# Patient Record
Sex: MALE | Race: WHITE | Employment: OTHER | URBAN - METROPOLITAN AREA
[De-identification: names, ages, dates, MRNs, and addresses within clinical notes are randomized per-mention and may not be internally consistent; named-entity substitution may affect disease eponyms.]

---

## 2017-04-29 ENCOUNTER — HOSPITAL ENCOUNTER (OUTPATIENT)
Dept: VASCULAR LAB | Age: 72
Discharge: HOME | End: 2017-04-29
Payer: COMMERCIAL

## 2017-04-29 PROCEDURE — 93880 EXTRACRANIAL BILAT STUDY: CPT

## 2018-03-15 ENCOUNTER — HOSPITAL ENCOUNTER (OUTPATIENT)
Age: 73
Discharge: HOME | End: 2018-03-17
Payer: COMMERCIAL

## 2018-03-15 ENCOUNTER — HOSPITAL ENCOUNTER (OUTPATIENT)
Dept: GENERAL RADIOLOGY | Age: 73
Discharge: HOME | End: 2018-03-17
Payer: COMMERCIAL

## 2018-03-15 DIAGNOSIS — J18.9 PNEUMONIA DUE TO INFECTIOUS ORGANISM, UNSPECIFIED LATERALITY, UNSPECIFIED PART OF LUNG: ICD-10-CM

## 2018-03-15 DIAGNOSIS — R05.9 COUGH: ICD-10-CM

## 2018-03-15 PROCEDURE — 71046 X-RAY EXAM CHEST 2 VIEWS: CPT

## 2018-04-09 ENCOUNTER — HOSPITAL ENCOUNTER (OUTPATIENT)
Age: 73
Discharge: HOME | End: 2018-04-09
Payer: COMMERCIAL

## 2018-04-09 LAB — VITAMIN B-12: 719 PG/ML (ref 232–1245)

## 2018-04-09 PROCEDURE — 82607 VITAMIN B-12: CPT

## 2018-04-09 PROCEDURE — 36415 COLL VENOUS BLD VENIPUNCTURE: CPT

## 2018-06-27 ENCOUNTER — OFFICE VISIT (OUTPATIENT)
Dept: GASTROENTEROLOGY | Age: 73
End: 2018-06-27
Payer: COMMERCIAL

## 2018-06-27 VITALS
OXYGEN SATURATION: 96 % | HEART RATE: 73 BPM | BODY MASS INDEX: 25.36 KG/M2 | HEIGHT: 75 IN | WEIGHT: 204 LBS | DIASTOLIC BLOOD PRESSURE: 82 MMHG | SYSTOLIC BLOOD PRESSURE: 169 MMHG

## 2018-06-27 DIAGNOSIS — R19.4 CHANGE IN BOWEL HABITS: ICD-10-CM

## 2018-06-27 DIAGNOSIS — K59.00 CONSTIPATION, UNSPECIFIED CONSTIPATION TYPE: ICD-10-CM

## 2018-06-27 DIAGNOSIS — K62.5 RECTAL BLEEDING: ICD-10-CM

## 2018-06-27 PROCEDURE — 99204 OFFICE O/P NEW MOD 45 MIN: CPT | Performed by: INTERNAL MEDICINE

## 2018-06-27 RX ORDER — POLYETHYLENE GLYCOL 3350 17 G/17G
POWDER, FOR SOLUTION ORAL
Qty: 255 G | Refills: 0 | Status: CANCELLED | OUTPATIENT
Start: 2018-06-27 | End: 2018-07-27

## 2018-06-27 RX ORDER — ATORVASTATIN CALCIUM 40 MG/1
40 TABLET, FILM COATED ORAL DAILY
COMMUNITY
Start: 2018-02-12 | End: 2019-07-23 | Stop reason: SDUPTHER

## 2018-06-27 ASSESSMENT — ENCOUNTER SYMPTOMS
TROUBLE SWALLOWING: 0
ABDOMINAL PAIN: 0
NAUSEA: 0
FACIAL SWELLING: 0
ROS SKIN COMMENTS: MANY TATTOOS
SHORTNESS OF BREATH: 0
WHEEZING: 0
ABDOMINAL DISTENTION: 0
VOMITING: 0
DIARRHEA: 0
SORE THROAT: 0
RECTAL PAIN: 0
CONSTIPATION: 1
RHINORRHEA: 0
SINUS PAIN: 1
BACK PAIN: 1
VOICE CHANGE: 0
SINUS PRESSURE: 0
BLOOD IN STOOL: 1
ANAL BLEEDING: 0
COUGH: 1

## 2018-06-27 NOTE — TELEPHONE ENCOUNTER
Bette, please change the prep instructions to golytely, after you call the patient, please forward this to Dr Figueroa Rojas to sign the prep.   Thanks

## 2018-07-16 ENCOUNTER — TELEPHONE (OUTPATIENT)
Dept: GASTROENTEROLOGY | Age: 73
End: 2018-07-16

## 2018-08-15 ENCOUNTER — TELEPHONE (OUTPATIENT)
Dept: GASTROENTEROLOGY | Age: 73
End: 2018-08-15

## 2018-08-29 ENCOUNTER — HOSPITAL ENCOUNTER (OUTPATIENT)
Dept: MRI IMAGING | Age: 73
Discharge: HOME | End: 2018-08-31
Payer: COMMERCIAL

## 2018-08-29 DIAGNOSIS — G60.9 IDIOPATHIC PERIPHERAL NEUROPATHY: ICD-10-CM

## 2018-08-29 PROCEDURE — 72148 MRI LUMBAR SPINE W/O DYE: CPT

## 2018-08-30 ENCOUNTER — TELEPHONE (OUTPATIENT)
Dept: GASTROENTEROLOGY | Age: 73
End: 2018-08-30

## 2018-08-30 NOTE — TELEPHONE ENCOUNTER
Spoke with patient and confirmed the following:  understanding of bowel prep, arrival time of 700am and need for  to and form for tomorrow's colon at [facility].

## 2018-08-31 ENCOUNTER — HOSPITAL ENCOUNTER (OUTPATIENT)
Age: 73
Setting detail: OUTPATIENT SURGERY
Discharge: HOME | End: 2018-08-31
Attending: INTERNAL MEDICINE | Admitting: INTERNAL MEDICINE
Payer: COMMERCIAL

## 2018-08-31 VITALS
OXYGEN SATURATION: 97 % | SYSTOLIC BLOOD PRESSURE: 103 MMHG | HEART RATE: 52 BPM | RESPIRATION RATE: 14 BRPM | BODY MASS INDEX: 24.87 KG/M2 | HEIGHT: 75 IN | DIASTOLIC BLOOD PRESSURE: 50 MMHG | TEMPERATURE: 97.5 F | WEIGHT: 200 LBS

## 2018-08-31 PROCEDURE — 2580000003 HC RX 258: Performed by: INTERNAL MEDICINE

## 2018-08-31 PROCEDURE — 99153 MOD SED SAME PHYS/QHP EA: CPT | Performed by: INTERNAL MEDICINE

## 2018-08-31 PROCEDURE — 7100000010 HC PHASE II RECOVERY - FIRST 15 MIN: Performed by: INTERNAL MEDICINE

## 2018-08-31 PROCEDURE — 7100000011 HC PHASE II RECOVERY - ADDTL 15 MIN: Performed by: INTERNAL MEDICINE

## 2018-08-31 PROCEDURE — 88305 TISSUE EXAM BY PATHOLOGIST: CPT

## 2018-08-31 PROCEDURE — 3609010400 HC COLONOSCOPY POLYPECTOMY HOT BIOPSY: Performed by: INTERNAL MEDICINE

## 2018-08-31 PROCEDURE — 99152 MOD SED SAME PHYS/QHP 5/>YRS: CPT | Performed by: INTERNAL MEDICINE

## 2018-08-31 PROCEDURE — 6360000002 HC RX W HCPCS: Performed by: INTERNAL MEDICINE

## 2018-08-31 PROCEDURE — C1773 RET DEV, INSERTABLE: HCPCS | Performed by: INTERNAL MEDICINE

## 2018-08-31 PROCEDURE — 2709999900 HC NON-CHARGEABLE SUPPLY: Performed by: INTERNAL MEDICINE

## 2018-08-31 RX ORDER — MIDAZOLAM HYDROCHLORIDE 1 MG/ML
INJECTION INTRAMUSCULAR; INTRAVENOUS PRN
Status: DISCONTINUED | OUTPATIENT
Start: 2018-08-31 | End: 2018-08-31 | Stop reason: HOSPADM

## 2018-08-31 RX ORDER — FENTANYL CITRATE 50 UG/ML
INJECTION, SOLUTION INTRAMUSCULAR; INTRAVENOUS PRN
Status: DISCONTINUED | OUTPATIENT
Start: 2018-08-31 | End: 2018-08-31 | Stop reason: HOSPADM

## 2018-08-31 RX ORDER — SODIUM CHLORIDE 9 MG/ML
INJECTION, SOLUTION INTRAVENOUS CONTINUOUS
Status: DISCONTINUED | OUTPATIENT
Start: 2018-08-31 | End: 2018-08-31 | Stop reason: HOSPADM

## 2018-08-31 RX ORDER — ALBUTEROL SULFATE 90 UG/1
2 AEROSOL, METERED RESPIRATORY (INHALATION) EVERY 6 HOURS PRN
COMMUNITY
End: 2019-06-27 | Stop reason: SDUPTHER

## 2018-08-31 RX ADMIN — SODIUM CHLORIDE: 9 INJECTION, SOLUTION INTRAVENOUS at 07:40

## 2018-08-31 ASSESSMENT — PAIN SCALES - GENERAL
PAINLEVEL_OUTOF10: 0

## 2018-08-31 ASSESSMENT — PAIN - FUNCTIONAL ASSESSMENT: PAIN_FUNCTIONAL_ASSESSMENT: 0-10

## 2018-08-31 NOTE — H&P
(36.2 °C) (Oral)   Resp 18   Ht 6' 3\" (1.905 m)   Wt 200 lb (90.7 kg)   SpO2 95%   BMI 25.00 kg/m²  Body mass index is 25 kg/m². GENERAL APPEARANCE:   Katya Coronado is 68 y.o.,  male, mildly obese, nourished, conscious, alert. Does not appear to be distress or pain at this time. SKIN:  Warm, dry, no cyanosis or jaundice. HEAD:  Normocephalic, atraumatic, no swelling or tenderness. EYES:  Pupils equal, reactive to light. EARS:  No discharge, hearing loss bilaterally. NOSE:  No rhinorrhea, epistaxis or septal deformity. THROAT: Teeth absent. Not congested. No ulceration bleeding or discharge. NECK:  No stiffness, trachea central.                 CHEST:  Symmetrical and equal on expansion. HEART:  RRR S1 > S2. No audible murmurs or gallops. LUNGS:  Equal on expansion, normal breath sounds. No wheezing, rhonchi or rales on exam. Productive chronic cough. ABDOMEN:  Obese. Soft on palpation. No localized tenderness. Surgical scars to abdomen. No guarding or rigidity. No palpable organomegaly. LYMPHATICS:  No palpable cervical lymphadenopathy. LOCOMOTOR, BACK AND SPINE:  No tenderness or deformities. EXTREMITIES:  Symmetrical, trace pedal edema. Nachos sign negative. No discoloration or ulcerations. Right sided weakness s/p CVA,  strength weaker on the right. NEUROLOGIC:  The patient is conscious, alert, oriented. PROVISIONAL DIAGNOSES / SURGERY:      Change in bowel habits, rectal bleeding.    Colonoscopy    Patient Active Problem List    Diagnosis Date Noted    Constipation 06/27/2018    Change in bowel habits 06/27/2018    Rectal bleeding 06/27/2018       ASA Classification:  Class 3 - A patient with severe systemic disease that limits activity but is not incapacitating    Mallampati Airway Assessment:  Mallampati Class II - (soft palate, fauces & uvula are visible)    I have examined this patient and reviewed the history and physical, vital signs, current medications and review of systems.     Electronically signed by Indira Yates MD on 8/31/2018 at 8:00 AM        RAGHAV Dickens - CNP on 8/31/2018 at 7:44 AM

## 2018-08-31 NOTE — OP NOTE
COLONOSCOPY    DATE OF PROCEDURE: 8/31/2018    SURGEON: Indira Yates MD    ASSISTANT: None    PREOPERATIVE DIAGNOSIS: History of rectal bleeding. This procedure is performed to evaluate colonic pathology    POSTOPERATIVE DIAGNOSIS: Polyp in the apex of the rectum Removed with snare and cautery techniqueand polyp in the left colon. No radiation induced proctitis. Few diverticuli. OPERATION: Total colonoscopy And excision of the polyp with biopsy forcepsFrom left colon, snare polypectomy From apex of the rectum. ANESTHESIA: Moderate sedation      ESTIMATED BLOOD LOSS: None    COMPLICATIONS: None     SPECIMENS:  Was Obtained: Polyp Of the rectum, polyp in the left colon    HISTORY: The patient is a 68y.o. year old male with history of above preop diagnosis. I recommended colonoscopy with possible biopsy or polypectomy and I explained the risk, benefits, expected outcome, and alternatives to the procedure. Risks included but are not limited to bleeding, infection, respiratory distress, hypotension, and perforation of the colon and possibility of missing a lesion. The patient understands and is in agreement. PROCEDURE:  The patient's SPO2 remained above 90% throughout the procedure. Digital rectal exam was normal.  The colonoscope was inserted through the anus into the rectum and advanced under direct vision to the cecum without difficulty. Terminal ileum was examined for approximately 2 inches. The prep was good. Findings:  Terminal ileum: normal    Cecum/Ascending colon: normal    Transverse colon: normal    Descending/Sigmoid colon: abnormal: In the left colon at about 80 cm from the anus there is a polyp which is about 3-4 mm. This polyp is excised with biopsy forceps. Rectum/Anus: examined in normal and retroflexed positions and was abnormal: In the apex of the rectum, underneath a fold, hiding, a polyp which is about 1.5 cm, sessile.   This polyp is completely removed using snare and cautery technique. Withdrawal Time was (minutes): 15          The colon was decompressed and the scope was removed. The patient tolerated the procedure and conscious sedation without unusual events. During the procedure, the patient's blood pressure, pulse and oxygen saturation remained stable and documented. No unusual events occurred during the procedure. Patient was transferred to recovery room and will be discharged when criteria is met. Recommendations/Plan:   1. F/U Biopsies  2. F/U In Office as instructed  3. Discussed with the family  4. High fiber diet   5. Precautions to avoid constipation     Next colonoscopy: 3 years.   If Colonoscopy is less than 10 years the recommended reason is due:polyps    Electronically signed by Noa Hendrickson MD  on 8/31/2018 at 8:30 AM

## 2018-09-04 LAB — SURGICAL PATHOLOGY REPORT: NORMAL

## 2018-09-20 ENCOUNTER — OFFICE VISIT (OUTPATIENT)
Dept: GASTROENTEROLOGY | Age: 73
End: 2018-09-20
Payer: COMMERCIAL

## 2018-09-20 VITALS
BODY MASS INDEX: 24.77 KG/M2 | WEIGHT: 198.2 LBS | SYSTOLIC BLOOD PRESSURE: 142 MMHG | DIASTOLIC BLOOD PRESSURE: 84 MMHG | HEART RATE: 68 BPM

## 2018-09-20 DIAGNOSIS — D36.9 TUBULAR ADENOMA: ICD-10-CM

## 2018-09-20 DIAGNOSIS — K59.00 CONSTIPATION, UNSPECIFIED CONSTIPATION TYPE: ICD-10-CM

## 2018-09-20 DIAGNOSIS — R19.4 CHANGE IN BOWEL HABITS: ICD-10-CM

## 2018-09-20 DIAGNOSIS — K62.5 RECTAL BLEEDING: Primary | ICD-10-CM

## 2018-09-20 PROCEDURE — 99213 OFFICE O/P EST LOW 20 MIN: CPT | Performed by: INTERNAL MEDICINE

## 2018-09-20 ASSESSMENT — ENCOUNTER SYMPTOMS
ANAL BLEEDING: 0
CHOKING: 0
EYE ITCHING: 1
CHEST TIGHTNESS: 0
ABDOMINAL PAIN: 0
DIARRHEA: 0
SHORTNESS OF BREATH: 0
WHEEZING: 1
BLOOD IN STOOL: 0
NAUSEA: 0
VOMITING: 0
ALLERGIC/IMMUNOLOGIC NEGATIVE: 1
COUGH: 0
RECTAL PAIN: 0
ABDOMINAL DISTENTION: 0
CONSTIPATION: 0
BACK PAIN: 1

## 2018-09-26 ENCOUNTER — HOSPITAL ENCOUNTER (OUTPATIENT)
Dept: MRI IMAGING | Age: 73
Discharge: HOME | End: 2018-09-28
Payer: COMMERCIAL

## 2018-09-26 DIAGNOSIS — G60.9 IDIOPATHIC PERIPHERAL NEUROPATHY: ICD-10-CM

## 2018-09-26 DIAGNOSIS — M54.2 NECK PAIN: ICD-10-CM

## 2018-09-26 DIAGNOSIS — M54.50 LUMBAR PAIN: ICD-10-CM

## 2018-09-26 PROCEDURE — 70551 MRI BRAIN STEM W/O DYE: CPT

## 2018-09-26 PROCEDURE — 72141 MRI NECK SPINE W/O DYE: CPT

## 2018-10-29 ENCOUNTER — HOSPITAL ENCOUNTER (OUTPATIENT)
Dept: MRI IMAGING | Age: 73
Discharge: HOME | End: 2018-10-31
Payer: COMMERCIAL

## 2018-10-29 DIAGNOSIS — R29.898 RIGHT LEG WEAKNESS: ICD-10-CM

## 2018-10-29 DIAGNOSIS — M54.50 LUMBAR PAIN: ICD-10-CM

## 2018-10-29 DIAGNOSIS — M54.2 NECK PAIN: ICD-10-CM

## 2018-10-29 PROCEDURE — 72146 MRI CHEST SPINE W/O DYE: CPT

## 2019-01-29 ENCOUNTER — OFFICE VISIT (OUTPATIENT)
Dept: PODIATRY | Age: 74
End: 2019-01-29
Payer: COMMERCIAL

## 2019-01-29 VITALS — WEIGHT: 200 LBS | HEIGHT: 75 IN | BODY MASS INDEX: 24.87 KG/M2

## 2019-01-29 DIAGNOSIS — M79.674 PAIN OF TOES OF BOTH FEET: ICD-10-CM

## 2019-01-29 DIAGNOSIS — G60.3 IDIOPATHIC PROGRESSIVE NEUROPATHY: ICD-10-CM

## 2019-01-29 DIAGNOSIS — B35.1 ONYCHOMYCOSIS OF TOENAIL: Primary | ICD-10-CM

## 2019-01-29 DIAGNOSIS — L84 CORNS AND CALLOSITIES: ICD-10-CM

## 2019-01-29 DIAGNOSIS — M79.675 PAIN OF TOES OF BOTH FEET: ICD-10-CM

## 2019-01-29 DIAGNOSIS — M79.672 PAIN IN LEFT FOOT: ICD-10-CM

## 2019-01-29 PROCEDURE — 99203 OFFICE O/P NEW LOW 30 MIN: CPT | Performed by: PODIATRIST

## 2019-01-29 PROCEDURE — 11056 PARNG/CUTG B9 HYPRKR LES 2-4: CPT | Performed by: PODIATRIST

## 2019-01-29 PROCEDURE — 11721 DEBRIDE NAIL 6 OR MORE: CPT | Performed by: PODIATRIST

## 2019-01-29 RX ORDER — LISINOPRIL AND HYDROCHLOROTHIAZIDE 12.5; 1 MG/1; MG/1
1 TABLET ORAL DAILY
Refills: 0 | COMMUNITY
Start: 2019-01-12 | End: 2019-07-23 | Stop reason: SDUPTHER

## 2019-01-29 RX ORDER — GABAPENTIN 800 MG/1
800 TABLET ORAL 3 TIMES DAILY
Refills: 0 | Status: ON HOLD | COMMUNITY
Start: 2019-01-16 | End: 2021-11-02 | Stop reason: HOSPADM

## 2019-01-29 ASSESSMENT — ENCOUNTER SYMPTOMS
COLOR CHANGE: 0
NAUSEA: 0
BACK PAIN: 0
DIARRHEA: 0
SHORTNESS OF BREATH: 0

## 2019-04-08 ENCOUNTER — HOSPITAL ENCOUNTER (OUTPATIENT)
Age: 74
Discharge: HOME | End: 2019-04-10
Payer: COMMERCIAL

## 2019-04-08 ENCOUNTER — HOSPITAL ENCOUNTER (OUTPATIENT)
Age: 74
Discharge: HOME | End: 2019-04-08
Payer: COMMERCIAL

## 2019-04-08 ENCOUNTER — HOSPITAL ENCOUNTER (OUTPATIENT)
Dept: NON INVASIVE DIAGNOSTICS | Age: 74
Discharge: HOME | End: 2019-04-08
Payer: COMMERCIAL

## 2019-04-08 ENCOUNTER — HOSPITAL ENCOUNTER (OUTPATIENT)
Dept: GENERAL RADIOLOGY | Age: 74
Discharge: HOME | End: 2019-04-10
Payer: COMMERCIAL

## 2019-04-08 DIAGNOSIS — R06.00 DYSPNEA, UNSPECIFIED TYPE: ICD-10-CM

## 2019-04-08 LAB
ALLEN TEST: ABNORMAL
CARBOXYHEMOGLOBIN: 4.1 % (ref 0–5)
FIO2: ABNORMAL
HCO3 ARTERIAL: 25.5 MMOL/L (ref 22–26)
LV EF: 63 %
LVEF MODALITY: NORMAL
METHEMOGLOBIN: 0.3 % (ref 0–1.9)
MODE: ABNORMAL
NEGATIVE BASE EXCESS, ART: ABNORMAL MMOL/L (ref 0–2)
NOTIFICATION TIME: ABNORMAL
NOTIFICATION: ABNORMAL
O2 DEVICE/FLOW/%: ABNORMAL
O2 SAT, ARTERIAL: 90.6 % (ref 95–98)
OXYHEMOGLOBIN: ABNORMAL % (ref 95–98)
PATIENT TEMP: 37
PCO2 ARTERIAL: 39.7 MMHG (ref 35–45)
PCO2, ART, TEMP ADJ: ABNORMAL (ref 35–45)
PEEP/CPAP: ABNORMAL
PH ARTERIAL: 7.42 (ref 7.35–7.45)
PH, ART, TEMP ADJ: ABNORMAL (ref 7.35–7.45)
PO2 ARTERIAL: 67.2 MMHG (ref 80–100)
PO2, ART, TEMP ADJ: ABNORMAL MMHG (ref 80–100)
POSITIVE BASE EXCESS, ART: 0.9 MMOL/L (ref 0–2)
PSV: ABNORMAL
PT. POSITION: ABNORMAL
RESPIRATORY RATE: ABNORMAL
SAMPLE SITE: ABNORMAL
SET RATE: ABNORMAL
TEXT FOR RESPIRATORY: ABNORMAL
TOTAL HB: ABNORMAL G/DL (ref 12–16)
TOTAL RATE: ABNORMAL
VT: ABNORMAL

## 2019-04-08 PROCEDURE — 71046 X-RAY EXAM CHEST 2 VIEWS: CPT

## 2019-04-08 PROCEDURE — 36600 WITHDRAWAL OF ARTERIAL BLOOD: CPT

## 2019-04-08 PROCEDURE — 93306 TTE W/DOPPLER COMPLETE: CPT

## 2019-04-08 PROCEDURE — 82805 BLOOD GASES W/O2 SATURATION: CPT

## 2019-04-08 PROCEDURE — 93005 ELECTROCARDIOGRAM TRACING: CPT

## 2019-05-15 LAB
EKG ATRIAL RATE: 76 BPM
EKG P AXIS: 72 DEGREES
EKG P-R INTERVAL: 324 MS
EKG Q-T INTERVAL: 392 MS
EKG QRS DURATION: 84 MS
EKG QTC CALCULATION (BAZETT): 441 MS
EKG R AXIS: 78 DEGREES
EKG T AXIS: 74 DEGREES
EKG VENTRICULAR RATE: 76 BPM

## 2019-06-27 ENCOUNTER — OFFICE VISIT (OUTPATIENT)
Dept: INTERNAL MEDICINE CLINIC | Age: 74
End: 2019-06-27
Payer: COMMERCIAL

## 2019-06-27 VITALS
DIASTOLIC BLOOD PRESSURE: 102 MMHG | HEART RATE: 80 BPM | HEIGHT: 75 IN | WEIGHT: 204 LBS | OXYGEN SATURATION: 97 % | BODY MASS INDEX: 25.36 KG/M2 | SYSTOLIC BLOOD PRESSURE: 170 MMHG

## 2019-06-27 DIAGNOSIS — I48.91 ATRIAL FIBRILLATION, UNSPECIFIED TYPE (HCC): Primary | ICD-10-CM

## 2019-06-27 DIAGNOSIS — E11.8 TYPE 2 DIABETES MELLITUS WITH COMPLICATION, UNSPECIFIED WHETHER LONG TERM INSULIN USE: ICD-10-CM

## 2019-06-27 DIAGNOSIS — I63.9 CEREBROVASCULAR ACCIDENT (CVA), UNSPECIFIED MECHANISM (HCC): ICD-10-CM

## 2019-06-27 DIAGNOSIS — I50.9 CONGESTIVE HEART FAILURE, UNSPECIFIED HF CHRONICITY, UNSPECIFIED HEART FAILURE TYPE (HCC): ICD-10-CM

## 2019-06-27 PROCEDURE — 99203 OFFICE O/P NEW LOW 30 MIN: CPT | Performed by: INTERNAL MEDICINE

## 2019-06-27 RX ORDER — AMLODIPINE BESYLATE 10 MG/1
10 TABLET ORAL DAILY
Qty: 30 TABLET | Refills: 3 | Status: SHIPPED | OUTPATIENT
Start: 2019-06-27 | End: 2019-10-16 | Stop reason: SDUPTHER

## 2019-06-27 RX ORDER — ALBUTEROL SULFATE 90 UG/1
2 AEROSOL, METERED RESPIRATORY (INHALATION) EVERY 6 HOURS PRN
Qty: 3 INHALER | Refills: 6 | Status: ON HOLD | OUTPATIENT
Start: 2019-06-27 | End: 2021-08-03 | Stop reason: ALTCHOICE

## 2019-06-27 ASSESSMENT — PATIENT HEALTH QUESTIONNAIRE - PHQ9
SUM OF ALL RESPONSES TO PHQ QUESTIONS 1-9: 0
SUM OF ALL RESPONSES TO PHQ QUESTIONS 1-9: 0
SUM OF ALL RESPONSES TO PHQ9 QUESTIONS 1 & 2: 0
2. FEELING DOWN, DEPRESSED OR HOPELESS: 0
1. LITTLE INTEREST OR PLEASURE IN DOING THINGS: 0

## 2019-07-05 ENCOUNTER — HOSPITAL ENCOUNTER (OUTPATIENT)
Dept: VASCULAR LAB | Age: 74
Discharge: HOME | End: 2019-07-05
Payer: COMMERCIAL

## 2019-07-05 DIAGNOSIS — I63.9 CEREBROVASCULAR ACCIDENT (CVA), UNSPECIFIED MECHANISM (HCC): ICD-10-CM

## 2019-07-05 PROCEDURE — 93880 EXTRACRANIAL BILAT STUDY: CPT

## 2019-07-12 ENCOUNTER — TELEPHONE (OUTPATIENT)
Dept: INTERNAL MEDICINE CLINIC | Age: 74
End: 2019-07-12

## 2019-07-12 DIAGNOSIS — I63.9 CEREBROVASCULAR ACCIDENT (CVA), UNSPECIFIED MECHANISM (HCC): Primary | ICD-10-CM

## 2019-07-17 ENCOUNTER — TELEPHONE (OUTPATIENT)
Dept: INTERNAL MEDICINE CLINIC | Age: 74
End: 2019-07-17

## 2019-07-17 DIAGNOSIS — I48.91 ATRIAL FIBRILLATION, UNSPECIFIED TYPE (HCC): Primary | ICD-10-CM

## 2019-07-17 DIAGNOSIS — I50.9 CONGESTIVE HEART FAILURE, UNSPECIFIED HF CHRONICITY, UNSPECIFIED HEART FAILURE TYPE (HCC): ICD-10-CM

## 2019-07-17 DIAGNOSIS — I63.9 CEREBROVASCULAR ACCIDENT (CVA), UNSPECIFIED MECHANISM (HCC): ICD-10-CM

## 2019-07-17 NOTE — TELEPHONE ENCOUNTER
Patients daughter states per the physical therapist for her dad, he is need of a rolator walker. Could you please send an order to [facility] for this?

## 2019-07-23 NOTE — TELEPHONE ENCOUNTER
Medication: lisinopril, lipitor  Last visit: 6/27/19  Next visit: 8/1/2019  Last refill: 1/2019  Pharmacy: [facility] out of meds.

## 2019-07-24 RX ORDER — LISINOPRIL AND HYDROCHLOROTHIAZIDE 12.5; 1 MG/1; MG/1
1 TABLET ORAL DAILY
Qty: 30 TABLET | Refills: 5 | Status: ON HOLD | OUTPATIENT
Start: 2019-07-24 | End: 2021-06-07 | Stop reason: HOSPADM

## 2019-07-24 RX ORDER — ATORVASTATIN CALCIUM 40 MG/1
40 TABLET, FILM COATED ORAL DAILY
Qty: 30 TABLET | Refills: 5 | Status: SHIPPED | OUTPATIENT
Start: 2019-07-24 | End: 2021-10-22

## 2019-07-26 ENCOUNTER — TELEPHONE (OUTPATIENT)
Dept: INTERNAL MEDICINE CLINIC | Age: 74
End: 2019-07-26

## 2019-08-01 ENCOUNTER — OFFICE VISIT (OUTPATIENT)
Dept: INTERNAL MEDICINE CLINIC | Age: 74
End: 2019-08-01
Payer: COMMERCIAL

## 2019-08-01 VITALS
HEIGHT: 75 IN | BODY MASS INDEX: 25.74 KG/M2 | DIASTOLIC BLOOD PRESSURE: 82 MMHG | SYSTOLIC BLOOD PRESSURE: 154 MMHG | WEIGHT: 207 LBS

## 2019-08-01 DIAGNOSIS — I50.9 CONGESTIVE HEART FAILURE, UNSPECIFIED HF CHRONICITY, UNSPECIFIED HEART FAILURE TYPE (HCC): Primary | ICD-10-CM

## 2019-08-01 DIAGNOSIS — I63.9 CEREBROVASCULAR ACCIDENT (CVA), UNSPECIFIED MECHANISM (HCC): ICD-10-CM

## 2019-08-01 PROCEDURE — 99214 OFFICE O/P EST MOD 30 MIN: CPT | Performed by: INTERNAL MEDICINE

## 2019-08-01 RX ORDER — FUROSEMIDE 20 MG/1
20 TABLET ORAL DAILY
Qty: 60 TABLET | Refills: 3 | Status: SHIPPED | OUTPATIENT
Start: 2019-08-01 | End: 2019-09-11 | Stop reason: SDUPTHER

## 2019-08-01 RX ORDER — POTASSIUM CHLORIDE 20 MEQ/1
20 TABLET, EXTENDED RELEASE ORAL DAILY
Qty: 30 TABLET | Refills: 5 | Status: SHIPPED | OUTPATIENT
Start: 2019-08-01 | End: 2021-05-24 | Stop reason: DRUGHIGH

## 2019-08-05 ENCOUNTER — TELEPHONE (OUTPATIENT)
Dept: INTERNAL MEDICINE CLINIC | Age: 74
End: 2019-08-05

## 2019-08-14 ENCOUNTER — TELEPHONE (OUTPATIENT)
Dept: PAIN MANAGEMENT | Age: 74
End: 2019-08-14

## 2019-08-14 DIAGNOSIS — M21.969 DEFORMITY OF FOOT, UNSPECIFIED LATERALITY: ICD-10-CM

## 2019-08-14 DIAGNOSIS — E11.8 TYPE 2 DIABETES MELLITUS WITH COMPLICATION, WITHOUT LONG-TERM CURRENT USE OF INSULIN (HCC): Primary | ICD-10-CM

## 2019-08-14 NOTE — TELEPHONE ENCOUNTER
PATIENT CALLED STATING HE HAD A STROKE SO HE HASNT BEEN IN TO SEE YOU SINCE 01/2019. HE WAS RECENTLY DX WITH TYPE 2 DM ITH COMPLICATION, UNSPECIFIED WHETHER LONG-TERM INSULIN USE. PATIENT INFORMED ABOUT DM SHOES AND WOULD LIKE TO START THIS PROCESS.  HE DOES HAVE INSURANCE THAT MAKES HIM GO OUTSIDE THE OFFICE

## 2019-08-15 ENCOUNTER — HOSPITAL ENCOUNTER (OUTPATIENT)
Age: 74
Setting detail: SPECIMEN
Discharge: HOME | End: 2019-08-15
Payer: COMMERCIAL

## 2019-08-15 ENCOUNTER — TELEPHONE (OUTPATIENT)
Dept: INTERNAL MEDICINE CLINIC | Age: 74
End: 2019-08-15

## 2019-08-15 DIAGNOSIS — I50.9 CONGESTIVE HEART FAILURE, UNSPECIFIED HF CHRONICITY, UNSPECIFIED HEART FAILURE TYPE (HCC): ICD-10-CM

## 2019-08-15 LAB
ABSOLUTE EOS #: 0.32 K/UL (ref 0–0.44)
ABSOLUTE IMMATURE GRANULOCYTE: 0.04 K/UL (ref 0–0.3)
ABSOLUTE LYMPH #: 1.89 K/UL (ref 1.1–3.7)
ABSOLUTE MONO #: 0.67 K/UL (ref 0.1–1.2)
ANION GAP SERPL CALCULATED.3IONS-SCNC: 16 MMOL/L (ref 9–17)
BASOPHILS # BLD: 1 % (ref 0–2)
BASOPHILS ABSOLUTE: 0.06 K/UL (ref 0–0.2)
BUN BLDV-MCNC: 20 MG/DL (ref 8–23)
BUN/CREAT BLD: ABNORMAL (ref 9–20)
CALCIUM SERPL-MCNC: 9.7 MG/DL (ref 8.6–10.4)
CHLORIDE BLD-SCNC: 99 MMOL/L (ref 98–107)
CHOLESTEROL/HDL RATIO: 1.9
CHOLESTEROL: 132 MG/DL
CO2: 26 MMOL/L (ref 20–31)
CREAT SERPL-MCNC: 1.01 MG/DL (ref 0.7–1.2)
DIFFERENTIAL TYPE: ABNORMAL
EOSINOPHILS RELATIVE PERCENT: 4 % (ref 1–4)
GFR AFRICAN AMERICAN: >60 ML/MIN
GFR NON-AFRICAN AMERICAN: >60 ML/MIN
GFR SERPL CREATININE-BSD FRML MDRD: ABNORMAL ML/MIN/{1.73_M2}
GFR SERPL CREATININE-BSD FRML MDRD: ABNORMAL ML/MIN/{1.73_M2}
GLUCOSE BLD-MCNC: 101 MG/DL (ref 70–99)
HCT VFR BLD CALC: 42.3 % (ref 40.7–50.3)
HDLC SERPL-MCNC: 70 MG/DL
HEMOGLOBIN: 13.8 G/DL (ref 13–17)
IMMATURE GRANULOCYTES: 1 %
LDL CHOLESTEROL: 51 MG/DL (ref 0–130)
LYMPHOCYTES # BLD: 23 % (ref 24–43)
MCH RBC QN AUTO: 30.2 PG (ref 25.2–33.5)
MCHC RBC AUTO-ENTMCNC: 32.6 G/DL (ref 28.4–34.8)
MCV RBC AUTO: 92.6 FL (ref 82.6–102.9)
MONOCYTES # BLD: 8 % (ref 3–12)
NRBC AUTOMATED: 0 PER 100 WBC
PDW BLD-RTO: 13.4 % (ref 11.8–14.4)
PLATELET # BLD: 218 K/UL (ref 138–453)
PLATELET ESTIMATE: ABNORMAL
PMV BLD AUTO: 9.5 FL (ref 8.1–13.5)
POTASSIUM SERPL-SCNC: 4.4 MMOL/L (ref 3.7–5.3)
RBC # BLD: 4.57 M/UL (ref 4.21–5.77)
RBC # BLD: ABNORMAL 10*6/UL
SEG NEUTROPHILS: 63 % (ref 36–65)
SEGMENTED NEUTROPHILS ABSOLUTE COUNT: 5.28 K/UL (ref 1.5–8.1)
SODIUM BLD-SCNC: 141 MMOL/L (ref 135–144)
TRIGL SERPL-MCNC: 54 MG/DL
VLDLC SERPL CALC-MCNC: NORMAL MG/DL (ref 1–30)
WBC # BLD: 8.3 K/UL (ref 3.5–11.3)
WBC # BLD: ABNORMAL 10*3/UL

## 2019-08-16 LAB
ESTIMATED AVERAGE GLUCOSE: 128 MG/DL
HBA1C MFR BLD: 6.1 % (ref 4–6)

## 2019-08-23 ENCOUNTER — TELEPHONE (OUTPATIENT)
Dept: INTERNAL MEDICINE CLINIC | Age: 74
End: 2019-08-23

## 2019-08-23 DIAGNOSIS — R26.81 UNSTEADY GAIT: ICD-10-CM

## 2019-08-23 DIAGNOSIS — I63.9 CEREBROVASCULAR ACCIDENT (CVA), UNSPECIFIED MECHANISM (HCC): Primary | ICD-10-CM

## 2019-08-23 NOTE — TELEPHONE ENCOUNTER
[facility] contacted office in request for new order for Rolator, date needs to be on or after face to face visit

## 2019-09-11 ENCOUNTER — OFFICE VISIT (OUTPATIENT)
Dept: INTERNAL MEDICINE CLINIC | Age: 74
End: 2019-09-11
Payer: COMMERCIAL

## 2019-09-11 VITALS
SYSTOLIC BLOOD PRESSURE: 132 MMHG | WEIGHT: 203 LBS | DIASTOLIC BLOOD PRESSURE: 80 MMHG | BODY MASS INDEX: 25.24 KG/M2 | HEIGHT: 75 IN

## 2019-09-11 DIAGNOSIS — E11.42 DIABETIC POLYNEUROPATHY ASSOCIATED WITH TYPE 2 DIABETES MELLITUS (HCC): ICD-10-CM

## 2019-09-11 DIAGNOSIS — M21.371 FOOT DROP, RIGHT FOOT: ICD-10-CM

## 2019-09-11 DIAGNOSIS — I50.9 CONGESTIVE HEART FAILURE, UNSPECIFIED HF CHRONICITY, UNSPECIFIED HEART FAILURE TYPE (HCC): ICD-10-CM

## 2019-09-11 DIAGNOSIS — E11.8 TYPE 2 DIABETES MELLITUS WITH COMPLICATION, UNSPECIFIED WHETHER LONG TERM INSULIN USE: Primary | ICD-10-CM

## 2019-09-11 PROCEDURE — 99214 OFFICE O/P EST MOD 30 MIN: CPT | Performed by: INTERNAL MEDICINE

## 2019-09-11 RX ORDER — FUROSEMIDE 20 MG/1
40 TABLET ORAL DAILY
Qty: 60 TABLET | Refills: 3 | Status: SHIPPED | OUTPATIENT
Start: 2019-09-11 | End: 2021-05-24

## 2019-09-11 NOTE — PROGRESS NOTES
Subjective:      Patient ID: Panchito Faustin is a 69 y.o. male. Chronic Disease Visit Information    BP Readings from Last 3 Encounters:   19 132/80   19 (!) 154/82   19 (!) 170/102          Hemoglobin A1C (%)   Date Value   08/15/2019 6.1 (H)   2014 5.6     LDL Cholesterol (mg/dL)   Date Value   08/15/2019 51     HDL (mg/dL)   Date Value   08/15/2019 70     BUN (mg/dL)   Date Value   08/15/2019 20     CREATININE (mg/dL)   Date Value   08/15/2019 1.01     Glucose (mg/dL)   Date Value   08/15/2019 101 (H)            Have you changed or started any medications since your last visit including any over-the-counter medicines, vitamins, or herbal medicines? no   Are you having any side effects from any of your medications? -  no  Have you stopped taking any of your medications? Is so, why? -  no    Have you seen any other physician or provider since your last visit? No  Have you had any other diagnostic tests since your last visit? No  Have you been seen in the emergency room and/or had an admission to a hospital since we last saw you? No  Have you had your annual diabetic retinal (eye) exam? No  Have you had your routine dental cleaning in the past 6 months? no    Have you activated your [employer] account? If not, what are your barriers? No:      Patient Care Team:  Krystal Linares MD as PCP - General (Internal Medicine)  Krystal Linares MD as PCP - RE[facility]ABIL[facility]ATION [facility]  Mary Perkins MD as Consulting Physician (Gastroenterology)     Chief Complaint   Patient presents with    Diabetes     pt with drop foot on the right side. Needs right foot brace/boot. Would also benefit from diabetic shoes.                Medical History Review  Past Medical, Family, and Social History reviewed and does contribute to the patient presenting condition    Health Maintenance   Topic Date Due    AAA screen  1945    Hepatitis C screen  1945    Low dose CT lung screening  08/10/2001 Exam    Assessment:       Diagnosis Orders   1. Type 2 diabetes mellitus with complication, unspecified whether long term insulin use ([facility]  Diabetic Shoe   2. Foot drop, right foot  DME Order for (Specify) as OP   3.  Diabetic polyneuropathy associated with type 2 diabetes mellitus ([facility]       DM with severe neuropathy would benefit from diabetic shoes   CVA hx with right foot drop - order for brace       Plan:              [place] MD

## 2019-10-16 RX ORDER — AMLODIPINE BESYLATE 10 MG/1
TABLET ORAL
Qty: 30 TABLET | Refills: 3 | Status: ON HOLD | OUTPATIENT
Start: 2019-10-16 | End: 2020-04-06 | Stop reason: HOSPADM

## 2020-04-03 ENCOUNTER — HOSPITAL ENCOUNTER (INPATIENT)
Age: 75
LOS: 3 days | Discharge: HOME | DRG: 300 | End: 2020-04-06
Attending: INTERNAL MEDICINE | Admitting: INTERNAL MEDICINE
Payer: COMMERCIAL

## 2020-04-03 ENCOUNTER — APPOINTMENT (OUTPATIENT)
Dept: CT IMAGING | Age: 75
End: 2020-04-03
Payer: COMMERCIAL

## 2020-04-03 ENCOUNTER — HOSPITAL ENCOUNTER (EMERGENCY)
Age: 75
Discharge: ANOTHER HOSPITAL | End: 2020-04-03
Attending: EMERGENCY MEDICINE
Payer: COMMERCIAL

## 2020-04-03 VITALS
DIASTOLIC BLOOD PRESSURE: 72 MMHG | OXYGEN SATURATION: 92 % | WEIGHT: 210 LBS | HEART RATE: 66 BPM | SYSTOLIC BLOOD PRESSURE: 111 MMHG | BODY MASS INDEX: 26.25 KG/M2 | RESPIRATION RATE: 18 BRPM | TEMPERATURE: 97.7 F

## 2020-04-03 PROBLEM — I71.00 AORTIC DISSECTION (HCC): Status: ACTIVE | Noted: 2020-04-03

## 2020-04-03 LAB
ABSOLUTE EOS #: 0.2 K/UL (ref 0–0.4)
ABSOLUTE IMMATURE GRANULOCYTE: ABNORMAL K/UL (ref 0–0.3)
ABSOLUTE LYMPH #: 1.6 K/UL (ref 1–4.8)
ABSOLUTE MONO #: 0.8 K/UL (ref 0.1–1.3)
ALBUMIN SERPL-MCNC: 4.4 G/DL (ref 3.5–5.2)
ALBUMIN/GLOBULIN RATIO: ABNORMAL (ref 1–2.5)
ALP BLD-CCNC: 76 U/L (ref 40–129)
ALT SERPL-CCNC: 17 U/L (ref 5–41)
ANION GAP SERPL CALCULATED.3IONS-SCNC: 14 MMOL/L (ref 9–17)
AST SERPL-CCNC: 17 U/L
BASOPHILS # BLD: 1 % (ref 0–2)
BASOPHILS ABSOLUTE: 0.1 K/UL (ref 0–0.2)
BILIRUB SERPL-MCNC: 1.01 MG/DL (ref 0.3–1.2)
BUN BLDV-MCNC: 23 MG/DL (ref 8–23)
BUN/CREAT BLD: ABNORMAL (ref 9–20)
CALCIUM SERPL-MCNC: 10 MG/DL (ref 8.6–10.4)
CHLORIDE BLD-SCNC: 100 MMOL/L (ref 98–107)
CO2: 27 MMOL/L (ref 20–31)
CREAT SERPL-MCNC: 0.89 MG/DL (ref 0.7–1.2)
DIFFERENTIAL TYPE: ABNORMAL
EOSINOPHILS RELATIVE PERCENT: 2 % (ref 0–4)
GFR AFRICAN AMERICAN: >60 ML/MIN
GFR NON-AFRICAN AMERICAN: >60 ML/MIN
GFR SERPL CREATININE-BSD FRML MDRD: ABNORMAL ML/MIN/{1.73_M2}
GFR SERPL CREATININE-BSD FRML MDRD: ABNORMAL ML/MIN/{1.73_M2}
GLUCOSE BLD-MCNC: 101 MG/DL (ref 70–99)
HCT VFR BLD CALC: 37.4 % (ref 41–53)
HEMOGLOBIN: 12.7 G/DL (ref 13.5–17.5)
IMMATURE GRANULOCYTES: ABNORMAL %
INR BLD: 1
LACTIC ACID: 0.9 MMOL/L (ref 0.5–2.2)
LIPASE: 31 U/L (ref 13–60)
LYMPHOCYTES # BLD: 15 % (ref 24–44)
MCH RBC QN AUTO: 30.4 PG (ref 26–34)
MCHC RBC AUTO-ENTMCNC: 33.9 G/DL (ref 31–37)
MCV RBC AUTO: 89.7 FL (ref 80–100)
MONOCYTES # BLD: 7 % (ref 1–7)
NRBC AUTOMATED: ABNORMAL PER 100 WBC
PDW BLD-RTO: 13.3 % (ref 11.5–14.9)
PLATELET # BLD: 214 K/UL (ref 150–450)
PLATELET ESTIMATE: ABNORMAL
PMV BLD AUTO: 7.1 FL (ref 6–12)
POTASSIUM SERPL-SCNC: 3.9 MMOL/L (ref 3.7–5.3)
PROTHROMBIN TIME: 13.3 SEC (ref 11.8–14.6)
RBC # BLD: 4.17 M/UL (ref 4.5–5.9)
RBC # BLD: ABNORMAL 10*6/UL
SEG NEUTROPHILS: 75 % (ref 36–66)
SEGMENTED NEUTROPHILS ABSOLUTE COUNT: 7.7 K/UL (ref 1.3–9.1)
SODIUM BLD-SCNC: 141 MMOL/L (ref 135–144)
TOTAL PROTEIN: 7.6 G/DL (ref 6.4–8.3)
WBC # BLD: 10.3 K/UL (ref 3.5–11)
WBC # BLD: ABNORMAL 10*3/UL

## 2020-04-03 PROCEDURE — 96376 TX/PRO/DX INJ SAME DRUG ADON: CPT

## 2020-04-03 PROCEDURE — 85610 PROTHROMBIN TIME: CPT

## 2020-04-03 PROCEDURE — 2500000003 HC RX 250 WO HCPCS: Performed by: NURSE PRACTITIONER

## 2020-04-03 PROCEDURE — 93005 ELECTROCARDIOGRAM TRACING: CPT | Performed by: NURSE PRACTITIONER

## 2020-04-03 PROCEDURE — 74177 CT ABD & PELVIS W/CONTRAST: CPT

## 2020-04-03 PROCEDURE — 83605 ASSAY OF LACTIC ACID: CPT

## 2020-04-03 PROCEDURE — 99222 1ST HOSP IP/OBS MODERATE 55: CPT | Performed by: INTERNAL MEDICINE

## 2020-04-03 PROCEDURE — 1200000000 HC SEMI PRIVATE

## 2020-04-03 PROCEDURE — 2580000003 HC RX 258: Performed by: NURSE PRACTITIONER

## 2020-04-03 PROCEDURE — 36415 COLL VENOUS BLD VENIPUNCTURE: CPT

## 2020-04-03 PROCEDURE — 2580000003 HC RX 258: Performed by: EMERGENCY MEDICINE

## 2020-04-03 PROCEDURE — 83690 ASSAY OF LIPASE: CPT

## 2020-04-03 PROCEDURE — 96372 THER/PROPH/DIAG INJ SC/IM: CPT

## 2020-04-03 PROCEDURE — 96375 TX/PRO/DX INJ NEW DRUG ADDON: CPT

## 2020-04-03 PROCEDURE — 6360000004 HC RX CONTRAST MEDICATION: Performed by: EMERGENCY MEDICINE

## 2020-04-03 PROCEDURE — 94761 N-INVAS EAR/PLS OXIMETRY MLT: CPT

## 2020-04-03 PROCEDURE — 80053 COMPREHEN METABOLIC PANEL: CPT

## 2020-04-03 PROCEDURE — 85025 COMPLETE CBC W/AUTO DIFF WBC: CPT

## 2020-04-03 PROCEDURE — 99285 EMERGENCY DEPT VISIT HI MDM: CPT

## 2020-04-03 PROCEDURE — 6360000002 HC RX W HCPCS: Performed by: NURSE PRACTITIONER

## 2020-04-03 PROCEDURE — 6360000002 HC RX W HCPCS: Performed by: EMERGENCY MEDICINE

## 2020-04-03 RX ORDER — ATORVASTATIN CALCIUM 40 MG/1
40 TABLET, FILM COATED ORAL NIGHTLY
Status: DISCONTINUED | OUTPATIENT
Start: 2020-04-03 | End: 2020-04-06 | Stop reason: HOSPADM

## 2020-04-03 RX ORDER — ASPIRIN 81 MG/1
81 TABLET ORAL DAILY
Status: DISCONTINUED | OUTPATIENT
Start: 2020-04-03 | End: 2020-04-04

## 2020-04-03 RX ORDER — MORPHINE SULFATE 4 MG/ML
4 INJECTION, SOLUTION INTRAMUSCULAR; INTRAVENOUS EVERY 4 HOURS PRN
Status: DISCONTINUED | OUTPATIENT
Start: 2020-04-03 | End: 2020-04-06 | Stop reason: HOSPADM

## 2020-04-03 RX ORDER — FUROSEMIDE 40 MG/1
40 TABLET ORAL DAILY
Status: DISCONTINUED | OUTPATIENT
Start: 2020-04-03 | End: 2020-04-04

## 2020-04-03 RX ORDER — LISINOPRIL AND HYDROCHLOROTHIAZIDE 12.5; 1 MG/1; MG/1
1 TABLET ORAL DAILY
Status: DISCONTINUED | OUTPATIENT
Start: 2020-04-03 | End: 2020-04-04

## 2020-04-03 RX ORDER — MAGNESIUM SULFATE 1 G/100ML
1 INJECTION INTRAVENOUS PRN
Status: DISCONTINUED | OUTPATIENT
Start: 2020-04-03 | End: 2020-04-06 | Stop reason: HOSPADM

## 2020-04-03 RX ORDER — DICYCLOMINE HYDROCHLORIDE 10 MG/ML
20 INJECTION INTRAMUSCULAR ONCE
Status: COMPLETED | OUTPATIENT
Start: 2020-04-03 | End: 2020-04-03

## 2020-04-03 RX ORDER — NICOTINE 21 MG/24HR
1 PATCH, TRANSDERMAL 24 HOURS TRANSDERMAL DAILY PRN
Status: DISCONTINUED | OUTPATIENT
Start: 2020-04-03 | End: 2020-04-06 | Stop reason: HOSPADM

## 2020-04-03 RX ORDER — GABAPENTIN 800 MG/1
800 TABLET ORAL 3 TIMES DAILY
Status: DISCONTINUED | OUTPATIENT
Start: 2020-04-03 | End: 2020-04-06 | Stop reason: HOSPADM

## 2020-04-03 RX ORDER — SODIUM CHLORIDE 9 MG/ML
INJECTION, SOLUTION INTRAVENOUS CONTINUOUS
Status: DISCONTINUED | OUTPATIENT
Start: 2020-04-03 | End: 2020-04-03 | Stop reason: HOSPADM

## 2020-04-03 RX ORDER — ONDANSETRON 2 MG/ML
4 INJECTION INTRAMUSCULAR; INTRAVENOUS EVERY 6 HOURS PRN
Status: DISCONTINUED | OUTPATIENT
Start: 2020-04-03 | End: 2020-04-06 | Stop reason: HOSPADM

## 2020-04-03 RX ORDER — POTASSIUM CHLORIDE 20 MEQ/1
20 TABLET, EXTENDED RELEASE ORAL DAILY
Status: DISCONTINUED | OUTPATIENT
Start: 2020-04-03 | End: 2020-04-06 | Stop reason: HOSPADM

## 2020-04-03 RX ORDER — AMLODIPINE BESYLATE 10 MG/1
10 TABLET ORAL DAILY
Status: DISCONTINUED | OUTPATIENT
Start: 2020-04-03 | End: 2020-04-04

## 2020-04-03 RX ORDER — ACETAMINOPHEN 650 MG/1
650 SUPPOSITORY RECTAL EVERY 6 HOURS PRN
Status: DISCONTINUED | OUTPATIENT
Start: 2020-04-03 | End: 2020-04-06 | Stop reason: HOSPADM

## 2020-04-03 RX ORDER — ACETAMINOPHEN 325 MG/1
650 TABLET ORAL EVERY 6 HOURS PRN
Status: DISCONTINUED | OUTPATIENT
Start: 2020-04-03 | End: 2020-04-06 | Stop reason: HOSPADM

## 2020-04-03 RX ORDER — POLYETHYLENE GLYCOL 3350 17 G/17G
17 POWDER, FOR SOLUTION ORAL DAILY PRN
Status: DISCONTINUED | OUTPATIENT
Start: 2020-04-03 | End: 2020-04-06 | Stop reason: HOSPADM

## 2020-04-03 RX ORDER — SODIUM CHLORIDE 0.9 % (FLUSH) 0.9 %
10 SYRINGE (ML) INJECTION PRN
Status: DISCONTINUED | OUTPATIENT
Start: 2020-04-03 | End: 2020-04-06 | Stop reason: HOSPADM

## 2020-04-03 RX ORDER — DEXTROSE AND SODIUM CHLORIDE 5; .45 G/100ML; G/100ML
INJECTION, SOLUTION INTRAVENOUS CONTINUOUS
Status: DISCONTINUED | OUTPATIENT
Start: 2020-04-03 | End: 2020-04-04

## 2020-04-03 RX ORDER — POTASSIUM CHLORIDE 7.45 MG/ML
10 INJECTION INTRAVENOUS PRN
Status: DISCONTINUED | OUTPATIENT
Start: 2020-04-03 | End: 2020-04-06 | Stop reason: HOSPADM

## 2020-04-03 RX ORDER — 0.9 % SODIUM CHLORIDE 0.9 %
80 INTRAVENOUS SOLUTION INTRAVENOUS ONCE
Status: COMPLETED | OUTPATIENT
Start: 2020-04-03 | End: 2020-04-03

## 2020-04-03 RX ORDER — MORPHINE SULFATE 2 MG/ML
2 INJECTION, SOLUTION INTRAMUSCULAR; INTRAVENOUS EVERY 4 HOURS PRN
Status: DISCONTINUED | OUTPATIENT
Start: 2020-04-03 | End: 2020-04-06 | Stop reason: HOSPADM

## 2020-04-03 RX ORDER — POTASSIUM CHLORIDE 20 MEQ/1
40 TABLET, EXTENDED RELEASE ORAL PRN
Status: DISCONTINUED | OUTPATIENT
Start: 2020-04-03 | End: 2020-04-05

## 2020-04-03 RX ORDER — PROMETHAZINE HYDROCHLORIDE 25 MG/1
12.5 TABLET ORAL EVERY 6 HOURS PRN
Status: DISCONTINUED | OUTPATIENT
Start: 2020-04-03 | End: 2020-04-06 | Stop reason: HOSPADM

## 2020-04-03 RX ORDER — SODIUM CHLORIDE 0.9 % (FLUSH) 0.9 %
10 SYRINGE (ML) INJECTION EVERY 12 HOURS SCHEDULED
Status: DISCONTINUED | OUTPATIENT
Start: 2020-04-03 | End: 2020-04-06 | Stop reason: HOSPADM

## 2020-04-03 RX ORDER — SODIUM CHLORIDE 0.9 % (FLUSH) 0.9 %
10 SYRINGE (ML) INJECTION PRN
Status: DISCONTINUED | OUTPATIENT
Start: 2020-04-03 | End: 2020-04-03 | Stop reason: HOSPADM

## 2020-04-03 RX ADMIN — FAMOTIDINE 20 MG: 10 INJECTION INTRAVENOUS at 20:23

## 2020-04-03 RX ADMIN — SODIUM CHLORIDE: 9 INJECTION, SOLUTION INTRAVENOUS at 14:57

## 2020-04-03 RX ADMIN — MORPHINE SULFATE 4 MG: 4 INJECTION INTRAVENOUS at 23:41

## 2020-04-03 RX ADMIN — MORPHINE SULFATE 2 MG: 2 INJECTION, SOLUTION INTRAMUSCULAR; INTRAVENOUS at 19:24

## 2020-04-03 RX ADMIN — DEXTROSE AND SODIUM CHLORIDE: 5; 450 INJECTION, SOLUTION INTRAVENOUS at 17:00

## 2020-04-03 RX ADMIN — SODIUM CHLORIDE 80 ML: 9 INJECTION, SOLUTION INTRAVENOUS at 12:17

## 2020-04-03 RX ADMIN — IOVERSOL 75 ML: 741 INJECTION INTRA-ARTERIAL; INTRAVENOUS at 12:18

## 2020-04-03 RX ADMIN — DICYCLOMINE HYDROCHLORIDE 20 MG: 20 INJECTION, SOLUTION INTRAMUSCULAR at 11:30

## 2020-04-03 ASSESSMENT — PAIN DESCRIPTION - ORIENTATION
ORIENTATION: LEFT
ORIENTATION_2: LEFT
ORIENTATION: MID;LOWER;UPPER

## 2020-04-03 ASSESSMENT — PAIN DESCRIPTION - LOCATION
LOCATION_2: SHOULDER
LOCATION: FLANK
LOCATION: ABDOMEN

## 2020-04-03 ASSESSMENT — ENCOUNTER SYMPTOMS
BACK PAIN: 1
DIARRHEA: 1
VOMITING: 0
COUGH: 0
SHORTNESS OF BREATH: 0
ABDOMINAL PAIN: 1
RHINORRHEA: 0

## 2020-04-03 ASSESSMENT — PAIN DESCRIPTION - DURATION: DURATION_2: CONTINUOUS

## 2020-04-03 ASSESSMENT — PAIN DESCRIPTION - PAIN TYPE
TYPE: ACUTE PAIN
TYPE: ACUTE PAIN
TYPE_2: REFERRED PAIN

## 2020-04-03 ASSESSMENT — PAIN SCALES - GENERAL
PAINLEVEL_OUTOF10: 8
PAINLEVEL_OUTOF10: 7
PAINLEVEL_OUTOF10: 8
PAINLEVEL_OUTOF10: 8
PAINLEVEL_OUTOF10: 9

## 2020-04-03 ASSESSMENT — PAIN DESCRIPTION - FREQUENCY
FREQUENCY: CONTINUOUS
FREQUENCY: CONTINUOUS

## 2020-04-03 ASSESSMENT — PAIN DESCRIPTION - PROGRESSION
CLINICAL_PROGRESSION_2: NOT CHANGED
CLINICAL_PROGRESSION: NOT CHANGED

## 2020-04-03 ASSESSMENT — PAIN - FUNCTIONAL ASSESSMENT: PAIN_FUNCTIONAL_ASSESSMENT: PREVENTS OR INTERFERES SOME ACTIVE ACTIVITIES AND ADLS

## 2020-04-03 ASSESSMENT — PAIN DESCRIPTION - ONSET
ONSET: ON-GOING
ONSET_2: ON-GOING

## 2020-04-03 ASSESSMENT — PAIN DESCRIPTION - DESCRIPTORS
DESCRIPTORS_2: ACHING
DESCRIPTORS: ACHING
DESCRIPTORS: ACHING

## 2020-04-03 ASSESSMENT — PAIN DESCRIPTION - INTENSITY: RATING_2: 8

## 2020-04-03 NOTE — ED PROVIDER NOTES
[address] ED  eMERGENCY dEPARTMENT eNCOUnter      Pt Name: Deanna Tipton  MRN: [id number]  [place] 1945  Date of evaluation: 4/3/20      CHIEF COMPLAINT       Chief Complaint   Patient presents with    Abdominal Pain     left     HISTORY OF PRESENT ILLNESS   HPI 69 y.o. male comes emergency department with complaints of lower abdominal pain. The patient reports that for the last week he has felt a pressure in his stomach, over the last 2 days, his symptoms have worsened and it has now become \" painful\" he reports that it is 7 out of 10 in intensity, and constant. Associated with a decreased appetite. He denies any vomiting or diarrhea. Reports that he is not been eating. He feels generally weak and fatigued. Is also been noted that the patient's been having difficulty urinating. Patient has had slowly progressive right sided weakness and difficulty talking and swallowing over the last year. He is currently being evaluated for ALS. REVIEW OF SYSTEMS       Review of Systems   Constitutional: Positive for activity change, appetite change and fatigue. Negative for chills and fever. HENT: Negative for congestion and rhinorrhea. Eyes: Negative for visual disturbance. Respiratory: Negative for cough and shortness of breath. Cardiovascular: Negative for chest pain. Gastrointestinal: Positive for abdominal pain and diarrhea. Negative for vomiting. Genitourinary: Positive for decreased urine volume. Musculoskeletal: Positive for back pain (chronic, unchanged from baseline. ). Skin: Negative for rash. Neurological: Negative for headaches.        PAST MEDICAL HISTORY     Past Medical History:   Diagnosis Date    Arthritis     Cancer [facility])     bladder 1980s    Cerebral artery occlusion with cerebral infarction [facility])     TIA 2010    Chronic kidney disease     COPD [facility]chronic obstructive pulmonary disease) [facility]     Hypertension     Pneumonia     Psychiatric problem     depression, infusion     -------------------------  CRITICAL CARE:   CONSULTS: None  PROCEDURES: Procedures     FINAL IMPRESSION      1. Enteritis    2. Ileitis    3. Dissection of abdominal aorta [facility])          DISPOSITION/PLAN   DISPOSITION Decision To Transfer 04/03/2020 03:13:46 PM      PATIENT REFERRED TO:  No follow-up provider specified.     DISCHARGE MEDICATIONS:  Discharge Medication List as of 4/3/2020  3:13 PM            Brodie Alvarez MD  Attending Emergency Physician                      Brodie Alvarez MD  04/03/20 [id number]

## 2020-04-03 NOTE — H&P
nontender, nondistended, normal bowel sounds, no hepatomegaly or splenomegaly  Neurologic: There are no new focal motor or sensory deficits, normal muscle tone and bulk, no abnormal sensation, normal speech, cranial nerves II through XII grossly intact  Skin: No gross lesions, rashes, bruising or bleeding on exposed skin area  Extremities: peripheral pulses palpable, no pedal edema or calf pain with palpation  Psych: normal affect    Investigations:      Laboratory Testing:  Recent Results (from the past 24 hour(s))   CBC with DIFF    Collection Time: 04/03/20 11:10 AM   Result Value Ref Range    WBC 10.3 3.5 - 11.0 k/uL    RBC 4.17 (L) 4.5 - 5.9 m/uL    Hemoglobin 12.7 (L) 13.5 - 17.5 g/dL    Hematocrit 37.4 (L) 41 - 53 %    MCV 89.7 80 - 100 fL    MCH 30.4 26 - 34 pg    MCHC 33.9 31 - 37 g/dL    RDW 13.3 11.5 - 14.9 %    Platelets 363 769 - 655 k/uL    MPV 7.1 6.0 - 12.0 fL    NRBC Automated NOT REPORTED per 100 WBC    Differential Type NOT REPORTED     Seg Neutrophils 75 (H) 36 - 66 %    Lymphocytes 15 (L) 24 - 44 %    Monocytes 7 1 - 7 %    Eosinophils % 2 0 - 4 %    Basophils 1 0 - 2 %    Immature Granulocytes NOT REPORTED 0 %    Segs Absolute 7.70 1.3 - 9.1 k/uL    Absolute Lymph # 1.60 1.0 - 4.8 k/uL    Absolute Mono # 0.80 0.1 - 1.3 k/uL    Absolute Eos # 0.20 0.0 - 0.4 k/uL    Basophils Absolute 0.10 0.0 - 0.2 k/uL    Absolute Immature Granulocyte NOT REPORTED 0.00 - 0.30 k/uL    WBC Morphology NOT REPORTED     RBC Morphology NOT REPORTED     Platelet Estimate NOT REPORTED    Comprehensive Metabolic Panel    Collection Time: 04/03/20 11:10 AM   Result Value Ref Range    Glucose 101 (H) 70 - 99 mg/dL    BUN 23 8 - 23 mg/dL    CREATININE 0.89 0.70 - 1.20 mg/dL    Bun/Cre Ratio NOT REPORTED 9 - 20    Calcium 10.0 8.6 - 10.4 mg/dL    Sodium 141 135 - 144 mmol/L    Potassium 3.9 3.7 - 5.3 mmol/L    Chloride 100 98 - 107 mmol/L    CO2 27 20 - 31 mmol/L    Anion Gap 14 9 - 17 mmol/L    Alkaline Phosphatase 76 40 -

## 2020-04-03 NOTE — ED NOTES
Pt resting quietly in bed with sister at bedside. No signs of distress noted. Pt remains on cardiac monitor.       Andreas Ma RN  04/03/20 [id number]

## 2020-04-03 NOTE — CONSULTS
aneurysm but not emergently at this time. 2. Continue recs and care per primary, recommend supportive care and resolution of enteritis   3.  Patient to follow-up with Dr. Malen Schilder as an outpatient    Plan discussed and formulated with Dr. Malen Schilder     Thank you,    Electronically signed by Karin Cortez DO on 4/3/2020 at 3:59 PM

## 2020-04-03 NOTE — ED NOTES
[facility] at bedside to transfer patient to [facility], Rm #[phone].      Kalpana Nunez RN  04/03/20 [id number]

## 2020-04-04 PROBLEM — R00.1 BRADYCARDIA: Status: ACTIVE | Noted: 2020-04-04

## 2020-04-04 LAB
ALBUMIN SERPL-MCNC: 3.4 G/DL (ref 3.5–5.2)
ALBUMIN/GLOBULIN RATIO: 1 (ref 1–2.5)
ALP BLD-CCNC: 66 U/L (ref 40–129)
ALT SERPL-CCNC: 13 U/L (ref 5–41)
AMYLASE: 43 U/L (ref 28–100)
ANION GAP SERPL CALCULATED.3IONS-SCNC: 13 MMOL/L (ref 9–17)
AST SERPL-CCNC: 18 U/L
BILIRUB SERPL-MCNC: 0.69 MG/DL (ref 0.3–1.2)
BUN BLDV-MCNC: 14 MG/DL (ref 8–23)
BUN/CREAT BLD: ABNORMAL (ref 9–20)
CALCIUM SERPL-MCNC: 8.9 MG/DL (ref 8.6–10.4)
CHLORIDE BLD-SCNC: 100 MMOL/L (ref 98–107)
CO2: 23 MMOL/L (ref 20–31)
CREAT SERPL-MCNC: 0.77 MG/DL (ref 0.7–1.2)
EKG ATRIAL RATE: 241 BPM
EKG Q-T INTERVAL: 448 MS
EKG QRS DURATION: 88 MS
EKG QTC CALCULATION (BAZETT): 416 MS
EKG R AXIS: 56 DEGREES
EKG T AXIS: 41 DEGREES
EKG VENTRICULAR RATE: 52 BPM
GFR AFRICAN AMERICAN: >60 ML/MIN
GFR NON-AFRICAN AMERICAN: >60 ML/MIN
GFR SERPL CREATININE-BSD FRML MDRD: ABNORMAL ML/MIN/{1.73_M2}
GFR SERPL CREATININE-BSD FRML MDRD: ABNORMAL ML/MIN/{1.73_M2}
GLUCOSE BLD-MCNC: 104 MG/DL (ref 70–99)
HCT VFR BLD CALC: 32.1 % (ref 40.7–50.3)
HEMOGLOBIN: 11.1 G/DL (ref 13–17)
LIPASE: 24 U/L (ref 13–60)
LV EF: 63 %
LVEF MODALITY: NORMAL
MAGNESIUM: 1.8 MG/DL (ref 1.6–2.6)
MCH RBC QN AUTO: 31.4 PG (ref 25.2–33.5)
MCHC RBC AUTO-ENTMCNC: 34.6 G/DL (ref 28.4–34.8)
MCV RBC AUTO: 90.9 FL (ref 82.6–102.9)
NRBC AUTOMATED: 0 PER 100 WBC
PDW BLD-RTO: 12.8 % (ref 11.8–14.4)
PHOSPHORUS: 3.3 MG/DL (ref 2.5–4.5)
PLATELET # BLD: 177 K/UL (ref 138–453)
PMV BLD AUTO: 9.4 FL (ref 8.1–13.5)
POTASSIUM SERPL-SCNC: 3.7 MMOL/L (ref 3.7–5.3)
RBC # BLD: 3.53 M/UL (ref 4.21–5.77)
SODIUM BLD-SCNC: 136 MMOL/L (ref 135–144)
TOTAL PROTEIN: 6.7 G/DL (ref 6.4–8.3)
WBC # BLD: 8.9 K/UL (ref 3.5–11.3)

## 2020-04-04 PROCEDURE — 6370000000 HC RX 637 (ALT 250 FOR IP): Performed by: NURSE PRACTITIONER

## 2020-04-04 PROCEDURE — 6360000002 HC RX W HCPCS: Performed by: NURSE PRACTITIONER

## 2020-04-04 PROCEDURE — 2580000003 HC RX 258: Performed by: NURSE PRACTITIONER

## 2020-04-04 PROCEDURE — 96376 TX/PRO/DX INJ SAME DRUG ADON: CPT

## 2020-04-04 PROCEDURE — 6370000000 HC RX 637 (ALT 250 FOR IP): Performed by: INTERNAL MEDICINE

## 2020-04-04 PROCEDURE — 2500000003 HC RX 250 WO HCPCS: Performed by: STUDENT IN AN ORGANIZED HEALTH CARE EDUCATION/TRAINING PROGRAM

## 2020-04-04 PROCEDURE — 96365 THER/PROPH/DIAG IV INF INIT: CPT

## 2020-04-04 PROCEDURE — 84100 ASSAY OF PHOSPHORUS: CPT

## 2020-04-04 PROCEDURE — 93010 ELECTROCARDIOGRAM REPORT: CPT | Performed by: INTERNAL MEDICINE

## 2020-04-04 PROCEDURE — 36415 COLL VENOUS BLD VENIPUNCTURE: CPT

## 2020-04-04 PROCEDURE — 85027 COMPLETE CBC AUTOMATED: CPT

## 2020-04-04 PROCEDURE — 93005 ELECTROCARDIOGRAM TRACING: CPT | Performed by: INTERNAL MEDICINE

## 2020-04-04 PROCEDURE — 99233 SBSQ HOSP IP/OBS HIGH 50: CPT | Performed by: INTERNAL MEDICINE

## 2020-04-04 PROCEDURE — 80053 COMPREHEN METABOLIC PANEL: CPT

## 2020-04-04 PROCEDURE — 96366 THER/PROPH/DIAG IV INF ADDON: CPT

## 2020-04-04 PROCEDURE — 2500000003 HC RX 250 WO HCPCS: Performed by: NURSE PRACTITIONER

## 2020-04-04 PROCEDURE — 6370000000 HC RX 637 (ALT 250 FOR IP): Performed by: STUDENT IN AN ORGANIZED HEALTH CARE EDUCATION/TRAINING PROGRAM

## 2020-04-04 PROCEDURE — 6360000002 HC RX W HCPCS: Performed by: STUDENT IN AN ORGANIZED HEALTH CARE EDUCATION/TRAINING PROGRAM

## 2020-04-04 PROCEDURE — 93306 TTE W/DOPPLER COMPLETE: CPT

## 2020-04-04 PROCEDURE — 83690 ASSAY OF LIPASE: CPT

## 2020-04-04 PROCEDURE — 83735 ASSAY OF MAGNESIUM: CPT

## 2020-04-04 PROCEDURE — 2000000000 HC ICU R&B

## 2020-04-04 PROCEDURE — 82150 ASSAY OF AMYLASE: CPT

## 2020-04-04 PROCEDURE — 96372 THER/PROPH/DIAG INJ SC/IM: CPT

## 2020-04-04 PROCEDURE — 96375 TX/PRO/DX INJ NEW DRUG ADDON: CPT

## 2020-04-04 RX ORDER — HYDROCODONE BITARTRATE AND ACETAMINOPHEN 5; 325 MG/1; MG/1
1 TABLET ORAL EVERY 6 HOURS PRN
Status: DISCONTINUED | OUTPATIENT
Start: 2020-04-04 | End: 2020-04-06 | Stop reason: HOSPADM

## 2020-04-04 RX ORDER — MAGNESIUM SULFATE 1 G/100ML
1 INJECTION INTRAVENOUS
Status: COMPLETED | OUTPATIENT
Start: 2020-04-04 | End: 2020-04-04

## 2020-04-04 RX ORDER — ALBUTEROL SULFATE 2.5 MG/3ML
2.5 SOLUTION RESPIRATORY (INHALATION) EVERY 6 HOURS PRN
Status: DISCONTINUED | OUTPATIENT
Start: 2020-04-04 | End: 2020-04-06 | Stop reason: HOSPADM

## 2020-04-04 RX ORDER — POTASSIUM CHLORIDE 20 MEQ/1
40 TABLET, EXTENDED RELEASE ORAL ONCE
Status: COMPLETED | OUTPATIENT
Start: 2020-04-04 | End: 2020-04-04

## 2020-04-04 RX ORDER — DOPAMINE HYDROCHLORIDE 160 MG/100ML
2.5 INJECTION, SOLUTION INTRAVENOUS CONTINUOUS
Status: DISCONTINUED | OUTPATIENT
Start: 2020-04-04 | End: 2020-04-06

## 2020-04-04 RX ADMIN — DESMOPRESSIN ACETATE 40 MG: 0.2 TABLET ORAL at 20:00

## 2020-04-04 RX ADMIN — GABAPENTIN 800 MG: 800 TABLET ORAL at 09:05

## 2020-04-04 RX ADMIN — MAGNESIUM SULFATE HEPTAHYDRATE 1 G: 1 INJECTION, SOLUTION INTRAVENOUS at 14:06

## 2020-04-04 RX ADMIN — FAMOTIDINE 20 MG: 10 INJECTION INTRAVENOUS at 20:00

## 2020-04-04 RX ADMIN — FUROSEMIDE 40 MG: 40 TABLET ORAL at 09:05

## 2020-04-04 RX ADMIN — Medication 81 MG: at 09:05

## 2020-04-04 RX ADMIN — MORPHINE SULFATE 4 MG: 4 INJECTION INTRAVENOUS at 22:37

## 2020-04-04 RX ADMIN — MAGNESIUM SULFATE HEPTAHYDRATE 1 G: 1 INJECTION, SOLUTION INTRAVENOUS at 15:21

## 2020-04-04 RX ADMIN — GLUCAGON HYDROCHLORIDE 1 MG: KIT at 13:01

## 2020-04-04 RX ADMIN — Medication 10 ML: at 20:00

## 2020-04-04 RX ADMIN — DEXTROSE AND SODIUM CHLORIDE: 5; 450 INJECTION, SOLUTION INTRAVENOUS at 02:08

## 2020-04-04 RX ADMIN — MORPHINE SULFATE 4 MG: 4 INJECTION INTRAVENOUS at 09:10

## 2020-04-04 RX ADMIN — FAMOTIDINE 20 MG: 10 INJECTION INTRAVENOUS at 09:05

## 2020-04-04 RX ADMIN — GABAPENTIN 800 MG: 800 TABLET ORAL at 14:25

## 2020-04-04 RX ADMIN — GABAPENTIN 800 MG: 800 TABLET ORAL at 20:00

## 2020-04-04 RX ADMIN — MORPHINE SULFATE 4 MG: 4 INJECTION INTRAVENOUS at 04:27

## 2020-04-04 RX ADMIN — POTASSIUM CHLORIDE 40 MEQ: 1500 TABLET, EXTENDED RELEASE ORAL at 14:25

## 2020-04-04 RX ADMIN — ENOXAPARIN SODIUM 40 MG: 40 INJECTION SUBCUTANEOUS at 09:05

## 2020-04-04 RX ADMIN — HYDROCODONE BITARTRATE AND ACETAMINOPHEN 1 TABLET: 5; 325 TABLET ORAL at 18:28

## 2020-04-04 ASSESSMENT — PAIN DESCRIPTION - PAIN TYPE: TYPE: ACUTE PAIN

## 2020-04-04 ASSESSMENT — PAIN SCALES - GENERAL
PAINLEVEL_OUTOF10: 7
PAINLEVEL_OUTOF10: 3
PAINLEVEL_OUTOF10: 6
PAINLEVEL_OUTOF10: 7
PAINLEVEL_OUTOF10: 2
PAINLEVEL_OUTOF10: 8

## 2020-04-04 ASSESSMENT — PAIN DESCRIPTION - DESCRIPTORS: DESCRIPTORS: ACHING

## 2020-04-04 ASSESSMENT — PAIN - FUNCTIONAL ASSESSMENT: PAIN_FUNCTIONAL_ASSESSMENT: ACTIVITIES ARE NOT PREVENTED

## 2020-04-04 ASSESSMENT — PAIN DESCRIPTION - ORIENTATION: ORIENTATION: LEFT;MID;LOWER

## 2020-04-04 ASSESSMENT — PAIN DESCRIPTION - LOCATION: LOCATION: BACK;SHOULDER

## 2020-04-04 ASSESSMENT — PAIN DESCRIPTION - PROGRESSION: CLINICAL_PROGRESSION: NOT CHANGED

## 2020-04-04 ASSESSMENT — PAIN DESCRIPTION - ONSET: ONSET: ON-GOING

## 2020-04-04 ASSESSMENT — PAIN DESCRIPTION - FREQUENCY: FREQUENCY: CONTINUOUS

## 2020-04-04 ASSESSMENT — PAIN SCALES - WONG BAKER: WONGBAKER_NUMERICALRESPONSE: 2

## 2020-04-04 NOTE — CONSULTS
prior stroke. Past Medical History:   has a past medical history of Arthritis, Cancer ([facility] Cerebral artery occlusion with cerebral infarction ([facility] Chronic kidney disease, COPD (chronic obstructive pulmonary disease) ([facility] Hypertension, Pneumonia, Psychiatric problem, and Seizures ([facility] Past Surgical History:   has a past surgical history that includes back surgery; fracture surgery; hernia repair; Tonsillectomy; Carotid endarterectomy (Left, 09/20/2016); Abdomen surgery; hiatal hernia repair; Inguinal hernia repair (Bilateral); other surgical history; and Colonoscopy (N/A, 8/31/2018). Home Medications:    Prior to Admission medications    Medication Sig Start Date End Date Taking? Authorizing Provider   amLODIPine (NORVASC) 10 MG tablet take 1 tablet by mouth once daily 10/16/19   Raji Tran MD   furosemide (LASIX) 20 MG tablet Take 2 tablets by mouth daily 9/11/19   aRji Tran MD   potassium chloride (KLOR-CON M) 20 MEQ extended release tablet Take 1 tablet by mouth daily 8/1/19   Raji Tran MD   lisinopril-hydrochlorothiazide (PRINZIDE;ZESTORETIC) 10-12.5 MG per tablet Take 1 tablet by mouth daily 7/24/19   Raji Tran MD   atorvastatin (LIPITOR) 40 MG tablet Take 1 tablet by mouth daily 7/24/19   Raji Tran MD   albuterol sulfate  (90 Base) MCG/ACT inhaler Inhale 2 puffs into the lungs every 6 hours as needed for Wheezing 6/27/19   Raji Tran MD   gabapentin (NEURONTIN) 800 MG tablet Take 800 mg by mouth 3 times daily.   1/16/19   Historical Provider, MD   aspirin 81 MG EC tablet Take 1 tablet by mouth daily 9/21/16   Suzanne Orta MD      Current Facility-Administered Medications: glucagon (rDNA) injection 1 mg, 1 mg, Intravenous, Once  amLODIPine (NORVASC) tablet 10 mg, 10 mg, Oral, Daily  aspirin EC tablet 81 mg, 81 mg, Oral, Daily  atorvastatin (LIPITOR) tablet 40 mg, 40 mg, Oral, Nightly  furosemide (LASIX) tablet 40 mg, 40 mg, Oral, Daily  gabapentin (NEURONTIN) tablet 800 mg, 800 mg, Oral, TID  lisinopril-hydroCHLOROthiazide (PRINZIDE;ZESTORETIC) 10-12.5 MG per tablet 1 tablet, 1 tablet, Oral, Daily  potassium chloride (KLOR-CON M) extended release tablet 20 mEq, 20 mEq, Oral, Daily  sodium chloride flush 0.9 % injection 10 mL, 10 mL, Intravenous, 2 times per day  sodium chloride flush 0.9 % injection 10 mL, 10 mL, Intravenous, PRN  potassium chloride (KLOR-CON M) extended release tablet 40 mEq, 40 mEq, Oral, PRN **OR** potassium bicarb-citric acid (EFFER-K) effervescent tablet 40 mEq, 40 mEq, Oral, PRN **OR** potassium chloride 10 mEq/100 mL IVPB (Peripheral Line), 10 mEq, Intravenous, PRN  magnesium sulfate 1 g in dextrose 5% 100 mL IVPB, 1 g, Intravenous, PRN  acetaminophen (TYLENOL) tablet 650 mg, 650 mg, Oral, Q6H PRN **OR** acetaminophen (TYLENOL) suppository 650 mg, 650 mg, Rectal, Q6H PRN  polyethylene glycol (GLYCOLAX) packet 17 g, 17 g, Oral, Daily PRN  promethazine (PHENERGAN) tablet 12.5 mg, 12.5 mg, Oral, Q6H PRN **OR** ondansetron (ZOFRAN) injection 4 mg, 4 mg, Intravenous, Q6H PRN  famotidine (PEPCID) injection 20 mg, 20 mg, Intravenous, BID  nicotine (NICODERM CQ) 21 MG/24HR 1 patch, 1 patch, Transdermal, Daily PRN  enoxaparin (LOVENOX) injection 40 mg, 40 mg, Subcutaneous, Daily  dextrose 5 % and 0.45 % sodium chloride infusion, , Intravenous, Continuous  morphine (PF) injection 2 mg, 2 mg, Intravenous, Q4H PRN **OR** morphine injection 4 mg, 4 mg, Intravenous, Q4H PRN      Allergies:  Bactrim [sulfamethoxazole-trimethoprim] and Ciprofloxacin      Social History:   reports that he has been smoking cigarettes. He started smoking about 64 years ago. He has a 60.00 pack-year smoking history. He has never used smokeless tobacco. He reports that he does not drink alcohol or use drugs.        Family History: family history includes Arthritis in his father and mother; Atrial Fibrillation in his mother; Cancer in 1946  Gender                      Male   Birth      Age          68 year(s)  Race                              Room Number              Height:                     75 inch, 190.5 cm      Corporate ID [id number]  Weight:                     200 pounds, 90.7 kg   #      Patient Acct [de-identified]   BSA:          2.19 m^2      BMI:      25 kg/m^2   #      MR #         [id number]      Sonographer                 Dominique Le      Accession #  [id number]   Interpreting Physician      [address]      Fellow                   Referring Nurse                            Practitioner      Interpreting             Referring Physician         Etienne Jaffe     Type of Study      TTE procedure:2D Echocardiogram, M-Mode, Doppler, Color Doppler. Procedure Date  Date: 04/08/2019 Start: 09:19 AM    Study Location: [facility]  Technical Quality: Fair visualization    Indications:Dyspnea/SOB and Chest pain. History / Tech. Comments:  HTN, COPD, CKD, Cancer, Smoker    Patient Status: Outpatient    Height: 75 inches Weight: 200 pounds BSA: 2.19 m^2 BMI: 25 kg/m^2    Rhythm: Within normal limits HR: 70 bpm    CONCLUSIONS    Summary  Left ventricle is normal in size and wall thickness. Global left ventricular systolic function is normal. Estimated LV EF 60-65  %. Evidence of mild (grade I) diastolic dysfunction. Left atrium is normal in size. Right atrium is moderately dilated . Normal right ventricular size and function. Mild mitral regurgitation. Mild tricuspid regurgitation.     Signature  ----------------------------------------------------------------------------   Electronically signed by Dominique Le(Sonographer) on 04/08/2019 11:51   AM  ----------------------------------------------------------------------------    ----------------------------------------------------------------------------   Electronically signed by Tadeo Inman(Interpreting physician) on 04/08/2019   12:11 PM  ----------------------------------------------------------------------------  FINDINGS  Left Atrium  Left atrium is normal in size. Left Ventricle  Left ventricle is normal in size and wall thickness. Global left ventricular systolic function is normal.  Estimated LV EF 60-65 %. Evidence of mild (grade I) diastolic dysfunction. Right Atrium  Right atrium is moderately dilated . Right Ventricle  Normal right ventricular size and function. Mitral Valve  No obvious valvular abnormality seen. Mild mitral regurgitation. Aortic Valve  Aortic valve is sclerotic but opens well. No aortic insufficiency. Tricuspid Valve  No obvious valvular abnormality. Mild tricuspid regurgitation. No pulmonary hypertension. Estimated right ventricular systolic pressure is  66AFDY. Pulmonic Valve  Pulmonic valve was not well visualized. No evidence of pulmonic insufficiency or stenosis. Pericardial Effusion  No significant pericardial effusion is seen. Pleural Effusion  No pleural effusion seen. Miscellaneous  Normal aortic root dimension.     M-mode / 2D Measurements & Calculations:      LVIDd:4.34 cm(3.7 - 5.6 cm)      Diastolic TAKJTL:20.0 ml   LVIDs:2.62 cm(2.2 - 4.0 cm)      Systolic TPFNTA:61 ml   DMQT:5.77 cm(0.6 - 1.1 cm)       Aortic Root:2.8 cm(2.0 - 3.7 cm)   LVPWd:0.96 cm(0.6 - 1.1 cm)      LA Dimension: 3.8 cm(1.9 - 4.0 cm)   Fractional Shortenin.63 %    LA volume/Index: 42.3 ml /19m^2   Calculated LVEF (%): 77.97 %     LVOT:1.9 cm      Mitral:                                Aortic      Peak E-Wave: 0.77 m/s                  Peak Velocity: 1.01 m/s   Peak A-Wave: 0.86 m/s                  Mean Velocity: 0.70 m/s   E/A Ratio: 0.89                        Peak Gradient: 4.08 mmHg   Peak Gradient: 2.38 mmHg               Mean Gradient: 2 mmHg   Deceleration Time: 236 msec                                             Area (continuity): 2.18 cm^2                                          AV VTI: 19.6 cm dissection - 2.7 cm in right common iliac artery - no emergent intervention planned by vascular surgery  3. 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 Hypotension  5. Bradycardia with sinus pause, prolonged 1 st degree av block  6. Preserved EF  7. Dyslipidemia    Patient Active Problem List   Diagnosis    Constipation    Change in bowel habits    Rectal bleeding    Aortic dissection (HCC)         RECOMMENDATIONS:  1. Hold anticoagulation  2. Repeat ekg  3. Will obtain echo  4. Hold antihypertensives  5. Hold any BB and CCB  6. Give glucagon 1mg IV, can repeat in 30 minutes if no response. 7. Pain management  8. Will start dopamine if HR persistently <40  9. Keep bedside atropine prn  10. Attempted to reach out to family with no response. 11. Will continue to assess for needs for pacemaker. Discussed with patient and nurse. Thank you for allowing us to participate in [facility] care. Will follow with you.       Electronically signed on 04/04/20 at 11:26 AM by:    Edgardo Robertson MD   Fellow, [address]

## 2020-04-04 NOTE — CARE COORDINATION
Case Management Initial Discharge Plan  [facility],             Met with:patient to discuss discharge plans. Information verified: address, contacts, phone number, , insurance Yes  PCP: Darlene Vázquez MD  Date of last visit: 3/6/2020    Insurance Provider: [employer]    Discharge Planning    Living Arrangements:  Alone   Support Systems:  Family Members    Home has 1 stories  0 stairs to climb to get into front door, 0stairs to climb to reach second floor  Location of bedroom/bathroom in home main floor    Patient able to perform ADL's:Dependent    Current Services (outpatient & in home) home care with [facility] home care, AoA, meals on wheels  DME equipment: walker, hospital bed, electric wheel chair  DME provider:       Potential Assistance Needed:  Home Care    Patient agreeable to home care: Yes  Freedom of choice provided:  yes    Prior SNF/Rehab Placement and Facility: none  Agreeable to SNF/Rehab: No  [place] of choice provided: n/a   Evaluation: n/a    Expected Discharge date:  20  Patient expects to be discharged to:  Home  Follow Up Appointment: Best Day/ Time: Monday AM    Transportation provider: family  Transportation arrangements needed for discharge: No    Readmission Risk              Risk of Unplanned Readmission:        11             Does patient have a readmission risk score greater than 14?: No  If yes, follow-up appointment must be made within 7 days of discharge. Goals of Care: to do therapy to rossi stronger      Discharge Plan: Return home with [facility] care, referral sent. Pt has DME and sister checks on pt 3 times a day.            Electronically signed by Keisha Harrison RN on 20 at 12:29 PM EDT

## 2020-04-05 LAB
ANION GAP SERPL CALCULATED.3IONS-SCNC: 15 MMOL/L (ref 9–17)
BUN BLDV-MCNC: 15 MG/DL (ref 8–23)
BUN/CREAT BLD: ABNORMAL (ref 9–20)
CALCIUM SERPL-MCNC: 8.9 MG/DL (ref 8.6–10.4)
CHLORIDE BLD-SCNC: 98 MMOL/L (ref 98–107)
CO2: 21 MMOL/L (ref 20–31)
CREAT SERPL-MCNC: 0.78 MG/DL (ref 0.7–1.2)
GFR AFRICAN AMERICAN: >60 ML/MIN
GFR NON-AFRICAN AMERICAN: >60 ML/MIN
GFR SERPL CREATININE-BSD FRML MDRD: ABNORMAL ML/MIN/{1.73_M2}
GFR SERPL CREATININE-BSD FRML MDRD: ABNORMAL ML/MIN/{1.73_M2}
GLUCOSE BLD-MCNC: 75 MG/DL (ref 70–99)
HCT VFR BLD CALC: 35.9 % (ref 40.7–50.3)
HEMOGLOBIN: 11.8 G/DL (ref 13–17)
MAGNESIUM: 2 MG/DL (ref 1.6–2.6)
MCH RBC QN AUTO: 30.3 PG (ref 25.2–33.5)
MCHC RBC AUTO-ENTMCNC: 32.9 G/DL (ref 28.4–34.8)
MCV RBC AUTO: 92.3 FL (ref 82.6–102.9)
NRBC AUTOMATED: 0 PER 100 WBC
PDW BLD-RTO: 12.4 % (ref 11.8–14.4)
PLATELET # BLD: 163 K/UL (ref 138–453)
PMV BLD AUTO: 9.3 FL (ref 8.1–13.5)
POTASSIUM SERPL-SCNC: 3.7 MMOL/L (ref 3.7–5.3)
RBC # BLD: 3.89 M/UL (ref 4.21–5.77)
SODIUM BLD-SCNC: 134 MMOL/L (ref 135–144)
WBC # BLD: 10.2 K/UL (ref 3.5–11.3)

## 2020-04-05 PROCEDURE — 96376 TX/PRO/DX INJ SAME DRUG ADON: CPT

## 2020-04-05 PROCEDURE — 6360000002 HC RX W HCPCS: Performed by: INTERNAL MEDICINE

## 2020-04-05 PROCEDURE — 94761 N-INVAS EAR/PLS OXIMETRY MLT: CPT

## 2020-04-05 PROCEDURE — 96375 TX/PRO/DX INJ NEW DRUG ADDON: CPT

## 2020-04-05 PROCEDURE — 36415 COLL VENOUS BLD VENIPUNCTURE: CPT

## 2020-04-05 PROCEDURE — 6370000000 HC RX 637 (ALT 250 FOR IP): Performed by: INTERNAL MEDICINE

## 2020-04-05 PROCEDURE — 94640 AIRWAY INHALATION TREATMENT: CPT

## 2020-04-05 PROCEDURE — 6360000002 HC RX W HCPCS: Performed by: NURSE PRACTITIONER

## 2020-04-05 PROCEDURE — 6370000000 HC RX 637 (ALT 250 FOR IP): Performed by: NURSE PRACTITIONER

## 2020-04-05 PROCEDURE — 1200000000 HC SEMI PRIVATE

## 2020-04-05 PROCEDURE — 2500000003 HC RX 250 WO HCPCS: Performed by: NURSE PRACTITIONER

## 2020-04-05 PROCEDURE — 99232 SBSQ HOSP IP/OBS MODERATE 35: CPT | Performed by: INTERNAL MEDICINE

## 2020-04-05 PROCEDURE — 96372 THER/PROPH/DIAG INJ SC/IM: CPT

## 2020-04-05 PROCEDURE — 92610 EVALUATE SWALLOWING FUNCTION: CPT

## 2020-04-05 PROCEDURE — 2700000000 HC OXYGEN THERAPY PER DAY

## 2020-04-05 PROCEDURE — 80048 BASIC METABOLIC PNL TOTAL CA: CPT

## 2020-04-05 PROCEDURE — 83735 ASSAY OF MAGNESIUM: CPT

## 2020-04-05 PROCEDURE — 85027 COMPLETE CBC AUTOMATED: CPT

## 2020-04-05 RX ORDER — POTASSIUM CHLORIDE 7.45 MG/ML
10 INJECTION INTRAVENOUS PRN
Status: DISCONTINUED | OUTPATIENT
Start: 2020-04-05 | End: 2020-04-06 | Stop reason: HOSPADM

## 2020-04-05 RX ORDER — THEOPHYLLINE ANHYDROUS 100 MG
100 TABLET, EXTENDED RELEASE 12 HR ORAL EVERY 8 HOURS SCHEDULED
Status: DISCONTINUED | OUTPATIENT
Start: 2020-04-05 | End: 2020-04-05 | Stop reason: RX

## 2020-04-05 RX ORDER — HEPARIN SODIUM 5000 [USP'U]/ML
5000 INJECTION, SOLUTION INTRAVENOUS; SUBCUTANEOUS EVERY 8 HOURS SCHEDULED
Status: DISCONTINUED | OUTPATIENT
Start: 2020-04-05 | End: 2020-04-06 | Stop reason: HOSPADM

## 2020-04-05 RX ORDER — POTASSIUM CHLORIDE 20 MEQ/1
40 TABLET, EXTENDED RELEASE ORAL PRN
Status: DISCONTINUED | OUTPATIENT
Start: 2020-04-05 | End: 2020-04-06 | Stop reason: HOSPADM

## 2020-04-05 RX ORDER — POTASSIUM CHLORIDE 7.45 MG/ML
40 INJECTION INTRAVENOUS ONCE
Status: DISCONTINUED | OUTPATIENT
Start: 2020-04-05 | End: 2020-04-05

## 2020-04-05 RX ORDER — POTASSIUM CHLORIDE 7.45 MG/ML
10 INJECTION INTRAVENOUS
Status: COMPLETED | OUTPATIENT
Start: 2020-04-05 | End: 2020-04-05

## 2020-04-05 RX ADMIN — POTASSIUM CHLORIDE 10 MEQ: 7.46 INJECTION, SOLUTION INTRAVENOUS at 14:11

## 2020-04-05 RX ADMIN — POTASSIUM CHLORIDE 10 MEQ: 7.46 INJECTION, SOLUTION INTRAVENOUS at 11:41

## 2020-04-05 RX ADMIN — HEPARIN SODIUM 5000 UNITS: 5000 INJECTION INTRAVENOUS; SUBCUTANEOUS at 22:00

## 2020-04-05 RX ADMIN — HYDROCODONE BITARTRATE AND ACETAMINOPHEN 1 TABLET: 5; 325 TABLET ORAL at 08:43

## 2020-04-05 RX ADMIN — POTASSIUM CHLORIDE 10 MEQ: 7.46 INJECTION, SOLUTION INTRAVENOUS at 15:56

## 2020-04-05 RX ADMIN — FAMOTIDINE 20 MG: 10 INJECTION INTRAVENOUS at 20:43

## 2020-04-05 RX ADMIN — POTASSIUM CHLORIDE 10 MEQ: 7.46 INJECTION, SOLUTION INTRAVENOUS at 12:46

## 2020-04-05 RX ADMIN — GABAPENTIN 800 MG: 800 TABLET ORAL at 08:43

## 2020-04-05 RX ADMIN — HEPARIN SODIUM 5000 UNITS: 5000 INJECTION INTRAVENOUS; SUBCUTANEOUS at 15:56

## 2020-04-05 RX ADMIN — MORPHINE SULFATE 4 MG: 4 INJECTION INTRAVENOUS at 12:33

## 2020-04-05 RX ADMIN — MORPHINE SULFATE 4 MG: 4 INJECTION INTRAVENOUS at 20:39

## 2020-04-05 RX ADMIN — FAMOTIDINE 20 MG: 10 INJECTION INTRAVENOUS at 08:43

## 2020-04-05 RX ADMIN — MORPHINE SULFATE 2 MG: 2 INJECTION, SOLUTION INTRAMUSCULAR; INTRAVENOUS at 02:39

## 2020-04-05 RX ADMIN — ALBUTEROL SULFATE 2.5 MG: 2.5 SOLUTION RESPIRATORY (INHALATION) at 08:43

## 2020-04-05 ASSESSMENT — PAIN - FUNCTIONAL ASSESSMENT: PAIN_FUNCTIONAL_ASSESSMENT: 0-10

## 2020-04-05 ASSESSMENT — PAIN DESCRIPTION - ORIENTATION: ORIENTATION: LEFT

## 2020-04-05 ASSESSMENT — PAIN DESCRIPTION - PROGRESSION: CLINICAL_PROGRESSION: NOT CHANGED

## 2020-04-05 ASSESSMENT — PAIN SCALES - GENERAL
PAINLEVEL_OUTOF10: 6
PAINLEVEL_OUTOF10: 8
PAINLEVEL_OUTOF10: 4
PAINLEVEL_OUTOF10: 8
PAINLEVEL_OUTOF10: 0

## 2020-04-05 ASSESSMENT — PAIN DESCRIPTION - LOCATION: LOCATION: SHOULDER

## 2020-04-05 ASSESSMENT — PAIN DESCRIPTION - DESCRIPTORS: DESCRIPTORS: ACHING

## 2020-04-05 ASSESSMENT — PAIN DESCRIPTION - PAIN TYPE: TYPE: ACUTE PAIN

## 2020-04-05 ASSESSMENT — PAIN DESCRIPTION - FREQUENCY: FREQUENCY: CONTINUOUS

## 2020-04-05 ASSESSMENT — PAIN DESCRIPTION - ONSET: ONSET: ON-GOING

## 2020-04-06 ENCOUNTER — APPOINTMENT (OUTPATIENT)
Dept: GENERAL RADIOLOGY | Age: 75
DRG: 300 | End: 2020-04-06
Attending: INTERNAL MEDICINE
Payer: COMMERCIAL

## 2020-04-06 VITALS
TEMPERATURE: 98.3 F | RESPIRATION RATE: 21 BRPM | HEIGHT: 70 IN | HEART RATE: 57 BPM | SYSTOLIC BLOOD PRESSURE: 121 MMHG | WEIGHT: 182.3 LBS | DIASTOLIC BLOOD PRESSURE: 57 MMHG | OXYGEN SATURATION: 94 % | BODY MASS INDEX: 26.1 KG/M2

## 2020-04-06 PROBLEM — K52.9 ENTERITIS: Status: ACTIVE | Noted: 2020-04-06

## 2020-04-06 PROBLEM — R13.19 OTHER DYSPHAGIA: Status: ACTIVE | Noted: 2020-04-06

## 2020-04-06 LAB
EKG ATRIAL RATE: 54 BPM
EKG P AXIS: 76 DEGREES
EKG P-R INTERVAL: 298 MS
EKG Q-T INTERVAL: 434 MS
EKG QRS DURATION: 82 MS
EKG QTC CALCULATION (BAZETT): 411 MS
EKG R AXIS: 72 DEGREES
EKG T AXIS: 71 DEGREES
EKG VENTRICULAR RATE: 54 BPM

## 2020-04-06 PROCEDURE — 96376 TX/PRO/DX INJ SAME DRUG ADON: CPT

## 2020-04-06 PROCEDURE — 93010 ELECTROCARDIOGRAM REPORT: CPT | Performed by: INTERNAL MEDICINE

## 2020-04-06 PROCEDURE — 6360000002 HC RX W HCPCS: Performed by: INTERNAL MEDICINE

## 2020-04-06 PROCEDURE — 74230 X-RAY XM SWLNG FUNCJ C+: CPT

## 2020-04-06 PROCEDURE — 96372 THER/PROPH/DIAG INJ SC/IM: CPT

## 2020-04-06 PROCEDURE — 6360000002 HC RX W HCPCS: Performed by: NURSE PRACTITIONER

## 2020-04-06 PROCEDURE — 99239 HOSP IP/OBS DSCHRG MGMT >30: CPT | Performed by: INTERNAL MEDICINE

## 2020-04-06 PROCEDURE — 92611 MOTION FLUOROSCOPY/SWALLOW: CPT

## 2020-04-06 PROCEDURE — 2500000003 HC RX 250 WO HCPCS: Performed by: INTERNAL MEDICINE

## 2020-04-06 PROCEDURE — 2580000003 HC RX 258: Performed by: NURSE PRACTITIONER

## 2020-04-06 RX ORDER — SODIUM CHLORIDE 9 MG/ML
INJECTION, SOLUTION INTRAVENOUS CONTINUOUS
Status: DISCONTINUED | OUTPATIENT
Start: 2020-04-06 | End: 2020-04-06 | Stop reason: HOSPADM

## 2020-04-06 RX ADMIN — SODIUM CHLORIDE: 9 INJECTION, SOLUTION INTRAVENOUS at 06:55

## 2020-04-06 RX ADMIN — MORPHINE SULFATE 4 MG: 4 INJECTION INTRAVENOUS at 07:27

## 2020-04-06 RX ADMIN — MORPHINE SULFATE 4 MG: 4 INJECTION INTRAVENOUS at 01:02

## 2020-04-06 RX ADMIN — MORPHINE SULFATE 4 MG: 4 INJECTION INTRAVENOUS at 12:37

## 2020-04-06 RX ADMIN — MORPHINE SULFATE 4 MG: 4 INJECTION INTRAVENOUS at 16:02

## 2020-04-06 RX ADMIN — HEPARIN SODIUM 5000 UNITS: 5000 INJECTION INTRAVENOUS; SUBCUTANEOUS at 06:17

## 2020-04-06 RX ADMIN — FAMOTIDINE 20 MG: 10 INJECTION INTRAVENOUS at 10:57

## 2020-04-06 ASSESSMENT — PAIN DESCRIPTION - DESCRIPTORS
DESCRIPTORS: ACHING;CONSTANT;DISCOMFORT
DESCRIPTORS: ACHING;CONSTANT;DISCOMFORT

## 2020-04-06 ASSESSMENT — PAIN DESCRIPTION - PROGRESSION: CLINICAL_PROGRESSION: NOT CHANGED

## 2020-04-06 ASSESSMENT — PAIN DESCRIPTION - ONSET
ONSET: ON-GOING
ONSET: ON-GOING

## 2020-04-06 ASSESSMENT — PAIN SCALES - GENERAL
PAINLEVEL_OUTOF10: 10
PAINLEVEL_OUTOF10: 9

## 2020-04-06 ASSESSMENT — PAIN DESCRIPTION - ORIENTATION
ORIENTATION: LEFT
ORIENTATION: LEFT

## 2020-04-06 ASSESSMENT — PAIN DESCRIPTION - LOCATION
LOCATION: SHOULDER
LOCATION: SHOULDER

## 2020-04-06 ASSESSMENT — PAIN - FUNCTIONAL ASSESSMENT: PAIN_FUNCTIONAL_ASSESSMENT: PREVENTS OR INTERFERES SOME ACTIVE ACTIVITIES AND ADLS

## 2020-04-06 ASSESSMENT — PAIN DESCRIPTION - FREQUENCY
FREQUENCY: CONTINUOUS
FREQUENCY: CONTINUOUS

## 2020-04-06 ASSESSMENT — PAIN DESCRIPTION - PAIN TYPE
TYPE: ACUTE PAIN
TYPE: ACUTE PAIN

## 2020-04-06 NOTE — PROGRESS NOTES
Dr. Dolores García notified via perfect serve that patient failed swallow study and theophylline IV is on back order, per pharmacist. Crystal Lama to hold for now.
Dr. Rima Beauchamp at bedside. Discussion about code status. Patient states he does not want chest compressions or intubation. Will change to DNR-CCA. Updated on overnight events, HR not sustained below 40 bpm. Low UO.
Dr. Sarah Cruz notified of patient failed swallow study results and high risk for aspiration, Dr. Sarah Cruz stated he will call and speak with the patients sister whom he resides with and discuss need for peg tube placement, will continue to monitor patient closely, patient suctioned well with joe upon arrival back to floor from xray, SPO2 level 94% on room air
From vascular standpoint, pt ok to follow up outpatient. No acute surgical intervention at this time.      Electronically signed by Cathie Baldwin DO on 4/4/2020 at 7:38 AM
Nutrition Assessment    Type and Reason for Visit: Initial, Consult(Swallowing difficulty )    Nutrition Recommendations:  If TF desired, suggest Standard without Fiber goal 70 mL/hr to provide 2016 kcal and 93 g pro/day. Will follow/monitor plans. Nutrition Assessment: Chart reviewed. Noted pt had a bedside swallow study done today -ST recommends NPO with alternate means of nutrition. Will provide TF recommendations. Malnutrition Assessment:  · Malnutrition Status: Insufficient data  · Context: Acute illness or injury  · Findings of the 6 clinical characteristics of malnutrition (Minimum of 2 out of 6 clinical characteristics is required to make the diagnosis of moderate or severe Protein Calorie Malnutrition based on AND/ASPEN Guidelines):  1. Energy Intake-Unable to assess, Unable to assess    2. Weight Loss-10% loss or greater, (7 months )  3. Fat Loss-Unable to assess,    4. Muscle Loss-Unable to assess,    5. Fluid Accumulation-No significant fluid accumulation(per RN documentation ),    6.  Strength-Not measured    Nutrition Risk Level: High    Nutrient Needs:  · Estimated Daily Total Kcal: 2000 kcal/day  · Estimated Daily Protein (g): 90 g pro/day     Nutrition Diagnosis:   · Problem: Inadequate oral intake  · Etiology: related to (current medical condition, swallowing difficulty )     Signs and symptoms:  as evidenced by NPO status due to medical condition    Objective Information:  · Current Nutrition Therapies:  · Oral Diet Orders: NPO   · Anthropometric Measures:  · Ht: 5' 10\" (177.8 cm)   · Current Body Wt: 182 lb 5.1 oz (82.7 kg)  · Usual Body Wt: noted weight of 203 lb on 9/11/19  · % Weight Change:  10% loss in 7 months  · Ideal Body Wt: 166 lb (75.3 kg), % Ideal Body 110%  · BMI Classification: BMI 25.0 - 29.9 Overweight    Nutrition Interventions:   Start nutrition as able. If TF desired, suggest Standard without Fiber goal 70 mL/hr to provide 2016 kcal and 93 g pro/day.
Patient transferred to [facility], room 448. CHRISSIE Antonio at bedside. All belongings transferred with patient.
Speech therapy at bedside. States patient failed swallow study.
1.0       Objective:   Vitals: BP (!) 121/57   Pulse 57   Temp 98.3 °F (36.8 °C) (Oral)   Resp 21   Ht 5' 10\" (1.778 m)   Wt 182 lb 4.8 oz (82.7 kg)   SpO2 94%   BMI 26.16 kg/m²   General appearance: alert and cooperative with exam. Expressive aphasia noted  HEENT: Head: Normocephalic, no lesions, without obvious abnormality. Neck:no JVD, trachea midline, no adenopathy  Lungs: Clear to auscultation  Heart: Regular rate and rhythm, s1/s2 auscultated, no murmurs. SB with PACs  Abdomen: soft, non-tender, bowel sounds hypoactive. Extremities: no edema  Neurologic: not done    Echo 4/4/2020  Summary  Left ventricle is normal in size with normal systolic function globally. Calculated ejection fraction is 63%. Evidence of mild diastolic dysfunction. Left atrium is mildly dilated. Aortic valve is trileaflet. Aortic sclerosis without stenosis. No aortic insufficiency. Mild mitral regurgitation. Trace tricuspid regurgitation. Estimated right ventricular systolic pressure is 29 mmHg. Assessment / Acute Cardiac Problems:   1. Sinus bradycardia with first degree AVB  2. Enteritis  3. Abdominal Aortic dissection - no surgical intervention  4. Right iliac artery aneurysm  5. Hypotension  6. Preserved LVEF  7. Chronic diastolic CHF  8. ?Hx of CVA with expressive aphasia    Patient Active Problem List:     Constipation     Change in bowel habits     Rectal bleeding     Aortic dissection (HCC)     Bradycardia      Plan of Treatment:   1. HR remains jerome but stable. Continue to hold BB/CCB. Echo reviewed as above with preserved LVEF and mild DD. No signs of volume overload. Failed swallow study this AM. Continue supportive care.     Electronically signed by RAGHAV Lizama CNP on 4/6/2020 at 10:01 AM  [address]  [phone]
anterior-posterior transit in oral cavity. Pt with +labial and lingual weakness, due to probable PMH of stroke. ST to follow up to trial O/M exercise program for dysphagia. Results & recommendations reviewed with pt & reported to RN. Dysphagia Diagnosis: Severe pharyngeal stage dysphagia; Moderate to severe oral stage dysphagia  Dysphagia Outcome Severity Scale: Level 1: Severe dysphagia- NPO. Unable to tolerate any PO safely     Treatment Plan  Requires SLP Intervention: Yes  Duration/Frequency of Treatment: 3-5x per week  D/C Recommendations: Further therapy recommended at discharge. Recommended Diet and Intervention  Diet Solids Recommendation: NPO  Liquid Consistency Recommendation: NPO  Recommended Form of Meds: Via alternative means of nutrition  Recommendations: NPO;Consider alternative nutrition;Dysphagia treatment  Therapeutic Interventions: Maggie; Laryngeal exercises; Patient/Family education;Pharyngeal exercises; Tongue base strengthening;Oral motor exercises    Treatment/Goals  Short-term Goals  Goal 1: Pt will trial O/M/pharyngeal exercise program for dysphagia. General  Chart Reviewed: Yes  Behavior/Cognition: Alert; Cooperative;Pleasant mood;Confused  Temperature Spikes Noted: No  Respiratory Status: Room air  O2 Device: None (Room air)  Communication Observation: Dysarthria;Apraxia  Follows Directions: Simple  Dentition: Edentulous  Patient Positioning: Upright in bed  Baseline Vocal Quality: Weak;Hoarse  Consistencies Administered: Dysphagia Pureed (Dysphagia I)    Pain Level: 0    Vision/Hearing  Vision  Vision: Within Functional Limits  Hearing  Hearing: Within functional limits    Oral Motor Deficits  Oral/Motor  Oral Motor: Exceptions to [facility]  Labial ROM: Reduced left; Reduced right  Labial Strength: Reduced  Labial Sensation: Reduced  Labial Coordination: Reduced  Lingual ROM: Reduced left; Reduced right  Lingual Strength: Reduced  Lingual Sensation: Reduced  Lingual Coordination:
bilaterally  Heart: Regular rate and rhythm, S1, S2 normal  Abdomen: Soft, non-tender; bowel sounds normal  Extremities: No cyanosis or edema      EKG:   Results for orders placed or performed during the hospital encounter of 04/03/20   EKG 12 Lead   Result Value Ref Range    Ventricular Rate 54 BPM    Atrial Rate 54 BPM    P-R Interval 298 ms    QRS Duration 82 ms    Q-T Interval 434 ms    QTc Calculation (Bazett) 411 ms    P Axis 76 degrees    R Axis 72 degrees    T Axis 71 degrees    Narrative    Sinus bradycardia with 1st degree A-V block with Premature supraventricular complexes  Low voltage QRS  Borderline ECG  When compared with ECG of 03-APR-2020 23:18,  Sinus rhythm has replaced Atrial fibrillation       ECHO:   Results for orders placed or performed during the hospital encounter of 04/03/20   ECHO Complete 2D W Doppler W Color    Narrative    Transthoracic Echocardiography Report (TTE)     Patient Name Ira Christianson  Date of Study               04/04/2020                P      Date of      1945  Gender                      Male   Birth      Age          69 year(s)  Race                              Room Number  0176        Height:                     70 inch, 177.8 cm      Corporate ID [id number]    Weight:                     182 pounds, 82.6 kg   #      Patient Acct [de-identified]   BSA:          2.01 m^2      BMI:     26.11 kg/m^2   #      MR #         [id number]     Sonographer                 [facility]       Accession #  [id number]   Interpreting Physician      [address]      Fellow                   Referring Nurse                            Practitioner      Interpreting             Referring Physician         [facility] MD SILVIANO   Fellow     Type of Study      TTE procedure:2D Echocardiogram, M-Mode, Doppler, Color Doppler. Procedure Date  Date: 04/04/2020 Start: 12:42 PM    Study Location: [facility]    [place] / Boo Zavala.  Comments:  Hypotension, Abd Aortic
Blood Pressure Father     Stroke Father     Vision Loss Father        Vitals:  BP (!) 130/50   Pulse 82   Temp 98.1 °F (36.7 °C) (Oral)   Resp 26   Ht 5' 10\" (1.778 m)   Wt 182 lb 4.8 oz (82.7 kg)   SpO2 96%   BMI 26.16 kg/m²   Temp (24hrs), Av.4 °F (36.9 °C), Min:98.1 °F (36.7 °C), Max:99 °F (37.2 °C)    No results for input(s): POCGLU in the last 72 hours. I/O (24Hr): Intake/Output Summary (Last 24 hours) at 2020 0937  Last data filed at 2020 0610  Gross per 24 hour   Intake 521 ml   Output 600 ml   Net -79 ml       Labs:  Hematology:  Recent Labs     20  1110 20  0634   WBC 10.3 8.9   RBC 4.17* 3.53*   HGB 12.7* 11.1*   HCT 37.4* 32.1*   MCV 89.7 90.9   MCH 30.4 31.4   MCHC 33.9 34.6   RDW 13.3 12.8    177   MPV 7.1 9.4   INR 1.0  --      Chemistry:  Recent Labs     20  1110 20  0634    136   K 3.9 3.7    100   CO2 27 23   GLUCOSE 101* 104*   BUN 23 14   CREATININE 0.89 0.77   MG  --  1.8   ANIONGAP 14 13   LABGLOM >60 >60   GFRAA >60 >60   CALCIUM 10.0 8.9   PHOS  --  3.3     Recent Labs     20  1110 20  0634   PROT 7.6 6.7   LABALBU 4.4 3.4*   AST 17 18   ALT 17 13   ALKPHOS 76 66   BILITOT 1.01 0.69   AMYLASE  --  43   LIPASE 31 24     ABG:  Lab Results   Component Value Date    PHART 7.416 2019    USL6IUK 39.7 2019    PO2ART 67.2 2019    YFX2ZCW 25.5 2019    NBEA NOT REPORTED 2019    PBEA 0.9 2019    J4HQXREN 90.6 2019    FIO2 NOT REPORTED 2019     No results found for: SPECIAL  No results found for: CULTURE    Radiology:  Ct Abdomen Pelvis W Iv Contrast Additional Contrast? None    Result Date: 4/3/2020  1. Abdominal aorta is top normal in diameter of 2.7 cm in the infrarenal location. 2. Short segment right-sided aortic dissection just above the right common iliac artery of 2.7 cm length.  3. Distal common right iliac artery aneurysm of 2.4 x 2.7 cm. 4. Moderate plaque throughout

## 2020-04-06 NOTE — DISCHARGE INSTR - COC
Continuity of Care Form    Patient Name: Renata Orellana   :    MRN:  [id number]    Admit date:  4/3/2020  Discharge date: 20    Code Status Order: DNR-CCA   Advance Directives:   [facility] Documentation     Date/Time Healthcare Directive Type of Healthcare Directive Copy in [facility] [address] Agent's Name Healthcare Agent's Phone Number    20 1624  No, patient does not have an advance directive for healthcare treatment -- -- -- -- --          Admitting Physician:  Arben Nuñez MD  PCP: Mitzi Montenegro MD    Discharging Nurse: Mark Craig. Discharging Hospital Unit/Room#: 7050/1424-80  Discharging Unit Phone Number: [phone]    Emergency Contact:   Extended Emergency Contact Information  Primary Emergency Contact: Cruz Fletcher  [place] Phone: [phone]  Relation: Brother/Sister    Past Surgical History:  Past Surgical History:   Procedure Laterality Date    ABDOMEN SURGERY      BACK SURGERY      spinal surgery      CAROTID ENDARTERECTOMY Left 2016    COLONOSCOPY N/A 2018    COLONOSCOPY POLYPECTOMY COLD BIOPSY performed by Kate Orta MD at [address] [place] [address]      left face    HERNIA REPAIR      HIATAL HERNIA REPAIR      INGUINAL HERNIA REPAIR Bilateral     OTHER SURGICAL HISTORY      mesh infected in inguinal canal, had to remove. Removed testiscle at this time.  TONSILLECTOMY         Immunization History: There is no immunization history on file for this patient.     Active Problems:  Patient Active Problem List   Diagnosis Code    Constipation K59.00    Change in bowel habits R19.4    Rectal bleeding K62.5    Aortic dissection (HCC) I71.00    Bradycardia R00.1       Isolation/Infection:   Isolation          Contact        Patient Infection Status     None to display          Nurse Assessment:  Last Vital Signs: BP (!) 121/57   Pulse 57   Temp 98.3 °F (36.8 °C) (Oral)   Resp 21   Ht 5' 10\" (1.778

## 2020-04-06 NOTE — DISCHARGE SUMMARY
Agnieszka Clayton     Discharge Summary     Patient ID: Jada Lenz  :  [birth year]   MRN: [id number]     ACCOUNT:  [de-identified]   Patient's PCP: Marcela Reynoso MD  Admit Date: 4/3/2020   Discharge Date: 2020     Length of Stay: 3  Code Status:  DNR-CCA  Admitting Physician: Kandy Child MD  Discharge Physician: Kandy Child MD     Active Discharge Diagnoses:     Hospital Problem Lists:  Active Problems: Aortic dissection (HCC)    Bradycardia    Other dysphagia    Enteritis  Resolved Problems:    * No resolved hospital problems. *      Admission Condition:  poor     Discharged Condition: fair    Hospital Stay:     Hospital Course:      Mariaa Clark is a 69 y.o. Non-/non  male who presents with abdominal pain to [facility] is admitted to the hospital for the management of enteritis.  Short segment right-sided aortic dissection just above the right common iliac artery of 2.7 cm length was noted.  Patient is to be seen by vascular surgery here who are already consulted.    No surgical intervention is planned from vascular surgery. Patient developed persistent hypotension and bradycardia for which cardiology was consulted. Patient was transferred to ICU for possible initiation of dopamine drip. Patient had remained stable in ICU overnight. His heart rate did go down at night while he was sleeping to as low as 29 however when he is awake it perks back up to 60s and 70s. Patient was transferred back to medical floor by cardiology after he was started on theophylline. It was noted that patient has difficulty swallowing this bedside swallow study was done which patient did not pass. Official consult was placed for speech therapist for modified barium swallow and patient is at risk of aspiration with anything orally. This was relayed to patient and patient's sister who helps him with his activities of daily living.   It was suggested that going forward patient would probably need a PEG tube in order to avoid risks of aspiration pneumonia however at this time both patient and his sisters declining this option. They will discuss future goals of care with patient's PCP upon discharge. Significant therapeutic interventions:  Theophylline    Significant Diagnostic Studies:   Labs / Micro:  CBC:   Lab Results   Component Value Date    WBC 10.2 04/05/2020    RBC 3.89 04/05/2020    HGB 11.8 04/05/2020    HCT 35.9 04/05/2020    MCV 92.3 04/05/2020    MCH 30.3 04/05/2020    MCHC 32.9 04/05/2020    RDW 12.4 04/05/2020     04/05/2020     BMP:    Lab Results   Component Value Date    GLUCOSE 75 04/05/2020     04/05/2020    K 3.7 04/05/2020    CL 98 04/05/2020    CO2 21 04/05/2020    ANIONGAP 15 04/05/2020    BUN 15 04/05/2020    CREATININE 0.78 04/05/2020    BUNCRER NOT REPORTED 04/05/2020    CALCIUM 8.9 04/05/2020    LABGLOM >60 04/05/2020    GFRAA >60 04/05/2020    GFR      04/05/2020    GFR NOT REPORTED 04/05/2020     HFP:    Lab Results   Component Value Date    PROT 6.7 04/04/2020     CMP:    Lab Results   Component Value Date    GLUCOSE 75 04/05/2020     04/05/2020    K 3.7 04/05/2020    CL 98 04/05/2020    CO2 21 04/05/2020    BUN 15 04/05/2020    CREATININE 0.78 04/05/2020    ANIONGAP 15 04/05/2020    ALKPHOS 66 04/04/2020    ALT 13 04/04/2020    AST 18 04/04/2020    BILITOT 0.69 04/04/2020    LABALBU 3.4 04/04/2020    ALBUMIN 1.0 04/04/2020    LABGLOM >60 04/05/2020    GFRAA >60 04/05/2020    GFR      04/05/2020    GFR NOT REPORTED 04/05/2020    PROT 6.7 04/04/2020    CALCIUM 8.9 04/05/2020     PT/INR:    Lab Results   Component Value Date    PROTIME 13.3 04/03/2020    INR 1.0 04/03/2020     PTT:   Lab Results   Component Value Date    APTT 28.7 09/21/2016     FLP:    Lab Results   Component Value Date    CHOL 132 08/15/2019    TRIG 54 08/15/2019    HDL 70 08/15/2019     U/A:  No results found for: DOMINGUEZ, TURBIDITY, SPECGRAV, HGBUR, PHUR, PROTEINU, GLUCOSEU, KETUA, BILIRUBINUR, UROBILINOGEN, NITRU, LEUKOCYTESUR  TSH:  No results found for: TSH     Radiology:  Ct Abdomen Pelvis W Iv Contrast Additional Contrast? None    Result Date: 4/3/2020  1. Abdominal aorta is top normal in diameter of 2.7 cm in the infrarenal location. 2. Short segment right-sided aortic dissection just above the right common iliac artery of 2.7 cm length. 3. Distal common right iliac artery aneurysm of 2.4 x 2.7 cm. 4. Moderate plaque throughout the abdomen described above. 5. Some fluid-filled bowel loops in the mid to upper pelvis with adjacent thickening of the ileum favoring enteritis/ileitis, mild without gross obstruction. Fl Modified Barium Swallow W Video    Result Date: 4/6/2020  Penetration and aspiration with pudding and honey thick liquid consistencies. Please see separate speech pathology report for full discussion of findings and recommendations. Consultations:    Consults:     Final Specialist Recommendations/Findings:   IP CONSULT TO VASCULAR SURGERY  IP CONSULT TO CARDIOLOGY  IP CONSULT TO DIETITIAN  IP CONSULT TO HOME CARE NEEDS      The patient was seen and examined on day of discharge and this discharge summary is in conjunction with any daily progress note from day of discharge. Discharge plan:     Disposition: Home    Physician Follow Up:     Dinora Nance MD  [place] [phone]             Requiring Further Evaluation/Follow Up POST HOSPITALIZATION/Incidental Findings: PCP    Diet: N.p.o. Activity: As tolerated    Instructions to Patient: Patient was noted to have dysphagia on modified barium swallow. It was recommended that patient might require G-tube going forward however they are not open to this option at this time. Patient will be at risk of aspiration pneumonia if he continues to eat and drink orally.   This was relayed to both patient and his sister who helps him with his daily needs. They would like to discuss further with your primary care doctor. Discharge Medications:      Medication List      START taking these medications    theophylline 80 MG/15ML elixir  Take 18.8 mLs by mouth every 8 hours        CONTINUE taking these medications    albuterol sulfate  (90 Base) MCG/ACT inhaler  Inhale 2 puffs into the lungs every 6 hours as needed for Wheezing     aspirin 81 MG EC tablet  Take 1 tablet by mouth daily     atorvastatin 40 MG tablet  Commonly known as:  LIPITOR  Take 1 tablet by mouth daily     furosemide 20 MG tablet  Commonly known as:  Lasix  Take 2 tablets by mouth daily     gabapentin 800 MG tablet  Commonly known as:  NEURONTIN     lisinopril-hydroCHLOROthiazide 10-12.5 MG per tablet  Commonly known as:  PRINZIDE;ZESTORETIC  Take 1 tablet by mouth daily     potassium chloride 20 MEQ extended release tablet  Commonly known as:  KLOR-CON M  Take 1 tablet by mouth daily        STOP taking these medications    amLODIPine 10 MG tablet  Commonly known as:  NORVASC           Where to Get Your Medications      These medications were sent to [facility], [address], [address] [place] [address]    Phone:  [phone]   · theophylline 80 MG/15ML elixir         No discharge procedures on file. Time Spent on discharge is  40 mins in patient examination, evaluation, counseling as well as medication reconciliation, prescriptions for required medications, discharge plan and follow up. Electronically signed by   Anali Lancaster MD  4/6/2020  11:55 AM      Thank you Dr. Colten Gardner MD for the opportunity to be involved in this patient's care.

## 2020-04-07 ENCOUNTER — APPOINTMENT (OUTPATIENT)
Dept: CT IMAGING | Age: 75
End: 2020-04-07
Payer: COMMERCIAL

## 2020-04-07 ENCOUNTER — HOSPITAL ENCOUNTER (INPATIENT)
Age: 75
LOS: 9 days | Discharge: HOME HEALTH | DRG: 981 | End: 2020-04-17
Attending: INTERNAL MEDICINE | Admitting: HOSPITALIST
Payer: COMMERCIAL

## 2020-04-07 ENCOUNTER — APPOINTMENT (OUTPATIENT)
Dept: GENERAL RADIOLOGY | Age: 75
End: 2020-04-07
Payer: COMMERCIAL

## 2020-04-07 ENCOUNTER — APPOINTMENT (OUTPATIENT)
Dept: GENERAL RADIOLOGY | Age: 75
DRG: 981 | End: 2020-04-07
Attending: INTERNAL MEDICINE
Payer: COMMERCIAL

## 2020-04-07 ENCOUNTER — HOSPITAL ENCOUNTER (EMERGENCY)
Age: 75
Discharge: ANOTHER HOSPITAL | End: 2020-04-07
Attending: EMERGENCY MEDICINE
Payer: COMMERCIAL

## 2020-04-07 VITALS
HEART RATE: 62 BPM | HEIGHT: 70 IN | SYSTOLIC BLOOD PRESSURE: 117 MMHG | RESPIRATION RATE: 21 BRPM | WEIGHT: 183 LBS | DIASTOLIC BLOOD PRESSURE: 66 MMHG | OXYGEN SATURATION: 97 % | BODY MASS INDEX: 26.2 KG/M2 | TEMPERATURE: 97.8 F

## 2020-04-07 PROBLEM — J18.9 PNEUMONIA: Status: ACTIVE | Noted: 2020-04-07

## 2020-04-07 PROBLEM — J69.0 ASPIRATION PNEUMONIA OF BOTH LOWER LOBES (HCC): Status: ACTIVE | Noted: 2020-04-07

## 2020-04-07 PROBLEM — Z72.0 TOBACCO ABUSE: Status: ACTIVE | Noted: 2020-04-07

## 2020-04-07 LAB
ABSOLUTE EOS #: 0.11 K/UL (ref 0–0.4)
ABSOLUTE IMMATURE GRANULOCYTE: ABNORMAL K/UL (ref 0–0.3)
ABSOLUTE LYMPH #: 1.16 K/UL (ref 1–4.8)
ABSOLUTE MONO #: 0.74 K/UL (ref 0.1–1.3)
ALBUMIN SERPL-MCNC: 3.8 G/DL (ref 3.5–5.2)
ALBUMIN/GLOBULIN RATIO: ABNORMAL (ref 1–2.5)
ALP BLD-CCNC: 72 U/L (ref 40–129)
ALT SERPL-CCNC: 16 U/L (ref 5–41)
ANION GAP SERPL CALCULATED.3IONS-SCNC: 22 MMOL/L (ref 9–17)
AST SERPL-CCNC: 22 U/L
BASOPHILS # BLD: 0 % (ref 0–2)
BASOPHILS ABSOLUTE: 0 K/UL (ref 0–0.2)
BILIRUB SERPL-MCNC: 1.67 MG/DL (ref 0.3–1.2)
BNP INTERPRETATION: ABNORMAL
BUN BLDV-MCNC: 15 MG/DL (ref 8–23)
BUN/CREAT BLD: ABNORMAL (ref 9–20)
C-REACTIVE PROTEIN: 83 MG/L (ref 0–5)
CALCIUM SERPL-MCNC: 9.7 MG/DL (ref 8.6–10.4)
CHLORIDE BLD-SCNC: 97 MMOL/L (ref 98–107)
CO2: 16 MMOL/L (ref 20–31)
CREAT SERPL-MCNC: 0.81 MG/DL (ref 0.7–1.2)
DIFFERENTIAL TYPE: ABNORMAL
DIRECT EXAM: NORMAL
EKG ATRIAL RATE: 92 BPM
EKG Q-T INTERVAL: 354 MS
EKG QRS DURATION: 84 MS
EKG QTC CALCULATION (BAZETT): 442 MS
EKG R AXIS: 11 DEGREES
EKG T AXIS: 5 DEGREES
EKG VENTRICULAR RATE: 94 BPM
EOSINOPHILS RELATIVE PERCENT: 1 % (ref 0–4)
FERRITIN: 61 UG/L (ref 30–400)
GFR AFRICAN AMERICAN: >60 ML/MIN
GFR NON-AFRICAN AMERICAN: >60 ML/MIN
GFR SERPL CREATININE-BSD FRML MDRD: ABNORMAL ML/MIN/{1.73_M2}
GFR SERPL CREATININE-BSD FRML MDRD: ABNORMAL ML/MIN/{1.73_M2}
GLUCOSE BLD-MCNC: 103 MG/DL (ref 70–99)
HCT VFR BLD CALC: 36.7 % (ref 41–53)
HEMOGLOBIN: 12.6 G/DL (ref 13.5–17.5)
IMMATURE GRANULOCYTES: ABNORMAL %
INR BLD: 1.1
LACTATE DEHYDROGENASE: 243 U/L (ref 135–225)
LEGIONELLA PNEUMOPHILIA AG, URINE: NEGATIVE
LYMPHOCYTES # BLD: 11 % (ref 24–44)
Lab: NORMAL
MCH RBC QN AUTO: 30.8 PG (ref 26–34)
MCHC RBC AUTO-ENTMCNC: 34.3 G/DL (ref 31–37)
MCV RBC AUTO: 89.6 FL (ref 80–100)
MONOCYTES # BLD: 7 % (ref 1–7)
MORPHOLOGY: NORMAL
NRBC AUTOMATED: ABNORMAL PER 100 WBC
PARTIAL THROMBOPLASTIN TIME: 37.3 SEC (ref 24–36)
PDW BLD-RTO: 13.2 % (ref 11.5–14.9)
PLATELET # BLD: 211 K/UL (ref 150–450)
PLATELET ESTIMATE: ABNORMAL
PMV BLD AUTO: 7.3 FL (ref 6–12)
POTASSIUM SERPL-SCNC: 4.2 MMOL/L (ref 3.7–5.3)
PRO-BNP: 489 PG/ML
PROCALCITONIN: 0.3 NG/ML
PROTHROMBIN TIME: 14.1 SEC (ref 11.8–14.6)
RBC # BLD: 4.1 M/UL (ref 4.5–5.9)
RBC # BLD: ABNORMAL 10*6/UL
SEG NEUTROPHILS: 81 % (ref 36–66)
SEGMENTED NEUTROPHILS ABSOLUTE COUNT: 8.49 K/UL (ref 1.3–9.1)
SODIUM BLD-SCNC: 135 MMOL/L (ref 135–144)
SOURCE: NORMAL
SPECIMEN DESCRIPTION: NORMAL
STREP PNEUMONIAE ANTIGEN: NEGATIVE
TOTAL PROTEIN: 7.8 G/DL (ref 6.4–8.3)
TROPONIN INTERP: NORMAL
TROPONIN INTERP: NORMAL
TROPONIN T: NORMAL NG/ML
TROPONIN T: NORMAL NG/ML
TROPONIN, HIGH SENSITIVITY: 10 NG/L (ref 0–22)
TROPONIN, HIGH SENSITIVITY: 10 NG/L (ref 0–22)
WBC # BLD: 10.5 K/UL (ref 3.5–11)
WBC # BLD: ABNORMAL 10*3/UL

## 2020-04-07 PROCEDURE — 6360000004 HC RX CONTRAST MEDICATION: Performed by: EMERGENCY MEDICINE

## 2020-04-07 PROCEDURE — 86140 C-REACTIVE PROTEIN: CPT

## 2020-04-07 PROCEDURE — 2580000003 HC RX 258: Performed by: NURSE PRACTITIONER

## 2020-04-07 PROCEDURE — 99222 1ST HOSP IP/OBS MODERATE 55: CPT | Performed by: INTERNAL MEDICINE

## 2020-04-07 PROCEDURE — 87449 NOS EACH ORGANISM AG IA: CPT

## 2020-04-07 PROCEDURE — 6360000002 HC RX W HCPCS: Performed by: EMERGENCY MEDICINE

## 2020-04-07 PROCEDURE — 93005 ELECTROCARDIOGRAM TRACING: CPT | Performed by: EMERGENCY MEDICINE

## 2020-04-07 PROCEDURE — 6370000000 HC RX 637 (ALT 250 FOR IP): Performed by: NURSE PRACTITIONER

## 2020-04-07 PROCEDURE — 85025 COMPLETE CBC W/AUTO DIFF WBC: CPT

## 2020-04-07 PROCEDURE — 86738 MYCOPLASMA ANTIBODY: CPT

## 2020-04-07 PROCEDURE — 6360000002 HC RX W HCPCS: Performed by: INTERNAL MEDICINE

## 2020-04-07 PROCEDURE — 36415 COLL VENOUS BLD VENIPUNCTURE: CPT

## 2020-04-07 PROCEDURE — 96375 TX/PRO/DX INJ NEW DRUG ADDON: CPT

## 2020-04-07 PROCEDURE — 71045 X-RAY EXAM CHEST 1 VIEW: CPT

## 2020-04-07 PROCEDURE — 84484 ASSAY OF TROPONIN QUANT: CPT

## 2020-04-07 PROCEDURE — 96365 THER/PROPH/DIAG IV INF INIT: CPT

## 2020-04-07 PROCEDURE — 87899 AGENT NOS ASSAY W/OPTIC: CPT

## 2020-04-07 PROCEDURE — 87040 BLOOD CULTURE FOR BACTERIA: CPT

## 2020-04-07 PROCEDURE — 85730 THROMBOPLASTIN TIME PARTIAL: CPT

## 2020-04-07 PROCEDURE — 82728 ASSAY OF FERRITIN: CPT

## 2020-04-07 PROCEDURE — 71260 CT THORAX DX C+: CPT

## 2020-04-07 PROCEDURE — 87641 MR-STAPH DNA AMP PROBE: CPT

## 2020-04-07 PROCEDURE — G0378 HOSPITAL OBSERVATION PER HR: HCPCS

## 2020-04-07 PROCEDURE — 96366 THER/PROPH/DIAG IV INF ADDON: CPT

## 2020-04-07 PROCEDURE — 6360000002 HC RX W HCPCS: Performed by: NURSE PRACTITIONER

## 2020-04-07 PROCEDURE — 96367 TX/PROPH/DG ADDL SEQ IV INF: CPT

## 2020-04-07 PROCEDURE — U0002 COVID-19 LAB TEST NON-CDC: HCPCS

## 2020-04-07 PROCEDURE — 2580000003 HC RX 258: Performed by: EMERGENCY MEDICINE

## 2020-04-07 PROCEDURE — 74177 CT ABD & PELVIS W/CONTRAST: CPT

## 2020-04-07 PROCEDURE — 85610 PROTHROMBIN TIME: CPT

## 2020-04-07 PROCEDURE — 87205 SMEAR GRAM STAIN: CPT

## 2020-04-07 PROCEDURE — 93010 ELECTROCARDIOGRAM REPORT: CPT | Performed by: INTERNAL MEDICINE

## 2020-04-07 PROCEDURE — 84145 PROCALCITONIN (PCT): CPT

## 2020-04-07 PROCEDURE — 83880 ASSAY OF NATRIURETIC PEPTIDE: CPT

## 2020-04-07 PROCEDURE — 70450 CT HEAD/BRAIN W/O DYE: CPT

## 2020-04-07 PROCEDURE — 6370000000 HC RX 637 (ALT 250 FOR IP): Performed by: EMERGENCY MEDICINE

## 2020-04-07 PROCEDURE — 99285 EMERGENCY DEPT VISIT HI MDM: CPT

## 2020-04-07 PROCEDURE — 83615 LACTATE (LD) (LDH) ENZYME: CPT

## 2020-04-07 PROCEDURE — 87804 INFLUENZA ASSAY W/OPTIC: CPT

## 2020-04-07 PROCEDURE — 80053 COMPREHEN METABOLIC PANEL: CPT

## 2020-04-07 PROCEDURE — 99223 1ST HOSP IP/OBS HIGH 75: CPT | Performed by: INTERNAL MEDICINE

## 2020-04-07 PROCEDURE — 96372 THER/PROPH/DIAG INJ SC/IM: CPT

## 2020-04-07 PROCEDURE — G0379 DIRECT REFER HOSPITAL OBSERV: HCPCS

## 2020-04-07 RX ORDER — ACETAMINOPHEN 325 MG/1
650 TABLET ORAL EVERY 6 HOURS PRN
Status: DISCONTINUED | OUTPATIENT
Start: 2020-04-07 | End: 2020-04-17 | Stop reason: HOSPADM

## 2020-04-07 RX ORDER — METHYLPREDNISOLONE SODIUM SUCCINATE 125 MG/2ML
125 INJECTION, POWDER, LYOPHILIZED, FOR SOLUTION INTRAMUSCULAR; INTRAVENOUS ONCE
Status: COMPLETED | OUTPATIENT
Start: 2020-04-07 | End: 2020-04-07

## 2020-04-07 RX ORDER — ACETAMINOPHEN 650 MG/1
650 SUPPOSITORY RECTAL EVERY 6 HOURS PRN
Status: DISCONTINUED | OUTPATIENT
Start: 2020-04-07 | End: 2020-04-17 | Stop reason: HOSPADM

## 2020-04-07 RX ORDER — ONDANSETRON 2 MG/ML
4 INJECTION INTRAMUSCULAR; INTRAVENOUS EVERY 6 HOURS PRN
Status: DISCONTINUED | OUTPATIENT
Start: 2020-04-07 | End: 2020-04-17 | Stop reason: HOSPADM

## 2020-04-07 RX ORDER — SODIUM CHLORIDE 9 MG/ML
INJECTION, SOLUTION INTRAVENOUS CONTINUOUS
Status: DISCONTINUED | OUTPATIENT
Start: 2020-04-07 | End: 2020-04-14

## 2020-04-07 RX ORDER — SODIUM CHLORIDE 0.9 % (FLUSH) 0.9 %
10 SYRINGE (ML) INJECTION EVERY 12 HOURS SCHEDULED
Status: DISCONTINUED | OUTPATIENT
Start: 2020-04-07 | End: 2020-04-17 | Stop reason: HOSPADM

## 2020-04-07 RX ORDER — NICOTINE 21 MG/24HR
1 PATCH, TRANSDERMAL 24 HOURS TRANSDERMAL DAILY PRN
Status: DISCONTINUED | OUTPATIENT
Start: 2020-04-07 | End: 2020-04-17 | Stop reason: HOSPADM

## 2020-04-07 RX ORDER — SODIUM CHLORIDE 0.9 % (FLUSH) 0.9 %
10 SYRINGE (ML) INJECTION PRN
Status: DISCONTINUED | OUTPATIENT
Start: 2020-04-07 | End: 2020-04-07 | Stop reason: HOSPADM

## 2020-04-07 RX ORDER — SODIUM CHLORIDE 0.9 % (FLUSH) 0.9 %
10 SYRINGE (ML) INJECTION PRN
Status: DISCONTINUED | OUTPATIENT
Start: 2020-04-07 | End: 2020-04-17 | Stop reason: HOSPADM

## 2020-04-07 RX ORDER — PROMETHAZINE HYDROCHLORIDE 25 MG/1
12.5 TABLET ORAL EVERY 6 HOURS PRN
Status: DISCONTINUED | OUTPATIENT
Start: 2020-04-07 | End: 2020-04-17 | Stop reason: HOSPADM

## 2020-04-07 RX ORDER — 0.9 % SODIUM CHLORIDE 0.9 %
80 INTRAVENOUS SOLUTION INTRAVENOUS ONCE
Status: COMPLETED | OUTPATIENT
Start: 2020-04-07 | End: 2020-04-07

## 2020-04-07 RX ORDER — ALBUTEROL SULFATE 90 UG/1
2 AEROSOL, METERED RESPIRATORY (INHALATION) EVERY 6 HOURS PRN
Status: DISCONTINUED | OUTPATIENT
Start: 2020-04-07 | End: 2020-04-07 | Stop reason: HOSPADM

## 2020-04-07 RX ORDER — PROPOFOL 10 MG/ML
INJECTION, EMULSION INTRAVENOUS
Status: DISCONTINUED
Start: 2020-04-07 | End: 2020-04-07 | Stop reason: HOSPADM

## 2020-04-07 RX ADMIN — ENOXAPARIN SODIUM 40 MG: 40 INJECTION SUBCUTANEOUS at 15:20

## 2020-04-07 RX ADMIN — VANCOMYCIN HYDROCHLORIDE 2000 MG: 1 INJECTION, POWDER, LYOPHILIZED, FOR SOLUTION INTRAVENOUS at 07:03

## 2020-04-07 RX ADMIN — Medication 10 ML: at 05:15

## 2020-04-07 RX ADMIN — Medication 1250 MG: at 19:04

## 2020-04-07 RX ADMIN — SODIUM CHLORIDE 80 ML: 9 INJECTION, SOLUTION INTRAVENOUS at 05:14

## 2020-04-07 RX ADMIN — PIPERACILLIN AND TAZOBACTAM 3.38 G: 3; .375 INJECTION, POWDER, FOR SOLUTION INTRAVENOUS at 15:13

## 2020-04-07 RX ADMIN — PIPERACILLIN AND TAZOBACTAM 3.38 G: 3; .375 INJECTION, POWDER, FOR SOLUTION INTRAVENOUS at 22:31

## 2020-04-07 RX ADMIN — METHYLPREDNISOLONE SODIUM SUCCINATE 125 MG: 125 INJECTION, POWDER, FOR SOLUTION INTRAMUSCULAR; INTRAVENOUS at 03:39

## 2020-04-07 RX ADMIN — IOVERSOL 75 ML: 741 INJECTION INTRA-ARTERIAL; INTRAVENOUS at 05:14

## 2020-04-07 RX ADMIN — SODIUM CHLORIDE, PRESERVATIVE FREE 10 ML: 5 INJECTION INTRAVENOUS at 09:45

## 2020-04-07 RX ADMIN — SODIUM CHLORIDE: 9 INJECTION, SOLUTION INTRAVENOUS at 11:45

## 2020-04-07 RX ADMIN — PIPERACILLIN AND TAZOBACTAM 4.5 G: 4; .5 INJECTION, POWDER, LYOPHILIZED, FOR SOLUTION INTRAVENOUS; PARENTERAL at 06:34

## 2020-04-07 RX ADMIN — ACETAMINOPHEN 650 MG: 650 SUPPOSITORY RECTAL at 15:32

## 2020-04-07 RX ADMIN — ALBUTEROL SULFATE 2 PUFF: 90 AEROSOL, METERED RESPIRATORY (INHALATION) at 03:39

## 2020-04-07 ASSESSMENT — ENCOUNTER SYMPTOMS
COUGH: 1
COLOR CHANGE: 0
APNEA: 1
SHORTNESS OF BREATH: 1
EYE DISCHARGE: 0
ABDOMINAL DISTENTION: 0

## 2020-04-07 ASSESSMENT — PAIN SCALES - GENERAL
PAINLEVEL_OUTOF10: 8
PAINLEVEL_OUTOF10: 8

## 2020-04-07 ASSESSMENT — PAIN DESCRIPTION - PAIN TYPE: TYPE: ACUTE PAIN;CHRONIC PAIN

## 2020-04-07 ASSESSMENT — PAIN DESCRIPTION - ORIENTATION: ORIENTATION: LEFT

## 2020-04-07 ASSESSMENT — PAIN DESCRIPTION - LOCATION: LOCATION: BACK;HEAD;SHOULDER

## 2020-04-07 NOTE — H&P
Father        Review of Systems:     unobtainable      Physical Exam:   /86   Pulse 59   Resp 16   SpO2 97%   Temp (24hrs), Av.1 °F (36.7 °C), Min:97.8 °F (36.6 °C), Max:98.4 °F (36.9 °C)    No results for input(s): POCGLU in the last 72 hours.   No intake or output data in the 24 hours ending 20 1116    General Appearance: alert, ill appearing, and in no acute distress  Mental status: oriented to person  Head: normocephalic, atraumatic  Eye: no icterus, redness, pupils equal and reactive, extraocular eye movements intact, conjunctiva clear  Ear: normal external ear, no discharge, hearing intact  Nose: no drainage noted  Mouth: mucous membranes moist  Neck: supple, no carotid bruits, thyroid not palpable  Lungs: Bilateral rhonchi, normal effort  Cardiovascular: normal rate, regular rhythm, no murmur, gallop, rub  Abdomen: Soft, nontender, nondistended, normal bowel sounds, no hepatomegaly or splenomegaly  Neurologic:slurred speech, unintelligible; seems to be weaker on right side compared to left  Skin: No gross lesions, rashes, bruising or bleeding on exposed skin area  Extremities: peripheral pulses palpable, no pedal edema or calf pain with palpation  Psych: normal affect    Investigations:      Laboratory Testing:  Recent Results (from the past 24 hour(s))   CBC Auto Differential    Collection Time: 20  3:10 AM   Result Value Ref Range    WBC 10.5 3.5 - 11.0 k/uL    RBC 4.10 (L) 4.5 - 5.9 m/uL    Hemoglobin 12.6 (L) 13.5 - 17.5 g/dL    Hematocrit 36.7 (L) 41 - 53 %    MCV 89.6 80 - 100 fL    MCH 30.8 26 - 34 pg    MCHC 34.3 31 - 37 g/dL    RDW 13.2 11.5 - 14.9 %    Platelets 329 897 - 049 k/uL    MPV 7.3 6.0 - 12.0 fL    NRBC Automated NOT REPORTED per 100 WBC    Differential Type NOT REPORTED     Immature Granulocytes NOT REPORTED 0 %    Absolute Immature Granulocyte NOT REPORTED 0.00 - 0.30 k/uL    WBC Morphology NOT REPORTED     RBC Morphology NOT REPORTED     Platelet Estimate NOT (Minh) 442 ms    R Axis 11 degrees    T Axis 5 degrees   Rapid influenza A/B antigens    Collection Time: 04/07/20  3:50 AM   Result Value Ref Range    Specimen Description . NASOPHARYNGEAL SWAB     Special Requests NOT REPORTED     Direct Exam       NEGATIVE for Influenza A + B antigens. PCR testing to confirm this result is available upon request.  Specimen will be saved in the laboratory for 7 days. Please call [phone] if PCR testing is indicated. Troponin    Collection Time: 04/07/20  5:30 AM   Result Value Ref Range    Troponin, High Sensitivity 10 0 - 22 ng/L    Troponin T NOT REPORTED <0.03 ng/mL    Troponin Interp NOT REPORTED        Imaging/Diagnostics:  Ct Head Wo Contrast    Result Date: 4/7/2020  No acute intracranial abnormality given streak artifact from plate in the left frontotemporal skull. .     Ct Abdomen Pelvis W Iv Contrast Additional Contrast? None    Result Date: 4/7/2020  1. No acute pulmonary thromboembolic disease. 2.  Scattered bilateral basilar predominant heterogeneous pulmonary opacities are most pronounced in the left lower lobe. Findings may represent subsegmental atelectasis/scarring or a developing infectious/inflammatory process including aspiration pneumonitis. Prominent mediastinal and hilar lymph nodes may be reactive. 3.  Bilateral bronchial wall thickening suggests bronchitis. 4.  No acute abnormality in the abdomen or pelvis. 5.  Atherosclerosis. Stable right common iliac artery aneurysm and focal dissection in the distal right abdominal aorta which extends into the right common iliac artery. Ct Abdomen Pelvis W Iv Contrast Additional Contrast? None    Result Date: 4/3/2020  1. Abdominal aorta is top normal in diameter of 2.7 cm in the infrarenal location. 2. Short segment right-sided aortic dissection just above the right common iliac artery of 2.7 cm length.  3. Distal common right iliac artery aneurysm of 2.4 x 2.7 cm. 4.

## 2020-04-07 NOTE — ED PROVIDER NOTES
Psychiatric problem     depression, social anxiety    Seizures ([facility] [address]      SURGICAL HISTORY       Past Surgical History:   Procedure Laterality Date    ABDOMEN SURGERY      BACK SURGERY      spinal surgery 2005     CAROTID ENDARTERECTOMY Left 09/20/2016    COLONOSCOPY N/A 8/31/2018    COLONOSCOPY POLYPECTOMY COLD BIOPSY performed by Pradip Cheng MD at [facility]      left face    HERNIA REPAIR      HIATAL HERNIA REPAIR      INGUINAL HERNIA REPAIR Bilateral     OTHER SURGICAL HISTORY      mesh infected in inguinal canal, had to remove. Removed testiscle at this time.  TONSILLECTOMY       CURRENT MEDICATIONS       Previous Medications    ALBUTEROL SULFATE  (90 BASE) MCG/ACT INHALER    Inhale 2 puffs into the lungs every 6 hours as needed for Wheezing    ASPIRIN 81 MG EC TABLET    Take 1 tablet by mouth daily    ATORVASTATIN (LIPITOR) 40 MG TABLET    Take 1 tablet by mouth daily    FUROSEMIDE (LASIX) 20 MG TABLET    Take 2 tablets by mouth daily    GABAPENTIN (NEURONTIN) 800 MG TABLET    Take 800 mg by mouth 3 times daily. LISINOPRIL-HYDROCHLOROTHIAZIDE (PRINZIDE;ZESTORETIC) 10-12.5 MG PER TABLET    Take 1 tablet by mouth daily    POTASSIUM CHLORIDE (KLOR-CON M) 20 MEQ EXTENDED RELEASE TABLET    Take 1 tablet by mouth daily    THEOPHYLLINE 80 MG/15ML ELIXIR    Take 18.8 mLs by mouth every 8 hours     ALLERGIES     is allergic to bactrim [sulfamethoxazole-trimethoprim] and ciprofloxacin. FAMILY HISTORY     He indicated that the status of his mother is unknown. He indicated that the status of his father is unknown.      SOCIAL HISTORY       Social History     Tobacco Use    Smoking status: Current Every Day Smoker     Packs/day: 1.00     Years: 60.00     Pack years: 60.00     Types: Cigarettes     Start date: 27    Smokeless tobacco: Never Used   Substance Use Topics    Alcohol use: No    Drug use: No     PHYSICAL EXAM     INITIAL VITALS: BP (!) 151/84   Pulse 95

## 2020-04-07 NOTE — CONSULTS
No murmur. No friction rub. Pulmonary:      Effort: No respiratory distress. Breath sounds: No stridor. No wheezing. Abdominal:      General: There is no distension. Palpations: Abdomen is soft. Genitourinary:     Comments: No francis  Musculoskeletal:         General: No deformity. Skin:     General: Skin is dry. Coloration: Skin is not jaundiced. Neurological:      Mental Status: He is alert. Mental status is at baseline. Cranial Nerves: No cranial nerve deficit. Comments: Very slurred speech   Psychiatric:         Mood and Affect: Mood normal.         Thought Content: Thought content normal.           Medical Decision Making:   I have independently reviewed/ordered the following labs:    CBC with Differential:   Recent Labs     04/05/20  0920 04/07/20  0310   WBC 10.2 10.5   HGB 11.8* 12.6*   HCT 35.9* 36.7*    211   LYMPHOPCT  --  11*   MONOPCT  --  7     BMP:  Recent Labs     04/05/20  0920 04/07/20  0310   * 135   K 3.7 4.2   CL 98 97*   CO2 21 16*   BUN 15 15   CREATININE 0.78 0.81   MG 2.0  --      Hepatic Function Panel:   Recent Labs     04/07/20  0310   PROT 7.8   LABALBU 3.8   BILITOT 1.67*   ALKPHOS 72   ALT 16   AST 22     No results for input(s): RPR in the last 72 hours. No results for input(s): HIV in the last 72 hours. No results for input(s): BC in the last 72 hours. Lab Results   Component Value Date    CREATININE 0.81 04/07/2020    GLUCOSE 103 04/07/2020       Detailed results: Thank you for allowing us to participate in the care of this patient. Please call with questions. This note is created with the assistance of a speech recognition program.  While intending to generate adocument that actually reflects the content of the visit, the document can still have some errors including those of syntax and sound a like substitutions which may escape proof reading.   It such instances, actual meaningcan be extrapolated by contextual

## 2020-04-07 NOTE — CARE COORDINATION
Case Management Initial Discharge Plan  Renata Orellana,             Talked with:family member sister Gloria to discuss discharge plans. Information verified: address, contacts, phone number, , insurance Yes  PCP: Mitzi Montenegro MD  Date of last visit: 2020    Insurance Provider: [facility] [address]     Discharge Planning    Living Arrangements:  Alone   Support Systems:  Family Members, Children, Home Care Staff    Home has one story with ramp    Patient able to perform ADL's:Independent    Current Services (outpatient & in home) [facility]-current  DME equipment: walker, electric W/C  DME provider:       Potential Assistance Needed:  Home Care, Other (Comment)(Hospice)    Patient agreeable to home care: Yes-current with [facility]  Freedom of choice provided:  yes    Prior SNF/Rehab Placement and Facility: N/A  Agreeable to SNF/Rehab: No  [place] of choice provided: n/a   Evaluation: n/a    Expected Discharge date:  04/10/20  Patient expects to be discharged to:  home ? Follow Up Appointment: Best Day/ Time:      Transportation provider: ambulance  Transportation arrangements needed for discharge: Yes    Readmission Risk              Risk of Unplanned Readmission:        0             Does patient have a readmission risk score greater than 14?: No  If yes, follow-up appointment must be made within 7 days of discharge. Goals of Care: breathe easier      Discharge Plan: ? Home    Pharmacy-[facility] [address]    Talked with sister Ezra Dingist us to look into Hospice. Talked with Dr. Sallie Avina. Wanting this RN to get a hold of patient's son. Explained to her that we needed to talk with the son. 1225 called and left message with son Jacqueline Kumari  [phone] return call from [facility] with him about hospice and [address]. Will need to talk with his sisters. Doesn't think there is a Healthcare DPOA. 1330 call from son Blayne Ortiz with Hospice consult.   65 notified Dr. Cano regarding family OK with Hospice consult.       Electronically signed by Jm Manning RN on 4/7/20 at 12:36 PM EDT

## 2020-04-08 PROBLEM — J69.0 ASPIRATION PNEUMONITIS (HCC): Status: ACTIVE | Noted: 2020-04-08

## 2020-04-08 LAB
ANION GAP SERPL CALCULATED.3IONS-SCNC: 15 MMOL/L (ref 9–17)
BUN BLDV-MCNC: 14 MG/DL (ref 8–23)
BUN/CREAT BLD: ABNORMAL (ref 9–20)
C-REACTIVE PROTEIN: 178.2 MG/L (ref 0–5)
CALCIUM SERPL-MCNC: 9.3 MG/DL (ref 8.6–10.4)
CHLORIDE BLD-SCNC: 102 MMOL/L (ref 98–107)
CO2: 22 MMOL/L (ref 20–31)
CREAT SERPL-MCNC: 0.63 MG/DL (ref 0.7–1.2)
GFR AFRICAN AMERICAN: >60 ML/MIN
GFR NON-AFRICAN AMERICAN: >60 ML/MIN
GFR SERPL CREATININE-BSD FRML MDRD: ABNORMAL ML/MIN/{1.73_M2}
GFR SERPL CREATININE-BSD FRML MDRD: ABNORMAL ML/MIN/{1.73_M2}
GLUCOSE BLD-MCNC: 133 MG/DL (ref 70–99)
HCT VFR BLD CALC: 33 % (ref 40.7–50.3)
HEMOGLOBIN: 11.4 G/DL (ref 13–17)
MAGNESIUM: 2 MG/DL (ref 1.6–2.6)
MCH RBC QN AUTO: 30.2 PG (ref 25.2–33.5)
MCHC RBC AUTO-ENTMCNC: 34.5 G/DL (ref 28.4–34.8)
MCV RBC AUTO: 87.5 FL (ref 82.6–102.9)
MRSA, DNA, NASAL: NORMAL
MYCOPLASMA PNEUMONIAE IGM: 0.15
NRBC AUTOMATED: 0 PER 100 WBC
PDW BLD-RTO: 12.2 % (ref 11.8–14.4)
PLATELET # BLD: 233 K/UL (ref 138–453)
PMV BLD AUTO: 9.7 FL (ref 8.1–13.5)
POTASSIUM SERPL-SCNC: 3.5 MMOL/L (ref 3.7–5.3)
RBC # BLD: 3.77 M/UL (ref 4.21–5.77)
SARS-COV-2, NAA: NORMAL
SARS-COV-2, PCR: NOT DETECTED
SARS-COV-2: NORMAL
SODIUM BLD-SCNC: 139 MMOL/L (ref 135–144)
SOURCE: NORMAL
SPECIMEN DESCRIPTION: NORMAL
WBC # BLD: 11 K/UL (ref 3.5–11.3)

## 2020-04-08 PROCEDURE — 85027 COMPLETE CBC AUTOMATED: CPT

## 2020-04-08 PROCEDURE — 83735 ASSAY OF MAGNESIUM: CPT

## 2020-04-08 PROCEDURE — 6360000002 HC RX W HCPCS: Performed by: INTERNAL MEDICINE

## 2020-04-08 PROCEDURE — 99232 SBSQ HOSP IP/OBS MODERATE 35: CPT | Performed by: INTERNAL MEDICINE

## 2020-04-08 PROCEDURE — 80048 BASIC METABOLIC PNL TOTAL CA: CPT

## 2020-04-08 PROCEDURE — 2060000000 HC ICU INTERMEDIATE R&B

## 2020-04-08 PROCEDURE — 2580000003 HC RX 258: Performed by: NURSE PRACTITIONER

## 2020-04-08 PROCEDURE — 2580000003 HC RX 258: Performed by: INTERNAL MEDICINE

## 2020-04-08 PROCEDURE — 6360000002 HC RX W HCPCS: Performed by: NURSE PRACTITIONER

## 2020-04-08 PROCEDURE — 86140 C-REACTIVE PROTEIN: CPT

## 2020-04-08 RX ORDER — ALBUTEROL SULFATE 90 UG/1
2 AEROSOL, METERED RESPIRATORY (INHALATION) EVERY 6 HOURS PRN
Status: DISCONTINUED | OUTPATIENT
Start: 2020-04-08 | End: 2020-04-17 | Stop reason: HOSPADM

## 2020-04-08 RX ADMIN — Medication 1250 MG: at 21:31

## 2020-04-08 RX ADMIN — SODIUM CHLORIDE, PRESERVATIVE FREE 10 ML: 5 INJECTION INTRAVENOUS at 08:28

## 2020-04-08 RX ADMIN — ENOXAPARIN SODIUM 40 MG: 40 INJECTION SUBCUTANEOUS at 08:27

## 2020-04-08 RX ADMIN — PIPERACILLIN AND TAZOBACTAM 3.38 G: 3; .375 INJECTION, POWDER, FOR SOLUTION INTRAVENOUS at 08:26

## 2020-04-08 RX ADMIN — PIPERACILLIN AND TAZOBACTAM 3.38 G: 3; .375 INJECTION, POWDER, FOR SOLUTION INTRAVENOUS at 16:48

## 2020-04-08 RX ADMIN — Medication 1250 MG: at 05:27

## 2020-04-08 ASSESSMENT — ENCOUNTER SYMPTOMS
COUGH: 1
EYE DISCHARGE: 0
ABDOMINAL DISTENTION: 0
COLOR CHANGE: 0
SHORTNESS OF BREATH: 1
APNEA: 1

## 2020-04-08 ASSESSMENT — PAIN SCALES - GENERAL: PAINLEVEL_OUTOF10: 5

## 2020-04-08 ASSESSMENT — PAIN DESCRIPTION - LOCATION: LOCATION: LEG;NECK;SHOULDER

## 2020-04-08 ASSESSMENT — PAIN DESCRIPTION - PAIN TYPE: TYPE: ACUTE PAIN;CHRONIC PAIN

## 2020-04-08 ASSESSMENT — PAIN DESCRIPTION - DESCRIPTORS: DESCRIPTORS: ACHING;CRAMPING

## 2020-04-08 NOTE — PROGRESS NOTES
Physical Therapy  DATE: 2020    NAME: Bernard Tao  MRN: [id number]   : 1945    Patient not seen this date for Physical Therapy due to:  [] Blood transfusion in progress  [] Hemodialysis  []  Patient Declined  [] Spine Precautions   [] Strict Bedrest  [] Surgery/ Procedure  [] Testing      [x] Other--awaiting COVID-19 test results        [] PT being discontinued at this time. Patient independent. No further needs. [] PT being discontinued at this time as the patient has been transferred to palliative care. No further needs.     Yeni Bennett, PT

## 2020-04-08 NOTE — PLAN OF CARE
Problem: Falls - Risk of:  Goal: Will remain free from falls  Description: Will remain free from falls  Outcome: Ongoing  Goal: Absence of physical injury  Description: Absence of physical injury  Outcome: Ongoing     Problem: Pain:  Goal: Pain level will decrease  Description: Pain level will decrease  Outcome: Ongoing  Goal: Control of acute pain  Description: Control of acute pain  Outcome: Ongoing  Goal: Control of chronic pain  Description: Control of chronic pain  Outcome: Ongoing   Questions and concerns addressed with patient and family as needed.

## 2020-04-08 NOTE — PROGRESS NOTES
pulmonary disease) ([facility] [address]     Hypertension     Pneumonia     Psychiatric problem     depression, social anxiety    [facility]        Past Surgical  History:     Past Surgical History:   Procedure Laterality Date    ABDOMEN SURGERY      BACK SURGERY      spinal surgery 2005     CAROTID ENDARTERECTOMY Left 09/20/2016    COLONOSCOPY N/A 8/31/2018    COLONOSCOPY POLYPECTOMY COLD BIOPSY performed by Anaid Molina MD at [facility]      left face    HERNIA REPAIR      HIATAL HERNIA REPAIR      INGUINAL HERNIA REPAIR Bilateral     OTHER SURGICAL HISTORY      mesh infected in inguinal canal, had to remove. Removed testiscle at this time.      TONSILLECTOMY         Medications:      enoxaparin  40 mg Subcutaneous Daily    sodium chloride flush  10 mL Intravenous 2 times per day    piperacillin-tazobactam  3.375 g Intravenous Q8H    vancomycin  1,250 mg Intravenous Q12H    vancomycin (VANCOCIN) intermittent dosing (placeholder)   Other RX Placeholder       Social History:     Social History     Socioeconomic History    Marital status:      Spouse name: Not on file    Number of children: Not on file    Years of education: Not on file    Highest education level: Not on file   Occupational History    Not on file   Social Needs    Financial resource strain: Not on file    Food insecurity     Worry: Not on file     Inability: Not on file    Transportation needs     Medical: Not on file     Non-medical: Not on file   Tobacco Use    Smoking status: Current Every Day Smoker     Packs/day: 1.00     Years: 60.00     Pack years: 60.00     Types: Cigarettes     Start date: 1956    Smokeless tobacco: Never Used   Substance and Sexual Activity    Alcohol use: No    Drug use: No    Sexual activity: Yes     Partners: Female   Lifestyle    Physical activity     Days per week: Not on file     Minutes per session: Not on file    Stress: Not on file   Relationships    Social results: Thank you for allowing us to participate in the care of this patient. Please call with questions. This note is created with the assistance of a speech recognition program.  While intending to generate adocument that actually reflects the content of the visit, the document can still have some errors including those of syntax and sound a like substitutions which may escape proof reading. It such instances, actual meaningcan be extrapolated by contextual diversion.     Ankit Wolf MD  Office: [phone]  Perfect serve / office [phone]

## 2020-04-08 NOTE — PROGRESS NOTES
occlusion with cerebral infarction ([facility] [address] Chronic kidney disease, COPD (chronic obstructive pulmonary disease) ([facility] [address] Hypertension, Pneumonia, Psychiatric problem, and [facility] Social History:   reports that he has been smoking cigarettes. He started smoking about 64 years ago. He has a 60.00 pack-year smoking history. He has never used smokeless tobacco. He reports that he does not drink alcohol or use drugs. Family History:   Family History   Problem Relation Age of Onset    Arthritis Mother     Atrial Fibrillation Mother     Hearing Loss Mother     Heart Disease Mother     Stroke Mother     Vision Loss Mother     Arthritis Father     Cancer Father     Heart Disease Father     High Blood Pressure Father     Stroke Father     Vision Loss Father        Vitals:  /81   Pulse 81   Temp 97.3 °F (36.3 °C) (Oral)   Resp 21   Wt 192 lb 14.4 oz (87.5 kg)   SpO2 94%   BMI 27.68 kg/m²   Temp (24hrs), Av.8 °F (36.6 °C), Min:97.3 °F (36.3 °C), Max:98.6 °F (37 °C)    No results for input(s): POCGLU in the last 72 hours. I/O (24Hr):     Intake/Output Summary (Last 24 hours) at 2020 0959  Last data filed at 2020 0553  Gross per 24 hour   Intake 976 ml   Output 1400 ml   Net -424 ml       Labs:  Hematology:  Recent Labs     20  03120  1646 20  0632   WBC 10.5  --  11.0   RBC 4.10*  --  3.77*   HGB 12.6*  --  11.4*   HCT 36.7*  --  33.0*   MCV 89.6  --  87.5   MCH 30.8  --  30.2   MCHC 34.3  --  34.5   RDW 13.2  --  12.2     --  233   MPV 7.3  --  9.7   CRP  --  83.0*  --    INR 1.1  --   --      Chemistry:  Recent Labs     20  0310 20  0530 20  0632     --  139   K 4.2  --  3.5*   CL 97*  --  102   CO2 16*  --  22   GLUCOSE 103*  --  133*   BUN 15  --  14   CREATININE 0.81  --  0.63*   MG  --   --  2.0   ANIONGAP 22*  --  15   LABGLOM >60  --  >60   GFRAA >60  --  >60   CALCIUM 9.7  --  9.3   PROBNP 489*  --   --    TROPHS 10 10 bowel loops in the mid to upper pelvis with adjacent thickening of the ileum favoring enteritis/ileitis, mild without gross obstruction. Xr Chest Portable    Result Date: 4/8/2020  1. Lower lung volumes without acute cardiopulmonary disease. Xr Chest Portable    Result Date: 4/7/2020  No acute cardiopulmonary process. Ct Chest Pulmonary Embolism W Contrast    Result Date: 4/7/2020  1. No acute pulmonary thromboembolic disease. 2.  Scattered bilateral basilar predominant heterogeneous pulmonary opacities are most pronounced in the left lower lobe. Findings may represent subsegmental atelectasis/scarring or a developing infectious/inflammatory process including aspiration pneumonitis. Prominent mediastinal and hilar lymph nodes may be reactive. 3.  Bilateral bronchial wall thickening suggests bronchitis. 4.  No acute abnormality in the abdomen or pelvis. 5.  Atherosclerosis. Stable right common iliac artery aneurysm and focal dissection in the distal right abdominal aorta which extends into the right common iliac artery. Fl Modified Barium Swallow W Video    Result Date: 4/6/2020  Penetration and aspiration with pudding and honey thick liquid consistencies. Please see separate speech pathology report for full discussion of findings and recommendations.        Physical Examination:        General appearance:  alert, cooperative and no distress  Mental Status:  oriented to person,normal affect  Lungs:  coarse bilaterally, normal effort  Heart:  regular rate and rhythm, no murmur  Abdomen:  soft, nontender, nondistended, normal bowel sounds, no masses, hepatomegaly, splenomegaly  Extremities:  no edema, redness, tenderness in the calves  Skin:  no gross lesions, rashes, induration    Assessment:        Hospital Problems           Last Modified POA    * (Principal) Aspiration pneumonia of both lower lobes ([address] 4/7/2020 Yes    Aortic dissection ([address] 4/7/2020 Yes    Other dysphagia 4/7/2020 Yes

## 2020-04-09 LAB
ANION GAP SERPL CALCULATED.3IONS-SCNC: 12 MMOL/L (ref 9–17)
BUN BLDV-MCNC: 17 MG/DL (ref 8–23)
BUN/CREAT BLD: ABNORMAL (ref 9–20)
C-REACTIVE PROTEIN: 67.5 MG/L (ref 0–5)
CALCIUM SERPL-MCNC: 9.5 MG/DL (ref 8.6–10.4)
CHLORIDE BLD-SCNC: 103 MMOL/L (ref 98–107)
CO2: 27 MMOL/L (ref 20–31)
CREAT SERPL-MCNC: 0.79 MG/DL (ref 0.7–1.2)
CULTURE: ABNORMAL
GFR AFRICAN AMERICAN: >60 ML/MIN
GFR NON-AFRICAN AMERICAN: >60 ML/MIN
GFR SERPL CREATININE-BSD FRML MDRD: ABNORMAL ML/MIN/{1.73_M2}
GFR SERPL CREATININE-BSD FRML MDRD: ABNORMAL ML/MIN/{1.73_M2}
GLUCOSE BLD-MCNC: 96 MG/DL (ref 70–99)
Lab: ABNORMAL
POTASSIUM SERPL-SCNC: 3.6 MMOL/L (ref 3.7–5.3)
SODIUM BLD-SCNC: 142 MMOL/L (ref 135–144)
SPECIMEN DESCRIPTION: ABNORMAL
VANCOMYCIN TROUGH DATE LAST DOSE: NORMAL
VANCOMYCIN TROUGH DOSE AMOUNT: NORMAL
VANCOMYCIN TROUGH TIME LAST DOSE: NORMAL
VANCOMYCIN TROUGH: 19.7 UG/ML (ref 10–20)

## 2020-04-09 PROCEDURE — 80202 ASSAY OF VANCOMYCIN: CPT

## 2020-04-09 PROCEDURE — 6360000002 HC RX W HCPCS: Performed by: NURSE PRACTITIONER

## 2020-04-09 PROCEDURE — 1200000000 HC SEMI PRIVATE

## 2020-04-09 PROCEDURE — 6370000000 HC RX 637 (ALT 250 FOR IP): Performed by: NURSE PRACTITIONER

## 2020-04-09 PROCEDURE — 99232 SBSQ HOSP IP/OBS MODERATE 35: CPT | Performed by: INTERNAL MEDICINE

## 2020-04-09 PROCEDURE — 6360000002 HC RX W HCPCS: Performed by: INTERNAL MEDICINE

## 2020-04-09 PROCEDURE — 86140 C-REACTIVE PROTEIN: CPT

## 2020-04-09 PROCEDURE — 2580000003 HC RX 258: Performed by: NURSE PRACTITIONER

## 2020-04-09 PROCEDURE — 2580000003 HC RX 258: Performed by: INTERNAL MEDICINE

## 2020-04-09 PROCEDURE — 36415 COLL VENOUS BLD VENIPUNCTURE: CPT

## 2020-04-09 PROCEDURE — 80048 BASIC METABOLIC PNL TOTAL CA: CPT

## 2020-04-09 PROCEDURE — 94761 N-INVAS EAR/PLS OXIMETRY MLT: CPT

## 2020-04-09 RX ADMIN — SODIUM CHLORIDE, PRESERVATIVE FREE 10 ML: 5 INJECTION INTRAVENOUS at 09:35

## 2020-04-09 RX ADMIN — SODIUM CHLORIDE, PRESERVATIVE FREE 10 ML: 5 INJECTION INTRAVENOUS at 21:28

## 2020-04-09 RX ADMIN — PIPERACILLIN AND TAZOBACTAM 3.38 G: 3; .375 INJECTION, POWDER, FOR SOLUTION INTRAVENOUS at 09:28

## 2020-04-09 RX ADMIN — ACETAMINOPHEN 650 MG: 325 TABLET ORAL at 14:04

## 2020-04-09 RX ADMIN — ENOXAPARIN SODIUM 40 MG: 40 INJECTION SUBCUTANEOUS at 09:27

## 2020-04-09 RX ADMIN — Medication 1250 MG: at 11:19

## 2020-04-09 RX ADMIN — ACETAMINOPHEN 650 MG: 325 TABLET ORAL at 22:08

## 2020-04-09 RX ADMIN — PIPERACILLIN AND TAZOBACTAM 3.38 G: 3; .375 INJECTION, POWDER, FOR SOLUTION INTRAVENOUS at 01:04

## 2020-04-09 RX ADMIN — PIPERACILLIN AND TAZOBACTAM 3.38 G: 3; .375 INJECTION, POWDER, FOR SOLUTION INTRAVENOUS at 16:33

## 2020-04-09 ASSESSMENT — ENCOUNTER SYMPTOMS
EYE DISCHARGE: 0
SHORTNESS OF BREATH: 1
COUGH: 1
COLOR CHANGE: 0
ABDOMINAL DISTENTION: 0
APNEA: 1

## 2020-04-09 ASSESSMENT — PAIN DESCRIPTION - PAIN TYPE
TYPE: CHRONIC PAIN
TYPE: ACUTE PAIN

## 2020-04-09 ASSESSMENT — PAIN SCALES - GENERAL
PAINLEVEL_OUTOF10: 3
PAINLEVEL_OUTOF10: 6
PAINLEVEL_OUTOF10: 6
PAINLEVEL_OUTOF10: 4

## 2020-04-09 ASSESSMENT — PAIN DESCRIPTION - LOCATION
LOCATION: NECK
LOCATION: SHOULDER;NECK

## 2020-04-09 NOTE — PROGRESS NOTES
Called son Elliot Lyndsey and sister Abby Greene to give updates. Left VM with sister re: pt condition and that pt is being transferred to room 444-.

## 2020-04-09 NOTE — PROGRESS NOTES
[address]    Progress Note    4/9/2020    10:00 AM    Name:   Real Landry  MRN:     [id number]     Acct:      [de-identified]   Room:   2004/2004-01  IP Day:  1  Admit Date:  4/7/2020 10:01 AM    PCP:   Tone Hester MD  Code Status:  DNR-CCA    Subjective:     C/C: sob  Interval History Status: not changed. Remains about the same  He denies complaints  Tested covid negative    Brief History:     Alondra Clark is a 69 y.o.  male who presents with No chief complaint on file.   and is admitted to the hospital for the management of Aspiration pneumonia of both lower lobes ([address]    This 69 yom presents with reportedly sob and headache. Ratna Montelongo was just discharged yesterday from [facility] with enteritis and aortic aneurysm which were to be addressed as outpatient. Ratna Montelongo had failed a swallow study and peg recommended but family refused. Glynn Fofana were counseled on how he would likely aspirate. Ratna Montelongo returned to [facility] er with sob.  Patient cannot provide any history as he mumbles unintelligibly-I am told this is his baseline. Review of Systems:     unobtainable      Medications: Allergies:     Allergies   Allergen Reactions    Bactrim [Sulfamethoxazole-Trimethoprim] Hives and Swelling    Ciprofloxacin Swelling     FACE       Current Meds:   Scheduled Meds:    enoxaparin  40 mg Subcutaneous Daily    sodium chloride flush  10 mL Intravenous 2 times per day    piperacillin-tazobactam  3.375 g Intravenous Q8H    vancomycin  1,250 mg Intravenous Q12H    vancomycin (VANCOCIN) intermittent dosing (placeholder)   Other RX Placeholder     Continuous Infusions:    sodium chloride 75 mL/hr at 04/07/20 1145     PRN Meds: albuterol sulfate HFA, acetaminophen **OR** acetaminophen, magnesium hydroxide, nicotine, promethazine **OR** ondansetron, sodium chloride flush    Data:     Past Medical History:   has a past medical history of Arthritis, above. 5. Some fluid-filled bowel loops in the mid to upper pelvis with adjacent thickening of the ileum favoring enteritis/ileitis, mild without gross obstruction. Xr Chest Portable    Result Date: 4/8/2020  1. Lower lung volumes without acute cardiopulmonary disease. Xr Chest Portable    Result Date: 4/7/2020  No acute cardiopulmonary process. Ct Chest Pulmonary Embolism W Contrast    Result Date: 4/7/2020  1. No acute pulmonary thromboembolic disease. 2.  Scattered bilateral basilar predominant heterogeneous pulmonary opacities are most pronounced in the left lower lobe. Findings may represent subsegmental atelectasis/scarring or a developing infectious/inflammatory process including aspiration pneumonitis. Prominent mediastinal and hilar lymph nodes may be reactive. 3.  Bilateral bronchial wall thickening suggests bronchitis. 4.  No acute abnormality in the abdomen or pelvis. 5.  Atherosclerosis. Stable right common iliac artery aneurysm and focal dissection in the distal right abdominal aorta which extends into the right common iliac artery. Fl Modified Barium Swallow W Video    Result Date: 4/6/2020  Penetration and aspiration with pudding and honey thick liquid consistencies. Please see separate speech pathology report for full discussion of findings and recommendations.        Physical Examination:        General appearance:  alert, cooperative and no distress  Mental Status:  oriented to person, place and time and normal affect  Lungs:  coarse bilaterally, normal effort  Heart:  regular rate and rhythm, no murmur  Abdomen:  soft, nontender, nondistended, normal bowel sounds, no masses, hepatomegaly, splenomegaly  Extremities:  no edema, redness, tenderness in the calves  Skin:  no gross lesions, rashes, induration    Assessment:        Hospital Problems           Last Modified POA    * (Principal) Aspiration pneumonia of both lower lobes ([address] 4/7/2020 Yes    Aortic dissection (HCC)

## 2020-04-09 NOTE — PROGRESS NOTES
[facility] -   Infectious diseases evaluation  admission date 4/7/2020    reason for consultation:   ?? COVID    Impression :   Current:  · Abdominal aorta dissection, seems to be stable  · Dysphagia, swallow study positive, PEG  · Cough headache chest pain SOB  · Left lower lobe infiltrate, possible aspiration pneumonia  · Suspected COVID    Other:  · CVA  Discussion / summary of stay / plan of care   ·   Recommendations   · Keep vancomycin and Zosyn for now  ·  COVID neg, move out on AB and remove isolation  · Discussed with RN    Infection Control Recommendations   · [place] Precautions  · Contact Isolation   · Airborne isolation  · Droplet Isolation    Antimicrobial Stewardship Recommendations   · Simplification of therapy  · Targeted therapy  Coordination ofOutpatient Care:   · Estimated Length of IV antimicrobials:  · Patient will need Midline / picc Catheter Insertion:   · Patient will need SNF:  · Patient will need outpatient wound care:     History of Present Illness:   Initial history:  Josefina Worrell is a 69y.o.-year-old male presents with shortness of breath chest pain and headache. The chest pain is left-sided, radiating to the left shoulder and the neck, no fever chills no diarrhea. He has a mild headache, gradual onset, without any neck stiffness, no photophobia. It seems that he presented to the ER at [facility] yesterday with abdominal pain and there was a concern for a right-sided aortic dissection affecting the area above the common iliac artery, vascular surgery did not plan on any procedure. Cardiology also saw him for bradycardia and hypotension,  He was started on dopamine drip and went to ICU. There was also a concern for aspiration and there was a suggestion to place a PEG tube, but never done.   The patient was discharged from [facility] on 4/6    At this time and to rule out pulmonary emboli, CT of the chest, showed no pulmonary emboli

## 2020-04-09 NOTE — PROGRESS NOTES
Report called and given to [facility] on 4C. All questions answered. All patient belongings packed up. Messages left for family about transfer status.

## 2020-04-10 PROCEDURE — 97530 THERAPEUTIC ACTIVITIES: CPT

## 2020-04-10 PROCEDURE — 97166 OT EVAL MOD COMPLEX 45 MIN: CPT

## 2020-04-10 PROCEDURE — 99232 SBSQ HOSP IP/OBS MODERATE 35: CPT | Performed by: HOSPITALIST

## 2020-04-10 PROCEDURE — 2580000003 HC RX 258: Performed by: NURSE PRACTITIONER

## 2020-04-10 PROCEDURE — 1200000000 HC SEMI PRIVATE

## 2020-04-10 PROCEDURE — 6360000002 HC RX W HCPCS: Performed by: NURSE PRACTITIONER

## 2020-04-10 PROCEDURE — 6360000002 HC RX W HCPCS: Performed by: INTERNAL MEDICINE

## 2020-04-10 PROCEDURE — 97535 SELF CARE MNGMENT TRAINING: CPT

## 2020-04-10 PROCEDURE — 97162 PT EVAL MOD COMPLEX 30 MIN: CPT

## 2020-04-10 RX ORDER — LOPERAMIDE HYDROCHLORIDE 2 MG/1
2 CAPSULE ORAL 4 TIMES DAILY PRN
Status: DISCONTINUED | OUTPATIENT
Start: 2020-04-10 | End: 2020-04-17 | Stop reason: HOSPADM

## 2020-04-10 RX ORDER — VANCOMYCIN HYDROCHLORIDE 1 G/200ML
1000 INJECTION, SOLUTION INTRAVENOUS EVERY 12 HOURS
Status: DISCONTINUED | OUTPATIENT
Start: 2020-04-10 | End: 2020-04-11

## 2020-04-10 RX ADMIN — Medication 1250 MG: at 00:34

## 2020-04-10 RX ADMIN — SODIUM CHLORIDE, PRESERVATIVE FREE 10 ML: 5 INJECTION INTRAVENOUS at 08:16

## 2020-04-10 RX ADMIN — ENOXAPARIN SODIUM 40 MG: 40 INJECTION SUBCUTANEOUS at 08:15

## 2020-04-10 RX ADMIN — VANCOMYCIN HYDROCHLORIDE 1000 MG: 1 INJECTION, SOLUTION INTRAVENOUS at 18:24

## 2020-04-10 RX ADMIN — PIPERACILLIN AND TAZOBACTAM 3.38 G: 3; .375 INJECTION, POWDER, FOR SOLUTION INTRAVENOUS at 20:51

## 2020-04-10 RX ADMIN — PIPERACILLIN AND TAZOBACTAM 3.38 G: 3; .375 INJECTION, POWDER, FOR SOLUTION INTRAVENOUS at 02:16

## 2020-04-10 RX ADMIN — SODIUM CHLORIDE, PRESERVATIVE FREE 10 ML: 5 INJECTION INTRAVENOUS at 23:01

## 2020-04-10 RX ADMIN — PIPERACILLIN AND TAZOBACTAM 3.38 G: 3; .375 INJECTION, POWDER, FOR SOLUTION INTRAVENOUS at 11:33

## 2020-04-10 NOTE — PROGRESS NOTES
Physical Therapy    Facility/Department: [facility]  Initial Assessment    NAME: Josefina Worrell  :   MRN: [id number]    Date of Service: 4/10/2020    Discharge Recommendations:    Further therapy recommended at discharge. PT Equipment Recommendations  Other: TBD    Assessment   Body structures, Functions, Activity limitations: Decreased functional mobility ; Decreased strength;Decreased balance;Decreased endurance  Assessment: Pt grossly modA for mobility, very slow, increased effort. Pt EOB ~8min, modA to [place]. Pt would benefit from continued acute PT to address deficits. Prognosis: Fair  Decision Making: Medium Complexity  PT Education: Plan of Care;General Safety;PT Role;Functional Mobility Training  REQUIRES PT FOLLOW UP: Yes  Activity Tolerance  Activity Tolerance: Patient Tolerated treatment well;Patient limited by fatigue;Patient limited by endurance       Patient Diagnosis(es): There were no encounter diagnoses. has a past medical history of Arthritis, Cancer ([facility] [address] Cerebral artery occlusion with cerebral infarction ([facility] [address] Chronic kidney disease, COPD (chronic obstructive pulmonary disease) ([facility] [address] Hypertension, Pneumonia, Psychiatric problem, and Seizures ([facility] [address] has a past surgical history that includes back surgery; fracture surgery; hernia repair; Tonsillectomy; Carotid endarterectomy (Left, 2016); Abdomen surgery; hiatal hernia repair; Inguinal hernia repair (Bilateral); other surgical history; and Colonoscopy (N/A, 2018).     Restrictions  Restrictions/Precautions  Restrictions/Precautions: General Precautions, Fall Risk, Up as Tolerated  Required Braces or Orthoses?: No  Vision/Hearing  Vision: Impaired  Vision Exceptions: Wears glasses for reading  Hearing: Exceptions to [facility]  Hearing Exceptions: Hard of hearing/hearing concerns     Subjective  General  Chart Reviewed: Yes  Patient assessed for rehabilitation services?: Yes  Response To Previous Treatment: Not applicable  Family / Caregiver Present: No  Follows Commands: Within Functional Limits  General Comment  Comments: OT co-eval  Subjective  Subjective: RN and pt agreeable to PT. Pt alert in bed upon arrival.   Pain Assessment  Response to Pain Intervention: Patient Satisfied  Pre Treatment Pain Screening  Intervention List: Patient able to continue with treatment  Comments / Details: c/o neck pain upon EOB, chronic, number not given. otherwise only pain/ sore with stefano care. Orientation  Orientation  Overall Orientation Status: (appeared appropriate with responses, notbaly difficult to understand)  Social/Functional History  Social/Functional History  Lives With: (sister)  Type of Home: House  Home Layout: One level  Home Access: Ramped entrance  Bathroom Shower/Tub: Tub/Shower unit(can't get in it)  Bathroom Toilet: Standard  Bathroom Equipment: Grab bars around toilet  Home Equipment: Rolling walker, Lift chair(states electric w/c coming, unsure when. sleeps in lift chair)  Receives Help From: Family  ADL Assistance: Needs assistance  Homemaking Assistance: Needs assistance(sister does cooking, cleaning, laundry)  Ambulation Assistance: Independent(with RW)  Transfer Assistance: Independent  Active : No  Patient's  Info: sister, or son  Cognition        Objective             Strength Other  Other: not formally assessed, very slow, fatigues quickly. Sensation  Overall Sensation Status: WFL(Pt denies any numbness or tingling)  Bed mobility  Supine to Sit: Moderate assistance  Sit to Supine: Moderate assistance  Scooting: Moderate assistance  Transfers  Comment: pt declined d/t fatigue  Ambulation  Ambulation?: No  Stairs/Curb  Stairs?: No     Balance  Posture: Fair  Sitting - Static: Fair  Sitting - Dynamic: Poor  Comments: EOB ~ 8min  Exercises  Comments: EOB ~8min, c/o upper back/ low posterior neck pain/ tight. cues to lean into rolled pillow with author supporting.  pt notes some pain relief,

## 2020-04-10 NOTE — PROGRESS NOTES
atelectasis/scarring or a developing infectious/inflammatory process including aspiration pneumonitis. Prominent mediastinal and hilar lymph nodes may be reactive. 3.  Bilateral bronchial wall thickening suggests bronchitis. 4.  No acute abnormality in the abdomen or pelvis. 5.  Atherosclerosis. Stable right common iliac artery aneurysm and focal dissection in the distal right abdominal aorta which extends into the right common iliac artery. Fl Modified Barium Swallow W Video    Result Date: 4/6/2020  Penetration and aspiration with pudding and honey thick liquid consistencies. Please see separate speech pathology report for full discussion of findings and recommendations. Physical Examination:        General appearance:  alert, cooperative and no distress  Mental Status: Patient responds to verbal stimuli. Difficult to assess whether patient understands what is going on. Lungs:  clear to auscultation bilaterally, normal effort  Heart:  regular rate and rhythm, no murmur  Abdomen:  soft, nontender, nondistended, normal bowel sounds, no masses, hepatomegaly, splenomegaly  Extremities: Generalized weakness noted in all extremities. Skin:  no gross lesions, rashes, induration    Assessment:        Hospital Problems           Last Modified POA    * (Principal) Aspiration pneumonia of both lower lobes ([address] 4/7/2020 Yes    Aortic dissection ([address] 4/7/2020 Yes    Other dysphagia 4/7/2020 Yes    Tobacco abuse 4/7/2020 Yes    Aspiration pneumonitis ([address] 4/8/2020 Yes          Plan:        Aspiration pneumonia on bilateral lower lobes -patient continues to aspirate. Currently hospice care consult is in place. Will need to discuss with patient's family members prior to making final decision. PEG has been refused on prior admission. COVID-19 tested negative. Hypokalemia -repleted. Called patient's family member and left voice message in regards to hospice consult and PEG tube insertion.     Aryan Aranda

## 2020-04-10 NOTE — CARE COORDINATION
Spoke with Tomas Kuhnprice, patient's sister. Clarified that Dr. Roseann Lopez attempted to call earlier. Perfect served Dr. Roseann Lopez and he is going to notify the sister, Tomas Garcia and discuss a plan. PEG vs hospice?

## 2020-04-10 NOTE — PLAN OF CARE
Problem: Falls - Risk of:  Goal: Will remain free from falls  Description: Will remain free from falls  4/10/2020 1505 by Pooja Goode RN  Outcome: Ongoing  4/10/2020 0243 by Cabrera Su RN  Outcome: Ongoing  Goal: Absence of physical injury  Description: Absence of physical injury  4/10/2020 1505 by Pooja Goode RN  Outcome: Ongoing  4/10/2020 0243 by Cabrera Su RN  Outcome: Ongoing     Problem: Pain:  Goal: Pain level will decrease  Description: Pain level will decrease  4/10/2020 1505 by Pooja Goode RN  Outcome: Ongoing  4/10/2020 0243 by Cabrera Su RN  Outcome: Ongoing  Goal: Control of acute pain  Description: Control of acute pain  4/10/2020 1505 by Pooja Goode RN  Outcome: Ongoing  4/10/2020 0243 by Cabrera Su RN  Outcome: Ongoing  Goal: Control of chronic pain  Description: Control of chronic pain  4/10/2020 1505 by Pooja Goode RN  Outcome: Ongoing  4/10/2020 0243 by Cabrera Su RN  Outcome: Ongoing     Problem: Musculor/Skeletal Functional Status  Goal: Highest potential functional level  Outcome: Ongoing

## 2020-04-10 NOTE — PROGRESS NOTES
artery occlusion with cerebral infarction ([facility] [address] Chronic kidney disease, COPD (chronic obstructive pulmonary disease) ([facility] [address] Hypertension, Pneumonia, Psychiatric problem, and Seizures ([facility] [address] has a past surgical history that includes back surgery; fracture surgery; hernia repair; Tonsillectomy; Carotid endarterectomy (Left, 09/20/2016); Abdomen surgery; hiatal hernia repair; Inguinal hernia repair (Bilateral); other surgical history; and Colonoscopy (N/A, 8/31/2018).       Restrictions  Restrictions/Precautions  Restrictions/Precautions: General Precautions, Fall Risk, Up as Tolerated  Required Braces or Orthoses?: No    Subjective   General  Patient assessed for rehabilitation services?: Yes  Family / Caregiver Present: No  Patient Currently in Pain: Yes(Pt reports bottom is sore, nack pain, did not rate )  Pain Assessment  Response to Pain Intervention: Patient Satisfied  Vital Signs  Patient Currently in Pain: Yes(Pt reports bottom is sore, nack pain, did not rate )     Social/Functional History  Social/Functional History  Lives With: Family(sister )  Type of Home: House  Home Layout: One level  Home Access: Ramped entrance  Bathroom Shower/Tub: Tub/Shower unit(can't get in it)  Bathroom Toilet: Standard  Bathroom Equipment: Grab bars around toilet  Home Equipment: Rolling walker, Lift chair(states electric w/c coming, unsure when. sleeps in lift chair)  Receives Help From: Family  ADL Assistance: Needs assistance  Homemaking Assistance: Needs assistance(sister does cooking, cleaning, laundry)  Ambulation Assistance: Independent(with RW)  Transfer Assistance: Independent  Active : No  Patient's  Info: sister, or son  Occupation: Retired     Objective   Vision: Impaired  Vision Exceptions: Wears glasses for reading  Hearing: Exceptions to [facility]  Hearing Exceptions: Hard of hearing/hearing concerns    Orientation  Overall Orientation Status: Impaired  Observation/Palpation  Posture: Fair  Observation: forward flexed posture   Balance  Sitting Balance: Minimal assistance(seated EOB )  Standing Balance: Unable to assess(comment)(pt declined standing)     ADL  Feeding: Modified independent ;Setup  Grooming: Minimal assistance;Setup  UE Bathing: Moderate assistance;Setup  LE Bathing: Dependent/Total;Maximum assistance;Setup  UE Dressing: Moderate assistance;Setup  LE Dressing: Maximum assistance;Dependent/Total;Setup  Toileting: Maximum assistance;Dependent/Total(brief in place, assist for pericare and brief change )  Additional Comments: Pt supine in bed on arrival, assisted to complete bed mobility for pericare, brief and linen change. Pt reports increased fatigue. Pt assisted to complete bed mobility and sat at EOB, reports dizziness and neck pain throughout session. Pt reports increased fatigue, declined stading, returned to bed, call light in reach and RN notified on therapist exit. Tone RUE  RUE Tone: Normotonic  Tone LUE  LUE Tone: Normotonic  Coordination  Movements Are Fluid And Coordinated: No  Coordination and Movement description: Right UE;Left UE;Decreased speed        Bed mobility  Supine to Sit: Moderate assistance  Sit to Supine: Moderate assistance  Scooting: Moderate assistance  Comment: HOB raised, use of bed rails and sequencing      Transfers  Stand Step Transfers: Unable to assess  Sit to stand: Unable to assess  Stand to sit: Unable to assess        Cognition  Overall Cognitive Status: Exceptions  Arousal/Alertness: Delayed responses to stimuli  Following Commands: Follows one step commands with increased time; Follows one step commands with repetition  Safety Judgement: Decreased awareness of need for safety;Decreased awareness of need for assistance  Insights: Decreased awareness of deficits  Cognition Comment: garbled speech, per chart, baseline      Perception  Overall Perceptual Status: WFL        Sensation  Overall Sensation Status: WFL(Pt denies numbness/ tingling )        LUE AROM

## 2020-04-10 NOTE — PROGRESS NOTES
Pharmacy Vancomycin Consult     Vancomycin Day: 4  Current Dosin mg q 12 hrs    Temp max:  afebrile    Recent Labs     20  0632 20  1018   BUN 14 17       Recent Labs     20  0632 20  1018   CREATININE 0.63* 0.79       Recent Labs     20  0632   WBC 11.0         Intake/Output Summary (Last 24 hours) at 4/10/2020 0835  Last data filed at 4/10/2020 5950  Gross per 24 hour   Intake --   Output 678 ml   Net -678 ml       Culture Date      Source                       Results  20           nasal swab                 MRSA negative    Ht Readings from Last 1 Encounters:   20 5' 10\" (1.778 m)        Wt Readings from Last 1 Encounters:   20 190 lb 8 oz (86.4 kg)         Body mass index is 27.33 kg/m². Estimated Creatinine Clearance: 86 mL/min (based on SCr of 0.79 mg/dL). Trough: 19.7  (drawn last night 20 at 22:27)    Assessment/Plan: Vancomycin trough is therapeutic. Another dose of Vancomycin 1250 mg was given after trough was drawn around midnight last night. Accumulation is evident. Will decrease dose to 1000 mg q 12 hrs and omit dose at 1100 this morning and resume at 1800 later today. Will continue to monitor and follow. MRSA nasal swab is negative. Will await ID input.   Susan Leyva [facility]  4/10/2020  8:42 AM

## 2020-04-11 PROCEDURE — 2580000003 HC RX 258: Performed by: INTERNAL MEDICINE

## 2020-04-11 PROCEDURE — 99232 SBSQ HOSP IP/OBS MODERATE 35: CPT | Performed by: INTERNAL MEDICINE

## 2020-04-11 PROCEDURE — 6360000002 HC RX W HCPCS: Performed by: NURSE PRACTITIONER

## 2020-04-11 PROCEDURE — 6360000002 HC RX W HCPCS: Performed by: INTERNAL MEDICINE

## 2020-04-11 PROCEDURE — 99232 SBSQ HOSP IP/OBS MODERATE 35: CPT | Performed by: HOSPITALIST

## 2020-04-11 PROCEDURE — 6370000000 HC RX 637 (ALT 250 FOR IP): Performed by: NURSE PRACTITIONER

## 2020-04-11 PROCEDURE — 2580000003 HC RX 258: Performed by: NURSE PRACTITIONER

## 2020-04-11 PROCEDURE — 1200000000 HC SEMI PRIVATE

## 2020-04-11 PROCEDURE — 97535 SELF CARE MNGMENT TRAINING: CPT

## 2020-04-11 RX ADMIN — VANCOMYCIN HYDROCHLORIDE 1000 MG: 1 INJECTION, SOLUTION INTRAVENOUS at 09:11

## 2020-04-11 RX ADMIN — PIPERACILLIN AND TAZOBACTAM 3.38 G: 3; .375 INJECTION, POWDER, FOR SOLUTION INTRAVENOUS at 21:40

## 2020-04-11 RX ADMIN — PIPERACILLIN AND TAZOBACTAM 3.38 G: 3; .375 INJECTION, POWDER, FOR SOLUTION INTRAVENOUS at 12:51

## 2020-04-11 RX ADMIN — ENOXAPARIN SODIUM 40 MG: 40 INJECTION SUBCUTANEOUS at 09:11

## 2020-04-11 RX ADMIN — LOPERAMIDE HYDROCHLORIDE 2 MG: 2 CAPSULE ORAL at 00:15

## 2020-04-11 RX ADMIN — PIPERACILLIN AND TAZOBACTAM 3.38 G: 3; .375 INJECTION, POWDER, FOR SOLUTION INTRAVENOUS at 04:40

## 2020-04-11 ASSESSMENT — PAIN DESCRIPTION - ORIENTATION: ORIENTATION: LOWER

## 2020-04-11 ASSESSMENT — PAIN SCALES - WONG BAKER: WONGBAKER_NUMERICALRESPONSE: 4

## 2020-04-11 ASSESSMENT — ENCOUNTER SYMPTOMS
ABDOMINAL PAIN: 0
COUGH: 0
NAUSEA: 0
SHORTNESS OF BREATH: 0
DIARRHEA: 0

## 2020-04-11 ASSESSMENT — PAIN DESCRIPTION - FREQUENCY: FREQUENCY: INTERMITTENT

## 2020-04-11 ASSESSMENT — PAIN DESCRIPTION - LOCATION: LOCATION: ABDOMEN

## 2020-04-11 ASSESSMENT — PAIN DESCRIPTION - DESCRIPTORS: DESCRIPTORS: ACHING;DISCOMFORT;SORE

## 2020-04-11 ASSESSMENT — PAIN DESCRIPTION - PAIN TYPE: TYPE: ACUTE PAIN

## 2020-04-11 ASSESSMENT — PAIN SCALES - GENERAL: PAINLEVEL_OUTOF10: 0

## 2020-04-11 NOTE — PLAN OF CARE
Problem: Falls - Risk of:  Goal: Will remain free from falls  Description: Will remain free from falls  4/11/2020 1502 by Chelle Cha RN  Outcome: Ongoing  4/11/2020 0408 by Lora Lugo RN  Outcome: Ongoing  Goal: Absence of physical injury  Description: Absence of physical injury  4/11/2020 1502 by Chelle Cha RN  Outcome: Ongoing  4/11/2020 0408 by Lora Lugo RN  Outcome: Ongoing     Problem: Pain:  Goal: Pain level will decrease  Description: Pain level will decrease  4/11/2020 1502 by Chelle Cha RN  Outcome: Ongoing  4/11/2020 0408 by Lora Lugo RN  Outcome: Ongoing  Goal: Control of acute pain  Description: Control of acute pain  4/11/2020 1502 by Chelle Cha RN  Outcome: Ongoing  4/11/2020 0408 by Lora Lugo RN  Outcome: Ongoing  Goal: Control of chronic pain  Description: Control of chronic pain  4/11/2020 1502 by Chelle Cha RN  Outcome: Ongoing  4/11/2020 0408 by Lora Lugo RN  Outcome: Ongoing     Problem: Musculor/Skeletal Functional Status  Goal: Highest potential functional level  4/11/2020 1502 by Chelle Cha RN  Outcome: Ongoing  4/11/2020 0408 by Lora Lugo RN  Outcome: Ongoing

## 2020-04-11 NOTE — PLAN OF CARE
Problem: Falls - Risk of:  Goal: Will remain free from falls  Description: Will remain free from falls  4/11/2020 1938 by Tracee Jennings RN  Outcome: Ongoing     Problem: Falls - Risk of:  Goal: Absence of physical injury  Description: Absence of physical injury  4/11/2020 1938 by Tracee Jennings RN  Outcome: Ongoing     Problem: Pain:  Goal: Pain level will decrease  Description: Pain level will decrease  4/11/2020 1938 by Tracee Jennigns RN  Outcome: Ongoing     Problem: Pain:  Goal: Control of acute pain  Description: Control of acute pain  4/11/2020 1938 by Tracee Jennings RN  Outcome: Ongoing     Problem: Pain:  Goal: Control of chronic pain  Description: Control of chronic pain  4/11/2020 1938 by Tracee Jennings RN  Outcome: Ongoing     Problem: Musculor/Skeletal Functional Status  Goal: Highest potential functional level  4/11/2020 1938 by Tracee Jennings RN  Outcome: Ongoing

## 2020-04-11 NOTE — PROGRESS NOTES
[facility]    Progress Note    4/11/2020    3:34 PM    Name:   Maurisio Garrison  MRN:     [id number]     Acct:      [de-identified]   Room:   [address] [place] [address] Day:  3  Admit Date:  4/7/2020 10:01 AM    PCP:   Colten Gardner MD  Code Status:  DNR-CCA    Subjective:     C/C: Aspiration Pneumonia  Patient Active Problem List   Diagnosis    Constipation    Change in bowel habits    Rectal bleeding    Aortic dissection ([address]    Bradycardia    Other dysphagia    Enteritis    Aspiration pneumonia of both lower lobes ([address]    Tobacco abuse    Aspiration pneumonitis ([address]     Interval History Status: not changed. Patient was seen and examined at bedside. Patient is more alert today and shows clear understanding of what is going on. Patient is still unable to communicate verbally due to baseline severe slurry speech. Explained that patient needs to choose between PEG tube insertion with tube feeding and hospice care with oral feeding. Patient refuses to be discharged to hospice and states that he wants to be home. However patient also does not want to give him oral intake. Patient's family member are stating that they cannot provide him with 24-hour care advised patient to discuss with family to come up with final decision on PEG and hospice. No acute overnight event. Patient has no acute complaint except for food intake issue. No fever or chills. No shortness of breath or palpitation. No chest pain. Brief History:     Bogdan Clark is a 69 y.o.  male who presents with No chief complaint on file.   and is admitted to the hospital for the management of Aspiration pneumonia of both lower lobes ([address]      This 69 yom presents with reportedly sob and headache. Huyen Moreira was just discharged yesterday from [facility] with enteritis and aortic aneurysm which were to be addressed as outpatient. Huyen Moreira had failed a swallow study and peg represent subsegmental atelectasis/scarring or a developing infectious/inflammatory process including aspiration pneumonitis. Prominent mediastinal and hilar lymph nodes may be reactive. 3.  Bilateral bronchial wall thickening suggests bronchitis. 4.  No acute abnormality in the abdomen or pelvis. 5.  Atherosclerosis. Stable right common iliac artery aneurysm and focal dissection in the distal right abdominal aorta which extends into the right common iliac artery. Xr Chest Portable    Result Date: 4/8/2020  1. Lower lung volumes without acute cardiopulmonary disease. Xr Chest Portable    Result Date: 4/7/2020  No acute cardiopulmonary process. Ct Chest Pulmonary Embolism W Contrast    Result Date: 4/7/2020  1. No acute pulmonary thromboembolic disease. 2.  Scattered bilateral basilar predominant heterogeneous pulmonary opacities are most pronounced in the left lower lobe. Findings may represent subsegmental atelectasis/scarring or a developing infectious/inflammatory process including aspiration pneumonitis. Prominent mediastinal and hilar lymph nodes may be reactive. 3.  Bilateral bronchial wall thickening suggests bronchitis. 4.  No acute abnormality in the abdomen or pelvis. 5.  Atherosclerosis. Stable right common iliac artery aneurysm and focal dissection in the distal right abdominal aorta which extends into the right common iliac artery. Fl Modified Barium Swallow W Video    Result Date: 4/6/2020  Penetration and aspiration with pudding and honey thick liquid consistencies. Please see separate speech pathology report for full discussion of findings and recommendations. Physical Examination:        General appearance:  alert, cooperative and no distress  Mental Status:  oriented to person, place and time and normal affect.     Lungs:  clear to auscultation bilaterally, normal effort  Heart:  regular rate and rhythm, no murmur  Abdomen:  soft, nontender, nondistended, normal bowel

## 2020-04-11 NOTE — CONSULTS
swallow study done on  showed aspiration     His white count is 10.5  No lymphopenia     We are called to rule out CO VID, versus aspiration pneumonia  Looking at the imaging it is very difficult to rule out early CO VID, although the patient is a high risk for aspiration pneumonia    Current evaluation:2020    /72   Pulse 61   Temp 98.3 °F (36.8 °C) (Oral)   Resp 22   Wt 190 lb 8 oz (86.4 kg)   SpO2 95%   BMI 27.33 kg/m²     Temperature Range: Temp: 98.3 °F (36.8 °C) Temp  Av.2 °F (36.8 °C)  Min: 98.1 °F (36.7 °C)  Max: 98.3 °F (36.8 °C)  The patient is seen and evaluated at bedside and he is awake and alert and is nonverbal  He is able to moan and groan and not to answers    Review of Systems   Constitutional: Negative for chills and fever. Respiratory: Negative for cough and shortness of breath. Gastrointestinal: Negative for abdominal pain, diarrhea and nausea. Genitourinary: Negative. Musculoskeletal: Negative. Neurological: Positive for speech difficulty. Psychiatric/Behavioral: Negative. Physical Examination :     Physical Exam  Constitutional:       Appearance: He is well-developed. HENT:      Head: Normocephalic and atraumatic. Neck:      Musculoskeletal: Normal range of motion and neck supple. Cardiovascular:      Rate and Rhythm: Normal rate. Heart sounds: Normal heart sounds. Pulmonary:      Effort: Pulmonary effort is normal.      Breath sounds: Normal breath sounds. Abdominal:      General: Bowel sounds are normal.      Palpations: Abdomen is soft. Musculoskeletal: Normal range of motion. Lymphadenopathy:      Cervical: No cervical adenopathy. Skin:     General: Skin is warm and dry. Neurological:      Mental Status: He is alert and oriented to person, place, and time.          Laboratory data:   I have independently reviewed the followinglabs:  CBC with Differential: No results for input(s): WBC, HGB, HCT, PLT, SEGSPCT, BANDSPCT, for MRSA infections. Legionella antigen, urine [[id number]] Collected: 04/07/20 1220   Order Status: Completed Specimen: Urine, clean catch Updated: 04/07/20 1626    Legionella Pneumophilia Ag, Urine NEGATIVE    Comment: L. pneumophila serogroup 1 antigen not detected. A negative result does not exclude infection with Leginella pnemophila serogroup 1 nor does   it rule out other microbial-caused respiratory infections of disease caused by other   serogroups of Legionella pneumophila. Strep Pneumoniae Antigen [[id number]] Collected: 04/07/20 1220   Order Status: Completed Specimen: Urine, clean catch Updated: 04/07/20 1415    Source . CLEAN CATCH URINE    Strep pneumo Ag NEGATIVE    Comment: Strep pneumoniae antigen not detected            Medications:      vancomycin  1,000 mg Intravenous Q12H    enoxaparin  40 mg Subcutaneous Daily    sodium chloride flush  10 mL Intravenous 2 times per day    piperacillin-tazobactam  3.375 g Intravenous Q8H    vancomycin (VANCOCIN) intermittent dosing (placeholder)   Other RX Placeholder           [facility]  Kalpana Drummond MD  Perfect Serve messaging  OFFICE: [phone]      Electronically signed by Kalpana Drummond MD on 4/11/2020 at 12:27 PM  Thank you for allowing us to participate in the care of this patient. Please call with questions. This note iscreated with the assistance of a speech recognition program.  While intending to generate a document that actually reflects the content of the visit, the document can still have some errors including those of syntax andsound a like substitutions which may escape proof reading. In such instances, actual meaning can be extrapolated by contextual diversion.

## 2020-04-11 NOTE — PROGRESS NOTES
Descriptors: Aching;Discomfort; Sore  Pain Frequency: Intermittent  Non-Pharmaceutical Pain Intervention(s): Therapeutic presence;Repositioned; Rest  Vital Signs  Patient Currently in Pain: Yes(Pt intially stated no pain. Abdomen starting hurting after seated EOB x 4 minutes.)   Orientation  Orientation  Overall Orientation Status: Impaired  Orientation Level: Oriented to person  Objective    ADL  Grooming: Setup;Supervision(Swab mouth out seated in bed with HOB elevated.)  UE Bathing: Setup;Contact guard assistance(Seated EOB. Max A for back.)  UE Dressing: Minimal assistance(To manage gown.)  Additional Comments: Pt transferred to seated EOB to complete ADL care. Shortly after competing UE bathing pt c/o abdomen pain asking to lay back down. Pt educated on incorporating RUE into ADL activities with good return. Balance  Sitting Balance: Contact guard assistance(Seated EOB x5 minutes.)  Standing Balance: Contact guard assistance  Standing Balance  Time: 30 seconds x2. Activity: Static standing in [facility]. Functional Mobility  Functional Mobility Comments: PAT  Bed mobility  Supine to Sit: Minimal assistance(Trunk support.)  Sit to Supine: Minimal assistance  Scooting: Moderate assistance  Comment: HOB elevated, use bed rails. Transfers  Sit to stand: Contact guard assistance  Stand to sit: Contact guard assistance  Transfer Comments: in [facility].   Cognition  Overall Cognitive Status: Exceptions  Arousal/Alertness: Appropriate responses to stimuli  Initiation: Requires cues for some  Sequencing: Requires cues for some  Cognition Comment: garbled speech, per chart, baseline   Plan   Plan  Times per week: 3-5x/wk   Current Treatment Recommendations: Functional Mobility Training, Endurance Training, Safety Education & Training, Patient/Caregiver Education & Training, Equipment Evaluation, Education, & procurement, Self-Care / ADL    Goals  Short term goals  Time Frame for Short term goals: by discharge, pt

## 2020-04-12 PROCEDURE — 99232 SBSQ HOSP IP/OBS MODERATE 35: CPT | Performed by: HOSPITALIST

## 2020-04-12 PROCEDURE — 1200000000 HC SEMI PRIVATE

## 2020-04-12 PROCEDURE — 97530 THERAPEUTIC ACTIVITIES: CPT

## 2020-04-12 PROCEDURE — 97110 THERAPEUTIC EXERCISES: CPT

## 2020-04-12 PROCEDURE — 6360000002 HC RX W HCPCS: Performed by: NURSE PRACTITIONER

## 2020-04-12 RX ADMIN — ENOXAPARIN SODIUM 40 MG: 40 INJECTION SUBCUTANEOUS at 09:32

## 2020-04-12 NOTE — PROGRESS NOTES
pelvis. 5.  Atherosclerosis. Stable right common iliac artery aneurysm and focal dissection in the distal right abdominal aorta which extends into the right common iliac artery. Xr Chest Portable    Result Date: 4/8/2020  1. Lower lung volumes without acute cardiopulmonary disease. Xr Chest Portable    Result Date: 4/7/2020  No acute cardiopulmonary process. Ct Chest Pulmonary Embolism W Contrast    Result Date: 4/7/2020  1. No acute pulmonary thromboembolic disease. 2.  Scattered bilateral basilar predominant heterogeneous pulmonary opacities are most pronounced in the left lower lobe. Findings may represent subsegmental atelectasis/scarring or a developing infectious/inflammatory process including aspiration pneumonitis. Prominent mediastinal and hilar lymph nodes may be reactive. 3.  Bilateral bronchial wall thickening suggests bronchitis. 4.  No acute abnormality in the abdomen or pelvis. 5.  Atherosclerosis. Stable right common iliac artery aneurysm and focal dissection in the distal right abdominal aorta which extends into the right common iliac artery. Fl Modified Barium Swallow W Video    Result Date: 4/6/2020  Penetration and aspiration with pudding and honey thick liquid consistencies. Please see separate speech pathology report for full discussion of findings and recommendations.        Physical Examination:        General appearance:  alert, cooperative and no distress  Mental Status:  oriented to person, place and time and normal affect  Lungs:  clear to auscultation bilaterally, normal effort  Heart:  regular rate and rhythm, no murmur  Abdomen:  soft, nontender, nondistended, normal bowel sounds, no masses, hepatomegaly, splenomegaly  Extremities:  no edema, redness, tenderness in the calves  Skin:  no gross lesions, rashes, induration    Assessment:        Hospital Problems           Last Modified POA    * (Principal) Aspiration pneumonia of both lower lobes ([address] 4/7/2020 Yes

## 2020-04-12 NOTE — PROGRESS NOTES
step fwd/retro-unsafe to continue forward due to fall risk     Balance  Posture: Fair  Sitting - Static: Good;-  Sitting - Dynamic: Fair;+  Standing - Static: Fair(pt stood at [facility] for 2-3 minutes with Mayank; flexed posture which improved with cues)  Standing - Dynamic: Fair;-(completed standing hip flexion to facilitate gait; fatigued quickly and required standing rest break to recover)       Exercises:  Seated LE exercise program: Long Arc Quads, hip abduction/adduction, heel/toe raises, and marches. Reps: 10x each  Upper extremity exercises: Bicep curl, shoulder flexion/extension, punches, tricep curl, shoulder abduction/adduction. Reps: 10x each, decreased B shoulder ROM noted  Bridging x 5 reps, SBA    Goals  Short term goals  Time Frame for Short term goals: 10 visits  Short term goal 1: Pt will be SBA bed mobility  Short term goal 2: Pt will be SBA transfers  Short term goal 3: Pt will be Mayank amb 20' RW  Short term goal 4: Pt will be safe to attempt steps. Plan    Plan  Times per week: 5-6x/wk  Current Treatment Recommendations: Strengthening, Balance Training, Functional Mobility Training, Endurance Training, Transfer Training, Gait Training, Home Exercise Program, Safety Education & Training, Patient/Caregiver Education & Training, Equipment Evaluation, Education, & procurement  Safety Devices  Type of devices:  All fall risk precautions in place, Bed alarm in place, Call light within reach, Gait belt, Patient at risk for falls, Left in bed, Nurse notified  Restraints  Initially in place: No     Therapy Time   Individual Concurrent Group Co-treatment   Time In 1139         Time Out 1216         Minutes 37         Timed Code Treatment Minutes: 7164 [facility]

## 2020-04-13 LAB
CULTURE: NORMAL
Lab: NORMAL
SPECIMEN DESCRIPTION: NORMAL

## 2020-04-13 PROCEDURE — 94760 N-INVAS EAR/PLS OXIMETRY 1: CPT

## 2020-04-13 PROCEDURE — 2580000003 HC RX 258: Performed by: NURSE PRACTITIONER

## 2020-04-13 PROCEDURE — 97110 THERAPEUTIC EXERCISES: CPT

## 2020-04-13 PROCEDURE — 99231 SBSQ HOSP IP/OBS SF/LOW 25: CPT | Performed by: INTERNAL MEDICINE

## 2020-04-13 PROCEDURE — 97116 GAIT TRAINING THERAPY: CPT

## 2020-04-13 PROCEDURE — 1200000000 HC SEMI PRIVATE

## 2020-04-13 PROCEDURE — 99221 1ST HOSP IP/OBS SF/LOW 40: CPT | Performed by: NURSE PRACTITIONER

## 2020-04-13 PROCEDURE — 99232 SBSQ HOSP IP/OBS MODERATE 35: CPT | Performed by: HOSPITALIST

## 2020-04-13 PROCEDURE — 6360000002 HC RX W HCPCS: Performed by: NURSE PRACTITIONER

## 2020-04-13 PROCEDURE — 97535 SELF CARE MNGMENT TRAINING: CPT

## 2020-04-13 PROCEDURE — 99223 1ST HOSP IP/OBS HIGH 75: CPT | Performed by: FAMILY MEDICINE

## 2020-04-13 RX ADMIN — SODIUM CHLORIDE, PRESERVATIVE FREE 10 ML: 5 INJECTION INTRAVENOUS at 20:11

## 2020-04-13 RX ADMIN — ENOXAPARIN SODIUM 40 MG: 40 INJECTION SUBCUTANEOUS at 09:26

## 2020-04-13 RX ADMIN — SODIUM CHLORIDE, PRESERVATIVE FREE 10 ML: 5 INJECTION INTRAVENOUS at 09:26

## 2020-04-13 ASSESSMENT — ENCOUNTER SYMPTOMS
RESPIRATORY NEGATIVE: 1
ALLERGIC/IMMUNOLOGIC NEGATIVE: 1
GASTROINTESTINAL NEGATIVE: 1

## 2020-04-13 NOTE — CONSULTS
Authorizing Provider   theophylline 80 MG/15ML elixir Take 18.8 mLs by mouth every 8 hours 4/6/20   Kelton Romano MD   furosemide (LASIX) 20 MG tablet Take 2 tablets by mouth daily 9/11/19   Lucy Arias MD   potassium chloride (KLOR-CON M) 20 MEQ extended release tablet Take 1 tablet by mouth daily 8/1/19   Lucy Arias MD   lisinopril-hydrochlorothiazide (PRINZIDE;ZESTORETIC) 10-12.5 MG per tablet Take 1 tablet by mouth daily 7/24/19   Lucy Arias MD   atorvastatin (LIPITOR) 40 MG tablet Take 1 tablet by mouth daily 7/24/19   Lucy Arias MD   albuterol sulfate  (90 Base) MCG/ACT inhaler Inhale 2 puffs into the lungs every 6 hours as needed for Wheezing 6/27/19   Lucy Arias MD   gabapentin (NEURONTIN) 800 MG tablet Take 800 mg by mouth 3 times daily. 1/16/19   Historical Provider, MD   aspirin 81 MG EC tablet Take 1 tablet by mouth daily 9/21/16   Rolando Hung MD     .  CURRENT MEDICATIONS:  Scheduled Meds:   enoxaparin  40 mg Subcutaneous Daily    sodium chloride flush  10 mL Intravenous 2 times per day     . Continuous Infusions:   sodium chloride 75 mL/hr at 04/07/20 1145     . PRN Meds:loperamide, albuterol sulfate HFA, acetaminophen **OR** acetaminophen, magnesium hydroxide, nicotine, promethazine **OR** ondansetron, sodium chloride flush  .   SOCIAL HISTORY:     Social History     Socioeconomic History    Marital status:      Spouse name: Not on file    Number of children: Not on file    Years of education: Not on file    Highest education level: Not on file   Occupational History    Not on file   Social Needs    Financial resource strain: Not on file    Food insecurity     Worry: Not on file     Inability: Not on file    Transportation needs     Medical: Not on file     Non-medical: Not on file   Tobacco Use    Smoking status: Current Every Day Smoker     Packs/day: 1.00     Years: 60.00     Pack years: 60.00     Types: Cigarettes LABIRON, TIBC, FERRITIN, [id number], FOLATE, OCCULTBLD in the last 72 hours. CBC  No results for input(s): WBC, HGB, MCV, RDW, PLT in the last 72 hours. PT/INR  No results for input(s): PROTIME, INR in the last 72 hours. BMP  No results for input(s): NA, K, CL, CO2, BUN, CREATININE, GLUCOSE, CALCIUM in the last 72 hours. Invalid input(s):  MG,  PHOS;3    LIVER WORK UP  Hepatitis Functional Panel:  No results for input(s): ALKPHOS, ALT, AST, PROT, BILITOT, BILIDIR, LABALBU in the last 72 hours. AMYLASE/LIPASE/AMMONIA  No results for input(s): AMYLASE, LIPASE, AMMONIA in the last 72 hours. Acute Hepatitis Panel   No results found for: HEPBSAG, [address] [place], HEPBIGM, [address]     4/6/2020 MBSS:  Impressions: Pt presents with severe dysphagia characterized by +aspiration and +penetration with both pudding and honey thick consistencies. Pt with no immediate cough, wet cough observed ~5 minutes after exam when being transferred to bed. ST is recommending NPO at this time. Results reported to RN.        Patient Position: A-P      Consistencies Administered: Honey teaspoon;Pudding teaspoon  Dysphagia Outcome Severity Scale: Level 1: Severe dysphagia- NPO. Unable to tolerate any PO safely  Penetration-Aspiration Scale (PAS): 8 - Material Enters the airway, passes below the vocal folds, and no effort is made to eject     Recommended Diet:  Solid consistency: NPO  Liquid consistency: NPO     Medication administration: Via NG/PEG     Principal Problem:    Aspiration pneumonia of both lower lobes (HCC)  Active Problems: Aortic dissection (HCC)    Other dysphagia    Tobacco abuse    Aspiration pneumonitis (HCC)  Resolved Problems:    * No resolved hospital problems. *       Assessment:  1. Oral pharengeal dysphagia-failed MBSS 4/6/2020.  Pt agrees to PEG  -NPO-will plan for PEG Tuesday    This plan was formulated in collaboration with Dr. Anna Encarnacion    Electronically signed by:  Monty Antonio [facility], THE [place] [facility] [place]

## 2020-04-13 NOTE — CONSULTS
(KLOR-CON M) 20 MEQ extended release tablet Take 1 tablet by mouth daily 30 tablet 5    lisinopril-hydrochlorothiazide (PRINZIDE;ZESTORETIC) 10-12.5 MG per tablet Take 1 tablet by mouth daily 30 tablet 5    atorvastatin (LIPITOR) 40 MG tablet Take 1 tablet by mouth daily 30 tablet 5    albuterol sulfate  (90 Base) MCG/ACT inhaler Inhale 2 puffs into the lungs every 6 hours as needed for Wheezing 3 Inhaler 6    gabapentin (NEURONTIN) 800 MG tablet Take 800 mg by mouth 3 times daily. 0    aspirin 81 MG EC tablet Take 1 tablet by mouth daily 30 tablet 3       Data         BP (!) 153/86   Pulse 55   Temp 97.9 °F (36.6 °C)   Resp 18   Wt 190 lb 8 oz (86.4 kg)   SpO2 94%   BMI 27.33 kg/m²     Wt Readings from Last 3 Encounters:   04/09/20 190 lb 8 oz (86.4 kg)   04/07/20 183 lb (83 kg)   04/04/20 182 lb 4.8 oz (82.7 kg)      EMG on 1/31/2020    Reason for referral: Rule out ALS. Summary: The right median motor nerve revealed decreased amplitudes on proximal stimulation and prolonged F-wave latency. The right ulnar motor nerve revealed decreased conduction velocity and prolonged F-wave latency. Left ulnar motor nerve revealed diminished amplitudes on proximal stimulation decreased conduction velocity and prolonged F-wave latency. Right peroneal motor nerve revealed markedly diminished amplitudes decreased conduction velocity and absent F-wave response. Left peroneal motor nerve revealed diminished amplitudes decreased conduction velocity and absent F-wave response. Right tibial motor response was not obtained. Left tibial motor response showed markedly diminished amplitudes decreased conduction velocity and an absent F-wave response.   Right median and right ulnar sensory responses showed diminished amplitude right radial sensory response was normal.  Extensive needle EMG study was performed of the right upper and lower extremity, the right sternocleidomastoid muscle and the right side of the tongue. In the right upper and lower extremity muscles needle EMG revealed large and unstable motor unit potentials with reduced interference pattern and increased firing rate. However, spontaneous activity was not prominent. Needle EMG of the right side of the tongue and the right sternomastoid muscle was normal.  Paraspinal muscles could not be examined as patient could not lie on his side both due to weakness as well as aspiration of saliva. Conclusion: This is an abnormal study. Needle EMG revealed unstable neurogenic motor unit potentials in a widespread distribution without prominent spontaneous activity,  indicative of a generalized motor axonopathy/neuronopathy. 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 Patient also has history of peripheral neuropathy in the past and this would account for the reduced nerve conduction velocities noted in the upper extremities. Code Status: DNR-CCA     ADVANCED CARE PLANNING:  Patient has capacity for medical decisions: yes  Health Care Power of : no  Living Will: no     Personal, Social, and Family History  Marital Status:   Living situation:alone  Importance of maureen/Mormon/spiritual beliefs: [] Very [] Somewhat [x] Not  asked  Psychological Distress: mild  Does patient understand diagnosis/treatment? yes  Does caregiver understand diagnosis/treatment?  yes      Assessment        REVIEW OF SYSTEMS  Constitutional: no fever, no chills   Eyes: no eye pain or blurred vision  ENT: no hearing loss,  +difficulty swallowing   Respiratory: no wheezing, chest tightness, or shortness of breath   Cardiovascular: no chest pain or pressure, no palpitations, no diaphoresis   Gastrointestinal: no nausea, vomiting,abdominal pain, diarrhea or constipation  Genitourinary: no dysuria, frequency,hematuria , or nocturia

## 2020-04-13 NOTE — CARE COORDINATION
Spoke with family in meeting. Dr. Maia Steele present. Clarified that the patient would benefit  From PEG placement and touched base a little about Hospice care. Dr. Kelly Hammer from palliative, came into patient's room and spoke with family and patient as well. Patient agreeable to hospice. Dr. Maia Steele consulted GI for PEG placement. Writer also spoke with [facility]. They will follow patient. The plan is for patient to go home on tube feed with home care and home with [facility] hospice.

## 2020-04-13 NOTE — PROGRESS NOTES
Agnies[address] 20    Progress Note    4/13/2020    3:53 PM    Name:   Roseline Whalen  MRN:     [id number]     Acct:      [de-identified]   Room:   [address] [place] [address] Day:  5  Admit Date:  4/7/2020 10:01 AM    PCP:   Brittani Hernandez MD  Code Status:  DNR-CCA    Subjective:     C/C: Aspiration     Interval History Status: not changed. Patient was seen and examined at bedside. Had family meeting with palliative care and myself with patient's sister and son. Patient is now agreeable to PEG insertion. Plan is to discharge him with home hospice with PEG and tube feeding. Brief History:     Per record:     Avery Clark is a 69 y.o.  male who presents with No chief complaint on file.   and is admitted to the hospital for the management of Aspiration pneumonia of both lower lobes ([address]    This 69 yom presents with reportedly sob and headache. Pallavi Howell was just discharged yesterday from [facility] with enteritis and aortic aneurysm which were to be addressed as outpatient. Pallavi Howell had failed a swallow study and peg recommended but family refused. Javier Rose were counseled on how he would likely aspirate. Pallavi Howell returned to [facility] with sob.  Patient cannot provide any history as he mumbles unintelligibly-I am told this is his baseline. Review of Systems:     Constitutional:  negative for chills, fevers, sweats  Respiratory:  negative for cough, dyspnea on exertion, shortness of breath, wheezing  Cardiovascular:  negative for chest pain, chest pressure/discomfort, lower extremity edema, palpitations  Gastrointestinal:  negative for abdominal pain, constipation, diarrhea, nausea, vomiting  Neurological:  negative for dizziness, headache    Medications: Allergies:     Allergies   Allergen Reactions    Bactrim [Sulfamethoxazole-Trimethoprim] Hives and Swelling    Ciprofloxacin Swelling     FACE       Current Meds:   Scheduled Meds:    enoxaparin  40 mg Subcutaneous Daily    sodium chloride flush  10 mL Intravenous 2 times per day     Continuous Infusions:    sodium chloride 75 mL/hr at 20 1145     PRN Meds: loperamide, albuterol sulfate HFA, acetaminophen **OR** acetaminophen, magnesium hydroxide, nicotine, promethazine **OR** ondansetron, sodium chloride flush    Data:     Past Medical History:   has a past medical history of Arthritis, Cancer ([facility] Cerebral artery occlusion with cerebral infarction ([facility] Chronic kidney disease, COPD (chronic obstructive pulmonary disease) ([facility] Hypertension, Pneumonia, Psychiatric problem, and Seizures ([facility] Social History:   reports that he has been smoking cigarettes. He started smoking about 64 years ago. He has a 60.00 pack-year smoking history. He has never used smokeless tobacco. He reports that he does not drink alcohol or use drugs. Family History:   Family History   Problem Relation Age of Onset    Arthritis Mother     Atrial Fibrillation Mother    Ravi Miners Hearing Loss Mother     Heart Disease Mother     Stroke Mother     Vision Loss Mother     Arthritis Father     Cancer Father     Heart Disease Father     High Blood Pressure Father     Stroke Father     Vision Loss Father        Vitals:  BP (!) 153/86   Pulse 55   Temp 97.9 °F (36.6 °C)   Resp 18   Wt 190 lb 8 oz (86.4 kg)   SpO2 94%   BMI 27.33 kg/m²   Temp (24hrs), Av.9 °F (36.6 °C), Min:97.9 °F (36.6 °C), Max:97.9 °F (36.6 °C)    No results for input(s): POCGLU in the last 72 hours. I/O (24Hr): Intake/Output Summary (Last 24 hours) at 2020 1553  Last data filed at 2020 1827  Gross per 24 hour   Intake 680 ml   Output --   Net 680 ml       Labs:  Hematology:No results for input(s): WBC, RBC, HGB, HCT, MCV, MCH, MCHC, RDW, PLT, MPV, SEDRATE, CRP, INR, DDIMER, [id number], LABABSO in the last 72 hours.     Invalid input(s): PT  Chemistry:No results for input(s): NA, K, CL, CO2, GLUCOSE, BUN, CREATININE, MG, ANIONGAP,

## 2020-04-13 NOTE — PROGRESS NOTES
[facility]  Progress Note    Deanna Tipton  MRN: [id number]  Date: 4/13/2020    Reason for F/U :   Pneumonia    Impression :   1. Abdominal aorta dissection  2. Dysphagia, swallow study positive,   · PEG planned for tomorrow   3. Left lower lobe infiltrate, possible aspiration pneumonia  · COVID testing negative    Recommendations:   · The patient completed antimicrobial therapy with Zosyn on 4/11/2020  · Clinically he continues to do well with no signs of respiratory decline. · He is scheduled for a PEG tube to be placed by the GI service. · The patient will then be subsequently discharged home with hospice care  · Infectious disease wise is nothing further to add and I will sign off    Infection Control Recommendations:   Universal precautions    Discharge Planning:   Patient will need Midline Catheter Insertion/ PICC line Insertion: No  Patient willneed outpatient wound care: No    MedicalDecision making / Summary of Stay:   Moose Clark is a 69y.o.-year-old male presents with shortness of breath chest pain and headache. The chest pain is left-sided, radiating to the left shoulder and the neck, no fever chills no diarrhea. He has a mild headache, gradual onset, without any neck stiffness, no photophobia.     It seems that he presented to the ER at [facility] yesterday with abdominal pain and there was a concern for a right-sided aortic dissection affecting the area above the common iliac artery, vascular surgery did not plan on any procedure. Cardiology also saw him for bradycardia and hypotension,  He was started on dopamine drip and went to ICU. There was also a concern for aspiration and there was a suggestion to place a PEG tube, but never done.   The patient was discharged from [facility] on 4/6     At this time and to rule out pulmonary emboli, CT of the chest, showed no pulmonary emboli but is subsegmental atelectasis scarring or new infiltrates left lower lobe possibly aspiration pneumonitis.     A swallow study done on  showed aspiration     His white count is 10.5  No lymphopenia     We are called to rule out CO VID, versus aspiration pneumonia  Looking at the imaging it is very difficult to rule out early CO VID, although the patient is a high risk for aspiration pneumonia    Current evaluation:2020    BP (!) 153/86   Pulse 55   Temp 97.9 °F (36.6 °C)   Resp 18   Wt 190 lb 8 oz (86.4 kg)   SpO2 94%   BMI 27.33 kg/m²     Temperature Range: Temp: 97.9 °F (36.6 °C) Temp  Av.9 °F (36.6 °C)  Min: 97.9 °F (36.6 °C)  Max: 97.9 °F (36.6 °C)  The patient is seen and evaluated at bedside he is awake and alert in no acute distress. He denies any fevers or chills. No cough or shortness of breath. No abdominal pain nausea vomiting. Review of Systems   Constitutional: Negative. Respiratory: Negative. Cardiovascular: Negative. Gastrointestinal: Negative. Genitourinary: Negative. Musculoskeletal: Negative. Allergic/Immunologic: Negative. Neurological: Positive for speech difficulty. Physical Examination :     Physical Exam  Constitutional:       Appearance: He is well-developed. HENT:      Head: Normocephalic and atraumatic. Neck:      Musculoskeletal: Normal range of motion and neck supple. Cardiovascular:      Rate and Rhythm: Normal rate. Heart sounds: Normal heart sounds. No friction rub. No gallop. Pulmonary:      Effort: Pulmonary effort is normal.      Breath sounds: Normal breath sounds. No wheezing. Abdominal:      General: Bowel sounds are normal.      Palpations: Abdomen is soft. There is no mass. Tenderness: There is no abdominal tenderness. Musculoskeletal: Normal range of motion. Lymphadenopathy:      Cervical: No cervical adenopathy. Skin:     General: Skin is warm and dry. Neurological:      Mental Status: He is alert.          Laboratory data:   I have independently reviewed the followinglabs:  CBC with Differential: No results for input(s): WBC, HGB, HCT, PLT, SEGSPCT, BANDSPCT, LYMPHOPCT, MONOPCT, EOSPCT in the last 72 hours. BMP: No results for input(s): NA, K, CL, CO2, BUN, CREATININE, MG in the last 72 hours. Invalid input(s): CA  Hepatic Function Panel: No results for input(s): PROT, LABALBU, BILIDIR, IBILI, BILITOT, ALKPHOS, ALT, AST in the last 72 hours. No results for input(s): VANCOTROUGH in the last 72 hours. Lab Results   Component Value Date    CRP 67.5 (H) 04/09/2020     No results found for: SEDRATE    No results for input(s): PROCAL in the last 72 hours. Imaging Studies:   No new imaging    Cultures:     MRSA DNA Probe, Nasal [[id number]] Collected: 04/07/20 1220   Order Status: Completed Specimen: Nasal Updated: 04/08/20 6563    Specimen Description . NASAL SWAB    MRSA, DNA, Nasal NEGATIVE:  MRSA DNA not detected by nucleic acid amplification. Comment:                                                    Results should be used as an adjunct to nosocomial control efforts to identify patients   needing enhanced precautions.     The test is not intended to identify patients with staphylococcal infections.  Results   should not be used to guide or monitor treatment for MRSA infections. Legionella antigen, urine [[id number]] Collected: 04/07/20 1220   Order Status: Completed Specimen: Urine, clean catch Updated: 04/07/20 1626    Legionella Pneumophilia Ag, Urine NEGATIVE    Comment: L. pneumophila serogroup 1 antigen not detected. A negative result does not exclude infection with Leginella pnemophila serogroup 1 nor does   it rule out other microbial-caused respiratory infections of disease caused by other   serogroups of Legionella pneumophila. Strep Pneumoniae Antigen [[id number]] Collected: 04/07/20 1220   Order Status: Completed Specimen: Urine, clean catch Updated: 04/07/20 1415    Source . CLEAN CATCH URINE    Strep pneumo Ag NEGATIVE    Comment: Strep pneumoniae antigen not

## 2020-04-13 NOTE — PLAN OF CARE
Problem: Falls - Risk of:  Goal: Will remain free from falls  Description: Will remain free from falls  Outcome: Ongoing  Goal: Absence of physical injury  Description: Absence of physical injury  Outcome: Ongoing     Problem: Pain:  Goal: Pain level will decrease  Description: Pain level will decrease  Outcome: Ongoing  Goal: Control of acute pain  Description: Control of acute pain  Outcome: Ongoing  Goal: Control of chronic pain  Description: Control of chronic pain  Outcome: Ongoing     Problem: Musculor/Skeletal Functional Status  Goal: Highest potential functional level  Outcome: Ongoing

## 2020-04-13 NOTE — PLAN OF CARE
Problem: Falls - Risk of:  Goal: Will remain free from falls  Description: Will remain free from falls  4/12/2020 2017 by Kathleen Abdi RN  Outcome: Ongoing     Problem: Falls - Risk of:  Goal: Absence of physical injury  Description: Absence of physical injury  4/12/2020 2017 by Kathleen Abdi RN  Outcome: Ongoing     Problem: Pain:  Goal: Pain level will decrease  Description: Pain level will decrease  4/12/2020 2017 by Kathleen Abdi RN  Outcome: Ongoing     Problem: Pain:  Goal: Control of acute pain  Description: Control of acute pain  4/12/2020 2017 by Kathleen Abdi RN  Outcome: Ongoing     Problem: Pain:  Goal: Control of chronic pain  Description: Control of chronic pain  4/12/2020 2017 by Kathleen Abdi RN  Outcome: Ongoing     Problem: Musculor/Skeletal Functional Status  Goal: Highest potential functional level  4/12/2020 2017 by Kathleen Abdi RN  Outcome: Ongoing

## 2020-04-13 NOTE — PROGRESS NOTES
Occupational Therapy  Facility/Department: [facility]  Daily Treatment Note  NAME: Samina Black  : 9094  MRN: [id number]    Date of Service: 2020    Discharge Recommendations:  Patient would benefit from continued therapy after discharge       Assessment   Performance deficits / Impairments: Decreased functional mobility ; Decreased ADL status; Decreased endurance;Decreased safe awareness;Decreased high-level IADLs;Decreased strength  Assessment: Pt would benefit from continued acute care and post acute care OT to address moderate/ maximal deficits in ADL/ functional activities, endurance and functional transfers/ mobility following admission  Prognosis: Good  REQUIRES OT FOLLOW UP: Yes  Activity Tolerance  Activity Tolerance: Patient Tolerated treatment well  Safety Devices  Safety Devices in place: Yes  Type of devices: Call light within reach; Patient at risk for falls; Left in bed;Nurse notified         Patient Diagnosis(es): There were no encounter diagnoses. has a past medical history of Arthritis, Cancer ([facility] [address] Cerebral artery occlusion with cerebral infarction ([facility] Chronic kidney disease, COPD (chronic obstructive pulmonary disease) ([facility] [address] Hypertension, Pneumonia, Psychiatric problem, and Seizures ([facility] [address] has a past surgical history that includes back surgery; fracture surgery; hernia repair; Tonsillectomy; Carotid endarterectomy (Left, 2016); Abdomen surgery; hiatal hernia repair; Inguinal hernia repair (Bilateral); other surgical history; and Colonoscopy (N/A, 2018).     Restrictions  Restrictions/Precautions  Restrictions/Precautions: General Precautions, Fall Risk, Up as Tolerated  Required Braces or Orthoses?: No  Subjective   General  Patient assessed for rehabilitation services?: Yes  Family / Caregiver Present: No  Pain Assessment  Response to Pain Intervention: Patient Satisfied  Vital Signs  Patient Currently in Pain: Denies   Orientation     Objective

## 2020-04-14 LAB
ANION GAP SERPL CALCULATED.3IONS-SCNC: 13 MMOL/L (ref 9–17)
BUN BLDV-MCNC: 8 MG/DL (ref 8–23)
BUN/CREAT BLD: ABNORMAL (ref 9–20)
CALCIUM SERPL-MCNC: 8.8 MG/DL (ref 8.6–10.4)
CHLORIDE BLD-SCNC: 106 MMOL/L (ref 98–107)
CO2: 24 MMOL/L (ref 20–31)
CREAT SERPL-MCNC: 0.71 MG/DL (ref 0.7–1.2)
GFR AFRICAN AMERICAN: >60 ML/MIN
GFR NON-AFRICAN AMERICAN: >60 ML/MIN
GFR SERPL CREATININE-BSD FRML MDRD: ABNORMAL ML/MIN/{1.73_M2}
GFR SERPL CREATININE-BSD FRML MDRD: ABNORMAL ML/MIN/{1.73_M2}
GLUCOSE BLD-MCNC: 90 MG/DL (ref 70–99)
HCT VFR BLD CALC: 34 % (ref 40.7–50.3)
HEMOGLOBIN: 11.2 G/DL (ref 13–17)
MAGNESIUM: 1.9 MG/DL (ref 1.6–2.6)
MCH RBC QN AUTO: 30.3 PG (ref 25.2–33.5)
MCHC RBC AUTO-ENTMCNC: 32.9 G/DL (ref 28.4–34.8)
MCV RBC AUTO: 91.9 FL (ref 82.6–102.9)
NRBC AUTOMATED: 0 PER 100 WBC
PDW BLD-RTO: 12.9 % (ref 11.8–14.4)
PLATELET # BLD: 231 K/UL (ref 138–453)
PMV BLD AUTO: 8.5 FL (ref 8.1–13.5)
POTASSIUM SERPL-SCNC: 2.7 MMOL/L (ref 3.7–5.3)
POTASSIUM SERPL-SCNC: 3.5 MMOL/L (ref 3.7–5.3)
RBC # BLD: 3.7 M/UL (ref 4.21–5.77)
SODIUM BLD-SCNC: 143 MMOL/L (ref 135–144)
WBC # BLD: 9.6 K/UL (ref 3.5–11.3)

## 2020-04-14 PROCEDURE — 99232 SBSQ HOSP IP/OBS MODERATE 35: CPT | Performed by: INTERNAL MEDICINE

## 2020-04-14 PROCEDURE — 2580000003 HC RX 258: Performed by: INTERNAL MEDICINE

## 2020-04-14 PROCEDURE — 99232 SBSQ HOSP IP/OBS MODERATE 35: CPT | Performed by: FAMILY MEDICINE

## 2020-04-14 PROCEDURE — 2580000003 HC RX 258: Performed by: NURSE PRACTITIONER

## 2020-04-14 PROCEDURE — 85027 COMPLETE CBC AUTOMATED: CPT

## 2020-04-14 PROCEDURE — 84132 ASSAY OF SERUM POTASSIUM: CPT

## 2020-04-14 PROCEDURE — 83735 ASSAY OF MAGNESIUM: CPT

## 2020-04-14 PROCEDURE — 36415 COLL VENOUS BLD VENIPUNCTURE: CPT

## 2020-04-14 PROCEDURE — 6360000002 HC RX W HCPCS: Performed by: INTERNAL MEDICINE

## 2020-04-14 PROCEDURE — 1200000000 HC SEMI PRIVATE

## 2020-04-14 PROCEDURE — 97535 SELF CARE MNGMENT TRAINING: CPT

## 2020-04-14 PROCEDURE — 80048 BASIC METABOLIC PNL TOTAL CA: CPT

## 2020-04-14 RX ORDER — POTASSIUM CHLORIDE 7.45 MG/ML
10 INJECTION INTRAVENOUS PRN
Status: DISCONTINUED | OUTPATIENT
Start: 2020-04-14 | End: 2020-04-17 | Stop reason: HOSPADM

## 2020-04-14 RX ORDER — POTASSIUM CHLORIDE AND SODIUM CHLORIDE 900; 300 MG/100ML; MG/100ML
INJECTION, SOLUTION INTRAVENOUS CONTINUOUS
Status: DISCONTINUED | OUTPATIENT
Start: 2020-04-14 | End: 2020-04-14

## 2020-04-14 RX ADMIN — POTASSIUM CHLORIDE 10 MEQ: 7.46 INJECTION, SOLUTION INTRAVENOUS at 08:10

## 2020-04-14 RX ADMIN — POTASSIUM CHLORIDE 10 MEQ: 7.46 INJECTION, SOLUTION INTRAVENOUS at 11:09

## 2020-04-14 RX ADMIN — POTASSIUM CHLORIDE 10 MEQ: 7.46 INJECTION, SOLUTION INTRAVENOUS at 23:17

## 2020-04-14 RX ADMIN — POTASSIUM CHLORIDE 10 MEQ: 7.46 INJECTION, SOLUTION INTRAVENOUS at 09:02

## 2020-04-14 RX ADMIN — POTASSIUM CHLORIDE 10 MEQ: 7.46 INJECTION, SOLUTION INTRAVENOUS at 18:53

## 2020-04-14 RX ADMIN — POTASSIUM CHLORIDE: 2 INJECTION, SOLUTION, CONCENTRATE INTRAVENOUS at 13:17

## 2020-04-14 RX ADMIN — POTASSIUM CHLORIDE 10 MEQ: 7.46 INJECTION, SOLUTION INTRAVENOUS at 12:14

## 2020-04-14 RX ADMIN — POTASSIUM CHLORIDE 10 MEQ: 7.46 INJECTION, SOLUTION INTRAVENOUS at 10:07

## 2020-04-14 RX ADMIN — POTASSIUM CHLORIDE 10 MEQ: 7.46 INJECTION, SOLUTION INTRAVENOUS at 13:17

## 2020-04-14 RX ADMIN — POTASSIUM CHLORIDE 10 MEQ: 7.46 INJECTION, SOLUTION INTRAVENOUS at 17:41

## 2020-04-14 RX ADMIN — POTASSIUM CHLORIDE 10 MEQ: 7.46 INJECTION, SOLUTION INTRAVENOUS at 21:39

## 2020-04-14 NOTE — PROGRESS NOTES
Occupational Therapy  Facility/Department: [facility]  Daily Treatment Note  NAME: Josefina Worrell  :   MRN: [id number]    Date of Service: 2020    Discharge Recommendations:  Patient would benefit from continued therapy after discharge       Assessment   Performance deficits / Impairments: Decreased functional mobility ; Decreased ADL status; Decreased endurance;Decreased safe awareness;Decreased high-level IADLs;Decreased strength  Assessment: Pt would benefit from continued acute care and post acute care OT to address moderate/ maximal deficits in ADL/ functional activities, endurance and functional transfers/ mobility following admission  Prognosis: Good  REQUIRES OT FOLLOW UP: Yes  Activity Tolerance  Activity Tolerance: Patient Tolerated treatment well  Safety Devices  Safety Devices in place: Yes  Type of devices: Call light within reach; Patient at risk for falls; Left in bed;Nurse notified         Patient Diagnosis(es): There were no encounter diagnoses. has a past medical history of Arthritis, Cancer ([facility] Cerebral artery occlusion with cerebral infarction ([facility] [address] Chronic kidney disease, COPD (chronic obstructive pulmonary disease) ([facility] [address] Hypertension, Pneumonia, Psychiatric problem, and Seizures ([facility] [address] has a past surgical history that includes back surgery; fracture surgery; hernia repair; Tonsillectomy; Carotid endarterectomy (Left, 2016); Abdomen surgery; hiatal hernia repair; Inguinal hernia repair (Bilateral); other surgical history; and Colonoscopy (N/A, 2018).     Restrictions  Restrictions/Precautions  Restrictions/Precautions: General Precautions, Fall Risk, Up as Tolerated  Required Braces or Orthoses?: No  Subjective   General  Patient assessed for rehabilitation services?: Yes  Family / Caregiver Present: No  Pain Assessment  Response to Pain Intervention: Patient Satisfied  Vital Signs  Patient Currently in Pain: Denies   Objective    ADL  Feeding: Stand by

## 2020-04-14 NOTE — PROGRESS NOTES
A1A    Anti - Liver/Kidney Ab  No results found for: LIVER-KIDNEYMICROSOMALAB    JAMAL  No results found for: JAMAL    AMA  No results found for: MITOAB    ASMA  No results found for: SMOOTHMUSCAB    Ceruloplasmin  No results found for: CERULOPLSM    Celiac panel  No results found for: TISSTRNTIIGG, TTGIGA, IGA    PT/INR  No results for input(s): PROTIME, INR in the last 72 hours. Cancer Markers:  CEA:  No results for input(s): CEA in the last 72 hours. Ca 125:  No results for input(s):  in the last 72 hours. Ca 19-9:   Invalid input(s):   AFP: No results for input(s): AFP in the last 72 hours. Lactic acid:Invalid input(s): LACTIC ACID    Radiology Review:    No results found. Principal Problem:    Aspiration pneumonia of both lower lobes (HCC)  Active Problems: Aortic dissection (HCC)    Other dysphagia    Tobacco abuse    Aspiration pneumonitis (HCC)  Resolved Problems:    * No resolved hospital problems. *       GI Assessment and plan:  1. Oral pharengeal dysphagia-failed MBSS 4/6/2020. Pt agrees to PEG  -NPO-will plan for PEG Wednesday if hypokalemia is resolved    Thank you for allowing me to participate in the care of your patient. Please feel free to contact me with any questions or concerns.      Malick Qrueshi, [address]  Gastroenterology  [phone]

## 2020-04-14 NOTE — PLAN OF CARE
Problem: Falls - Risk of:  Goal: Will remain free from falls  Description: Will remain free from falls  4/14/2020 0619 by Chhaya Levin RN  Outcome: Ongoing  Note: No falls to date. Bed in lowest position with call light within reach. Floor free from obstacles and pt verbalizes understanding to call out with needs or assistance with ambulation. Falls risk score evaluated-high risk. Bed alarm activated ,falling star posted. Will continue to monitor additional needs. 4/13/2020 1708 by Joan Alan RN  Outcome: Ongoing  Goal: Absence of physical injury  Description: Absence of physical injury  4/14/2020 0619 by Chhaya Levin RN  Outcome: Ongoing  4/13/2020 1708 by Joan Alan RN  Outcome: Ongoing     Problem: Pain:  Goal: Pain level will decrease  Description: Pain level will decrease  4/14/2020 0619 by Chhaya Levin RN  Outcome: Ongoing  Note: Pain level assessment complete. Pt rated pain on 0-10 scale. Pt educated on pain scale and control interventions. PRN pain medication given per pt request. Pt verbalizes understanding to call out with new onset of pain or unrelieved pain. Will continue to monitor.     4/13/2020 1708 by Joan Alan RN  Outcome: Ongoing  Goal: Control of acute pain  Description: Control of acute pain  4/14/2020 0619 by Chhaya Levin RN  Outcome: Ongoing  4/13/2020 1708 by Joan Alan RN  Outcome: Ongoing  Goal: Control of chronic pain  Description: Control of chronic pain  4/13/2020 1708 by Joan Alan RN  Outcome: Ongoing

## 2020-04-14 NOTE — PROGRESS NOTES
x    Palliative Care Progress Note    ATTENDING ATTESTATION:    I have discussed the care of [facility], including pertinent history and exam findings, with the resident/fellow/NP. I have seen and examined the patient and the key elements of all parts of the encounter have been performed by me. I agree with the assessment, plan and orders as documented by the fellow/resident/NP, after I modified the exam findings and the plan of treatments and the final version is my approved version of the assessment. Additional Comments:   Patient was seen along with palliative care fellow Dr. Tr Sanchez and resident   Dr. Madeleine Vegas   I reviewed patient's POA paperwork for health and patient has made his son Arleen Romberg as his POA for health and his Sister Nata Sinha as the first alternate  We encouraged the patient to have the PEG tube placed   We offered comfort and emotional support to the patient    We will continue to provide comfort and support to the patient and family        Electronically signed by Damon Nam MD on 2020 at 1:19 PM      NAME:  [address]:  [id number]  AGE: 69 y.o. GENDER: male  : 1945  TODAY'S DATE:  2020    Reason for Consult:  goals of care and hospice discussion  History of Present Illness     The patient is a 69 y.o. Non-/non  male who presents with No chief complaint on file. Referred to Palliative Care by  ?x Physician   ? Nursing  ? Family Request   ? Other:       [facility]J18.9]  Aspiration pneumonitis ([facility] [address] [J69.0]. His hospital course has been associated with motor neuron disease. The patient has a complicated medical history and has been hospitalized since 2020 10:01 AM.      This is a 71-year-old male with diagnosis of motor neuron disease (did not meet criteria for ALS), who is admitted with aspiration pneumonia.   Patient initially refused to have PEG tube placed, but is now agreeable. OVERNIGHT EVENTS:  Patient had DPOA paperwork and living will completed yesterday. DPOA will be Grant and 1st alternate is Valerie. Patient with low K. K will be replaced and will have PEG tube placed. Patient continues to be tearful and emotional, but is hopeful to go home. BP (!) 154/67   Pulse 58   Temp 98 °F (36.7 °C) (Oral)   Resp 16   Wt 190 lb 8 oz (86.4 kg)   SpO2 99%   BMI 27.33 kg/m²     Assessment        REVIEW OF SYSTEMS    ?x   UNABLE TO OBTAIN: limited    Constitutional:  ?   Chills   ? Fatigue   ? Fevers   ? Malaise   ? Weight loss   ? Other:     Respiratory:   ?  Cough    ? Shortness of breath    ? Chest pain    ? Other:     Cardiovascular:   ?  Chest pain  ? Dyspnea    ? Exertional chest pressure/discomfort     ? Fatigue      ? Palpitations    ? Syncope   ? Other:     Gastrointestinal:   ?  Abdominal pain   ? Constipation    ? Diarrhea    ? X  Dysphagia   ? Reflux             ? Vomiting   ? Other:     Genitourinary:  ?  Dysuria     ? Frequency   ? Hematuria   ? Nocturia   ? Urinary incontinence   ? Other:     Musculoskeletal:   ? Back pain    ? Muscle weakness   ? Myalgias    ? Neck pain   ? Stiff joints   ? Other:     Behavioral/Psych:   ? Anxiety    ? Depression     ? Mood swings   ? Other:     PHYSICAL ASSESSMENT:     General: ?  Oriented x3      ? well appearing      ? Intubated      ? X ill appearing      ? Other:    Mental Status: ? normal mental status exam      ? drowsy      ? Confused      ?x Other: answers questions appropriately even though speech is mumbled. Cardiovascular: ?x  Regular rate/rhythm      ? Arrhythmia      ? Other:     Chest: ?x Effort normal      ? lungs clear      ? respiratory distress      ? Tachypnea      ? Other:    Abdomen: ? XSoft/non-tender      ? X Normal appearance      ? Distended      ? Ascites      ? Other:    Neurological: ?x Normal Speech      ?x Normal Sensation      ?   Deficits present: Extremity:  ? normal skin color/temp      ? clubbing/cyanosis      ? x No edema      ? Other:     Palliative Performance Scale:  ___60%  Ambulation reduced; Significant disease; Can't do hobbies/housework; intake normal or reduced; occasional assist; LOC full/confusion  ___50%  Mainly sit/lie; Extensive disease; Can't do any work; Considerable assist; intake normal or reduced; LOC full/confusion  _X__40%  Mainly in bed; Extensive disease; Mainly assist; intake normal or reduced; LOC full/confusion   ___30%  Bed Bound; Extensive disease; Total care; intake reduced; LOCfull/confusion  ___20%  Bed Bound; Extensive disease; Total care; intake minimal; Drowsy/coma  ___10%  Bed Bound; Extensive disease; Total care; Mouth care only; Drowsy/coma  ___0       Death      Plan      Palliative Interaction: This is a 73-year-old male with diagnosis of motor neuron disease, who is admitted with aspiration pneumonia. Patient initially refused to have PEG tube placed, but is now agreeable. Patient's main goal is going home and son is supportive.      - plan for PEG tube placement tomorrow after K is replaced. - will continue to follow to discuss code status/DNR CC, once PEG placed. - patient is currently DNR CCA. Hospice is already consulted with [facility] for home hospice.  - DPOA-HC and living will completed - Melecio Petty (son) will be POA and Valerie (sister) will be 1st alternate       Principle Problem/Diagnosis:  Aspiration pneumonia of both lower lobes ([address]    Additional Assessments:  Principal Problem:    Aspiration pneumonia of both lower lobes ([address]  Active Problems: Aortic dissection (HCC)    Other dysphagia    Tobacco abuse    Aspiration pneumonitis (HCC)      1- Symptom management/ pain control    No acute symptoms to be managed by palliative care at this time. We feel the patient symptoms are being controlled. his current regimen is reviewed by myself and discussed with the staff.        2- Goals of care

## 2020-04-15 ENCOUNTER — ANESTHESIA EVENT (OUTPATIENT)
Dept: ENDOSCOPY | Age: 75
DRG: 981 | End: 2020-04-15
Payer: COMMERCIAL

## 2020-04-15 ENCOUNTER — ANESTHESIA (OUTPATIENT)
Dept: ENDOSCOPY | Age: 75
DRG: 981 | End: 2020-04-15
Payer: COMMERCIAL

## 2020-04-15 VITALS — DIASTOLIC BLOOD PRESSURE: 59 MMHG | SYSTOLIC BLOOD PRESSURE: 105 MMHG | OXYGEN SATURATION: 100 %

## 2020-04-15 LAB
GLUCOSE BLD-MCNC: 95 MG/DL (ref 75–110)
POTASSIUM SERPL-SCNC: 4 MMOL/L (ref 3.7–5.3)

## 2020-04-15 PROCEDURE — 3609013000 HC EGD TRANSORAL CONTROL BLEEDING ANY METHOD: Performed by: INTERNAL MEDICINE

## 2020-04-15 PROCEDURE — 94761 N-INVAS EAR/PLS OXIMETRY MLT: CPT

## 2020-04-15 PROCEDURE — 6360000002 HC RX W HCPCS: Performed by: INTERNAL MEDICINE

## 2020-04-15 PROCEDURE — 1200000000 HC SEMI PRIVATE

## 2020-04-15 PROCEDURE — 7100000001 HC PACU RECOVERY - ADDTL 15 MIN: Performed by: INTERNAL MEDICINE

## 2020-04-15 PROCEDURE — 82947 ASSAY GLUCOSE BLOOD QUANT: CPT

## 2020-04-15 PROCEDURE — 84132 ASSAY OF SERUM POTASSIUM: CPT

## 2020-04-15 PROCEDURE — 2720000010 HC SURG SUPPLY STERILE: Performed by: INTERNAL MEDICINE

## 2020-04-15 PROCEDURE — 2580000003 HC RX 258: Performed by: NURSE ANESTHETIST, CERTIFIED REGISTERED

## 2020-04-15 PROCEDURE — 3700000001 HC ADD 15 MINUTES (ANESTHESIA): Performed by: INTERNAL MEDICINE

## 2020-04-15 PROCEDURE — 0DH63UZ INSERTION OF FEEDING DEVICE INTO STOMACH, PERCUTANEOUS APPROACH: ICD-10-PCS | Performed by: INTERNAL MEDICINE

## 2020-04-15 PROCEDURE — 99232 SBSQ HOSP IP/OBS MODERATE 35: CPT | Performed by: FAMILY MEDICINE

## 2020-04-15 PROCEDURE — 2500000003 HC RX 250 WO HCPCS: Performed by: NURSE ANESTHETIST, CERTIFIED REGISTERED

## 2020-04-15 PROCEDURE — 6360000002 HC RX W HCPCS: Performed by: ANESTHESIOLOGY

## 2020-04-15 PROCEDURE — 3700000000 HC ANESTHESIA ATTENDED CARE: Performed by: INTERNAL MEDICINE

## 2020-04-15 PROCEDURE — 43255 EGD CONTROL BLEEDING ANY: CPT | Performed by: INTERNAL MEDICINE

## 2020-04-15 PROCEDURE — 2580000003 HC RX 258: Performed by: INTERNAL MEDICINE

## 2020-04-15 PROCEDURE — 3609013300 HC EGD TUBE PLACEMENT: Performed by: INTERNAL MEDICINE

## 2020-04-15 PROCEDURE — 7100000000 HC PACU RECOVERY - FIRST 15 MIN: Performed by: INTERNAL MEDICINE

## 2020-04-15 PROCEDURE — 2709999900 HC NON-CHARGEABLE SUPPLY: Performed by: INTERNAL MEDICINE

## 2020-04-15 PROCEDURE — 6360000002 HC RX W HCPCS: Performed by: NURSE ANESTHETIST, CERTIFIED REGISTERED

## 2020-04-15 PROCEDURE — 99231 SBSQ HOSP IP/OBS SF/LOW 25: CPT | Performed by: INTERNAL MEDICINE

## 2020-04-15 PROCEDURE — 43246 EGD PLACE GASTROSTOMY TUBE: CPT | Performed by: INTERNAL MEDICINE

## 2020-04-15 PROCEDURE — 0W3P4ZZ CONTROL BLEEDING IN GASTROINTESTINAL TRACT, PERCUTANEOUS ENDOSCOPIC APPROACH: ICD-10-PCS | Performed by: INTERNAL MEDICINE

## 2020-04-15 PROCEDURE — 36415 COLL VENOUS BLD VENIPUNCTURE: CPT

## 2020-04-15 RX ORDER — GLYCOPYRROLATE 1 MG/5 ML
SYRINGE (ML) INTRAVENOUS PRN
Status: DISCONTINUED | OUTPATIENT
Start: 2020-04-15 | End: 2020-04-15 | Stop reason: SDUPTHER

## 2020-04-15 RX ORDER — FENTANYL CITRATE 50 UG/ML
25 INJECTION, SOLUTION INTRAMUSCULAR; INTRAVENOUS EVERY 5 MIN PRN
Status: DISCONTINUED | OUTPATIENT
Start: 2020-04-15 | End: 2020-04-15

## 2020-04-15 RX ORDER — DEXAMETHASONE SODIUM PHOSPHATE 10 MG/ML
INJECTION INTRAMUSCULAR; INTRAVENOUS PRN
Status: DISCONTINUED | OUTPATIENT
Start: 2020-04-15 | End: 2020-04-15 | Stop reason: SDUPTHER

## 2020-04-15 RX ORDER — 0.9 % SODIUM CHLORIDE 0.9 %
500 INTRAVENOUS SOLUTION INTRAVENOUS
Status: DISCONTINUED | OUTPATIENT
Start: 2020-04-15 | End: 2020-04-15

## 2020-04-15 RX ORDER — FENTANYL CITRATE 50 UG/ML
50 INJECTION, SOLUTION INTRAMUSCULAR; INTRAVENOUS EVERY 5 MIN PRN
Status: DISCONTINUED | OUTPATIENT
Start: 2020-04-15 | End: 2020-04-15

## 2020-04-15 RX ORDER — PROMETHAZINE HYDROCHLORIDE 25 MG/ML
6.25 INJECTION, SOLUTION INTRAMUSCULAR; INTRAVENOUS
Status: DISCONTINUED | OUTPATIENT
Start: 2020-04-15 | End: 2020-04-15

## 2020-04-15 RX ORDER — DIPHENHYDRAMINE HYDROCHLORIDE 50 MG/ML
12.5 INJECTION INTRAMUSCULAR; INTRAVENOUS
Status: DISCONTINUED | OUTPATIENT
Start: 2020-04-15 | End: 2020-04-15

## 2020-04-15 RX ORDER — SODIUM CHLORIDE, SODIUM LACTATE, POTASSIUM CHLORIDE, CALCIUM CHLORIDE 600; 310; 30; 20 MG/100ML; MG/100ML; MG/100ML; MG/100ML
INJECTION, SOLUTION INTRAVENOUS CONTINUOUS PRN
Status: DISCONTINUED | OUTPATIENT
Start: 2020-04-15 | End: 2020-04-15 | Stop reason: SDUPTHER

## 2020-04-15 RX ORDER — ONDANSETRON 2 MG/ML
4 INJECTION INTRAMUSCULAR; INTRAVENOUS
Status: DISCONTINUED | OUTPATIENT
Start: 2020-04-15 | End: 2020-04-15

## 2020-04-15 RX ORDER — OXYCODONE HYDROCHLORIDE AND ACETAMINOPHEN 5; 325 MG/1; MG/1
1 TABLET ORAL EVERY 4 HOURS PRN
Status: DISCONTINUED | OUTPATIENT
Start: 2020-04-15 | End: 2020-04-15

## 2020-04-15 RX ORDER — SUCCINYLCHOLINE/SOD CL,ISO/PF 100 MG/5ML
SYRINGE (ML) INTRAVENOUS PRN
Status: DISCONTINUED | OUTPATIENT
Start: 2020-04-15 | End: 2020-04-15 | Stop reason: SDUPTHER

## 2020-04-15 RX ORDER — PROPOFOL 10 MG/ML
INJECTION, EMULSION INTRAVENOUS PRN
Status: DISCONTINUED | OUTPATIENT
Start: 2020-04-15 | End: 2020-04-15 | Stop reason: SDUPTHER

## 2020-04-15 RX ORDER — ONDANSETRON 2 MG/ML
INJECTION INTRAMUSCULAR; INTRAVENOUS PRN
Status: DISCONTINUED | OUTPATIENT
Start: 2020-04-15 | End: 2020-04-15 | Stop reason: SDUPTHER

## 2020-04-15 RX ORDER — EPHEDRINE SULFATE/0.9% NACL/PF 50 MG/5 ML
SYRINGE (ML) INTRAVENOUS PRN
Status: DISCONTINUED | OUTPATIENT
Start: 2020-04-15 | End: 2020-04-15 | Stop reason: SDUPTHER

## 2020-04-15 RX ORDER — LIDOCAINE HYDROCHLORIDE 10 MG/ML
INJECTION, SOLUTION EPIDURAL; INFILTRATION; INTRACAUDAL; PERINEURAL PRN
Status: DISCONTINUED | OUTPATIENT
Start: 2020-04-15 | End: 2020-04-15 | Stop reason: SDUPTHER

## 2020-04-15 RX ORDER — LABETALOL 20 MG/4 ML (5 MG/ML) INTRAVENOUS SYRINGE
5 EVERY 10 MIN PRN
Status: DISCONTINUED | OUTPATIENT
Start: 2020-04-15 | End: 2020-04-15

## 2020-04-15 RX ORDER — HYDRALAZINE HYDROCHLORIDE 20 MG/ML
5 INJECTION INTRAMUSCULAR; INTRAVENOUS EVERY 10 MIN PRN
Status: DISCONTINUED | OUTPATIENT
Start: 2020-04-15 | End: 2020-04-15

## 2020-04-15 RX ADMIN — FENTANYL CITRATE 25 MCG: 50 INJECTION, SOLUTION INTRAMUSCULAR; INTRAVENOUS at 10:40

## 2020-04-15 RX ADMIN — DEXAMETHASONE SODIUM PHOSPHATE 5 MG: 10 INJECTION INTRAMUSCULAR; INTRAVENOUS at 09:48

## 2020-04-15 RX ADMIN — Medication 10 MG: at 10:06

## 2020-04-15 RX ADMIN — PROPOFOL 50 MG: 10 INJECTION, EMULSION INTRAVENOUS at 09:56

## 2020-04-15 RX ADMIN — Medication 0.2 MG: at 09:58

## 2020-04-15 RX ADMIN — SODIUM CHLORIDE, POTASSIUM CHLORIDE, SODIUM LACTATE AND CALCIUM CHLORIDE: 600; 310; 30; 20 INJECTION, SOLUTION INTRAVENOUS at 09:40

## 2020-04-15 RX ADMIN — ONDANSETRON 4 MG: 2 INJECTION, SOLUTION INTRAMUSCULAR; INTRAVENOUS at 09:50

## 2020-04-15 RX ADMIN — PROPOFOL 100 MG: 10 INJECTION, EMULSION INTRAVENOUS at 09:44

## 2020-04-15 RX ADMIN — Medication 100 MG: at 09:44

## 2020-04-15 RX ADMIN — LIDOCAINE HYDROCHLORIDE 50 MG: 10 INJECTION, SOLUTION EPIDURAL; INFILTRATION; INTRACAUDAL; PERINEURAL at 09:44

## 2020-04-15 RX ADMIN — PROPOFOL 50 MG: 10 INJECTION, EMULSION INTRAVENOUS at 10:08

## 2020-04-15 RX ADMIN — POTASSIUM CHLORIDE: 2 INJECTION, SOLUTION, CONCENTRATE INTRAVENOUS at 11:21

## 2020-04-15 RX ADMIN — POTASSIUM CHLORIDE: 2 INJECTION, SOLUTION, CONCENTRATE INTRAVENOUS at 03:13

## 2020-04-15 ASSESSMENT — PULMONARY FUNCTION TESTS
PIF_VALUE: 2
PIF_VALUE: 17
PIF_VALUE: 18
PIF_VALUE: 18
PIF_VALUE: 1
PIF_VALUE: 37
PIF_VALUE: 17
PIF_VALUE: 16
PIF_VALUE: 16
PIF_VALUE: 20
PIF_VALUE: 2
PIF_VALUE: 26
PIF_VALUE: 19
PIF_VALUE: 1
PIF_VALUE: 2
PIF_VALUE: 17
PIF_VALUE: 17
PIF_VALUE: 19
PIF_VALUE: 1
PIF_VALUE: 18
PIF_VALUE: 19
PIF_VALUE: 1
PIF_VALUE: 18
PIF_VALUE: 17
PIF_VALUE: 2
PIF_VALUE: 0
PIF_VALUE: 17
PIF_VALUE: 17
PIF_VALUE: 5
PIF_VALUE: 21
PIF_VALUE: 22
PIF_VALUE: 1
PIF_VALUE: 18
PIF_VALUE: 17
PIF_VALUE: 19

## 2020-04-15 ASSESSMENT — PAIN SCALES - GENERAL
PAINLEVEL_OUTOF10: 0
PAINLEVEL_OUTOF10: 4

## 2020-04-15 ASSESSMENT — PAIN DESCRIPTION - PAIN TYPE: TYPE: SURGICAL PAIN

## 2020-04-15 ASSESSMENT — PAIN DESCRIPTION - LOCATION: LOCATION: ABDOMEN

## 2020-04-15 ASSESSMENT — LIFESTYLE VARIABLES: SMOKING_STATUS: 1

## 2020-04-15 ASSESSMENT — PAIN - FUNCTIONAL ASSESSMENT: PAIN_FUNCTIONAL_ASSESSMENT: 0-10

## 2020-04-15 NOTE — PLAN OF CARE
Problem: Falls - Risk of:  Goal: Will remain free from falls  Description: Will remain free from falls  Outcome: Ongoing  Goal: Absence of physical injury  Description: Absence of physical injury  Outcome: Ongoing     Problem: Pain:  Description: Pain management should include both nonpharmacologic and pharmacologic interventions.   Goal: Pain level will decrease  Description: Pain level will decrease  Outcome: Ongoing  Goal: Control of acute pain  Description: Control of acute pain  Outcome: Ongoing  Goal: Control of chronic pain  Description: Control of chronic pain  Outcome: Ongoing     Problem: Musculor/Skeletal Functional Status  Goal: Highest potential functional level  Outcome: Ongoing

## 2020-04-15 NOTE — PROGRESS NOTES
Classification: BMI 25.0 - 29.9 Overweight    Nutrition Interventions:   Continue NPO, Start Tube Feeding(as able )  Continued Inpatient Monitoring, Education Not Indicated    Nutrition Evaluation:   · Evaluation: Goals set   · Goals: Start EN within 24-72 hrs     · Monitoring: Nutrition Progression, I&O, Skin Integrity, Weight, Pertinent Labs, Monitor Bowel Function      Electronically signed by Jarad Jefferson RD, LD on 4/15/20 at 10:21 AM EDT    Contact Number: [phone]

## 2020-04-15 NOTE — OP NOTE
Percutaneous Endoscopic Gastrostomy Note    Patient:   Javier Becker   :    1945   Referring/PCP: Brendon Weaver MD  Procedure:   Esophagogastroduodenoscopy with placement of a percutaneous endoscopic gastrostomy tube and control of bleeding  Date:     4/15/2020   Endoscopist:  Tito Pereira MD, [facility]    Preoperative Diagnosis:  Need for long term nutritional support    Postoperative Diagnosis:  Succseful placement of PEG tube    Anesthesia: General    EBL: Minimal    Specimen: None    Description of Procedure:  Informed consent was obtained from the patient after explanation of the procedure including indications, description of the procedure,  benefits and possible risks and complications of the procedure, and alternatives. Questions were answered. The patient's history was reviewed and a directed physical examination was performed prior to the procedure. Patient recieved prophylactic antibiotics immediately prior to the start of the procedure and was monitored throughout the procedure with pulse oximetry and periodic assessment of vital signs. Patient was sedated as noted above. With the patient in the supine position, the Olympus flexible endoscope was introduced and passed without difficulty. Visualization of the esophagus, stomach, and duodenum was performed and retroflexed view of the proximal stomach was obtained. The scope was passed to the 2nd portion of the duodenum. Esophagus: normal.  Stomach: normal  Duodenum: two 14 or 15 mm chronic appearing ulcer(s) with a visible vessel located in the duodenal bulb. Biopsies  were not obtained. The lesion was  oozing blood; the lesion was treated with  bicap cautery      No contraindication to placement of the gastrostomy tube was appreciated. The site for placement of the gastrostomy tube was identified with palpation and   transillumination of the abdomen. The abdomen, having been prepared, was draped in a sterile fashion.  Local anesthesia

## 2020-04-15 NOTE — PROGRESS NOTES
 enoxaparin  40 mg Subcutaneous Daily    sodium chloride flush  10 mL Intravenous 2 times per day     Continuous Infusions:    IV infusion builder 75 mL/hr at 04/15/20 1121     PRN Meds: potassium chloride, loperamide, albuterol sulfate HFA, acetaminophen **OR** acetaminophen, magnesium hydroxide, nicotine, promethazine **OR** ondansetron, sodium chloride flush    Data:     Past Medical History:   has a past medical history of Arthritis, Cancer ([facility] [address] [facility] Chronic kidney disease, COPD (chronic obstructive pulmonary disease) ([facility] Hypertension, Pneumonia, Psychiatric problem, and Seizures ([facility] Social History:   reports that he has been smoking cigarettes. He started smoking about 64 years ago. He has a 60.00 pack-year smoking history. He has never used smokeless tobacco. He reports that he does not drink alcohol or use drugs. Family History:   Family History   Problem Relation Age of Onset    Arthritis Mother     Atrial Fibrillation Mother    Meridee Sadaf Hearing Loss Mother     Heart Disease Mother     Stroke Mother     Vision Loss Mother     Arthritis Father     Cancer Father     Heart Disease Father     High Blood Pressure Father     Stroke Father     Vision Loss Father        Vitals:  /77   Pulse 58   Temp 97.3 °F (36.3 °C) (Oral)   Resp 18   Ht 5' 11\" (1.803 m) Comment: FROM FLOOR  Wt 189 lb (85.7 kg) Comment: FROM FLOOR  SpO2 97%   BMI 26.36 kg/m²   Temp (24hrs), Av.5 °F (36.4 °C), Min:97 °F (36.1 °C), Max:97.9 °F (36.6 °C)    No results for input(s): POCGLU in the last 72 hours. I/O (24Hr):     Intake/Output Summary (Last 24 hours) at 4/15/2020 1345  Last data filed at 4/15/2020 1017  Gross per 24 hour   Intake 3358 ml   Output 2900 ml   Net 458 ml       Labs:  Hematology:  Recent Labs     20  0600   WBC 9.6   RBC 3.70*   HGB 11.2*   HCT 34.0*   MCV 91.9   MCH 30.3   MCHC 32.9   RDW 12.9      MPV 8.5 right common iliac artery. Xr Chest Portable    Result Date: 4/8/2020  1. Lower lung volumes without acute cardiopulmonary disease. Xr Chest Portable    Result Date: 4/7/2020  No acute cardiopulmonary process. Ct Chest Pulmonary Embolism W Contrast    Result Date: 4/7/2020  1. No acute pulmonary thromboembolic disease. 2.  Scattered bilateral basilar predominant heterogeneous pulmonary opacities are most pronounced in the left lower lobe. Findings may represent subsegmental atelectasis/scarring or a developing infectious/inflammatory process including aspiration pneumonitis. Prominent mediastinal and hilar lymph nodes may be reactive. 3.  Bilateral bronchial wall thickening suggests bronchitis. 4.  No acute abnormality in the abdomen or pelvis. 5.  Atherosclerosis. Stable right common iliac artery aneurysm and focal dissection in the distal right abdominal aorta which extends into the right common iliac artery. Physical Examination:        General appearance:  alert, cooperative and no distress  Mental Status:  oriented to person, place and time and normal affect  Lungs:  clear to auscultation bilaterally, normal effort  Heart:  regular rate and rhythm, no murmur  Abdomen:  soft, nontender, nondistended, normal bowel sounds, no masses, hepatomegaly, splenomegaly  Extremities:  no edema, redness, tenderness in the calves  Skin:  no gross lesions, rashes, induration    Assessment:        Hospital Problems           Last Modified POA    * (Principal) Aspiration pneumonia of both lower lobes ([address] 4/7/2020 Yes    Aortic dissection ([address] 4/7/2020 Yes    Other dysphagia 4/7/2020 Yes    Tobacco abuse 4/7/2020 Yes    Aspiration pneumonitis ([address] 4/8/2020 Yes          Plan:        Aspiration pneumonia bilateral lower lobes -patient is now off antibiotics. Grossly asymptomatic. Status post PEG tube placement  COVID19 negative  Hypokalemia -replaced.   Disposition -after patient gets PEG tube and

## 2020-04-15 NOTE — ANESTHESIA POSTPROCEDURE EVALUATION
Department of Anesthesiology  Postprocedure Note    Patient: Ora Hodgson  MRN: [id number]  YOB: 1945  Date of evaluation: 4/15/2020  Time:  11:13 AM     Procedure Summary     Date:  04/15/20 Room / Location:  [address] [place] [address]    Anesthesia Start:  0940 Anesthesia Stop:  1026    Procedures:       EGD CONTROL HEMORRHAGE      EGD ESOPHAGOGASTRODUODENOSCOPY PEG TUBE INSERTION Diagnosis:  (DYSPHAGIA)    Surgeon:  Faye Sloan MD Responsible Provider:  Lorrie Jimenez MD    Anesthesia Type:  general ASA Status:  4          Anesthesia Type: general    Chato Phase I: Chato Score: 10    Chato Phase II:      Last vitals: Reviewed and per EMR flowsheets.        Anesthesia Post Evaluation    Patient location during evaluation: PACU  Patient participation: complete - patient participated  Level of consciousness: awake  Pain score: 1  Airway patency: patent  Nausea & Vomiting: no nausea and no vomiting  Complications: no  Cardiovascular status: blood pressure returned to baseline and hemodynamically stable  Respiratory status: acceptable  Hydration status: euvolemic

## 2020-04-15 NOTE — PROGRESS NOTES
Palliative Care Progress Note    NAME:  Veronika Khan  MEDICAL RECORD NUMBER:  [id number]  AGE: 69 y.o. GENDER: male  : 1945  TODAY'S DATE:  4/15/2020    Reason for Consult:  goals of care  History of Present Illness     The patient is a 69 y.o. Non-/non  male who presents with No chief complaint on file. Referred to Palliative Care by  ?x Physician   ? Nursing  ? Family Request   ? Other:       He was admitted to the internal medicine service for Pneumonia [J18.9]  Aspiration pneumonitis ([address] [J69.0]. His hospital course has been associated with shortness of breath. The patient has a complicated medical history and has been hospitalized since 2020 10:01 AM.    OVERNIGHT EVENTS:  No acute events overnight  Patient got PEG tube placed today  Patient tolerated the procedure well  Patient hemodynamically stable     /69   Pulse 58   Temp 97.5 °F (36.4 °C) (Temporal)   Resp 19   Ht 5' 11\" (1.803 m) Comment: FROM FLOOR  Wt 189 lb (85.7 kg) Comment: FROM FLOOR  SpO2 98%   BMI 26.36 kg/m²     Assessment        REVIEW OF SYSTEMS    ? UNABLE TO OBTAIN:     Constitutional:  ?   Chills   ?x  Fatigue   ? Fevers   ? Malaise   ? Weight loss   ?x Other: Tiredness    Respiratory:   ?  Cough    ? Shortness of breath    ? Chest pain    ? Other:     Cardiovascular:   ?  Chest pain  ? Dyspnea    ? Exertional chest pressure/discomfort     ? Fatigue      ? Palpitations    ? Syncope   ? Other:     Gastrointestinal:   ?  Abdominal pain   ? Constipation    ? Diarrhea    ? Dysphagia   ? Reflux             ? Vomiting   ? Other:     Genitourinary:  ?  Dysuria     ? Frequency   ? Hematuria   ? Nocturia   ? Urinary incontinence   ? Other:     Musculoskeletal:   ? Back pain    ? Muscle weakness   ? Myalgias    ? Neck pain   ? Stiff joints   ? Other:     Behavioral/Psych:   ? Anxiety    ? Depression     ? Mood swings   ?  Other:     PHYSICAL ASSESSMENT:     General: ?x AM    Palliative care office: [phone]

## 2020-04-15 NOTE — ANESTHESIA PRE PROCEDURE
Base) MCG/ACT inhaler 2 puff  2 puff Inhalation Q6H PRN Valaria Sessions, APRN - CNP        acetaminophen (TYLENOL) tablet 650 mg  650 mg Oral Q6H PRN Valaria Sessions, APRN - CNP   650 mg at 04/09/20 2208    Or    acetaminophen (TYLENOL) suppository 650 mg  650 mg Rectal Q6H PRN Valaria Sessions, APRN - CNP   650 mg at 04/07/20 1532    enoxaparin (LOVENOX) injection 40 mg  40 mg Subcutaneous Daily Valaria Sessions, APRN - CNP   40 mg at 04/13/20 0926    magnesium hydroxide (MILK OF MAGNESIA) 400 MG/5ML suspension 30 mL  30 mL Oral Daily PRN Valaria Sessions, APRN - CNP        nicotine (NICODERM CQ) 21 MG/24HR 1 patch  1 patch Transdermal Daily PRN Valaria Sessions, APRN - CNP        promethazine (PHENERGAN) tablet 12.5 mg  12.5 mg Oral Q6H PRN Valaria Sessions, APRN - CNP        Or    ondansetron [facility]) injection 4 mg  4 mg Intravenous Q6H PRN Valaria Sessions, APRN - CNP        sodium chloride flush 0.9 % injection 10 mL  10 mL Intravenous 2 times per day Valaria Sessions, APRN - CNP   10 mL at 04/13/20 2011    sodium chloride flush 0.9 % injection 10 mL  10 mL Intravenous PRN Valaria Sessions, APRN - CNP           Allergies:     Allergies   Allergen Reactions    Bactrim [Sulfamethoxazole-Trimethoprim] Hives and Swelling    Ciprofloxacin Swelling     FACE       Problem List:    Patient Active Problem List   Diagnosis Code    Constipation K59.00    Change in bowel habits R19.4    Rectal bleeding K62.5    Aortic dissection ([facility]) I71.00    Bradycardia R00.1    Other dysphagia R13.19    Enteritis K52.9    Aspiration pneumonia of both lower lobes ([facility]) J69.0    Tobacco abuse Z72.0    Aspiration pneumonitis ([facility]) J69.0       Past Medical History:        Diagnosis Date    Arthritis     Cancer [facility])     bladder 1980s    Cerebral artery occlusion with cerebral infarction [facility])     TIA 2010    Chronic kidney disease     COPD (chronic obstructive pulmonary disease) ([facility])     Hypertension     Pneumonia     Psychiatric problem are most pronounced in the left lower lobe.  Findings may represent   subsegmental atelectasis/scarring or a developing infectious/inflammatory   process including aspiration pneumonitis.  Prominent mediastinal and hilar   lymph nodes may be reactive.       3.  Bilateral bronchial wall thickening suggests bronchitis.       4.  No acute abnormality in the abdomen or pelvis.       5.  Atherosclerosis. [facility] right common iliac artery aneurysm and focal   dissection in the distal right abdominal aorta which extends into the right   common iliac artery.              Maksim Fields MD   4/15/2020

## 2020-04-16 PROBLEM — Z93.1 STATUS POST INSERTION OF PERCUTANEOUS ENDOSCOPIC GASTROSTOMY (PEG) TUBE (HCC): Status: ACTIVE | Noted: 2020-04-16

## 2020-04-16 LAB
ANION GAP SERPL CALCULATED.3IONS-SCNC: 11 MMOL/L (ref 9–17)
BUN BLDV-MCNC: 19 MG/DL (ref 8–23)
BUN/CREAT BLD: ABNORMAL (ref 9–20)
CALCIUM SERPL-MCNC: 8.8 MG/DL (ref 8.6–10.4)
CHLORIDE BLD-SCNC: 106 MMOL/L (ref 98–107)
CO2: 23 MMOL/L (ref 20–31)
CREAT SERPL-MCNC: 0.83 MG/DL (ref 0.7–1.2)
GFR AFRICAN AMERICAN: >60 ML/MIN
GFR NON-AFRICAN AMERICAN: >60 ML/MIN
GFR SERPL CREATININE-BSD FRML MDRD: ABNORMAL ML/MIN/{1.73_M2}
GFR SERPL CREATININE-BSD FRML MDRD: ABNORMAL ML/MIN/{1.73_M2}
GLUCOSE BLD-MCNC: 130 MG/DL (ref 75–110)
GLUCOSE BLD-MCNC: 143 MG/DL (ref 70–99)
POTASSIUM SERPL-SCNC: 4.1 MMOL/L (ref 3.7–5.3)
SODIUM BLD-SCNC: 140 MMOL/L (ref 135–144)

## 2020-04-16 PROCEDURE — 82947 ASSAY GLUCOSE BLOOD QUANT: CPT

## 2020-04-16 PROCEDURE — 2580000003 HC RX 258: Performed by: INTERNAL MEDICINE

## 2020-04-16 PROCEDURE — 99231 SBSQ HOSP IP/OBS SF/LOW 25: CPT | Performed by: INTERNAL MEDICINE

## 2020-04-16 PROCEDURE — 80048 BASIC METABOLIC PNL TOTAL CA: CPT

## 2020-04-16 PROCEDURE — 99232 SBSQ HOSP IP/OBS MODERATE 35: CPT | Performed by: NURSE PRACTITIONER

## 2020-04-16 PROCEDURE — 6360000002 HC RX W HCPCS: Performed by: INTERNAL MEDICINE

## 2020-04-16 PROCEDURE — 99232 SBSQ HOSP IP/OBS MODERATE 35: CPT | Performed by: INTERNAL MEDICINE

## 2020-04-16 PROCEDURE — 36415 COLL VENOUS BLD VENIPUNCTURE: CPT

## 2020-04-16 PROCEDURE — 6370000000 HC RX 637 (ALT 250 FOR IP): Performed by: STUDENT IN AN ORGANIZED HEALTH CARE EDUCATION/TRAINING PROGRAM

## 2020-04-16 PROCEDURE — 97530 THERAPEUTIC ACTIVITIES: CPT

## 2020-04-16 PROCEDURE — 97110 THERAPEUTIC EXERCISES: CPT

## 2020-04-16 PROCEDURE — 1200000000 HC SEMI PRIVATE

## 2020-04-16 RX ORDER — LANSOPRAZOLE
30 KIT
Status: DISCONTINUED | OUTPATIENT
Start: 2020-04-16 | End: 2020-04-17 | Stop reason: HOSPADM

## 2020-04-16 RX ADMIN — LANSOPRAZOLE 30 MG: KIT at 10:16

## 2020-04-16 RX ADMIN — SODIUM CHLORIDE, PRESERVATIVE FREE 10 ML: 5 INJECTION INTRAVENOUS at 19:53

## 2020-04-16 RX ADMIN — ENOXAPARIN SODIUM 40 MG: 40 INJECTION SUBCUTANEOUS at 07:59

## 2020-04-16 RX ADMIN — POTASSIUM CHLORIDE: 2 INJECTION, SOLUTION, CONCENTRATE INTRAVENOUS at 00:27

## 2020-04-16 RX ADMIN — LANSOPRAZOLE 30 MG: KIT at 16:08

## 2020-04-16 ASSESSMENT — PAIN SCALES - GENERAL
PAINLEVEL_OUTOF10: 0

## 2020-04-16 ASSESSMENT — PAIN SCALES - WONG BAKER: WONGBAKER_NUMERICALRESPONSE: 0

## 2020-04-16 NOTE — DISCHARGE INSTR - COC
Continuity of Care Form    Patient Name: Samina Black   :    MRN:  [id number]    Admit date:  2020  Discharge date:  2020    Code Status Order: DNR-CCA   Advance Directives:   Advance Care Flowsheet Documentation     Date/Time Healthcare Directive Type of Healthcare Directive Copy in [address] Agent's Name Healthcare Agent's Phone Number    04/15/20 9851  Yes, patient has an advance directive for healthcare treatment  --  Yes, copy in chart  --  --  --    20 1356  Yes, patient has an advance directive for healthcare treatment completed today  Durable power of  for health care;Living will  Yes, copy in chart  Adult Children  -- Uriostegui Child  [id number]          Admitting Physician:  Burgess Windy DO  PCP: Alysia López MD    Discharging Nurse: [facility] Unit/Room#: 7077/7602-90  Discharging Unit Phone Number: [phone]    Emergency Contact:   Extended Emergency Contact Information  Primary Emergency Contact: [facility] , [address] [place] [address] Phone: [phone]  Mobile Phone: [phone]  Relation: Brother/Sister  [facility] Phone: [phone]  Relation: Child    Past Surgical History:  Past Surgical History:   Procedure Laterality Date    ABDOMEN SURGERY      BACK SURGERY      spinal surgery 2005     CAROTID ENDARTERECTOMY Left 2016    COLONOSCOPY N/A 2018    COLONOSCOPY POLYPECTOMY COLD BIOPSY performed by Wing Lopez MD at [address] [place] [address]      left face    GASTROSTOMY TUBE PLACEMENT  04/15/2020    HERNIA REPAIR      HIATAL HERNIA REPAIR      INGUINAL HERNIA REPAIR Bilateral     OTHER SURGICAL HISTORY      mesh infected in inguinal canal, had to remove. Removed testiscle at this time.      TONSILLECTOMY      UPPER GASTROINTESTINAL ENDOSCOPY  04/15/2020       EGD CONTROL HEMORRHAGE    UPPER GASTROINTESTINAL ENDOSCOPY  4/15/2020    EGD CONTROL HEMORRHAGE performed 2058 ml   Output 775 ml   Net 1283 ml     I/O last 3 completed shifts: In: 2498 [I.V.:1903; NG/GT:595]  Out: 1175 [Urine:1175]    Safety Concerns: At Risk for Falls and Aspiration Risk    Impairments/Disabilities:      Speech    Nutrition Therapy:  Current Nutrition Therapy:   - Tube Feedings:  Standard with fiber Goal: 70 ml/hr    Routes of Feeding: Gastrostomy Tube  Liquids: No Liquids  Daily Fluid Restriction: no  Last Modified Barium Swallow with Video (Video Swallowing Test): not done    Treatments at the Time of Hospital Discharge:   Respiratory Treatments: albuterol q6 prn wheezing  Oxygen Therapy:  is not on home oxygen therapy. Ventilator:    - No ventilator support    Rehab Therapies: Physical Therapy, Occupational Therapy and Speech/Language Therapy  Weight Bearing Status/Restrictions: No weight bearing restirctions  Other Medical Equipment (for information only, NOT a DME order):  walker  Other Treatments: skilled nursing assessment; medication administration through PEG tube; PT/OT; hospice    Patient's personal belongings (please select all that are sent with patient):  None    RN SIGNATURE:  Electronically signed by Tete Gao RN on 4/16/20 at 11:52 AM EDT    CASE MANAGEMENT/SOCIAL WORK SECTION    Inpatient Status Date:4-8  Readmission Risk Assessment Score:  Readmission Risk              Risk of Unplanned Readmission:        14           Discharging to Facility/ Agency   · Name:   · Address:  · Phone:  · Fax:    Dialysis Facility (if applicable)   · Name:  · Address:  · Dialysis Schedule:  · Phone:  · Fax:    / signature: Electronically signed by Sapphire Reina RN on 4/16/20 at 2:26 PM EDT    PHYSICIAN SECTION    Prognosis: Fair    Condition at Discharge: Stable    Rehab Potential (if transferring to Rehab):  Fair    Recommended Labs or Other Treatments After Discharge: tube feeds    Physician Certification: I certify the above information and transfer of [place] Ashley Miguel  is necessary for the continuing treatment of the diagnosis listed and that he requires home care with PT/OT and Home nursing visit for less 30 days.      Update Admission H&P: No change in H&P    PHYSICIAN SIGNATURE:  Electronically signed by Sandeep Pettit MD on 4/16/20 at 11:47 AM EDT

## 2020-04-16 NOTE — PROGRESS NOTES
Screening  Patient Currently in Pain: Denies  Vital Signs  Patient Currently in Pain: Denies       Orientation  Orientation  Overall Orientation Status: Within Normal Limits  Cognition      Objective   Bed mobility  Rolling to Left: Moderate assistance  Supine to Sit: Moderate assistance  Sit to Supine: Moderate assistance  Scooting: Moderate assistance  Comment: HOB elevated, increased time to complete  Transfers  Sit to Stand: Maximum Assistance  Stand to sit: Maximum Assistance  Comment: Attempted x 3 trials, unable to obtain full standing position depsite MAX A. Ambulation  Ambulation?: No(unable to fully stand)     Balance  Posture: Fair  Sitting - Static: Good  Sitting - Dynamic: Fair;+  Standing - Static: Fair;-  Standing - Dynamic: Fair;-    Exercise  Seated LE exercise program: Long Arc Quads, hip abduction/adduction, heel/toe raises, and marches. Reps: 10x        Goals  Short term goals  Time Frame for Short term goals: 10 visits  Short term goal 1: Pt will be SBA bed mobility  Short term goal 2: Pt will be SBA transfers  Short term goal 3: Pt will be Mayank amb 20' RW  Short term goal 4: Pt will be safe to attempt steps. Plan    Plan  Times per week: 5-6x/wk  Current Treatment Recommendations: Strengthening, Balance Training, Functional Mobility Training, Endurance Training, Transfer Training, Gait Training, Home Exercise Program, Safety Education & Training, Patient/Caregiver Education & Training, Equipment Evaluation, Education, & procurement  Safety Devices  Type of devices:  All fall risk precautions in place, Call light within reach, Gait belt, Patient at risk for falls, Left in bed, Nurse notified, Bed alarm in place  Restraints  Initially in place: No     Therapy Time   Individual Concurrent Group Co-treatment   Time In 1439         Time Out 1506         Minutes 27         Timed Code Treatment Minutes: 213 [facility] [place]

## 2020-04-16 NOTE — PROGRESS NOTES
Agnieszka Clayton [address]    Progress Note    4/16/2020    4:54 PM    Name:   Deanna Tipton  MRN:     [id number]     Acct:      [de-identified]   Room:   [address] [place] [address] Day:  8  Admit Date:  4/7/2020 10:01 AM    PCP:   Karis Calvin MD  Code Status:  DNR-CCA    Subjective:     C/C: Aspiration     Interval History Status: not changed. Patient was seen and examined at bedside. Reports feeling better today. Brief History:     Per record:     Moose Clark is a 69 y.o.  male who presents with No chief complaint on file.   and is admitted to the hospital for the management of Aspiration pneumonia of both lower lobes ([address]    This 69 yom presents with reportedly sob and headache. [facility] was just discharged yesterday from [facility] with enteritis and aortic aneurysm which were to be addressed as outpatient. [facility] had failed a swallow study and peg recommended but family refused. Duarte Snare were counseled on how he would likely aspirate. [facility] returned to [facility] with sob.  Patient cannot provide any history as he mumbles unintelligibly-I am told this is his baseline. Review of Systems:     Constitutional:  negative for chills, fevers, sweats  Respiratory:  negative for cough, dyspnea on exertion, shortness of breath, wheezing  Cardiovascular:  negative for chest pain, chest pressure/discomfort, lower extremity edema, palpitations  Gastrointestinal:  negative for abdominal pain, constipation, diarrhea, nausea, vomiting  Neurological:  negative for dizziness, headache    Medications: Allergies:     Allergies   Allergen Reactions    Bactrim [Sulfamethoxazole-Trimethoprim] Hives and Swelling    Ciprofloxacin Swelling     FACE       Current Meds:   Scheduled Meds:    lansoprazole  30 mg Per G Tube BID AC    enoxaparin  40 mg Subcutaneous Daily    sodium chloride flush  10 mL Intravenous 2 times per day     Continuous Infusions:     PRN Meds:

## 2020-04-16 NOTE — PROGRESS NOTES
THE [facility] Progress Note    aRfael Calle is a 69 y.o. male patient. Hospitalization Day:8      Chief consult reason: PEG placement for dysphagia    Subjective: NO acute overnight issues. Pt has no complaints. Underwent successful PEG placement yesterday. Tolerating TFs at goal rate. VITALS:  BP (!) 96/53   Pulse 54   Temp 97.3 °F (36.3 °C) (Oral)   Resp 17   Ht 5' 11\" (1.803 m) Comment: FROM FLOOR  Wt 188 lb 4.8 oz (85.4 kg)   SpO2 92%   BMI 26.26 kg/m²   TEMPERATURE:  Current - Temp: 97.3 °F (36.3 °C); Max - Temp  Av.3 °F (36.3 °C)  Min: 96.8 °F (36 °C)  Max: 97.7 °F (36.5 °C)    Physical Assessment:  General appearance:  alert, cooperative and no distress  Mental Status:  oriented to person, place and time and normal affect  Lungs:  clear to auscultation bilaterally, normal effort  Heart:  regular rate and rhythm, no murmur  Abdomen:  soft, nontender, nondistended, normal bowel sounds, no masses, hepatomegaly, splenomegaly  Extremities:  no edema, redness, tenderness in the calves  Skin:  warm, dry, no gross lesions or rashes    Data Review:  LABS and IMAGING:     CBC  Recent Labs     20  0600   WBC 9.6   HGB 11.2*   MCV 91.9   RDW 12.9          Immature PLTs   No results found for: PLTFLUORE    ANEMIA STUDIES  No results for input(s): LABIRON, TIBC, IRON, FERRITIN, CATXVDKN44, FOLATE, OCCULTBLD in the last 72 hours. BMP  Recent Labs     20  0600 20  1648 04/15/20  0251     --   --    K 2.7* 3.5* 4.0     --   --    CO2 24  --   --    BUN 8  --   --    CREATININE 0.71  --   --    GLUCOSE 90  --   --    CALCIUM 8.8  --   --        LFTS  No results for input(s): ALKPHOS, ALT, AST, BILITOT, BILIDIR, LABALBU in the last 72 hours. AMYLASE/LIPASE/AMMONIA  No results for input(s): AMYLASE, LIPASE, AMMONIA in the last 72 hours.     Acute Hepatitis Panel   No results found for: HEPBSAG, HEPCAB, HEPBIGM, HEPAIGM    HCV Genotype   No results found

## 2020-04-16 NOTE — CARE COORDINATION
Transitional planning. Transportation request sent to [employer]. Stated it would be late, around 8 or 9:00pm. Danette Martinezs at [email or web address] and notified of pts discharge and she states [facility] will handle the tube feedings. [facility]. to [facility], admissions for [facility], and they will start working on pts arrival home but cannot get needed equipment tonight. (ie pump for tube feeds, etc.)She said she can have a nurse out there by 11:30 tomorrow morning. [facility] called back and notified pt can be picked up at 10:45 AM tomorrow morning. RN aware. 0 [facility] called and notified of time of discharge tomorrow. [employer] [facility] [facility], at [address] [place] [address], asked if it would be OK to send the nurse at 1:00pm due to their covid screening. Told her I would call back. Called sister Bree Mccoy and notified of pts time home tomorrow. She or pts son will be with pt 24/7. Called [employer] back and said it was OK for 1:00pm arrival tomorrow. Called Rupal with [employer]. She will call [employer] and make sure they do the tube feeds and also supply the food. 1600 Faxed AVS,MAR,H&P to Carlene Guidry at [phone]. Cinthia Swanson from [employer] calls back and [facility] will be supplying tube feeds. They are requesting 24 hours of tube feeds with pt tomorrow. Spoke with RN and charge nurse regarding this. Left message with Jhoana at [address] of the above and to pass on to tomorrow's CM if she will authorize.

## 2020-04-16 NOTE — PLAN OF CARE
Problem: Falls - Risk of:  Goal: Will remain free from falls  Description: Will remain free from falls  4/16/2020 0603 by Erwin Herbert RN  Outcome: Met This Shift  4/15/2020 1604 by Johan Farias RN  Outcome: Ongoing  Goal: Absence of physical injury  Description: Absence of physical injury  4/16/2020 0603 by Erwin Herbert RN  Outcome: Met This Shift  4/15/2020 1604 by Johan Farias RN  Outcome: Ongoing     Problem: Nutritional:  Goal: Ability to tolerate tube feedings without aspirating will improve  Description: Ability to tolerate tube feedings without aspirating will improve  Outcome: Met This Shift     Problem: Respiratory:  Goal: Ability to maintain a clear airway will improve  Description: Ability to maintain a clear airway will improve  Outcome: Met This Shift  Goal: Absence of aspiration  Description: Absence of aspiration  Outcome: Met This Shift     Problem: Pain:  Goal: Pain level will decrease  Description: Pain level will decrease  4/16/2020 0603 by Erwin Herbert RN  Outcome: Ongoing  4/15/2020 1604 by Johan Farias RN  Outcome: Ongoing  Goal: Control of acute pain  Description: Control of acute pain  4/16/2020 0603 by Erwin Herbert RN  Outcome: Ongoing  4/15/2020 1604 by Johan Farias RN  Outcome: Ongoing  Goal: Control of chronic pain  Description: Control of chronic pain  4/16/2020 0603 by Erwin Herbert RN  Outcome: Ongoing  4/15/2020 1604 by Johan Farias RN  Outcome: Ongoing     Problem: Musculor/Skeletal Functional Status  Goal: Highest potential functional level  4/15/2020 1604 by Johan Farias RN  Outcome: Ongoing     Problem: Nutrition  Goal: Optimal nutrition therapy  4/15/2020 1604 by Johan Farias RN  Outcome: Ongoing     Problem: Respiratory:  Goal: Will remain free from infection  Description: Will remain free from infection  Outcome: Ongoing     Problem: Safety:  Goal: Ability to demonstrate good, daily oral hygiene techniques will improve  Description:

## 2020-04-16 NOTE — PROGRESS NOTES
Palpitations    []  Syncope   [x] Other: no edema  Gastrointestinal:   []  Abdominal pain   []  Constipation    []  Diarrhea    []   Dysphagia   []  Reflux             []  Vomiting   [x] Other: Peg tube; Abdominal binder for comfort  Genitourinary:  []  Dysuria     []  Frequency   []  Hematuria   [] Nocturia   []  Urinary incontinence   [x] Other: Patient uses urinal   Musculoskeletal:   [] Back pain    []  Muscle weakness   []  Myalgias    []  Neck pain   []  Stiff joints   [x]  Other: generalized weakness  Behavioral/Psych:   [] Anxiety    []  Depression     []  Mood swings   [] Other:     PHYSICAL ASSESSMENT:     General: [x]  Oriented x3      [] well appearing      [] Intubated      [] ill appearing      [x] Other:  Forgetful slightly  Mental Status: [x] normal mental status exam      [x] drowsy      [] Confused      [] Other:     Cardiovascular: [x]  Regular rate/rhythm      [] Arrhythmia      [] Other:     Chest: [x] Effort normal      [x] lungs clear      [] respiratory distress      [] Tachypnea      []  Other:    Abdomen: [x] Soft/non-tender      []  Normal appearance      [] Distended      [] Ascites      [x] Other:Abdominal binder covering Peg Tube     Neurological: [] Normal Speech      [x] Normal Sensation      []  Deficits present: Patient with mumbled speech    Extremity:  [x] normal skin color/temp      [] clubbing/cyanosis      [x]  No edema      [] Other:     Palliative Performance Scale:  ___60%  Ambulation reduced; Significant disease; Can't do hobbies/housework; intake normal or reduced; occasional assist; LOC full/confusion  ___50%  Mainly sit/lie; Extensive disease; Can't do any work; Considerable assist; intake normal or reduced; LOC full/confusion  ___40%  Mainly in bed; Extensive disease; Mainly assist; intake normal or reduced; LOC full/confusion   _x__30%  Bed Bound; Extensive disease; Total care; intake reduced; LOCfull/confusion  ___20%  Bed Bound; Extensive disease;  Total care; intake

## 2020-04-17 VITALS
HEART RATE: 58 BPM | OXYGEN SATURATION: 96 % | BODY MASS INDEX: 26.46 KG/M2 | HEIGHT: 71 IN | WEIGHT: 189 LBS | TEMPERATURE: 97.6 F | SYSTOLIC BLOOD PRESSURE: 113 MMHG | RESPIRATION RATE: 16 BRPM | DIASTOLIC BLOOD PRESSURE: 64 MMHG

## 2020-04-17 PROCEDURE — 97535 SELF CARE MNGMENT TRAINING: CPT

## 2020-04-17 PROCEDURE — 6360000002 HC RX W HCPCS: Performed by: INTERNAL MEDICINE

## 2020-04-17 PROCEDURE — 6370000000 HC RX 637 (ALT 250 FOR IP): Performed by: STUDENT IN AN ORGANIZED HEALTH CARE EDUCATION/TRAINING PROGRAM

## 2020-04-17 PROCEDURE — 99239 HOSP IP/OBS DSCHRG MGMT >30: CPT | Performed by: INTERNAL MEDICINE

## 2020-04-17 RX ADMIN — ENOXAPARIN SODIUM 40 MG: 40 INJECTION SUBCUTANEOUS at 09:28

## 2020-04-17 RX ADMIN — LANSOPRAZOLE 30 MG: KIT at 09:28

## 2020-04-17 ASSESSMENT — PAIN SCALES - GENERAL
PAINLEVEL_OUTOF10: 0
PAINLEVEL_OUTOF10: 0

## 2020-04-17 NOTE — PROGRESS NOTES
w/ fiber) @ 70 mL/hr x 24 hrs ~> 2016 kcals, 93 gms protein  · Goal TF & Flush Orders Provides: Jevity 1.2 (Standard w/ fiber) @ 70 mL/hr x 24 hrs ~> 2016 kcals, 93 gms protein  · Anthropometric Measures:  · Ht: 5' 11\" (180.3 cm)(FROM FLOOR)   · Current Body Wt: 189 lb (85.7 kg)  · Admission Body Wt: 192 lb (87.1 kg)  · Weight Change: 1.6% wt loss x 1 wk   · Ideal Body Wt: 172 lb (78 kg), % Ideal Body 112% adm/ideal  · BMI Classification: BMI 25.0 - 29.9 Overweight    Nutrition Interventions:   Continue NPO, Continue current Tube Feeding  Continued Inpatient Monitoring, Education Not Indicated    Nutrition Evaluation:   · Evaluation: Goal achieved   · Goals: Start EN within 24-72 hrs    · Monitoring: Nutrition Progression, TF Intake, TF Tolerance, I&O, Weight, Pertinent Labs, Monitor Bowel Function      Electronically signed by Finn Catalan RD, LD on 4/17/20 at 11:10 AM EDT    Contact Number: [phone]

## 2020-04-17 NOTE — PROGRESS NOTES
time;Follows one step commands with repetition  Safety Judgement: Decreased awareness of need for safety;Decreased awareness of need for assistance  Insights: Decreased awareness of deficits  Initiation: Requires cues for some  Sequencing: Requires cues for some  Cognition Comment: garbled speech, per chart, baseline       Plan   Plan  Times per week: 3-4x/wk   Current Treatment Recommendations: Functional Mobility Training, Endurance Training, Safety Education & Training, Patient/Caregiver Education & Training, Equipment Evaluation, Education, & procurement, Self-Care / ADL    Goals  Short term goals  Time Frame for Short term goals: by discharge, pt will  Short term goal 1: demo CGA in UE ADL activities with set up  Short term goal 2: demo mod A for LE ADL activities with set up  Short term goal 3: demo increased activity tolerance of 20+ min with good sitting balance to assist with ADL/ functional activities   Short term goal 4: demo understanding and I use of ECWS, fall prevention tech during functional activities   Short term goal 5: demo min A for functional transfers to assist with ADL/ functional activities        Therapy Time   Individual Concurrent Group Co-treatment   Time In 0952         Time Out 1016         Minutes 24         Timed Code Treatment Minutes: 24 Minutes       Nate Garcia OTR/L

## 2020-04-18 ENCOUNTER — CARE COORDINATION (OUTPATIENT)
Dept: CASE MANAGEMENT | Age: 75
End: 2020-04-18

## 2020-05-19 ENCOUNTER — HOSPITAL ENCOUNTER (OUTPATIENT)
Age: 75
Setting detail: SPECIMEN
Discharge: HOME | End: 2020-05-19
Payer: COMMERCIAL

## 2020-05-19 LAB
-: ABNORMAL
AMORPHOUS: ABNORMAL
BACTERIA: ABNORMAL
BILIRUBIN URINE: NEGATIVE
CASTS UA: ABNORMAL /LPF
COLOR: YELLOW
COMMENT UA: ABNORMAL
CRYSTALS, UA: ABNORMAL /HPF
EPITHELIAL CELLS UA: ABNORMAL /HPF
GLUCOSE URINE: NEGATIVE
KETONES, URINE: NEGATIVE
LEUKOCYTE ESTERASE, URINE: ABNORMAL
MUCUS: ABNORMAL
NITRITE, URINE: NEGATIVE
OTHER OBSERVATIONS UA: ABNORMAL
PH UA: 8 (ref 5–8)
PROTEIN UA: ABNORMAL
RBC UA: ABNORMAL /HPF
RENAL EPITHELIAL, UA: ABNORMAL /HPF
SPECIFIC GRAVITY UA: 1.01 (ref 1–1.03)
TRICHOMONAS: ABNORMAL
TURBIDITY: ABNORMAL
URINE HGB: ABNORMAL
UROBILINOGEN, URINE: NORMAL
WBC UA: ABNORMAL /HPF
YEAST: ABNORMAL

## 2020-05-19 PROCEDURE — 81001 URINALYSIS AUTO W/SCOPE: CPT

## 2020-05-19 PROCEDURE — 87077 CULTURE AEROBIC IDENTIFY: CPT

## 2020-05-19 PROCEDURE — 87086 URINE CULTURE/COLONY COUNT: CPT

## 2020-05-19 PROCEDURE — 87186 SC STD MICRODIL/AGAR DIL: CPT

## 2020-05-20 LAB
CULTURE: ABNORMAL
Lab: ABNORMAL
SPECIMEN DESCRIPTION: ABNORMAL

## 2020-08-10 ENCOUNTER — HOSPITAL ENCOUNTER (OUTPATIENT)
Age: 75
Setting detail: SPECIMEN
Discharge: HOME | End: 2020-08-10
Payer: COMMERCIAL

## 2020-08-10 LAB
C DIFFICILE TOXINS, PCR: ABNORMAL
CAMPYLOBACTER PCR: NORMAL
E COLI ENTEROTOXIGENIC PCR: NORMAL
LACTOFERRIN, QUAL: ABNORMAL
PLESIOMONAS SHIGELLOIDES PCR: NORMAL
SALMONELLA PCR: NORMAL
SHIGATOXIN GENE PCR: NORMAL
SHIGELLA SP PCR: NORMAL
SPECIMEN DESCRIPTION: ABNORMAL
SPECIMEN DESCRIPTION: NORMAL
VIBRIO PCR: NORMAL
YERSINIA ENTEROCOLITICA PCR: NORMAL

## 2020-08-10 PROCEDURE — 87506 IADNA-DNA/RNA PROBE TQ 6-11: CPT

## 2020-08-10 PROCEDURE — 87493 C DIFF AMPLIFIED PROBE: CPT

## 2020-08-10 PROCEDURE — 83630 LACTOFERRIN FECAL (QUAL): CPT

## 2020-09-22 ENCOUNTER — HOSPITAL ENCOUNTER (OUTPATIENT)
Age: 75
Setting detail: SPECIMEN
Discharge: HOME | End: 2020-09-22
Payer: COMMERCIAL

## 2020-09-22 PROCEDURE — 87493 C DIFF AMPLIFIED PROBE: CPT

## 2020-09-23 LAB
C DIFFICILE TOXINS, PCR: ABNORMAL
SPECIMEN DESCRIPTION: ABNORMAL

## 2020-10-19 ENCOUNTER — HOSPITAL ENCOUNTER (OUTPATIENT)
Age: 75
Setting detail: SPECIMEN
Discharge: HOME | End: 2020-10-19
Payer: COMMERCIAL

## 2020-10-19 PROCEDURE — 87493 C DIFF AMPLIFIED PROBE: CPT

## 2020-10-20 LAB
C DIFFICILE TOXINS, PCR: ABNORMAL
SPECIMEN DESCRIPTION: ABNORMAL

## 2020-12-10 ENCOUNTER — HOSPITAL ENCOUNTER (OUTPATIENT)
Age: 75
Setting detail: SPECIMEN
Discharge: HOME | End: 2020-12-10
Payer: COMMERCIAL

## 2020-12-10 PROCEDURE — 87493 C DIFF AMPLIFIED PROBE: CPT

## 2020-12-11 LAB
C DIFFICILE TOXINS, PCR: ABNORMAL
SPECIMEN DESCRIPTION: ABNORMAL

## 2021-01-21 ENCOUNTER — HOSPITAL ENCOUNTER (OUTPATIENT)
Age: 76
Setting detail: SPECIMEN
Discharge: HOME | End: 2021-01-21
Payer: COMMERCIAL

## 2021-01-21 LAB
C DIFF AG + TOXIN: ABNORMAL
SPECIMEN DESCRIPTION: ABNORMAL

## 2021-01-21 PROCEDURE — 87449 NOS EACH ORGANISM AG IA: CPT

## 2021-01-21 PROCEDURE — 87493 C DIFF AMPLIFIED PROBE: CPT

## 2021-01-21 PROCEDURE — 87324 CLOSTRIDIUM AG IA: CPT

## 2021-01-22 LAB
C DIFFICILE TOXINS, PCR: ABNORMAL
SPECIMEN DESCRIPTION: ABNORMAL

## 2021-02-08 ENCOUNTER — TELEPHONE (OUTPATIENT)
Dept: GASTROENTEROLOGY | Age: 76
End: 2021-02-08

## 2021-02-08 ENCOUNTER — VIRTUAL VISIT (OUTPATIENT)
Dept: GASTROENTEROLOGY | Age: 76
End: 2021-02-08
Payer: COMMERCIAL

## 2021-02-08 DIAGNOSIS — Z43.1 ATTENTION TO G-TUBE (HCC): Primary | ICD-10-CM

## 2021-02-08 PROCEDURE — 99212 OFFICE O/P EST SF 10 MIN: CPT | Performed by: INTERNAL MEDICINE

## 2021-02-08 ASSESSMENT — ENCOUNTER SYMPTOMS
RECTAL PAIN: 0
CHOKING: 0
SORE THROAT: 0
SHORTNESS OF BREATH: 0
NAUSEA: 0
CONSTIPATION: 0
BLOOD IN STOOL: 0
ANAL BLEEDING: 0
APNEA: 0
COUGH: 0
ABDOMINAL PAIN: 1
VOMITING: 0
VOICE CHANGE: 0
BACK PAIN: 0
ABDOMINAL DISTENTION: 0
TROUBLE SWALLOWING: 1
WHEEZING: 0
DIARRHEA: 1

## 2021-02-08 NOTE — PROGRESS NOTES
2021    TELEHEALTH EVALUATION -- Audio/Visual (During IKJDE-73 public health emergency)    HPI:    Chelsey Warren (:  1945) has requested an audio/video evaluation for the following concern(s): This is a video/audio visit. Patient is bedridden. He depends on G-tube for feedings. Patient had a G-tube placed in 2020. He was tolerating feedings decently. However recently then noticed some some hematochezia around the fistula site. For this reason this video visit was arranged. I did inspect the G-tube site through the video. At present no obvious blood noted. Basing on the information given to be that the external retainer is at the brenda 4 cm on the shaft. Basing on the operative report on 4/15/2020 the bumper was placed at about 4.5 cm brenda. Basing on this information, the G-tube appears to be in place. Basing on the video appearance the shaft of the G-tube appears to be good. Also the attendant present the G-tube site with finger and there is no significant tenderness. No cellulitis noted around the G-tube. Patient has been tolerating continuous feedings. Also the attendant stated to me that the nurse practitioner going to visit this patient at home today. Review of Systems   Constitutional: Negative for appetite change, fatigue and unexpected weight change. HENT: Positive for trouble swallowing. Negative for sore throat and voice change. Eyes: Negative for visual disturbance. Respiratory: Negative for apnea, cough, choking, shortness of breath and wheezing. Cardiovascular: Negative for chest pain, palpitations and leg swelling. Gastrointestinal: Positive for abdominal pain (burning feeling) and diarrhea. Negative for abdominal distention, anal bleeding, blood in stool, constipation, nausea, rectal pain and vomiting. Genitourinary: Negative for difficulty urinating. Musculoskeletal: Positive for gait problem (bed bound). Negative for arthralgias, back pain and neck pain. Neurological: Negative for dizziness, weakness, light-headedness, numbness and headaches. Hematological: Does not bruise/bleed easily. Psychiatric/Behavioral: Positive for sleep disturbance. The patient is nervous/anxious. Prior to Visit Medications    Medication Sig Taking? Authorizing Provider   theophylline 80 MG/15ML elixir Take 18.8 mLs by mouth every 8 hours Yes Russell Fernandez MD   furosemide (LASIX) 20 MG tablet Take 2 tablets by mouth daily Yes Govind Benavides MD   potassium chloride (KLOR-CON M) 20 MEQ extended release tablet Take 1 tablet by mouth daily Yes Govind Benavides MD   lisinopril-hydrochlorothiazide (PRINZIDE;ZESTORETIC) 10-12.5 MG per tablet Take 1 tablet by mouth daily Yes Govind Benavides MD   atorvastatin (LIPITOR) 40 MG tablet Take 1 tablet by mouth daily Yes Govind Benavides MD   albuterol sulfate  (90 Base) MCG/ACT inhaler Inhale 2 puffs into the lungs every 6 hours as needed for Wheezing Yes Govind Benavides MD   gabapentin (NEURONTIN) 800 MG tablet Take 800 mg by mouth 3 times daily.   Yes Historical Provider, MD   aspirin 81 MG EC tablet Take 1 tablet by mouth daily Yes Hortencia Nettles MD       Social History     Tobacco Use    Smoking status: Current Every Day Smoker     Packs/day: 1.00     Years: 60.00     Pack years: 60.00     Types: Cigarettes     Start date: 1956    Smokeless tobacco: Never Used   Substance Use Topics    Alcohol use: No    Drug use: No        PHYSICAL EXAMINATION:  [ INSTRUCTIONS:  \"[x]\" Indicates a positive item  \"[]\" Indicates a negative item  -- DELETE ALL ITEMS NOT EXAMINED]  Vital Signs: (As obtained by patient/caregiver or practitioner observation)    Blood pressure-  Heart rate-    Respiratory rate-    Temperature-  Pulse oximetry-     Constitutional: [x] Patient appears to be overweight.    [] Abnormal- Mental status patient is not able to give satisfactory verbal responses. [x] Alert and awake  [x] Oriented to person/place/time []Able to follow commands      Eyes:  EOM    [x]  Normal  [] Abnormal-  Sclera  []  Normal  [] Abnormal -         Discharge []  None visible  [] Abnormal -    HENT:   [] Normocephalic, atraumatic. [] Abnormal   [] Mouth/Throat: Mucous membranes are moist.     External Ears [] Normal  [] Abnormal-     Neck: [] No visualized mass     Pulmonary/Chest: [] Respiratory effort normal.  [] No visualized signs of difficulty breathing or respiratory distress        [] Abnormal-      Musculoskeletal:   [] Normal gait with no signs of ataxia         [] Normal range of motion of neck        [] Abnormal-     Patient is bedridden. Neurological:        [] No Facial Asymmetry (Cranial nerve 7 motor function) (limited exam to video visit)          [] No gaze palsy        [] Abnormal-         Skin:        [] No significant exanthematous lesions or discoloration noted on facial skin         [] Abnormal-            Psychiatric:       [] Normal Affect [] No Hallucinations        [] Abnormal-     Other pertinent observable physical exam findings-     ASSESSMENT/PLAN:  Basing on the evaluation no signs of bleeding at the G-tube site. At present patient is tolerating continuous feedings    If he develops fever or any cellulitis around the G-tube site, redness around the G-tube site swelling around the G-tube site to contact me immediately or bring him to the emergency department. Also to watch whether patient develops fever abdominal distention etc. and to be in touch with medical provider. Ken Fall is a 77 y.o. male being evaluated by a Virtual Visit (video visit) encounter to address concerns as mentioned above. A caregiver was present when appropriate. Due to this being a TeleHealth encounter (During AMOLK-70 public health emergency), evaluation of the following organ systems was limited: Vitals/Constitutional/EENT/Resp/CV/GI//MS/Neuro/Skin/Heme-Lymph-Imm. Pursuant to the emergency declaration under the [address] [place] [address] and the [employer] Act, this Virtual Visit was conducted with patient's (and/or legal guardian's) consent, to reduce the patient's risk of exposure to COVID-19 and provide necessary medical care. The patient (and/or legal guardian) has also been advised to contact this office for worsening conditions or problems, and seek emergency medical treatment and/or call 911 if deemed necessary. Patient identification was verified at the start of the visit: Yes    Total time spent on this encounter: 25 minutes    Services were provided through a video synchronous discussion virtually to substitute for in-person clinic visit. Patient and provider were located at their individual homes. --Celestino Jaramillo MA on 2/8/2021 at 3:11 PM    An electronic signature was used to authenticate this note.

## 2021-02-08 NOTE — TELEPHONE ENCOUNTER
pts sister Yen Cohen stating that she has noticed blood around the outside of the peg tube x 1 month. Jose Alarcon stated it does not look infected but wanted to schedule a virtual with Dr Martin Martínez for him to take a look at the site. Jose Alarcon also stated that pts nephew cut the tube due to it leaking to make it stop. Writer spoke with Joan Lawrence, Dr Clarisa Acevedo nurse. She stated she will talk to Dr Martin Martínez and address this.

## 2021-05-24 ENCOUNTER — APPOINTMENT (OUTPATIENT)
Dept: CT IMAGING | Age: 76
DRG: 329 | End: 2021-05-24
Payer: COMMERCIAL

## 2021-05-24 ENCOUNTER — HOSPITAL ENCOUNTER (INPATIENT)
Age: 76
LOS: 14 days | Discharge: HOME HEALTH | DRG: 329 | End: 2021-06-07
Attending: EMERGENCY MEDICINE | Admitting: FAMILY MEDICINE
Payer: COMMERCIAL

## 2021-05-24 DIAGNOSIS — I50.9 CONGESTIVE HEART FAILURE, UNSPECIFIED HF CHRONICITY, UNSPECIFIED HEART FAILURE TYPE (HCC): ICD-10-CM

## 2021-05-24 DIAGNOSIS — K56.609 SMALL BOWEL OBSTRUCTION (HCC): Primary | ICD-10-CM

## 2021-05-24 PROBLEM — N30.00 ACUTE CYSTITIS WITHOUT HEMATURIA: Status: ACTIVE | Noted: 2021-05-24

## 2021-05-24 PROBLEM — R91.8 MASS OF RIGHT LUNG: Status: ACTIVE | Noted: 2021-05-24

## 2021-05-24 PROBLEM — J44.9 COPD (CHRONIC OBSTRUCTIVE PULMONARY DISEASE) (HCC): Status: ACTIVE | Noted: 2021-05-24

## 2021-05-24 LAB
-: ABNORMAL
ABSOLUTE EOS #: 0 K/UL (ref 0–0.4)
ABSOLUTE IMMATURE GRANULOCYTE: ABNORMAL K/UL (ref 0–0.3)
ABSOLUTE LYMPH #: 1 K/UL (ref 1–4.8)
ABSOLUTE MONO #: 0.5 K/UL (ref 0.1–1.3)
ALBUMIN SERPL-MCNC: 4.2 G/DL (ref 3.5–5.2)
ALBUMIN/GLOBULIN RATIO: ABNORMAL (ref 1–2.5)
ALP BLD-CCNC: 71 U/L (ref 40–129)
ALT SERPL-CCNC: 11 U/L (ref 5–41)
AMORPHOUS: ABNORMAL
ANION GAP SERPL CALCULATED.3IONS-SCNC: 14 MMOL/L (ref 9–17)
AST SERPL-CCNC: 20 U/L
BACTERIA: ABNORMAL
BASOPHILS # BLD: 0 % (ref 0–2)
BASOPHILS ABSOLUTE: 0 K/UL (ref 0–0.2)
BILIRUB SERPL-MCNC: <0.15 MG/DL (ref 0.3–1.2)
BILIRUBIN URINE: NEGATIVE
BUN BLDV-MCNC: 35 MG/DL (ref 8–23)
BUN/CREAT BLD: ABNORMAL (ref 9–20)
CALCIUM SERPL-MCNC: 9.5 MG/DL (ref 8.6–10.4)
CASTS UA: ABNORMAL /LPF
CASTS UA: ABNORMAL /LPF
CHLORIDE BLD-SCNC: 103 MMOL/L (ref 98–107)
CO2: 23 MMOL/L (ref 20–31)
COLOR: YELLOW
COMMENT UA: ABNORMAL
CREAT SERPL-MCNC: 0.82 MG/DL (ref 0.7–1.2)
CRYSTALS, UA: ABNORMAL /HPF
DIFFERENTIAL TYPE: ABNORMAL
EOSINOPHILS RELATIVE PERCENT: 0 % (ref 0–4)
EPITHELIAL CELLS UA: ABNORMAL /HPF
GFR AFRICAN AMERICAN: >60 ML/MIN
GFR NON-AFRICAN AMERICAN: >60 ML/MIN
GFR SERPL CREATININE-BSD FRML MDRD: ABNORMAL ML/MIN/{1.73_M2}
GFR SERPL CREATININE-BSD FRML MDRD: ABNORMAL ML/MIN/{1.73_M2}
GLUCOSE BLD-MCNC: 145 MG/DL (ref 75–110)
GLUCOSE BLD-MCNC: 172 MG/DL (ref 70–99)
GLUCOSE URINE: NEGATIVE
HCT VFR BLD CALC: 35.5 % (ref 41–53)
HEMOGLOBIN: 11.9 G/DL (ref 13.5–17.5)
IMMATURE GRANULOCYTES: ABNORMAL %
KETONES, URINE: NEGATIVE
LACTIC ACID: 1.8 MMOL/L (ref 0.5–2.2)
LEUKOCYTE ESTERASE, URINE: ABNORMAL
LIPASE: 33 U/L (ref 13–60)
LYMPHOCYTES # BLD: 8 % (ref 24–44)
MAGNESIUM: 2.1 MG/DL (ref 1.6–2.6)
MCH RBC QN AUTO: 29.1 PG (ref 26–34)
MCHC RBC AUTO-ENTMCNC: 33.5 G/DL (ref 31–37)
MCV RBC AUTO: 87 FL (ref 80–100)
MONOCYTES # BLD: 4 % (ref 1–7)
MUCUS: ABNORMAL
NITRITE, URINE: POSITIVE
NRBC AUTOMATED: ABNORMAL PER 100 WBC
OTHER OBSERVATIONS UA: ABNORMAL
PDW BLD-RTO: 13.7 % (ref 11.5–14.9)
PH UA: 5 (ref 5–8)
PLATELET # BLD: 327 K/UL (ref 150–450)
PLATELET ESTIMATE: ABNORMAL
PMV BLD AUTO: 6.7 FL (ref 6–12)
POTASSIUM SERPL-SCNC: 3.3 MMOL/L (ref 3.7–5.3)
PROTEIN UA: ABNORMAL
RBC # BLD: 4.08 M/UL (ref 4.5–5.9)
RBC # BLD: ABNORMAL 10*6/UL
RBC UA: ABNORMAL /HPF
RENAL EPITHELIAL, UA: ABNORMAL /HPF
SARS-COV-2, RAPID: NOT DETECTED
SEG NEUTROPHILS: 88 % (ref 36–66)
SEGMENTED NEUTROPHILS ABSOLUTE COUNT: 11.3 K/UL (ref 1.3–9.1)
SODIUM BLD-SCNC: 140 MMOL/L (ref 135–144)
SPECIFIC GRAVITY UA: 1.02 (ref 1–1.03)
SPECIMEN DESCRIPTION: NORMAL
TOTAL PROTEIN: 7.9 G/DL (ref 6.4–8.3)
TRICHOMONAS: ABNORMAL
TURBIDITY: CLEAR
URINE HGB: NEGATIVE
UROBILINOGEN, URINE: NORMAL
WBC # BLD: 12.8 K/UL (ref 3.5–11)
WBC # BLD: ABNORMAL 10*3/UL
WBC UA: ABNORMAL /HPF
YEAST: ABNORMAL

## 2021-05-24 PROCEDURE — 82947 ASSAY GLUCOSE BLOOD QUANT: CPT

## 2021-05-24 PROCEDURE — 2580000003 HC RX 258: Performed by: STUDENT IN AN ORGANIZED HEALTH CARE EDUCATION/TRAINING PROGRAM

## 2021-05-24 PROCEDURE — 83735 ASSAY OF MAGNESIUM: CPT

## 2021-05-24 PROCEDURE — 6360000002 HC RX W HCPCS: Performed by: SURGERY

## 2021-05-24 PROCEDURE — 96365 THER/PROPH/DIAG IV INF INIT: CPT

## 2021-05-24 PROCEDURE — 96375 TX/PRO/DX INJ NEW DRUG ADDON: CPT

## 2021-05-24 PROCEDURE — 85025 COMPLETE CBC W/AUTO DIFF WBC: CPT

## 2021-05-24 PROCEDURE — 2580000003 HC RX 258: Performed by: EMERGENCY MEDICINE

## 2021-05-24 PROCEDURE — 83690 ASSAY OF LIPASE: CPT

## 2021-05-24 PROCEDURE — 2580000003 HC RX 258: Performed by: SURGERY

## 2021-05-24 PROCEDURE — 87186 SC STD MICRODIL/AGAR DIL: CPT

## 2021-05-24 PROCEDURE — 87086 URINE CULTURE/COLONY COUNT: CPT

## 2021-05-24 PROCEDURE — 87077 CULTURE AEROBIC IDENTIFY: CPT

## 2021-05-24 PROCEDURE — 6360000002 HC RX W HCPCS: Performed by: STUDENT IN AN ORGANIZED HEALTH CARE EDUCATION/TRAINING PROGRAM

## 2021-05-24 PROCEDURE — 2060000000 HC ICU INTERMEDIATE R&B

## 2021-05-24 PROCEDURE — 74177 CT ABD & PELVIS W/CONTRAST: CPT

## 2021-05-24 PROCEDURE — 80053 COMPREHEN METABOLIC PANEL: CPT

## 2021-05-24 PROCEDURE — 99284 EMERGENCY DEPT VISIT MOD MDM: CPT

## 2021-05-24 PROCEDURE — 6360000004 HC RX CONTRAST MEDICATION: Performed by: EMERGENCY MEDICINE

## 2021-05-24 PROCEDURE — 87635 SARS-COV-2 COVID-19 AMP PRB: CPT

## 2021-05-24 PROCEDURE — 83605 ASSAY OF LACTIC ACID: CPT

## 2021-05-24 PROCEDURE — 87040 BLOOD CULTURE FOR BACTERIA: CPT

## 2021-05-24 PROCEDURE — 81001 URINALYSIS AUTO W/SCOPE: CPT

## 2021-05-24 PROCEDURE — 36415 COLL VENOUS BLD VENIPUNCTURE: CPT

## 2021-05-24 RX ORDER — HYDRALAZINE HYDROCHLORIDE 20 MG/ML
10 INJECTION INTRAMUSCULAR; INTRAVENOUS EVERY 6 HOURS PRN
Status: DISCONTINUED | OUTPATIENT
Start: 2021-05-24 | End: 2021-06-07 | Stop reason: HOSPADM

## 2021-05-24 RX ORDER — MAGNESIUM SULFATE 1 G/100ML
1000 INJECTION INTRAVENOUS PRN
Status: DISCONTINUED | OUTPATIENT
Start: 2021-05-24 | End: 2021-06-07 | Stop reason: HOSPADM

## 2021-05-24 RX ORDER — CILOSTAZOL 100 MG/1
100 TABLET ORAL 2 TIMES DAILY
Status: ON HOLD | COMMUNITY
End: 2021-11-02 | Stop reason: HOSPADM

## 2021-05-24 RX ORDER — THEOPHYLLINE ANHYDROUS 80 MG/15ML
100 SOLUTION ORAL EVERY 8 HOURS SCHEDULED
Status: DISCONTINUED | OUTPATIENT
Start: 2021-05-24 | End: 2021-05-29

## 2021-05-24 RX ORDER — 0.9 % SODIUM CHLORIDE 0.9 %
80 INTRAVENOUS SOLUTION INTRAVENOUS ONCE
Status: COMPLETED | OUTPATIENT
Start: 2021-05-24 | End: 2021-05-24

## 2021-05-24 RX ORDER — 0.9 % SODIUM CHLORIDE 0.9 %
1000 INTRAVENOUS SOLUTION INTRAVENOUS ONCE
Status: COMPLETED | OUTPATIENT
Start: 2021-05-24 | End: 2021-05-25

## 2021-05-24 RX ORDER — SODIUM CHLORIDE 0.9 % (FLUSH) 0.9 %
10 SYRINGE (ML) INJECTION EVERY 12 HOURS SCHEDULED
Status: DISCONTINUED | OUTPATIENT
Start: 2021-05-24 | End: 2021-06-07 | Stop reason: HOSPADM

## 2021-05-24 RX ORDER — PROMETHAZINE HYDROCHLORIDE 25 MG/1
12.5 TABLET ORAL EVERY 6 HOURS PRN
Status: DISCONTINUED | OUTPATIENT
Start: 2021-05-24 | End: 2021-06-07 | Stop reason: HOSPADM

## 2021-05-24 RX ORDER — ACETAMINOPHEN 650 MG/1
650 SUPPOSITORY RECTAL EVERY 6 HOURS PRN
Status: DISCONTINUED | OUTPATIENT
Start: 2021-05-24 | End: 2021-06-07 | Stop reason: HOSPADM

## 2021-05-24 RX ORDER — SODIUM CHLORIDE 9 MG/ML
INJECTION, SOLUTION INTRAVENOUS CONTINUOUS
Status: DISCONTINUED | OUTPATIENT
Start: 2021-05-24 | End: 2021-05-25

## 2021-05-24 RX ORDER — ONDANSETRON 2 MG/ML
4 INJECTION INTRAMUSCULAR; INTRAVENOUS EVERY 6 HOURS PRN
Status: DISCONTINUED | OUTPATIENT
Start: 2021-05-24 | End: 2021-06-07 | Stop reason: HOSPADM

## 2021-05-24 RX ORDER — ACETAMINOPHEN 325 MG/1
650 TABLET ORAL EVERY 6 HOURS PRN
Status: DISCONTINUED | OUTPATIENT
Start: 2021-05-24 | End: 2021-06-07 | Stop reason: HOSPADM

## 2021-05-24 RX ORDER — SODIUM CHLORIDE 0.9 % (FLUSH) 0.9 %
10 SYRINGE (ML) INJECTION PRN
Status: DISCONTINUED | OUTPATIENT
Start: 2021-05-24 | End: 2021-05-25 | Stop reason: SDUPTHER

## 2021-05-24 RX ORDER — ONDANSETRON 2 MG/ML
4 INJECTION INTRAMUSCULAR; INTRAVENOUS ONCE
Status: COMPLETED | OUTPATIENT
Start: 2021-05-24 | End: 2021-05-24

## 2021-05-24 RX ORDER — ONDANSETRON 2 MG/ML
4 INJECTION INTRAMUSCULAR; INTRAVENOUS EVERY 6 HOURS PRN
Status: DISCONTINUED | OUTPATIENT
Start: 2021-05-24 | End: 2021-05-24 | Stop reason: SDUPTHER

## 2021-05-24 RX ORDER — ALBUTEROL SULFATE 2.5 MG/3ML
2.5 SOLUTION RESPIRATORY (INHALATION) EVERY 4 HOURS PRN
Status: DISCONTINUED | OUTPATIENT
Start: 2021-05-24 | End: 2021-05-25

## 2021-05-24 RX ORDER — ASPIRIN 325 MG
325 TABLET ORAL DAILY
Status: ON HOLD | COMMUNITY
End: 2021-06-07 | Stop reason: HOSPADM

## 2021-05-24 RX ORDER — SODIUM CHLORIDE 0.9 % (FLUSH) 0.9 %
10 SYRINGE (ML) INJECTION PRN
Status: DISCONTINUED | OUTPATIENT
Start: 2021-05-24 | End: 2021-05-31

## 2021-05-24 RX ORDER — POTASSIUM CHLORIDE 7.45 MG/ML
10 INJECTION INTRAVENOUS PRN
Status: DISCONTINUED | OUTPATIENT
Start: 2021-05-24 | End: 2021-06-07 | Stop reason: HOSPADM

## 2021-05-24 RX ORDER — FENTANYL CITRATE 50 UG/ML
50 INJECTION, SOLUTION INTRAMUSCULAR; INTRAVENOUS
Status: DISCONTINUED | OUTPATIENT
Start: 2021-05-24 | End: 2021-06-06

## 2021-05-24 RX ORDER — POTASSIUM CHLORIDE 750 MG/1
20 CAPSULE, EXTENDED RELEASE ORAL DAILY
COMMUNITY
End: 2021-10-22

## 2021-05-24 RX ORDER — FAMOTIDINE 20 MG/1
20 TABLET, FILM COATED ORAL 2 TIMES DAILY
COMMUNITY

## 2021-05-24 RX ORDER — FENTANYL CITRATE 50 UG/ML
50 INJECTION, SOLUTION INTRAMUSCULAR; INTRAVENOUS ONCE
Status: COMPLETED | OUTPATIENT
Start: 2021-05-24 | End: 2021-05-24

## 2021-05-24 RX ORDER — SODIUM CHLORIDE 9 MG/ML
25 INJECTION, SOLUTION INTRAVENOUS PRN
Status: DISCONTINUED | OUTPATIENT
Start: 2021-05-24 | End: 2021-05-31

## 2021-05-24 RX ORDER — SODIUM CHLORIDE 9 MG/ML
INJECTION, SOLUTION INTRAVENOUS CONTINUOUS
Status: DISCONTINUED | OUTPATIENT
Start: 2021-05-24 | End: 2021-05-27

## 2021-05-24 RX ORDER — POTASSIUM CHLORIDE 20 MEQ/1
40 TABLET, EXTENDED RELEASE ORAL PRN
Status: DISCONTINUED | OUTPATIENT
Start: 2021-05-24 | End: 2021-06-07 | Stop reason: HOSPADM

## 2021-05-24 RX ORDER — FENTANYL CITRATE 50 UG/ML
25 INJECTION, SOLUTION INTRAMUSCULAR; INTRAVENOUS
Status: DISCONTINUED | OUTPATIENT
Start: 2021-05-24 | End: 2021-06-06

## 2021-05-24 RX ORDER — 0.9 % SODIUM CHLORIDE 0.9 %
1000 INTRAVENOUS SOLUTION INTRAVENOUS ONCE
Status: COMPLETED | OUTPATIENT
Start: 2021-05-24 | End: 2021-05-24

## 2021-05-24 RX ADMIN — SODIUM CHLORIDE 1000 ML: 9 INJECTION, SOLUTION INTRAVENOUS at 17:01

## 2021-05-24 RX ADMIN — SODIUM CHLORIDE 1000 ML: 9 INJECTION, SOLUTION INTRAVENOUS at 23:12

## 2021-05-24 RX ADMIN — IOPAMIDOL 75 ML: 755 INJECTION, SOLUTION INTRAVENOUS at 18:02

## 2021-05-24 RX ADMIN — SODIUM CHLORIDE: 9 INJECTION, SOLUTION INTRAVENOUS at 20:43

## 2021-05-24 RX ADMIN — ONDANSETRON 4 MG: 2 INJECTION INTRAMUSCULAR; INTRAVENOUS at 17:02

## 2021-05-24 RX ADMIN — SODIUM CHLORIDE 80 ML: 9 INJECTION, SOLUTION INTRAVENOUS at 18:03

## 2021-05-24 RX ADMIN — FENTANYL CITRATE 50 MCG: 50 INJECTION, SOLUTION INTRAMUSCULAR; INTRAVENOUS at 17:01

## 2021-05-24 RX ADMIN — FENTANYL CITRATE 50 MCG: 50 INJECTION INTRAMUSCULAR; INTRAVENOUS at 23:12

## 2021-05-24 RX ADMIN — CEFEPIME 2000 MG: 2 INJECTION, POWDER, FOR SOLUTION INTRAVENOUS at 18:55

## 2021-05-24 RX ADMIN — SODIUM CHLORIDE, PRESERVATIVE FREE 10 ML: 5 INJECTION INTRAVENOUS at 18:03

## 2021-05-24 RX ADMIN — FENTANYL CITRATE 50 MCG: 50 INJECTION INTRAMUSCULAR; INTRAVENOUS at 20:44

## 2021-05-24 ASSESSMENT — PAIN SCALES - GENERAL
PAINLEVEL_OUTOF10: 9
PAINLEVEL_OUTOF10: 9
PAINLEVEL_OUTOF10: 7
PAINLEVEL_OUTOF10: 9

## 2021-05-24 NOTE — ED NOTES
RN was instructed to insert NG tube to gravity per resident [place]. RN attempted to insert 16F ng tube multiple times per each nostril. Patient unable to swallow and tube just coils in back of throat. RN attempted to insert 14F NG tube and was unsuccessful still. RN notified Dr. Valorie Sousa and was instructed to hook up g-tube to gravity.       Delphine Peña RN  05/24/21 [id number]

## 2021-05-24 NOTE — ED NOTES
Patient's SPO2 monitor repositioned and patient's SPO2 increased to 94%. Patient placed on 2L NC via mouth per family request. Dr. Syeda Agarwal notified.       Ailyn Cunningham RN  05/24/21 1375

## 2021-05-24 NOTE — ED PROVIDER NOTES
[address] ED  eMERGENCY dEPARTMENT eNCOUnter   Attending Attestation     Pt Name: Anusha Castaneda  MRN: [id number]  [place] 1945  Date of evaluation: 5/24/21       Anusha Castaneda is a 77 y.o. male who presents with Nausea and Emesis      History:   Patient started having diffuse abdominal pain right worse than left with nausea and vomiting today. Patient does have a G-tube where he gets all of his tube feeds. Patient is having no respiratory issues no fevers. Exam: Vitals:   Vitals:    05/24/21 1609 05/24/21 1610 05/24/21 1611 05/24/21 1612   BP:       Pulse:       Temp:       TempSrc:       SpO2: 94% 94% 96% 96%     Abdomen is soft nondistended but tender to palpation diffusely but right greater than left. Lungs are clear to auscultation bilaterally. Heart regular rate rhythm no murmurs. I performed a history and physical examination of the patient and discussed management with the resident. I reviewed the residents note and agree with the documented findings and plan of care. Any areas of disagreement are noted on the chart. I was personally present for the key portions of any procedures. I have documented in the chart those procedures where I was not present during the key portions. I have personally reviewed all images and agree with the resident's interpretation. I have reviewed the emergency nurses triage note. I agree with the chief complaint, past medical history, past surgical history, allergies, medications, social and family history as documented unless otherwise noted below. Documentation of the HPI, Physical Exam and Medical Decision Making performed by medical students or scribes is based on my personal performance of the HPI, PE and MDM. I personally evaluated and examined the patient in conjunction with the APC and agree with the assessment, treatment plan, and disposition of the patient as recorded by the APC. Additional findings are as noted.     Ashley Reeves MD  Attending

## 2021-05-24 NOTE — ED NOTES
Mode of arrival (squad #, walk in, police, etc) : [place]        Chief complaint(s): nausea and vomiting      Arrival Note (brief scenario, treatment PTA, etc). : Patient arrived to ED this afternoon with c/o nausea and vomiting. EMS squad states they were called out to home by patient's sister. Sister reports patient has been nauseous and throwing up all day. Sister also reports patient has been complaining of abdominal pain. 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 Patient has gtube placed last year and receives tube feed twice a day. Upon arrival to ED patient was drive heaving and c/o of abdominal pain. Patient respirations were even non labored but RN placed patient on 2l NC due to spo2 being in 80s.             Shelby Kunz RN  05/24/21 4510

## 2021-05-24 NOTE — ED NOTES
Report given Bret Cordoba RN from [facility], RN   Report method in person   The following was reviewed with receiving RN:   Current vital signs:  BP (!) 129/98   Pulse (!) 37   Temp 97.6 °F (36.4 °C) (Axillary)   Resp 19   SpO2 98%                      Any medication or safety alerts were reviewed. Any pending diagnostics and notifications were also reviewed, as well as any safety concerns or issues, abnormal labs, abnormal imaging, and abnormal assessment findings. Questions were answered.           Kev Cotter RN  05/24/21 6529

## 2021-05-24 NOTE — ED PROVIDER NOTES
[facility]  Emergency Department Encounter  EmergencyMedicine Resident     Pt Name:Juni Meyers  MRN: [id number]  Birthdate 1945  Date of evaluation: 5/24/21  PCP:  Mabel Moe MD    [address] [place] [address]       Chief Complaint   Patient presents with    Nausea    Emesis       HISTORY OF PRESENT ILLNESS  (Location/Symptom, Timing/Onset, Context/Setting, Quality, Duration, Modifying Factors, Severity.)      Stef Grace is a 77 y.o. male who presents with abdominal pain, nausea, vomiting. Patient was noted by family to have abdominal pain, nausea, vomiting, called EMS, transferred to the emergency department. Patient is at baseline, mumbling incoherently, unable to obtain HPI or ROS from patient, but sister is present and is able to provide information. Patient was diagnosed with a neuromuscular disorder, unknown etiology, minimally verbal at baseline, but interactive. Patient sister reports that he started having generalized abdominal pain this morning, some episodes of emesis. Sister denies any fevers, cough, respiratory distress, chest pain, headaches, change in bowel or bladder habits, lower extremities pain or swelling. Patient is able to urinate on his own at baseline, diarrhea at baseline, has PEG tube in place and is fed with tube feeds. PAST MEDICAL / SURGICAL / SOCIAL / FAMILY HISTORY      has a past medical history of Arthritis, Cancer ([address] Cerebral artery occlusion with cerebral infarction ([address] Chronic kidney disease, COPD (chronic obstructive pulmonary disease) ([address] Hypertension, Pneumonia, Psychiatric problem, and Seizures ([address] has a past surgical history that includes back surgery; fracture surgery; hernia repair; Tonsillectomy; Carotid endarterectomy (Left, 09/20/2016); Abdomen surgery; hiatal hernia repair; Inguinal hernia repair (Bilateral); other surgical history; Colonoscopy (N/A, 8/31/2018); Upper gastrointestinal endoscopy (04/15/2020);  Gastrostomy tube placement (04/15/2020); Upper gastrointestinal endoscopy (4/15/2020); and Upper gastrointestinal endoscopy (4/15/2020). Social History     Socioeconomic History    Marital status:      Spouse name: Not on file    Number of children: Not on file    Years of education: Not on file    Highest education level: Not on file   Occupational History    Not on file   Tobacco Use    Smoking status: Former Smoker     Packs/day: 1.00     Years: 60.00     Pack years: 60.00     Types: Cigarettes     Start date:      Quit date: 2020     Years since quittin.1    Smokeless tobacco: Never Used   Vaping Use    Vaping Use: Never used   Substance and Sexual Activity    Alcohol use: No    Drug use: No    Sexual activity: Yes     Partners: Female   Other Topics Concern    Not on file   Social History Narrative    Not on file     Social Determinants of Health     Financial Resource Strain:     Difficulty of Paying Living Expenses:    Food Insecurity:     Worried About [address] in the Last Year:     [address] in the Last Year:    Transportation Needs:     Lack of Transportation (Medical):      Lack of Transportation (Non-Medical):    Physical Activity:     Days of Exercise per Week:     Minutes of Exercise per Session:    Stress:     Feeling of Stress :    Social Connections:     Frequency of Communication with Friends and Family:     Frequency of Social Gatherings with Friends and Family:     Attends Jain Services:     Active Member of Clubs or Organizations:     Attends Club or Organization Meetings:     Marital Status:    Intimate Partner Violence:     Fear of Current or Ex-Partner:     Emotionally Abused:     Physically Abused:     Sexually Abused:        Family History   Problem Relation Age of Onset    Arthritis Mother     Atrial Fibrillation Mother     Hearing Loss Mother     Heart Disease Mother     Stroke Mother     Vision Loss Mother     Arthritis Father     Cancer Father     Heart Disease Father     High Blood Pressure Father     Stroke Father     Vision Loss Father        Allergies:  Bactrim [sulfamethoxazole-trimethoprim] and Ciprofloxacin    Home Medications:  Prior to Admission medications    Medication Sig Start Date End Date Taking? Authorizing Provider   cilostazol (PLETAL) 100 MG tablet Take 100 mg by mouth 2 times daily   Yes Historical Provider, MD   famotidine (PEPCID) 20 MG tablet Take 20 mg by mouth 2 times daily   Yes Historical Provider, MD   aspirin 325 MG tablet Take 325 mg by mouth daily   Yes Historical Provider, MD   potassium chloride (MICRO-K) 10 MEQ extended release capsule Take 20 mEq by mouth daily   Yes Historical Provider, MD   theophylline 80 MG/15ML elixir Take 18.8 mLs by mouth every 8 hours 4/6/20  Yes Angelito Lara MD   lisinopril-hydrochlorothiazide (PRINZIDE;ZESTORETIC) 10-12.5 MG per tablet Take 1 tablet by mouth daily 7/24/19  Yes Harsh Ramirez MD   atorvastatin (LIPITOR) 40 MG tablet Take 1 tablet by mouth daily 7/24/19  Yes Harsh Ramirez MD   albuterol sulfate  (90 Base) MCG/ACT inhaler Inhale 2 puffs into the lungs every 6 hours as needed for Wheezing 6/27/19  Yes Harsh Ramirez MD   gabapentin (NEURONTIN) 800 MG tablet Take 800 mg by mouth 3 times daily. 1/16/19  Yes Historical Provider, MD       REVIEW OF SYSTEMS    (2-9 systems for level 4, 10 or more for level 5)      Review of Systems   Constitutional: Negative for chills, diaphoresis, fatigue and fever. HENT: Negative for congestion, rhinorrhea and sore throat. Eyes: Negative for visual disturbance. Respiratory: Negative for cough and shortness of breath. Cardiovascular: Negative for chest pain, palpitations and leg swelling. Gastrointestinal: Positive for abdominal pain, diarrhea, nausea and vomiting. Negative for blood in stool and constipation. Genitourinary: Negative for dysuria and hematuria.    Musculoskeletal: Negative for neck pain and neck stiffness. Skin: Negative for pallor and rash. Neurological: Negative for dizziness, syncope, weakness, light-headedness and headaches. Psychiatric/Behavioral: Negative for confusion. PHYSICAL EXAM   (up to 7 for level 4, 8 or more for level 5)      INITIAL VITALS:   /65   Pulse 89   Temp 97.5 °F (36.4 °C) (Axillary)   Resp 16   Ht 6' 4\" (1.93 m)   Wt 184 lb 4.8 oz (83.6 kg)   SpO2 98%   BMI 22.43 kg/m²     Physical Exam  Constitutional:       General: He is not in acute distress. Appearance: He is well-developed. He is ill-appearing. He is not toxic-appearing or diaphoretic. Comments: Patient is alert, nonverbal, sister is able to understand patient for the most part, patient appears in no acute distress, no respiratory distress   HENT:      Head: Normocephalic and atraumatic. Right Ear: External ear normal.      Left Ear: External ear normal.      Nose: Nose normal. No congestion or rhinorrhea. Mouth/Throat:      Mouth: Mucous membranes are moist.      Pharynx: Oropharynx is clear. No oropharyngeal exudate or posterior oropharyngeal erythema. Eyes:      General:         Right eye: No discharge. Left eye: No discharge. Extraocular Movements: Extraocular movements intact. Pupils: Pupils are equal, round, and reactive to light. Neck:      Vascular: No JVD. Trachea: No tracheal deviation. Cardiovascular:      Rate and Rhythm: Normal rate and regular rhythm. Pulses: Normal pulses. Heart sounds: Normal heart sounds. No murmur heard. No friction rub. No gallop. Pulmonary:      Effort: Pulmonary effort is normal. No respiratory distress. Breath sounds: Normal breath sounds. No stridor. No wheezing, rhonchi or rales. Chest:      Chest wall: No tenderness. Abdominal:      General: There is no distension. Palpations: Abdomen is soft. There is no mass. Tenderness:  There is abdominal guidelines    Pad/offload medical devices    Maintain HOB at the lowest elevation consistent with medical plan of care    Use lift equipment for lifting patient    Maintain heels off of bed at all times    Full Code    Inpatient consult to General Surgery    Inpatient consult to Primary Care Provider    Inpatient consult to Pulmonology    OT eval and treat    PT evaluation and treat    Initiate Oxygen Therapy Protocol    Pulse Oximetry Spot Check    POC Glucose Fingerstick    PATIENT STATUS (FROM ED OR OR/PROCEDURAL) Inpatient       MEDICATIONS ORDERED:  Orders Placed This Encounter   Medications    0.9 % sodium chloride bolus    ondansetron (ZOFRAN) injection 4 mg    fentaNYL (SUBLIMAZE) injection 50 mcg    iopamidol (ISOVUE-370) 76 % injection 75 mL    sodium chloride flush 0.9 % injection 10 mL    0.9 % sodium chloride bolus    cefepime (MAXIPIME) 2000 mg IVPB minibag     Order Specific Question:   Antimicrobial Indications     Answer:   Pneumonia (CAP)    0.9 % sodium chloride infusion    famotidine (PEPCID) injection 20 mg    enoxaparin (LOVENOX) injection 40 mg    DISCONTD: ondansetron (ZOFRAN) injection 4 mg    DISCONTD: piperacillin-tazobactam (ZOSYN) 3,375 mg in dextrose 5 % 50 mL IVPB (mini-bag)     Order Specific Question:   Antimicrobial Indications     Answer:   Intra-Abdominal Infection    fentaNYL (SUBLIMAZE) injection 25 mcg    fentaNYL (SUBLIMAZE) injection 50 mcg    0.9 % sodium chloride bolus    albuterol (PROVENTIL) nebulizer solution 2.5 mg    theophylline 80 MG/15ML oral solution 100 mg    sodium chloride flush 0.9 % injection 10 mL    sodium chloride flush 0.9 % injection 10 mL    0.9 % sodium chloride infusion    OR Linked Order Group     potassium chloride (KLOR-CON M) extended release tablet 40 mEq     potassium bicarb-citric acid (EFFER-K) effervescent tablet 40 mEq     potassium chloride 10 mEq/100 mL IVPB (Peripheral Line)    magnesium sulfate 1000 mg in dextrose 5% 100 mL IVPB    DISCONTD: enoxaparin (LOVENOX) injection 40 mg    OR Linked Order Group     promethazine (PHENERGAN) tablet 12.5 mg     ondansetron (ZOFRAN) injection 4 mg    magnesium hydroxide (MILK OF MAGNESIA) 400 MG/5ML suspension 30 mL    OR Linked Order Group     acetaminophen (TYLENOL) tablet 650 mg     acetaminophen (TYLENOL) suppository 650 mg    cefepime (MAXIPIME) 2000 mg IVPB minibag     Order Specific Question:   Antimicrobial Indications     Answer:   Intra-Abdominal Infection    metronidazole (FLAGYL) 500 mg in NaCl 100 mL IVPB premix     Order Specific Question:   Antimicrobial Indications     Answer:   Intra-Abdominal Infection    0.9 % sodium chloride infusion    hydrALAZINE (APRESOLINE) injection 10 mg    DISCONTD: piperacillin-tazobactam (ZOSYN) 3,375 mg in dextrose 5 % 50 mL IVPB (mini-bag)     Order Specific Question:   Antimicrobial Indications     Answer:   Intra-Abdominal Infection    potassium chloride 10 mEq/100 mL IVPB (Peripheral Line)       DDX:     DIAGNOSTIC RESULTS / EMERGENCY DEPARTMENT COURSE / MDM   LAB RESULTS:  Results for orders placed or performed during the hospital encounter of 05/24/21   COVID-19, Rapid    Specimen: Nasopharyngeal Swab   Result Value Ref Range    Specimen Description . NASOPHARYNGEAL SWAB     SARS-CoV-2, Rapid Not Detected Not Detected   CBC Auto Differential   Result Value Ref Range    WBC 12.8 (H) 3.5 - 11.0 k/uL    RBC 4.08 (L) 4.5 - 5.9 m/uL    Hemoglobin 11.9 (L) 13.5 - 17.5 g/dL    Hematocrit 35.5 (L) 41 - 53 %    MCV 87.0 80 - 100 fL    MCH 29.1 26 - 34 pg    MCHC 33.5 31 - 37 g/dL    RDW 13.7 11.5 - 14.9 %    Platelets 767 857 - 725 k/uL    MPV 6.7 6.0 - 12.0 fL    NRBC Automated NOT REPORTED per 100 WBC    Differential Type NOT REPORTED     Seg Neutrophils 88 (H) 36 - 66 %    Lymphocytes 8 (L) 24 - 44 %    Monocytes 4 1 - 7 %    Eosinophils % 0 0 - 4 %    Basophils 0 0 - 2 %    Immature Granulocytes NOT REPORTED 0 % Segs Absolute 11.30 (H) 1.3 - 9.1 k/uL    Absolute Lymph # 1.00 1.0 - 4.8 k/uL    Absolute Mono # 0.50 0.1 - 1.3 k/uL    Absolute Eos # 0.00 0.0 - 0.4 k/uL    Basophils Absolute 0.00 0.0 - 0.2 k/uL    Absolute Immature Granulocyte NOT REPORTED 0.00 - 0.30 k/uL    WBC Morphology NOT REPORTED     RBC Morphology NOT REPORTED     Platelet Estimate NOT REPORTED    Comprehensive Metabolic Panel w/ Reflex to MG   Result Value Ref Range    Glucose 172 (H) 70 - 99 mg/dL    BUN 35 (H) 8 - 23 mg/dL    CREATININE 0.82 0.70 - 1.20 mg/dL    Bun/Cre Ratio NOT REPORTED 9 - 20    Calcium 9.5 8.6 - 10.4 mg/dL    Sodium 140 135 - 144 mmol/L    Potassium 3.3 (L) 3.7 - 5.3 mmol/L    Chloride 103 98 - 107 mmol/L    CO2 23 20 - 31 mmol/L    Anion Gap 14 9 - 17 mmol/L    Alkaline Phosphatase 71 40 - 129 U/L    ALT 11 5 - 41 U/L    AST 20 <40 U/L    Total Bilirubin <0.15 (L) 0.3 - 1.2 mg/dL    Total Protein 7.9 6.4 - 8.3 g/dL    Albumin 4.2 3.5 - 5.2 g/dL    Albumin/Globulin Ratio NOT REPORTED 1.0 - 2.5    GFR Non-African American >60 >60 mL/min    GFR African American >60 >60 mL/min    GFR Comment          GFR Staging NOT REPORTED    Lipase   Result Value Ref Range    Lipase 33 13 - 60 U/L   Urinalysis Reflex to Culture    Specimen: Urine, straight catheter   Result Value Ref Range    Color, UA YELLOW YELLOW    Turbidity UA CLEAR CLEAR    Glucose, Ur NEGATIVE NEGATIVE    Bilirubin Urine NEGATIVE NEGATIVE    Ketones, Urine NEGATIVE NEGATIVE    Specific Gravity, UA 1.024 1.000 - 1.030    Urine Hgb NEGATIVE NEGATIVE    pH, UA 5.0 5.0 - 8.0    Protein, UA TRACE (A) NEGATIVE    Urobilinogen, Urine Normal Normal    Nitrite, Urine POSITIVE (A) NEGATIVE    Leukocyte Esterase, Urine SMALL (A) NEGATIVE    Urinalysis Comments NOT REPORTED    Lactic Acid   Result Value Ref Range    Lactic Acid 1.8 0.5 - 2.2 mmol/L   Magnesium   Result Value Ref Range    Magnesium 2.1 1.6 - 2.6 mg/dL   Microscopic Urinalysis   Result Value Ref Range    - WBC, UA 10 TO 20 /HPF    RBC, UA 0 TO 2 /HPF    Casts UA HYALINE /LPF    Casts UA 5 TO 10 /LPF    Crystals, UA NOT REPORTED None /HPF    Epithelial Cells UA 0 TO 2 /HPF    Renal Epithelial, UA NOT REPORTED 0 /HPF    Bacteria, UA FEW (A) None    Mucus, UA 2+ (A) None    Trichomonas, UA NOT REPORTED None    Amorphous, UA 1+ (A) None    Other Observations UA NOT REPORTED NOT REQ. Yeast, UA NOT REPORTED None   POC Glucose Fingerstick   Result Value Ref Range    POC Glucose 145 (H) 75 - 110 mg/dL       IMPRESSION: 52-year-old male who is minimally verbal at baseline, presenting with family member who reports abdominal pain, nausea, vomiting, PEG tube in place, concern for small bowel obstruction versus diverticulitis versus perforated viscus, will obtain CT abdomen pelvis for further evaluation. Will obtain abdominal labs to evaluate for electrolyte abnormality, anemia, gallbladder/liver etiology, pancreatitis, will obtain urinalysis to evaluate for UTI. RADIOLOGY:  CT ABDOMEN PELVIS W IV CONTRAST Additional Contrast? None    Result Date: 5/24/2021  EXAMINATION: CT OF THE ABDOMEN AND PELVIS WITH CONTRAST 5/24/2021 6:01 pm TECHNIQUE: CT of the abdomen and pelvis was performed with the administration of intravenous contrast. Multiplanar reformatted images are provided for review. Dose modulation, iterative reconstruction, and/or weight based adjustment of the mA/kV was utilized to reduce the radiation dose to as low as reasonably achievable. COMPARISON: 04/07/2020 HISTORY: ORDERING SYSTEM PROVIDED HISTORY: Abdominal pain TECHNOLOGIST PROVIDED HISTORY: Abdominal pain Decision Support Exception - unselect if not a suspected or confirmed emergency medical condition->Emergency Medical Condition (MA) Reason for Exam: abd pain, pt poor historian, unable to raise arms above head.  FINDINGS: Lower Chest: There is either necrotic infrahilar lymph node or infrahilar lower lobe mass 3.2 cm in size representing a drastic change from prior. Malignancy is of concern. There may be some satellite nodules. There is accompanying partially imaged patchy consolidation with interlobular septal thickening and trace right pleural effusion noted in the right lower lobe and adjacent middle lobe. Similar septal thickening noted left lower lobe posteriorly. Dedicated CT chest is recommended. Malignancy is of concern. Organs: Unchanged 1 cm cyst in the posterior segment 2 left lobe of liver. Other tiny subcentimeter hypodensities anteriorly in segment 4 a unchanged but more difficult to characterize due to small size. The spleen, pancreas, adrenal glands show no significant abnormalities. Right kidney is atrophic. Gallbladder unremarkable. Quintin Hoyles GI/Bowel: There is limited evaluation due to absence of oral contrast. Sliding hiatal hernia. Stomach grossly normal.  In the right mid to upper abdomen there is a group of swirling mildly dilated fluid-filled small bowel loops which end inferiorly in a tightly collapsed swirling small bowel loop with surrounding fat. Several other borderline dilated fluid-filled small bowel loops are noted along with collapsed terminal ileum. Findings are most compatible with closed-loop small-bowel obstruction possibly from internal hernia. Pelvis: Urinary bladder grossly normal.  Mild prostatomegaly. Small amount of pelvic free fluid. Peritoneum/Retroperitoneum: Ectatic right common iliac artery with large amount of mural thrombus measuring 2.3 cm diameter. 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 Findings stable. Bones/Soft Tissues: Diffuse degenerative changes. Grade 1 L4-5 anterolisthesis unchanged. No acute abnormality of the bones. The superficial soft tissues show no significant abnormalities. 1. Findings consistent with close loop small bowel obstruction with transition zone in the right abdomen.   Right upper abdomen Swirling loops of mildly dilated small bowel and in tightly collapsed small bowel loop with a funneling appearance. Surgical consultation advised. 2. Views of the lung bases show partially imaged necrotic appearing 3.2 cm right hilar or infrahilar lymph nodes/parenchymal mass with surrounding patchy consolidation and septal thickening in the right lower and middle lobes. Findings worrisome for malignancy. Dedicated CT chest recommended. 3. 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 Vascular consultation advised. EKG      All EKG's are interpreted by the Emergency Department Physician who either signs or Co-signs this chart in the absence of a cardiologist.    EMERGENCY DEPARTMENT COURSE:  Patient came to emergency department, HPI and physical exam were conducted. All nursing notes were reviewed. CT abdomen pelvis was consistent with small bowel obstruction. Consulted with general surgery who recommended NG tube to gravity, 1 more liter of normal saline, normal saline at 125 cc/h. Patient has slight leukocytosis, normal lactate. Will admit patient to primary care provider for further medical management. PROCEDURES:      CONSULTS:  IP CONSULT TO GENERAL SURGERY  IP CONSULT TO PRIMARY CARE PROVIDER  IP CONSULT TO PULMONOLOGY    CRITICAL CARE:  Please see attending note    FINAL IMPRESSION      1. Small bowel obstruction ([address]          DISPOSITION / PLAN     DISPOSITION Admitted 05/24/2021 07:25:15 PM      PATIENT REFERRED TO:  No follow-up provider specified.     DISCHARGE MEDICATIONS:  Current Discharge Medication List          Adriana Nunez MD  Emergency Medicine Resident    (Please note that portions of thisnote were completed with a voice recognition program.  Efforts were made to edit the dictations but occasionally words are mis-transcribed.)        Adriana Nunez MD  Resident  05/25/21 [id number]

## 2021-05-25 ENCOUNTER — APPOINTMENT (OUTPATIENT)
Dept: GENERAL RADIOLOGY | Age: 76
DRG: 329 | End: 2021-05-25
Payer: COMMERCIAL

## 2021-05-25 LAB
-: ABNORMAL
ABSOLUTE EOS #: 0 K/UL (ref 0–0.4)
ABSOLUTE IMMATURE GRANULOCYTE: ABNORMAL K/UL (ref 0–0.3)
ABSOLUTE LYMPH #: 1.13 K/UL (ref 1–4.8)
ABSOLUTE MONO #: 0.76 K/UL (ref 0.1–1.3)
ALBUMIN SERPL-MCNC: 3.2 G/DL (ref 3.5–5.2)
ALBUMIN/GLOBULIN RATIO: ABNORMAL (ref 1–2.5)
ALP BLD-CCNC: 59 U/L (ref 40–129)
ALT SERPL-CCNC: 11 U/L (ref 5–41)
AMORPHOUS: ABNORMAL
ANION GAP SERPL CALCULATED.3IONS-SCNC: 15 MMOL/L (ref 9–17)
AST SERPL-CCNC: 18 U/L
BACTERIA: ABNORMAL
BASOPHILS # BLD: 1 % (ref 0–2)
BASOPHILS ABSOLUTE: 0.19 K/UL (ref 0–0.2)
BILIRUB SERPL-MCNC: 0.19 MG/DL (ref 0.3–1.2)
BILIRUBIN URINE: NEGATIVE
BUN BLDV-MCNC: 41 MG/DL (ref 8–23)
BUN/CREAT BLD: ABNORMAL (ref 9–20)
CALCIUM SERPL-MCNC: 8.5 MG/DL (ref 8.6–10.4)
CASTS UA: ABNORMAL /LPF
CASTS UA: ABNORMAL /LPF
CHLORIDE BLD-SCNC: 109 MMOL/L (ref 98–107)
CO2: 18 MMOL/L (ref 20–31)
COLOR: YELLOW
COMMENT UA: ABNORMAL
CREAT SERPL-MCNC: 0.9 MG/DL (ref 0.7–1.2)
CRYSTALS, UA: ABNORMAL /HPF
DIFFERENTIAL TYPE: ABNORMAL
EOSINOPHILS RELATIVE PERCENT: 0 % (ref 0–4)
EPITHELIAL CELLS UA: ABNORMAL /HPF
GFR AFRICAN AMERICAN: >60 ML/MIN
GFR NON-AFRICAN AMERICAN: >60 ML/MIN
GFR SERPL CREATININE-BSD FRML MDRD: ABNORMAL ML/MIN/{1.73_M2}
GFR SERPL CREATININE-BSD FRML MDRD: ABNORMAL ML/MIN/{1.73_M2}
GLUCOSE BLD-MCNC: 115 MG/DL (ref 75–110)
GLUCOSE BLD-MCNC: 116 MG/DL (ref 75–110)
GLUCOSE BLD-MCNC: 124 MG/DL (ref 75–110)
GLUCOSE BLD-MCNC: 146 MG/DL (ref 70–99)
GLUCOSE URINE: NEGATIVE
HCT VFR BLD CALC: 35.3 % (ref 41–53)
HEMOGLOBIN: 11.7 G/DL (ref 13.5–17.5)
IMMATURE GRANULOCYTES: ABNORMAL %
KETONES, URINE: NEGATIVE
LACTIC ACID: 2.7 MMOL/L (ref 0.5–2.2)
LEUKOCYTE ESTERASE, URINE: ABNORMAL
LYMPHOCYTES # BLD: 6 % (ref 24–44)
MCH RBC QN AUTO: 29.5 PG (ref 26–34)
MCHC RBC AUTO-ENTMCNC: 33.2 G/DL (ref 31–37)
MCV RBC AUTO: 88.9 FL (ref 80–100)
MONOCYTES # BLD: 4 % (ref 1–7)
MORPHOLOGY: ABNORMAL
MORPHOLOGY: ABNORMAL
MUCUS: ABNORMAL
NITRITE, URINE: NEGATIVE
NRBC AUTOMATED: ABNORMAL PER 100 WBC
OTHER OBSERVATIONS UA: ABNORMAL
PDW BLD-RTO: 13.7 % (ref 11.5–14.9)
PH UA: 5 (ref 5–8)
PLATELET # BLD: 314 K/UL (ref 150–450)
PLATELET ESTIMATE: ABNORMAL
PMV BLD AUTO: 6.7 FL (ref 6–12)
POTASSIUM SERPL-SCNC: 4.4 MMOL/L (ref 3.7–5.3)
PROTEIN UA: ABNORMAL
RBC # BLD: 3.97 M/UL (ref 4.5–5.9)
RBC # BLD: ABNORMAL 10*6/UL
RBC UA: ABNORMAL /HPF
RENAL EPITHELIAL, UA: ABNORMAL /HPF
SEG NEUTROPHILS: 89 % (ref 36–66)
SEGMENTED NEUTROPHILS ABSOLUTE COUNT: 16.82 K/UL (ref 1.3–9.1)
SODIUM BLD-SCNC: 142 MMOL/L (ref 135–144)
SPECIFIC GRAVITY UA: 1.06 (ref 1–1.03)
TOTAL PROTEIN: 6.7 G/DL (ref 6.4–8.3)
TRICHOMONAS: ABNORMAL
TURBIDITY: CLEAR
URINE HGB: NEGATIVE
UROBILINOGEN, URINE: NORMAL
WBC # BLD: 18.9 K/UL (ref 3.5–11)
WBC # BLD: ABNORMAL 10*3/UL
WBC UA: ABNORMAL /HPF
YEAST: ABNORMAL

## 2021-05-25 PROCEDURE — 36415 COLL VENOUS BLD VENIPUNCTURE: CPT

## 2021-05-25 PROCEDURE — 81001 URINALYSIS AUTO W/SCOPE: CPT

## 2021-05-25 PROCEDURE — 94760 N-INVAS EAR/PLS OXIMETRY 1: CPT

## 2021-05-25 PROCEDURE — 2580000003 HC RX 258: Performed by: FAMILY MEDICINE

## 2021-05-25 PROCEDURE — 94640 AIRWAY INHALATION TREATMENT: CPT

## 2021-05-25 PROCEDURE — 87186 SC STD MICRODIL/AGAR DIL: CPT

## 2021-05-25 PROCEDURE — 87077 CULTURE AEROBIC IDENTIFY: CPT

## 2021-05-25 PROCEDURE — 74018 RADEX ABDOMEN 1 VIEW: CPT

## 2021-05-25 PROCEDURE — 2580000003 HC RX 258: Performed by: SURGERY

## 2021-05-25 PROCEDURE — 83605 ASSAY OF LACTIC ACID: CPT

## 2021-05-25 PROCEDURE — 51702 INSERT TEMP BLADDER CATH: CPT

## 2021-05-25 PROCEDURE — 87086 URINE CULTURE/COLONY COUNT: CPT

## 2021-05-25 PROCEDURE — 85025 COMPLETE CBC W/AUTO DIFF WBC: CPT

## 2021-05-25 PROCEDURE — 80053 COMPREHEN METABOLIC PANEL: CPT

## 2021-05-25 PROCEDURE — 2500000003 HC RX 250 WO HCPCS: Performed by: FAMILY MEDICINE

## 2021-05-25 PROCEDURE — 6360000002 HC RX W HCPCS: Performed by: FAMILY MEDICINE

## 2021-05-25 PROCEDURE — 6360000002 HC RX W HCPCS: Performed by: SURGERY

## 2021-05-25 PROCEDURE — 82947 ASSAY GLUCOSE BLOOD QUANT: CPT

## 2021-05-25 PROCEDURE — 51798 US URINE CAPACITY MEASURE: CPT

## 2021-05-25 PROCEDURE — 94664 DEMO&/EVAL PT USE INHALER: CPT

## 2021-05-25 PROCEDURE — 2500000003 HC RX 250 WO HCPCS: Performed by: SURGERY

## 2021-05-25 PROCEDURE — 1200000000 HC SEMI PRIVATE

## 2021-05-25 PROCEDURE — 6370000000 HC RX 637 (ALT 250 FOR IP): Performed by: FAMILY MEDICINE

## 2021-05-25 RX ORDER — POTASSIUM CHLORIDE 7.45 MG/ML
10 INJECTION INTRAVENOUS PRN
Status: DISCONTINUED | OUTPATIENT
Start: 2021-05-25 | End: 2021-05-31

## 2021-05-25 RX ORDER — 0.9 % SODIUM CHLORIDE 0.9 %
1000 INTRAVENOUS SOLUTION INTRAVENOUS ONCE
Status: COMPLETED | OUTPATIENT
Start: 2021-05-25 | End: 2021-05-25

## 2021-05-25 RX ORDER — IPRATROPIUM BROMIDE AND ALBUTEROL SULFATE 2.5; .5 MG/3ML; MG/3ML
1 SOLUTION RESPIRATORY (INHALATION)
Status: DISCONTINUED | OUTPATIENT
Start: 2021-05-25 | End: 2021-05-29

## 2021-05-25 RX ADMIN — METRONIDAZOLE 500 MG: 500 INJECTION, SOLUTION INTRAVENOUS at 09:18

## 2021-05-25 RX ADMIN — SODIUM CHLORIDE 1000 ML: 9 INJECTION, SOLUTION INTRAVENOUS at 22:30

## 2021-05-25 RX ADMIN — FENTANYL CITRATE 50 MCG: 50 INJECTION INTRAMUSCULAR; INTRAVENOUS at 02:16

## 2021-05-25 RX ADMIN — IPRATROPIUM BROMIDE AND ALBUTEROL SULFATE 1 AMPULE: .5; 3 SOLUTION RESPIRATORY (INHALATION) at 14:57

## 2021-05-25 RX ADMIN — SODIUM CHLORIDE: 9 INJECTION, SOLUTION INTRAVENOUS at 21:16

## 2021-05-25 RX ADMIN — FENTANYL CITRATE 50 MCG: 50 INJECTION INTRAMUSCULAR; INTRAVENOUS at 09:10

## 2021-05-25 RX ADMIN — POTASSIUM CHLORIDE 10 MEQ: 7.46 INJECTION, SOLUTION INTRAVENOUS at 02:01

## 2021-05-25 RX ADMIN — FENTANYL CITRATE 25 MCG: 50 INJECTION INTRAMUSCULAR; INTRAVENOUS at 22:33

## 2021-05-25 RX ADMIN — FENTANYL CITRATE 50 MCG: 50 INJECTION INTRAMUSCULAR; INTRAVENOUS at 12:16

## 2021-05-25 RX ADMIN — SODIUM CHLORIDE: 9 INJECTION, SOLUTION INTRAVENOUS at 00:27

## 2021-05-25 RX ADMIN — METRONIDAZOLE 500 MG: 500 INJECTION, SOLUTION INTRAVENOUS at 15:41

## 2021-05-25 RX ADMIN — IPRATROPIUM BROMIDE AND ALBUTEROL SULFATE 1 AMPULE: .5; 3 SOLUTION RESPIRATORY (INHALATION) at 18:51

## 2021-05-25 RX ADMIN — CEFEPIME 2000 MG: 2 INJECTION, POWDER, FOR SOLUTION INTRAVENOUS at 07:47

## 2021-05-25 RX ADMIN — POTASSIUM CHLORIDE 10 MEQ: 7.46 INJECTION, SOLUTION INTRAVENOUS at 05:06

## 2021-05-25 RX ADMIN — FAMOTIDINE 20 MG: 10 INJECTION, SOLUTION INTRAVENOUS at 00:27

## 2021-05-25 RX ADMIN — CEFEPIME 2000 MG: 2 INJECTION, POWDER, FOR SOLUTION INTRAVENOUS at 18:20

## 2021-05-25 RX ADMIN — FENTANYL CITRATE 50 MCG: 50 INJECTION INTRAMUSCULAR; INTRAVENOUS at 05:05

## 2021-05-25 RX ADMIN — ENOXAPARIN SODIUM 40 MG: 40 INJECTION SUBCUTANEOUS at 00:32

## 2021-05-25 RX ADMIN — METRONIDAZOLE 500 MG: 500 INJECTION, SOLUTION INTRAVENOUS at 00:28

## 2021-05-25 RX ADMIN — FAMOTIDINE 20 MG: 10 INJECTION, SOLUTION INTRAVENOUS at 09:10

## 2021-05-25 RX ADMIN — FAMOTIDINE 20 MG: 10 INJECTION, SOLUTION INTRAVENOUS at 21:16

## 2021-05-25 RX ADMIN — POTASSIUM CHLORIDE 10 MEQ: 7.46 INJECTION, SOLUTION INTRAVENOUS at 03:45

## 2021-05-25 RX ADMIN — SODIUM CHLORIDE: 9 INJECTION, SOLUTION INTRAVENOUS at 09:19

## 2021-05-25 RX ADMIN — FENTANYL CITRATE 50 MCG: 50 INJECTION INTRAMUSCULAR; INTRAVENOUS at 15:49

## 2021-05-25 ASSESSMENT — PAIN DESCRIPTION - PAIN TYPE
TYPE: ACUTE PAIN

## 2021-05-25 ASSESSMENT — PAIN SCALES - GENERAL
PAINLEVEL_OUTOF10: 0
PAINLEVEL_OUTOF10: 2
PAINLEVEL_OUTOF10: 0
PAINLEVEL_OUTOF10: 8
PAINLEVEL_OUTOF10: 2
PAINLEVEL_OUTOF10: 8
PAINLEVEL_OUTOF10: 9
PAINLEVEL_OUTOF10: 2
PAINLEVEL_OUTOF10: 7
PAINLEVEL_OUTOF10: 4
PAINLEVEL_OUTOF10: 8

## 2021-05-25 ASSESSMENT — PAIN DESCRIPTION - LOCATION
LOCATION: ARM;SHOULDER
LOCATION: BACK
LOCATION: ABDOMEN
LOCATION: ARM;SHOULDER
LOCATION: ABDOMEN

## 2021-05-25 ASSESSMENT — ENCOUNTER SYMPTOMS
NAUSEA: 1
DIARRHEA: 1
CONSTIPATION: 0
COUGH: 0
TROUBLE SWALLOWING: 1
ABDOMINAL PAIN: 1
SHORTNESS OF BREATH: 0
RHINORRHEA: 0
SORE THROAT: 0
BLOOD IN STOOL: 0
VOMITING: 1

## 2021-05-25 ASSESSMENT — PAIN SCALES - WONG BAKER: WONGBAKER_NUMERICALRESPONSE: 4

## 2021-05-25 ASSESSMENT — PAIN DESCRIPTION - ORIENTATION: ORIENTATION: RIGHT

## 2021-05-25 NOTE — PLAN OF CARE
Problem: Falls - Risk of:  Goal: Will remain free from falls  Description: Will remain free from falls  5/25/2021 0458 by James Mayo RN  Outcome: Met This Shift  5/25/2021 0457 by James Mayo RN  Outcome: Ongoing  Goal: Absence of physical injury  Description: Absence of physical injury  5/25/2021 0458 by James Mayo RN  Outcome: Met This Shift  5/25/2021 0457 by James Mayo RN  Outcome: Ongoing     Problem: SAFETY  Goal: Free from accidental physical injury  Outcome: Met This Shift  Goal: Free from intentional harm  Outcome: Met This Shift

## 2021-05-25 NOTE — PROGRESS NOTES
Comprehensive Nutrition Assessment    Type and Reason for Visit:  Initial, Positive Nutrition Screen (H/O Tube feed, wound, N/V)    Nutrition Recommendations/Plan: Recommend Vital AF (semielemental) with goal of 70 ml per hour to provide 2016 kcal, 126 g protein. Nutrition Assessment:  Pt admitted from ECF due to SBO. G tube is currently to gravity. N/V 1 day prior to admit noted. Pt does take some po at [facility]. Malnutrition Assessment:  Malnutrition Status: At risk for malnutrition (Comment)    Context:  Chronic Illness     Findings of the 6 clinical characteristics of malnutrition:  Energy Intake:  Unable to assess  Weight Loss:  Unable to assess     Body Fat Loss:  Unable to assess     Muscle Mass Loss:  Unable to assess    Fluid Accumulation:  1 - Mild Extremities   Strength:  Not Performed    Estimated Daily Nutrient Needs:  Energy (kcal):   [place] x 1.2= 2000 kcal; Weight Used for Energy Requirements:  Admission     Protein (g):  1.5g/kg= 135 g protein; Weight Used for Protein Requirements:  Ideal          Nutrition Related Findings:  trace edema BLE, Labs (5/25) Glu 146, POC Glu 116-124, Meds: Reviewed, PMH: CVA, CKD, COPD, Lung Mass      Wounds:   (possible wound at sacrum)       Current Nutrition Therapies:    Diet NPO Effective Now    Anthropometric Measures:  · Height: 6' 4\" (193 cm)  · Current Body Weight: 184 lb (83.5 kg)   · Admission Body Weight: 184 lb (83.5 kg)    · Ideal Body Weight: 202 lbs;BMI: 22.4  · BMI Categories: Normal Weight (BMI 22.0 to 24.9) age over 72       Nutrition Diagnosis:   · Inadequate oral intake related to swallowing difficulty as evidenced by NPO or clear liquid status due to medical condition, nutrition support - enteral nutrition    Nutrition Interventions:   Food and/or Nutrient Delivery:  Start Tube Feeding  Nutrition Education/Counseling:  No recommendation at this time   Coordination of Nutrition Care:  Continue to monitor while inpatient    Goals:  provide more than 75% of nutrition needs       Nutrition Monitoring and Evaluation:   Food/Nutrient Intake Outcomes:  Enteral Nutrition Intake/Tolerance  Physical Signs/Symptoms Outcomes:  Biochemical Data, GI Status, Skin, Weight, Fluid Status or Edema     Discharge Planning:    Enteral Nutrition     Electronically signed by Yolanda Burt RD, HEVER on 5/25/21 at 3:01 PM EDT    Contact: [phone]

## 2021-05-25 NOTE — CARE COORDINATION
CASE MANAGEMENT NOTE:    Admission Date:  5/24/2021 Kecia Sawyer is a 77 y.o.  male    Admitted for : Small bowel obstruction ([facility] [address] [K56.199]    Met with:  Patient    PCP:  Dr. Roslynn Prader:  [employer]      Is patient alert and oriented at time of discussion:  Spoke with family     Current Residence/ Living Arrangements:  independently at home             Current Services PTA:  Yes    Does patient go to outpatient dialysis: No  If yes, location and chair time:     Is patient agreeable to VNS: Yes    Freedom of choice provided:  yes    List of [address] provided: NA    VNS chosen:  Yes    DME:  scooter and other hospital bed    Home Oxygen: No    Nebulizer: No    CPAP/BIPAP: No    Supplier: N/A    Potential Assistance Needed: No    SNF needed: No    Freedom of choice and list provided: No    Pharmacy:  [facility] on [address]       Does Patient want to use MEDS to BEDS? No    Is patient currently receiving oral anticoagulation therapy? No    Is the Patient an [facility] with Readmission Risk Score greater than 14%? Yes  If yes, pt needs a follow up appointment made within 7 days. Family Members/Caregivers that pt would like involved in their care:    Yes    If yes, list name here:  Melissa Vásquez    Transportation Provider:  Family             Discharge Plan:  5/25/21 Westonkusumnoreen Smith Pt is from home in a one story home alone his family stays with his 24/7 he has a motorized wheelchair and a hospital bed pt has a [facility] JAN is started and needs signed . //tv    Electronically signed by:  Pierre Andrew RN on 5/25/2021 at 1:49 PM

## 2021-05-25 NOTE — FLOWSHEET NOTE
05/25/21 1550   Encounter Summary   Services provided to: Patient   Referral/Consult From: Rounding   Complexity of Encounter Low   Length of Encounter 15 minutes   Spiritual/Restoration   Type Spiritual support   Assessment Sleeping   Intervention Prayer   Outcome Did not respond

## 2021-05-25 NOTE — PROGRESS NOTES
Indwelling Issa catheter was placed at this time observing sterile technique for retention. Sample will be sent for UA culture and sensitivity.

## 2021-05-25 NOTE — CONSULTS
[address] [facility]                 [facility], [address]                                  CONSULTATION    PATIENT NAME: Jarett Hudson                       :        1945  MED REC NO:   [id number]                              ROOM:       2105  ACCOUNT NO:   [de-identified]                           ADMIT DATE: 2021  PROVIDER:     Izabella Fournier    CONSULT DATE:  2021    REASON FOR CONSULTATION:  Lung mass, COPD. HISTORY OF PRESENT ILLNESS:  The patient is a 71-year-old male. He is  not able to give the history because he reportedly has a history of  expressive aphasia. The patient presented to the hospital because of  nausea and emesis. Reportedly, he had some abdominal pain with episodes  of nonbloody emesis. The patient was admitted. CT scan of the abdomen  and pelvis revealed findings consistent with a closed loop small bowel  obstruction with transitions only in the right abdomen. It also showed  a 3.2 cm right infrahilar lymph node, mass with surrounding patchy  consolidation. We have been asked to help and assist the patient's  evaluation. PAST MEDICAL HISTORY:  Notable for a diagnosis of history of arthritis,  history of bladder cancer ______, history of CVA, history of chronic  kidney disease, history of COPD, hypertension, pneumonia, history of  seizures. SOCIAL HISTORY:  Reportedly, he has a former smoking history, quitting  18 months ago. He has a 60-pack history. CURRENT MEDICATIONS:  The patient's home medications does include  Trital, Pepcid, aspirin, potassium chloride, also has theophylline,  _____, albuterol HFA. REVIEW OF SYSTEMS:  Not obtainable at this time. PHYSICAL EXAMINATION:  GENERAL:  The patient is a stable-appearing 71-year-old male resting  quietly. VITAL SIGNS:  Blood pressure is 129/60, pulse 79, respirations 20,  temperature 98.1, O2 saturations 96% on 2L. His BMI is 23.5.   HEENT:  No supraclavicular lymph node enlargement. LUNGS:  Clear posteriorly. No crackles, rales, or wheezes. CARDIOVASCULAR:  Regular rhythm. ABDOMEN:  Soft. EXTREMITIES:  No edema. LABORATORY RESULTS:  BUN and creatinine 41/0.9. Albumin 3.2, white  count 18.9, hemoglobin 11.7, platelet count 775. IMPRESSION:  1. The patient with evidence of small bowel obstruction. 2.  COPD by history. 3.  3.2 cm infrahilar right lesion. PLAN:  1. Check CT scan of the chest with IV contrast tomorrow. We did hold  off on CT scan with contrast yesterday. 2.  Continue with COPD medications. 3.  Continue with Lovenox. 4.  Further recommendations pending. The patient has home medication  called Pletal and it has been held. If the bronchoscopy is required,  the patient's Pletal may need to be held for 5 to 7 days prior to  potential bronchoscopy. We will follow the patient in-house and thank  you Dr. Maggy Landry for the consult.         Megan Jean Baptiste    D: 05/25/2021 15:30:05       T: 05/25/2021 19:35:42     [email or web address]  Job#: [id number]     Doc#: [id number]    CC:

## 2021-05-25 NOTE — PROGRESS NOTES
RN spoke with Dr. Timur Houser and updated her on patient's decreased urine output. Bladder scan showed 586 mL retention. Dr. Timur Houser stated to place a catheter francis at this time. Orders entered at this time.

## 2021-05-25 NOTE — H&P
History and Physical      Name: Luana Euceda  MRN: [id number]     [place]: [de-identified]  Room: [facility]-    Admit Date: 5/24/2021  PCP: Dago Zhang MD      Chief Complaint:     Chief Complaint   Patient presents with    Nausea    Emesis       History Obtained From:     patient, family member - son, electronic medical record  Patient is mumbling incoherently unable to obtain HPI or review of systems per patient    History of Present Illness:      Luana Euceda is a  77 y.o.  male cerebral CVA, G-tube in place presents with Nausea and Emesis  Per son patient started having abdominal pain with episodes of nonbloody emesis since yesterday. Abdominal pain was generalized. There have been no reports of hematemesis. Per son there have been no reports of cough shortness of breath, fever. This morning patient was noted to have difficulty urinating  bladder scan showed 586 mL of urine retention    Past Medical History:     Past Medical History:   Diagnosis Date    Arthritis     Cancer [facility])     bladder 1980s    Cerebral artery occlusion with cerebral infarction [facility])     TIA 2010    Chronic kidney disease     COPD (chronic obstructive pulmonary disease) ([address]     Hypertension     Pneumonia     Psychiatric problem     depression, social anxiety    Seizures ([address]         Past Surgical History:     Past Surgical History:   Procedure Laterality Date    ABDOMEN SURGERY      BACK SURGERY      spinal surgery 2005     CAROTID ENDARTERECTOMY Left 09/20/2016    COLONOSCOPY N/A 8/31/2018    COLONOSCOPY POLYPECTOMY COLD BIOPSY performed by Kelly Hernandez MD at [address]      left face    GASTROSTOMY TUBE PLACEMENT  04/15/2020    HERNIA REPAIR      HIATAL HERNIA REPAIR      INGUINAL HERNIA REPAIR Bilateral     OTHER SURGICAL HISTORY      mesh infected in inguinal canal, had to remove. Removed testiscle at this time.      TONSILLECTOMY      UPPER GASTROINTESTINAL ENDOSCOPY 04/15/2020       EGD CONTROL HEMORRHAGE    UPPER GASTROINTESTINAL ENDOSCOPY  4/15/2020    EGD CONTROL HEMORRHAGE performed by Katherene Primrose, MD at [address] [place] [address] ENDOSCOPY  4/15/2020    EGD ESOPHAGOGASTRODUODENOSCOPY PEG TUBE INSERTION performed by Katherene Primrose, MD at [facility]        Medications Prior to Admission:       Prior to Admission medications    Medication Sig Start Date End Date Taking? Authorizing Provider   cilostazol (PLETAL) 100 MG tablet Take 100 mg by mouth 2 times daily   Yes Historical Provider, MD   famotidine (PEPCID) 20 MG tablet Take 20 mg by mouth 2 times daily   Yes Historical Provider, MD   aspirin 325 MG tablet Take 325 mg by mouth daily   Yes Historical Provider, MD   potassium chloride (MICRO-K) 10 MEQ extended release capsule Take 20 mEq by mouth daily   Yes Historical Provider, MD   theophylline 80 MG/15ML elixir Take 18.8 mLs by mouth every 8 hours 4/6/20  Yes Prudence Urrutia MD   lisinopril-hydrochlorothiazide (PRINZIDE;ZESTORETIC) 10-12.5 MG per tablet Take 1 tablet by mouth daily 7/24/19  Yes Radha Copeland MD   atorvastatin (LIPITOR) 40 MG tablet Take 1 tablet by mouth daily 7/24/19  Yes Radha Copeland MD   albuterol sulfate  (90 Base) MCG/ACT inhaler Inhale 2 puffs into the lungs every 6 hours as needed for Wheezing 6/27/19  Yes Radha Copeland MD   gabapentin (NEURONTIN) 800 MG tablet Take 800 mg by mouth 3 times daily. 1/16/19  Yes Historical Provider, MD        Allergies:       Bactrim [sulfamethoxazole-trimethoprim] and Ciprofloxacin    Social History:     Tobacco:    reports that he quit smoking about 13 months ago. His smoking use included cigarettes. He started smoking about 65 years ago. He has a 60.00 pack-year smoking history. He has never used smokeless tobacco.  Alcohol:      reports no history of alcohol use. Drug Use:  reports no history of drug use.     Family History:     Family History   Problem Relation Age of Onset    Arthritis Mother     Atrial Fibrillation Mother     Hearing Loss Mother     Heart Disease Mother     Stroke Mother     Vision Loss Mother     Arthritis Father     Cancer Father     Heart Disease Father     High Blood Pressure Father     Stroke Father     Vision Loss Father        Review of Systems:     Positive and Negative as described in HPI   all 10 systems are reviewed and negative except as Noted      Review of Systems   Unable to perform ROS: Patient nonverbal   Constitutional: Negative for fever. HENT: Positive for trouble swallowing. Negative for tinnitus. Respiratory: Negative for shortness of breath. Gastrointestinal: Positive for abdominal pain, nausea and vomiting. Genitourinary: Positive for difficulty urinating. Code Status:  Full Code    Physical Exam:     Vitals:  /62   Pulse 79   Temp 97.9 °F (36.6 °C) (Oral)   Resp 20   Ht 6' 4\" (1.93 m)   Wt 184 lb 4.8 oz (83.6 kg)   SpO2 90%   BMI 22.43 kg/m²   Temp (24hrs), Av.7 °F (36.5 °C), Min:97.5 °F (36.4 °C), Max:98.1 °F (36.7 °C)        Physical Exam  Vitals reviewed. Constitutional:       Appearance: Normal appearance. He is not diaphoretic. HENT:      Head: Normocephalic and atraumatic. Right Ear: External ear normal.      Left Ear: External ear normal.      Nose: Nose normal.      Mouth/Throat:      Mouth: Mucous membranes are moist.      Pharynx: Oropharynx is clear. Eyes:      Conjunctiva/sclera: Conjunctivae normal.   Cardiovascular:      Rate and Rhythm: Normal rate and regular rhythm. Pulses: Normal pulses. Heart sounds: Normal heart sounds. Pulmonary:      Effort: Pulmonary effort is normal.      Breath sounds: Normal breath sounds. Abdominal:      General: Bowel sounds are normal. There is no distension. Palpations: Abdomen is soft. Tenderness: There is abdominal tenderness (mid Abdomen). There is no right CVA tenderness or left CVA tenderness. Comments: G-tube in placed to gravity   Musculoskeletal:         General: No tenderness or deformity. Normal range of motion. Cervical back: Normal range of motion and neck supple. No rigidity. Skin:     General: Skin is warm and dry. Capillary Refill: Capillary refill takes less than 2 seconds. Coloration: Skin is not jaundiced. Psychiatric:         Behavior: Behavior normal.               Data:     Recent Results (from the past 24 hour(s))   COVID-19, Rapid    Collection Time: 05/24/21  4:29 PM    Specimen: Nasopharyngeal Swab   Result Value Ref Range    Specimen Description . NASOPHARYNGEAL SWAB     SARS-CoV-2, Rapid Not Detected Not Detected   CBC Auto Differential    Collection Time: 05/24/21  4:30 PM   Result Value Ref Range    WBC 12.8 (H) 3.5 - 11.0 k/uL    RBC 4.08 (L) 4.5 - 5.9 m/uL    Hemoglobin 11.9 (L) 13.5 - 17.5 g/dL    Hematocrit 35.5 (L) 41 - 53 %    MCV 87.0 80 - 100 fL    MCH 29.1 26 - 34 pg    MCHC 33.5 31 - 37 g/dL    RDW 13.7 11.5 - 14.9 %    Platelets 321 346 - 949 k/uL    MPV 6.7 6.0 - 12.0 fL    NRBC Automated NOT REPORTED per 100 WBC    Differential Type NOT REPORTED     Seg Neutrophils 88 (H) 36 - 66 %    Lymphocytes 8 (L) 24 - 44 %    Monocytes 4 1 - 7 %    Eosinophils % 0 0 - 4 %    Basophils 0 0 - 2 %    Immature Granulocytes NOT REPORTED 0 %    Segs Absolute 11.30 (H) 1.3 - 9.1 k/uL    Absolute Lymph # 1.00 1.0 - 4.8 k/uL    Absolute Mono # 0.50 0.1 - 1.3 k/uL    Absolute Eos # 0.00 0.0 - 0.4 k/uL    Basophils Absolute 0.00 0.0 - 0.2 k/uL    Absolute Immature Granulocyte NOT REPORTED 0.00 - 0.30 k/uL    WBC Morphology NOT REPORTED     RBC Morphology NOT REPORTED     Platelet Estimate NOT REPORTED    Comprehensive Metabolic Panel w/ Reflex to MG    Collection Time: 05/24/21  4:30 PM   Result Value Ref Range    Glucose 172 (H) 70 - 99 mg/dL    BUN 35 (H) 8 - 23 mg/dL    CREATININE 0.82 0.70 - 1.20 mg/dL    Bun/Cre Ratio NOT REPORTED 9 - 20    Calcium 9.5 8.6 - 10.4 None   POC Glucose Fingerstick    Collection Time: 05/24/21 10:46 PM   Result Value Ref Range    POC Glucose 145 (H) 75 - 110 mg/dL   Comprehensive Metabolic Panel w/ Reflex to MG    Collection Time: 05/25/21  5:35 AM   Result Value Ref Range    Glucose 146 (H) 70 - 99 mg/dL    BUN 41 (H) 8 - 23 mg/dL    CREATININE 0.90 0.70 - 1.20 mg/dL    Bun/Cre Ratio NOT REPORTED 9 - 20    Calcium 8.5 (L) 8.6 - 10.4 mg/dL    Sodium 142 135 - 144 mmol/L    Potassium 4.4 3.7 - 5.3 mmol/L    Chloride 109 (H) 98 - 107 mmol/L    CO2 18 (L) 20 - 31 mmol/L    Anion Gap 15 9 - 17 mmol/L    Alkaline Phosphatase 59 40 - 129 U/L    ALT 11 5 - 41 U/L    AST 18 <40 U/L    Total Bilirubin 0.19 (L) 0.3 - 1.2 mg/dL    Total Protein 6.7 6.4 - 8.3 g/dL    Albumin 3.2 (L) 3.5 - 5.2 g/dL    Albumin/Globulin Ratio NOT REPORTED 1.0 - 2.5    GFR Non-African American >60 >60 mL/min    GFR African American >60 >60 mL/min    GFR Comment          GFR Staging NOT REPORTED    CBC with DIFF    Collection Time: 05/25/21  5:35 AM   Result Value Ref Range    WBC 18.9 (H) 3.5 - 11.0 k/uL    RBC 3.97 (L) 4.5 - 5.9 m/uL    Hemoglobin 11.7 (L) 13.5 - 17.5 g/dL    Hematocrit 35.3 (L) 41 - 53 %    MCV 88.9 80 - 100 fL    MCH 29.5 26 - 34 pg    MCHC 33.2 31 - 37 g/dL    RDW 13.7 11.5 - 14.9 %    Platelets 974 050 - 734 k/uL    MPV 6.7 6.0 - 12.0 fL    NRBC Automated NOT REPORTED per 100 WBC    Differential Type NOT REPORTED     Immature Granulocytes NOT REPORTED 0 %    Absolute Immature Granulocyte NOT REPORTED 0.00 - 0.30 k/uL    WBC Morphology NOT REPORTED     RBC Morphology NOT REPORTED     Platelet Estimate NOT REPORTED     Seg Neutrophils 89 (H) 36 - 66 %    Lymphocytes 6 (L) 24 - 44 %    Monocytes 4 1 - 7 %    Eosinophils % 0 0 - 4 %    Basophils 1 0 - 2 %    Segs Absolute 16.82 (H) 1.3 - 9.1 k/uL    Absolute Lymph # 1.13 1.0 - 4.8 k/uL    Absolute Mono # 0.76 0.1 - 1.3 k/uL    Absolute Eos # 0.00 0.0 - 0.4 k/uL    Basophils Absolute 0.19 0.0 - 0.2 k/uL Morphology ANISOCYTOSIS PRESENT     Morphology FEW TEARDROPS    POC Glucose Fingerstick    Collection Time: 05/25/21  6:53 AM   Result Value Ref Range    POC Glucose 116 (H) 75 - 110 mg/dL   Urinalysis Reflex to Culture    Collection Time: 05/25/21  8:10 AM    Specimen: Urine, indwelling catheter   Result Value Ref Range    Color, UA YELLOW YELLOW    Turbidity UA CLEAR CLEAR    Glucose, Ur NEGATIVE NEGATIVE    Bilirubin Urine NEGATIVE NEGATIVE    Ketones, Urine NEGATIVE NEGATIVE    Specific Gravity, UA 1.060 (H) 1.000 - 1.030    Urine Hgb NEGATIVE NEGATIVE    pH, UA 5.0 5.0 - 8.0    Protein, UA TRACE (A) NEGATIVE    Urobilinogen, Urine Normal Normal    Nitrite, Urine NEGATIVE NEGATIVE    Leukocyte Esterase, Urine SMALL (A) NEGATIVE    Urinalysis Comments NOT REPORTED    Microscopic Urinalysis    Collection Time: 05/25/21  8:10 AM   Result Value Ref Range    -          WBC, UA 10 TO 20 /HPF    RBC, UA 0 TO 2 /HPF    Casts UA HYALINE /LPF    Casts UA 0 TO 2 /LPF    Crystals, UA NOT REPORTED None /HPF    Epithelial Cells UA 0 TO 2 /HPF    Renal Epithelial, UA NOT REPORTED 0 /HPF    Bacteria, UA FEW (A) None    Mucus, UA NOT REPORTED None    Trichomonas, UA NOT REPORTED None    Amorphous, UA NOT REPORTED None    Other Observations UA NOT REPORTED NOT REQ. Yeast, UA NOT REPORTED None   POC Glucose Fingerstick    Collection Time: 05/25/21 11:10 AM   Result Value Ref Range    POC Glucose 124 (H) 75 - 110 mg/dL       Assesment:     Primary Problem  Small bowel obstruction (HCC)    Principal Problem:    Small bowel obstruction (HCC)  Active Problems:    Status post insertion of percutaneous endoscopic gastrostomy (PEG) tube (HCC)    Acute cystitis without hematuria    Mass of right lung    COPD (chronic obstructive pulmonary disease) ([facility])  Resolved Problems:    * No resolved hospital problems. *      Plan:     1. IV normal saline at 125 mL/h  2. IV cefepime  3. IV Flagyl  4. G-tube to gravity  5.  Consult general surgery  6. CT abdominal pelvis report reviewed  7. Consult pulmonology for right lung mass  8. Issa catheter  9. Nebulizer treatment every 4 hours as needed for shortness of breath  10. CBC, BMP  11. Discussed with patient and son who was present in room during the exam  12. Urine culture  13. Blood culture x2  14. DVT prophylaxis Lovenox 40 mg subcu daily  15.   EPCs  16.  check and replace electrolytes per sliding scale  17.  restart home medications        Electronically signed by Kacey Sr MD     Copy sent to Dr. Surya Patterson MD

## 2021-05-25 NOTE — PLAN OF CARE
Nutrition Problem #1: Inadequate oral intake  Intervention: Food and/or Nutrient Delivery: Start Tube Feeding  Nutritional Goals: provide more than 75% of nutrition needs

## 2021-05-25 NOTE — PROGRESS NOTES
Patient's sister Burkett Bourbon called for an update, at this time all questions and concerns were addressed. This writer explained that if there are any changes in condition or plan of care the sister will be updated.

## 2021-05-25 NOTE — PROGRESS NOTES
Patient began to vomit and was unable to clear the vomit from his mouth and required suctioning. RN also noted that patient did have a small amount of bloody sputum present after suctioning. RN examined patient's mouth which did not show any evidence of further bleeding, cuts or scrapes. Patient is complaining that mouth and throat are dry. Frequent swabbing performed by this RN and SEGUN Savage.

## 2021-05-25 NOTE — PROGRESS NOTES
Medication History completed:    New medications: famotidine, cilostazol    Medications discontinued: furosemide    Changes to dosing:   Potassium chloride changed to ER capsules taking 20 mEq (two capsules) daily  Aspirin changed to 325 mg daily    Stated allergies: As listed    Other pertinent information: Medications confirmed with [facility].      Thank you,  Lowell Francisco, PharmD, BCPS  [phone]

## 2021-05-25 NOTE — ED NOTES
Report given to Blayne Mccurdy RN from U. Report method by phone   The following was reviewed with receiving RN:   Current vital signs:  BP (!) 129/98   Pulse (!) 37   Temp 97.6 °F (36.4 °C) (Axillary)   Resp 19   SpO2 98%                      Any medication or safety alerts were reviewed. Any pending diagnostics and notifications were also reviewed, as well as any safety concerns or issues, abnormal labs, abnormal imaging, and abnormal assessment findings. Questions were answered.             Tatiana Love RN  05/24/21 [id number]

## 2021-05-25 NOTE — PROGRESS NOTES
The patient's sister, [facility], is at bedside and requested an update. At this time this writer attended the bedside and updated Laura on the plan of care so far. It was noticed at this time that the patient's sister had ice chips in a cup with a spoon on the patient's bed. It was reiterated to the patient and the sister that the patient is absolutely not allowed to have any ice chips at this time. The patient is unable to swallow d/t previous CVA and the patient has failed multiple swallow studies. The patient's sister at this time stated \"we do it all the time at home and you can just look away. \" This writer then explained that it was possible for the patient to aspirate and develop pneumonia should he continue to try to have ice chips or fluids and that we would have to place a telesitter in the room to make sure that the patient did not have these types of substances while inpatient. The patient's sister stated understanding at this time, however will need re-education, as well as a watchful eye to make sure that no one is giving the patient anything by mouth at this time due to aspiration risk. NPO sign displayed at door, as well as on white board. Will continue to monitor closely.

## 2021-05-25 NOTE — PROGRESS NOTES
This writer agrees with all charted information by SEGUN Gonzales (this is my cosign on her CTA work).  Electronically signed by Simon Alvarado RN on 5/25/2021 at 7:09 PM

## 2021-05-25 NOTE — PROGRESS NOTES
[facility]   OCCUPATIONAL THERAPY MISSED TREATMENT NOTE   INPATIENT   Date: 21  Patient Name: Leandro Paniagua       Room: 6735/2830-15  MRN: [id number]   Account #: [de-identified]    : 1945  (72 y.o.)  Gender: male                 REASON FOR MISSED TREATMENT:  Pt refused in PM due to pain.  Pt shakes his head \"no\" x 2 in regard to therapy evaluation.   -    Refusal by Patient      Tasha Oscar OT

## 2021-05-25 NOTE — PROGRESS NOTES
Physical Therapy    DATE: 2021    NAME: Bobby Carnes  MRN: [id number]   : 1945      Patient not seen this date for Physical Therapy due to:    2021 at 835- Pt out of room for a KUB per nurse Ana Lizarraga. Will check back later today if time permits.        Electronically signed by Carrington Alonso PT on 2021 at 10:40 AM

## 2021-05-26 ENCOUNTER — APPOINTMENT (OUTPATIENT)
Dept: CT IMAGING | Age: 76
DRG: 329 | End: 2021-05-26
Payer: COMMERCIAL

## 2021-05-26 ENCOUNTER — APPOINTMENT (OUTPATIENT)
Dept: GENERAL RADIOLOGY | Age: 76
DRG: 329 | End: 2021-05-26
Payer: COMMERCIAL

## 2021-05-26 LAB
ABSOLUTE BANDS #: 0.63 K/UL (ref 0–1)
ABSOLUTE EOS #: 0 K/UL (ref 0–0.4)
ABSOLUTE IMMATURE GRANULOCYTE: ABNORMAL K/UL (ref 0–0.3)
ABSOLUTE LYMPH #: 0.63 K/UL (ref 1–4.8)
ABSOLUTE MONO #: 0.94 K/UL (ref 0.1–1.3)
ALBUMIN SERPL-MCNC: 3 G/DL (ref 3.5–5.2)
ALBUMIN/GLOBULIN RATIO: ABNORMAL (ref 1–2.5)
ALP BLD-CCNC: 48 U/L (ref 40–129)
ALT SERPL-CCNC: 8 U/L (ref 5–41)
ANION GAP SERPL CALCULATED.3IONS-SCNC: 12 MMOL/L (ref 9–17)
AST SERPL-CCNC: 14 U/L
BANDS: 4 % (ref 0–10)
BASOPHILS # BLD: 0 % (ref 0–2)
BASOPHILS ABSOLUTE: 0 K/UL (ref 0–0.2)
BILIRUB SERPL-MCNC: 0.23 MG/DL (ref 0.3–1.2)
BUN BLDV-MCNC: 49 MG/DL (ref 8–23)
BUN/CREAT BLD: ABNORMAL (ref 9–20)
CALCIUM SERPL-MCNC: 8 MG/DL (ref 8.6–10.4)
CHLORIDE BLD-SCNC: 114 MMOL/L (ref 98–107)
CO2: 18 MMOL/L (ref 20–31)
CREAT SERPL-MCNC: 1.05 MG/DL (ref 0.7–1.2)
CULTURE: ABNORMAL
CULTURE: ABNORMAL
DIFFERENTIAL TYPE: ABNORMAL
EOSINOPHILS RELATIVE PERCENT: 0 % (ref 0–4)
GFR AFRICAN AMERICAN: >60 ML/MIN
GFR NON-AFRICAN AMERICAN: >60 ML/MIN
GFR SERPL CREATININE-BSD FRML MDRD: ABNORMAL ML/MIN/{1.73_M2}
GFR SERPL CREATININE-BSD FRML MDRD: ABNORMAL ML/MIN/{1.73_M2}
GLUCOSE BLD-MCNC: 105 MG/DL (ref 75–110)
GLUCOSE BLD-MCNC: 110 MG/DL (ref 70–99)
GLUCOSE BLD-MCNC: 85 MG/DL (ref 75–110)
GLUCOSE BLD-MCNC: 96 MG/DL (ref 75–110)
HCT VFR BLD CALC: 26.7 % (ref 41–53)
HCT VFR BLD CALC: 28.4 % (ref 41–53)
HEMOGLOBIN: 8.7 G/DL (ref 13.5–17.5)
HEMOGLOBIN: 8.8 G/DL (ref 13.5–17.5)
IMMATURE GRANULOCYTES: ABNORMAL %
LACTIC ACID: 1.5 MMOL/L (ref 0.5–2.2)
LYMPHOCYTES # BLD: 4 % (ref 24–44)
Lab: ABNORMAL
Lab: ABNORMAL
MCH RBC QN AUTO: 29.1 PG (ref 26–34)
MCHC RBC AUTO-ENTMCNC: 32.5 G/DL (ref 31–37)
MCV RBC AUTO: 89.4 FL (ref 80–100)
MONOCYTES # BLD: 6 % (ref 1–7)
MORPHOLOGY: ABNORMAL
NRBC AUTOMATED: ABNORMAL PER 100 WBC
PDW BLD-RTO: 14 % (ref 11.5–14.9)
PLATELET # BLD: 260 K/UL (ref 150–450)
PLATELET ESTIMATE: ABNORMAL
PMV BLD AUTO: 6.9 FL (ref 6–12)
POTASSIUM SERPL-SCNC: 3.8 MMOL/L (ref 3.7–5.3)
RBC # BLD: 2.99 M/UL (ref 4.5–5.9)
RBC # BLD: ABNORMAL 10*6/UL
SEG NEUTROPHILS: 86 % (ref 36–66)
SEGMENTED NEUTROPHILS ABSOLUTE COUNT: 13.5 K/UL (ref 1.3–9.1)
SODIUM BLD-SCNC: 144 MMOL/L (ref 135–144)
SPECIMEN DESCRIPTION: ABNORMAL
SPECIMEN DESCRIPTION: ABNORMAL
TOTAL PROTEIN: 5.9 G/DL (ref 6.4–8.3)
WBC # BLD: 15.7 K/UL (ref 3.5–11)
WBC # BLD: ABNORMAL 10*3/UL

## 2021-05-26 PROCEDURE — 6370000000 HC RX 637 (ALT 250 FOR IP): Performed by: FAMILY MEDICINE

## 2021-05-26 PROCEDURE — 97162 PT EVAL MOD COMPLEX 30 MIN: CPT

## 2021-05-26 PROCEDURE — 1200000000 HC SEMI PRIVATE

## 2021-05-26 PROCEDURE — C9113 INJ PANTOPRAZOLE SODIUM, VIA: HCPCS | Performed by: SURGERY

## 2021-05-26 PROCEDURE — 36415 COLL VENOUS BLD VENIPUNCTURE: CPT

## 2021-05-26 PROCEDURE — 2500000003 HC RX 250 WO HCPCS: Performed by: FAMILY MEDICINE

## 2021-05-26 PROCEDURE — 80053 COMPREHEN METABOLIC PANEL: CPT

## 2021-05-26 PROCEDURE — 85025 COMPLETE CBC W/AUTO DIFF WBC: CPT

## 2021-05-26 PROCEDURE — 2580000003 HC RX 258: Performed by: FAMILY MEDICINE

## 2021-05-26 PROCEDURE — 6360000002 HC RX W HCPCS: Performed by: SURGERY

## 2021-05-26 PROCEDURE — 83605 ASSAY OF LACTIC ACID: CPT

## 2021-05-26 PROCEDURE — 97166 OT EVAL MOD COMPLEX 45 MIN: CPT

## 2021-05-26 PROCEDURE — 82947 ASSAY GLUCOSE BLOOD QUANT: CPT

## 2021-05-26 PROCEDURE — 2580000003 HC RX 258: Performed by: INTERNAL MEDICINE

## 2021-05-26 PROCEDURE — 71260 CT THORAX DX C+: CPT

## 2021-05-26 PROCEDURE — 85018 HEMOGLOBIN: CPT

## 2021-05-26 PROCEDURE — 2580000003 HC RX 258: Performed by: SURGERY

## 2021-05-26 PROCEDURE — 94761 N-INVAS EAR/PLS OXIMETRY MLT: CPT

## 2021-05-26 PROCEDURE — 74018 RADEX ABDOMEN 1 VIEW: CPT

## 2021-05-26 PROCEDURE — 6360000002 HC RX W HCPCS: Performed by: FAMILY MEDICINE

## 2021-05-26 PROCEDURE — 94640 AIRWAY INHALATION TREATMENT: CPT

## 2021-05-26 PROCEDURE — 85014 HEMATOCRIT: CPT

## 2021-05-26 RX ORDER — SODIUM CHLORIDE 9 MG/ML
10 INJECTION INTRAVENOUS 2 TIMES DAILY
Status: DISCONTINUED | OUTPATIENT
Start: 2021-05-26 | End: 2021-06-07 | Stop reason: HOSPADM

## 2021-05-26 RX ORDER — 0.9 % SODIUM CHLORIDE 0.9 %
1000 INTRAVENOUS SOLUTION INTRAVENOUS ONCE
Status: COMPLETED | OUTPATIENT
Start: 2021-05-26 | End: 2021-05-26

## 2021-05-26 RX ORDER — SODIUM CHLORIDE 0.9 % (FLUSH) 0.9 %
10 SYRINGE (ML) INJECTION PRN
Status: DISCONTINUED | OUTPATIENT
Start: 2021-05-26 | End: 2021-05-31

## 2021-05-26 RX ORDER — 0.9 % SODIUM CHLORIDE 0.9 %
80 INTRAVENOUS SOLUTION INTRAVENOUS ONCE
Status: COMPLETED | OUTPATIENT
Start: 2021-05-26 | End: 2021-05-26

## 2021-05-26 RX ORDER — PANTOPRAZOLE SODIUM 40 MG/10ML
40 INJECTION, POWDER, LYOPHILIZED, FOR SOLUTION INTRAVENOUS 2 TIMES DAILY
Status: DISCONTINUED | OUTPATIENT
Start: 2021-05-26 | End: 2021-06-07 | Stop reason: HOSPADM

## 2021-05-26 RX ADMIN — FENTANYL CITRATE 50 MCG: 50 INJECTION INTRAMUSCULAR; INTRAVENOUS at 13:46

## 2021-05-26 RX ADMIN — THEOPHYLLINE 100 MG: 80 SOLUTION ORAL at 23:11

## 2021-05-26 RX ADMIN — SODIUM CHLORIDE: 9 INJECTION, SOLUTION INTRAVENOUS at 20:07

## 2021-05-26 RX ADMIN — IPRATROPIUM BROMIDE AND ALBUTEROL SULFATE 1 AMPULE: .5; 3 SOLUTION RESPIRATORY (INHALATION) at 14:41

## 2021-05-26 RX ADMIN — IPRATROPIUM BROMIDE AND ALBUTEROL SULFATE 1 AMPULE: .5; 3 SOLUTION RESPIRATORY (INHALATION) at 10:51

## 2021-05-26 RX ADMIN — PANTOPRAZOLE SODIUM 40 MG: 40 INJECTION, POWDER, FOR SOLUTION INTRAVENOUS at 09:57

## 2021-05-26 RX ADMIN — FENTANYL CITRATE 25 MCG: 50 INJECTION INTRAMUSCULAR; INTRAVENOUS at 02:44

## 2021-05-26 RX ADMIN — CEFEPIME 2000 MG: 2 INJECTION, POWDER, FOR SOLUTION INTRAVENOUS at 06:57

## 2021-05-26 RX ADMIN — IPRATROPIUM BROMIDE AND ALBUTEROL SULFATE 1 AMPULE: .5; 3 SOLUTION RESPIRATORY (INHALATION) at 06:58

## 2021-05-26 RX ADMIN — IPRATROPIUM BROMIDE AND ALBUTEROL SULFATE 1 AMPULE: .5; 3 SOLUTION RESPIRATORY (INHALATION) at 18:53

## 2021-05-26 RX ADMIN — FENTANYL CITRATE 50 MCG: 50 INJECTION INTRAMUSCULAR; INTRAVENOUS at 17:58

## 2021-05-26 RX ADMIN — METRONIDAZOLE 500 MG: 500 INJECTION, SOLUTION INTRAVENOUS at 09:57

## 2021-05-26 RX ADMIN — CEFEPIME 2000 MG: 2 INJECTION, POWDER, FOR SOLUTION INTRAVENOUS at 17:58

## 2021-05-26 RX ADMIN — Medication 10 ML: at 20:07

## 2021-05-26 RX ADMIN — METRONIDAZOLE 500 MG: 500 INJECTION, SOLUTION INTRAVENOUS at 16:16

## 2021-05-26 RX ADMIN — Medication 10 ML: at 09:58

## 2021-05-26 RX ADMIN — SODIUM CHLORIDE, PRESERVATIVE FREE 10 ML: 5 INJECTION INTRAVENOUS at 09:12

## 2021-05-26 RX ADMIN — SODIUM CHLORIDE 1000 ML: 9 INJECTION, SOLUTION INTRAVENOUS at 03:58

## 2021-05-26 RX ADMIN — PANTOPRAZOLE SODIUM 40 MG: 40 INJECTION, POWDER, FOR SOLUTION INTRAVENOUS at 20:07

## 2021-05-26 RX ADMIN — METRONIDAZOLE 500 MG: 500 INJECTION, SOLUTION INTRAVENOUS at 00:20

## 2021-05-26 RX ADMIN — SODIUM CHLORIDE 80 ML: 9 INJECTION, SOLUTION INTRAVENOUS at 09:13

## 2021-05-26 ASSESSMENT — ENCOUNTER SYMPTOMS
ABDOMINAL PAIN: 0
TROUBLE SWALLOWING: 1
VOMITING: 0
NAUSEA: 0

## 2021-05-26 ASSESSMENT — PAIN SCALES - GENERAL
PAINLEVEL_OUTOF10: 0
PAINLEVEL_OUTOF10: 0
PAINLEVEL_OUTOF10: 2
PAINLEVEL_OUTOF10: 0
PAINLEVEL_OUTOF10: 5

## 2021-05-26 ASSESSMENT — PAIN DESCRIPTION - PROGRESSION: CLINICAL_PROGRESSION: NOT CHANGED

## 2021-05-26 ASSESSMENT — PAIN DESCRIPTION - LOCATION
LOCATION: SHOULDER;RIB CAGE
LOCATION: ABDOMEN
LOCATION: SHOULDER;RIB CAGE

## 2021-05-26 ASSESSMENT — PAIN SCALES - WONG BAKER
WONGBAKER_NUMERICALRESPONSE: 2
WONGBAKER_NUMERICALRESPONSE: 2
WONGBAKER_NUMERICALRESPONSE: 0
WONGBAKER_NUMERICALRESPONSE: 6

## 2021-05-26 ASSESSMENT — PAIN DESCRIPTION - ORIENTATION
ORIENTATION: RIGHT

## 2021-05-26 ASSESSMENT — PAIN DESCRIPTION - PAIN TYPE
TYPE: ACUTE PAIN

## 2021-05-26 ASSESSMENT — PAIN - FUNCTIONAL ASSESSMENT: PAIN_FUNCTIONAL_ASSESSMENT: PREVENTS OR INTERFERES SOME ACTIVE ACTIVITIES AND ADLS

## 2021-05-26 ASSESSMENT — PAIN DESCRIPTION - ONSET: ONSET: ON-GOING

## 2021-05-26 ASSESSMENT — PAIN DESCRIPTION - DESCRIPTORS: DESCRIPTORS: DISCOMFORT

## 2021-05-26 ASSESSMENT — PAIN DESCRIPTION - FREQUENCY: FREQUENCY: CONTINUOUS

## 2021-05-26 NOTE — CARE COORDINATION
ONGOING DISCHARGE PLAN:    Patient is alert and oriented x4. Spoke with patient regarding discharge plan and patient confirms that plan is still to discharge to home with [facility] [address] [facility] [address]  Patient having a enema done today   Recheck hbg at 1200 today      Will continue to follow for additional discharge needs.     Electronically signed by Phillip Liang RN on 5/26/2021 at 1:07 PM

## 2021-05-26 NOTE — CONSULTS
General Surgery Consult      Pt Name: Bobby Carnes  MRN: [id number]  [place]: 1945  Date of evaluation: 5/25/2021  Primary Care Physician: Ángel Fabian MD   Patient evaluated at the request of  Dr. Em Lizarraga  Reason for evaluation: nausea vomiting    SUBJECTIVE:   History of Chief Complaint:    Bobby Carnes is a 77 y.o. male who presents with nausea vomiting. patient is not verbally communicative. Patient with history of PEG tube. Family was given patient's eyes chips at home. Patient was also on tube feeds. Patient started vomiting. Patient was having diarrhea according to the family at home. He was brought to the emergency room because of nausea and vomiting. No distention no fever chills. patient with history of arthritis bladder cancer history of CVA emergency room workup reviewed. Patient has had 3 or 4 loose bowel movements today. NG output 150 mL. Urine output is 700 mL all day today. No distention. No nausea vomiting today. Symptom onset has been acute for a time period of few day(s). Severity is described as moderate. Course of his symptoms over time is acute. Past Medical History   has a past medical history of Arthritis, Cancer ([address] Cerebral artery occlusion with cerebral infarction ([address] Chronic kidney disease, COPD (chronic obstructive pulmonary disease) ([address] Hypertension, Pneumonia, Psychiatric problem, and Seizures ([address] Past Surgical History   has a past surgical history that includes back surgery; fracture surgery; hernia repair; Tonsillectomy; Carotid endarterectomy (Left, 09/20/2016); Abdomen surgery; hiatal hernia repair; Inguinal hernia repair (Bilateral); other surgical history; Colonoscopy (N/A, 8/31/2018); Upper gastrointestinal endoscopy (04/15/2020); Gastrostomy tube placement (04/15/2020); Upper gastrointestinal endoscopy (4/15/2020); and Upper gastrointestinal endoscopy (4/15/2020).     Medications  Prior to Admission medications    Medication Sig Start Date End Date Taking? Authorizing Provider   cilostazol (PLETAL) 100 MG tablet Take 100 mg by mouth 2 times daily   Yes Historical Provider, MD   famotidine (PEPCID) 20 MG tablet Take 20 mg by mouth 2 times daily   Yes Historical Provider, MD   aspirin 325 MG tablet Take 325 mg by mouth daily   Yes Historical Provider, MD   potassium chloride (MICRO-K) 10 MEQ extended release capsule Take 20 mEq by mouth daily   Yes Historical Provider, MD   theophylline 80 MG/15ML elixir Take 18.8 mLs by mouth every 8 hours 4/6/20  Yes Rjaendra Ross MD   lisinopril-hydrochlorothiazide (PRINZIDE;ZESTORETIC) 10-12.5 MG per tablet Take 1 tablet by mouth daily 7/24/19  Yes Joaquin Faith MD   atorvastatin (LIPITOR) 40 MG tablet Take 1 tablet by mouth daily 7/24/19  Yes Joaquin Faith MD   albuterol sulfate  (90 Base) MCG/ACT inhaler Inhale 2 puffs into the lungs every 6 hours as needed for Wheezing 6/27/19  Yes Joaquin Faith MD   gabapentin (NEURONTIN) 800 MG tablet Take 800 mg by mouth 3 times daily. 1/16/19  Yes Historical Provider, MD     Allergies  is allergic to bactrim [sulfamethoxazole-trimethoprim] and ciprofloxacin. Family History  family history includes Arthritis in his father and mother; Atrial Fibrillation in his mother; Cancer in his father; Hearing Loss in his mother; Heart Disease in his father and mother; High Blood Pressure in his father; Stroke in his father and mother; Vision Loss in his father and mother. Social History   reports that he quit smoking about 13 months ago. His smoking use included cigarettes. He started smoking about 65 years ago. He has a 60.00 pack-year smoking history. He has never used smokeless tobacco. He reports that he does not drink alcohol and does not use drugs. Review of Systems:  All 10 systems review was conducted. Please refer to chart. OBJECTIVE:   VITALS:  height is 6' 4\" (1.93 m) and weight is 184 lb 4.8 oz (83.6 kg).  His axillary temperature is 99.7 °F (37.6 Calcium:    Lab Results   Component Value Date    CALCIUM 8.5 05/25/2021     Magnesium:    Lab Results   Component Value Date    MG 2.1 05/24/2021     Phosphorus:    Lab Results   Component Value Date    PHOS 3.3 04/04/2020     PT/INR:    Lab Results   Component Value Date    PROTIME 14.1 04/07/2020    INR 1.1 04/07/2020     ABG:    Lab Results   Component Value Date    PHART 7.416 04/08/2019    GHV1ZII 39.7 04/08/2019    PO2ART 67.2 04/08/2019    URW2CRH 25.5 04/08/2019    G6BGXUUQ 90.6 04/08/2019     Urine Culture:  No components found for: CURINE  Blood Culture:  No components found for: CBLOOD, CFUNGUSBL  Stool Culture:  No components found for: CSTOOL    RADIOLOGY:   I have personally reviewed the following films:  XR ABDOMEN (KUB) (SINGLE AP VIEW)    Result Date: 5/25/2021  EXAMINATION: ONE SUPINE XRAY VIEW(S) OF THE ABDOMEN 5/25/2021 8:28 am COMPARISON: 05/24/2021 HISTORY: ORDERING SYSTEM PROVIDED HISTORY: SBO TECHNOLOGIST PROVIDED HISTORY: SBO Reason for Exam: F/u SBO Acuity: Acute Type of Exam: Subsequent/Follow-up Additional signs and symptoms: F/u SBO FINDINGS: Air-filled loops of large and small bowel are similar to yesterday's CT. No air-fluid levels are demonstrated. There is no evidence of free air. No acute osseous abnormality is demonstrated. The urinary bladder is decompressed by a Issa catheter. Stable bowel gas pattern, with air present throughout the large and small bowel. No significant small bowel distension. The closed loop small bowel obstruction in the right abdomen is not evident radiographically. CT ABDOMEN PELVIS W IV CONTRAST Additional Contrast? None    Result Date: 5/24/2021  EXAMINATION: CT OF THE ABDOMEN AND PELVIS WITH CONTRAST 5/24/2021 6:01 pm TECHNIQUE: CT of the abdomen and pelvis was performed with the administration of intravenous contrast. Multiplanar reformatted images are provided for review.  Dose modulation, iterative reconstruction, and/or weight based adjustment of the mA/kV was utilized to reduce the radiation dose to as low as reasonably achievable. COMPARISON: 04/07/2020 HISTORY: ORDERING SYSTEM PROVIDED HISTORY: Abdominal pain TECHNOLOGIST PROVIDED HISTORY: Abdominal pain Decision Support Exception - unselect if not a suspected or confirmed emergency medical condition->Emergency Medical Condition (MA) Reason for Exam: abd pain, pt poor historian, unable to raise arms above head. FINDINGS: Lower Chest: There is either necrotic infrahilar lymph node or infrahilar lower lobe mass 3.2 cm in size representing a drastic change from prior. Malignancy is of concern. There may be some satellite nodules. There is accompanying partially imaged patchy consolidation with interlobular septal thickening and trace right pleural effusion noted in the right lower lobe and adjacent middle lobe. Similar septal thickening noted left lower lobe posteriorly. Dedicated CT chest is recommended. Malignancy is of concern. Organs: Unchanged 1 cm cyst in the posterior segment 2 left lobe of liver. Other tiny subcentimeter hypodensities anteriorly in segment 4 a unchanged but more difficult to characterize due to small size. The spleen, pancreas, adrenal glands show no significant abnormalities. Right kidney is atrophic. Gallbladder unremarkable. Willetta Alyson GI/Bowel: There is limited evaluation due to absence of oral contrast. Sliding hiatal hernia. Stomach grossly normal.  In the right mid to upper abdomen there is a group of swirling mildly dilated fluid-filled small bowel loops which end inferiorly in a tightly collapsed swirling small bowel loop with surrounding fat. Several other borderline dilated fluid-filled small bowel loops are noted along with collapsed terminal ileum. Findings are most compatible with closed-loop small-bowel obstruction possibly from internal hernia. Pelvis: Urinary bladder grossly normal.  Mild prostatomegaly. Small amount of pelvic free fluid. Peritoneum/Retroperitoneum: Ectatic right common iliac artery with large amount of mural thrombus measuring 2.3 cm diameter. 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 Findings stable. Bones/Soft Tissues: Diffuse degenerative changes. Grade 1 L4-5 anterolisthesis unchanged. No acute abnormality of the bones. The superficial soft tissues show no significant abnormalities. 1. Findings consistent with close loop small bowel obstruction with transition zone in the right abdomen. Right upper abdomen Swirling loops of mildly dilated small bowel and in tightly collapsed small bowel loop with a funneling appearance. Surgical consultation advised. 2. Views of the lung bases show partially imaged necrotic appearing 3.2 cm right hilar or infrahilar lymph nodes/parenchymal mass with surrounding patchy consolidation and septal thickening in the right lower and middle lobes. Findings worrisome for malignancy. Dedicated CT chest recommended. 3. 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 Vascular consultation advised. IMPRESSION:   1. Abdominal pain. 2. CT scan suggestive of bowel obstruction but clinically patient does not have bowel obstruction. He's had 3 or 4 bowel movements already today. G-tube output is 150 ML. Abdominal x-ray showing air within the small and large bowel. 3. CT also showing necrotic-appearing 3.2 cm right hilar infrahilar lymph node/mass with surrounding consolidation. Findings concerning for lung cancer. 4. 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 5. Significant leukocytosis today. Antibiotics were changed. 6. Patient was given ice chips and liquids by the family according to the nurse. Possible aspiration pneumonia. does not have any pertinent problems on file. PLAN:   1.  Admission to the hospital. Lactic acid is elevated. Fluid bolus. Recheck lactic acid in the morning. IV hydration. Continue IV antibiotics. Recheck KUB and blood work in the morning. GI prophylaxis. G-tube to gravity. Strict I's and O's. CT of the chest for pulmonary medicine. If WBC count remains elevated I may repeat his CT scan of his abdomen and pelvis tomorrow. Reevaluate in the morning. Discussed with nursing staff. depending on his clinical and radiology progress patient may need surgical intervention. Thank you for this interesting consult and for allowing us to participate in the care of this patient. If you have any questions please don't hesitate to call.           Electronically signed by Froylan Monahan MD  on 5/25/2021 at 9:10 PM

## 2021-05-26 NOTE — PROGRESS NOTES
Patient was seen and examined. Awake alert. Afebrile. Vital signs are stable. Urine output is excellent over 700 mL. G-tube 100 mL. Patient had soapsuds enema with return of one solid stool and some liquid stool. Abdomen is soft. No peritoneal signs. PEG site is clean. Extremity positive edema. Blood work was reviewed. Hemoglobin had dropped likely because of dilution. Repeat hemoglobin is stable. WBC count is improved to 15.7. Potassium is 3.8. Creatinine is normal.  Abdominal x-ray shows no signs of obstruction. Evidence of constipation for which he received soapsuds enema. G-tube can be used for medications. Plan for medications otherwise G-tube to gravity. Recheck blood work in the morning. Small bowel follow-through tomorrow with Gastrografin via G-tube. Local skin care on the sacrococcygeal region. Continue IV antibiotics.

## 2021-05-26 NOTE — PROGRESS NOTES
Physical Therapy    Facility/Department: [address] [place] [address] CARE  Initial Assessment    NAME: Puja Cruz  :   MRN: [id number]    Date of Service: 2021    Discharge Recommendations:  Patient would benefit from continued therapy after discharge   PT Equipment Recommendations  Equipment Needed: No    Assessment   Body structures, Functions, Activity limitations: Decreased functional mobility ; Decreased ADL status; Decreased ROM; Decreased strength;Decreased endurance;Decreased sensation; Increased pain  Assessment: Pt requiring 2 assist for bed mobility and repositioning. Pt agreeable to bedside eval. Pt would benefit from continued PT to prepare for junior transfers at home in addition to progress to EOB with core activity. Treatment Diagnosis: Impaired functional mobility 2* SBO  Specific instructions for Next Treatment: ROM, stretching, bed mobility, repositioning  Prognosis: Guarded  Decision Making: Medium Complexity  History: 77 y.o. male who presents with abdominal pain, nausea, vomiting. Patient was noted by family to have abdominal pain, nausea, vomiting, called EMS, transferred to the emergency department. Patient is at baseline, mumbling incoherently, unable to obtain HPI or ROS from patient, but sister is present and is able to provide information. Patient was diagnosed with a neuromuscular disorder, unknown etiology, minimally verbal at baseline, but interactive. Patient sister reports that he started having generalized abdominal pain this morning, some episodes of emesis. Exam: ROM, MMT, bed mobility, positioning  Clinical Presentation: Pt agreeable with encouragement. Expressive aphasia. Barriers to Learning: expressive aphasia, pain  REQUIRES PT FOLLOW UP: Yes  Activity Tolerance  Activity Tolerance: Patient limited by pain; Patient limited by endurance       Patient Diagnosis(es): The encounter diagnosis was Small bowel obstruction ([facility]      has a past medical history of Arthritis, Cancer Intervention(s): Ambulation/Increased Activity; Distraction;Repositioned;Rest;Elevation  Response to Pain Intervention: None  Vital Signs  Patient Currently in Pain: Yes  Oxygen Therapy  O2 Device: None (Room air)  Patient Observation  Observations: R sided weakness, expressive aphasia       Orientation  Orientation  Overall Orientation Status: Within Functional Limits (expressive aphasia (confirms with yes/no))  Social/Functional History  Social/Functional History  Lives With: Alone  Type of Home: House  Home Layout: One level  Home Access: Ramped entrance  Bathroom Shower/Tub:  (performs bathing in bed)  Bathroom Accessibility: Accessible  Home Equipment:  (junior lift)  Receives Help From: Family  ADL Assistance: Needs assistance (ADLs in bed with family assist)  Homemaking Assistance: Needs assistance (sister and daughter in law do cleaning/laundry)  Homemaking Responsibilities: No  Ambulation Assistance:  (NON AMBULATORY)  Transfer Assistance:  (recently got junior lift)  Active : No  Patient's  Info: transport  Occupation: Retired  IADL Comments: hospital bed  Additional Comments: Recently received junior lift to use. PT OT home therapy 2x/week. Sister, daughter in law, son involved in care. 24/7 supervision at home.    Cognition        Objective          PROM RLE (degrees)  RLE PROM: WFL  RLE General PROM: unable to obtain neutral ankle - requested PRAFO boots  PROM LLE (degrees)  LLE PROM: WFL  LLE General PROM: unable to obtain neutral ankle - requested PRAFO boots  AROM RUE (degrees)  RUE General AROM: See OT  AROM LUE (degrees)  LUE General AROM: See OT  Strength RLE  Strength RLE: Exception  Comment: supine  R Hip Flexion: 0/5  R Knee Flexion: 2-/5  R Knee Extension: 0/5  R Ankle Dorsiflexion: 0/5  R Ankle Plantar flexion: 0/5  Strength LLE  Strength LLE: Exception  Comment: supine  L Hip Flexion: 1+/5  L Knee Flexion: 2-/5  L Knee Extension: 1+/5  L Ankle Dorsiflexion: 0/5  L Ankle Plantar Flexion: 0/5  Strength RUE  Comment: See OT  Strength LUE  Comment: See OT  Tone RLE  RLE Tone: Clonus  Tone Description: with passive DF  Tone LLE  LLE Tone: Clonus  Tone Description: with passive DF  Sensation  Overall Sensation Status: Impaired (R hand/foot numbness)  Bed mobility  Rolling to Left: 2 Person assistance;Dependent/Total  Rolling to Right: 2 Person assistance;Maximum assistance  Supine to Sit: Unable to assess  Sit to Supine: Unable to assess  Scooting: Dependent/Total  Comment: Pt encouraged to roll to R with use of bed rail and L UE. Pt repositioned in bed with pillows to elevated B LE and B UE. Transfers  Sit to Stand: Unable to assess  Stand to sit: Unable to assess  Comment: Pt does not transfer - will be progressing to junior transfers at home. Ambulation  Ambulation?: No  Stairs/Curb  Stairs?: No     Balance  Comments: PAT - pt remains in bed, agreeable to rolling only. Pt repositioned by PT OT. Plan   Plan  Times per week: 3-4x/week  Specific instructions for Next Treatment: ROM, stretching, bed mobility, repositioning  Current Treatment Recommendations: ROM, Strengthening, Functional Mobility Training, Endurance Training, Positioning, Equipment Evaluation, Education, & procurement, Patient/Caregiver Education & Training, Safety Education & Training, Home Exercise Program, Pain Management  Safety Devices  Type of devices:  All fall risk precautions in place, Call light within reach, Bed alarm in place, Gait belt, Patient at risk for falls, Left in bed, Nurse notified (CHRISSIE Germain)    G-Code       OutComes Score                                                  AM-PAC Score     AM-PAC Inpatient Mobility without Stair Climbing Raw Score : 5 (05/26/21 1249)  AM-PAC Inpatient without Stair Climbing T-Scale Score : 23.59 (05/26/21 1249)  Mobility Inpatient CMS 0-100% Score: 100 (05/26/21 1249)  Mobility Inpatient without Stair CMS G-Code Modifier : CN (05/26/21 1249)       Goals  Short term goals  Time Frame for Short term goals: 4-5 days  Short term goal 1: Pt to demo mod x2 rolling each direction in bed to prepare for sling placement at home for junior transfers. Short term goal 2: Pt to tolerate 30-45 minute PT session. Short term goal 3: Pt to tolerate stretching of B LE with up to 30 second holds, 3x each. Short term goal 4: Pt to tolerate 10-15 reps of BLE strengthening exercises progressing from [facility] to [facility]. Short term goal 5: Pt to complete core strengthening exercises with good technique.   Patient Goals   Patient goals : Pt did not state       Therapy Time   Individual Concurrent Group Co-treatment   Time In 1249         Time Out 1311         Minutes [address]78, [address]

## 2021-05-26 NOTE — PLAN OF CARE
Problem: Falls - Risk of:  Goal: Will remain free from falls  Description: Will remain free from falls  Outcome: Ongoing  Note: Pt remains free from falls this shift. Bed is locked, in lowest position, and call light within reach. Bed alarm is on. .      Problem: Skin Integrity:  Goal: Will show no infection signs and symptoms  Description: Will show no infection signs and symptoms  Outcome: Ongoing  Note: Pt has redness on coccyx and heels. Pt feet elevated off bed and pt is repositioned every 2 hours. Waffle mattress in place. Problem: PAIN  Goal: Patient's pain/discomfort is manageable  Outcome: Ongoing  Note: Pt given fentanyl as needed. Pt grimaces when moving or in pain. Problem: PAIN  Goal: Patient's pain/discomfort is manageable  Outcome: Ongoing  Note: Pt given fentanyl as needed. Pt grimaces when moving or in pain.

## 2021-05-26 NOTE — PLAN OF CARE
Problem: Falls - Risk of:  Goal: Will remain free from falls  Description: Will remain free from falls  5/26/2021 0306 by Willem Saba RN  Outcome: Ongoing  Note: Pt assessed as a fall risk score 55 this shift. Remains free from falls and accidental injury at this time. Fall precautions in place, including falling star sign and fall risk band on pt. Floor free from obstacles, and bed is locked and in lowest position. Adequate lighting provided. Pt encouraged to call before getting OOB for any need. Bed alarm activated. Will continue to monitor needs during hourly rounding, and reinforce education on use of call light. Problem: Skin Integrity:  Goal: Will show no infection signs and symptoms  Description: Will show no infection signs and symptoms  5/26/2021 0306 by Willem Saba RN  Outcome: Ongoing  Note: Skin assessment complete. Waffle mattress in place. Coccyx reddened. Mepilex in place and Sensicare applied PRN. Turned and repositioned every two hours. Area kept free from moisture. Proper nourishment and fluids encouraged, as appropriate. Will continue to monitor for additional needs and changes in skin breakdown. Problem: DAILY CARE  Goal: Daily care needs are met  5/26/2021 0306 by Willem Saba RN  Outcome: Ongoing  Note: Staff currently meeting all patient's daily care needs. Patient encouraged to participate and complete all ADLs per ability. Will continue to monitor for opportunities for patient to meet all ADLs per self. Problem: PAIN  Goal: Patient's pain/discomfort is manageable  Outcome: Ongoing  Note: Pt medicated with pain medication prn. Assessed all pain characteristics including level, type, location, frequency, and onset. Non-pharmacologic interventions offered to pt as well. Pt states pain is tolerable at this time. Will continue to monitor.

## 2021-05-26 NOTE — PROGRESS NOTES
[address] [facility]    Occupational Therapy Evaluation  Date: 21  Patient Name: Anusha Castaneda       Room: 9525/6675-32  MRN: [id number]  Account: [de-identified]   : 1945  (72 y.o.) Gender: male     Discharge Recommendations:  Further Occupational Therapy is recommended upon facility discharge. Referring Practitioner: Stanislaw Garcia MD  Diagnosis: Small bowel obstruction       Treatment Diagnosis: Impaired self-care status. Past Medical History:  has a past medical history of Arthritis, Cancer ([facility] [address] Cerebral artery occlusion with cerebral infarction ([facility] [address] Chronic kidney disease, COPD (chronic obstructive pulmonary disease) ([facility] [address] Hypertension, Pneumonia, Psychiatric problem, and Seizures ([facility] [address] Past Surgical History:   has a past surgical history that includes back surgery; fracture surgery; hernia repair; Tonsillectomy; Carotid endarterectomy (Left, 2016); Abdomen surgery; hiatal hernia repair; Inguinal hernia repair (Bilateral); other surgical history; Colonoscopy (N/A, 2018); Upper gastrointestinal endoscopy (04/15/2020); Gastrostomy tube placement (04/15/2020); Upper gastrointestinal endoscopy (4/15/2020); and Upper gastrointestinal endoscopy (4/15/2020). Restrictions  Restrictions/Precautions: General Precautions, Fall Risk  Implants present? : Metal implants (plate in head)  Required Braces or Orthoses?: No     Vitals  Temp: 98.5 °F (36.9 °C)  Pulse: 87  Resp: 16  BP: (!) 125/48  Height: 6' 4\" (193 cm)  Weight: 188 lb (85.3 kg)  BMI (Calculated): 22.9  Oxygen Therapy  SpO2: 98 %  Pulse Oximeter Device Mode: Intermittent  Pulse Oximeter Device Location: Finger  O2 Device: None (Room air)  O2 Flow Rate (L/min): 3 L/min  Blood Gas  Performed?: No  Level of Consciousness: Alert (0)    Subjective  Subjective: \"That's bull\" Pt is primarily non-verbal, however attempts to communicate with nods/shaking his head as well as verbalizing as able.  Pt's sister present and is able to understand patient at times. Overall Orientation Status:  (unable to fully assess due to significant expressive aphasia)  Vision  Vision: Impaired  Vision Exceptions: Wears glasses for reading  Hearing  Hearing: Within functional limits  Social/Functional History  Lives With: Alone  Type of Home: House  Home Layout: One level  Home Access: Ramped entrance  Bathroom Shower/Tub:  (performs bathing in bed)  Bathroom Accessibility: Accessible  Receives Help From: Family  ADL Assistance: Needs assistance (ADLs in bed with family assist)  Homemaking Assistance: Needs assistance (sister and daughter in law do cleaning/laundry)  Homemaking Responsibilities: No  Ambulation Assistance:  (NON AMBULATORY)  Transfer Assistance:  (recently got junior lift)  Active : No  Patient's  Info: transport  Occupation: Retired  IADL Comments: hospital bed  Additional Comments: PT OT home therapy 2x/week. Sister, daughter in law, son involved in care. 24/7 supervision at home. Pain Assessment  Pain Assessment: Faces  Hinson-Baker Pain Rating: Hurts a little bit  Pain Type: Acute pain  Pain Location: Shoulder, Rib cage  Pain Orientation: Right    Objective  Vision - Basic Assessment  Prior Vision: Wears glasses only for reading   Cognition  Overall Cognitive Status: Impaired (unable to assess fully due to aphasia)  Arousal/Alertness: Delayed responses to stimuli  Attention Span: Attends with cues to redirect  Memory: Unable to assess  Following Commands:  Follows one step commands with repetition  Safety Judgement: Decreased awareness of need for safety  Awareness of Errors: Decreased awareness of errors  Insights: Decreased awareness of deficits  Sequencing and Organization: Assistance required to implement solutions, Assistance required to generate solutions, Assistance required to identify errors made   Perception  Overall Perceptual Status: Impaired  Initiation: Cues to initiate tasks  Motor Planning: Cues to use objects appropriately  Sensation  Overall Sensation Status: Impaired (R hand/foot numbness)   ADL  Feeding: NPO  Grooming: Maximum assistance  UE Bathing: Dependent/Total  LE Bathing: Dependent/Total  UE Dressing: Dependent/Total  LE Dressing: Dependent/Total  Toileting: Dependent/Total  Additional Comments: ADL scores based on skilled observations and clinical reasoning as well as family report. UE Function  Hand Dominance  Hand Dominance: Right        LUE Strength  Gross LUE Strength: Exceptions to [facility]  L Hand General: 3+/5  LUE Strength Comment: Grossly 3+/5     LUE Tone: Normotonic  LUE PROM (degrees)  LUE PROM: Exceptions  LUE General PROM: Hard end feel noted at ~60% PROM of shoulder, elbow WFL  LUE AROM (degrees)  LUE AROM : Exceptions  LUE General AROM: AROM ~60% at shoulder, elbow WFL  Left Hand PROM (degrees)  Left Hand PROM: WFL  Left Hand AROM (degrees)  Left Hand AROM: WFL  RUE Strength  Gross RUE Strength: Exceptions to [facility]  R Hand General: 2-/5  RUE Strength Comment: Shoulder not assessed      RUE Tone: Normotonic  RUE PROM (degrees)  RUE PROM: Exceptions  RUE General PROM: Shoulder not assessed due to patient's reports of pain. Elbow WFL  RUE AROM (degrees)  RUE AROM : Exceptions  RUE General AROM: Shoulder not assessed due to patient's reports of pain.  Elbow WFL  Right Hand PROM (degrees)  Right Hand PROM: WFL  Right Hand AROM (degrees)  Right Hand AROM: Exceptions  Right Hand General AROM: AROM ~10% composite fist    Fine Motor Skills  Coordination  Movements Are Fluid And Coordinated: No  Coordination and Movement description: Left UE, Right UE, Fine motor impairments, Gross motor impairments (RUE worse than LUE)                           Mobility  Balance  Sitting Balance: Unable to assess(comment) (pt refusal)  Standing Balance: Unable to assess(comment) (pt refusal)        Bed mobility  Rolling to Left: 2 Person assistance;Dependent/Total  Rolling to Right: 2 Person assistance;Maximum assistance        Functional Activity Tolerance  Functional Activity Tolerance: Tolerates 10 - 20 min exercise with multiple rests   Assessment  Performance deficits / Impairments: Decreased functional mobility , Decreased ADL status, Decreased ROM, Decreased strength, Decreased safe awareness, Decreased cognition, Decreased endurance, Decreased sensation, Decreased balance, Decreased high-level IADLs, Decreased fine motor control, Decreased coordination, Decreased posture  Treatment Diagnosis: Impaired self-care status. Prognosis: Fair  Decision Making: Medium Complexity  REQUIRES OT FOLLOW UP: Yes  Discharge Recommendations: Patient would benefit from continued therapy after discharge  Activity Tolerance: Patient limited by pain, Patient Tolerated treatment well         Functional Outcome Measures  AM-PAC Daily Activity Inpatient   How much help for putting on and taking off regular lower body clothing?: Total  How much help for Bathing?: Total  How much help for Toileting?: Total  How much help for putting on and taking off regular upper body clothing?: Total  How much help for taking care of personal grooming?: A Lot  How much help for eating meals?: Total  AM-PAC Inpatient Daily Activity Raw Score: 7  AM-PAC Inpatient ADL T-Scale Score : 20.13  ADL Inpatient CMS 0-100% Score: 92.44  ADL Inpatient CMS G-Code Modifier : CM       Goals  Patient Goals   Patient goals : To return home  Short term goals  Time Frame for Short term goals: By discharge  Short term goal 1: Pt will perform rolling while supine with Maximum assist for increased participation in self-care and decreased caregiver burden. Short term goal 2: Pt will actively participate in 15+ minutes of self-care to the best of patient's ability. Short term goal 3: Pt will actively participate in 15+ minutes of therapeutic exercise/functional activities to the best of patient's ability.   Short term goal 4: Pt and Pt's caregivers will verbalize/demonstrate Good understanding of assistive equipment/durable medical equipment/modified techniques for increased independence with self-care and mobility.     Plan  Plan  Times per week: 2-3  Times per day: Daily  Current Treatment Recommendations: Self-Care / ADL, Strengthening, Balance Training, ROM, Endurance Training, Positioning, Safety Education & Training, Patient/Caregiver Education & Training, Equipment Evaluation, Education, & procurement          OT Individual Minutes  Time In: 4074  Time Out: 0189  Minutes: 27    Electronically signed by Sunil Moreno OT on 5/26/21 at 3:22 PM EDT

## 2021-05-26 NOTE — PROGRESS NOTES
Progress Note    5/26/2021   1:19 PM    Name:  Bobby Carnes  MRN:    [id number]     Acct:     [de-identified]   Room:  2105/2105-[facility] Day: 2     Admit Date: 5/24/2021  3:38 PM  PCP: Ángel Fabian MD    Subjective:     C/C:   Chief Complaint   Patient presents with   [employer] Nausea    Emesis       Interval History: Status: not changed. Patient denies abdominal pain there have been no reports of vomiting. Patient had soapsuds enema today had a small formed stool. Vital signs stable. Recent labs reviewed hemoglobin dropped to 8.7. urine cultures positive. KUB report reviewed . ROS:   all 10 systems reviewed and are negative except as noted    Review of Systems   Unable to perform ROS: Acuity of condition (Patient mumbles)   HENT: Positive for trouble swallowing. Gastrointestinal: Negative for abdominal pain, nausea and vomiting. Medications: Allergies:    Allergies   Allergen Reactions    Bactrim [Sulfamethoxazole-Trimethoprim] Hives and Swelling    Ciprofloxacin Swelling     FACE       Current Meds: pantoprazole (PROTONIX) injection 40 mg, BID   And  sodium chloride (PF) 0.9 % injection 10 mL, BID  sodium chloride flush 0.9 % injection 10 mL, PRN  iopamidol (ISOVUE-370) 76 % injection 75 mL, ONCE PRN  potassium chloride 10 mEq/100 mL IVPB (Peripheral Line), PRN  ipratropium-albuterol (DUONEB) nebulizer solution 1 ampule, Q4H WA  0.9 % sodium chloride infusion, Continuous  enoxaparin (LOVENOX) injection 40 mg, Daily  fentaNYL (SUBLIMAZE) injection 25 mcg, Q2H PRN  fentaNYL (SUBLIMAZE) injection 50 mcg, Q2H PRN  theophylline 80 MG/15ML oral solution 100 mg, 3 times per day  sodium chloride flush 0.9 % injection 10 mL, 2 times per day  sodium chloride flush 0.9 % injection 10 mL, PRN  0.9 % sodium chloride infusion, PRN  potassium chloride (KLOR-CON M) extended release tablet 40 mEq, PRN   Or  potassium bicarb-citric acid (EFFER-K) effervescent tablet 40 mEq, PRN   Or  potassium chloride 10 mEq/100 mL funneling appearance. Surgical consultation advised. 2. Views of the lung bases show partially imaged necrotic appearing 3.2 cm right hilar or infrahilar lymph nodes/parenchymal mass with surrounding patchy consolidation and septal thickening in the right lower and middle lobes. Findings worrisome for malignancy. Dedicated CT chest recommended. 3. 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 Vascular consultation advised.        Labs:  Recent Results (from the past 24 hour(s))   POC Glucose Fingerstick    Collection Time: 05/25/21  4:04 PM   Result Value Ref Range    POC Glucose 115 (H) 75 - 110 mg/dL   Lactic Acid    Collection Time: 05/25/21  5:20 PM   Result Value Ref Range    Lactic Acid 2.7 (H) 0.5 - 2.2 mmol/L   Lactic Acid    Collection Time: 05/26/21  5:17 AM   Result Value Ref Range    Lactic Acid 1.5 0.5 - 2.2 mmol/L   Comprehensive Metabolic Panel w/ Reflex to MG    Collection Time: 05/26/21  5:17 AM   Result Value Ref Range    Glucose 110 (H) 70 - 99 mg/dL    BUN 49 (H) 8 - 23 mg/dL    CREATININE 1.05 0.70 - 1.20 mg/dL    Bun/Cre Ratio NOT REPORTED 9 - 20    Calcium 8.0 (L) 8.6 - 10.4 mg/dL    Sodium 144 135 - 144 mmol/L    Potassium 3.8 3.7 - 5.3 mmol/L    Chloride 114 (H) 98 - 107 mmol/L    CO2 18 (L) 20 - 31 mmol/L    Anion Gap 12 9 - 17 mmol/L    Alkaline Phosphatase 48 40 - 129 U/L    ALT 8 5 - 41 U/L    AST 14 <40 U/L    Total Bilirubin 0.23 (L) 0.3 - 1.2 mg/dL    Total Protein 5.9 (L) 6.4 - 8.3 g/dL    Albumin 3.0 (L) 3.5 - 5.2 g/dL    Albumin/Globulin Ratio NOT REPORTED 1.0 - 2.5    GFR Non-African American >60 >60 mL/min    GFR African American >60 >60 mL/min    GFR Comment          GFR Staging NOT REPORTED    CBC with DIFF    Collection Time: 05/26/21  5:17 AM   Result Value Ref Range    WBC 15.7 (H) 3.5 - 11.0 k/uL    RBC 2.99 (L) 4.5 - 5.9 m/uL    Hemoglobin 8.7 (L) 13.5 - 17.5 g/dL Hematocrit 26.7 (L) 41 - 53 %    MCV 89.4 80 - 100 fL    MCH 29.1 26 - 34 pg    MCHC 32.5 31 - 37 g/dL    RDW 14.0 11.5 - 14.9 %    Platelets 438 331 - 403 k/uL    MPV 6.9 6.0 - 12.0 fL    NRBC Automated NOT REPORTED per 100 WBC    Differential Type NOT REPORTED     Immature Granulocytes NOT REPORTED 0 %    Absolute Immature Granulocyte NOT REPORTED 0.00 - 0.30 k/uL    WBC Morphology NOT REPORTED     RBC Morphology NOT REPORTED     Platelet Estimate NOT REPORTED     Seg Neutrophils 86 (H) 36 - 66 %    Lymphocytes 4 (L) 24 - 44 %    Monocytes 6 1 - 7 %    Eosinophils % 0 0 - 4 %    Basophils 0 0 - 2 %    Bands 4 0 - 10 %    Segs Absolute 13.50 (H) 1.3 - 9.1 k/uL    Absolute Lymph # 0.63 (L) 1.0 - 4.8 k/uL    Absolute Mono # 0.94 0.1 - 1.3 k/uL    Absolute Eos # 0.00 0.0 - 0.4 k/uL    Basophils Absolute 0.00 0.0 - 0.2 k/uL    Absolute Bands # 0.63 0.0 - 1.0 k/uL    Morphology FEW TEARDROPS    POC Glucose Fingerstick    Collection Time: 21  8:23 AM   Result Value Ref Range    POC Glucose 105 75 - 110 mg/dL   Hgb/Hct    Collection Time: 21 12:04 PM   Result Value Ref Range    Hemoglobin 8.8 (L) 13.5 - 17.5 g/dL    Hematocrit 28.4 (L) 41 - 53 %   POC Glucose Fingerstick    Collection Time: 21 12:53 PM   Result Value Ref Range    POC Glucose 96 75 - 110 mg/dL       Physical Examination:        Vitals:  BP (!) 125/48   Pulse 87   Temp 98.5 °F (36.9 °C) (Oral)   Resp 16   Ht 6' 4\" (1.93 m)   Wt 188 lb (85.3 kg)   SpO2 93%   BMI 22.88 kg/m²   Temp (24hrs), Av.7 °F (37.1 °C), Min:97.5 °F (36.4 °C), Max:100.2 °F (37.9 °C)    Recent Labs     21  1110 21  1604 21  0823 21  1253   POCGLU 124* 115* 105 96         Physical Exam  Vitals reviewed. Constitutional:       Appearance: Normal appearance. He is not diaphoretic. HENT:      Head: Normocephalic and atraumatic.       Right Ear: External ear normal.      Left Ear: External ear normal.      Nose: Nose normal. mL/h  7. Discussed with son who was present in room during exam  1. PPI  2. DVT Prophylaxis Lovenox 40 mg subcu daily  3. EPCs  4. PT/OT to evaluate and treat  5. Pain control  6. Replace electrolytes as per sliding scale  7. Home medications reviewed and appropriate medications continued  8.  Reviewed labs and imaging studies from last 24 hours and results explained to patient      Electronically signed by Pedro Morales MD

## 2021-05-26 NOTE — PROGRESS NOTES
Pulmonary Progress Note  [facility]      Patient - Soniya Machado,  Age - 77 y.o.    - 1945      Room Number - 2105/2105-01   N -  [id number]   Acct # - [de-identified]  Date of Admission -  2021  3:38 PM    Edie Cash MD  Primary Care Physician - Radha Copeland MD     SUBJECTIVE   Patient resting quietly. No acute distress. OBJECTIVE   VITALS    height is 6' 4\" (1.93 m) and weight is 188 lb (85.3 kg). His oral temperature is 98.5 °F (36.9 °C). His blood pressure is 125/48 (abnormal) and his pulse is 87. His respiration is 16 and oxygen saturation is 98%. Body mass index is 22.88 kg/m². Temperature Range: Temp: 98.5 °F (36.9 °C) Temp  Av.7 °F (37.1 °C)  Min: 97.5 °F (36.4 °C)  Max: 100.2 °F (37.9 °C)  BP Range:  Systolic (29GON), GVK:257 , Min:111 , TFY:292     Diastolic (86OHV), AUX:47, Min:48, Max:72    Pulse Range: Pulse  Av.4  Min: 81  Max: 103  Respiration Range: Resp  Av  Min: 16  Max: 16  Current Pulse Ox[de-identified]  SpO2: 98 %  24HR Pulse Ox Range:  SpO2  Av.4 %  Min: 93 %  Max: 98 %  Oxygen Amount and Delivery: O2 Flow Rate (L/min): 3 L/min    Wt Readings from Last 3 Encounters:   21 188 lb (85.3 kg)   20 189 lb (85.7 kg)   20 183 lb (83 kg)       I/O (24 Hours)    Intake/Output Summary (Last 24 hours) at 2021 1658  Last data filed at 2021 1357  Gross per 24 hour   Intake 3079 ml   Output 1300 ml   Net 1779 ml       EXAM     General Appearance  Awake, alert, oriented, in no acute distress  HEENT - normocephalic, atraumatic. Neck - Supple,  trachea midline   Lungs -coarse breath sounds no crackles rales or wheeze  Heart Exam:PMI normal. No lifts, heaves, or thrills. RRR. No murmurs, clicks, gallops, or rubs  Abdomen Exam: Abdomen soft, non-tender.  BS normal. No masses,   Extremity Exam: No signs of cyanosis    MEDS      pantoprazole  40 mg Intravenous BID    And    sodium chloride (PF)  10 mL Intravenous BID    ipratropium-albuterol  1 ampule Inhalation Q4H WA    enoxaparin  40 mg Subcutaneous Daily    theophylline  100 mg Oral 3 times per day    sodium chloride flush  10 mL Intravenous 2 times per day    cefepime  2,000 mg Intravenous Q12H    metroNIDAZOLE  500 mg Intravenous Q8H      sodium chloride Stopped (05/26/21 0657)    sodium chloride       sodium chloride flush, iopamidol, potassium chloride, fentanNYL, fentanNYL, sodium chloride flush, sodium chloride, potassium chloride **OR** potassium alternative oral replacement **OR** potassium chloride, magnesium sulfate, promethazine **OR** ondansetron, magnesium hydroxide, acetaminophen **OR** acetaminophen, hydrALAZINE    LABS   CBC   Recent Labs     05/26/21  0517 05/26/21  1204   WBC 15.7*  --    HGB 8.7* 8.8*   HCT 26.7* 28.4*   MCV 89.4  --      --      BMP:   Lab Results   Component Value Date     05/26/2021    K 3.8 05/26/2021     05/26/2021    CO2 18 05/26/2021    BUN 49 05/26/2021    LABALBU 3.0 05/26/2021    CREATININE 1.05 05/26/2021    CALCIUM 8.0 05/26/2021    GFRAA >60 05/26/2021    LABGLOM >60 05/26/2021     ABGs:  Lab Results   Component Value Date    PHART 7.416 04/08/2019    PO2ART 67.2 04/08/2019    KUI4BRL 39.7 04/08/2019      Lab Results   Component Value Date    MODE NOT REPORTED 04/08/2019     Ionized Calcium:  No results found for: IONCA  Magnesium:    Lab Results   Component Value Date    MG 2.1 05/24/2021     Phosphorus:    Lab Results   Component Value Date    PHOS 3.3 04/04/2020        LIVER PROFILE   Recent Labs     05/24/21  1630 05/26/21 0517   AST 20 14   ALT 11 8   LIPASE 33  --    BILITOT <0.15* 0.23*   ALKPHOS 71 48     INR No results for input(s): INR in the last 72 hours.   PTT   Lab Results   Component Value Date    APTT 37.3 (H) 04/07/2020         RADIOLOGY     (See actual reports for details)    ASSESSMENT/PLAN     Patient Active Problem List   Diagnosis    Constipation    Change in bowel habits    Rectal bleeding    Aortic dissection (HCC)    Bradycardia    Other dysphagia    Enteritis    Aspiration pneumonia of both lower lobes (HCC)    Tobacco abuse    Aspiration pneumonitis (HCC)    Status post insertion of percutaneous endoscopic gastrostomy (PEG) tube (HCC)    Small bowel obstruction (HCC)    Acute cystitis without hematuria    Mass of right lung    COPD (chronic obstructive pulmonary disease) ([facility] [address]     The CT scan of the chest was reviewed. There is a 4.6 x 3.7 right infrahilar mass. There are other opacities in the right upper lobe appreciated. I reviewed the scans with the patient and patient's sister Ms. Evelyne Malagon. I explained to the patient and sister that we have like a high clinical suspicion for cancer. It is hard for the patient to express himself given his CVA. I received the impression that they wish to get through this more acute issue regarding the patient's abdomen prior to considering addressing the lung mass.       Electronically signed by Brent Hudson MD on 5/26/2021 at 4:58 PM

## 2021-05-26 NOTE — DISCHARGE INSTR - COC
Continuity of Care Form    Patient Name: Sean Rosa   :    MRN:  [id number]    Admit date:  2021  Discharge date:  2021    Code Status Order: Full Code   Advance Directives:   [facility] Documentation       Date/Time Healthcare Directive Type of Healthcare Directive Copy in [address] [facility] [address] Agent's Name Healthcare Agent's Phone Number    [phone]  Yes, patient has an advance directive for healthcare treatment  Durable power of  for health care  No, copy requested from family  Healthcare power of   Michelle Godoy [address] Physician:  Susan Barnes MD  PCP: Bakari Daniel MD    Discharging Nurse: [facility] Unit/Room#: 2105/2105-01  Discharging Unit Phone Number: [phone]    Emergency Contact:   Extended Emergency Contact Information  Primary Emergency Contact: [address] , [address] [place] [address] Phone: [phone]  Mobile Phone: [phone]  Relation: Brother/Sister  Secondary Emergency Contact: OrniceptonoYesware  Home Phone: [phone]  Relation: Child    Past Surgical History:  Past Surgical History:   Procedure Laterality Date    ABDOMEN SURGERY      BACK SURGERY      spinal surgery 2005     CAROTID ENDARTERECTOMY Left 2016    COLONOSCOPY N/A 2018    COLONOSCOPY POLYPECTOMY COLD BIOPSY performed by Key Arreaga MD at [address] [place] [address]      left face    GASTROSTOMY TUBE PLACEMENT  04/15/2020    HERNIA REPAIR      HIATAL HERNIA REPAIR      INGUINAL HERNIA REPAIR Bilateral     OTHER SURGICAL HISTORY      mesh infected in inguinal canal, had to remove. Removed testiscle at this time.      TONSILLECTOMY      UPPER GASTROINTESTINAL ENDOSCOPY  04/15/2020       EGD CONTROL HEMORRHAGE    UPPER GASTROINTESTINAL ENDOSCOPY  4/15/2020    EGD CONTROL HEMORRHAGE performed by Vashti Ivan MD at [address] [place] [address] ENDOSCOPY  4/15/2020    EGD ESOPHAGOGASTRODUODENOSCOPY PEG TUBE INSERTION performed by Jeni Rendon MD at [facility] Endoscopy       Immunization History: There is no immunization history on file for this patient.     Active Problems:  Patient Active Problem List   Diagnosis Code    Constipation K59.00    Change in bowel habits R19.4    Rectal bleeding K62.5    Aortic dissection ([facility]) I71.00    Bradycardia R00.1    Other dysphagia R13.19    Enteritis K52.9    Aspiration pneumonia of both lower lobes ([facility]) J69.0    Tobacco abuse Z72.0    Aspiration pneumonitis ([address] J69.0    Status post insertion of percutaneous endoscopic gastrostomy (PEG) tube ([facility] Z93.1    Small bowel obstruction ([facility] K56.609    Acute cystitis without hematuria N30.00    Mass of right lung R91.8    COPD (chronic obstructive pulmonary disease) ([facility]) J44.9       Isolation/Infection:   Isolation            No Isolation          Patient Infection Status       Infection Onset Added Last Indicated Last Indicated By Review Planned Expiration Resolved Resolved By    None active    Resolved    COVID-19 Rule Out 05/24/21 05/24/21 05/24/21 COVID-19, Rapid (Ordered)   05/24/21 Rule-Out Test Resulted    C-diff Rule Out 01/21/21 01/21/21 01/21/21 C. difficile toxin Molecular (Ordered)   01/21/21 Rule-Out Test Resulted    C-diff Rule Out 12/10/20 12/10/20 12/10/20 C. difficile toxin Molecular (Ordered)   12/11/20 Rule-Out Test Resulted    C-diff Rule Out 10/19/20 10/19/20 10/19/20 C. difficile toxin Molecular (Ordered)   10/20/20 Rule-Out Test Resulted    C-diff Rule Out 09/22/20 09/22/20 09/22/20 C. difficile toxin Molecular (Ordered)   09/23/20 Rule-Out Test Resulted    C-diff Rule Out 08/10/20 08/10/20 08/10/20 Gastrointestinal Panel, Molecular (Ordered)   08/10/20 Rule-Out Test Resulted    COVID-19 Rule Out 04/07/20 04/07/20 04/07/20 COVID-19 (Ordered)   04/08/20 Rule-Out Test Resulted            Nurse Assessment:  Last Vital Signs: BP (!) 119/59   Pulse 98   Temp 98.2 °F (36.8 °C) (Axillary)   Resp 16   Ht 6' 4\" (1.93 m)   Wt 188 lb (85.3 kg)   SpO2 93%   BMI 22.88 kg/m²     Last documented pain score (0-10 scale): Pain Level: 2  Last Weight:   Wt Readings from Last 1 Encounters:   05/26/21 188 lb (85.3 kg)     Mental Status:  oriented, alert and coherent    IV Access:  - None    Nursing Mobility/ADLs:  Walking   Dependent  Transfer  Dependent  Bathing  Dependent  Dressing  Dependent  Toileting  Dependent  Feeding  Dependent  Med Admin  Dependent  Med Delivery   peg tube    Wound Care Documentation and Therapy:           Buttocks/sacrum: Cleanse with soap and water, pat dry. Apply thin layer of zinc cream twice daily and as needed when incontinent     Left inner thigh: Apply skin barrier wipe to reddened area, cover with foam dressing. Change every 3 days and as needed if loose or soiled     Elimination:  Continence:   · Bowel: No  · Bladder: Francis  Urinary Catheter: Francis removed today, if patient does not void by 1700, francis will be placed again and discharged home with. Colostomy/Ileostomy/Ileal Conduit: No       Date of Last BM: 6/6/24    Intake/Output Summary (Last 24 hours) at 5/26/2021 0821  Last data filed at 5/26/2021 0800  Gross per 24 hour   Intake 4252 ml   Output 1150 ml   Net 3102 ml     I/O last 3 completed shifts: In: 4714 [I.V.:4152; IV Piggyback:100]  Out: 960 [Urine:750; Emesis/NG output:210]    Safety Concerns:     History of Falls (last 30 days) and At Risk for Falls    Impairments/Disabilities:      Speech    Nutrition Therapy:  Current Nutrition Therapy:   - Tube Feedings:  as per home tube feed prior to discharge    Routes of Feeding: Gastrostomy Tube  Liquids: No Liquids  Daily Fluid Restriction: no  Last Modified Barium Swallow with Video (Video Swallowing Test): not done    Treatments at the Time of Hospital Discharge:   Respiratory Treatments:   Oxygen Therapy:  is not on home oxygen therapy.   Ventilator:    - No ventilator support    Rehab Therapies: Physical Therapy and Occupational Therapy  Weight Bearing Status/Restrictions:  Bedbound  Other Medical Equipment (for information only, NOT a DME order):  Hospital bed, lift, motor wheelchair  Other Treatments: skilled nursing assessment; medication education and monitor; home health aid    Home health care agency's  to evaluate patient two weeks prior to discharge from home health to determine post-discharge services. Patient's personal belongings (please select all that are sent with patient):  None    RN SIGNATURE:  Electronically signed by Salvador Kumar RN on 6/7/21 at 2:23 PM EDT    CASE MANAGEMENT/SOCIAL WORK SECTION    Inpatient Status Date: 5/24/21    Readmission Risk Assessment Score:  Readmission Risk              Risk of Unplanned Readmission:  13           Discharging to Facility/ [facility]  [address] [address]  Phone [phone]  Fax  [address]  [phone]        / signature: Electronically signed by Domingo Dietrich RN on 5/26/21 at 8:22 AM EDT    PHYSICIAN SECTION    Prognosis: Good    Condition at Discharge: Stable    Rehab Potential (if transferring to Rehab): Good    Recommended Labs or Other Treatments After Discharge: Continue tube feeds at goal as tolerated. Wash the incision with Hibiclens or soap and water twice daily. Drain site care. Prescriptions called into [facility] in [place]. Physician Certification: I certify the above information and transfer of Christopher Lacey  is necessary for the continuing treatment of the diagnosis listed and that he requires [facility] for less 30 days.      Update Admission H&P: No change in H&P    PHYSICIAN SIGNATURE:  Electronically signed by Brandt Cummings MD on 6/5/21 at 9:45 AM EDT

## 2021-05-27 ENCOUNTER — APPOINTMENT (OUTPATIENT)
Dept: GENERAL RADIOLOGY | Age: 76
DRG: 329 | End: 2021-05-27
Payer: COMMERCIAL

## 2021-05-27 LAB
ABSOLUTE EOS #: 0 K/UL (ref 0–0.4)
ABSOLUTE IMMATURE GRANULOCYTE: ABNORMAL K/UL (ref 0–0.3)
ABSOLUTE LYMPH #: 0.91 K/UL (ref 1–4.8)
ABSOLUTE MONO #: 0.6 K/UL (ref 0.1–1.3)
ALBUMIN SERPL-MCNC: 3.4 G/DL (ref 3.5–5.2)
ALBUMIN/GLOBULIN RATIO: ABNORMAL (ref 1–2.5)
ALP BLD-CCNC: 55 U/L (ref 40–129)
ALT SERPL-CCNC: 12 U/L (ref 5–41)
ANION GAP SERPL CALCULATED.3IONS-SCNC: 16 MMOL/L (ref 9–17)
AST SERPL-CCNC: 24 U/L
BASOPHILS # BLD: 1 % (ref 0–2)
BASOPHILS ABSOLUTE: 0.15 K/UL (ref 0–0.2)
BILIRUB SERPL-MCNC: 0.3 MG/DL (ref 0.3–1.2)
BUN BLDV-MCNC: 40 MG/DL (ref 8–23)
BUN/CREAT BLD: ABNORMAL (ref 9–20)
CALCIUM SERPL-MCNC: 8.6 MG/DL (ref 8.6–10.4)
CHLORIDE BLD-SCNC: 114 MMOL/L (ref 98–107)
CO2: 15 MMOL/L (ref 20–31)
CREAT SERPL-MCNC: 0.88 MG/DL (ref 0.7–1.2)
DIFFERENTIAL TYPE: ABNORMAL
EOSINOPHILS RELATIVE PERCENT: 0 % (ref 0–4)
GFR AFRICAN AMERICAN: >60 ML/MIN
GFR NON-AFRICAN AMERICAN: >60 ML/MIN
GFR SERPL CREATININE-BSD FRML MDRD: ABNORMAL ML/MIN/{1.73_M2}
GFR SERPL CREATININE-BSD FRML MDRD: ABNORMAL ML/MIN/{1.73_M2}
GLUCOSE BLD-MCNC: 82 MG/DL (ref 75–110)
GLUCOSE BLD-MCNC: 84 MG/DL (ref 75–110)
GLUCOSE BLD-MCNC: 91 MG/DL (ref 70–99)
HCT VFR BLD CALC: 26.5 % (ref 41–53)
HEMOGLOBIN: 9 G/DL (ref 13.5–17.5)
IMMATURE GRANULOCYTES: ABNORMAL %
LACTIC ACID: 0.7 MMOL/L (ref 0.5–2.2)
LYMPHOCYTES # BLD: 6 % (ref 24–44)
MAGNESIUM: 2.2 MG/DL (ref 1.6–2.6)
MCH RBC QN AUTO: 29.7 PG (ref 26–34)
MCHC RBC AUTO-ENTMCNC: 33.9 G/DL (ref 31–37)
MCV RBC AUTO: 87.7 FL (ref 80–100)
MONOCYTES # BLD: 4 % (ref 1–7)
MORPHOLOGY: ABNORMAL
NRBC AUTOMATED: ABNORMAL PER 100 WBC
PDW BLD-RTO: 14.2 % (ref 11.5–14.9)
PLATELET # BLD: 268 K/UL (ref 150–450)
PLATELET ESTIMATE: ABNORMAL
PMV BLD AUTO: 6.7 FL (ref 6–12)
POTASSIUM SERPL-SCNC: 3.4 MMOL/L (ref 3.7–5.3)
RBC # BLD: 3.02 M/UL (ref 4.5–5.9)
RBC # BLD: ABNORMAL 10*6/UL
SEG NEUTROPHILS: 89 % (ref 36–66)
SEGMENTED NEUTROPHILS ABSOLUTE COUNT: 13.44 K/UL (ref 1.3–9.1)
SODIUM BLD-SCNC: 145 MMOL/L (ref 135–144)
TOTAL PROTEIN: 6.5 G/DL (ref 6.4–8.3)
WBC # BLD: 15.1 K/UL (ref 3.5–11)
WBC # BLD: ABNORMAL 10*3/UL

## 2021-05-27 PROCEDURE — 83735 ASSAY OF MAGNESIUM: CPT

## 2021-05-27 PROCEDURE — 36415 COLL VENOUS BLD VENIPUNCTURE: CPT

## 2021-05-27 PROCEDURE — 2580000003 HC RX 258: Performed by: FAMILY MEDICINE

## 2021-05-27 PROCEDURE — 6360000002 HC RX W HCPCS: Performed by: SURGERY

## 2021-05-27 PROCEDURE — 83605 ASSAY OF LACTIC ACID: CPT

## 2021-05-27 PROCEDURE — 74250 X-RAY XM SM INT 1CNTRST STD: CPT

## 2021-05-27 PROCEDURE — 6360000004 HC RX CONTRAST MEDICATION: Performed by: SURGERY

## 2021-05-27 PROCEDURE — 94640 AIRWAY INHALATION TREATMENT: CPT

## 2021-05-27 PROCEDURE — C9113 INJ PANTOPRAZOLE SODIUM, VIA: HCPCS | Performed by: SURGERY

## 2021-05-27 PROCEDURE — 2580000003 HC RX 258: Performed by: INTERNAL MEDICINE

## 2021-05-27 PROCEDURE — 1200000000 HC SEMI PRIVATE

## 2021-05-27 PROCEDURE — 6370000000 HC RX 637 (ALT 250 FOR IP): Performed by: FAMILY MEDICINE

## 2021-05-27 PROCEDURE — 2580000003 HC RX 258: Performed by: SURGERY

## 2021-05-27 PROCEDURE — 2500000003 HC RX 250 WO HCPCS: Performed by: FAMILY MEDICINE

## 2021-05-27 PROCEDURE — 82947 ASSAY GLUCOSE BLOOD QUANT: CPT

## 2021-05-27 PROCEDURE — 85025 COMPLETE CBC W/AUTO DIFF WBC: CPT

## 2021-05-27 PROCEDURE — 99213 OFFICE O/P EST LOW 20 MIN: CPT

## 2021-05-27 PROCEDURE — 80053 COMPREHEN METABOLIC PANEL: CPT

## 2021-05-27 PROCEDURE — 94761 N-INVAS EAR/PLS OXIMETRY MLT: CPT

## 2021-05-27 PROCEDURE — 6360000002 HC RX W HCPCS: Performed by: FAMILY MEDICINE

## 2021-05-27 PROCEDURE — 97110 THERAPEUTIC EXERCISES: CPT

## 2021-05-27 RX ORDER — CILOSTAZOL 100 MG/1
100 TABLET ORAL 2 TIMES DAILY
Status: DISCONTINUED | OUTPATIENT
Start: 2021-05-27 | End: 2021-06-07 | Stop reason: HOSPADM

## 2021-05-27 RX ORDER — POTASSIUM CHLORIDE 750 MG/1
20 CAPSULE, EXTENDED RELEASE ORAL DAILY
Status: DISCONTINUED | OUTPATIENT
Start: 2021-05-27 | End: 2021-06-07 | Stop reason: HOSPADM

## 2021-05-27 RX ORDER — DEXTROSE AND SODIUM CHLORIDE 5; .9 G/100ML; G/100ML
INJECTION, SOLUTION INTRAVENOUS CONTINUOUS
Status: DISCONTINUED | OUTPATIENT
Start: 2021-05-27 | End: 2021-05-28

## 2021-05-27 RX ORDER — ASPIRIN 325 MG
325 TABLET ORAL DAILY
Status: DISCONTINUED | OUTPATIENT
Start: 2021-05-27 | End: 2021-06-03

## 2021-05-27 RX ADMIN — PANTOPRAZOLE SODIUM 40 MG: 40 INJECTION, POWDER, FOR SOLUTION INTRAVENOUS at 21:09

## 2021-05-27 RX ADMIN — IPRATROPIUM BROMIDE AND ALBUTEROL SULFATE 1 AMPULE: .5; 3 SOLUTION RESPIRATORY (INHALATION) at 19:23

## 2021-05-27 RX ADMIN — SODIUM CHLORIDE, PRESERVATIVE FREE 10 ML: 5 INJECTION INTRAVENOUS at 11:52

## 2021-05-27 RX ADMIN — FENTANYL CITRATE 50 MCG: 50 INJECTION INTRAMUSCULAR; INTRAVENOUS at 09:31

## 2021-05-27 RX ADMIN — CILOSTAZOL 100 MG: 100 TABLET ORAL at 14:07

## 2021-05-27 RX ADMIN — SODIUM CHLORIDE: 9 INJECTION, SOLUTION INTRAVENOUS at 05:55

## 2021-05-27 RX ADMIN — SODIUM CHLORIDE, PRESERVATIVE FREE 10 ML: 5 INJECTION INTRAVENOUS at 09:31

## 2021-05-27 RX ADMIN — FENTANYL CITRATE 50 MCG: 50 INJECTION INTRAMUSCULAR; INTRAVENOUS at 11:51

## 2021-05-27 RX ADMIN — Medication 10 ML: at 08:41

## 2021-05-27 RX ADMIN — THEOPHYLLINE 100 MG: 80 SOLUTION ORAL at 14:08

## 2021-05-27 RX ADMIN — ONDANSETRON 4 MG: 2 INJECTION INTRAMUSCULAR; INTRAVENOUS at 09:31

## 2021-05-27 RX ADMIN — IPRATROPIUM BROMIDE AND ALBUTEROL SULFATE 1 AMPULE: .5; 3 SOLUTION RESPIRATORY (INHALATION) at 15:16

## 2021-05-27 RX ADMIN — FENTANYL CITRATE 50 MCG: 50 INJECTION INTRAMUSCULAR; INTRAVENOUS at 13:56

## 2021-05-27 RX ADMIN — ENOXAPARIN SODIUM 40 MG: 40 INJECTION SUBCUTANEOUS at 08:41

## 2021-05-27 RX ADMIN — Medication 10 ML: at 21:09

## 2021-05-27 RX ADMIN — POTASSIUM BICARBONATE 40 MEQ: 782 TABLET, EFFERVESCENT ORAL at 14:06

## 2021-05-27 RX ADMIN — FENTANYL CITRATE 50 MCG: 50 INJECTION INTRAMUSCULAR; INTRAVENOUS at 03:00

## 2021-05-27 RX ADMIN — FENTANYL CITRATE 50 MCG: 50 INJECTION INTRAMUSCULAR; INTRAVENOUS at 21:09

## 2021-05-27 RX ADMIN — CEFEPIME 2000 MG: 2 INJECTION, POWDER, FOR SOLUTION INTRAVENOUS at 06:37

## 2021-05-27 RX ADMIN — METRONIDAZOLE 500 MG: 500 INJECTION, SOLUTION INTRAVENOUS at 16:02

## 2021-05-27 RX ADMIN — THEOPHYLLINE 100 MG: 80 SOLUTION ORAL at 05:55

## 2021-05-27 RX ADMIN — FENTANYL CITRATE 50 MCG: 50 INJECTION INTRAMUSCULAR; INTRAVENOUS at 17:11

## 2021-05-27 RX ADMIN — ONDANSETRON 4 MG: 2 INJECTION INTRAMUSCULAR; INTRAVENOUS at 17:14

## 2021-05-27 RX ADMIN — METRONIDAZOLE 500 MG: 500 INJECTION, SOLUTION INTRAVENOUS at 01:38

## 2021-05-27 RX ADMIN — PANTOPRAZOLE SODIUM 40 MG: 40 INJECTION, POWDER, FOR SOLUTION INTRAVENOUS at 08:41

## 2021-05-27 RX ADMIN — CEFEPIME 2000 MG: 2 INJECTION, POWDER, FOR SOLUTION INTRAVENOUS at 19:20

## 2021-05-27 RX ADMIN — DEXTROSE AND SODIUM CHLORIDE: 5; 900 INJECTION, SOLUTION INTRAVENOUS at 13:56

## 2021-05-27 RX ADMIN — DIATRIZOATE MEGLUMINE AND DIATRIZOATE SODIUM 450 ML: 600; 100 SOLUTION ORAL; RECTAL at 12:14

## 2021-05-27 RX ADMIN — ASPIRIN 325 MG ORAL TABLET 325 MG: 325 PILL ORAL at 13:56

## 2021-05-27 RX ADMIN — IPRATROPIUM BROMIDE AND ALBUTEROL SULFATE 1 AMPULE: .5; 3 SOLUTION RESPIRATORY (INHALATION) at 07:41

## 2021-05-27 RX ADMIN — METRONIDAZOLE 500 MG: 500 INJECTION, SOLUTION INTRAVENOUS at 08:41

## 2021-05-27 RX ADMIN — FENTANYL CITRATE 50 MCG: 50 INJECTION INTRAMUSCULAR; INTRAVENOUS at 06:41

## 2021-05-27 ASSESSMENT — PAIN DESCRIPTION - LOCATION
LOCATION: ABDOMEN
LOCATION: ABDOMEN;ARM
LOCATION: ABDOMEN
LOCATION: ABDOMEN

## 2021-05-27 ASSESSMENT — PAIN SCALES - GENERAL
PAINLEVEL_OUTOF10: 10
PAINLEVEL_OUTOF10: 5
PAINLEVEL_OUTOF10: 10
PAINLEVEL_OUTOF10: 10
PAINLEVEL_OUTOF10: 5
PAINLEVEL_OUTOF10: 9
PAINLEVEL_OUTOF10: 10
PAINLEVEL_OUTOF10: 10
PAINLEVEL_OUTOF10: 9
PAINLEVEL_OUTOF10: 10

## 2021-05-27 ASSESSMENT — ENCOUNTER SYMPTOMS
BLOOD IN STOOL: 0
CHOKING: 0
ANAL BLEEDING: 0
SHORTNESS OF BREATH: 0
BACK PAIN: 0
VOMITING: 1
TROUBLE SWALLOWING: 1
ABDOMINAL DISTENTION: 0
COUGH: 1
VOICE CHANGE: 0

## 2021-05-27 ASSESSMENT — PAIN DESCRIPTION - PAIN TYPE
TYPE: ACUTE PAIN

## 2021-05-27 ASSESSMENT — PAIN DESCRIPTION - DESCRIPTORS
DESCRIPTORS: PATIENT UNABLE TO DESCRIBE
DESCRIPTORS: PATIENT UNABLE TO DESCRIBE

## 2021-05-27 ASSESSMENT — PAIN DESCRIPTION - FREQUENCY
FREQUENCY: INTERMITTENT
FREQUENCY: INTERMITTENT

## 2021-05-27 ASSESSMENT — PAIN DESCRIPTION - ONSET
ONSET: ON-GOING
ONSET: ON-GOING

## 2021-05-27 ASSESSMENT — PAIN - FUNCTIONAL ASSESSMENT
PAIN_FUNCTIONAL_ASSESSMENT: PREVENTS OR INTERFERES SOME ACTIVE ACTIVITIES AND ADLS
PAIN_FUNCTIONAL_ASSESSMENT: PREVENTS OR INTERFERES SOME ACTIVE ACTIVITIES AND ADLS

## 2021-05-27 ASSESSMENT — PAIN DESCRIPTION - PROGRESSION
CLINICAL_PROGRESSION: NOT CHANGED
CLINICAL_PROGRESSION: NOT CHANGED

## 2021-05-27 ASSESSMENT — PAIN DESCRIPTION - ORIENTATION: ORIENTATION: RIGHT

## 2021-05-27 NOTE — CONSULTS
[facility] Contin[facility] Nurse  Consult Note       NAME:  Anusha Castaneda  MEDICAL RECORD NUMBER:  [id number]  AGE: 77 y.o. GENDER: male  : 1945  TODAY'S DATE:  2021    Subjective:     Reason for Wound Sean Due Care and Assessment: pressure ulcer- sacrum      Anusha Castaneda is a 77 y.o. male referred by:   [x] Physician  [] Nursing  [] Other:       Wound Identification:  Wound Type: incontinence associated dermatitis  Contributing Factors: chronic pressure, decreased mobility, shear force, anticoagulation therapy, malnutrition and incontinence of stool    Wound History: pt nonverbal, staff reports area is red and blanchable, foam applied to protect   Current Wound Care Treatment:  foam    Patient Goal of Care:  [x] Wound Healing  [] Odor Control  [] Palliative Care  [] Pain Control   [] Other:         PAST MEDICAL HISTORY        Diagnosis Date    Arthritis     Cancer [facility])     bladder 1980s    Cerebral artery occlusion with cerebral infarction ([facility]     TIA     Chronic kidney disease     COPD (chronic obstructive pulmonary disease) ([facility]     Hypertension     Pneumonia     Psychiatric problem     depression, social anxiety    Seizures ([facility]        PAST SURGICAL HISTORY    Past Surgical History:   Procedure Laterality Date    ABDOMEN SURGERY      BACK SURGERY      spinal surgery 2005     CAROTID ENDARTERECTOMY Left 2016    COLONOSCOPY N/A 2018    COLONOSCOPY POLYPECTOMY COLD BIOPSY performed by Davin Pressley MD at [facility]      left face    GASTROSTOMY TUBE PLACEMENT  04/15/2020    HERNIA REPAIR      HIATAL HERNIA REPAIR      INGUINAL HERNIA REPAIR Bilateral     OTHER SURGICAL HISTORY      mesh infected in inguinal canal, had to remove. Removed testiscle at this time.      TONSILLECTOMY      UPPER GASTROINTESTINAL ENDOSCOPY  04/15/2020       EGD CONTROL HEMORRHAGE    UPPER GASTROINTESTINAL ENDOSCOPY  4/15/2020    EGD CONTROL HEMORRHAGE performed by Adela Andersen MD at [facility] Endoscopy    UPPER GASTROINTESTINAL ENDOSCOPY  4/15/2020    EGD ESOPHAGOGASTRODUODENOSCOPY PEG TUBE INSERTION performed by Adela Andersen MD at [facility]       FAMILY HISTORY    Family History   Problem Relation Age of Onset    Arthritis Mother     Atrial Fibrillation Mother     Hearing Loss Mother     Heart Disease Mother     Stroke Mother     Vision Loss Mother     Arthritis Father     Cancer Father     Heart Disease Father     High Blood Pressure Father     Stroke Father     Vision Loss Father        SOCIAL HISTORY    Social History     Tobacco Use    Smoking status: Former Smoker     Packs/day: 1.00     Years: 60.00     Pack years: 60.00     Types: Cigarettes     Start date:      Quit date: 2020     Years since quittin.1    Smokeless tobacco: Never Used   Vaping Use    Vaping Use: Never used   Substance Use Topics    Alcohol use: No    Drug use: No       ALLERGIES    Allergies   Allergen Reactions    Bactrim [Sulfamethoxazole-Trimethoprim] Hives and Swelling    Ciprofloxacin Swelling     FACE           Objective:      BP (!) 161/102   Pulse 72   Temp 99 °F (37.2 °C) (Oral)   Resp 24   Ht 6' 4\" (1.93 m)   Wt 188 lb (85.3 kg)   SpO2 95%   BMI 22.88 kg/m²       LABS    CBC:   Lab Results   Component Value Date    WBC 15.1 2021    RBC 3.02 2021    HGB 9.0 2021     CMP:  Albumin:    Lab Results   Component Value Date    LABALBU 3.4 2021     PT/INR:    Lab Results   Component Value Date    PROTIME 14.1 2020    INR 1.1 2020     HgBA1c:    Lab Results   Component Value Date    LABA1C 6.1 08/15/2019     PTT: No components found for: LABPTT    Review of Systems    Assessment:     Physical Exam      Leeroy Risk Score: Leeroy Scale Score: 9    Patient Active Problem List   Diagnosis Code    Constipation K59.00    Change in bowel habits R19.4    Rectal bleeding K62.5    Aortic dissection (HCC) I71.00    Bradycardia R00.1    Other dysphagia R13.19    Enteritis K52.9    Aspiration pneumonia of both lower lobes (HCC) J69.0    Tobacco abuse Z72.0    Aspiration pneumonitis (HCC) J69.0    Status post insertion of percutaneous endoscopic gastrostomy (PEG) tube (HCC) Z93.1    Small bowel obstruction ([facility] [address] K56.609    Acute cystitis without hematuria N30.00    Mass of right lung R91.8    COPD (chronic obstructive pulmonary disease) (HCC) J44.9       Patient Active Problem List   Diagnosis    Constipation    Change in bowel habits    Rectal bleeding    Aortic dissection (HCC)    Bradycardia    Other dysphagia    Enteritis    Aspiration pneumonia of both lower lobes (HCC)    Tobacco abuse    Aspiration pneumonitis (HCC)    Status post insertion of percutaneous endoscopic gastrostomy (PEG) tube (HCC)    Small bowel obstruction (HCC)    Acute cystitis without hematuria    Mass of right lung    COPD (chronic obstructive pulmonary disease) (HCC)     Media Information       Sacral area red and blanchable with evidence of moisture associated skin damage. Mildly denuded areas to gluteal fold. Per nursing student assigned to pt today, pt has had several loose bowel movements this morning/afternoon. Issa catheter in place. Recommend zinc cream to buttocks twice daily and as needed when incontinent. Waffle mattress on bed. Pt also has blanchable area to left inner thigh, which appears to be from brief. Recommend protecting with foam dressing- change every 3 days and as needed if loose or soiled. Bilateral heels red and blanchable. Heels were elevated on pillow. Added heel medix boots for further offloading and protection. Response to treatment:  Well tolerated by patient. Plan:     Plan of Care:   Buttocks/sacrum: Cleanse with soap and water, pat dry.  Apply thin layer of zinc cream twice daily and as needed when incontinent    Left inner thigh: Apply skin barrier wipe to reddened area, cover with foam dressing. Change every 3 days and as needed if loose or soiled    -Turn every 2 hours    -Float heels off of bed with pillows under calves    -Routine incontinence care with foaming cleanser and zinc oxide cream. Apply zinc oxide cream BID and prn incontinence. Use moisture wicking under pads vs cloth backed briefs    -Static air overlay. Check inflation each shift by sliding hand under the air overlay. Feel for the patient's heaviest/ most dependant body part. Ideally 1/2 to 1\" of air will be between your hand and the patient's body. Add air prn.     Specialty Bed Required : Yes   [] Low Air Loss   [x] Pressure Redistribution  [] Fluid Immersion  [] Bariatric  [] Total Pressure Relief  [] Other:     Current Diet: Diet NPO Effective Now  Dietician consult:  Yes    Discharge Plan:  Placement for patient upon discharge: home with support   Patient appropriate for Outpatient [address]: N/A    Patient/Caregiver Teaching:  Level of patientunderstanding able to:     [] Indicates understanding       [] Needs reinforcement  [] Unsuccessful      [] Verbal Understanding  [] Demonstrated understanding       [x] No evidence of learning  [] Refused teaching         [] N/A       Electronically signed by Eulogio De La Fuente RN on  5/27/2021 at 3:24 PM

## 2021-05-27 NOTE — PROGRESS NOTES
Physician Progress Note      Anderson Nance  CSN #:                  [id number]  :                       1945  ADMIT DATE:       2021 3:38 PM  [address] [place] DATE:  RESPONDING  PROVIDER #:        Kevin Crisostomo MD          QUERY TEXT:    Pt admitted with SBO. Pt noted to have acute cystitis with SIRS plus elevated   lactic acid and documentation of possible aspiration pneumonia by surgery   consultant in  consult note. If possible, please document in the progress   notes and discharge summary if you are evaluating and /or treating any of the   following: The medical record reflects the following:  Risk Factors: CVA with multiple failed swallow studies in past requiring PEG;    nursing progress note indicates patient family witnessed providing ice   chips to patient by RN with response by patient's sister: Nubia Cano do it all the   time at home and you can just look away. \"  Patient with current likely lung   malignancy.   Urinary retention requiring rfancis catheter placement;  current   acute cystitis  Clinical Indicators:  UCx:  >100,000 Pseudomonas;   in evening on    with T 37.9, lactic acid 1.8 on  but up to 2.7 on ;  WBC 12.8 on   , up to 18.9 on  with continued leukocytosis despite antibiotics;     CT chest:  right infrahilar mass which may narrow bronchials;  Pleural   based opacity in the right upper lobe posteriorly and nodular opacity  in the right upper lobe laterally;   general surgery consult note:    Pleural based opacity in the right upper lobe posteriorly and nodular opacity   in the right upper lobe laterally  Treatment: general surgery and pulmonology consults, total 4L in IVF boluses   - and IVFs @ 125ml/hr, IV Antibiotics Maxipime and Flagyl;  Zosyn x 1  Options provided:  -- Sepsis due to acute cystitis developing on admission  -- Sepsis due to acute cystitis and aspiration pneumonia developing on   admission  -- Acute cystitis resulting in sepsis which was not present on admission  -- Acute cystitis and aspiration pneumonia resulting in sepsis which was not   present on admission  -- Acute cystitis without sepsis  -- Acute cystitis and aspiration pneumonia without sepsis  -- Other - I will add my own diagnosis  -- Disagree - Not applicable / Not valid  -- Disagree - Clinically unable to determine / Unknown  -- Refer to Clinical Documentation Reviewer    PROVIDER RESPONSE TEXT:    This patient has acute cystitis resulting in sepsis which was not present on   admission.     Query created by: Melissa Albright on 5/27/2021 11:23 AM      Electronically signed by:  Kevin Crisostomo MD 5/27/2021 11:36 AM

## 2021-05-27 NOTE — PROGRESS NOTES
Progress Note    5/27/2021   12:03 PM    Name:  Bobo Lezama  MRN:    [id number]     Acct:     [de-identified]   Room:  2105/2105-01  IP Day: 3     Admit Date: 5/24/2021  3:38 PM  PCP: Elaine Jauregui MD    Subjective:     C/C:   Chief Complaint   Patient presents with   [facility] Nausea    Emesis       Interval History: Status: not changed. Patient is seen and examined in radiology suite. Patient will be having a small bowel follow-through per surgery today. 2 episodes of nonbloody vomiting this morning. Vital signs reviewed. blood culture no growth for 2 days. ROS:   all 10 systems reviewed and are negative except as noted    Review of Systems   Unable to perform ROS: Other (Patient mumbles)   HENT: Positive for trouble swallowing. Negative for drooling and voice change. Respiratory: Positive for cough. Negative for choking and shortness of breath. Gastrointestinal: Positive for vomiting (x2). Negative for abdominal distention, anal bleeding and blood in stool. Musculoskeletal: Negative for back pain. Skin: Negative for rash. Medications: Allergies:    Allergies   Allergen Reactions    Bactrim [Sulfamethoxazole-Trimethoprim] Hives and Swelling    Ciprofloxacin Swelling     FACE       Current Meds: aspirin tablet 325 mg, Daily  cilostazol (PLETAL) tablet 100 mg, BID  potassium chloride (MICRO-K) extended release capsule 20 mEq, Daily  dextrose 5 % and 0.9 % sodium chloride infusion, Continuous  pantoprazole (PROTONIX) injection 40 mg, BID   And  sodium chloride (PF) 0.9 % injection 10 mL, BID  sodium chloride flush 0.9 % injection 10 mL, PRN  iopamidol (ISOVUE-370) 76 % injection 75 mL, ONCE PRN  potassium chloride 10 mEq/100 mL IVPB (Peripheral Line), PRN  ipratropium-albuterol (DUONEB) nebulizer solution 1 ampule, Q4H WA  enoxaparin (LOVENOX) injection 40 mg, Daily  fentaNYL (SUBLIMAZE) injection 25 mcg, Q2H PRN  fentaNYL (SUBLIMAZE) injection 50 mcg, Q2H PRN  theophylline 80 MG/15ML oral solution 100 mg, 3 times per day  sodium chloride flush 0.9 % injection 10 mL, 2 times per day  sodium chloride flush 0.9 % injection 10 mL, PRN  0.9 % sodium chloride infusion, PRN  potassium chloride (KLOR-CON M) extended release tablet 40 mEq, PRN   Or  potassium bicarb-citric acid (EFFER-K) effervescent tablet 40 mEq, PRN   Or  potassium chloride 10 mEq/100 mL IVPB (Peripheral Line), PRN  magnesium sulfate 1000 mg in dextrose 5% 100 mL IVPB, PRN  promethazine (PHENERGAN) tablet 12.5 mg, Q6H PRN   Or  ondansetron (ZOFRAN) injection 4 mg, Q6H PRN  magnesium hydroxide (MILK OF MAGNESIA) 400 MG/5ML suspension 30 mL, Daily PRN  acetaminophen (TYLENOL) tablet 650 mg, Q6H PRN   Or  acetaminophen (TYLENOL) suppository 650 mg, Q6H PRN  cefepime (MAXIPIME) 2000 mg IVPB minibag, Q12H  metronidazole (FLAGYL) 500 mg in NaCl 100 mL IVPB premix, Q8H  hydrALAZINE (APRESOLINE) injection 10 mg, Q6H PRN        Data:     Code Status:  Full Code    Family History   Problem Relation Age of Onset    Arthritis Mother     Atrial Fibrillation Mother     Hearing Loss Mother     Heart Disease Mother     Stroke Mother     Vision Loss Mother     Arthritis Father     Cancer Father     Heart Disease Father     High Blood Pressure Father     Stroke Father     Vision Loss Father        Social History     Socioeconomic History    Marital status:      Spouse name: Not on file    Number of children: Not on file    Years of education: Not on file    Highest education level: Not on file   Occupational History    Not on file   Tobacco Use    Smoking status: Former Smoker     Packs/day: 1.00     Years: 60.00     Pack years: 60.00     Types: Cigarettes     Start date:      Quit date: 2020     Years since quittin.1    Smokeless tobacco: Never Used   Vaping Use    Vaping Use: Never used   Substance and Sexual Activity    Alcohol use: No    Drug use: No    Sexual activity: Yes     Partners: Female   Other Topics Concern    Not on file   Social History Narrative    Not on file     Social Determinants of Health     Financial Resource Strain:     Difficulty of Paying Living Expenses:    Food Insecurity:     Worried About Running Out of Food in the Last Year:     [address] in the Last Year:    Transportation Needs:     Lack of Transportation (Medical):  Lack of Transportation (Non-Medical):    Physical Activity:     Days of Exercise per Week:     Minutes of Exercise per Session:    Stress:     Feeling of Stress :    Social Connections:     Frequency of Communication with Friends and Family:     Frequency of Social Gatherings with Friends and Family:     Attends [employer] Services:     Active Member of Clubs or Organizations:     Attends Club or Organization Meetings:     Marital Status:    Intimate Partner Violence:     Fear of Current or Ex-Partner:     Emotionally Abused:     Physically Abused:     Sexually Abused:        I/O (24Hr): Intake/Output Summary (Last 24 hours) at 5/27/2021 1203  Last data filed at 5/27/2021 0841  Gross per 24 hour   Intake 2771.52 ml   Output 1555 ml   Net 1216.52 ml     Radiology:  XR ABDOMEN (KUB) (SINGLE AP VIEW)    Result Date: 5/26/2021  1. Interval decrease in bowel gas. Nonobstructive bowel gas pattern. 2.  Moderate amount stool burden in the rectum and proximal colon. XR ABDOMEN (KUB) (SINGLE AP VIEW)    Result Date: 5/25/2021  Stable bowel gas pattern, with air present throughout the large and small bowel. No significant small bowel distension. The closed loop small bowel obstruction in the right abdomen is not evident radiographically. CT CHEST W CONTRAST    Result Date: 5/26/2021  Right infrahilar mass that may involve the right inferior pulmonary vein. This also may narrow bronchials traversing this region. Tissue sampling is recommended.  Pleural based opacity in the right upper lobe posteriorly and nodular opacity in the right upper lobe laterally. PET-CT may be helpful in further characterization. CT ABDOMEN PELVIS W IV CONTRAST Additional Contrast? None    Result Date: 5/24/2021  1. Findings consistent with close loop small bowel obstruction with transition zone in the right abdomen. Right upper abdomen Swirling loops of mildly dilated small bowel and in tightly collapsed small bowel loop with a funneling appearance. Surgical consultation advised. 2. Views of the lung bases show partially imaged necrotic appearing 3.2 cm right hilar or infrahilar lymph nodes/parenchymal mass with surrounding patchy consolidation and septal thickening in the right lower and middle lobes. Findings worrisome for malignancy. Dedicated CT chest recommended. 3. 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 Vascular consultation advised.        Labs:  Recent Results (from the past 24 hour(s))   Hgb/Hct    Collection Time: 05/26/21 12:04 PM   Result Value Ref Range    Hemoglobin 8.8 (L) 13.5 - 17.5 g/dL    Hematocrit 28.4 (L) 41 - 53 %   POC Glucose Fingerstick    Collection Time: 05/26/21 12:53 PM   Result Value Ref Range    POC Glucose 96 75 - 110 mg/dL   POC Glucose Fingerstick    Collection Time: 05/26/21  4:44 PM   Result Value Ref Range    POC Glucose 85 75 - 110 mg/dL   POC Glucose Fingerstick    Collection Time: 05/26/21  7:51 PM   Result Value Ref Range    POC Glucose 84 75 - 110 mg/dL   Comprehensive Metabolic Panel w/ Reflex to MG    Collection Time: 05/27/21  4:55 AM   Result Value Ref Range    Glucose 91 70 - 99 mg/dL    BUN 40 (H) 8 - 23 mg/dL    CREATININE 0.88 0.70 - 1.20 mg/dL    Bun/Cre Ratio NOT REPORTED 9 - 20    Calcium 8.6 8.6 - 10.4 mg/dL    Sodium 145 (H) 135 - 144 mmol/L    Potassium 3.4 (L) 3.7 - 5.3 mmol/L    Chloride 114 (H) 98 - 107 mmol/L    CO2 15 (L) 20 - 31 mmol/L    Anion Gap 16 9 - 17 mmol/L    Alkaline Phosphatase 55 40 - 129 U/L    ALT 12 5 - 41 U/L    AST 24 <40 U/L    Total Bilirubin 0.30 0.3 - 1.2 mg/dL    Total Protein 6.5 6.4 - 8.3 g/dL    Albumin 3.4 (L) 3.5 - 5.2 g/dL    Albumin/Globulin Ratio NOT REPORTED 1.0 - 2.5    GFR Non-African American >60 >60 mL/min    GFR African American >60 >60 mL/min    GFR Comment          GFR Staging NOT REPORTED    CBC with DIFF    Collection Time: 05/27/21  4:55 AM   Result Value Ref Range    WBC 15.1 (H) 3.5 - 11.0 k/uL    RBC 3.02 (L) 4.5 - 5.9 m/uL    Hemoglobin 9.0 (L) 13.5 - 17.5 g/dL    Hematocrit 26.5 (L) 41 - 53 %    MCV 87.7 80 - 100 fL    MCH 29.7 26 - 34 pg    MCHC 33.9 31 - 37 g/dL    RDW 14.2 11.5 - 14.9 %    Platelets 835 304 - 152 k/uL    MPV 6.7 6.0 - 12.0 fL    NRBC Automated NOT REPORTED per 100 WBC    Differential Type NOT REPORTED     Immature Granulocytes NOT REPORTED 0 %    Absolute Immature Granulocyte NOT REPORTED 0.00 - 0.30 k/uL    WBC Morphology NOT REPORTED     RBC Morphology NOT REPORTED     Platelet Estimate NOT REPORTED     Seg Neutrophils 89 (H) 36 - 66 %    Lymphocytes 6 (L) 24 - 44 %    Monocytes 4 1 - 7 %    Eosinophils % 0 0 - 4 %    Basophils 1 0 - 2 %    Segs Absolute 13.44 (H) 1.3 - 9.1 k/uL    Absolute Lymph # 0.91 (L) 1.0 - 4.8 k/uL    Absolute Mono # 0.60 0.1 - 1.3 k/uL    Absolute Eos # 0.00 0.0 - 0.4 k/uL    Basophils Absolute 0.15 0.0 - 0.2 k/uL    Morphology ANISOCYTOSIS PRESENT     Morphology FEW ELLIPTOCYTES     Morphology FEW ECHINOCYTES    Lactic Acid    Collection Time: 05/27/21  4:55 AM   Result Value Ref Range    Lactic Acid 0.7 0.5 - 2.2 mmol/L   Magnesium    Collection Time: 05/27/21  4:55 AM   Result Value Ref Range    Magnesium 2.2 1.6 - 2.6 mg/dL   POC Glucose Fingerstick    Collection Time: 05/27/21  7:09 AM   Result Value Ref Range    POC Glucose 82 75 - 110 mg/dL       Physical Examination:        Vitals:  BP (!) 147/91   Pulse 109   Temp 98.8 °F (37.1 °C) (Axillary)   Resp 20   Ht 6' 4\" (1.93 m)   Wt 188 lb (85.3 kg)   SpO2 92%   BMI 22.88 kg/m²   Temp (24hrs), Av.3 °F (36.8 °C), Min:97.5 °F (36.4 °C), Max:98.8 °F (37.1 °C)    Recent Labs     21  1253 21  1644 21  0709   POCGLU 96 85 84 82         Physical Exam  Vitals reviewed. Constitutional:       Appearance: Normal appearance. He is not diaphoretic. HENT:      Head: Normocephalic and atraumatic. Right Ear: External ear normal.      Left Ear: External ear normal.      Nose: Nose normal.      Mouth/Throat:      Mouth: Mucous membranes are moist.      Pharynx: Oropharynx is clear. Eyes:      Conjunctiva/sclera: Conjunctivae normal.   Cardiovascular:      Rate and Rhythm: Normal rate and regular rhythm. Pulses: Normal pulses. Heart sounds: Normal heart sounds. Pulmonary:      Effort: Pulmonary effort is normal.      Breath sounds: Normal breath sounds. Abdominal:      General: Bowel sounds are normal. There is no distension. Palpations: Abdomen is soft. Tenderness: There is no abdominal tenderness. There is no right CVA tenderness or left CVA tenderness. Comments: G-tube in place   Musculoskeletal:         General: No tenderness or deformity. Normal range of motion. Cervical back: Normal range of motion and neck supple. No rigidity. Skin:     General: Skin is warm and dry. Capillary Refill: Capillary refill takes less than 2 seconds. Coloration: Skin is not jaundiced. Neurological:      Mental Status: Mental status is at baseline.    Psychiatric:         Mood and Affect: Mood normal.         Behavior: Behavior normal.         Assessment:        Primary Problem  Small bowel obstruction (HCC)     Principal Problem:    Small bowel obstruction (HCC)  Active Problems:    Status post insertion of percutaneous endoscopic gastrostomy (PEG) tube (HCC)    Acute cystitis without hematuria    Mass of right lung    COPD (chronic obstructive pulmonary disease) (HCC)  Resolved Problems:    * No resolved hospital problems. *      Past Medical History:   Diagnosis Date    Arthritis     Cancer [facility])     bladder 1980s    Cerebral artery occlusion with cerebral infarction [facility])     TIA 2010    Chronic kidney disease     COPD (chronic obstructive [facility] disease) ([facility]     Hypertension     Pneumonia     Psychiatric problem     depression, social anxiety    Seizures ([facility]         Plan:        1. IV cefepime  2. Urine cultures positive for Pseudomonas  3. IV Flagyl  4. D5 normal saline at 125 mL/h  5. CBC, CMP  6. Small bowel follow-through today per general surgery  1. PPI protonics 40 mg IV  2. DVT Prophylaxis Lovenox  3. EPCs  4. PT/OT to evaluate and treat  5. Pain control  6. Replace electrolytes as per sliding scale  7. Home medications reviewed and appropriate medications continued  8.  Reviewed labs and imaging studies from last 24 hours and results explained to patient      Electronically signed by Nico Otero MD

## 2021-05-27 NOTE — PLAN OF CARE
Problem: Falls - Risk of:  Goal: Will remain free from falls  Description: Will remain free from falls  5/27/2021 0332 by Almas Jaramillo RN  Outcome: Ongoing  Note: Pt assessed as a fall risk this shift. Remains free from falls and accidental injury at this time. Fall precautions in place, including falling star sign and fall risk band on pt. Floor free from obstacles, and bed is locked and in lowest position. Adequate lighting provided. Pt encouraged to call before getting OOB for any need. Bed alarm activated. Needs monitored during hourly rounding. Problem: Skin Integrity:  Goal: Absence of new skin breakdown  Description: Absence of new skin breakdown  Outcome: Ongoing  Note: Skin assessment complete. Waffle mattress in place. Pt turned and repositioned every two hours with assistance from staff. Area kept free from moisture. Proper nourishment and fluids encouraged, as appropriate. Skin remains clean, dry, and intact. Problem: PAIN  Goal: Patient's pain/discomfort is manageable  5/27/2021 0332 by Almas Jaramillo RN  Outcome: Ongoing  Note: Pt stated in the beginning of shift that he didn't want to take anymore pain meds. Around 3am patient changed his mind and was willing to take pain medication. Pt medicated with pain medication prn. Assessed all pain characteristics including level, type, location, frequency, and onset. Non-pharmacologic interventions offered to pt as well.

## 2021-05-27 NOTE — PROGRESS NOTES
Pulmonary Progress Note  [facility]      Patient - Nitish Gray,  Age - 77 y.o.    - 1945      Room Number - 2105/2105-01   N -  [id number]   [facility]  Date of Admission -  2021  3:38 PM    Tona De MD  Primary Care Physician - Anthony Perez MD     SUBJECTIVE   Patient looks about the same. No respiratory difficulty at this time. General surgery continues to follow the patient's abdomen      OBJECTIVE   VITALS    height is 6' 4\" (1.93 m) and weight is 188 lb (85.3 kg). His axillary temperature is 98.6 °F (37 °C). His blood pressure is 149/82 (abnormal) and his pulse is 99. His respiration is 20 and oxygen saturation is 95%. Body mass index is 22.88 kg/m². Temperature Range: Temp: 98.6 °F (37 °C) Temp  Av.4 °F (36.9 °C)  Min: 97.5 °F (36.4 °C)  Max: 99 °F (37.2 °C)  BP Range:  Systolic (08FMY), AVQ:732 , Min:131 , QBA:373     Diastolic (49QUU), WPA:362, Min:82, Max:140    Pulse Range: Pulse  Av.1  Min: 72  Max: 109  Respiration Range: Resp  Av.3  Min: 16  Max: 24  Current Pulse Ox[de-identified]  SpO2: 95 %  24HR Pulse Ox Range:  SpO2  Av.2 %  Min: 92 %  Max: 97 %  Oxygen Amount and Delivery: O2 Flow Rate (L/min): 0 L/min    Wt Readings from Last 3 Encounters:   21 188 lb (85.3 kg)   20 189 lb (85.7 kg)   20 183 lb (83 kg)       I/O (24 Hours)    Intake/Output Summary (Last 24 hours) at 2021 0329  Last data filed at 2021 1625  Gross per 24 hour   Intake 1478.52 ml   Output 3005 ml   Net -1526.48 ml       EXAM     General Appearance  Awake, alert, oriented, in no acute distress  HEENT - normocephalic, atraumatic. Neck - Supple,  trachea midline   Lungs -coarse breath sounds no crackles rales or wheezes anteriorly    Heart Exam:PMI normal. No lifts, heaves, or thrills. RRR. No murmurs,  Extremity Exam: No signs of edema.     MEDS      aspirin  325 mg Oral Daily    cilostazol  100 mg Oral BID    potassium chloride  20 mEq Oral Daily    pantoprazole  40 mg Intravenous BID    And    sodium chloride (PF)  10 mL Intravenous BID    ipratropium-albuterol  1 ampule Inhalation Q4H WA    enoxaparin  40 mg Subcutaneous Daily    theophylline  100 mg Oral 3 times per day    sodium chloride flush  10 mL Intravenous 2 times per day    cefepime  2,000 mg Intravenous Q12H    metroNIDAZOLE  500 mg Intravenous Q8H      dextrose 5 % and 0.9 % NaCl 125 mL/hr at 05/27/21 1356    sodium chloride       diatrizoate meglumine-sodium, sodium chloride flush, iopamidol, potassium chloride, fentanNYL, fentanNYL, sodium chloride flush, sodium chloride, potassium chloride **OR** potassium alternative oral replacement **OR** potassium chloride, magnesium sulfate, promethazine **OR** ondansetron, magnesium hydroxide, acetaminophen **OR** acetaminophen, hydrALAZINE    LABS   CBC   Recent Labs     05/27/21  0455   WBC 15.1*   HGB 9.0*   HCT 26.5*   MCV 87.7        BMP:   Lab Results   Component Value Date     05/27/2021    K 3.4 05/27/2021     05/27/2021    CO2 15 05/27/2021    BUN 40 05/27/2021    LABALBU 3.4 05/27/2021    CREATININE 0.88 05/27/2021    CALCIUM 8.6 05/27/2021    GFRAA >60 05/27/2021    LABGLOM >60 05/27/2021     ABGs:  Lab Results   Component Value Date    PHART 7.416 04/08/2019    PO2ART 67.2 04/08/2019    VIH4WUJ 39.7 04/08/2019      Lab Results   Component Value Date    MODE NOT REPORTED 04/08/2019     Ionized Calcium:  No results found for: IONCA  Magnesium:    Lab Results   Component Value Date    MG 2.2 05/27/2021     Phosphorus:    Lab Results   Component Value Date    PHOS 3.3 04/04/2020        LIVER PROFILE   Recent Labs     05/27/21  0455   AST 24   ALT 12   BILITOT 0.30   ALKPHOS 55     INR No results for input(s): INR in the last 72 hours.   PTT   Lab Results   Component Value Date    APTT 37.3 (H) 04/07/2020         RADIOLOGY     (See actual reports for details)    ASSESSMENT/PLAN     Patient Active Problem List   Diagnosis    Constipation    Change in bowel habits    Rectal bleeding    Aortic dissection (HCC)    Bradycardia    Other dysphagia    Enteritis    Aspiration pneumonia of both lower lobes (HCC)    Tobacco abuse    Aspiration pneumonitis ([address]    Status post insertion of percutaneous endoscopic gastrostomy (PEG) tube (HCC)    Small bowel obstruction (HCC)    Acute cystitis without hematuria    Mass of right lung    COPD (chronic obstructive pulmonary disease) ([address]     IMPRESSION:  1. The patient with evidence of small bowel obstruction  Versus ileus. 2.  COPD by history. 3.    4.6 x 3.7 cm infrahilar right lesion. Concern for satellite lesions. 4.  History of CVA with expressive aphasia  5. History of bladder cancer in the past  6. History of hypertension  7. History of chronic kidney disease    Of note, patient was placed back on Pletal    On DuoNeb treatments. On Lovenox for DVT prophylaxis    I did show the CT scan images to patient's son, Eboni Gusman. He voiced understanding that this could be advanced lung cancer.   Certainly it would be wise to hold off addressing this issue until the acute process that brought him to the hospital is stable  He was appreciative of the information      Electronically signed by Gordo Raines MD on 5/27/2021 at 6:59 PM

## 2021-05-27 NOTE — CARE COORDINATION
ONGOING DISCHARGE PLAN:    I spoke with son at nurse . He states that someone is with the patient 24/7  I inquired about VNS/aide services and he states what they currently have is not enough. I called [facility] to inquire about increasing servies/aide services and they informed me that they do not offer aide services, however they would be able to increase nurse visits to 2x/week instead of 1x/week. [facility] states that family needs to consider SNF - I informed them that family is declining SNF at this time. Will continue to follow for additional discharge needs.     Electronically signed by Gutierrez Magallon RN on 5/27/2021 at 3:55 PM

## 2021-05-27 NOTE — PLAN OF CARE
Problem: Falls - Risk of:  Goal: Will remain free from falls  Outcome: Ongoing     Problem: Skin Integrity:  Goal: Will show no infection signs and symptoms  Outcome: Ongoing     Problem: SAFETY  Goal: Free from accidental physical injury  Outcome: Ongoing     Problem: DAILY CARE  Goal: Daily care needs are met  Outcome: Ongoing     Problem: PAIN  Goal: Patient's pain/discomfort is manageable  Outcome: Ongoing     Problem: SKIN INTEGRITY  Goal: Skin integrity is maintained or improved  Outcome: Ongoing     Problem: KNOWLEDGE DEFICIT  Goal: Patient/S.O. demonstrates understanding of disease process, treatment plan, medications, and discharge instructions.   Outcome: Ongoing     Problem: DISCHARGE BARRIERS  Goal: Patient's continuum of care needs are met  Outcome: Ongoing

## 2021-05-27 NOTE — PROGRESS NOTES
Patient was seen and examined. Had some abdominal pain. Afebrile bowel sounds are stable. Urine output is excellent. MAXIMUM TEMPERATURE was 99. G-tube was clamped earlier for small bowel follow-through. Abdomen is soft nonspecific tenderness. Patient is Nonverbal and its very hard to get information from him. Extremity mild edema. Blood work was reviewed. WBC count has improved a little too 15.1 from 15.7. It's better than 18.9 couple of days ago. Hemoglobin is stable. Potassium was 3.4. Creatinine is normal.Lactic acid today is normal at 0.7. Small bowel follow-through reviewed with the radiologist. I spoke with the nurse and the charge nurse this afternoon after I reviewed the small bowel follow-through. On the x-rays contrast is moving very slow especially towards the distal small bowel. It has not reached the colon almost after 6 hours. I was told by the nurse today that  She got in the report that patient had 2 large bowel movements and a smear this morning. The 2 large bowel movements are not documented in the computer. I've asked the charge nurse to reach out to the right nurse and confirm that. On the small bowel follow-through patient does have stool and air in the colon. At this point my plan is to repeat KUB in the morning. Repeat blood work in the morning. G-tube to gravity. Monitor strict I's and O's. Depending on my evaluation tomorrow and the small bowel follow-through results with a follow-up KUB in the morning I will make further recommendations in terms of possible surgical intervention. This was again discussed with the nurse taking care of the patient as well as charge nurse.

## 2021-05-27 NOTE — PROGRESS NOTES
2)  Scooting: Dependent/Total;2 Person assistance  Bed mobility  Scooting: Dependent/Total;2 Person assistance    Transfers:                                                                                            Other exercises?: Yes  Other exercises 1: PROM BLE and BUE ex x  10reps, 2sets. Other exercises 2: gastroc gentle stretching x 5 for 15sec hold. Activity Tolerance: Patient limited by pain; Patient limited by endurance  PT Equipment Recommendations  Equipment Needed: No       Assessment  Activity Tolerance: Patient limited by pain; Patient limited by endurance   Body structures, Functions, Activity limitations: Decreased functional mobility ; Decreased ADL status; Decreased ROM; Decreased strength;Decreased endurance;Decreased sensation; Increased pain  Assessment: Pt requiring 2 assist for bed mobility and repositioning. Pt agreeable to bedside eval. Pt would benefit from continued PT to prepare for junior transfers at home in addition to progress to EOB with core activity. Prognosis: Guarded  Discharge Recommendations: Patient would benefit from continued therapy after discharge     Type of devices: All fall risk precautions in place; Bed alarm in place;Call light within reach; Left in bed;Nurse notified (student nurse present in the room. )     Plan  Times per week: 3-4x/week  Current Treatment Recommendations: ROM, Strengthening, Functional Mobility Training, Endurance Training, Positioning, Equipment Evaluation, Education, & procurement, Patient/Caregiver Education & Training, Safety Education & Training, Home Exercise Program, Pain Management    Patient Education  New Education Provided:    Learner:patient  Method: explanation       Outcome: acknowledged understanding of none. Goals  Short term goals  Time Frame for Short term goals: 4-5 days  Short term goal 1: Pt to demo mod x2 rolling each direction in bed to prepare for sling placement at home for junior transfers.   Short term goal 2: Pt to tolerate 30-45 minute PT session. Short term goal 3: Pt to tolerate stretching of B LE with up to 30 second holds, 3x each. Short term goal 4: Pt to tolerate 10-15 reps of BLE strengthening exercises progressing from [facility] to [facility]. Short term goal 5: Pt to complete core strengthening exercises with good technique.     PT Individual Minutes  Time In: (P) 0459  Time Out: (P) 0921  Minutes: (P) 25    Electronically signed by Socorro Carrera PTA on 5/27/21 at 9:41 AM EDT

## 2021-05-28 ENCOUNTER — APPOINTMENT (OUTPATIENT)
Dept: GENERAL RADIOLOGY | Age: 76
DRG: 329 | End: 2021-05-28
Payer: COMMERCIAL

## 2021-05-28 ENCOUNTER — ANESTHESIA (OUTPATIENT)
Dept: OPERATING ROOM | Age: 76
DRG: 329 | End: 2021-05-28
Payer: COMMERCIAL

## 2021-05-28 ENCOUNTER — APPOINTMENT (OUTPATIENT)
Dept: INTERVENTIONAL RADIOLOGY/VASCULAR | Age: 76
DRG: 329 | End: 2021-05-28
Payer: COMMERCIAL

## 2021-05-28 ENCOUNTER — ANESTHESIA EVENT (OUTPATIENT)
Dept: OPERATING ROOM | Age: 76
DRG: 329 | End: 2021-05-28
Payer: COMMERCIAL

## 2021-05-28 VITALS — TEMPERATURE: 34.2 F | OXYGEN SATURATION: 94 % | DIASTOLIC BLOOD PRESSURE: 67 MMHG | SYSTOLIC BLOOD PRESSURE: 139 MMHG

## 2021-05-28 LAB
ABSOLUTE EOS #: 0 K/UL (ref 0–0.4)
ABSOLUTE IMMATURE GRANULOCYTE: ABNORMAL K/UL (ref 0–0.3)
ABSOLUTE LYMPH #: 1.68 K/UL (ref 1–4.8)
ABSOLUTE MONO #: 0.61 K/UL (ref 0.1–1.3)
ALBUMIN SERPL-MCNC: 3.6 G/DL (ref 3.5–5.2)
ALBUMIN/GLOBULIN RATIO: ABNORMAL (ref 1–2.5)
ALLEN TEST: ABNORMAL
ALP BLD-CCNC: 52 U/L (ref 40–129)
ALT SERPL-CCNC: 13 U/L (ref 5–41)
ANION GAP SERPL CALCULATED.3IONS-SCNC: 13 MMOL/L (ref 9–17)
ANION GAP SERPL CALCULATED.3IONS-SCNC: 15 MMOL/L (ref 9–17)
AST SERPL-CCNC: 28 U/L
BASOPHILS # BLD: 0 % (ref 0–2)
BASOPHILS ABSOLUTE: 0 K/UL (ref 0–0.2)
BILIRUB SERPL-MCNC: 0.32 MG/DL (ref 0.3–1.2)
BNP INTERPRETATION: ABNORMAL
BUN BLDV-MCNC: 30 MG/DL (ref 8–23)
BUN BLDV-MCNC: 35 MG/DL (ref 8–23)
BUN/CREAT BLD: ABNORMAL (ref 9–20)
BUN/CREAT BLD: ABNORMAL (ref 9–20)
CALCIUM SERPL-MCNC: 8.3 MG/DL (ref 8.6–10.4)
CALCIUM SERPL-MCNC: 8.9 MG/DL (ref 8.6–10.4)
CARBOXYHEMOGLOBIN: 1.6 % (ref 0–5)
CHLORIDE BLD-SCNC: 119 MMOL/L (ref 98–107)
CHLORIDE BLD-SCNC: 119 MMOL/L (ref 98–107)
CO2: 18 MMOL/L (ref 20–31)
CO2: 20 MMOL/L (ref 20–31)
CREAT SERPL-MCNC: 0.99 MG/DL (ref 0.7–1.2)
CREAT SERPL-MCNC: 1.05 MG/DL (ref 0.7–1.2)
DIFFERENTIAL TYPE: ABNORMAL
EOSINOPHILS RELATIVE PERCENT: 0 % (ref 0–4)
FIO2: 60
GFR AFRICAN AMERICAN: >60 ML/MIN
GFR AFRICAN AMERICAN: >60 ML/MIN
GFR NON-AFRICAN AMERICAN: >60 ML/MIN
GFR NON-AFRICAN AMERICAN: >60 ML/MIN
GFR SERPL CREATININE-BSD FRML MDRD: ABNORMAL ML/MIN/{1.73_M2}
GLUCOSE BLD-MCNC: 114 MG/DL (ref 75–110)
GLUCOSE BLD-MCNC: 118 MG/DL (ref 75–110)
GLUCOSE BLD-MCNC: 138 MG/DL (ref 70–99)
GLUCOSE BLD-MCNC: 150 MG/DL (ref 70–99)
HCO3 ARTERIAL: 17.8 MMOL/L (ref 22–26)
HCT VFR BLD CALC: 25.8 % (ref 41–53)
HCT VFR BLD CALC: 28.1 % (ref 41–53)
HCT VFR BLD CALC: 33.7 % (ref 41–53)
HEMOGLOBIN: 10.7 G/DL (ref 13.5–17.5)
HEMOGLOBIN: 8.3 G/DL (ref 13.5–17.5)
HEMOGLOBIN: 9.3 G/DL (ref 13.5–17.5)
IMMATURE GRANULOCYTES: ABNORMAL %
LYMPHOCYTES # BLD: 11 % (ref 24–44)
MAGNESIUM: 2.3 MG/DL (ref 1.6–2.6)
MCH RBC QN AUTO: 29.1 PG (ref 26–34)
MCHC RBC AUTO-ENTMCNC: 32.3 G/DL (ref 31–37)
MCV RBC AUTO: 90.2 FL (ref 80–100)
METHEMOGLOBIN: 0.4 % (ref 0–1.9)
MODE: ABNORMAL
MONOCYTES # BLD: 4 % (ref 1–7)
MORPHOLOGY: ABNORMAL
MORPHOLOGY: ABNORMAL
NEGATIVE BASE EXCESS, ART: 8.3 MMOL/L (ref 0–2)
NOTIFICATION TIME: ABNORMAL
NOTIFICATION: ABNORMAL
NRBC AUTOMATED: ABNORMAL PER 100 WBC
O2 DEVICE/FLOW/%: ABNORMAL
O2 SAT, ARTERIAL: 98.3 % (ref 95–98)
OXYHEMOGLOBIN: ABNORMAL % (ref 95–98)
PATIENT TEMP: 37
PCO2 ARTERIAL: 34.7 MMHG (ref 35–45)
PCO2, ART, TEMP ADJ: ABNORMAL (ref 35–45)
PDW BLD-RTO: 14.6 % (ref 11.5–14.9)
PEEP/CPAP: 5
PH ARTERIAL: 7.32 (ref 7.35–7.45)
PH, ART, TEMP ADJ: ABNORMAL (ref 7.35–7.45)
PLATELET # BLD: 311 K/UL (ref 150–450)
PLATELET ESTIMATE: ABNORMAL
PMV BLD AUTO: 6.8 FL (ref 6–12)
PO2 ARTERIAL: 259 MMHG (ref 80–100)
PO2, ART, TEMP ADJ: ABNORMAL MMHG (ref 80–100)
POSITIVE BASE EXCESS, ART: ABNORMAL MMOL/L (ref 0–2)
POTASSIUM SERPL-SCNC: 3.2 MMOL/L (ref 3.7–5.3)
POTASSIUM SERPL-SCNC: 3.5 MMOL/L (ref 3.7–5.3)
PRO-BNP: ABNORMAL PG/ML
PSV: ABNORMAL
PT. POSITION: ABNORMAL
RBC # BLD: 2.86 M/UL (ref 4.5–5.9)
RBC # BLD: ABNORMAL 10*6/UL
RESPIRATORY RATE: 16
SAMPLE SITE: ABNORMAL
SEG NEUTROPHILS: 85 % (ref 36–66)
SEGMENTED NEUTROPHILS ABSOLUTE COUNT: 13.01 K/UL (ref 1.3–9.1)
SET RATE: 16
SODIUM BLD-SCNC: 152 MMOL/L (ref 135–144)
SODIUM BLD-SCNC: 152 MMOL/L (ref 135–144)
TEXT FOR RESPIRATORY: ABNORMAL
TOTAL HB: ABNORMAL G/DL (ref 12–16)
TOTAL PROTEIN: 6.7 G/DL (ref 6.4–8.3)
TOTAL RATE: 16
VT: 620
WBC # BLD: 15.3 K/UL (ref 3.5–11)
WBC # BLD: ABNORMAL 10*3/UL

## 2021-05-28 PROCEDURE — 80053 COMPREHEN METABOLIC PANEL: CPT

## 2021-05-28 PROCEDURE — 2580000003 HC RX 258: Performed by: ANESTHESIOLOGY

## 2021-05-28 PROCEDURE — 84134 ASSAY OF PREALBUMIN: CPT

## 2021-05-28 PROCEDURE — 94761 N-INVAS EAR/PLS OXIMETRY MLT: CPT

## 2021-05-28 PROCEDURE — 2709999900 IR FLUORO GUIDED CVA DEVICE PLMT/REPLACE/REMOVAL

## 2021-05-28 PROCEDURE — 85025 COMPLETE CBC W/AUTO DIFF WBC: CPT

## 2021-05-28 PROCEDURE — 36415 COLL VENOUS BLD VENIPUNCTURE: CPT

## 2021-05-28 PROCEDURE — 3700000001 HC ADD 15 MINUTES (ANESTHESIA): Performed by: SURGERY

## 2021-05-28 PROCEDURE — C9113 INJ PANTOPRAZOLE SODIUM, VIA: HCPCS | Performed by: SURGERY

## 2021-05-28 PROCEDURE — 85018 HEMOGLOBIN: CPT

## 2021-05-28 PROCEDURE — 2709999900 HC NON-CHARGEABLE SUPPLY: Performed by: SURGERY

## 2021-05-28 PROCEDURE — 80048 BASIC METABOLIC PNL TOTAL CA: CPT

## 2021-05-28 PROCEDURE — 0DB80ZZ EXCISION OF SMALL INTESTINE, OPEN APPROACH: ICD-10-PCS | Performed by: SURGERY

## 2021-05-28 PROCEDURE — 82805 BLOOD GASES W/O2 SATURATION: CPT

## 2021-05-28 PROCEDURE — 6360000002 HC RX W HCPCS: Performed by: NURSE ANESTHETIST, CERTIFIED REGISTERED

## 2021-05-28 PROCEDURE — 2580000003 HC RX 258: Performed by: NURSE ANESTHETIST, CERTIFIED REGISTERED

## 2021-05-28 PROCEDURE — 83880 ASSAY OF NATRIURETIC PEPTIDE: CPT

## 2021-05-28 PROCEDURE — 86850 RBC ANTIBODY SCREEN: CPT

## 2021-05-28 PROCEDURE — 85014 HEMATOCRIT: CPT

## 2021-05-28 PROCEDURE — 94640 AIRWAY INHALATION TREATMENT: CPT

## 2021-05-28 PROCEDURE — 2500000003 HC RX 250 WO HCPCS: Performed by: FAMILY MEDICINE

## 2021-05-28 PROCEDURE — 0DN80ZZ RELEASE SMALL INTESTINE, OPEN APPROACH: ICD-10-PCS | Performed by: SURGERY

## 2021-05-28 PROCEDURE — 83735 ASSAY OF MAGNESIUM: CPT

## 2021-05-28 PROCEDURE — 6370000000 HC RX 637 (ALT 250 FOR IP): Performed by: FAMILY MEDICINE

## 2021-05-28 PROCEDURE — 94002 VENT MGMT INPAT INIT DAY: CPT

## 2021-05-28 PROCEDURE — 86920 COMPATIBILITY TEST SPIN: CPT

## 2021-05-28 PROCEDURE — C1765 ADHESION BARRIER: HCPCS | Performed by: SURGERY

## 2021-05-28 PROCEDURE — 36573 INSJ PICC RS&I 5 YR+: CPT | Performed by: RADIOLOGY

## 2021-05-28 PROCEDURE — 02HV33Z INSERTION OF INFUSION DEVICE INTO SUPERIOR VENA CAVA, PERCUTANEOUS APPROACH: ICD-10-PCS | Performed by: RADIOLOGY

## 2021-05-28 PROCEDURE — 0DUP07Z SUPPLEMENT RECTUM WITH AUTOLOGOUS TISSUE SUBSTITUTE, OPEN APPROACH: ICD-10-PCS | Performed by: SURGERY

## 2021-05-28 PROCEDURE — 2500000003 HC RX 250 WO HCPCS: Performed by: SURGERY

## 2021-05-28 PROCEDURE — 2580000003 HC RX 258: Performed by: SURGERY

## 2021-05-28 PROCEDURE — 3600000003 HC SURGERY LEVEL 3 BASE: Performed by: SURGERY

## 2021-05-28 PROCEDURE — 0DQV0ZZ REPAIR MESENTERY, OPEN APPROACH: ICD-10-PCS | Performed by: SURGERY

## 2021-05-28 PROCEDURE — 3700000000 HC ANESTHESIA ATTENDED CARE: Performed by: SURGERY

## 2021-05-28 PROCEDURE — 2500000003 HC RX 250 WO HCPCS: Performed by: NURSE ANESTHETIST, CERTIFIED REGISTERED

## 2021-05-28 PROCEDURE — 2700000000 HC OXYGEN THERAPY PER DAY

## 2021-05-28 PROCEDURE — 36430 TRANSFUSION BLD/BLD COMPNT: CPT

## 2021-05-28 PROCEDURE — 6360000002 HC RX W HCPCS: Performed by: SURGERY

## 2021-05-28 PROCEDURE — 74018 RADEX ABDOMEN 1 VIEW: CPT

## 2021-05-28 PROCEDURE — 36600 WITHDRAWAL OF ARTERIAL BLOOD: CPT

## 2021-05-28 PROCEDURE — 7100000000 HC PACU RECOVERY - FIRST 15 MIN: Performed by: SURGERY

## 2021-05-28 PROCEDURE — 3600000013 HC SURGERY LEVEL 3 ADDTL 15MIN: Performed by: SURGERY

## 2021-05-28 PROCEDURE — 6360000002 HC RX W HCPCS: Performed by: ANESTHESIOLOGY

## 2021-05-28 PROCEDURE — 71045 X-RAY EXAM CHEST 1 VIEW: CPT

## 2021-05-28 PROCEDURE — 6370000000 HC RX 637 (ALT 250 FOR IP): Performed by: SURGERY

## 2021-05-28 PROCEDURE — 88307 TISSUE EXAM BY PATHOLOGIST: CPT

## 2021-05-28 PROCEDURE — 82947 ASSAY GLUCOSE BLOOD QUANT: CPT

## 2021-05-28 PROCEDURE — 5A1955Z RESPIRATORY VENTILATION, GREATER THAN 96 CONSECUTIVE HOURS: ICD-10-PCS | Performed by: INTERNAL MEDICINE

## 2021-05-28 PROCEDURE — 2000000000 HC ICU R&B

## 2021-05-28 PROCEDURE — 86901 BLOOD TYPING SEROLOGIC RH(D): CPT

## 2021-05-28 PROCEDURE — 86900 BLOOD TYPING SEROLOGIC ABO: CPT

## 2021-05-28 PROCEDURE — 6360000002 HC RX W HCPCS: Performed by: FAMILY MEDICINE

## 2021-05-28 PROCEDURE — 2580000003 HC RX 258: Performed by: FAMILY MEDICINE

## 2021-05-28 PROCEDURE — P9016 RBC LEUKOCYTES REDUCED: HCPCS

## 2021-05-28 PROCEDURE — 7100000001 HC PACU RECOVERY - ADDTL 15 MIN: Performed by: SURGERY

## 2021-05-28 DEVICE — BARRIER, ABSORBABLE, ADHESION
Type: IMPLANTABLE DEVICE | Site: ABDOMEN | Status: FUNCTIONAL
Brand: SEPRAFILM®

## 2021-05-28 RX ORDER — SODIUM CHLORIDE 9 MG/ML
INJECTION, SOLUTION INTRAVENOUS PRN
Status: DISCONTINUED | OUTPATIENT
Start: 2021-05-28 | End: 2021-06-07 | Stop reason: HOSPADM

## 2021-05-28 RX ORDER — LIDOCAINE HYDROCHLORIDE 10 MG/ML
INJECTION, SOLUTION EPIDURAL; INFILTRATION; INTRACAUDAL; PERINEURAL PRN
Status: DISCONTINUED | OUTPATIENT
Start: 2021-05-28 | End: 2021-05-28 | Stop reason: SDUPTHER

## 2021-05-28 RX ORDER — DIPHENHYDRAMINE HYDROCHLORIDE 50 MG/ML
12.5 INJECTION INTRAMUSCULAR; INTRAVENOUS
Status: DISCONTINUED | OUTPATIENT
Start: 2021-05-28 | End: 2021-05-28 | Stop reason: HOSPADM

## 2021-05-28 RX ORDER — SODIUM CHLORIDE 450 MG/100ML
INJECTION, SOLUTION INTRAVENOUS CONTINUOUS
Status: DISCONTINUED | OUTPATIENT
Start: 2021-05-28 | End: 2021-05-29

## 2021-05-28 RX ORDER — LABETALOL HYDROCHLORIDE 5 MG/ML
5 INJECTION, SOLUTION INTRAVENOUS EVERY 10 MIN PRN
Status: DISCONTINUED | OUTPATIENT
Start: 2021-05-28 | End: 2021-05-28 | Stop reason: HOSPADM

## 2021-05-28 RX ORDER — ONDANSETRON 2 MG/ML
4 INJECTION INTRAMUSCULAR; INTRAVENOUS EVERY 6 HOURS PRN
Status: DISCONTINUED | OUTPATIENT
Start: 2021-05-28 | End: 2021-06-07 | Stop reason: HOSPADM

## 2021-05-28 RX ORDER — FENTANYL CITRATE 50 UG/ML
INJECTION, SOLUTION INTRAMUSCULAR; INTRAVENOUS PRN
Status: DISCONTINUED | OUTPATIENT
Start: 2021-05-28 | End: 2021-05-28 | Stop reason: SDUPTHER

## 2021-05-28 RX ORDER — PROPOFOL 10 MG/ML
5-50 INJECTION, EMULSION INTRAVENOUS
Status: DISCONTINUED | OUTPATIENT
Start: 2021-05-28 | End: 2021-06-07 | Stop reason: HOSPADM

## 2021-05-28 RX ORDER — SODIUM CHLORIDE, SODIUM LACTATE, POTASSIUM CHLORIDE, CALCIUM CHLORIDE 600; 310; 30; 20 MG/100ML; MG/100ML; MG/100ML; MG/100ML
INJECTION, SOLUTION INTRAVENOUS CONTINUOUS PRN
Status: DISCONTINUED | OUTPATIENT
Start: 2021-05-28 | End: 2021-05-28 | Stop reason: SDUPTHER

## 2021-05-28 RX ORDER — SODIUM CHLORIDE 0.9 % (FLUSH) 0.9 %
10 SYRINGE (ML) INJECTION PRN
Status: DISCONTINUED | OUTPATIENT
Start: 2021-05-28 | End: 2021-06-07 | Stop reason: HOSPADM

## 2021-05-28 RX ORDER — HYDROCODONE BITARTRATE AND ACETAMINOPHEN 5; 325 MG/1; MG/1
2 TABLET ORAL PRN
Status: DISCONTINUED | OUTPATIENT
Start: 2021-05-28 | End: 2021-05-28 | Stop reason: HOSPADM

## 2021-05-28 RX ORDER — FENTANYL CITRATE 50 UG/ML
25 INJECTION, SOLUTION INTRAMUSCULAR; INTRAVENOUS EVERY 5 MIN PRN
Status: DISCONTINUED | OUTPATIENT
Start: 2021-05-28 | End: 2021-05-28 | Stop reason: HOSPADM

## 2021-05-28 RX ORDER — MEPERIDINE HYDROCHLORIDE 25 MG/ML
12.5 INJECTION INTRAMUSCULAR; INTRAVENOUS; SUBCUTANEOUS EVERY 5 MIN PRN
Status: DISCONTINUED | OUTPATIENT
Start: 2021-05-28 | End: 2021-05-28 | Stop reason: HOSPADM

## 2021-05-28 RX ORDER — MAGNESIUM SULFATE 1 G/100ML
1000 INJECTION INTRAVENOUS PRN
Status: DISCONTINUED | OUTPATIENT
Start: 2021-05-28 | End: 2021-05-31

## 2021-05-28 RX ORDER — SUCCINYLCHOLINE/SOD CL,ISO/PF 200MG/10ML
SYRINGE (ML) INTRAVENOUS PRN
Status: DISCONTINUED | OUTPATIENT
Start: 2021-05-28 | End: 2021-05-28 | Stop reason: SDUPTHER

## 2021-05-28 RX ORDER — ONDANSETRON 2 MG/ML
4 INJECTION INTRAMUSCULAR; INTRAVENOUS
Status: DISCONTINUED | OUTPATIENT
Start: 2021-05-28 | End: 2021-05-28 | Stop reason: HOSPADM

## 2021-05-28 RX ORDER — MIDAZOLAM HYDROCHLORIDE 1 MG/ML
INJECTION INTRAMUSCULAR; INTRAVENOUS PRN
Status: DISCONTINUED | OUTPATIENT
Start: 2021-05-28 | End: 2021-05-28 | Stop reason: SDUPTHER

## 2021-05-28 RX ORDER — SODIUM CHLORIDE 9 MG/ML
25 INJECTION, SOLUTION INTRAVENOUS PRN
Status: DISCONTINUED | OUTPATIENT
Start: 2021-05-28 | End: 2021-05-31

## 2021-05-28 RX ORDER — SODIUM CHLORIDE 0.9 % (FLUSH) 0.9 %
10 SYRINGE (ML) INJECTION EVERY 12 HOURS SCHEDULED
Status: DISCONTINUED | OUTPATIENT
Start: 2021-05-28 | End: 2021-06-07 | Stop reason: HOSPADM

## 2021-05-28 RX ORDER — FENTANYL CITRATE 50 UG/ML
50 INJECTION, SOLUTION INTRAMUSCULAR; INTRAVENOUS EVERY 5 MIN PRN
Status: DISCONTINUED | OUTPATIENT
Start: 2021-05-28 | End: 2021-05-28 | Stop reason: HOSPADM

## 2021-05-28 RX ORDER — ROCURONIUM BROMIDE 10 MG/ML
INJECTION, SOLUTION INTRAVENOUS PRN
Status: DISCONTINUED | OUTPATIENT
Start: 2021-05-28 | End: 2021-05-28 | Stop reason: SDUPTHER

## 2021-05-28 RX ORDER — SODIUM CHLORIDE, SODIUM LACTATE, POTASSIUM CHLORIDE, CALCIUM CHLORIDE 600; 310; 30; 20 MG/100ML; MG/100ML; MG/100ML; MG/100ML
INJECTION, SOLUTION INTRAVENOUS CONTINUOUS
Status: DISCONTINUED | OUTPATIENT
Start: 2021-05-28 | End: 2021-05-28

## 2021-05-28 RX ORDER — POTASSIUM CHLORIDE 7.45 MG/ML
10 INJECTION INTRAVENOUS PRN
Status: DISCONTINUED | OUTPATIENT
Start: 2021-05-28 | End: 2021-06-07 | Stop reason: HOSPADM

## 2021-05-28 RX ORDER — DEXTROSE MONOHYDRATE 50 MG/ML
100 INJECTION, SOLUTION INTRAVENOUS PRN
Status: DISCONTINUED | OUTPATIENT
Start: 2021-05-28 | End: 2021-06-07 | Stop reason: HOSPADM

## 2021-05-28 RX ORDER — MORPHINE SULFATE 2 MG/ML
2 INJECTION, SOLUTION INTRAMUSCULAR; INTRAVENOUS EVERY 5 MIN PRN
Status: DISCONTINUED | OUTPATIENT
Start: 2021-05-28 | End: 2021-05-28 | Stop reason: HOSPADM

## 2021-05-28 RX ORDER — PROPOFOL 10 MG/ML
INJECTION, EMULSION INTRAVENOUS PRN
Status: DISCONTINUED | OUTPATIENT
Start: 2021-05-28 | End: 2021-05-28 | Stop reason: SDUPTHER

## 2021-05-28 RX ORDER — NICOTINE POLACRILEX 4 MG
15 LOZENGE BUCCAL PRN
Status: DISCONTINUED | OUTPATIENT
Start: 2021-05-28 | End: 2021-06-07 | Stop reason: HOSPADM

## 2021-05-28 RX ORDER — ONDANSETRON 2 MG/ML
4 INJECTION INTRAMUSCULAR; INTRAVENOUS EVERY 6 HOURS PRN
Status: DISCONTINUED | OUTPATIENT
Start: 2021-05-28 | End: 2021-06-02 | Stop reason: SDUPTHER

## 2021-05-28 RX ORDER — METOPROLOL TARTRATE 5 MG/5ML
5 INJECTION INTRAVENOUS EVERY 6 HOURS
Status: DISCONTINUED | OUTPATIENT
Start: 2021-05-28 | End: 2021-05-29

## 2021-05-28 RX ORDER — DEXTROSE MONOHYDRATE 25 G/50ML
12.5 INJECTION, SOLUTION INTRAVENOUS PRN
Status: DISCONTINUED | OUTPATIENT
Start: 2021-05-28 | End: 2021-06-07 | Stop reason: HOSPADM

## 2021-05-28 RX ORDER — HYDRALAZINE HYDROCHLORIDE 20 MG/ML
5 INJECTION INTRAMUSCULAR; INTRAVENOUS EVERY 10 MIN PRN
Status: DISCONTINUED | OUTPATIENT
Start: 2021-05-28 | End: 2021-05-28 | Stop reason: HOSPADM

## 2021-05-28 RX ORDER — METOCLOPRAMIDE HYDROCHLORIDE 5 MG/ML
10 INJECTION INTRAMUSCULAR; INTRAVENOUS
Status: DISCONTINUED | OUTPATIENT
Start: 2021-05-28 | End: 2021-05-28 | Stop reason: HOSPADM

## 2021-05-28 RX ORDER — HYDROCODONE BITARTRATE AND ACETAMINOPHEN 5; 325 MG/1; MG/1
1 TABLET ORAL PRN
Status: DISCONTINUED | OUTPATIENT
Start: 2021-05-28 | End: 2021-05-28 | Stop reason: HOSPADM

## 2021-05-28 RX ADMIN — IPRATROPIUM BROMIDE AND ALBUTEROL SULFATE 1 AMPULE: .5; 3 SOLUTION RESPIRATORY (INHALATION) at 11:54

## 2021-05-28 RX ADMIN — SODIUM CHLORIDE, POTASSIUM CHLORIDE, SODIUM LACTATE AND CALCIUM CHLORIDE: 600; 310; 30; 20 INJECTION, SOLUTION INTRAVENOUS at 15:27

## 2021-05-28 RX ADMIN — Medication 10 ML: at 08:03

## 2021-05-28 RX ADMIN — METRONIDAZOLE 500 MG: 500 INJECTION, SOLUTION INTRAVENOUS at 08:03

## 2021-05-28 RX ADMIN — MIDAZOLAM 1 MG: 1 INJECTION INTRAMUSCULAR; INTRAVENOUS at 14:57

## 2021-05-28 RX ADMIN — IPRATROPIUM BROMIDE AND ALBUTEROL SULFATE 1 AMPULE: .5; 3 SOLUTION RESPIRATORY (INHALATION) at 20:05

## 2021-05-28 RX ADMIN — FENTANYL CITRATE 50 MCG: 50 INJECTION INTRAMUSCULAR; INTRAVENOUS at 02:52

## 2021-05-28 RX ADMIN — MIDAZOLAM 1 MG: 1 INJECTION INTRAMUSCULAR; INTRAVENOUS at 15:16

## 2021-05-28 RX ADMIN — FENTANYL CITRATE 50 MCG: 50 INJECTION INTRAMUSCULAR; INTRAVENOUS at 21:59

## 2021-05-28 RX ADMIN — FENTANYL CITRATE 50 MCG: 50 INJECTION INTRAMUSCULAR; INTRAVENOUS at 08:03

## 2021-05-28 RX ADMIN — FENTANYL CITRATE 50 MCG: 50 INJECTION, SOLUTION INTRAMUSCULAR; INTRAVENOUS at 16:19

## 2021-05-28 RX ADMIN — POTASSIUM CHLORIDE 10 MEQ: 7.46 INJECTION, SOLUTION INTRAVENOUS at 22:36

## 2021-05-28 RX ADMIN — LIDOCAINE HYDROCHLORIDE 50 MG: 10 INJECTION, SOLUTION EPIDURAL; INFILTRATION; INTRACAUDAL; PERINEURAL at 14:57

## 2021-05-28 RX ADMIN — SODIUM CHLORIDE, PRESERVATIVE FREE 10 ML: 5 INJECTION INTRAVENOUS at 20:35

## 2021-05-28 RX ADMIN — ROCURONIUM BROMIDE 10 MG: 10 INJECTION, SOLUTION INTRAVENOUS at 17:04

## 2021-05-28 RX ADMIN — CEFEPIME 2000 MG: 2 INJECTION, POWDER, FOR SOLUTION INTRAVENOUS at 06:13

## 2021-05-28 RX ADMIN — ROCURONIUM BROMIDE 20 MG: 10 INJECTION, SOLUTION INTRAVENOUS at 16:35

## 2021-05-28 RX ADMIN — PROPOFOL 200 MG: 10 INJECTION, EMULSION INTRAVENOUS at 14:57

## 2021-05-28 RX ADMIN — PHENYLEPHRINE HYDROCHLORIDE 100 MCG: 10 INJECTION INTRAVENOUS at 16:57

## 2021-05-28 RX ADMIN — Medication 140 MG: at 14:57

## 2021-05-28 RX ADMIN — DEXTROSE AND SODIUM CHLORIDE: 5; 900 INJECTION, SOLUTION INTRAVENOUS at 00:41

## 2021-05-28 RX ADMIN — IPRATROPIUM BROMIDE AND ALBUTEROL SULFATE 1 AMPULE: .5; 3 SOLUTION RESPIRATORY (INHALATION) at 07:38

## 2021-05-28 RX ADMIN — FENTANYL CITRATE 50 MCG: 50 INJECTION, SOLUTION INTRAMUSCULAR; INTRAVENOUS at 14:57

## 2021-05-28 RX ADMIN — FENTANYL CITRATE 50 MCG: 50 INJECTION, SOLUTION INTRAMUSCULAR; INTRAVENOUS at 15:31

## 2021-05-28 RX ADMIN — ROCURONIUM BROMIDE 10 MG: 10 INJECTION, SOLUTION INTRAVENOUS at 16:17

## 2021-05-28 RX ADMIN — FENTANYL CITRATE 25 MCG: 50 INJECTION, SOLUTION INTRAMUSCULAR; INTRAVENOUS at 17:11

## 2021-05-28 RX ADMIN — PANTOPRAZOLE SODIUM 40 MG: 40 INJECTION, POWDER, FOR SOLUTION INTRAVENOUS at 08:03

## 2021-05-28 RX ADMIN — METRONIDAZOLE 500 MG: 500 INJECTION, SOLUTION INTRAVENOUS at 00:44

## 2021-05-28 RX ADMIN — ROCURONIUM BROMIDE 20 MG: 10 INJECTION, SOLUTION INTRAVENOUS at 15:49

## 2021-05-28 RX ADMIN — PANTOPRAZOLE SODIUM 40 MG: 40 INJECTION, POWDER, FOR SOLUTION INTRAVENOUS at 22:04

## 2021-05-28 RX ADMIN — ASCORBIC ACID, VITAMIN A PALMITATE, CHOLECALCIFEROL, THIAMINE HYDROCHLORIDE, RIBOFLAVIN-5 PHOSPHATE SODIUM, PYRIDOXINE HYDROCHLORIDE, NIACINAMIDE, DEXPANTHENOL, ALPHA-TOCOPHEROL ACETATE, VITAMIN K1, FOLIC ACID, BIOTIN, CYANOCOBALAMIN: 200; 3300; 200; 6; 3.6; 6; 40; 15; 10; 150; 600; 60; 5 INJECTION, SOLUTION INTRAVENOUS at 22:21

## 2021-05-28 RX ADMIN — PHENYLEPHRINE HYDROCHLORIDE 100 MCG: 10 INJECTION INTRAVENOUS at 16:06

## 2021-05-28 RX ADMIN — SODIUM CHLORIDE, POTASSIUM CHLORIDE, SODIUM LACTATE AND CALCIUM CHLORIDE: 600; 310; 30; 20 INJECTION, SOLUTION INTRAVENOUS at 14:41

## 2021-05-28 RX ADMIN — PHENYLEPHRINE HYDROCHLORIDE 100 MCG: 10 INJECTION INTRAVENOUS at 16:15

## 2021-05-28 RX ADMIN — FENTANYL CITRATE 50 MCG: 50 INJECTION, SOLUTION INTRAMUSCULAR; INTRAVENOUS at 18:08

## 2021-05-28 RX ADMIN — Medication 10 ML: at 22:04

## 2021-05-28 RX ADMIN — SODIUM CHLORIDE: 4.5 INJECTION, SOLUTION INTRAVENOUS at 06:34

## 2021-05-28 RX ADMIN — ROCURONIUM BROMIDE 50 MG: 10 INJECTION, SOLUTION INTRAVENOUS at 15:11

## 2021-05-28 RX ADMIN — SODIUM CHLORIDE: 4.5 INJECTION, SOLUTION INTRAVENOUS at 20:31

## 2021-05-28 RX ADMIN — FENTANYL CITRATE 25 MCG: 50 INJECTION, SOLUTION INTRAMUSCULAR; INTRAVENOUS at 17:04

## 2021-05-28 RX ADMIN — PROPOFOL 9.96 MCG/KG/MIN: 10 INJECTION, EMULSION INTRAVENOUS at 18:39

## 2021-05-28 ASSESSMENT — PULMONARY FUNCTION TESTS
PIF_VALUE: 15
PIF_VALUE: 16
PIF_VALUE: 15
PIF_VALUE: 24
PIF_VALUE: 16
PIF_VALUE: 21
PIF_VALUE: 15
PIF_VALUE: 15
PIF_VALUE: 16
PIF_VALUE: 22
PIF_VALUE: 20
PIF_VALUE: 15
PIF_VALUE: 15
PIF_VALUE: 16
PIF_VALUE: 15
PIF_VALUE: 16
PIF_VALUE: 17
PIF_VALUE: 21
PIF_VALUE: 21
PIF_VALUE: 15
PIF_VALUE: 16
PIF_VALUE: 15
PIF_VALUE: 22
PIF_VALUE: 15
PIF_VALUE: 4
PIF_VALUE: 16
PIF_VALUE: 15
PIF_VALUE: 16
PIF_VALUE: 15
PIF_VALUE: 17
PIF_VALUE: 16
PIF_VALUE: 17
PIF_VALUE: 15
PIF_VALUE: 16
PIF_VALUE: 15
PIF_VALUE: 2
PIF_VALUE: 21
PIF_VALUE: 17
PIF_VALUE: 18
PIF_VALUE: 16
PIF_VALUE: 15
PIF_VALUE: 17
PIF_VALUE: 16
PIF_VALUE: 17
PIF_VALUE: 15
PIF_VALUE: 18
PIF_VALUE: 16
PIF_VALUE: 16
PIF_VALUE: 15
PIF_VALUE: 17
PIF_VALUE: 15
PIF_VALUE: 17
PIF_VALUE: 15
PIF_VALUE: 16
PIF_VALUE: 16
PIF_VALUE: 21
PIF_VALUE: 15
PIF_VALUE: 15
PIF_VALUE: 16
PIF_VALUE: 15
PIF_VALUE: 16
PIF_VALUE: 15
PIF_VALUE: 16
PIF_VALUE: 15
PIF_VALUE: 16
PIF_VALUE: 20
PIF_VALUE: 16
PIF_VALUE: 15
PIF_VALUE: 16

## 2021-05-28 ASSESSMENT — ENCOUNTER SYMPTOMS: STRIDOR: 0

## 2021-05-28 ASSESSMENT — PAIN - FUNCTIONAL ASSESSMENT: PAIN_FUNCTIONAL_ASSESSMENT: FACES

## 2021-05-28 ASSESSMENT — PAIN SCALES - GENERAL
PAINLEVEL_OUTOF10: 10
PAINLEVEL_OUTOF10: 10
PAINLEVEL_OUTOF10: 6
PAINLEVEL_OUTOF10: 8

## 2021-05-28 ASSESSMENT — COPD QUESTIONNAIRES: CAT_SEVERITY: NO INTERVAL CHANGE

## 2021-05-28 ASSESSMENT — PAIN SCALES - WONG BAKER: WONGBAKER_NUMERICALRESPONSE: 0

## 2021-05-28 NOTE — PROGRESS NOTES
Patient was seen and examined. Son/POA at the bedside. Afebrile vital signs are stable. Urine output is adequate. G-tube had significant output. Abdomen is soft and mildly tender. No significant distention. Extremity positive edema. Blood work was reviewed. Sodium is elevated. Potassium is 3.5. Creatinine is normal.  WBC count is stable. Hemoglobin is 8.3. Platelet count is adequate. Small bowel follow-through reviewed with the radiologist including this morning's KUB. Findings consistent with small bowel obstruction. Patient scheduled for exploratory laparotomy. Discussed with patient's son/POA. He understands and wants us to proceed.

## 2021-05-28 NOTE — PLAN OF CARE
Problem: SKIN INTEGRITY  Goal: Skin integrity is maintained or improved  5/28/2021 1853 by Valerie Arzate RCP  Outcome: Ongoing     Problem: OXYGENATION/RESPIRATORY FUNCTION  Goal: Patient will maintain patent airway  Outcome: Ongoing     Problem: OXYGENATION/RESPIRATORY FUNCTION  Goal: Patient will achieve/maintain normal respiratory rate/effort  Description: Respiratory rate and effort will be within normal limits for the patient  Outcome: Ongoing     Problem: MECHANICAL VENTILATION  Goal: Patient will maintain patent airway  Outcome: Ongoing  Goal: Oral health is maintained or improved  Outcome: Ongoing  Goal: ET tube will be managed safely  Outcome: Ongoing  Goal: Ability to express needs and understand communication  Outcome: Ongoing  Goal: Mobility/activity is maintained at optimum level for patient  Outcome: Ongoing

## 2021-05-28 NOTE — PROGRESS NOTES
Physical Therapy    DATE: 2021    NAME: Leandro Paniagua  MRN: [id number]   : 1945      Patient not seen this date for Physical Therapy due to: 1315 p.m Pt unavailable, having procedure per nursing staff.           Electronically signed by Mukesh Haynes PTA on 2021 at 4:47 PM

## 2021-05-28 NOTE — PROGRESS NOTES
Progress Note    5/28/2021   12:48 PM    Name:  Stefanie Olmedo  MRN:    [id number]     Acct:     [de-identified]   Room:  2105/2105-01   Day: 4     Admit Date: 5/24/2021  3:38 PM  PCP: Tania Brantley MD    Subjective:     C/C:   Chief Complaint   Patient presents with   Gatica Nausea    Emesis       Interval History: Status: not changed. There have been no reports of vomiting patient had a bowel movement today. Vital signs reviewed and stable. Labs reviewed sodium 152. WBC 15.3 hemoglobin 8.3, calcium 5.3 small bowel follow-through report shows small bowel obstruction. ROS:   all 10 systems reviewed and are negative except as noted    Review of Systems   Unable to perform ROS: Patient nonverbal (Patient mumbles due to previous history of CVA)       Medications: Allergies:    Allergies   Allergen Reactions    Bactrim [Sulfamethoxazole-Trimethoprim] Hives and Swelling    Ciprofloxacin Swelling     FACE       Current Meds: 0.45 % sodium chloride infusion, Continuous  metoprolol (LOPRESSOR) injection 5 mg, Q6H  aspirin tablet 325 mg, Daily  cilostazol (PLETAL) tablet 100 mg, BID  potassium chloride (MICRO-K) extended release capsule 20 mEq, Daily  diatrizoate meglumine-sodium (GASTROGRAFIN) 66-10 % solution 450 mL, ONCE PRN  pantoprazole (PROTONIX) injection 40 mg, BID   And  sodium chloride (PF) 0.9 % injection 10 mL, BID  sodium chloride flush 0.9 % injection 10 mL, PRN  iopamidol (ISOVUE-370) 76 % injection 75 mL, ONCE PRN  potassium chloride 10 mEq/100 mL IVPB (Peripheral Line), PRN  ipratropium-albuterol (DUONEB) nebulizer solution 1 ampule, Q4H WA  enoxaparin (LOVENOX) injection 40 mg, Daily  fentaNYL (SUBLIMAZE) injection 25 mcg, Q2H PRN  fentaNYL (SUBLIMAZE) injection 50 mcg, Q2H PRN  theophylline 80 MG/15ML oral solution 100 mg, 3 times per day  sodium chloride flush 0.9 % injection 10 mL, 2 times per day  sodium chloride flush 0.9 % injection 10 mL, PRN  0.9 % sodium chloride infusion, PRN  potassium chloride (KLOR-CON M) extended release tablet 40 mEq, PRN   Or  potassium bicarb-citric acid (EFFER-K) effervescent tablet 40 mEq, PRN   Or  potassium chloride 10 mEq/100 mL IVPB (Peripheral Line), PRN  magnesium sulfate 1000 mg in dextrose 5% 100 mL IVPB, PRN  promethazine (PHENERGAN) tablet 12.5 mg, Q6H PRN   Or  ondansetron (ZOFRAN) injection 4 mg, Q6H PRN  magnesium hydroxide (MILK OF MAGNESIA) 400 MG/5ML suspension 30 mL, Daily PRN  acetaminophen (TYLENOL) tablet 650 mg, Q6H PRN   Or  acetaminophen (TYLENOL) suppository 650 mg, Q6H PRN  cefepime (MAXIPIME) 2000 mg IVPB minibag, Q12H  metronidazole (FLAGYL) 500 mg in NaCl 100 mL IVPB premix, Q8H  hydrALAZINE (APRESOLINE) injection 10 mg, Q6H PRN        Data:     Code Status:  Full Code    Family History   Problem Relation Age of Onset    Arthritis Mother     Atrial Fibrillation Mother     Hearing Loss Mother     Heart Disease Mother     Stroke Mother     Vision Loss Mother     Arthritis Father     Cancer Father     Heart Disease Father     High Blood Pressure Father     Stroke Father     Vision Loss Father        Social History     Socioeconomic History    Marital status:      Spouse name: Not on file    Number of children: Not on file    Years of education: Not on file    Highest education level: Not on file   Occupational History    Not on file   Tobacco Use    Smoking status: Former Smoker     Packs/day: 1.00     Years: 60.00     Pack years: 60.00     Types: Cigarettes     Start date:      Quit date: 2020     Years since quittin.1    Smokeless tobacco: Never Used   Vaping Use    Vaping Use: Never used   Substance and Sexual Activity    Alcohol use: No    Drug use: No    Sexual activity: Yes     Partners: Female   Other Topics Concern    Not on file   Social History Narrative    Not on file     Social Determinants of Health     Financial Resource Strain:     Difficulty of Paying Living Expenses:    Food Insecurity:  Worried About [facility] in the Last Year:    [address] in the Last Year:    Transportation Needs:     Lack of Transportation (Medical):  Lack of Transportation (Non-Medical):    Physical Activity:     Days of Exercise per Week:     Minutes of Exercise per Session:    Stress:     Feeling of Stress :    Social Connections:     Frequency of Communication with Friends and Family:     Frequency of Social Gatherings with Friends and Family:     Attends Yazidi Services:     Active Member of Clubs or Organizations:     Attends Club or Organization Meetings:     Marital Status:    Intimate Partner Violence:     Fear of Current or Ex-Partner:     Emotionally Abused:     Physically Abused:     Sexually Abused:        I/O (24Hr): Intake/Output Summary (Last 24 hours) at 5/28/2021 1248  Last data filed at 5/28/2021 1221  Gross per 24 hour   Intake 1800 ml   Output 4550 ml   Net -2750 ml     Radiology:  XR ABDOMEN (KUB) (SINGLE AP VIEW)    Result Date: 5/28/2021  Persistently dilated small bowel loops similar to small bowel series examination. XR ABDOMEN (KUB) (SINGLE AP VIEW)    Result Date: 5/26/2021  1. Interval decrease in bowel gas. Nonobstructive bowel gas pattern. 2.  Moderate amount stool burden in the rectum and proximal colon. XR ABDOMEN (KUB) (SINGLE AP VIEW)    Result Date: 5/25/2021  Stable bowel gas pattern, with air present throughout the large and small bowel. No significant small bowel distension. The closed loop small bowel obstruction in the right abdomen is not evident radiographically. CT CHEST W CONTRAST    Result Date: 5/26/2021  Right infrahilar mass that may involve the right inferior pulmonary vein. This also may narrow bronchials traversing this region. Tissue sampling is recommended. Pleural based opacity in the right upper lobe posteriorly and nodular opacity in the right upper lobe laterally.   PET-CT may be helpful in further Protein 6.7 6.4 - 8.3 g/dL    Albumin 3.6 3.5 - 5.2 g/dL    Albumin/Globulin Ratio NOT REPORTED 1.0 - 2.5    GFR Non-African American >60 >60 mL/min    GFR African American >60 >60 mL/min    GFR Comment          GFR Staging NOT REPORTED    CBC with DIFF    Collection Time: 21  5:01 AM   Result Value Ref Range    WBC 15.3 (H) 3.5 - 11.0 k/uL    RBC 2.86 (L) 4.5 - 5.9 m/uL    Hemoglobin 8.3 (L) 13.5 - 17.5 g/dL    Hematocrit 25.8 (L) 41 - 53 %    MCV 90.2 80 - 100 fL    MCH 29.1 26 - 34 pg    MCHC 32.3 31 - 37 g/dL    RDW 14.6 11.5 - 14.9 %    Platelets 643 285 - 716 k/uL    MPV 6.8 6.0 - 12.0 fL    NRBC Automated NOT REPORTED per 100 WBC    Differential Type NOT REPORTED     Immature Granulocytes NOT REPORTED 0 %    Absolute Immature Granulocyte NOT REPORTED 0.00 - 0.30 k/uL    WBC Morphology NOT REPORTED     RBC Morphology NOT REPORTED     Platelet Estimate NOT REPORTED     Seg Neutrophils 85 (H) 36 - 66 %    Lymphocytes 11 (L) 24 - 44 %    Monocytes 4 1 - 7 %    Eosinophils % 0 0 - 4 %    Basophils 0 0 - 2 %    Segs Absolute 13.01 (H) 1.3 - 9.1 k/uL    Absolute Lymph # 1.68 1.0 - 4.8 k/uL    Absolute Mono # 0.61 0.1 - 1.3 k/uL    Absolute Eos # 0.00 0.0 - 0.4 k/uL    Basophils Absolute 0.00 0.0 - 0.2 k/uL    Morphology 1+ ECHINOCYTES     Morphology FEW ELLIPTOCYTES    Magnesium    Collection Time: 21  5:01 AM   Result Value Ref Range    Magnesium 2.3 1.6 - 2.6 mg/dL   POC Glucose Fingerstick    Collection Time: 21  6:57 AM   Result Value Ref Range    POC Glucose 118 (H) 75 - 110 mg/dL       Physical Examination:        Vitals:  BP (!) 117/93   Pulse 90   Temp 98.8 °F (37.1 °C) (Axillary)   Resp 18   Ht 6' 4\" (1.93 m)   Wt 188 lb (85.3 kg)   SpO2 97%   BMI 22.88 kg/m²   Temp (24hrs), Av.6 °F (37 °C), Min:98.4 °F (36.9 °C), Max:99 °F (37.2 °C)    Recent Labs     21  1644 21  1951 21  0709 21  0657   POCGLU 85 84 82 118*         Physical Exam  Vitals reviewed. Constitutional:       Appearance: Normal appearance. He is not diaphoretic. HENT:      Head: Normocephalic and atraumatic. Right Ear: External ear normal.      Left Ear: External ear normal.      Nose: Nose normal.      Mouth/Throat:      Pharynx: Oropharynx is clear. Cardiovascular:      Rate and Rhythm: Normal rate and regular rhythm. Pulses: Normal pulses. Heart sounds: Normal heart sounds. Pulmonary:      Effort: Pulmonary effort is normal.      Breath sounds: Normal breath sounds. Abdominal:      General: Bowel sounds are normal. There is no distension. Palpations: Abdomen is soft. Musculoskeletal:         General: No tenderness or deformity. Normal range of motion. Cervical back: Normal range of motion and neck supple. No rigidity. Right lower leg: Edema (1+) present. Left lower leg: Edema (1+) present. Skin:     General: Skin is warm and dry. Capillary Refill: Capillary refill takes less than 2 seconds. Coloration: Skin is not jaundiced. Neurological:      General: No focal deficit present. Mental Status: Mental status is at baseline. Psychiatric:         Mood and Affect: Mood normal.         Behavior: Behavior normal.         Assessment:        Primary Problem  Small bowel obstruction (HCC)     Principal Problem:    Small bowel obstruction (HCC)  Active Problems:    Status post insertion of percutaneous endoscopic gastrostomy (PEG) tube (HCC)    Acute cystitis without hematuria    Mass of right lung    COPD (chronic obstructive pulmonary disease) (HCC)  Resolved Problems:    * No resolved hospital problems.  *      Past Medical History:   Diagnosis Date    Arthritis     Cancer [facility])     bladder 1980s    Cerebral artery occlusion with cerebral infarction [facility])     TIA 2010    Chronic kidney disease     COPD (chronic obstructive pulmonary disease) ([facility] [address]     Hypertension     Pneumonia     Psychiatric problem     depression, social

## 2021-05-28 NOTE — CONSULTS
Comprehensive Nutrition Assessment    Type and Reason for Visit:  Consult (TPN ordering and management)    Nutrition Recommendations/Plan: TPN to start at 41.7 ml with 100 ml lipids. Additives: K acetate- 15 mEq, K Phos- 15 mmol, add MVI & trace elements. Nutrition Assessment:  Yesterday pt had SB follow throught with gastrograffin via G-tube showing high grade ileus or partial/intermittent SB obstruction. Surgery ordered KUB this morning showing persistent dialated SB loops with decision made to take pt to surgery for exploratory lap. PCP had PICC line placed in anticipation of possible start of TPN. Writer asked Dr. Reji Shine if would like to start TPN tonight, she was ok ordering under her name. Sodium level was up to 152 with IV started of .45% NaCl at 125 ml/hr. Day 1 TPN will start at 41.6 ml with 100 ml lipids. Malnutrition Assessment:  Malnutrition Status: At risk for malnutrition (Comment)    Context:  Chronic Illness     Findings of the 6 clinical characteristics of malnutrition:  Energy Intake:  Unable to assess  Weight Loss:  Unable to assess     Body Fat Loss:  Unable to assess     Muscle Mass Loss:  Unable to assess    Fluid Accumulation:  1 - Mild Extremities   Strength:  Not Performed    Estimated Daily Nutrient Needs:  Energy (kcal): [place] x 1.2= 2000 kcal; Weight Used for Energy Requirements:  Admission     Protein (g):  1.5g/kg= 135 g protein; Weight Used for Protein Requirements:  Ideal          Nutrition Related Findings:  Mild edema BLE's. Hx CVA with multiple failed swallow studies requiring PEG tube.       Wounds:   (possible wound at sacrum)       Current Nutrition Therapies:    Diet NPO Effective Now  PN-Adult 2-in-1 Central Line (Standard)  Current Parenteral Nutrition Orders:  · Type and Formula: 2-in-1 Custom   · Lipids: 100ml  · Duration: Continuous  · Rate/Volume: 41.6 ml/ 1000 ml  · Current PN Order Provides: 1080 kcal, 50 gm protein    Anthropometric Measures:  · Height: 6' 4\" (193 cm)  · Current Body Weight: 188 lb (85.3 kg)   · Admission Body Weight: 184 lb (83.5 kg)     · Ideal Body Weight: 202 lbs; % Ideal Body Weight     · BMI: 22.9  · BMI Categories: Normal Weight (BMI 22.0 to 24.9) age over 72       Nutrition Diagnosis:   · Inadequate oral intake related to altered GI function as evidenced by NPO or clear liquid status due to medical condition, nutrition support - parenteral nutrition      Nutrition Interventions:   Food and/or Nutrient Delivery:  Continue NPO, Start Parenteral Nutrititon  Nutrition Education/Counseling:  No recommendation at this time   Coordination of Nutrition Care:  Continue to monitor while inpatient    Goals:  provide more than 75% of nutrition needs       Nutrition Monitoring and Evaluation:   Behavioral-Environmental Outcomes:  None Identified   Food/Nutrient Intake Outcomes:  Parenteral Nutrition Intake/Tolerance  Physical Signs/Symptoms Outcomes:  Biochemical Data, GI Status, Fluid Status or Edema, Nutrition Focused Physical Findings, Skin, Weight     Discharge Planning: Too soon to determine     Some areas of assessment may be incomplete due to COVID-19 precautions. Crow Kay R.D. LAVEL.   Clinical Dietitian  Office: [phone]

## 2021-05-28 NOTE — ANESTHESIA PRE PROCEDURE
Department of Anesthesiology  Preprocedure Note       Name:  Anayeli Mc   Age:  77 y.o.  :  1945                                          MRN:  [id number]         Date:  2021      Surgeon: Ksenia Sosa):  Nathaly Hughes MD    Procedure: Procedure(s):  LAPAROTOMY EXPLORATORY    Medications prior to admission:   Prior to Admission medications    Medication Sig Start Date End Date Taking? Authorizing Provider   cilostazol (PLETAL) 100 MG tablet Take 100 mg by mouth 2 times daily   Yes Historical Provider, MD   famotidine (PEPCID) 20 MG tablet Take 20 mg by mouth 2 times daily   Yes Historical Provider, MD   aspirin 325 MG tablet Take 325 mg by mouth daily   Yes Historical Provider, MD   potassium chloride (MICRO-K) 10 MEQ extended release capsule Take 20 mEq by mouth daily   Yes Historical Provider, MD   theophylline 80 MG/15ML elixir Take 18.8 mLs by mouth every 8 hours 20  Yes Lexi Abarca MD   lisinopril-hydrochlorothiazide (PRINZIDE;ZESTORETIC) 10-12.5 MG per tablet Take 1 tablet by mouth daily 19  Yes Mirela Barrow MD   atorvastatin (LIPITOR) 40 MG tablet Take 1 tablet by mouth daily 19  Yes Mirela Barrow MD   albuterol sulfate  (90 Base) MCG/ACT inhaler Inhale 2 puffs into the lungs every 6 hours as needed for Wheezing 19  Yes Mirela Barrow MD   gabapentin (NEURONTIN) 800 MG tablet Take 800 mg by mouth 3 times daily.   19  Yes Historical Provider, MD       Current medications:    Current Facility-Administered Medications   Medication Dose Route Frequency Provider Last Rate Last Admin    0.45 % sodium chloride infusion   Intravenous Continuous Nathaly Hughes  mL/hr at 21 0634 New Bag at 21 0634    aspirin tablet 325 mg  325 mg Oral Daily Birgit Jimenez MD   325 mg at 21 1356    cilostazol (PLETAL) tablet 100 mg  100 mg Oral BID Birgit Jimenez MD   100 mg at 21 1407    potassium chloride (MICRO-K) extended release capsule 20 mEq  20 mEq Oral Daily Susy Gandhi MD        diatrizoate meglumine-sodium (GASTROGRAFIN) 66-10 % solution 450 mL  450 mL Oral ONCE PRN Shan Velez MD   450 mL at 05/27/21 1214    pantoprazole (PROTONIX) injection 40 mg  40 mg Intravenous BID Shan Velez MD   40 mg at 05/28/21 4972    And    sodium chloride (PF) 0.9 % injection 10 mL  10 mL Intravenous BID Shan Velez MD   10 mL at 05/28/21 0803    sodium chloride flush 0.9 % injection 10 mL  10 mL Intravenous PRN Glenn Bacon MD   10 mL at 05/27/21 1152    iopamidol (ISOVUE-370) 76 % injection 75 mL  75 mL Intravenous ONCE PRN Glenn Bacon MD        potassium chloride 10 mEq/100 mL IVPB (Peripheral Line)  10 mEq Intravenous PRN Susy Gandhi  mL/hr at 05/25/21 0506 10 mEq at 05/25/21 0506    ipratropium-albuterol (DUONEB) nebulizer solution 1 ampule  1 ampule Inhalation Q4H WA Susy Gandhi MD   1 ampule at 05/28/21 0738    enoxaparin (LOVENOX) injection 40 mg  40 mg Subcutaneous Daily Shan Velez MD   40 mg at 05/27/21 0841    fentaNYL (SUBLIMAZE) injection 25 mcg  25 mcg Intravenous Q2H PRN Shan Velez MD   25 mcg at 05/26/21 0244    fentaNYL (SUBLIMAZE) injection 50 mcg  50 mcg Intravenous Q2H PRN Shan Velez MD   50 mcg at 05/28/21 0803    theophylline 80 MG/15ML oral solution 100 mg  100 mg Oral 3 times per day Susy Gandhi MD   100 mg at 05/27/21 1408    sodium chloride flush 0.9 % injection 10 mL  10 mL Intravenous 2 times per day Susy Gandhi MD   10 mL at 05/27/21 0931    sodium chloride flush 0.9 % injection 10 mL  10 mL Intravenous PRN Susy Gandhi MD        0.9 % sodium chloride infusion  25 mL Intravenous PRN Susy Gandhi MD        potassium chloride (KLOR-CON M) extended release tablet 40 mEq  40 mEq Oral PRN Susy Gandhi MD        Or    potassium bicarb-citric acid (EFFER-K) effervescent tablet 40 mEq  40 mEq Oral PRN Susy Gandhi MD   40 mEq at 05/27/21 1406    Or    potassium chloride 10 mEq/100 mL IVPB (Peripheral Line)  10 mEq Intravenous PRN Beltran Neri MD        magnesium sulfate 1000 mg in dextrose 5% 100 mL IVPB  1,000 mg Intravenous PRN Beltran Neri MD        promethazine (PHENERGAN) tablet 12.5 mg  12.5 mg Oral Q6H PRN Beltran Neri MD        Or    [facility] injection 4 mg  4 mg Intravenous Q6H PRN Beltran Neri MD   4 mg at 05/27/21 1714    magnesium hydroxide (MILK OF MAGNESIA) 400 MG/5ML suspension 30 mL  30 mL Oral Daily PRN Beltran Neri MD        acetaminophen (TYLENOL) tablet 650 mg  650 mg Oral Q6H PRN Beltran Neri MD        Or    acetaminophen (TYLENOL) suppository 650 mg  650 mg Rectal Q6H PRN Beltran Neri MD        cefepime (MAXIPIME) 2000 mg IVPB minibag  2,000 mg Intravenous Q12H Beltran Neri MD   Stopped at 05/28/21 0643    metronidazole (FLAGYL) 500 mg in NaCl 100 mL IVPB premix  500 mg Intravenous Q8H Beltran Neri  mL/hr at 05/28/21 0803 500 mg at 05/28/21 0803    hydrALAZINE (APRESOLINE) injection 10 mg  10 mg Intravenous Q6H PRN Beltran Neri MD           Allergies:     Allergies   Allergen Reactions    Bactrim [Sulfamethoxazole-Trimethoprim] Hives and Swelling    Ciprofloxacin Swelling     FACE       Problem List:    Patient Active Problem List   Diagnosis Code    Constipation K59.00    Change in bowel habits R19.4    Rectal bleeding K62.5    Aortic dissection ([facility]) I71.00    Bradycardia R00.1    Other dysphagia R13.19    Enteritis K52.9    Aspiration pneumonia of both lower lobes ([facility]) J69.0    Tobacco abuse Z72.0    Aspiration pneumonitis ([facility]) J69.0    Status post insertion of percutaneous endoscopic gastrostomy (PEG) tube ([facility]) Z93.1    Small bowel obstruction ([facility] K56.609    Acute cystitis without hematuria N30.00    Mass of right lung R91.8    COPD (chronic obstructive pulmonary disease) ([facility]) J44.9       Past Medical History: Diagnosis Date    Arthritis     Cancer [facility])     bladder 1980s    Cerebral artery occlusion with cerebral infarction [facility])     TIA 2010    Chronic kidney disease     COPD [facility] obstructive [facility] disease) [facility]     Hypertension     Pneumonia     Psychiatric problem     depression, social anxiety    Seizures ([facility]        Past Surgical History:        Procedure Laterality Date    ABDOMEN SURGERY      BACK SURGERY      spinal surgery 2005     CAROTID ENDARTERECTOMY Left 2016    COLONOSCOPY N/A 2018    COLONOSCOPY POLYPECTOMY COLD BIOPSY performed by Kelly Hernandez MD at [address] [facility]      left face    GASTROSTOMY TUBE PLACEMENT  04/15/2020    HERNIA REPAIR      HIATAL HERNIA REPAIR      INGUINAL HERNIA REPAIR Bilateral     OTHER SURGICAL HISTORY      mesh infected in inguinal canal, had to remove. Removed testiscle at this time.  TONSILLECTOMY      UPPER GASTROINTESTINAL ENDOSCOPY  04/15/2020       EGD CONTROL HEMORRHAGE    UPPER GASTROINTESTINAL ENDOSCOPY  4/15/2020    EGD CONTROL HEMORRHAGE performed by Jamar Damon MD at [address] [place] [address] ENDOSCOPY  4/15/2020    EGD ESOPHAGOGASTRODUODENOSCOPY PEG TUBE INSERTION performed by Jamar Damon MD at [facility]       Social History:    Social History     Tobacco Use    Smoking status: Former Smoker     Packs/day: 1.00     Years: 60.00     Pack years: 60.00     Types: Cigarettes     Start date:      Quit date: 2020     Years since quittin.1    Smokeless tobacco: Never Used   Substance Use Topics    Alcohol use:  No                                Counseling given: Not Answered      Vital Signs (Current):   Vitals:    21 0030 21 0200 21 0741 21 0745   BP: (!) 146/68 128/72  (!) 117/93   Pulse: 112 87  90   Resp:  18   Temp: 98.4 °F (36.9 °C) 98.6 °F (37 °C)  98.8 °F (37.1 °C)   TempSrc: Axillary Axillary  Axillary   SpO2: 96% 94% 92% 96%   Weight:       Height:                                                  BP Readings from Last 3 Encounters:   05/28/21 (!) 117/93   04/17/20 113/64   04/15/20 (!) 105/59       NPO Status:                                                                                 BMI:   Wt Readings from Last 3 Encounters:   05/26/21 188 lb (85.3 kg)   04/17/20 189 lb (85.7 kg)   04/07/20 183 lb (83 kg)     Body mass index is 22.88 kg/m².     CBC:   Lab Results   Component Value Date    WBC 15.3 05/28/2021    RBC 2.86 05/28/2021    HGB 8.3 05/28/2021    HCT 25.8 05/28/2021    MCV 90.2 05/28/2021    RDW 14.6 05/28/2021     05/28/2021       CMP:   Lab Results   Component Value Date     05/28/2021    K 3.5 05/28/2021     05/28/2021    CO2 18 05/28/2021    BUN 35 05/28/2021    CREATININE 0.99 05/28/2021    GFRAA >60 05/28/2021    LABGLOM >60 05/28/2021    GLUCOSE 138 05/28/2021    PROT 6.7 05/28/2021    CALCIUM 8.9 05/28/2021    BILITOT 0.32 05/28/2021    ALKPHOS 52 05/28/2021    AST 28 05/28/2021    ALT 13 05/28/2021       POC Tests:   Recent Labs     05/28/21  0657   POCGLU 118*       Coags:   Lab Results   Component Value Date    PROTIME 14.1 04/07/2020    INR 1.1 04/07/2020    APTT 37.3 04/07/2020       HCG (If Applicable): No results found for: PREGTESTUR, PREGSERUM, HCG, HCGQUANT     ABGs:   Lab Results   Component Value Date    PHART 7.416 04/08/2019    PO2ART 67.2 04/08/2019    EAY3XKZ 39.7 04/08/2019    OPH5KMN 25.5 04/08/2019    R8HVLBXV 90.6 04/08/2019        Type & Screen (If Applicable):  No results found for: LABABO, LABRH    Drug/Infectious Status (If Applicable):  No results found for: HIV, HEPCAB    COVID-19 Screening (If Applicable):   Lab Results   Component Value Date    COVID19 Not Detected 05/24/2021    COVID19 Not Detected 04/07/2020           Anesthesia Evaluation  Patient summary reviewed and Nursing notes reviewed  Airway: Mallampati: II  TM distance: >3 FB   Neck ROM: limited Mouth opening: > = 3 FB Dental:    (+) edentulous      Pulmonary:   (+) pneumonia:  COPD: no interval change,  decreased breath sounds,      (-) rhonchi, wheezes, rales and stridor                          ROS comment:  lung CA   Hx of aspiration pneumonia   Cardiovascular:    (+) hypertension: no interval change,     (-) murmur, weak pulses,  friction rub, systolic click, carotid bruit,  JVD and peripheral edema    ECG reviewed  Rhythm: regular  Rate: normal  Echocardiogram reviewed               ROS comment: Echo: Left ventricle is normal in size with normal systolic function globally. Calculated ejection fraction is 63%. Evidence of mild diastolic dysfunction     Neuro/Psych:   (+) CVA: residual symptoms, psychiatric history:depression/anxiety              ROS comment: Hx of CVA with expressive aphasia GI/Hepatic/Renal:            ROS comment: Small bowel obstruction. Endo/Other:    (+) malignancy/cancer. ROS comment: Hx of bladder CA Abdominal:           Vascular:                                      Anesthesia Plan      general     ASA 3     (Possible Post op Ventilator/ ICU discussed with patient's son who agreed to proceed)  Induction: rapid sequence and intravenous. MIPS: Postoperative opioids intended and Prophylactic antiemetics administered. Anesthetic plan and risks discussed with patient and child/children. Plan discussed with CRNA.                   Ruthy Matthew MD   5/28/2021

## 2021-05-28 NOTE — PROGRESS NOTES
RN spoke with Dr. Neida Hollingsworth regarding pt output over night. Orders received. Radiology called at this time for stat KUB.

## 2021-05-28 NOTE — OP NOTE
Operative Note      Patient: Mirna Moon  YOB: 1945  MRN: [id number]    Date of Procedure: 5/28/2021                Preoperative diagnosis: Small bowel obstruction. Postoperative diagnosis: Adhesive small bowel obstruction. Hemoperitoneum. Etiology of hemoperitoneum unclear. Procedure: Exploratory Laparotomy. Release of adhesive band with release of small bowel obstruction. Small bowel resection with primary anastomosis. Surgeon: Dr. Freddy Wolfe    Asst.: None    Anesthesia: General    Preparation: Chloraprep    EBL: Less than 50 mL    Specimen: Small bowel. Procedure: 27-year-old gentleman with  history of laparoscopic hiatal hernia repair history of G-tube presented with abdominal pain. He was diagnosed with small bowel obstruction that did not resolve with conservative management. Small bowel follow-through and this morning's KUB showed persistent dilatation of the small bowel. Patient was scheduled for exploratory laparotomy for small bowel obstruction. Procedure risk complications risk-benefit alternatives recovery restrictions were discussed with the POA. Informed consent was obtained. Preoperative antibiotics were given. Patient was taken to the operating room. General anesthesia was given. Orogastric tube was placed. Abdomen was prepped and draped in usual sterile fashion. Timeout was done. Midline laparotomy was performed. Abdominal cavity was entered. Upon entering the abdominal cavity patient was found to have evidence of hemoperitoneum. There was a fair amount of blood noted in the abdominal cavity. Upon further exploration patient did have adhesive small bowel obstruction in this adhesive band was released. Small bowel obstruction release. Portion of small bowel appeared hemorrhagic in the region of the small bowel obstruction. Mesentery was somewhat congested. Exact etiology of hemoperitoneum at this time is unclear.   I inspected liver and the spleen and grossly they appeared/felt unremarkable. I also spoke with the radiologist from the operating room and the CT scan did not show any evidence of trauma to the liver or the spleen. All the solid organs were intact. At this point abdominal cavity was copiously irrigated into the returning fluid was clear. Small bowel was inspected. There was one portion of the small bowel that appeared fairly thick edematous and hemorrhagic. And I decided to proceed with small bowel resection with primary anastomosis. Small bowel on either side in the healthier portion was transected using a DEBRA 55 blue stapler. Mesentery control was obtained using the LigaSure. Small bowel was removed and sent to pathology. At this point a side-to-side functional end-to-end anastomosis was accomplished between the 2 ends of the small bowel using DEBRA 55 blue stapler followed by TA 60 blue stapler. Satisfactory widely patent tension-free viable anastomosis was accomplished. Crotch stitch was placed. Seromuscular sutures were placed around the anastomosis using 4-0 PDS suture. Mesentery defect was approximated using 4-0 PDS suture. Bowel was returned into the abdominal cavity. There was large amount of stool noted throughout the colon. Small bowel was inspected from ligament of Treitz all the way to the level of ileocecal valve and otherwise the bowel was unremarkable and viable. Orogastric tube was confirmed in the stomach. Proximal small bowel was decompressed and large amount of orogastric output noted. At this point all the small bowel is decompressed. Small bowel was returned into the abdominal cavity. Again the abdominal cavity was copiously irrigated until the returning fluid was clear. Fer-Carson drains were left in the left in the right paracolic gutter as well as in the pelvis. Those were brought out and secured. Satisfactory drain placement was confirmed. Hemostasis was confirmed.   Sponge needle instrument count was found to be correct. Seprafilm was left below and above the omentum. After confirming sponge needle instrument count and hemostasis fascia was approximated using looped PDS sutures x2. Wound was irrigated. Subcutaneous drain was left in place was brought out and secured. Skin was approximated using staples. All the drains were secured. Clean dressing was applied. Urine output remained clear. Patient tolerated procedure very well and was transferred to the recovery room in a stable condition.    -  Recommendations: Admission to the ICU for further care. Findings were discussed with Dr. Prema Grande. Patient will remain intubated on the ventilator. Tonight. I will check hemoglobin and BMP in the recovery room. Patient will get 1 unit of blood transfusion in the recovery room. Will monitor hemoglobin every 6 hours. Continue aggressive supportive care. Family updated.

## 2021-05-28 NOTE — PLAN OF CARE
Problem: Falls - Risk of:  Goal: Will remain free from falls  Description: Will remain free from falls  5/28/2021 0346 by Patricia Bishop RN  Outcome: Ongoing  Note: Pt free of falls.  Call light and bedside table within reach, bed in lowest position and call light within reach     Problem: Skin Integrity:  Goal: Absence of new skin breakdown  Description: Absence of new skin breakdown  5/28/2021 0346 by Patricia Bishop RN  Outcome: Ongoing  Note: Pt absent of any new skin breakdown     Problem: SAFETY  Goal: Free from intentional harm  5/28/2021 0346 by Patricia Bishop RN  Outcome: Ongoing  Note: Pt free from any intentional harm

## 2021-05-28 NOTE — ANESTHESIA POSTPROCEDURE EVALUATION
POST- ANESTHESIA EVALUATION       Pt Name: Christi Ross  MRN: [id number]  YOB: 1945  Date of evaluation: 5/28/2021  Time:  6:14 PM      BP (!) 172/83   Pulse 86   Temp 98 °F (36.7 °C) (Infrared)   Resp 18   Ht 6' 4\" (1.93 m)   Wt 188 lb (85.3 kg)   SpO2 100%   BMI 22.88 kg/m²      Consciousness Level  Sedated, Intubated on vent  Cardiopulmonary Status  Stable  Pain Adequately Treated YES  Nausea / Vomiting  NO  Adequate Hydration  YES  Anesthesia Related Complications NONE      Electronically signed by Irwin Frankel, MD on 5/28/2021 at [address] PM       Department of Anesthesiology  Postprocedure Note    Patient: Christi Ross  MRN: [id number]  YOB: 1945  Date of evaluation: 5/28/2021  Time:  6:14 PM     Procedure Summary     Date: 05/28/21 Room / Location: [address] [place] [address] [facility]  / [facility]: [facility]    Anesthesia Start: 2933 Anesthesia Stop: 1732    Procedure: Exploratory Laparotomy. Release of adhesive band with release of small bowel obstruction. Small bowel resection with primary anastomosis (N/A Abdomen) Diagnosis: (SMALL BOWEL OBSTRUCTION)    Surgeons: Micaela Madden MD Responsible Provider: Irwin Frankel, MD    Anesthesia Type: general ASA Status: 3          Anesthesia Type: general    Chato Phase I: Chato Score: 4    Chato Phase II:      Last vitals: Reviewed and per EMR flowsheets.    Patient sedated, intubated on the vent    Anesthesia Post Evaluation

## 2021-05-28 NOTE — CARE COORDINATION
ONGOING DISCHARGE PLAN:    Patient is alert     I spoke with son Rach Ghotra in regards to discharge plans and informed him of [facility] not being able to do [facility] services and that a referral was sent to [facility] to assist with [facility] services. Will remain current with [facility] for skilled services. Patient is going to exp lap today. [place] [facility] - 13%    Will continue to follow for additional discharge needs.     Electronically signed by Jessee Lemon RN on 5/28/2021 at 12:34 PM

## 2021-05-28 NOTE — PROGRESS NOTES
Physician Progress Note      PATIENTNikhil Tristan  [facility]:                  [id number]  :                       1945  ADMIT DATE:       2021 3:38 PM  [address] [place] DATE:  RESPONDING  PROVIDER #:        Chico Flynn MD          QUERY TEXT:    Pt admitted with SBO. Pt. noted to have history of CVA and neuromuscular   disorder of unknown etiology rendering patient bedbound with severely limited   mobility. If possible, please document in the progress notes and/or discharge   summary if you are treating and/or evaluating any of the following: The medical record reflects the following:  Risk Factors: history of CVA and neuromuscular disorder of unknown etiology   (per  ED provider note)  Clinical Indicators: Per nursing screening and flowsheet notes:  patient with   limited movement of extremities x 4, contractures, bedbound, and dependent on   others for all ADLs  Treatment: ADLs performed by nursing and ancillary staff:  Nutrition, mobility   (turn every 2 hours), elimination, hygiene;  PT/OT    Functional quadriplegia is not true paresis. It is the inability to move due   to another condition (dementia, severe contractures, MS, Parkinson's,   [place]'s, ALS, advanced arthritis). .  The patient is immobile due to a   severe physical disability, not a spinal cord injury or trauma. The patient   loses the ability to feed, bath and toilet themselves. Options provided:  -- Functional quadriplegia  -- Severe debility/impaired mobility  -- Other - I will add my own diagnosis  -- Disagree - Not applicable / Not valid  -- Disagree - Clinically unable to determine / Unknown  -- Refer to Clinical Documentation Reviewer    PROVIDER RESPONSE TEXT:    This patient has severe debility/ impaired mobility.     Query created by: Adin Rowland on 2021 8:52 AM      Electronically signed by:  Chico Flynn MD 2021 11:37 AM

## 2021-05-28 NOTE — BRIEF OP NOTE
Brief Postoperative Note    Val Aldridge  YOB: 1945  [id number]    Pre-operative Diagnosis: SBO. Lung cancer. In need of longterm IV treatments    Post-operative Diagnosis: Same    Procedure: PICC    Medications Given: none    Anesthesia: Local    Surgeons/Assistants: Lan Lennon MD    Estimated Blood Loss: minimal    Complications: none    Specimens: were not obtained    Findings: 5 F dual lumen 40 cm PICC inserted rt brachial vein with tip in upper RA. PICC is ready for use at this time.       Electronically signed by Lan Lennon MD on 5/28/2021 at 8:41 AM

## 2021-05-28 NOTE — PROGRESS NOTES
Pulmonary Progress Note  [facility]      Patient - Sage Barfield,  Age - 77 y.o.    - 1945      Room Number - 2105/2105-01   N -  [id number]   [facility]  Date of Admission -  2021  3:38 PM        Faye Piedra MD  Primary Care Physician - Josie Ramos MD     SUBJECTIVE   Plans for exploratory lap noted    OBJECTIVE   VITALS    height is 6' 4\" (1.93 m) and weight is 188 lb (85.3 kg). His axillary temperature is 98.8 °F (37.1 °C). His blood pressure is 117/93 (abnormal) and his pulse is 90. His respiration is 18 and oxygen saturation is 97%. Body mass index is 22.88 kg/m². Temperature Range: Temp: 98.8 °F (37.1 °C) Temp  Av.6 °F (37 °C)  Min: 98.4 °F (36.9 °C)  Max: 99 °F (37.2 °C)  BP Range:  Systolic (57JLE), QVD:823 , Min:117 , QFR:011     Diastolic (81AQE), RRM:26, Min:60, Max:102    Pulse Range: Pulse  Av.9  Min: 72  Max: 112  Respiration Range: Resp  Av.3  Min: 18  Max: 24  Current Pulse Ox[de-identified]  SpO2: 97 %  24HR Pulse Ox Range:  SpO2  Av %  Min: 92 %  Max: 97 %  Oxygen Amount and Delivery: O2 Flow Rate (L/min): 0 L/min    Wt Readings from Last 3 Encounters:   21 188 lb (85.3 kg)   20 189 lb (85.7 kg)   20 183 lb (83 kg)       I/O (24 Hours)    Intake/Output Summary (Last 24 hours) at 2021 1310  Last data filed at 2021 1221  Gross per 24 hour   Intake 1800 ml   Output 3350 ml   Net -1550 ml       EXAM     General Appearance  Awake, alert, oriented, in no acute distress  HEENT - normocephalic, atraumatic.  []  Mallampati  [] Crowded airway   [x] Macroglossia  []  Retrognathia  [] Micrognathia  []  Normal tongue size []  Normal Bite  [] Cherie sign positive    Neck - Supple,  trachea midline   Lungs -coarse upper airway noise  Cardiovascular - Heart sounds are normal.  Regular rate and rhythm   Abdomen - Soft, nontender, nondistended, no masses or organomegaly  Neurologic - There are no focal motor or sensory deficits  Skin - No bruising or bleeding  Extremities - No clubbing, cyanosis, edema    MEDS      metoprolol  5 mg Intravenous Q6H    fat emulsion  100 mL Intravenous Daily    insulin lispro  0-6 Units Subcutaneous Q6H    aspirin  325 mg Oral Daily    cilostazol  100 mg Oral BID    potassium chloride  20 mEq Oral Daily    pantoprazole  40 mg Intravenous BID    And    sodium chloride (PF)  10 mL Intravenous BID    ipratropium-albuterol  1 ampule Inhalation Q4H WA    enoxaparin  40 mg Subcutaneous Daily    theophylline  100 mg Oral 3 times per day    sodium chloride flush  10 mL Intravenous 2 times per day    cefepime  2,000 mg Intravenous Q12H    metroNIDAZOLE  500 mg Intravenous Q8H      sodium chloride 125 mL/hr at 05/28/21 0634    PN-Adult 2-in-1 Central Line (Standard)      dextrose      sodium chloride       glucose, dextrose, glucagon (rDNA), dextrose, diatrizoate meglumine-sodium, sodium chloride flush, iopamidol, potassium chloride, fentanNYL, fentanNYL, sodium chloride flush, sodium chloride, potassium chloride **OR** potassium alternative oral replacement **OR** potassium chloride, magnesium sulfate, promethazine **OR** ondansetron, magnesium hydroxide, acetaminophen **OR** acetaminophen, hydrALAZINE    LABS   CBC   Recent Labs     05/28/21  0501   WBC 15.3*   HGB 8.3*   HCT 25.8*   MCV 90.2        BMP:   Lab Results   Component Value Date     05/28/2021    K 3.5 05/28/2021     05/28/2021    CO2 18 05/28/2021    BUN 35 05/28/2021    LABALBU 3.6 05/28/2021    CREATININE 0.99 05/28/2021    CALCIUM 8.9 05/28/2021    GFRAA >60 05/28/2021    LABGLOM >60 05/28/2021     ABGs:  Lab Results   Component Value Date    PHART 7.416 04/08/2019    PO2ART 67.2 04/08/2019    TVI3TPF 39.7 04/08/2019      Lab Results   Component Value Date    MODE NOT REPORTED 04/08/2019     Ionized Calcium:  No results found for: IONCA  Magnesium:    Lab Results   Component Value Date    MG 2.3 05/28/2021     Phosphorus:    Lab Results   Component Value Date    PHOS 3.3 04/04/2020        LIVER PROFILE   Recent Labs     05/28/21  0501   AST 28   ALT 13   BILITOT 0.32   ALKPHOS 52     INR No results for input(s): INR in the last 72 hours. PTT   Lab Results   Component Value Date    APTT 37.3 (H) 04/07/2020         RADIOLOGY     (See actual reports for details)    ASSESSMENT/PLAN   Principal Problem:    Small bowel obstruction ([address]  Active Problems:    Status post insertion of percutaneous endoscopic gastrostomy (PEG) tube ([address]    Acute cystitis without hematuria    Mass of right lung    COPD (chronic obstructive pulmonary disease) ([address]  Resolved Problems:    * No resolved hospital problems.  *    High risk for pulmonary complications postoperatively due to poor cough reflex and COPD  I suspect he has a component of CYNDI given his upper airway anatomy  We will check follow-up x-ray tomorrow  Continue respiratory treatments  ICU bed postoperatively  Discussed with patient's son, he is aware of the risks  Electronically signed by Severa Grippe, MD on 5/28/2021 at 1:10 PM

## 2021-05-28 NOTE — PROGRESS NOTES
[facility]: KARL LEMON   OCCUPATIONAL THERAPY MISSED TREATMENT NOTE   INPATIENT   Date: 21  Patient Name: Malena Alexander       Room: [address] [facility] [address]  MRN: [id number]   Account #: [de-identified]    : 1945  (72 y.o.)  Gender: male   Referring Practitioner: Shaji Carcamo MD  Diagnosis: Small bowel obstruction             REASON FOR MISSED TREATMENT:  Patient at testing and/or off the floor   -   Surgery          Froylan Bosworth, OTA

## 2021-05-29 ENCOUNTER — APPOINTMENT (OUTPATIENT)
Dept: ULTRASOUND IMAGING | Age: 76
DRG: 329 | End: 2021-05-29
Payer: COMMERCIAL

## 2021-05-29 ENCOUNTER — APPOINTMENT (OUTPATIENT)
Dept: GENERAL RADIOLOGY | Age: 76
DRG: 329 | End: 2021-05-29
Payer: COMMERCIAL

## 2021-05-29 LAB
ABSOLUTE EOS #: 0 K/UL (ref 0–0.4)
ABSOLUTE IMMATURE GRANULOCYTE: ABNORMAL K/UL (ref 0–0.3)
ABSOLUTE LYMPH #: 1 K/UL (ref 1–4.8)
ABSOLUTE MONO #: 0.8 K/UL (ref 0.1–1.3)
ALBUMIN SERPL-MCNC: 3.1 G/DL (ref 3.5–5.2)
ALBUMIN/GLOBULIN RATIO: ABNORMAL (ref 1–2.5)
ALLEN TEST: ABNORMAL
ALP BLD-CCNC: 45 U/L (ref 40–129)
ALT SERPL-CCNC: 20 U/L (ref 5–41)
ANION GAP SERPL CALCULATED.3IONS-SCNC: 11 MMOL/L (ref 9–17)
AST SERPL-CCNC: 56 U/L
BASOPHILS # BLD: 0 % (ref 0–2)
BASOPHILS ABSOLUTE: 0 K/UL (ref 0–0.2)
BILIRUB SERPL-MCNC: 0.34 MG/DL (ref 0.3–1.2)
BUN BLDV-MCNC: 30 MG/DL (ref 8–23)
BUN/CREAT BLD: ABNORMAL (ref 9–20)
CALCIUM SERPL-MCNC: 8.3 MG/DL (ref 8.6–10.4)
CARBOXYHEMOGLOBIN: 1.4 % (ref 0–5)
CHLORIDE BLD-SCNC: 119 MMOL/L (ref 98–107)
CHLORIDE, UR: 46 MMOL/L
CO2: 22 MMOL/L (ref 20–31)
CREAT SERPL-MCNC: 1.07 MG/DL (ref 0.7–1.2)
CREATININE URINE: 94.6 MG/DL (ref 39–259)
DIFFERENTIAL TYPE: ABNORMAL
EKG ATRIAL RATE: 100 BPM
EKG P AXIS: 69 DEGREES
EKG P-R INTERVAL: 222 MS
EKG Q-T INTERVAL: 390 MS
EKG QRS DURATION: 84 MS
EKG QTC CALCULATION (BAZETT): 503 MS
EKG R AXIS: 55 DEGREES
EKG T AXIS: 48 DEGREES
EKG VENTRICULAR RATE: 100 BPM
EOSINOPHILS RELATIVE PERCENT: 0 % (ref 0–4)
FIO2: 30
GFR AFRICAN AMERICAN: >60 ML/MIN
GFR NON-AFRICAN AMERICAN: >60 ML/MIN
GFR SERPL CREATININE-BSD FRML MDRD: ABNORMAL ML/MIN/{1.73_M2}
GFR SERPL CREATININE-BSD FRML MDRD: ABNORMAL ML/MIN/{1.73_M2}
GLUCOSE BLD-MCNC: 106 MG/DL (ref 75–110)
GLUCOSE BLD-MCNC: 126 MG/DL (ref 75–110)
GLUCOSE BLD-MCNC: 140 MG/DL (ref 75–110)
GLUCOSE BLD-MCNC: 168 MG/DL (ref 75–110)
GLUCOSE BLD-MCNC: 177 MG/DL (ref 75–110)
GLUCOSE BLD-MCNC: 191 MG/DL (ref 75–110)
GLUCOSE BLD-MCNC: 198 MG/DL (ref 70–99)
HCO3 ARTERIAL: 19.5 MMOL/L (ref 22–26)
HCT VFR BLD CALC: 27.1 % (ref 41–53)
HCT VFR BLD CALC: 30.1 % (ref 41–53)
HCT VFR BLD CALC: 31.2 % (ref 41–53)
HEMOGLOBIN: 10 G/DL (ref 13.5–17.5)
HEMOGLOBIN: 10.4 G/DL (ref 13.5–17.5)
HEMOGLOBIN: 8.8 G/DL (ref 13.5–17.5)
IMMATURE GRANULOCYTES: ABNORMAL %
LACTIC ACID: 2.4 MMOL/L (ref 0.5–2.2)
LYMPHOCYTES # BLD: 7 % (ref 24–44)
MAGNESIUM: 2 MG/DL (ref 1.6–2.6)
MCH RBC QN AUTO: 29.2 PG (ref 26–34)
MCHC RBC AUTO-ENTMCNC: 33.4 G/DL (ref 31–37)
MCV RBC AUTO: 87.6 FL (ref 80–100)
METHEMOGLOBIN: 0.9 % (ref 0–1.9)
MODE: ABNORMAL
MONOCYTES # BLD: 6 % (ref 1–7)
NEGATIVE BASE EXCESS, ART: 5 MMOL/L (ref 0–2)
NOTIFICATION TIME: ABNORMAL
NOTIFICATION: ABNORMAL
NRBC AUTOMATED: ABNORMAL PER 100 WBC
O2 DEVICE/FLOW/%: ABNORMAL
O2 SAT, ARTERIAL: 96.7 % (ref 95–98)
OXYHEMOGLOBIN: ABNORMAL % (ref 95–98)
PATIENT TEMP: 37
PCO2 ARTERIAL: 30.4 MMHG (ref 35–45)
PCO2, ART, TEMP ADJ: ABNORMAL (ref 35–45)
PDW BLD-RTO: 14.6 % (ref 11.5–14.9)
PEEP/CPAP: 7
PH ARTERIAL: 7.42 (ref 7.35–7.45)
PH, ART, TEMP ADJ: ABNORMAL (ref 7.35–7.45)
PHOSPHORUS: 1.4 MG/DL (ref 2.5–4.5)
PHOSPHORUS: 2 MG/DL (ref 2.5–4.5)
PLATELET # BLD: 329 K/UL (ref 150–450)
PLATELET ESTIMATE: ABNORMAL
PMV BLD AUTO: 7 FL (ref 6–12)
PO2 ARTERIAL: 109 MMHG (ref 80–100)
PO2, ART, TEMP ADJ: ABNORMAL MMHG (ref 80–100)
POSITIVE BASE EXCESS, ART: ABNORMAL MMOL/L (ref 0–2)
POTASSIUM SERPL-SCNC: 3.2 MMOL/L (ref 3.7–5.3)
POTASSIUM SERPL-SCNC: 4 MMOL/L (ref 3.7–5.3)
PREALBUMIN: 18 MG/DL (ref 20–40)
PREALBUMIN: 19.4 MG/DL (ref 20–40)
PSV: ABNORMAL
PT. POSITION: ABNORMAL
RBC # BLD: 3.56 M/UL (ref 4.5–5.9)
RBC # BLD: ABNORMAL 10*6/UL
RESPIRATORY RATE: 18
SAMPLE SITE: ABNORMAL
SEG NEUTROPHILS: 87 % (ref 36–66)
SEGMENTED NEUTROPHILS ABSOLUTE COUNT: 11.7 K/UL (ref 1.3–9.1)
SET RATE: 18
SODIUM BLD-SCNC: 152 MMOL/L (ref 135–144)
SODIUM,UR: <20 MMOL/L
TEXT FOR RESPIRATORY: ABNORMAL
TOTAL HB: ABNORMAL G/DL (ref 12–16)
TOTAL PROTEIN, URINE: 125 MG/DL
TOTAL PROTEIN: 5.7 G/DL (ref 6.4–8.3)
TOTAL RATE: 25
TRIGL SERPL-MCNC: 114 MG/DL
URIC ACID: 7.5 MG/DL (ref 3.4–7)
URINE TOTAL PROTEIN CREATININE RATIO: 1.32 (ref 0–0.2)
VT: 500
WBC # BLD: 13.6 K/UL (ref 3.5–11)
WBC # BLD: ABNORMAL 10*3/UL

## 2021-05-29 PROCEDURE — 51702 INSERT TEMP BLADDER CATH: CPT

## 2021-05-29 PROCEDURE — 2580000003 HC RX 258: Performed by: FAMILY MEDICINE

## 2021-05-29 PROCEDURE — 82805 BLOOD GASES W/O2 SATURATION: CPT

## 2021-05-29 PROCEDURE — 82570 ASSAY OF URINE CREATININE: CPT

## 2021-05-29 PROCEDURE — 2500000003 HC RX 250 WO HCPCS: Performed by: INTERNAL MEDICINE

## 2021-05-29 PROCEDURE — 82947 ASSAY GLUCOSE BLOOD QUANT: CPT

## 2021-05-29 PROCEDURE — 85014 HEMATOCRIT: CPT

## 2021-05-29 PROCEDURE — 2000000000 HC ICU R&B

## 2021-05-29 PROCEDURE — 85018 HEMOGLOBIN: CPT

## 2021-05-29 PROCEDURE — 94003 VENT MGMT INPAT SUBQ DAY: CPT

## 2021-05-29 PROCEDURE — 94761 N-INVAS EAR/PLS OXIMETRY MLT: CPT

## 2021-05-29 PROCEDURE — 80053 COMPREHEN METABOLIC PANEL: CPT

## 2021-05-29 PROCEDURE — 2500000003 HC RX 250 WO HCPCS: Performed by: SURGERY

## 2021-05-29 PROCEDURE — 36415 COLL VENOUS BLD VENIPUNCTURE: CPT

## 2021-05-29 PROCEDURE — 6360000002 HC RX W HCPCS: Performed by: SURGERY

## 2021-05-29 PROCEDURE — 84134 ASSAY OF PREALBUMIN: CPT

## 2021-05-29 PROCEDURE — 2580000003 HC RX 258: Performed by: SURGERY

## 2021-05-29 PROCEDURE — 82436 ASSAY OF URINE CHLORIDE: CPT

## 2021-05-29 PROCEDURE — 84300 ASSAY OF URINE SODIUM: CPT

## 2021-05-29 PROCEDURE — 2580000003 HC RX 258: Performed by: INTERNAL MEDICINE

## 2021-05-29 PROCEDURE — 84132 ASSAY OF SERUM POTASSIUM: CPT

## 2021-05-29 PROCEDURE — 6360000002 HC RX W HCPCS: Performed by: INTERNAL MEDICINE

## 2021-05-29 PROCEDURE — 94640 AIRWAY INHALATION TREATMENT: CPT

## 2021-05-29 PROCEDURE — 6370000000 HC RX 637 (ALT 250 FOR IP): Performed by: SURGERY

## 2021-05-29 PROCEDURE — 84156 ASSAY OF PROTEIN URINE: CPT

## 2021-05-29 PROCEDURE — 84100 ASSAY OF PHOSPHORUS: CPT

## 2021-05-29 PROCEDURE — 83735 ASSAY OF MAGNESIUM: CPT

## 2021-05-29 PROCEDURE — 2700000000 HC OXYGEN THERAPY PER DAY

## 2021-05-29 PROCEDURE — 84550 ASSAY OF BLOOD/URIC ACID: CPT

## 2021-05-29 PROCEDURE — 71045 X-RAY EXAM CHEST 1 VIEW: CPT

## 2021-05-29 PROCEDURE — 83605 ASSAY OF LACTIC ACID: CPT

## 2021-05-29 PROCEDURE — 6370000000 HC RX 637 (ALT 250 FOR IP): Performed by: INTERNAL MEDICINE

## 2021-05-29 PROCEDURE — C9113 INJ PANTOPRAZOLE SODIUM, VIA: HCPCS | Performed by: SURGERY

## 2021-05-29 PROCEDURE — 84478 ASSAY OF TRIGLYCERIDES: CPT

## 2021-05-29 PROCEDURE — 36600 WITHDRAWAL OF ARTERIAL BLOOD: CPT

## 2021-05-29 PROCEDURE — 85025 COMPLETE CBC W/AUTO DIFF WBC: CPT

## 2021-05-29 RX ORDER — 0.9 % SODIUM CHLORIDE 0.9 %
500 INTRAVENOUS SOLUTION INTRAVENOUS ONCE
Status: COMPLETED | OUTPATIENT
Start: 2021-05-29 | End: 2021-05-29

## 2021-05-29 RX ORDER — METOPROLOL TARTRATE 5 MG/5ML
5 INJECTION INTRAVENOUS EVERY 6 HOURS PRN
Status: DISCONTINUED | OUTPATIENT
Start: 2021-05-29 | End: 2021-06-07 | Stop reason: HOSPADM

## 2021-05-29 RX ORDER — SODIUM CHLORIDE, SODIUM LACTATE, POTASSIUM CHLORIDE, AND CALCIUM CHLORIDE .6; .31; .03; .02 G/100ML; G/100ML; G/100ML; G/100ML
1000 INJECTION, SOLUTION INTRAVENOUS ONCE
Status: COMPLETED | OUTPATIENT
Start: 2021-05-29 | End: 2021-05-29

## 2021-05-29 RX ORDER — NOREPINEPHRINE BIT/0.9 % NACL 16MG/250ML
2-100 INFUSION BOTTLE (ML) INTRAVENOUS CONTINUOUS
Status: DISCONTINUED | OUTPATIENT
Start: 2021-05-29 | End: 2021-06-07 | Stop reason: HOSPADM

## 2021-05-29 RX ORDER — IPRATROPIUM BROMIDE AND ALBUTEROL SULFATE 2.5; .5 MG/3ML; MG/3ML
1 SOLUTION RESPIRATORY (INHALATION) EVERY 4 HOURS
Status: DISCONTINUED | OUTPATIENT
Start: 2021-05-29 | End: 2021-05-30

## 2021-05-29 RX ORDER — SODIUM CHLORIDE, SODIUM LACTATE, POTASSIUM CHLORIDE, AND CALCIUM CHLORIDE .6; .31; .03; .02 G/100ML; G/100ML; G/100ML; G/100ML
500 INJECTION, SOLUTION INTRAVENOUS ONCE
Status: COMPLETED | OUTPATIENT
Start: 2021-05-30 | End: 2021-05-30

## 2021-05-29 RX ADMIN — IPRATROPIUM BROMIDE AND ALBUTEROL SULFATE 1 AMPULE: .5; 3 SOLUTION RESPIRATORY (INHALATION) at 19:18

## 2021-05-29 RX ADMIN — FENTANYL CITRATE 25 MCG: 50 INJECTION INTRAMUSCULAR; INTRAVENOUS at 00:00

## 2021-05-29 RX ADMIN — INSULIN LISPRO 1 UNITS: 100 INJECTION, SOLUTION INTRAVENOUS; SUBCUTANEOUS at 13:35

## 2021-05-29 RX ADMIN — METRONIDAZOLE 500 MG: 500 INJECTION, SOLUTION INTRAVENOUS at 10:09

## 2021-05-29 RX ADMIN — PROPOFOL 15 MCG/KG/MIN: 10 INJECTION, EMULSION INTRAVENOUS at 19:10

## 2021-05-29 RX ADMIN — PANTOPRAZOLE SODIUM 40 MG: 40 INJECTION, POWDER, FOR SOLUTION INTRAVENOUS at 07:17

## 2021-05-29 RX ADMIN — INSULIN LISPRO 1 UNITS: 100 INJECTION, SOLUTION INTRAVENOUS; SUBCUTANEOUS at 18:25

## 2021-05-29 RX ADMIN — SODIUM CHLORIDE, PRESERVATIVE FREE 10 ML: 5 INJECTION INTRAVENOUS at 07:18

## 2021-05-29 RX ADMIN — POTASSIUM CHLORIDE 10 MEQ: 7.46 INJECTION, SOLUTION INTRAVENOUS at 08:27

## 2021-05-29 RX ADMIN — METRONIDAZOLE 500 MG: 500 INJECTION, SOLUTION INTRAVENOUS at 23:40

## 2021-05-29 RX ADMIN — SODIUM CHLORIDE, POTASSIUM CHLORIDE, SODIUM LACTATE AND CALCIUM CHLORIDE 1000 ML: 600; 310; 30; 20 INJECTION, SOLUTION INTRAVENOUS at 16:01

## 2021-05-29 RX ADMIN — Medication 10 ML: at 07:17

## 2021-05-29 RX ADMIN — POTASSIUM ACETATE: 3.93 INJECTION, SOLUTION, CONCENTRATE INTRAVENOUS at 17:37

## 2021-05-29 RX ADMIN — POTASSIUM CHLORIDE 10 MEQ: 7.46 INJECTION, SOLUTION INTRAVENOUS at 01:56

## 2021-05-29 RX ADMIN — POTASSIUM CHLORIDE 10 MEQ: 7.46 INJECTION, SOLUTION INTRAVENOUS at 00:56

## 2021-05-29 RX ADMIN — POTASSIUM CHLORIDE 10 MEQ: 7.46 INJECTION, SOLUTION INTRAVENOUS at 09:28

## 2021-05-29 RX ADMIN — METRONIDAZOLE 500 MG: 500 INJECTION, SOLUTION INTRAVENOUS at 00:32

## 2021-05-29 RX ADMIN — METRONIDAZOLE 500 MG: 500 INJECTION, SOLUTION INTRAVENOUS at 17:35

## 2021-05-29 RX ADMIN — Medication 2 MCG/MIN: at 21:15

## 2021-05-29 RX ADMIN — I.V. FAT EMULSION 100 ML: 20 EMULSION INTRAVENOUS at 17:36

## 2021-05-29 RX ADMIN — PROPOFOL 10 MG/HR: 10 INJECTION, EMULSION INTRAVENOUS at 10:11

## 2021-05-29 RX ADMIN — FENTANYL CITRATE 25 MCG: 50 INJECTION INTRAMUSCULAR; INTRAVENOUS at 16:00

## 2021-05-29 RX ADMIN — IPRATROPIUM BROMIDE AND ALBUTEROL SULFATE 1 AMPULE: .5; 3 SOLUTION RESPIRATORY (INHALATION) at 10:47

## 2021-05-29 RX ADMIN — POTASSIUM CHLORIDE 10 MEQ: 7.46 INJECTION, SOLUTION INTRAVENOUS at 07:16

## 2021-05-29 RX ADMIN — Medication 10 ML: at 21:50

## 2021-05-29 RX ADMIN — POTASSIUM CHLORIDE: 2 INJECTION, SOLUTION, CONCENTRATE INTRAVENOUS at 14:14

## 2021-05-29 RX ADMIN — POTASSIUM PHOSPHATE, MONOBASIC AND POTASSIUM PHOSPHATE, DIBASIC 20 MMOL: 224; 236 INJECTION, SOLUTION, CONCENTRATE INTRAVENOUS at 14:15

## 2021-05-29 RX ADMIN — IPRATROPIUM BROMIDE AND ALBUTEROL SULFATE 1 AMPULE: .5; 3 SOLUTION RESPIRATORY (INHALATION) at 23:27

## 2021-05-29 RX ADMIN — SODIUM CHLORIDE 500 ML: 9 INJECTION, SOLUTION INTRAVENOUS at 13:35

## 2021-05-29 RX ADMIN — SODIUM CHLORIDE, PRESERVATIVE FREE 10 ML: 5 INJECTION INTRAVENOUS at 21:57

## 2021-05-29 RX ADMIN — INSULIN LISPRO 1 UNITS: 100 INJECTION, SOLUTION INTRAVENOUS; SUBCUTANEOUS at 06:25

## 2021-05-29 RX ADMIN — POTASSIUM CHLORIDE 10 MEQ: 7.46 INJECTION, SOLUTION INTRAVENOUS at 00:00

## 2021-05-29 RX ADMIN — PROPOFOL 25 MCG/KG/MIN: 10 INJECTION, EMULSION INTRAVENOUS at 03:42

## 2021-05-29 RX ADMIN — PANTOPRAZOLE SODIUM 40 MG: 40 INJECTION, POWDER, FOR SOLUTION INTRAVENOUS at 21:49

## 2021-05-29 RX ADMIN — SODIUM CHLORIDE 500 ML: 9 INJECTION, SOLUTION INTRAVENOUS at 18:20

## 2021-05-29 RX ADMIN — POTASSIUM CHLORIDE 10 MEQ: 7.46 INJECTION, SOLUTION INTRAVENOUS at 10:32

## 2021-05-29 RX ADMIN — SODIUM CHLORIDE, POTASSIUM CHLORIDE, SODIUM LACTATE AND CALCIUM CHLORIDE 500 ML: 600; 310; 30; 20 INJECTION, SOLUTION INTRAVENOUS at 23:39

## 2021-05-29 RX ADMIN — IPRATROPIUM BROMIDE AND ALBUTEROL SULFATE 1 AMPULE: .5; 3 SOLUTION RESPIRATORY (INHALATION) at 06:45

## 2021-05-29 RX ADMIN — IPRATROPIUM BROMIDE AND ALBUTEROL SULFATE 1 AMPULE: .5; 3 SOLUTION RESPIRATORY (INHALATION) at 15:16

## 2021-05-29 RX ADMIN — CEFEPIME 2000 MG: 2 INJECTION, POWDER, FOR SOLUTION INTRAVENOUS at 18:50

## 2021-05-29 RX ADMIN — CEFEPIME 2000 MG: 2 INJECTION, POWDER, FOR SOLUTION INTRAVENOUS at 07:16

## 2021-05-29 RX ADMIN — INSULIN LISPRO 1 UNITS: 100 INJECTION, SOLUTION INTRAVENOUS; SUBCUTANEOUS at 00:28

## 2021-05-29 ASSESSMENT — PULMONARY FUNCTION TESTS
PIF_VALUE: 14
PIF_VALUE: 18
PIF_VALUE: 13
PIF_VALUE: 28
PIF_VALUE: 18
PIF_VALUE: 13
PIF_VALUE: 16
PIF_VALUE: 18
PIF_VALUE: 14
PIF_VALUE: 12
PIF_VALUE: 16
PIF_VALUE: 31
PIF_VALUE: 17
PIF_VALUE: 21
PIF_VALUE: 20
PIF_VALUE: 15
PIF_VALUE: 18
PIF_VALUE: 24
PIF_VALUE: 14
PIF_VALUE: 19
PIF_VALUE: 26
PIF_VALUE: 18
PIF_VALUE: 22
PIF_VALUE: 20
PIF_VALUE: 22
PIF_VALUE: 15
PIF_VALUE: 18
PIF_VALUE: 30
PIF_VALUE: 18
PIF_VALUE: 19
PIF_VALUE: 12
PIF_VALUE: 25
PIF_VALUE: 18
PIF_VALUE: 13
PIF_VALUE: 15
PIF_VALUE: 21
PIF_VALUE: 26
PIF_VALUE: 29
PIF_VALUE: 17
PIF_VALUE: 15
PIF_VALUE: 15
PIF_VALUE: 9
PIF_VALUE: 15
PIF_VALUE: 12
PIF_VALUE: 14
PIF_VALUE: 13
PIF_VALUE: 13
PIF_VALUE: 22
PIF_VALUE: 11
PIF_VALUE: 37
PIF_VALUE: 13
PIF_VALUE: 19
PIF_VALUE: 14
PIF_VALUE: 19
PIF_VALUE: 12
PIF_VALUE: 18
PIF_VALUE: 24
PIF_VALUE: 14
PIF_VALUE: 28
PIF_VALUE: 11
PIF_VALUE: 28
PIF_VALUE: 21
PIF_VALUE: 17
PIF_VALUE: 22
PIF_VALUE: 18
PIF_VALUE: 9
PIF_VALUE: 12
PIF_VALUE: 16
PIF_VALUE: 12
PIF_VALUE: 12
PIF_VALUE: 15
PIF_VALUE: 25
PIF_VALUE: 17
PIF_VALUE: 23
PIF_VALUE: 12
PIF_VALUE: 16
PIF_VALUE: 30
PIF_VALUE: 14
PIF_VALUE: 19
PIF_VALUE: 18
PIF_VALUE: 19
PIF_VALUE: 17
PIF_VALUE: 18
PIF_VALUE: 20
PIF_VALUE: 25
PIF_VALUE: 14
PIF_VALUE: 39
PIF_VALUE: 17
PIF_VALUE: 17
PIF_VALUE: 23
PIF_VALUE: 17
PIF_VALUE: 16
PIF_VALUE: 17
PIF_VALUE: 31
PIF_VALUE: 17

## 2021-05-29 ASSESSMENT — PAIN SCALES - WONG BAKER

## 2021-05-29 ASSESSMENT — PAIN SCALES - GENERAL
PAINLEVEL_OUTOF10: 0
PAINLEVEL_OUTOF10: 0
PAINLEVEL_OUTOF10: 2
PAINLEVEL_OUTOF10: 4
PAINLEVEL_OUTOF10: 4

## 2021-05-29 NOTE — PROGRESS NOTES
Dr. Sia Vasquez notified of urine output 235, 10 ml since 4pm and recent labs. Order given for STAT renal US and bladder scan. Would like call back with results.      Bladder scan completed 135 ml morning nurse updated

## 2021-05-29 NOTE — PROGRESS NOTES
Dr. Aurelio Putnam notified of [facility] radiology's recommendations to retract ETT 3.5cm. Order received to retract ETT 2cm. RT made aware of this. Patient currently on CPAP PS, tolerating well.

## 2021-05-29 NOTE — CONSULTS
Department of Internal Medicine  Nephrology Edith Day MD   Consult Note    Reason for consultation: Management of oliguric acute kidney injury. Consulting physician: Beltran Neri MD    History of presenting illness: This is a 77 y.o. male with a significant past medical history of Bladder cancer, seizure disorder, osteoarthritis and s/p Cerebrovascular accident [has G-tube in place], who presented to the hospital on 5/24/2021 with complaints of diffuse abdominal pain of 1 day duration associated with nausea and vomiting. Also he complained of difficulty urinating and bladder scan showed 586 mL of urine retention. .  BUN/creatinine was 49/1.05 mg/dL with serum bicarbonate 18 mmol/L at presentation. He was admitted and eventually underwent exploratory laparotomy with release of adhesive band causing small bowel obstruction, small bowel resection with primary anastomosis on 5/28/2021. Postoperatively, he was intubated and admitted to the intensive care unit but noted to be oliguric and hence nephrology consultation. Issa catheter was flushed and clots removed and urine output has improved. History was obtained by chart review as patient is currently intubated.     Bactrim [sulfamethoxazole-trimethoprim] and Ciprofloxacin    Past Medical History:   Diagnosis Date    Arthritis     Cancer [facility])     bladder 1980s    Cerebral artery occlusion with cerebral infarction [facility])     TIA 2010    Chronic kidney disease     COPD [facility]ronic obstructive [facility]) [facility]     Hypertension     Pneumonia     Psychiatric problem     depression, social anxiety    Seizures ([facility])      Scheduled Meds:   metoprolol  5 mg Intravenous Q6H    fat emulsion  100 mL Intravenous Daily    insulin lispro  0-6 Units Subcutaneous Q6H    sodium chloride flush  10 mL Intravenous 2 times per day    aspirin  325 mg Oral Daily    cilostazol  100 mg Oral BID    potassium chloride  20 mEq Oral Daily    pantoprazole 40 mg Intravenous BID    And    sodium chloride (PF)  10 mL Intravenous BID    ipratropium-albuterol  1 ampule Inhalation Q4H WA    theophylline  100 mg Oral 3 times per day    sodium chloride flush  10 mL Intravenous 2 times per day    cefepime  2,000 mg Intravenous Q12H    metroNIDAZOLE  500 mg Intravenous Q8H     Continuous Infusions:   sodium chloride Stopped (05/28/21 1444)    PN-Adult 2-in-1 Central Line (Standard) 41.6 mL/hr at 05/28/21 2221    dextrose      sodium chloride      propofol 10 mcg/kg/min (05/29/21 1257)    sodium chloride      sodium chloride 75 mL/hr at 05/28/21 2226    sodium chloride       PRN Meds:.glucose, dextrose, glucagon (rDNA), dextrose, sodium chloride, HYDROmorphone **OR** HYDROmorphone, sodium chloride flush, sodium chloride, potassium chloride, magnesium sulfate, ondansetron, ondansetron, diatrizoate meglumine-sodium, sodium chloride flush, iopamidol, potassium chloride, fentanNYL, fentanNYL, sodium chloride flush, sodium chloride, potassium chloride **OR** potassium alternative oral replacement **OR** potassium chloride, magnesium sulfate, promethazine **OR** ondansetron, magnesium hydroxide, acetaminophen **OR** acetaminophen, hydrALAZINE    Family History   Problem Relation Age of Onset    Arthritis Mother     Atrial Fibrillation Mother     Hearing Loss Mother     Heart Disease Mother     Stroke Mother     Vision Loss Mother     Arthritis Father     Cancer Father     Heart Disease Father     High Blood Pressure Father     Stroke Father     Vision Loss Father        Social History     Socioeconomic History    Marital status:      Spouse name: None    Number of children: None    Years of education: None    Highest education level: None   Occupational History    None   Tobacco Use    Smoking status: Former Smoker     Packs/day: 1.00     Years: 60.00     Pack years: 60.00     Types: Cigarettes     Start date: 1956     Quit date: 4/1/2020 Years since quittin.1    Smokeless tobacco: Never Used   Vaping Use    Vaping Use: Never used   Substance and Sexual Activity    Alcohol use: No    Drug use: No    Sexual activity: Yes     Partners: Female   Other Topics Concern    None   Social History Narrative    None     Social Determinants of Health     Financial Resource Strain:     Difficulty of Paying Living Expenses:    Food Insecurity:     Worried About Running Out of Food in the Last Year:     Ran Out of Food in the Last Year:    Transportation Needs:     Lack of Transportation (Medical):  Lack of Transportation (Non-Medical):    Physical Activity:     Days of Exercise per Week:     Minutes of Exercise per Session:    Stress:     Feeling of Stress :    Social Connections:     Frequency of Communication with Friends and Family:     Frequency of Social Gatherings with Friends and Family:     Attends Scientologist Services:     Active Member of Clubs or Organizations:     Attends Club or Organization Meetings:     Marital Status:    Intimate Partner Violence:     Fear of Current or Ex-Partner:     Emotionally Abused:     Physically Abused:     Sexually Abused:      Review of systems: Unobtainable due to intubation and sedation.     Physical Exam:    VITALS:  BP 92/71   Pulse 104   Temp 99.2 °F (37.3 °C) (Oral)   Resp 28   Ht 6' 4\" (1.93 m)   Wt 192 lb (87.1 kg)   SpO2 97%   BMI 23.37 kg/m²   24HR INTAKE/OUTPUT:    Intake/Output Summary (Last 24 hours) at 2021 1304  Last data filed at 2021 0734  Gross per 24 hour   Intake 4033.91 ml   Output 3145 ml   Net 888.91 ml     Constitutional: Intubated, sedated and on ventilator support    Skin: Skin color, texture, turgor normal. No rashes or lesions    Head: Normocephalic, without obvious abnormality, atraumatic     Cardiovascular/Edema: regular rate and rhythm, S1, S2 normal, no murmur, click, rub or gallop    Respiratory: Lungs: clear to auscultation bilaterally    Abdomen: soft, non-tender; bowel sounds normal; no masses,  no organomegaly    Back: Abdominal binder dressing in place;  hypoactive bowel sounds. Extremities: extremities normal, atraumatic, no cyanosis or edema    Neuro:  Grossly normal      CBC:   Recent Labs     05/27/21  0455 05/28/21  0501 05/28/21 2131 05/29/21  0441 05/29/21  1143   WBC 15.1* 15.3*  --  13.6*  --    HGB 9.0* 8.3* 10.7* 10.4* 10.0*    311  --  329  --      BMP:    Recent Labs     05/28/21  0501 05/28/21 2131 05/29/21  0441   * 152* 152*   K 3.5* 3.2* 3.2*   * 119* 119*   CO2 18* 20 22   BUN 35* 30* 30*   CREATININE 0.99 1.05 1.07   GLUCOSE 138* 150* 198*       Lab Results   Component Value Date    NITRU NEGATIVE 05/25/2021    COLORU YELLOW 05/25/2021    PHUR 5.0 05/25/2021    WBCUA 10 TO 20 05/25/2021    RBCUA 0 TO 2 05/25/2021    MUCUS NOT REPORTED 05/25/2021    TRICHOMONAS NOT REPORTED 05/25/2021    YEAST NOT REPORTED 05/25/2021    BACTERIA FEW 05/25/2021    SPECGRAV 1.060 05/25/2021    LEUKOCYTESUR SMALL 05/25/2021    UROBILINOGEN Normal 05/25/2021    BILIRUBINUR NEGATIVE 05/25/2021    GLUCOSEU NEGATIVE 05/25/2021    KETUA NEGATIVE 05/25/2021    AMORPHOUS NOT REPORTED 05/25/2021     IMPRESSION/RECOMMENDATIONS:      1. Acute oliguric kidney injury - secondary to urinary retention. Patient is now nonoliguric with clearing of Issa catheter obstruction. Plan: Continue IV fluid 0.45 normal saline at 100 mL/h. Avoid nephrotoxic agents. Keep mean arterial pressure greater than 65 mmHg. Strict input and output documentation  Basic metabolic profile daily. 2.  Hypernatremia - estimated free water deficit 4 L. Continue hypotonic hydration. 3.  Hypokalemia - secondary to poor intake. We will add potassium chloride to IV fluid. 3.  Hypophosphatemia - IV potassium phosphate 20 mmol over 6 hours. Prognosis is guarded. Thank you very much for the courtesy of this consultation.       Derek CALDERON Harika Rowland MD FACP  Attending Nephrologist  5/29/2021 12:57 PM

## 2021-05-29 NOTE — PROGRESS NOTES
Issa catheter irrigated at this time. Moderate size clots noted. Issa irrigated with 60ml of sterile water. 150ml of urine immediately in bag. Urometer placed for closer monitoring of I&O.

## 2021-05-29 NOTE — PLAN OF CARE
Nutrition Problem #1: Inadequate oral intake  Intervention: Food and/or Nutrient Delivery: Modify Parenteral Nutrition  Nutritional Goals: provide more than 75% of nutrition needs

## 2021-05-29 NOTE — PROGRESS NOTES
Dr. Clayton Argueta updated on New Consult, current lab values and TPN/ lipid infusion. Per Dr. Marta Carrasquillo orders given to start TPN hold lipids at this time. Continue IV fluids  0.45% NS but keep total fluids around 150ml/h, BMP for morning, ok to replace potassium per sliding scale.  Will see patient in the AM.

## 2021-05-29 NOTE — PROGRESS NOTES
Dr. Stephanie Araya notified of continued borderline blood pressures. Order received to start Levophed if MAP <65.

## 2021-05-29 NOTE — PROGRESS NOTES
Comprehensive Nutrition Assessment    Type and Reason for Visit:  Reassess    Nutrition Recommendations/Plan: Continue TPN at 41.6ml/hr with 100ml of lipids and the following additives: K acetate 35mEq, K phos 15mMol, remove MVI and Trace elements. Nutrition Assessment:  TPN to continue at 41.6ml with 100ml of lipids. Pt was given KCl boluse totalling 40mEq and will have a 20mMol Kphos bolus. Malnutrition Assessment:  Malnutrition Status: At risk for malnutrition (Comment)    Context:  Chronic Illness     Findings of the 6 clinical characteristics of malnutrition:  Energy Intake:  Unable to assess  Weight Loss:  Unable to assess     Body Fat Loss:  Unable to assess     Muscle Mass Loss:  Unable to assess    Fluid Accumulation:  1 - Mild Extremities   Strength:  Not Performed    Estimated Daily Nutrient Needs:  Energy (kcal): [place] x 1.2= 2000 kcal; Weight Used for Energy Requirements:  Admission     Protein (g):  1.5g/kg= 135 g protein; Weight Used for Protein Requirements:  Ideal            Nutrition Related Findings:  Trace edema. Labs and meds reviewed.       Wounds:   (possible wound at sacrum)       Current Nutrition Therapies:    Current Parenteral Nutrition Orders:  · Type and Formula: 2-in-1 Custom   · Lipids: 100ml  · Duration: Continuous  · Rate/Volume: 41.6 ml/ 1000 ml  · Current PN Order Provides: 1080 kcal, 50 gm protein  · Goal PN Orders Provides:        Anthropometric Measures:  · Height: 6' 4\" (193 cm)  · Current Body Weight: 188 lb (85.3 kg)   · Admission Body Weight: 184 lb (83.5 kg)    · Ideal Body Weight: 202 lbs;   · BMI: 22.9  ·   · BMI Categories: Normal Weight (BMI 22.0 to 24.9) age over 72       Nutrition Diagnosis:   · Inadequate oral intake related to altered GI function as evidenced by NPO or clear liquid status due to medical condition, nutrition support - parenteral nutrition      Nutrition Interventions:   Food and/or Nutrient Delivery:  Modify Parenteral Nutrition  Nutrition Education/Counseling:  No recommendation at this time   Coordination of Nutrition Care:  Continue to monitor while inpatient    Goals:  provide more than 75% of nutrition needs       Nutrition Monitoring and Evaluation:   Behavioral-Environmental Outcomes:  None Identified   Food/Nutrient Intake Outcomes:  Parenteral Nutrition Intake/Tolerance  Physical Signs/Symptoms Outcomes:  Biochemical Data, GI Status, Fluid Status or Edema, Nutrition Focused Physical Findings, Skin, Weight     Discharge Planning: Too soon to determine     Bridgett Sanchez RD, LD  [phone]    Some areas of assessment may be incomplete due to standard COVID-19 Precautions.

## 2021-05-29 NOTE — PROGRESS NOTES
Dr. Aurelio Putnam notified of hypotension and tachypnea. Asynchrony noted on vent as well as diaphragmatic breathing. Order received for 1000ml bolus of LR.  Attempt to give patient 25mcg of Fentanyl

## 2021-05-29 NOTE — PLAN OF CARE
Problem: OXYGENATION/RESPIRATORY FUNCTION  Goal: Patient will maintain patent airway  5/29/2021 1923 by Heaven Shields RCP  Outcome: Ongoing  5/29/2021 1610 by Comfort Mcguire RN  Outcome: Ongoing  Note: Patient remains orally intubated at this time   5/29/2021 0528 by Saji Arroyo RN  Outcome: Ongoing     Problem: OXYGENATION/RESPIRATORY FUNCTION  Goal: Patient will achieve/maintain normal respiratory rate/effort  Description: Respiratory rate and effort will be within normal limits for the patient  5/29/2021 1923 by Heaven Shields RCP  Outcome: Ongoing  5/29/2021 1610 by Comfort Mcguire RN  Outcome: Ongoing  5/29/2021 0528 by Saji Arroyo RN  Outcome: Ongoing     Problem: MECHANICAL VENTILATION  Goal: Patient will maintain patent airway  5/29/2021 1923 by Heaven Shields RCP  Outcome: Ongoing  5/29/2021 1610 by Comfort Mcguire RN  Outcome: Ongoing  Note: Patient remains orally intubated at this time   5/29/2021 0528 by Saji Arroyo RN  Outcome: Ongoing     Problem: MECHANICAL VENTILATION  Goal: Patient will maintain patent airway  5/29/2021 1923 by Heaven Shields RCP  Outcome: Ongoing  5/29/2021 1610 by Comfort Mcguire RN  Outcome: Ongoing  5/29/2021 0528 by Saji Arroyo RN  Outcome: Ongoing     Problem: MECHANICAL VENTILATION  Goal: ET tube will be managed safely  5/29/2021 1923 by Heaven Shields RCP  Outcome: Ongoing  5/29/2021 1610 by Comfort Mcguire RN  Outcome: Ongoing  5/29/2021 0528 by Saji Arroyo RN  Outcome: Ongoing

## 2021-05-29 NOTE — PROGRESS NOTES
Spoke with Dr. Debbie Barnett regarding UA. Hold off on obtaining UA at this time, will evaluate tomorrow.

## 2021-05-29 NOTE — PROGRESS NOTES
ICU Progress Note (Vent)   [facility]    Patient - Evi Wong,  Age - 77 y.o.    - 1945      Room Number -    N -  [id number]   [facility]  Date of Admission -  2021  3:38 PM    Events of Past 24 Hours   Underwent surgery yesterday, this a.m. failed weaning trial due to tachypnea    Vitals    height is 6' 4\" (1.93 m) and weight is 192 lb (87.1 kg). His oral temperature is 99.5 °F (37.5 °C). His blood pressure is 88/57 (abnormal) and his pulse is 98. His respiration is 22 and oxygen saturation is 100%. Temperature Range: Temp: 99.5 °F (37.5 °C) Temp  Av.3 °F (37.4 °C)  Min: 99 °F (37.2 °C)  Max: 99.5 °F (37.5 °C)  BP Range:  Systolic (42WQL), DCC:017 , Min:69 , VOH:216     Diastolic (26RPI), MCE:37, Min:34, Max:107    Pulse Range: Pulse  Av  Min: 87  Max: 113  Respiration Range: Resp  Av.5  Min: 12  Max: 31  Current Pulse Ox[de-identified]  SpO2: 100 %  24HR Pulse Ox Range:  SpO2  Av.5 %  Min: 78 %  Max: 100 %  Oxygen Amount and Delivery: O2 Flow Rate (L/min): 0 L/min      Wt Readings from Last 3 Encounters:   21 192 lb (87.1 kg)   20 189 lb (85.7 kg)   20 183 lb (83 kg)     I/O       Intake/Output Summary (Last 24 hours) at 2021 1840  Last data filed at 2021 1754  Gross per 24 hour   Intake 5786.62 ml   Output 1595 ml   Net 4191.62 ml     I/O last 3 completed shifts:   In: 4033.9 [I.V.:3229.2; Blood:377.5; IV Piggyback:100]  Out: 3256 [Urine:525; Emesis/NG output:475; Drains:795; Blood:1000]     DRAIN/TUBE OUTPUT:     Invasive Lines   ETT Day -   1  Lines -PICC line    ICP PRESSURE RANGE:  No data recorded  CVP PRESSURE RANGE:  No data recorded  Mechanical Ventilation Data   SETTINGS (Comprehensive)  Vent Information  $Ventilation: $Subsequent Day  Skin Assessment: Clean, dry, & intact  Equipment ID: [id number]  Vent Type: Servo i  Vent Mode: PRVC  Vt Ordered: 500 mL  Rate Set: 18 bmp  Pressure Support: 7 cmH20  FiO2 : 30 %  SpO2: 100 %  SpO2/FiO2 ratio: 333.33  Sensitivity: 5  PEEP/CPAP: 7  I Time/ I Time %: 1.11 s  Humidification Source: HME  Additional Respiratory  Assessments  Pulse: 98  Resp: 22  SpO2: 100 %  End Tidal CO2: 27  Position: Semi-Santiago's  Humidification Source: HME  Oral Care: Mouth suctioned, Mouth swabbed       ABGs:   Lab Results   Component Value Date    PHART 7.416 05/29/2021    PO2ART 109.0 05/29/2021    UQG3HCK 30.4 05/29/2021       Lab Results   Component Value Date    MODE PRVC 05/29/2021         Medications   IV   IV infusion builder 100 mL/hr at 05/29/21 1414    PN-Adult 2-in-1 Central Line (Standard) 41.6 mL/hr at 05/29/21 1737    dextrose      sodium chloride      propofol 15 mcg/kg/min (05/29/21 1546)    sodium chloride      sodium chloride        sodium chloride  500 mL Intravenous Once    fat emulsion  100 mL Intravenous Daily    insulin lispro  0-6 Units Subcutaneous Q6H    sodium chloride flush  10 mL Intravenous 2 times per day    aspirin  325 mg Oral Daily    cilostazol  100 mg Oral BID    potassium chloride  20 mEq Oral Daily    pantoprazole  40 mg Intravenous BID    And    sodium chloride (PF)  10 mL Intravenous BID    ipratropium-albuterol  1 ampule Inhalation Q4H WA    theophylline  100 mg Oral 3 times per day    sodium chloride flush  10 mL Intravenous 2 times per day    cefepime  2,000 mg Intravenous Q12H    metroNIDAZOLE  500 mg Intravenous Q8H       Diet/Nutrition   Diet NPO Effective Now Exceptions are: Ice Chips, Sips of Water with Meds, Popsicles  PN-Adult 2-in-1 Central Line (Standard)    Exam   VITALS    height is 6' 4\" (1.93 m) and weight is 192 lb (87.1 kg). His oral temperature is 99.5 °F (37.5 °C). His blood pressure is 88/57 (abnormal) and his pulse is 98. His respiration is 22 and oxygen saturation is 100%.    Ventilator Settings (Basic)  Vent Mode: PRVC Rate Set: 18 bmp/Vt Ordered: 500 mL/ /FiO2 : 30 %    Constitutional - Sedated  General Appearance  well developed, well nourished  HEENT - Life support devices in place (ET, OG),normocephalic, atraumatic. PERRLA  Lungs - Chest expands equally, slightly diminished   Cardiovascular - Heart sounds are normal.  normal rate and rhythm regular, no murmur, gallop or rub. Abdomen - soft, nontender, nondistended, no masses or organomegaly  Neurologic - CN II-XII are grossly intact. There are no focal motor deficits  Skin - no bruising or bleeding  Extremities - no cyanosis, clubbing or edema    Lab Results   CBC     Lab Results   Component Value Date    WBC 13.6 05/29/2021    RBC 3.56 05/29/2021    HGB 10.0 05/29/2021    HCT 30.1 05/29/2021     05/29/2021    MCV 87.6 05/29/2021    MCH 29.2 05/29/2021    MCHC 33.4 05/29/2021    RDW 14.6 05/29/2021    LYMPHOPCT 7 05/29/2021    MONOPCT 6 05/29/2021    BASOPCT 0 05/29/2021    MONOSABS 0.80 05/29/2021    LYMPHSABS 1.00 05/29/2021    EOSABS 0.00 05/29/2021    BASOSABS 0.00 05/29/2021    DIFFTYPE NOT REPORTED 05/29/2021       BMP   Lab Results   Component Value Date     05/29/2021    K 4.0 05/29/2021     05/29/2021    CO2 22 05/29/2021    BUN 30 05/29/2021    CREATININE 1.07 05/29/2021    GLUCOSE 198 05/29/2021    CALCIUM 8.3 05/29/2021       LFTS  Lab Results   Component Value Date    ALKPHOS 45 05/29/2021    ALT 20 05/29/2021    AST 56 05/29/2021    PROT 5.7 05/29/2021    BILITOT 0.34 05/29/2021    LABALBU 3.1 05/29/2021       INR  No results for input(s): PROTIME, INR in the last 72 hours. APTT  No results for input(s): APTT in the last 72 hours. Lactic Acid  Lab Results   Component Value Date    LACTA 0.7 05/27/2021    LACTA 1.5 05/26/2021    LACTA 2.7 05/25/2021        BNP   No results for input(s): BNP in the last 72 hours.      Cultures       Radiology     Plain Films               SYSTEM ASSESSMENT    Status post exploratory lap for small bowel obstruction with hemoperitoneum and resection of small bowel with primary anastomosis and lysis of adhesions  History of COPD  Poor cough reflex  Probable CYNDI  Hypernatremia    Neuro   Keep sedated and comfortable, sedation vacations daily    Respiratory   Wean oxygen as tolerated. Keep O2 sat > 88%  Retry weaning him tomorrow, not surprising he failed given his multiple medical problems and overall ill health  Endotracheal tube retracted 2 cm  Sputum culture sent  DuoNeb aerosols adjusted to every 4 hours  No need for theophylline  Hemodynamics   Blood pressure initially on low side but now improving after fluid boluses  May need norepinephrine  Check lactic acid level  Gastrointestinal/Nutrition   TPN to be started  GI prophylaxis    Renal   Continue IV fluids, appears to be dehydrated  Patient has had worsening hyponatremia over the last 24 hours  Infectious Disease   Remains on broad-spectrum antibiotics, cefepime and Flagyl  Awaiting cultures    Hematology/Oncology   Monitor CBC  No chemical ET prophylaxis due to hemoperitoneum e  Endocrine   Placed on high dose sliding scale insulin, watch blood sugars as TPN has been started    Social/Spiritual/DNR/Disposition/Other   Family was aware how he may need ventilator for several days.       Critical Care Time   35 min    Electronically signed by Du Mccann MD on 5/29/2021 at 6:40 PM

## 2021-05-29 NOTE — PROGRESS NOTES
Patient was seen and examined. Failed weaning trial.  Afebrile vital signs are stable. Urine output is adequate. G-tube is bilious. Abdomen is benign. Incision is clean dry intact. CHELSEA drains are all serosanguineous. Extremity nontender. Blood work was reviewed. Potassium was replaced. Creatinine is normal.  WBC count is improved. Hemoglobin is stable. Surgically stable. Weaning trials per pulmonary. Patient is on TPN. Patient on cefepime and Flagyl. Continue supportive care. Discussed with nurse taking care of the patient.

## 2021-05-29 NOTE — PROGRESS NOTES
Patient placed back on PRVC due to tachypnea. RR in the upper 40s, diaphragmatic breathing noted. Diprivan restarted at previous rate of 20mcg/kg/min for rate control.

## 2021-05-29 NOTE — PLAN OF CARE
Problem: Falls - Risk of:  Goal: Will remain free from falls  Outcome: Ongoing  Goal: Absence of physical injury  Outcome: Ongoing     Problem: Skin Integrity:  Goal: Will show no infection signs and symptoms  Outcome: Ongoing  Goal: Absence of new skin breakdown  Outcome: Ongoing     Problem: SAFETY  Goal: Free from accidental physical injury  Outcome: Ongoing  Goal: Free from intentional harm  Outcome: Ongoing     Problem: DAILY CARE  Goal: Daily care needs are met  Outcome: Ongoing     Problem: PAIN  Goal: Patient's pain/discomfort is manageable  Outcome: Ongoing     Problem: SKIN INTEGRITY  Goal: Skin integrity is maintained or improved  5/29/2021 0528 by Trisha Morales RN  Outcome: Ongoing  5/28/2021 1853 by Mari Babcock RCP  Outcome: Ongoing     Problem: KNOWLEDGE DEFICIT  Goal: Patient/S.O. demonstrates understanding of disease process, treatment plan, medications, and discharge instructions.   Outcome: Ongoing     Problem: DISCHARGE BARRIERS  Goal: Patient's continuum of care needs are met  Outcome: Ongoing     Problem: Pain:  Goal: Pain level will decrease  Outcome: Ongoing  Goal: Control of acute pain  Outcome: Ongoing  Goal: Control of chronic pain  Outcome: Ongoing     Problem: Nutrition  Goal: Optimal nutrition therapy  Outcome: Ongoing     Problem: Musculor/Skeletal Functional Status  Goal: Highest potential functional level  Outcome: Ongoing  Goal: Absence of falls  Outcome: Ongoing     Problem: Musculor/Skeletal Functional Status  Goal: Highest potential functional level  Outcome: Ongoing  Goal: Absence of falls  Outcome: Ongoing     Problem: Non-Violent Restraints  Goal: Removal from restraints as soon as assessed to be safe  Outcome: Ongoing  Goal: No harm/injury to patient while restraints in use  Outcome: Ongoing  Goal: Patient's dignity will be maintained  Outcome: Ongoing     Problem: OXYGENATION/RESPIRATORY FUNCTION  Goal: Patient will maintain patent airway  5/29/2021 0528 by Logan Sparrow

## 2021-05-29 NOTE — PLAN OF CARE
Problem: OXYGENATION/RESPIRATORY FUNCTION  Goal: Patient will maintain patent airway  5/28/2021 2012 by Bebeto Swift RCP  Outcome: Ongoing  5/28/2021 1853 by Fabiano Yuan RCP  Outcome: Ongoing     Problem: OXYGENATION/RESPIRATORY FUNCTION  Goal: Patient will achieve/maintain normal respiratory rate/effort  Description: Respiratory rate and effort will be within normal limits for the patient  5/28/2021 2012 by Bebeto Swift RCP  Outcome: Ongoing  5/28/2021 1853 by Fabiano uYan RCP  Outcome: Ongoing     Problem: MECHANICAL VENTILATION  Goal: Patient will maintain patent airway  5/28/2021 2012 by Bebeto Swift RCP  Outcome: Ongoing  5/28/2021 1853 by Fabiano Yuan RCP  Outcome: Ongoing     Problem: MECHANICAL VENTILATION  Goal: Oral health is maintained or improved  5/28/2021 2012 by Bebeto Swift RCP  Outcome: Ongoing  5/28/2021 1853 by Fabiano Yuan RCP  Outcome: Ongoing     Problem: MECHANICAL VENTILATION  Goal: ET tube will be managed safely  5/28/2021 2012 by Bebeto Swift RCP  Outcome: Ongoing  5/28/2021 1853 by Fabiano Yuan RCP  Outcome: Ongoing

## 2021-05-29 NOTE — ANESTHESIA POSTPROCEDURE EVALUATION
Department of Anesthesiology  Postprocedure Note    Patient: Pankaj Villagomez  MRN: [id number]  YOB: 1945  Date of evaluation: 5/29/2021  Time:  3:26 PM     Procedure Summary     Date: 05/28/21 Room / Location: [address]  / [address]    Anesthesia Start: 7579 Anesthesia Stop: 1732    Procedure: Exploratory Laparotomy. Release of adhesive band with release of small bowel obstruction. Small bowel resection with primary anastomosis (N/A Abdomen) Diagnosis: (SMALL BOWEL OBSTRUCTION)    Surgeons: Sue Merlos MD Responsible Provider: Lou Hancock MD    Anesthesia Type: general ASA Status: 3          Anesthesia Type: general    Chato Phase I: Chato Score: 5    Chato Phase II:      Last vitals: Reviewed and per EMR flowsheets. Anesthesia Post Evaluation    Comments: POD #1. Patient still intubated and vented in ICU. No anesthesia related issues reported by ICU or surgical staff.

## 2021-05-29 NOTE — PROGRESS NOTES
Progress Note    5/29/2021   5:01 PM    Name:  Sage Barfield  MRN:    [id number]     Acct:     [de-identified]   Room:  2002/2002-01  IP Day: 5     Admit Date: 5/24/2021  3:38 PM  PCP: Josie Ramos MD    Subjective:     C/C:   Chief Complaint   Patient presents with   Jenness Reek Nausea    Emesis       Interval History: Status: not changed. Patient is stop day 1 small bowel resection due to small bowel obstruction. Sedated and intubated. Vital signs reviewed relatively low blood pressure readings noted. Heart rates in 90s. Recent labs reviewed sodium 152, mean 1.07, hemoglobin 10    ROS:   all 10 systems reviewed and are negative except as noted    Review of Systems   Unable to perform ROS: Intubated       Medications: Allergies:    Allergies   Allergen Reactions    Bactrim [Sulfamethoxazole-Trimethoprim] Hives and Swelling    Ciprofloxacin Swelling     FACE       Current Meds: potassium chloride 20 mEq in sodium chloride 0.45 % 1,000 mL infusion, Continuous  potassium phosphate 20 mmol in dextrose 5 % 250 mL IVPB, Once  PN-Adult 2-in-1 Central Line (Standard), Continuous TPN  lactated ringers bolus, Once  0.9 % sodium chloride bolus, Once  metoprolol (LOPRESSOR) injection 5 mg, Q6H PRN  PN-Adult 2-in-1 Central Line (Standard), Continuous TPN  fat emulsion 20 % infusion 100 mL, Daily  insulin lispro (HUMALOG) injection vial 0-6 Units, Q6H  glucose (GLUTOSE) 40 % oral gel 15 g, PRN  dextrose 50 % IV solution, PRN  glucagon (rDNA) injection 1 mg, PRN  dextrose 5 % solution, PRN  0.9 % sodium chloride infusion, PRN  propofol injection, Titrated  HYDROmorphone (DILAUDID) injection 0.25 mg, Q3H PRN   Or  HYDROmorphone (DILAUDID) injection 0.5 mg, Q3H PRN  sodium chloride flush 0.9 % injection 10 mL, 2 times per day  sodium chloride flush 0.9 % injection 10 mL, PRN  0.9 % sodium chloride infusion, PRN  potassium chloride 10 mEq/100 mL IVPB (Peripheral Line), PRN  magnesium sulfate 1000 mg in dextrose 5% 100 mL IVPB, PRN  ondansetron (ZOFRAN) injection 4 mg, Q6H PRN  ondansetron (ZOFRAN) injection 4 mg, Q6H PRN  aspirin tablet 325 mg, Daily  cilostazol (PLETAL) tablet 100 mg, BID  potassium chloride (MICRO-K) extended release capsule 20 mEq, Daily  diatrizoate meglumine-sodium (GASTROGRAFIN) 66-10 % solution 450 mL, ONCE PRN  pantoprazole (PROTONIX) injection 40 mg, BID   And  sodium chloride (PF) 0.9 % injection 10 mL, BID  sodium chloride flush 0.9 % injection 10 mL, PRN  iopamidol (ISOVUE-370) 76 % injection 75 mL, ONCE PRN  potassium chloride 10 mEq/100 mL IVPB (Peripheral Line), PRN  ipratropium-albuterol (DUONEB) nebulizer solution 1 ampule, Q4H WA  fentaNYL (SUBLIMAZE) injection 25 mcg, Q2H PRN  fentaNYL (SUBLIMAZE) injection 50 mcg, Q2H PRN  theophylline 80 MG/15ML oral solution 100 mg, 3 times per day  sodium chloride flush 0.9 % injection 10 mL, 2 times per day  sodium chloride flush 0.9 % injection 10 mL, PRN  0.9 % sodium chloride infusion, PRN  potassium chloride (KLOR-CON M) extended release tablet 40 mEq, PRN   Or  potassium bicarb-citric acid (EFFER-K) effervescent tablet 40 mEq, PRN   Or  potassium chloride 10 mEq/100 mL IVPB (Peripheral Line), PRN  magnesium sulfate 1000 mg in dextrose 5% 100 mL IVPB, PRN  promethazine (PHENERGAN) tablet 12.5 mg, Q6H PRN   Or  ondansetron (ZOFRAN) injection 4 mg, Q6H PRN  magnesium hydroxide (MILK OF MAGNESIA) 400 MG/5ML suspension 30 mL, Daily PRN  acetaminophen (TYLENOL) tablet 650 mg, Q6H PRN   Or  acetaminophen (TYLENOL) suppository 650 mg, Q6H PRN  cefepime (MAXIPIME) 2000 mg IVPB minibag, Q12H  metronidazole (FLAGYL) 500 mg in NaCl 100 mL IVPB premix, Q8H  hydrALAZINE (APRESOLINE) injection 10 mg, Q6H PRN        Data:     Code Status:  Full Code    Family History   Problem Relation Age of Onset    Arthritis Mother     Atrial Fibrillation Mother     Hearing Loss Mother     Heart Disease Mother     Stroke Mother     Vision Loss Mother     Arthritis Father     Cancer Father     Heart Disease Father     High Blood Pressure Father     Stroke Father     Vision Loss Father        Social History     Socioeconomic History    Marital status:      Spouse name: Not on file    Number of children: Not on file    Years of education: Not on file    Highest education level: Not on file   Occupational History    Not on file   Tobacco Use    Smoking status: Former Smoker     Packs/day: 1.00     Years: 60.00     Pack years: 60.00     Types: Cigarettes     Start date:      Quit date: 2020     Years since quittin.1    Smokeless tobacco: Never Used   Vaping Use    Vaping Use: Never used   Substance and Sexual Activity    Alcohol use: No    Drug use: No    Sexual activity: Yes     Partners: Female   Other Topics Concern    Not on file   Social History Narrative    Not on file     Social Determinants of Health     Financial Resource Strain:     Difficulty of Paying Living Expenses:    Food Insecurity:     Worried About Running Out of Food in the Last Year:     [address] in the Last Year:    Transportation Needs:     Lack of Transportation (Medical):  Lack of Transportation (Non-Medical):    Physical Activity:     Days of Exercise per Week:     Minutes of Exercise per Session:    Stress:     Feeling of Stress :    Social Connections:     Frequency of Communication with Friends and Family:     Frequency of Social Gatherings with Friends and Family:     Attends [employer] Services:     Active Member of Clubs or Organizations:     Attends Club or Organization Meetings:     Marital Status:    Intimate Partner Violence:     Fear of Current or Ex-Partner:     Emotionally Abused:     Physically Abused:     Sexually Abused:        I/O (24Hr):     Intake/Output Summary (Last 24 hours) at 2021 1701  Last data filed at 2021 1600  Gross per 24 hour   Intake 3033.91 ml   Output 2915 ml   Net 118.91 ml     Radiology:  XR ABDOMEN (KUB) (SINGLE sided PICC insertion with good tip position in the central circulation. PICC is ready for use at this time. XR CHEST PORTABLE    Result Date: 5/29/2021  Trace right effusion and mild atelectasis. Tubes and lines as above. However, endotracheal tube terminates at the louis oriented toward the right mainstem bronchus. RECOMMENDATION: Advise retraction of endotracheal tube by 3-4 cm. The findings were sent to the Radiology Results [address] at 8:11 am on 5/29/2021to be communicated to a licensed caregiver. XR CHEST PORTABLE    Result Date: 5/28/2021  Endotracheal tube with tip 3.6 cm from the louis. Enteric tube with tip in the proximal body versus fundus of the stomach and side-port likely in the distal esophagus. Suggest advancement by 5-10 cm. Right-sided PICC line with tip in the distal SVC. No pneumothoraces. Mild bibasilar likely atelectasis. FL SMALL BOWEL FOLLOW THROUGH ONLY    Result Date: 5/27/2021  Findings suggestive of high-grade ileus or partial/intermittent small bowel obstruction. Findings were discussed with Dr. Jamie Collins at 4:30 p.m. on 5/27/2021.        Labs:  Recent Results (from the past 24 hour(s))   Arterial Blood Gases    Collection Time: 05/28/21  5:34 PM   Result Value Ref Range    pH, Arterial 7.319 (L) 7.350 - 7.450    pCO2, Arterial 34.7 (L) 35.0 - 45.0 mmHg    pO2, Arterial 259.0 (H) 80.0 - 100.0 mmHg    HCO3, Arterial 17.8 (L) 22.0 - 26.0 mmol/L    Positive Base Excess, Art NOT REPORTED 0.0 - 2.0 mmol/L    Negative Base Excess, Art 8.3 (H) 0.0 - 2.0 mmol/L    O2 Sat, Arterial 98.3 (H) 95 - 98 %    Total Hb NOT REPORTED 12.0 - 16.0 g/dl    Oxyhemoglobin NOT REPORTED 95.0 - 98.0 %    Carboxyhemoglobin 1.6 0 - 5 %    Methemoglobin 0.4 0.0 - 1.9 %    Pt Temp 37     pH, Art, Temp Adj NOT REPORTED 7.350 - 7.450    pCO2, Art, Temp Adj NOT REPORTED 35.0 - 45.0    pO2, Art, Temp Adj NOT REPORTED 80.0 - 100.0 mmHg    O2 Device/Flow/% VENTILATOR     Respiratory Rate 16 Andrew Test PASS     Sample Site Left Radial Artery     Pt.  Position SEMI-FOWLERS     Mode PRVC     Set Rate 16     Total Rate 16          FIO2 60     Peep/Cpap 5     PSV NOT REPORTED     Text for Respiratory NOT REPORTED     NOTIFICATION NOT REPORTED     NOTIFICATION TIME NOT REPORTED    Prealbumin    Collection Time: 05/28/21  5:57 PM   Result Value Ref Range    Prealbumin 19.4 (L) 20 - 40 mg/dL   HEMOGLOBIN AND HEMATOCRIT, BLOOD    Collection Time: 05/28/21  5:57 PM   Result Value Ref Range    Hemoglobin 9.3 (L) 13.5 - 17.5 g/dL    Hematocrit 28.1 (L) 41 - 53 %   Brain Natriuretic Peptide    Collection Time: 05/28/21  5:57 PM   Result Value Ref Range    Pro-BNP 14,372 (H) <300 pg/mL    BNP Interpretation Pro-BNP Reference Range:    POC Glucose Fingerstick    Collection Time: 05/28/21  6:50 PM   Result Value Ref Range    POC Glucose 114 (H) 75 - 110 mg/dL   HEMOGLOBIN AND HEMATOCRIT, BLOOD    Collection Time: 05/28/21  9:31 PM   Result Value Ref Range    Hemoglobin 10.7 (L) 13.5 - 17.5 g/dL    Hematocrit 33.7 (L) 41 - 53 %   Basic Metabolic Panel    Collection Time: 05/28/21  9:31 PM   Result Value Ref Range    Glucose 150 (H) 70 - 99 mg/dL    BUN 30 (H) 8 - 23 mg/dL    CREATININE 1.05 0.70 - 1.20 mg/dL    Bun/Cre Ratio NOT REPORTED 9 - 20    Calcium 8.3 (L) 8.6 - 10.4 mg/dL    Sodium 152 (H) 135 - 144 mmol/L    Potassium 3.2 (L) 3.7 - 5.3 mmol/L    Chloride 119 (H) 98 - 107 mmol/L    CO2 20 20 - 31 mmol/L    Anion Gap 13 9 - 17 mmol/L    GFR Non-African American >60 >60 mL/min    GFR African American >60 >60 mL/min    GFR Comment          GFR Staging NOT REPORTED    POC Glucose Fingerstick    Collection Time: 05/29/21 12:25 AM   Result Value Ref Range    POC Glucose 140 (H) 75 - 110 mg/dL   BLOOD GAS, ARTERIAL    Collection Time: 05/29/21  3:30 AM   Result Value Ref Range    pH, Arterial 7.416 7.350 - 7.450    pCO2, Arterial 30.4 (L) 35.0 - 45.0 mmHg    pO2, Arterial 109.0 (H) 80.0 - 100.0 mmHg    HCO3, Arterial 19.5 (L) 22.0 - 26.0 mmol/L    Positive Base Excess, Art NOT REPORTED 0.0 - 2.0 mmol/L    Negative Base Excess, Art 5.0 (H) 0.0 - 2.0 mmol/L    O2 Sat, Arterial 96.7 95 - 98 %    Total Hb NOT REPORTED 12.0 - 16.0 g/dl    Oxyhemoglobin NOT REPORTED 95.0 - 98.0 %    Carboxyhemoglobin 1.4 0 - 5 %    Methemoglobin 0.9 0.0 - 1.9 %    Pt Temp 37.0     pH, Art, Temp Adj NOT REPORTED 7.350 - 7.450    pCO2, Art, Temp Adj NOT REPORTED 35.0 - 45.0    pO2, Art, Temp Adj NOT REPORTED 80.0 - 100.0 mmHg    O2 Device/Flow/% VENTILATOR     Respiratory Rate 18     Andrew Test PASS     Sample Site Right Radial Artery     Pt.  Position SEMI-FOWLERS     Mode PRVC     Set Rate 18     Total Rate 25          FIO2 30     Peep/Cpap 7     PSV NOT REPORTED     Text for Respiratory RESULTS TO RN     NOTIFICATION NOT REPORTED     NOTIFICATION TIME NOT REPORTED    CBC with DIFF    Collection Time: 05/29/21  4:41 AM   Result Value Ref Range    WBC 13.6 (H) 3.5 - 11.0 k/uL    RBC 3.56 (L) 4.5 - 5.9 m/uL    Hemoglobin 10.4 (L) 13.5 - 17.5 g/dL    Hematocrit 31.2 (L) 41 - 53 %    MCV 87.6 80 - 100 fL    MCH 29.2 26 - 34 pg    MCHC 33.4 31 - 37 g/dL    RDW 14.6 11.5 - 14.9 %    Platelets 334 130 - 959 k/uL    MPV 7.0 6.0 - 12.0 fL    NRBC Automated NOT REPORTED per 100 WBC    Differential Type NOT REPORTED     Seg Neutrophils 87 (H) 36 - 66 %    Lymphocytes 7 (L) 24 - 44 %    Monocytes 6 1 - 7 %    Eosinophils % 0 0 - 4 %    Basophils 0 0 - 2 %    Immature Granulocytes NOT REPORTED 0 %    Segs Absolute 11.70 (H) 1.3 - 9.1 k/uL    Absolute Lymph # 1.00 1.0 - 4.8 k/uL    Absolute Mono # 0.80 0.1 - 1.3 k/uL    Absolute Eos # 0.00 0.0 - 0.4 k/uL    Basophils Absolute 0.00 0.0 - 0.2 k/uL    Absolute Immature Granulocyte NOT REPORTED 0.00 - 0.30 k/uL    WBC Morphology NOT REPORTED     RBC Morphology NOT REPORTED     Platelet Estimate NOT REPORTED    Comprehensive Metabolic Panel    Collection Time: 05/29/21  4:41 AM   Result Value Ref Range Glucose 198 (H) 70 - 99 mg/dL    BUN 30 (H) 8 - 23 mg/dL    CREATININE 1.07 0.70 - 1.20 mg/dL    Bun/Cre Ratio NOT REPORTED 9 - 20    Calcium 8.3 (L) 8.6 - 10.4 mg/dL    Sodium 152 (H) 135 - 144 mmol/L    Potassium 3.2 (L) 3.7 - 5.3 mmol/L    Chloride 119 (H) 98 - 107 mmol/L    CO2 22 20 - 31 mmol/L    Anion Gap 11 9 - 17 mmol/L    Alkaline Phosphatase 45 40 - 129 U/L    ALT 20 5 - 41 U/L    AST 56 (H) <40 U/L    Total Bilirubin 0.34 0.3 - 1.2 mg/dL    Total Protein 5.7 (L) 6.4 - 8.3 g/dL    Albumin 3.1 (L) 3.5 - 5.2 g/dL    Albumin/Globulin Ratio NOT REPORTED 1.0 - 2.5    GFR Non-African American >60 >60 mL/min    GFR African American >60 >60 mL/min    GFR Comment          GFR Staging NOT REPORTED    Magnesium    Collection Time: 05/29/21  4:41 AM   Result Value Ref Range    Magnesium 2.0 1.6 - 2.6 mg/dL   Phosphorus    Collection Time: 05/29/21  4:41 AM   Result Value Ref Range    Phosphorus 1.4 (L) 2.5 - 4.5 mg/dL   Triglycerides    Collection Time: 05/29/21  4:41 AM   Result Value Ref Range    Triglycerides 114 <150 mg/dL   Prealbumin    Collection Time: 05/29/21  4:41 AM   Result Value Ref Range    Prealbumin 18.0 (L) 20 - 40 mg/dL   EKG 12 Lead    Collection Time: 05/29/21  4:46 AM   Result Value Ref Range    Ventricular Rate 100 BPM    Atrial Rate 100 BPM    P-R Interval 222 ms    QRS Duration 84 ms    Q-T Interval 390 ms    QTc Calculation (Bazett) 503 ms    P Axis 69 degrees    R Axis 55 degrees    T Axis 48 degrees   HEMOGLOBIN AND HEMATOCRIT, BLOOD    Collection Time: 05/29/21 11:43 AM   Result Value Ref Range    Hemoglobin 10.0 (L) 13.5 - 17.5 g/dL    Hematocrit 30.1 (L) 41 - 53 %   Potassium    Collection Time: 05/29/21 11:43 AM   Result Value Ref Range    Potassium 4.0 3.7 - 5.3 mmol/L   Uric Acid    Collection Time: 05/29/21 11:43 AM   Result Value Ref Range    Uric Acid 7.5 (H) 3.4 - 7.0 mg/dL   POC Glucose Fingerstick    Collection Time: 05/29/21 12:02 PM   Result Value Ref Range    POC Glucose 191 (H) 75 - 110 mg/dL   CHLORIDE, URINE, RANDOM    Collection Time: 21  1:45 PM   Result Value Ref Range    Chloride, Ur 46 mmol/L   SODIUM, URINE, RANDOM    Collection Time: 21  1:45 PM   Result Value Ref Range    Sodium,Ur <20 mmol/L   Protein / Creatinine Ratio, Urine    Collection Time: 21  1:45 PM   Result Value Ref Range    Total Protein, Urine 125 mg/dL    Creatinine, Ur 94.6 39.0 - 259.0 mg/dL    Urine Total Protein Creatinine Ratio 1.32 (H) 0.00 - 0.20       Physical Examination:        Vitals:  BP 96/62   Pulse 99   Temp 99.5 °F (37.5 °C) (Oral)   Resp 26   Ht 6' 4\" (1.93 m)   Wt 192 lb (87.1 kg)   SpO2 100%   BMI 23.37 kg/m²   Temp (24hrs), Av.4 °F (34.7 °C), Min:34.2 °F (1.2 °C), Max:99.5 °F (37.5 °C)    Recent Labs     21  0657 21  1850 21  0025 21  1202   POCGLU 118* 114* 140* 191*         Physical Exam  Vitals reviewed. Constitutional:       Appearance: Normal appearance. He is not diaphoretic. HENT:      Head: Normocephalic and atraumatic. Right Ear: External ear normal.      Left Ear: External ear normal.      Nose: Nose normal.      Mouth/Throat:      Pharynx: Oropharynx is clear. Cardiovascular:      Rate and Rhythm: Normal rate and regular rhythm. Pulses: Normal pulses. Heart sounds: Normal heart sounds. Pulmonary:      Breath sounds: Normal breath sounds. Comments: Sedated and intubated  Abdominal:      General: Bowel sounds are normal. There is no distension. Palpations: Abdomen is soft. Comments: Postop day 1 small bowel resection   Musculoskeletal:      Cervical back: Normal range of motion and neck supple. No rigidity. Skin:     General: Skin is warm and dry. Capillary Refill: Capillary refill takes less than 2 seconds. Coloration: Skin is not jaundiced. Neurological:      General: No focal deficit present.          Assessment:        Primary Problem  Small bowel obstruction ([address] Principal Problem:    Small bowel obstruction (HCC)  Active Problems:    Status post insertion of percutaneous endoscopic gastrostomy (PEG) tube (HCC)    Acute cystitis without hematuria    Mass of right lung    COPD (chronic obstructive pulmonary disease) (HCC)  Resolved Problems:    * No resolved hospital problems. *      Past Medical History:   Diagnosis Date    Arthritis     Cancer [facility])     bladder 1980s    Cerebral artery occlusion with cerebral infarction [facility])     TIA 2010    Chronic kidney disease     COPD [facility] [address]     Hypertension     Pneumonia     Psychiatric problem     depression, social anxiety    Seizures ([facility] [address]         Plan:        1. 500 mL normal saline bolus X2  2. Patient is currently receiving 1 L of LR bolus  3. IV normal saline at 100 mL/h  4. TPN at 41 mL/h  5. IV Flagyl  6. IV cefepime  1. PPI protonics 40 mg IV daily  2. Guarded prognosis  3. Nephrology input noted  4. Monitor CBC CMP  5. IV Lopressor 5 mg every 6 hours as needed for systolic blood pressure more than 160 heart rate more than 115  6. disCussed with floor nurse  7. EPCs  8. PT/OT to evaluate and treat  9. Pain control  10. Replace electrolytes as per sliding scale  11. Home medications reviewed and appropriate medications continued  12.  Reviewed labs and imaging studies from last 24 hours and results explained to patient      Electronically signed by Reather Meckel, MD

## 2021-05-30 ENCOUNTER — APPOINTMENT (OUTPATIENT)
Dept: GENERAL RADIOLOGY | Age: 76
DRG: 329 | End: 2021-05-30
Payer: COMMERCIAL

## 2021-05-30 LAB
ABSOLUTE EOS #: 0.16 K/UL (ref 0–0.4)
ABSOLUTE IMMATURE GRANULOCYTE: ABNORMAL K/UL (ref 0–0.3)
ABSOLUTE LYMPH #: 0.47 K/UL (ref 1–4.8)
ABSOLUTE MONO #: 1.24 K/UL (ref 0.1–1.3)
ALLEN TEST: ABNORMAL
ANION GAP SERPL CALCULATED.3IONS-SCNC: 9 MMOL/L (ref 9–17)
BASOPHILS # BLD: 0 % (ref 0–2)
BASOPHILS ABSOLUTE: 0 K/UL (ref 0–0.2)
BUN BLDV-MCNC: 28 MG/DL (ref 8–23)
BUN/CREAT BLD: ABNORMAL (ref 9–20)
CALCIUM SERPL-MCNC: 7.7 MG/DL (ref 8.6–10.4)
CARBOXYHEMOGLOBIN: 1.2 % (ref 0–5)
CHLORIDE BLD-SCNC: 118 MMOL/L (ref 98–107)
CO2: 19 MMOL/L (ref 20–31)
CREAT SERPL-MCNC: 0.85 MG/DL (ref 0.7–1.2)
DIFFERENTIAL TYPE: ABNORMAL
EOSINOPHILS RELATIVE PERCENT: 1 % (ref 0–4)
FIO2: 30
GFR AFRICAN AMERICAN: >60 ML/MIN
GFR NON-AFRICAN AMERICAN: >60 ML/MIN
GFR SERPL CREATININE-BSD FRML MDRD: ABNORMAL ML/MIN/{1.73_M2}
GFR SERPL CREATININE-BSD FRML MDRD: ABNORMAL ML/MIN/{1.73_M2}
GLUCOSE BLD-MCNC: 126 MG/DL (ref 75–110)
GLUCOSE BLD-MCNC: 136 MG/DL (ref 75–110)
GLUCOSE BLD-MCNC: 163 MG/DL (ref 75–110)
GLUCOSE BLD-MCNC: 199 MG/DL (ref 70–99)
HCO3 ARTERIAL: 18.9 MMOL/L (ref 22–26)
HCT VFR BLD CALC: 26.3 % (ref 41–53)
HCT VFR BLD CALC: 27.2 % (ref 41–53)
HCT VFR BLD CALC: 27.3 % (ref 41–53)
HCT VFR BLD CALC: 27.7 % (ref 41–53)
HCT VFR BLD CALC: 31.1 % (ref 41–53)
HEMOGLOBIN: 8.6 G/DL (ref 13.5–17.5)
HEMOGLOBIN: 8.7 G/DL (ref 13.5–17.5)
HEMOGLOBIN: 8.7 G/DL (ref 13.5–17.5)
HEMOGLOBIN: 9.3 G/DL (ref 13.5–17.5)
HEMOGLOBIN: 9.5 G/DL (ref 13.5–17.5)
IMMATURE GRANULOCYTES: ABNORMAL %
LACTIC ACID: 1.6 MMOL/L (ref 0.5–2.2)
LYMPHOCYTES # BLD: 3 % (ref 24–44)
MAGNESIUM: 1.8 MG/DL (ref 1.6–2.6)
MCH RBC QN AUTO: 29.7 PG (ref 26–34)
MCHC RBC AUTO-ENTMCNC: 34 G/DL (ref 31–37)
MCV RBC AUTO: 87.2 FL (ref 80–100)
METHEMOGLOBIN: 0.9 % (ref 0–1.9)
MODE: ABNORMAL
MONOCYTES # BLD: 8 % (ref 1–7)
MORPHOLOGY: ABNORMAL
NEGATIVE BASE EXCESS, ART: 5.1 MMOL/L (ref 0–2)
NOTIFICATION TIME: ABNORMAL
NOTIFICATION: ABNORMAL
NRBC AUTOMATED: ABNORMAL PER 100 WBC
O2 DEVICE/FLOW/%: ABNORMAL
O2 SAT, ARTERIAL: 97.2 % (ref 95–98)
OXYHEMOGLOBIN: ABNORMAL % (ref 95–98)
PATIENT TEMP: 37
PCO2 ARTERIAL: 26.9 MMHG (ref 35–45)
PCO2, ART, TEMP ADJ: ABNORMAL (ref 35–45)
PDW BLD-RTO: 14.9 % (ref 11.5–14.9)
PEEP/CPAP: 7
PH ARTERIAL: 7.45 (ref 7.35–7.45)
PH, ART, TEMP ADJ: ABNORMAL (ref 7.35–7.45)
PHOSPHORUS: 1.7 MG/DL (ref 2.5–4.5)
PHOSPHORUS: 2.5 MG/DL (ref 2.5–4.5)
PLATELET # BLD: 286 K/UL (ref 150–450)
PLATELET ESTIMATE: ABNORMAL
PMV BLD AUTO: 7.2 FL (ref 6–12)
PO2 ARTERIAL: 110 MMHG (ref 80–100)
PO2, ART, TEMP ADJ: ABNORMAL MMHG (ref 80–100)
POSITIVE BASE EXCESS, ART: ABNORMAL MMOL/L (ref 0–2)
POTASSIUM SERPL-SCNC: 3.9 MMOL/L (ref 3.7–5.3)
PSV: ABNORMAL
PT. POSITION: ABNORMAL
RBC # BLD: 3.13 M/UL (ref 4.5–5.9)
RBC # BLD: ABNORMAL 10*6/UL
RESPIRATORY RATE: 18
SAMPLE SITE: ABNORMAL
SEG NEUTROPHILS: 88 % (ref 36–66)
SEGMENTED NEUTROPHILS ABSOLUTE COUNT: 13.63 K/UL (ref 1.3–9.1)
SET RATE: 18
SODIUM BLD-SCNC: 146 MMOL/L (ref 135–144)
TEXT FOR RESPIRATORY: ABNORMAL
TOTAL HB: ABNORMAL G/DL (ref 12–16)
TOTAL RATE: 25
VT: 500
WBC # BLD: 15.5 K/UL (ref 3.5–11)
WBC # BLD: ABNORMAL 10*3/UL

## 2021-05-30 PROCEDURE — 71045 X-RAY EXAM CHEST 1 VIEW: CPT

## 2021-05-30 PROCEDURE — C9113 INJ PANTOPRAZOLE SODIUM, VIA: HCPCS | Performed by: SURGERY

## 2021-05-30 PROCEDURE — 36415 COLL VENOUS BLD VENIPUNCTURE: CPT

## 2021-05-30 PROCEDURE — 36600 WITHDRAWAL OF ARTERIAL BLOOD: CPT

## 2021-05-30 PROCEDURE — 6370000000 HC RX 637 (ALT 250 FOR IP): Performed by: INTERNAL MEDICINE

## 2021-05-30 PROCEDURE — 2500000003 HC RX 250 WO HCPCS: Performed by: INTERNAL MEDICINE

## 2021-05-30 PROCEDURE — 85014 HEMATOCRIT: CPT

## 2021-05-30 PROCEDURE — 2500000003 HC RX 250 WO HCPCS: Performed by: SURGERY

## 2021-05-30 PROCEDURE — 85018 HEMOGLOBIN: CPT

## 2021-05-30 PROCEDURE — 2000000000 HC ICU R&B

## 2021-05-30 PROCEDURE — 83605 ASSAY OF LACTIC ACID: CPT

## 2021-05-30 PROCEDURE — 6360000002 HC RX W HCPCS: Performed by: INTERNAL MEDICINE

## 2021-05-30 PROCEDURE — 82947 ASSAY GLUCOSE BLOOD QUANT: CPT

## 2021-05-30 PROCEDURE — 94003 VENT MGMT INPAT SUBQ DAY: CPT

## 2021-05-30 PROCEDURE — 6360000002 HC RX W HCPCS: Performed by: SURGERY

## 2021-05-30 PROCEDURE — 2700000000 HC OXYGEN THERAPY PER DAY

## 2021-05-30 PROCEDURE — 80048 BASIC METABOLIC PNL TOTAL CA: CPT

## 2021-05-30 PROCEDURE — 83735 ASSAY OF MAGNESIUM: CPT

## 2021-05-30 PROCEDURE — 82805 BLOOD GASES W/O2 SATURATION: CPT

## 2021-05-30 PROCEDURE — 94761 N-INVAS EAR/PLS OXIMETRY MLT: CPT

## 2021-05-30 PROCEDURE — 51702 INSERT TEMP BLADDER CATH: CPT

## 2021-05-30 PROCEDURE — 2580000003 HC RX 258: Performed by: SURGERY

## 2021-05-30 PROCEDURE — 84100 ASSAY OF PHOSPHORUS: CPT

## 2021-05-30 PROCEDURE — 94640 AIRWAY INHALATION TREATMENT: CPT

## 2021-05-30 PROCEDURE — 2580000003 HC RX 258: Performed by: INTERNAL MEDICINE

## 2021-05-30 PROCEDURE — 85025 COMPLETE CBC W/AUTO DIFF WBC: CPT

## 2021-05-30 RX ORDER — LEVALBUTEROL 1.25 MG/.5ML
1.25 SOLUTION, CONCENTRATE RESPIRATORY (INHALATION) EVERY 6 HOURS
Status: DISCONTINUED | OUTPATIENT
Start: 2021-05-30 | End: 2021-06-07 | Stop reason: HOSPADM

## 2021-05-30 RX ORDER — SODIUM CHLORIDE FOR INHALATION 0.9 %
3 VIAL, NEBULIZER (ML) INHALATION EVERY 8 HOURS PRN
Status: DISCONTINUED | OUTPATIENT
Start: 2021-05-30 | End: 2021-06-07 | Stop reason: HOSPADM

## 2021-05-30 RX ORDER — DIGOXIN 0.25 MG/ML
125 INJECTION INTRAMUSCULAR; INTRAVENOUS ONCE
Status: COMPLETED | OUTPATIENT
Start: 2021-05-30 | End: 2021-05-30

## 2021-05-30 RX ADMIN — LEVALBUTEROL HYDROCHLORIDE 1.25 MG: 1.25 SOLUTION, CONCENTRATE RESPIRATORY (INHALATION) at 18:58

## 2021-05-30 RX ADMIN — FENTANYL CITRATE 25 MCG: 50 INJECTION INTRAMUSCULAR; INTRAVENOUS at 13:31

## 2021-05-30 RX ADMIN — PROPOFOL 15 MCG/KG/MIN: 10 INJECTION, EMULSION INTRAVENOUS at 04:31

## 2021-05-30 RX ADMIN — SODIUM CHLORIDE, PRESERVATIVE FREE 10 ML: 5 INJECTION INTRAVENOUS at 19:35

## 2021-05-30 RX ADMIN — CALCIUM GLUCONATE: 98 INJECTION, SOLUTION INTRAVENOUS at 17:47

## 2021-05-30 RX ADMIN — IPRATROPIUM BROMIDE 0.5 MG: 0.5 SOLUTION RESPIRATORY (INHALATION) at 18:58

## 2021-05-30 RX ADMIN — IPRATROPIUM BROMIDE AND ALBUTEROL SULFATE 1 AMPULE: .5; 3 SOLUTION RESPIRATORY (INHALATION) at 06:59

## 2021-05-30 RX ADMIN — Medication 10 ML: at 07:26

## 2021-05-30 RX ADMIN — SODIUM BICARBONATE: 84 INJECTION, SOLUTION INTRAVENOUS at 13:31

## 2021-05-30 RX ADMIN — PANTOPRAZOLE SODIUM 40 MG: 40 INJECTION, POWDER, FOR SOLUTION INTRAVENOUS at 19:33

## 2021-05-30 RX ADMIN — CEFEPIME 2000 MG: 2 INJECTION, POWDER, FOR SOLUTION INTRAVENOUS at 19:34

## 2021-05-30 RX ADMIN — PROPOFOL 20 MCG/KG/MIN: 10 INJECTION, EMULSION INTRAVENOUS at 20:26

## 2021-05-30 RX ADMIN — CEFEPIME 2000 MG: 2 INJECTION, POWDER, FOR SOLUTION INTRAVENOUS at 06:16

## 2021-05-30 RX ADMIN — SODIUM PHOSPHATE, MONOBASIC, MONOHYDRATE AND SODIUM PHOSPHATE, DIBASIC, ANHYDROUS 14.79 MMOL: 276; 142 INJECTION, SOLUTION INTRAVENOUS at 11:04

## 2021-05-30 RX ADMIN — Medication 10 ML: at 19:33

## 2021-05-30 RX ADMIN — IPRATROPIUM BROMIDE AND ALBUTEROL SULFATE 1 AMPULE: .5; 3 SOLUTION RESPIRATORY (INHALATION) at 03:25

## 2021-05-30 RX ADMIN — METRONIDAZOLE 500 MG: 500 INJECTION, SOLUTION INTRAVENOUS at 16:47

## 2021-05-30 RX ADMIN — INSULIN LISPRO 3 UNITS: 100 INJECTION, SOLUTION INTRAVENOUS; SUBCUTANEOUS at 06:17

## 2021-05-30 RX ADMIN — METRONIDAZOLE 500 MG: 500 INJECTION, SOLUTION INTRAVENOUS at 08:55

## 2021-05-30 RX ADMIN — IPRATROPIUM BROMIDE AND ALBUTEROL SULFATE 1 AMPULE: .5; 3 SOLUTION RESPIRATORY (INHALATION) at 11:13

## 2021-05-30 RX ADMIN — POTASSIUM CHLORIDE: 2 INJECTION, SOLUTION, CONCENTRATE INTRAVENOUS at 04:32

## 2021-05-30 RX ADMIN — I.V. FAT EMULSION 100 ML: 20 EMULSION INTRAVENOUS at 17:47

## 2021-05-30 RX ADMIN — DIGOXIN 125 MCG: 0.25 INJECTION INTRAMUSCULAR; INTRAVENOUS at 04:17

## 2021-05-30 RX ADMIN — PANTOPRAZOLE SODIUM 40 MG: 40 INJECTION, POWDER, FOR SOLUTION INTRAVENOUS at 07:26

## 2021-05-30 ASSESSMENT — PULMONARY FUNCTION TESTS
PIF_VALUE: 19
PIF_VALUE: 15
PIF_VALUE: 13
PIF_VALUE: 14
PIF_VALUE: 15
PIF_VALUE: 13
PIF_VALUE: 16
PIF_VALUE: 20
PIF_VALUE: 17
PIF_VALUE: 19
PIF_VALUE: 13
PIF_VALUE: 19
PIF_VALUE: 17
PIF_VALUE: 16
PIF_VALUE: 20
PIF_VALUE: 54
PIF_VALUE: 17
PIF_VALUE: 14
PIF_VALUE: 15
PIF_VALUE: 12
PIF_VALUE: 13
PIF_VALUE: 14
PIF_VALUE: 15
PIF_VALUE: 15
PIF_VALUE: 21
PIF_VALUE: 20
PIF_VALUE: 17
PIF_VALUE: 14
PIF_VALUE: 15
PIF_VALUE: 14
PIF_VALUE: 21
PIF_VALUE: 13
PIF_VALUE: 18
PIF_VALUE: 22
PIF_VALUE: 18
PIF_VALUE: 19
PIF_VALUE: 16
PIF_VALUE: 14
PIF_VALUE: 16
PIF_VALUE: 19
PIF_VALUE: 13
PIF_VALUE: 18
PIF_VALUE: 16
PIF_VALUE: 14
PIF_VALUE: 12
PIF_VALUE: 15
PIF_VALUE: 44
PIF_VALUE: 17
PIF_VALUE: 14
PIF_VALUE: 17
PIF_VALUE: 14
PIF_VALUE: 20
PIF_VALUE: 21
PIF_VALUE: 19
PIF_VALUE: 18
PIF_VALUE: 11
PIF_VALUE: 14
PIF_VALUE: 18
PIF_VALUE: 15
PIF_VALUE: 13
PIF_VALUE: 18
PIF_VALUE: 13
PIF_VALUE: 15
PIF_VALUE: 20
PIF_VALUE: 11
PIF_VALUE: 15
PIF_VALUE: 15
PIF_VALUE: 28
PIF_VALUE: 15
PIF_VALUE: 13
PIF_VALUE: 11
PIF_VALUE: 18
PIF_VALUE: 22
PIF_VALUE: 26
PIF_VALUE: 14
PIF_VALUE: 14
PIF_VALUE: 17
PIF_VALUE: 16
PIF_VALUE: 21
PIF_VALUE: 11
PIF_VALUE: 18
PIF_VALUE: 12
PIF_VALUE: 14
PIF_VALUE: 20
PIF_VALUE: 19
PIF_VALUE: 12
PIF_VALUE: 19
PIF_VALUE: 12
PIF_VALUE: 12
PIF_VALUE: 14
PIF_VALUE: 20
PIF_VALUE: 16

## 2021-05-30 ASSESSMENT — PAIN SCALES - GENERAL
PAINLEVEL_OUTOF10: 0
PAINLEVEL_OUTOF10: 0
PAINLEVEL_OUTOF10: 2
PAINLEVEL_OUTOF10: 0
PAINLEVEL_OUTOF10: 0
PAINLEVEL_OUTOF10: 2
PAINLEVEL_OUTOF10: 0

## 2021-05-30 NOTE — FLOWSHEET NOTE
05/30/21 0225   Provider Notification   Reason for Communication Evaluate   Provider Name Dr. Brian Haynes. Provider Notification Physician   Method of Communication Secure Message   Response Waiting for response   Dr. Brian Haynes notified of patients heart rate increasing to 140's-150's at times. Updated that patient has been in afib but controlled between 's throughout shift. Updated that patient is on levophed. Orders received to give digoxin 0.125.

## 2021-05-30 NOTE — PLAN OF CARE
treatment plan, medications, and discharge instructions. 5/30/2021 0323 by Parish Irizarry RN  Outcome: Ongoing     Problem: DISCHARGE BARRIERS  Goal: Patient's continuum of care needs are met  5/30/2021 0323 by Parish Irizarry RN  Outcome: Ongoing     Problem: Pain:  Goal: Pain level will decrease  Description: Pain level will decrease  5/30/2021 0323 by Parish Irizarry RN  Outcome: Ongoing  Goal: Control of acute pain  Description: Control of acute pain  5/30/2021 0323 by Parish Irizarry RN  Outcome: Ongoing  Note: Pain assessed using CPOT pain assessment scale. Goal: Control of chronic pain  Description: Control of chronic pain  5/30/2021 0323 by Parish Irizarry RN  Outcome: Ongoing     Problem: Nutrition  Goal: Optimal nutrition therapy  5/30/2021 0323 by Parish Irizarry RN  Outcome: Ongoing  Note: Patient is NPO. Patient has TPN running. Problem: Musculor/Skeletal Functional Status  Goal: Highest potential functional level  5/30/2021 0323 by aPrish Irizarry RN  Outcome: Ongoing  Goal: Absence of falls  5/30/2021 0323 by Parish Irizarry RN  Outcome: Ongoing     Problem: Musculor/Skeletal Functional Status  Goal: Highest potential functional level  5/30/2021 0323 by Parish Irizarry RN  Outcome: Ongoing  Goal: Absence of falls  5/30/2021 0323 by Parish Irizarry RN  Outcome: Ongoing  Problem: Non-Violent Restraints  Goal: Removal from restraints as soon as assessed to be safe  5/30/2021 0323 by Parish Irizarry RN  Outcome: Ongoing  Note: Attempts to pull at tubes when released. ROM assessed every 2 hours and visual safety checks completed hourly. Restraints maintained to preserve the patient's airway. Goal: No harm/injury to patient while restraints in use  5/30/2021 0323 by Parish Irizarry RN  Outcome: Ongoing  Note: Skin integrity assessed and ROM performed every two hours. No signs of injury noted.    Goal: Patient's dignity will be maintained  5/30/2021 0323 by Parish Irizarry RN  Outcome: Ongoing     Problem: OXYGENATION/RESPIRATORY FUNCTION  Goal: Patient will maintain patent airway  5/30/2021 0323 by Jared Madison RN  Outcome: Ongoing  Note: Patient remains orally intubated and sedated on vent. Suctioned as needed. Airway remains patent. Goal: Patient will achieve/maintain normal respiratory rate/effort  Description: Respiratory rate and effort will be within normal limits for the patient  5/30/2021 0323 by Jared Madison RN  Outcome: Ongoing  Note: RR remains within normal limits on vent. Problem: MECHANICAL VENTILATION  Goal: Patient will maintain patent airway  5/30/2021 0323 by Jared Madison RN  Outcome: Ongoing  Note: Patient remains orally intubated and sedated on vent. Suctioned as needed. Airway remains patent.    Goal: Patient will maintain patent airway  5/30/2021 0323 by Jared Madison RN  Outcome: Ongoing  Goal: Oral health is maintained or improved  5/30/2021 0323 by Jared Madison RN  Outcome: Ongoing  Goal: ET tube will be managed safely  5/30/2021 0323 by Jared Madison RN  Outcome: Ongoing  Goal: Ability to express needs and understand communication  5/30/2021 0323 by Jared Madison RN  Outcome: Ongoing  Goal: Mobility/activity is maintained at optimum level for patient  5/30/2021 0323 by Jared Madison RN  Outcome: Ongoing

## 2021-05-30 NOTE — PROGRESS NOTES
Dr. Oscar Martínez at bedside, discuss current Levophed rate as well as urine output and labs. Order received for sodium phosphate replacement protocol.

## 2021-05-30 NOTE — PROGRESS NOTES
Patient was seen and examined. Remains intubated on the ventilator. Low-dose Levophed. Blood pressure drop with sedation. Discussed with nurse. Gastric tube output bilious. Urine output is adequate. Abdomen is soft. Incision is clean dry intact. CHELSEA drains are all serosanguineous. Extremity nontender. Mild edema. Blood work was reviewed. Potassium is 3.9. Creatinine is normal.  WBC count is 15.5. Hemoglobin is 9.3. Continue supportive care. Patient on TPN. No acute general surgical issues at this time.

## 2021-05-30 NOTE — PLAN OF CARE
Problem: Falls - Risk of:  Goal: Will remain free from falls  Description: Will remain free from falls  5/30/2021 1555 by Comfort Mcguire RN  Outcome: Ongoing  Note: Patient remains free from falls this shift, appropriate safety measures in place      Problem: Skin Integrity:  Goal: Will show no infection signs and symptoms  Description: Will show no infection signs and symptoms  5/30/2021 1555 by Comfort Mcguire RN  Outcome: Ongoing  Note: No new skin breakdown noted this shift, patient turned every 2 hours      Problem: Non-Violent Restraints  Goal: Removal from restraints as soon as assessed to be safe  5/30/2021 1555 by Comfort Mcguire RN  Outcome: Ongoing  Note: Patient attempts to pull at lines and tubes when unrestrained, restraints continue      Problem: Nutrition  Goal: Optimal nutrition therapy  5/30/2021 1555 by Comfort Mcguire RN  Outcome: Ongoing  Note: TPN infusing

## 2021-05-30 NOTE — FLOWSHEET NOTE
05/29/21 5059   Provider Notification   Reason for Communication Evaluate;New orders   Provider Name Dr. Kristopher Reese. Provider Notification Physician   Method of Communication Secure Message   Response Waiting for response   Dr. Kristopher Reese notified of patient's lactic acid of 2.4.  Orders received to give a 500 mL bolus of LR and re-check in AM.

## 2021-05-30 NOTE — PLAN OF CARE
BRONCHOSPASM/BRONCHOCONSTRICTION   [x]  IMPROVE AERATION/BREATH SOUNDS  [x]   ADMINISTER BRONCHODILATOR THERAPY AS APPROPRIATE  [x]   ASSESS BREATH SOUNDS  []   IMPLEMENT AEROSOL/MDI PROTOCOL  [x]   PATIENT EDUCATION AS NEEDED    PROVIDE ADEQUATE OXYGENATION WITH ACCEPTABLE SP02/ABG'S  [x]  IDENTIFY APPROPRIATE OXYGEN THERAPY  [x]   MONITOR SP02/ABG'S AS NEEDED   [x]   PATIENT EDUCATION AS NEEDED      MECHANICAL VENTILATION     [x]  PROVIDE OPTIMAL VENTILATION  [x]   ASSESS FOR EXTUBATION READINESS  [x]   ASSESS FOR WEANING READINESS  [x]  EXTUBATE AS TOLERATED  [x]  IMPLEMENT ADULT MECHANICAL VENTILATION PROTOCOL  [x]  MAINTAIN ADEQUATE OXYGENATION  [x]  PERFORM SPONTANEOUS WEANING TRIAL AS TOLERATED

## 2021-05-30 NOTE — PROGRESS NOTES
Department of Internal Medicine  Nephrology Alice Byrd MD  Progress Note    Reason for consultation: Management of oliguric acute kidney injury. Consulting physician: Edel Self MD    Interval history: Patient was seen and examined today in the intensive care unit where he remains intubated and on ventilator support. He is hypotensive and on Levophed drip at 4 mcg/min. He is nonoliguric. History of presenting illness: This is a 77 y.o. male with a significant past medical history of Bladder cancer, seizure disorder, osteoarthritis and s/p Cerebrovascular accident [has G-tube in place], who presented to the hospital on 5/24/2021 with complaints of diffuse abdominal pain of 1 day duration associated with nausea and vomiting. Also he complained of difficulty urinating and bladder scan showed 586 mL of urine retention. .  BUN/creatinine was 49/1.05 mg/dL with serum bicarbonate 18 mmol/L at presentation. He was admitted and eventually underwent exploratory laparotomy with release of adhesive band causing small bowel obstruction, small bowel resection with primary anastomosis on 5/28/2021. Postoperatively, he was intubated and admitted to the intensive care unit but noted to be oliguric and hence nephrology consultation. Issa catheter was flushed and clots removed and urine output has improved. History was obtained by chart review as patient is currently intubated.     Scheduled Meds:   ipratropium-albuterol  1 ampule Inhalation Q4H    insulin lispro  0-18 Units Subcutaneous Q6H    fat emulsion  100 mL Intravenous Daily    sodium chloride flush  10 mL Intravenous 2 times per day    aspirin  325 mg Oral Daily    cilostazol  100 mg Oral BID    potassium chloride  20 mEq Oral Daily    pantoprazole  40 mg Intravenous BID    And    sodium chloride (PF)  10 mL Intravenous BID    sodium chloride flush  10 mL Intravenous 2 times per day    cefepime  2,000 mg Intravenous Q12H    metroNIDAZOLE  500 mg Intravenous Q8H     Continuous Infusions:   IV infusion builder 100 mL/hr at 05/30/21 0432    PN-Adult 2-in-1 Central Line (Standard) 41.6 mL/hr at 05/29/21 1737    norepinephrine 4 mcg/min (05/30/21 0936)    dextrose      sodium chloride      propofol 20 mcg/kg/min (05/30/21 0635)    sodium chloride      sodium chloride       PRN Meds:.sodium phosphate IVPB **OR** sodium phosphate IVPB, metoprolol, glucose, dextrose, glucagon (rDNA), dextrose, sodium chloride, HYDROmorphone **OR** HYDROmorphone, sodium chloride flush, sodium chloride, potassium chloride, magnesium sulfate, ondansetron, ondansetron, diatrizoate meglumine-sodium, sodium chloride flush, iopamidol, potassium chloride, fentanNYL, fentanNYL, sodium chloride flush, sodium chloride, potassium chloride **OR** potassium alternative oral replacement **OR** potassium chloride, magnesium sulfate, promethazine **OR** ondansetron, magnesium hydroxide, acetaminophen **OR** acetaminophen, hydrALAZINE    Physical Exam:    VITALS:  /72   Pulse 96   Temp 99.5 °F (37.5 °C) (Axillary)   Resp 18   Ht 6' 4\" (1.93 m)   Wt 203 lb 11.3 oz (92.4 kg)   SpO2 100%   BMI 24.80 kg/m²   24HR INTAKE/OUTPUT:      Intake/Output Summary (Last 24 hours) at 5/30/2021 1120  Last data filed at 5/30/2021 0800  Gross per 24 hour   Intake 5620.57 ml   Output 1890 ml   Net 3730.57 ml     Constitutional: Intubated, sedated and on ventilator support    Skin: Skin color, texture, turgor normal. No rashes or lesions    Head: Normocephalic, without obvious abnormality, atraumatic     Cardiovascular/Edema: regular rate and rhythm, S1, S2 normal, no murmur, click, rub or gallop    Respiratory: Lungs: clear to auscultation bilaterally    Abdomen: soft, non-tender; bowel sounds normal; no masses,  no organomegaly    Back: Abdominal binder dressing in place;  hypoactive bowel sounds.     Extremities: extremities normal, atraumatic, no cyanosis or edema    Neuro:

## 2021-05-30 NOTE — PROGRESS NOTES
ICU Progress Note ([facility]ent)   [facility]    Patient - Malena Alexander,  Age - 77 y.o.    - 1945      Room Number -    MRN -  [id number]   Acct # - [de-identified]  Date of Admission -  2021  3:38 PM    Events of Past 24 Hours   Remains sedated on propofol, also on norepinephrine ; No sedation vacation done yet. An episode of SVT overnight, responded to digoxin    Vitals    height is 6' 4\" (1.93 m) and weight is 203 lb 11.3 oz (92.4 kg). His axillary temperature is 99.5 °F (37.5 °C). His blood pressure is 121/68 and his pulse is 100. His respiration is 22 and oxygen saturation is 100%. Temperature Range: Temp: 99.5 °F (37.5 °C) Temp  Av.2 °F (37.3 °C)  Min: 98 °F (36.7 °C)  Max: 99.5 °F (37.5 °C)  BP Range:  Systolic (07KVR), VLN:09 , Min:71 , EBX:133     Diastolic (71XJE), ZGM:89, Min:38, Max:95    Pulse Range: Pulse  Av.3  Min: 77  Max: 132  Respiration Range: Resp  Av.8  Min: 16  Max: 33  Current Pulse Ox[de-identified]  SpO2: 100 %  24HR Pulse Ox Range:  SpO2  Av.5 %  Min: 86 %  Max: 100 %  Oxygen Amount and Delivery: O2 Flow Rate (L/min): 0 L/min      Wt Readings from Last 3 Encounters:   21 203 lb 11.3 oz (92.4 kg)   20 189 lb (85.7 kg)   20 183 lb (83 kg)     I/O       Intake/Output Summary (Last 24 hours) at 2021 1117  Last data filed at 2021 0800  Gross per 24 hour   Intake 5620.57 ml   Output 1890 ml   Net 3730.57 ml     I/O last 3 completed shifts:   In: 5620.6 [I.V.:4481.2; IV Piggyback:150]  Out: 0 [Urine:890; Emesis/NG output:150; Drains:340]     DRAIN/TUBE OUTPUT:     Invasive Lines   ETT Day -   2  Lines -PICC line in place    ICP PRESSURE RANGE:  No data recorded  CVP PRESSURE RANGE:  No data recorded  Mechanical Ventilation Data   SETTINGS (Comprehensive)  Vent Information  $Ventilation: $Subsequent Day  Skin Assessment: Clean, dry, & intact  Equipment ID: [id number]  Equipment Changed: HME  Vent Type: Servo i  Vent Mode: PRVC  Vt Ordered: 500 mL  Rate Set: 18 bmp  Pressure Support: 7 cmH20  FiO2 : 30 %  SpO2: 100 %  SpO2/FiO2 ratio: 333.33  Sensitivity: 5  PEEP/CPAP: 7  I Time/ I Time %: 1.11 s  Humidification Source: HME  Additional Respiratory  Assessments  Pulse: 100  Resp: 22  SpO2: 100 %  End Tidal CO2: 27  Position: Semi-Santiago's  Humidification Source: HME  Oral Care: Mouth swabbed, Mouth moisturizer, Mouth suctioned, Suction toothette       ABGs:   Lab Results   Component Value Date    PHART 7.454 05/30/2021    PO2ART 110.0 05/30/2021    YAW0VYI 26.9 05/30/2021       Lab Results   Component Value Date    MODE PRVC 05/30/2021         Medications   IV   IV infusion builder 100 mL/hr at 05/30/21 6735    PN-Adult 2-in-1 Central Line (Standard) 41.6 mL/hr at 05/29/21 1737    norepinephrine 4 mcg/min (05/30/21 0936)    dextrose      sodium chloride      propofol 20 mcg/kg/min (05/30/21 0607)    sodium chloride      sodium chloride        ipratropium-albuterol  1 ampule Inhalation Q4H    insulin lispro  0-18 Units Subcutaneous Q6H    fat emulsion  100 mL Intravenous Daily    sodium chloride flush  10 mL Intravenous 2 times per day    aspirin  325 mg Oral Daily    cilostazol  100 mg Oral BID    potassium chloride  20 mEq Oral Daily    pantoprazole  40 mg Intravenous BID    And    sodium chloride (PF)  10 mL Intravenous BID    sodium chloride flush  10 mL Intravenous 2 times per day    cefepime  2,000 mg Intravenous Q12H    metroNIDAZOLE  500 mg Intravenous Q8H       Diet/Nutrition   Diet NPO Effective Now Exceptions are: Ice Chips, Sips of Water with Meds, Popsicles  PN-Adult 2-in-1 Central Line (Standard)    Exam   VITALS    height is 6' 4\" (1.93 m) and weight is 203 lb 11.3 oz (92.4 kg). His axillary temperature is 99.5 °F (37.5 °C). His blood pressure is 121/68 and his pulse is 100. His respiration is 22 and oxygen saturation is 100%.    Ventilator Settings (Basic)  Vent Mode: PRVC Rate Set: 18 bmp/Vt Ordered: 500 mL/ /FiO2 : 30 %    Constitutional - Sedated  General Appearance  well developed, well nourished  HEENT - Life support devices in place (ET, OG),normocephalic, atraumatic. PERRLA  Lungs - Chest expands equally, overall fairly clear with some minimal basilar crackles  Cardiovascular - Heart sounds are normal.  normal rate and rhythm regular, no murmur, gallop or rub. Abdomen - soft, nontender, ndistended, no masses or organomegaly  Neurologic - CN II-XII are grossly intact. There are no focal motor deficits  Skin - no bruising or bleeding  Extremities - no cyanosis, clubbing or edema    Lab Results   CBC     Lab Results   Component Value Date    WBC 15.5 05/30/2021    RBC 3.13 05/30/2021    HGB 8.7 05/30/2021    HCT 27.2 05/30/2021     05/30/2021    MCV 87.2 05/30/2021    MCH 29.7 05/30/2021    MCHC 34.0 05/30/2021    RDW 14.9 05/30/2021    LYMPHOPCT 3 05/30/2021    MONOPCT 8 05/30/2021    BASOPCT 0 05/30/2021    MONOSABS 1.24 05/30/2021    LYMPHSABS 0.47 05/30/2021    EOSABS 0.16 05/30/2021    BASOSABS 0.00 05/30/2021    DIFFTYPE NOT REPORTED 05/30/2021       BMP   Lab Results   Component Value Date     05/30/2021    K 3.9 05/30/2021     05/30/2021    CO2 19 05/30/2021    BUN 28 05/30/2021    CREATININE 0.85 05/30/2021    GLUCOSE 199 05/30/2021    CALCIUM 7.7 05/30/2021       LFTS  Lab Results   Component Value Date    ALKPHOS 45 05/29/2021    ALT 20 05/29/2021    AST 56 05/29/2021    PROT 5.7 05/29/2021    BILITOT 0.34 05/29/2021    LABALBU 3.1 05/29/2021       INR  No results for input(s): PROTIME, INR in the last 72 hours. APTT  No results for input(s): APTT in the last 72 hours. Lactic Acid  Lab Results   Component Value Date    LACTA 1.6 05/30/2021    LACTA 2.4 05/29/2021    LACTA 0.7 05/27/2021        BNP   No results for input(s): BNP in the last 72 hours.      Cultures       Radiology     Plain Films             SYSTEM ASSESSMENT    Status post exploratory lap for small bowel obstruction with hemoperitoneum and resection of small bowel with primary anastomosis and lysis of adhesions  History of COPD  Poor cough reflex  Probable CYNDI  Hypernatremia    Neuro   Will do daily sedation vacations but otherwise keep comfortable since we are not weaning    Respiratory   Wean oxygen as tolerated.  Keep O2 sat > 88%  Remains hemodynamically unstable for no weaning today  We will switch to Xopenex aerosols due to SVT/tachycardia  Theophylline stopped yesterday     Hemodynamics   Unstable, continue norepinephrine  Lactic acid level trending down again    Gastrointestinal/Nutrition   Currently on TPN, would increase phosphorus   GI prophylaxis    Renal   Phosphate being replaced  Sodium bicarb drip started    Infectious Disease   Remains on cefepime and Flagyl    Hematology/Oncology   Creased frequency of H&H's to every 8 hours    Endocrine   I increase insulin over TPN  Social/Spiritual/DNR/Disposition/Other     Updated patient's sister in detail    Critical Care Time   35 min    Electronically signed by Sari Dandy, MD on 5/30/2021 at 11:17 AM

## 2021-05-30 NOTE — PROGRESS NOTES
Progress Note    5/30/2021   1:46 PM    Name:  Stef Grace  MRN:    [id number]     Acct:     [de-identified]   Room:  2002/2002-01  IP Day: 6     Admit Date: 5/24/2021  3:38 PM  PCP: Mabel Moe MD    Subjective:     C/C:   Chief Complaint   Patient presents with   Liz Jose E Nausea    Emesis       Interval History: Status: not changed. Postop day 2 small bowel resection patient is sedated and intubated. Patient is on Levophed 4 mics. Had one episode of nonsustained V. tach overnight which was resolved with 1 dose of digoxin. Vital signs reviewed. Currently patient's map is 80. Recent labs reviewed sodium 146, phosphorus 1.7, WBC count 15.5 hemoglobin 8.7.    ROS:   all 10 systems reviewed and are negative except as noted    Review of Systems   Unable to perform ROS: Intubated       Medications: Allergies:    Allergies   Allergen Reactions    Bactrim [Sulfamethoxazole-Trimethoprim] Hives and Swelling    Ciprofloxacin Swelling     FACE       Current Meds: sodium phosphate 14.79 mmol in dextrose 5 % 250 mL IVPB, PRN   Or  sodium phosphate 29.58 mmol in dextrose 5 % 250 mL IVPB, PRN  sodium bicarbonate 50 mEq in dextrose 5 % 1,000 mL infusion, Continuous  levalbuterol (XOPENEX) nebulizer solution 1.25 mg, Q6H  sodium chloride nebulizer 0.9 % solution 3 mL, Q8H PRN  ipratropium (ATROVENT) 0.02 % nebulizer solution 0.5 mg, Q6H  PN-Adult 2-in-1 Central Line (Standard), Continuous TPN  PN-Adult 2-in-1 Central Line (Standard), Continuous TPN  metoprolol (LOPRESSOR) injection 5 mg, Q6H PRN  insulin lispro (HUMALOG) injection vial 0-18 Units, Q6H  norepinephrine (LEVOPHED) 16 mg in sodium chloride 0.9 % 250 mL infusion, Continuous  fat emulsion 20 % infusion 100 mL, Daily  glucose (GLUTOSE) 40 % oral gel 15 g, PRN  dextrose 50 % IV solution, PRN  glucagon (rDNA) injection 1 mg, PRN  dextrose 5 % solution, PRN  0.9 % sodium chloride infusion, PRN  propofol injection, Titrated  HYDROmorphone (DILAUDID) injection 0.25 mg, Q3H PRN   Or  HYDROmorphone (DILAUDID) injection 0.5 mg, Q3H PRN  sodium chloride flush 0.9 % injection 10 mL, 2 times per day  sodium chloride flush 0.9 % injection 10 mL, PRN  0.9 % sodium chloride infusion, PRN  potassium chloride 10 mEq/100 mL IVPB (Peripheral Line), PRN  magnesium sulfate 1000 mg in dextrose 5% 100 mL IVPB, PRN  ondansetron (ZOFRAN) injection 4 mg, Q6H PRN  ondansetron (ZOFRAN) injection 4 mg, Q6H PRN  aspirin tablet 325 mg, Daily  cilostazol (PLETAL) tablet 100 mg, BID  potassium chloride (MICRO-K) extended release capsule 20 mEq, Daily  diatrizoate meglumine-sodium (GASTROGRAFIN) 66-10 % solution 450 mL, ONCE PRN  pantoprazole (PROTONIX) injection 40 mg, BID   And  sodium chloride (PF) 0.9 % injection 10 mL, BID  sodium chloride flush 0.9 % injection 10 mL, PRN  iopamidol (ISOVUE-370) 76 % injection 75 mL, ONCE PRN  potassium chloride 10 mEq/100 mL IVPB (Peripheral Line), PRN  fentaNYL (SUBLIMAZE) injection 25 mcg, Q2H PRN  fentaNYL (SUBLIMAZE) injection 50 mcg, Q2H PRN  sodium chloride flush 0.9 % injection 10 mL, 2 times per day  sodium chloride flush 0.9 % injection 10 mL, PRN  0.9 % sodium chloride infusion, PRN  potassium chloride (KLOR-CON M) extended release tablet 40 mEq, PRN   Or  potassium bicarb-citric acid (EFFER-K) effervescent tablet 40 mEq, PRN   Or  potassium chloride 10 mEq/100 mL IVPB (Peripheral Line), PRN  magnesium sulfate 1000 mg in dextrose 5% 100 mL IVPB, PRN  promethazine (PHENERGAN) tablet 12.5 mg, Q6H PRN   Or  ondansetron (ZOFRAN) injection 4 mg, Q6H PRN  magnesium hydroxide (MILK OF MAGNESIA) 400 MG/5ML suspension 30 mL, Daily PRN  acetaminophen (TYLENOL) tablet 650 mg, Q6H PRN   Or  acetaminophen (TYLENOL) suppository 650 mg, Q6H PRN  cefepime (MAXIPIME) 2000 mg IVPB minibag, Q12H  metronidazole (FLAGYL) 500 mg in NaCl 100 mL IVPB premix, Q8H  hydrALAZINE (APRESOLINE) injection 10 mg, Q6H PRN        Data:     Code Status:  Full Code    Family History   Problem Relation Age of Onset    Arthritis Mother     Atrial Fibrillation Mother     Hearing Loss Mother     Heart Disease Mother     Stroke Mother     Vision Loss Mother     Arthritis Father     Cancer Father     Heart Disease Father     High Blood Pressure Father     Stroke Father     Vision Loss Father        Social History     Socioeconomic History    Marital status:      Spouse name: Not on file    Number of children: Not on file    Years of education: Not on file    Highest education level: Not on file   Occupational History    Not on file   Tobacco Use    Smoking status: Former Smoker     Packs/day: 1.00     Years: 60.00     Pack years: 60.00     Types: Cigarettes     Start date:      Quit date: 2020     Years since quittin.1    Smokeless tobacco: Never Used   Vaping Use    Vaping Use: Never used   Substance and Sexual Activity    Alcohol use: No    Drug use: No    Sexual activity: Yes     Partners: Female   Other Topics Concern    Not on file   Social History Narrative    Not on file     Social Determinants of Health     Financial Resource Strain:     Difficulty of Paying Living Expenses:    Food Insecurity:     Worried About [address] in the Last Year:     [address] [place] [employer] in the Last Year:    Transportation Needs:     Lack of Transportation (Medical):      Lack of Transportation (Non-Medical):    Physical Activity:     Days of Exercise per Week:     Minutes of Exercise per Session:    Stress:     Feeling of Stress :    Social Connections:     Frequency of Communication with Friends and Family:     Frequency of Social Gatherings with Friends and Family:     Attends Confucianism Services:     Active Member of Clubs or Organizations:     Attends Club or Organization Meetings:     Marital Status:    Intimate Partner Violence:     Fear of Current or Ex-Partner:     Emotionally Abused:     Physically Abused:     Sexually Abused:        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 Vascular consultation advised. IR FLUORO GUIDED CVA DEVICE PLMT/REPLACE/REMOVAL    Result Date: 5/28/2021  Ultrasound and fluoroscopy guided right sided PICC insertion with good tip position in the central circulation. PICC is ready for use at this time. XR CHEST PORTABLE    Result Date: 5/30/2021  Endotracheal tube in satisfactory position Bilateral airspace disease right greater than left could represent edema or infection     XR CHEST PORTABLE    Result Date: 5/29/2021  Trace right effusion and mild atelectasis. Tubes and lines as above. However, endotracheal tube terminates at the louis oriented toward the right mainstem bronchus. RECOMMENDATION: Advise retraction of endotracheal tube by 3-4 cm. The findings were sent to the Radiology Results [address] at 8:11 am on 5/29/2021to be communicated to a licensed caregiver. XR CHEST PORTABLE    Result Date: 5/28/2021  Endotracheal tube with tip 3.6 cm from the louis. Enteric tube with tip in the proximal body versus fundus of the stomach and side-port likely in the distal esophagus. Suggest advancement by 5-10 cm. Right-sided PICC line with tip in the distal SVC. No pneumothoraces. Mild bibasilar likely atelectasis. FL SMALL BOWEL FOLLOW THROUGH ONLY    Result Date: 5/27/2021  Findings suggestive of high-grade ileus or partial/intermittent small bowel obstruction. Findings were discussed with Dr. Margarita Traore at 4:30 p.m. on 5/27/2021.        Labs:  Recent Results (from the past 24 hour(s))   POC Glucose Fingerstick    Collection Time: 05/29/21  6:24 PM   Result Value Ref Range    POC Glucose 168 (H) 75 - 110 mg/dL   PHOSPHORUS    Collection Time: 05/29/21  7:26 PM   Result Value Ref Range    Phosphorus 2.0 (L) 2.5 - 4.5 mg/dL   Hemoglobin and Hematocrit, Blood    Collection Time: 05/29/21  7:26 PM   Result Value Ref Range    Hemoglobin 8.8 (L) 13.5 - 17.5 g/dL Hematocrit 27.1 (L) 41 - 53 %   Lactic Acid    Collection Time: 05/29/21  7:26 PM   Result Value Ref Range    Lactic Acid 2.4 (H) 0.5 - 2.2 mmol/L   POC Glucose Fingerstick    Collection Time: 05/29/21  9:46 PM   Result Value Ref Range    POC Glucose 126 (H) 75 - 110 mg/dL   POC Glucose Fingerstick    Collection Time: 05/29/21 11:44 PM   Result Value Ref Range    POC Glucose 106 75 - 110 mg/dL   Hemoglobin and Hematocrit, Blood    Collection Time: 05/30/21  1:50 AM   Result Value Ref Range    Hemoglobin 9.5 (L) 13.5 - 17.5 g/dL    Hematocrit 31.1 (L) 41 - 53 %   BLOOD GAS, ARTERIAL    Collection Time: 05/30/21  4:20 AM   Result Value Ref Range    pH, Arterial 7.454 (H) 7.350 - 7.450    pCO2, Arterial 26.9 (LL) 35.0 - 45.0 mmHg    pO2, Arterial 110.0 (H) 80.0 - 100.0 mmHg    HCO3, Arterial 18.9 (L) 22.0 - 26.0 mmol/L    Positive Base Excess, Art NOT REPORTED 0.0 - 2.0 mmol/L    Negative Base Excess, Art 5.1 (H) 0.0 - 2.0 mmol/L    O2 Sat, Arterial 97.2 95 - 98 %    Total Hb NOT REPORTED 12.0 - 16.0 g/dl    Oxyhemoglobin NOT REPORTED 95.0 - 98.0 %    Carboxyhemoglobin 1.2 0 - 5 %    Methemoglobin 0.9 0.0 - 1.9 %    Pt Temp 37.0     pH, Art, Temp Adj NOT REPORTED 7.350 - 7.450    pCO2, Art, Temp Adj NOT REPORTED 35.0 - 45.0    pO2, Art, Temp Adj NOT REPORTED 80.0 - 100.0 mmHg    O2 Device/Flow/% VENTILATOR     Respiratory Rate 18     Andrew Test PASS     Sample Site Right Radial Artery     Pt.  Position SEMI-FOWLERS     Mode PRVC     Set Rate 18     Total Rate 25          FIO2 30     Peep/Cpap 7     PSV NOT REPORTED     Text for Respiratory RESULTS TO RN     NOTIFICATION NOT REPORTED     NOTIFICATION TIME NOT REPORTED    Basic Metabolic Panel w/ Reflex to MG    Collection Time: 05/30/21  4:46 AM   Result Value Ref Range    Glucose 199 (H) 70 - 99 mg/dL    BUN 28 (H) 8 - 23 mg/dL    CREATININE 0.85 0.70 - 1.20 mg/dL    Bun/Cre Ratio NOT REPORTED 9 - 20    Calcium 7.7 (L) 8.6 - 10.4 mg/dL    Sodium 146 (H) 135 - 144 mmol/L    Potassium 3.9 3.7 - 5.3 mmol/L    Chloride 118 (H) 98 - 107 mmol/L    CO2 19 (L) 20 - 31 mmol/L    Anion Gap 9 9 - 17 mmol/L    GFR Non-African American >60 >60 mL/min    GFR African American >60 >60 mL/min    GFR Comment          GFR Staging NOT REPORTED    CBC with DIFF    Collection Time: 05/30/21  4:46 AM   Result Value Ref Range    WBC 15.5 (H) 3.5 - 11.0 k/uL    RBC 3.13 (L) 4.5 - 5.9 m/uL    Hemoglobin 9.3 (L) 13.5 - 17.5 g/dL    Hematocrit 27.3 (L) 41 - 53 %    MCV 87.2 80 - 100 fL    MCH 29.7 26 - 34 pg    MCHC 34.0 31 - 37 g/dL    RDW 14.9 11.5 - 14.9 %    Platelets 350 884 - 874 k/uL    MPV 7.2 6.0 - 12.0 fL    NRBC Automated NOT REPORTED per 100 WBC    Differential Type NOT REPORTED     Immature Granulocytes NOT REPORTED 0 %    Absolute Immature Granulocyte NOT REPORTED 0.00 - 0.30 k/uL    WBC Morphology NOT REPORTED     RBC Morphology NOT REPORTED     Platelet Estimate NOT REPORTED     Seg Neutrophils 88 (H) 36 - 66 %    Lymphocytes 3 (L) 24 - 44 %    Monocytes 8 (H) 1 - 7 %    Eosinophils % 1 0 - 4 %    Basophils 0 0 - 2 %    Segs Absolute 13.63 (H) 1.3 - 9.1 k/uL    Absolute Lymph # 0.47 (L) 1.0 - 4.8 k/uL    Absolute Mono # 1.24 0.1 - 1.3 k/uL    Absolute Eos # 0.16 0.0 - 0.4 k/uL    Basophils Absolute 0.00 0.0 - 0.2 k/uL    Morphology ANISOCYTOSIS PRESENT     Morphology 1+ ELLIPTOCYTES     Morphology 1+ TEARDROPS     Morphology 1+ ECHINOCYTES    Magnesium    Collection Time: 05/30/21  4:46 AM   Result Value Ref Range    Magnesium 1.8 1.6 - 2.6 mg/dL   Phosphorus    Collection Time: 05/30/21  4:46 AM   Result Value Ref Range    Phosphorus 1.7 (L) 2.5 - 4.5 mg/dL   Lactic Acid    Collection Time: 05/30/21  4:46 AM   Result Value Ref Range    Lactic Acid 1.6 0.5 - 2.2 mmol/L   POC Glucose Fingerstick    Collection Time: 05/30/21  6:12 AM   Result Value Ref Range    POC Glucose 163 (H) 75 - 110 mg/dL   HEMOGLOBIN AND HEMATOCRIT, BLOOD    Collection Time: 05/30/21 11:05 AM   Result Value Ref Range    Hemoglobin 8.7 (L) 13.5 - 17.5 g/dL    Hematocrit 27.2 (L) 41 - 53 %       Physical Examination:        Vitals:  /78   Pulse 117   Temp 99.5 °F (37.5 °C) (Axillary)   Resp 30   Ht 6' 4\" (1.93 m)   Wt 203 lb 11.3 oz (92.4 kg)   SpO2 98%   BMI 24.80 kg/m²   Temp (24hrs), Av.2 °F (37.3 °C), Min:98 °F (36.7 °C), Max:99.5 °F (37.5 °C)    Recent Labs     21  1824 21  2146 21  2344 21  0612   POCGLU 168* 126* 106 163*         Physical Exam  Vitals reviewed. Constitutional:       Appearance: He is not diaphoretic. HENT:      Head: Normocephalic and atraumatic. Right Ear: External ear normal.      Left Ear: External ear normal.      Nose: Nose normal.      Mouth/Throat:      Pharynx: Oropharynx is clear. Eyes:      Conjunctiva/sclera: Conjunctivae normal.   Cardiovascular:      Rate and Rhythm: Normal rate and regular rhythm. Pulses: Normal pulses. Heart sounds: Normal heart sounds. Pulmonary:      Breath sounds: Normal breath sounds. Comments: Sedated and intubated  Abdominal:      General: Bowel sounds are decreased. There is no distension. Palpations: Abdomen is soft. Comments: Postop day 2 small bowel resection   Musculoskeletal:         General: No deformity. Skin:     General: Skin is warm and dry. Capillary Refill: Capillary refill takes less than 2 seconds. Coloration: Skin is not jaundiced. Assessment:        Primary Problem  Small bowel obstruction (HCC)     Principal Problem:    Small bowel obstruction (HCC)  Active Problems:    Status post insertion of percutaneous endoscopic gastrostomy (PEG) tube (HCC)    Acute cystitis without hematuria    Mass of right lung    COPD (chronic obstructive pulmonary disease) (HCC)  Resolved Problems:    * No resolved hospital problems.  *      Past Medical History:   Diagnosis Date    Arthritis     Cancer [facility])     bladder 1980s    Cerebral artery occlusion with cerebral

## 2021-05-30 NOTE — FLOWSHEET NOTE
05/30/21 5142   Provider Notification   Reason for Communication Evaluate   Provider Name Dr. Molly Hyde. Provider Notification Physician   Method of Communication Secure Message   Response Waiting for response   Dr. Molly Hyde notified of morning ABG's. MD also updated on patient's low phosphorus level. Awaiting response.

## 2021-05-30 NOTE — CARE COORDINATION
ONGOING DISCHARGE PLAN:    Patient is on Vent    Remains on vent post-op day #2 exp lap with release of bowel obstruction and bowel resection.     TPN. Plan per family was for this patient to return to home and have services resumed with [facility] and add HHA services through [facility]. We will continue to follow and see how patient does post-op. Orange header - 25%    Will continue to follow for additional discharge needs.     Electronically signed by Ashly Rose RN on 5/30/2021 at 1:33 PM

## 2021-05-31 ENCOUNTER — APPOINTMENT (OUTPATIENT)
Dept: GENERAL RADIOLOGY | Age: 76
DRG: 329 | End: 2021-05-31
Payer: COMMERCIAL

## 2021-05-31 LAB
ABSOLUTE EOS #: 0.31 K/UL (ref 0–0.4)
ABSOLUTE IMMATURE GRANULOCYTE: ABNORMAL K/UL (ref 0–0.3)
ABSOLUTE LYMPH #: 2.78 K/UL (ref 1–4.8)
ABSOLUTE MONO #: 1.08 K/UL (ref 0.1–1.3)
ALLEN TEST: ABNORMAL
ANION GAP SERPL CALCULATED.3IONS-SCNC: 7 MMOL/L (ref 9–17)
BASOPHILS # BLD: 0 % (ref 0–2)
BASOPHILS ABSOLUTE: 0 K/UL (ref 0–0.2)
BUN BLDV-MCNC: 24 MG/DL (ref 8–23)
BUN/CREAT BLD: ABNORMAL (ref 9–20)
CALCIUM SERPL-MCNC: 7.3 MG/DL (ref 8.6–10.4)
CARBOXYHEMOGLOBIN: 1 % (ref 0–5)
CHLORIDE BLD-SCNC: 110 MMOL/L (ref 98–107)
CO2: 21 MMOL/L (ref 20–31)
CREAT SERPL-MCNC: 0.81 MG/DL (ref 0.7–1.2)
CULTURE: NORMAL
CULTURE: NORMAL
DIFFERENTIAL TYPE: ABNORMAL
EKG ATRIAL RATE: 57 BPM
EKG Q-T INTERVAL: 384 MS
EKG QRS DURATION: 78 MS
EKG QTC CALCULATION (BAZETT): 485 MS
EKG R AXIS: 72 DEGREES
EKG T AXIS: 161 DEGREES
EKG VENTRICULAR RATE: 96 BPM
EOSINOPHILS RELATIVE PERCENT: 2 % (ref 0–4)
FIO2: 30
GFR AFRICAN AMERICAN: >60 ML/MIN
GFR NON-AFRICAN AMERICAN: >60 ML/MIN
GFR SERPL CREATININE-BSD FRML MDRD: ABNORMAL ML/MIN/{1.73_M2}
GFR SERPL CREATININE-BSD FRML MDRD: ABNORMAL ML/MIN/{1.73_M2}
GLUCOSE BLD-MCNC: 141 MG/DL (ref 75–110)
GLUCOSE BLD-MCNC: 162 MG/DL (ref 75–110)
GLUCOSE BLD-MCNC: 167 MG/DL (ref 70–99)
GLUCOSE BLD-MCNC: 95 MG/DL (ref 75–110)
GLUCOSE BLD-MCNC: 99 MG/DL (ref 75–110)
HCO3 ARTERIAL: 20.6 MMOL/L (ref 22–26)
HCT VFR BLD CALC: 25.6 % (ref 41–53)
HCT VFR BLD CALC: 27.5 % (ref 41–53)
HEMOGLOBIN: 8.3 G/DL (ref 13.5–17.5)
HEMOGLOBIN: 8.9 G/DL (ref 13.5–17.5)
IMMATURE GRANULOCYTES: ABNORMAL %
LYMPHOCYTES # BLD: 18 % (ref 24–44)
Lab: NORMAL
Lab: NORMAL
MAGNESIUM: 1.5 MG/DL (ref 1.6–2.6)
MAGNESIUM: 2.1 MG/DL (ref 1.6–2.6)
MCH RBC QN AUTO: 28.8 PG (ref 26–34)
MCHC RBC AUTO-ENTMCNC: 32.5 G/DL (ref 31–37)
MCV RBC AUTO: 88.5 FL (ref 80–100)
METHEMOGLOBIN: 1.1 % (ref 0–1.9)
MODE: ABNORMAL
MONOCYTES # BLD: 7 % (ref 1–7)
MORPHOLOGY: ABNORMAL
NEGATIVE BASE EXCESS, ART: 3.3 MMOL/L (ref 0–2)
NOTIFICATION TIME: ABNORMAL
NOTIFICATION: ABNORMAL
NRBC AUTOMATED: ABNORMAL PER 100 WBC
NUCLEATED RED BLOOD CELLS: 1 PER 100 WBC
O2 DEVICE/FLOW/%: ABNORMAL
O2 SAT, ARTERIAL: 97.2 % (ref 95–98)
OXYHEMOGLOBIN: ABNORMAL % (ref 95–98)
PATIENT TEMP: 37
PCO2 ARTERIAL: 29.2 MMHG (ref 35–45)
PCO2, ART, TEMP ADJ: ABNORMAL (ref 35–45)
PDW BLD-RTO: 15.2 % (ref 11.5–14.9)
PEEP/CPAP: 7
PH ARTERIAL: 7.46 (ref 7.35–7.45)
PH, ART, TEMP ADJ: ABNORMAL (ref 7.35–7.45)
PHOSPHORUS: 1.9 MG/DL (ref 2.5–4.5)
PHOSPHORUS: 2.4 MG/DL (ref 2.5–4.5)
PLATELET # BLD: 215 K/UL (ref 150–450)
PLATELET ESTIMATE: ABNORMAL
PMV BLD AUTO: 7.7 FL (ref 6–12)
PO2 ARTERIAL: 111 MMHG (ref 80–100)
PO2, ART, TEMP ADJ: ABNORMAL MMHG (ref 80–100)
POSITIVE BASE EXCESS, ART: ABNORMAL MMOL/L (ref 0–2)
POTASSIUM SERPL-SCNC: 3.5 MMOL/L (ref 3.7–5.3)
POTASSIUM SERPL-SCNC: 3.7 MMOL/L (ref 3.7–5.3)
PSV: ABNORMAL
PT. POSITION: ABNORMAL
RBC # BLD: 2.9 M/UL (ref 4.5–5.9)
RBC # BLD: ABNORMAL 10*6/UL
RESPIRATORY RATE: 18
SAMPLE SITE: ABNORMAL
SEG NEUTROPHILS: 73 % (ref 36–66)
SEGMENTED NEUTROPHILS ABSOLUTE COUNT: 11.28 K/UL (ref 1.3–9.1)
SET RATE: 18
SODIUM BLD-SCNC: 138 MMOL/L (ref 135–144)
SPECIMEN DESCRIPTION: NORMAL
SPECIMEN DESCRIPTION: NORMAL
TEXT FOR RESPIRATORY: ABNORMAL
TOTAL HB: ABNORMAL G/DL (ref 12–16)
TOTAL RATE: 25
VT: 500
WBC # BLD: 15.4 K/UL (ref 3.5–11)
WBC # BLD: ABNORMAL 10*3/UL

## 2021-05-31 PROCEDURE — 94003 VENT MGMT INPAT SUBQ DAY: CPT

## 2021-05-31 PROCEDURE — 2580000003 HC RX 258: Performed by: SURGERY

## 2021-05-31 PROCEDURE — 36415 COLL VENOUS BLD VENIPUNCTURE: CPT

## 2021-05-31 PROCEDURE — 51702 INSERT TEMP BLADDER CATH: CPT

## 2021-05-31 PROCEDURE — 94761 N-INVAS EAR/PLS OXIMETRY MLT: CPT

## 2021-05-31 PROCEDURE — 94640 AIRWAY INHALATION TREATMENT: CPT

## 2021-05-31 PROCEDURE — 84132 ASSAY OF SERUM POTASSIUM: CPT

## 2021-05-31 PROCEDURE — C9113 INJ PANTOPRAZOLE SODIUM, VIA: HCPCS | Performed by: SURGERY

## 2021-05-31 PROCEDURE — 82805 BLOOD GASES W/O2 SATURATION: CPT

## 2021-05-31 PROCEDURE — 6370000000 HC RX 637 (ALT 250 FOR IP): Performed by: INTERNAL MEDICINE

## 2021-05-31 PROCEDURE — 2500000003 HC RX 250 WO HCPCS: Performed by: SURGERY

## 2021-05-31 PROCEDURE — 36600 WITHDRAWAL OF ARTERIAL BLOOD: CPT

## 2021-05-31 PROCEDURE — 2580000003 HC RX 258: Performed by: INTERNAL MEDICINE

## 2021-05-31 PROCEDURE — 6360000002 HC RX W HCPCS: Performed by: FAMILY MEDICINE

## 2021-05-31 PROCEDURE — 80048 BASIC METABOLIC PNL TOTAL CA: CPT

## 2021-05-31 PROCEDURE — 2500000003 HC RX 250 WO HCPCS: Performed by: INTERNAL MEDICINE

## 2021-05-31 PROCEDURE — 85025 COMPLETE CBC W/AUTO DIFF WBC: CPT

## 2021-05-31 PROCEDURE — 84100 ASSAY OF PHOSPHORUS: CPT

## 2021-05-31 PROCEDURE — 6360000002 HC RX W HCPCS: Performed by: SURGERY

## 2021-05-31 PROCEDURE — P9047 ALBUMIN (HUMAN), 25%, 50ML: HCPCS | Performed by: INTERNAL MEDICINE

## 2021-05-31 PROCEDURE — 85018 HEMOGLOBIN: CPT

## 2021-05-31 PROCEDURE — 6360000002 HC RX W HCPCS: Performed by: INTERNAL MEDICINE

## 2021-05-31 PROCEDURE — 2000000000 HC ICU R&B

## 2021-05-31 PROCEDURE — 71045 X-RAY EXAM CHEST 1 VIEW: CPT

## 2021-05-31 PROCEDURE — 83735 ASSAY OF MAGNESIUM: CPT

## 2021-05-31 PROCEDURE — 82947 ASSAY GLUCOSE BLOOD QUANT: CPT

## 2021-05-31 PROCEDURE — 85014 HEMATOCRIT: CPT

## 2021-05-31 PROCEDURE — 2700000000 HC OXYGEN THERAPY PER DAY

## 2021-05-31 RX ORDER — ALBUMIN (HUMAN) 12.5 G/50ML
25 SOLUTION INTRAVENOUS ONCE
Status: COMPLETED | OUTPATIENT
Start: 2021-05-31 | End: 2021-05-31

## 2021-05-31 RX ORDER — FUROSEMIDE 10 MG/ML
20 INJECTION INTRAMUSCULAR; INTRAVENOUS ONCE
Status: COMPLETED | OUTPATIENT
Start: 2021-05-31 | End: 2021-05-31

## 2021-05-31 RX ORDER — POTASSIUM CHLORIDE 7.45 MG/ML
10 INJECTION INTRAVENOUS
Status: COMPLETED | OUTPATIENT
Start: 2021-05-31 | End: 2021-06-01

## 2021-05-31 RX ORDER — MAGNESIUM SULFATE 1 G/100ML
1000 INJECTION INTRAVENOUS PRN
Status: DISCONTINUED | OUTPATIENT
Start: 2021-05-31 | End: 2021-05-31

## 2021-05-31 RX ADMIN — CALCIUM GLUCONATE: 98 INJECTION, SOLUTION INTRAVENOUS at 17:59

## 2021-05-31 RX ADMIN — POTASSIUM CHLORIDE 10 MEQ: 7.46 INJECTION, SOLUTION INTRAVENOUS at 05:48

## 2021-05-31 RX ADMIN — MAGNESIUM SULFATE HEPTAHYDRATE 1000 MG: 1 INJECTION, SOLUTION INTRAVENOUS at 14:50

## 2021-05-31 RX ADMIN — ALBUMIN (HUMAN) 25 G: 0.25 INJECTION, SOLUTION INTRAVENOUS at 16:29

## 2021-05-31 RX ADMIN — PROPOFOL 30 MCG/KG/MIN: 10 INJECTION, EMULSION INTRAVENOUS at 11:38

## 2021-05-31 RX ADMIN — INSULIN LISPRO 3 UNITS: 100 INJECTION, SOLUTION INTRAVENOUS; SUBCUTANEOUS at 05:47

## 2021-05-31 RX ADMIN — POTASSIUM CHLORIDE 10 MEQ: 7.46 INJECTION, SOLUTION INTRAVENOUS at 08:07

## 2021-05-31 RX ADMIN — INSULIN LISPRO 3 UNITS: 100 INJECTION, SOLUTION INTRAVENOUS; SUBCUTANEOUS at 00:15

## 2021-05-31 RX ADMIN — LEVALBUTEROL HYDROCHLORIDE 1.25 MG: 1.25 SOLUTION, CONCENTRATE RESPIRATORY (INHALATION) at 07:04

## 2021-05-31 RX ADMIN — LEVALBUTEROL HYDROCHLORIDE 1.25 MG: 1.25 SOLUTION, CONCENTRATE RESPIRATORY (INHALATION) at 02:58

## 2021-05-31 RX ADMIN — CEFEPIME 2000 MG: 2 INJECTION, POWDER, FOR SOLUTION INTRAVENOUS at 18:56

## 2021-05-31 RX ADMIN — SODIUM PHOSPHATE, MONOBASIC, MONOHYDRATE AND SODIUM PHOSPHATE, DIBASIC, ANHYDROUS 14.79 MMOL: 276; 142 INJECTION, SOLUTION INTRAVENOUS at 08:07

## 2021-05-31 RX ADMIN — PANTOPRAZOLE SODIUM 40 MG: 40 INJECTION, POWDER, FOR SOLUTION INTRAVENOUS at 20:48

## 2021-05-31 RX ADMIN — Medication 10 ML: at 20:48

## 2021-05-31 RX ADMIN — CEFEPIME 2000 MG: 2 INJECTION, POWDER, FOR SOLUTION INTRAVENOUS at 08:10

## 2021-05-31 RX ADMIN — METRONIDAZOLE 500 MG: 500 INJECTION, SOLUTION INTRAVENOUS at 00:59

## 2021-05-31 RX ADMIN — METRONIDAZOLE 500 MG: 500 INJECTION, SOLUTION INTRAVENOUS at 17:24

## 2021-05-31 RX ADMIN — LEVALBUTEROL HYDROCHLORIDE 1.25 MG: 1.25 SOLUTION, CONCENTRATE RESPIRATORY (INHALATION) at 15:36

## 2021-05-31 RX ADMIN — POTASSIUM CHLORIDE 10 MEQ: 7.46 INJECTION, SOLUTION INTRAVENOUS at 09:51

## 2021-05-31 RX ADMIN — MAGNESIUM SULFATE HEPTAHYDRATE 1000 MG: 1 INJECTION, SOLUTION INTRAVENOUS at 13:32

## 2021-05-31 RX ADMIN — PANTOPRAZOLE SODIUM 40 MG: 40 INJECTION, POWDER, FOR SOLUTION INTRAVENOUS at 08:12

## 2021-05-31 RX ADMIN — Medication 4 MCG/MIN: at 00:57

## 2021-05-31 RX ADMIN — SODIUM BICARBONATE: 84 INJECTION, SOLUTION INTRAVENOUS at 02:40

## 2021-05-31 RX ADMIN — PROPOFOL 30 MCG/KG/MIN: 10 INJECTION, EMULSION INTRAVENOUS at 05:08

## 2021-05-31 RX ADMIN — LEVALBUTEROL HYDROCHLORIDE 1.25 MG: 1.25 SOLUTION, CONCENTRATE RESPIRATORY (INHALATION) at 18:51

## 2021-05-31 RX ADMIN — POTASSIUM CHLORIDE 10 MEQ: 7.46 INJECTION, SOLUTION INTRAVENOUS at 21:53

## 2021-05-31 RX ADMIN — IPRATROPIUM BROMIDE 0.5 MG: 0.5 SOLUTION RESPIRATORY (INHALATION) at 02:58

## 2021-05-31 RX ADMIN — IPRATROPIUM BROMIDE 0.5 MG: 0.5 SOLUTION RESPIRATORY (INHALATION) at 07:04

## 2021-05-31 RX ADMIN — METRONIDAZOLE 500 MG: 500 INJECTION, SOLUTION INTRAVENOUS at 09:50

## 2021-05-31 RX ADMIN — IPRATROPIUM BROMIDE 0.5 MG: 0.5 SOLUTION RESPIRATORY (INHALATION) at 15:36

## 2021-05-31 RX ADMIN — PROPOFOL 20 MCG/KG/MIN: 10 INJECTION, EMULSION INTRAVENOUS at 21:53

## 2021-05-31 RX ADMIN — Medication 10 ML: at 08:13

## 2021-05-31 RX ADMIN — PROPOFOL 20 MCG/KG/MIN: 10 INJECTION, EMULSION INTRAVENOUS at 17:59

## 2021-05-31 RX ADMIN — POTASSIUM CHLORIDE 10 MEQ: 7.46 INJECTION, SOLUTION INTRAVENOUS at 20:47

## 2021-05-31 RX ADMIN — IPRATROPIUM BROMIDE 0.5 MG: 0.5 SOLUTION RESPIRATORY (INHALATION) at 18:51

## 2021-05-31 RX ADMIN — POTASSIUM CHLORIDE 10 MEQ: 7.46 INJECTION, SOLUTION INTRAVENOUS at 11:38

## 2021-05-31 RX ADMIN — FUROSEMIDE 20 MG: 10 INJECTION, SOLUTION INTRAMUSCULAR; INTRAVENOUS at 17:58

## 2021-05-31 RX ADMIN — PROPOFOL 20 MCG/KG/MIN: 10 INJECTION, EMULSION INTRAVENOUS at 00:54

## 2021-05-31 RX ADMIN — POTASSIUM CHLORIDE 10 MEQ: 7.46 INJECTION, SOLUTION INTRAVENOUS at 23:18

## 2021-05-31 ASSESSMENT — PULMONARY FUNCTION TESTS
PIF_VALUE: 18
PIF_VALUE: 26
PIF_VALUE: 12
PIF_VALUE: 22
PIF_VALUE: 21
PIF_VALUE: 18
PIF_VALUE: 13
PIF_VALUE: 12
PIF_VALUE: 14
PIF_VALUE: 20
PIF_VALUE: 21
PIF_VALUE: 13
PIF_VALUE: 12
PIF_VALUE: 16
PIF_VALUE: 12
PIF_VALUE: 19
PIF_VALUE: 12
PIF_VALUE: 12
PIF_VALUE: 13
PIF_VALUE: 18
PIF_VALUE: 12
PIF_VALUE: 14
PIF_VALUE: 13
PIF_VALUE: 28
PIF_VALUE: 12
PIF_VALUE: 18
PIF_VALUE: 14
PIF_VALUE: 21
PIF_VALUE: 15
PIF_VALUE: 15
PIF_VALUE: 12
PIF_VALUE: 15
PIF_VALUE: 12
PIF_VALUE: 18
PIF_VALUE: 10
PIF_VALUE: 15
PIF_VALUE: 17
PIF_VALUE: 25
PIF_VALUE: 12
PIF_VALUE: 15
PIF_VALUE: 14
PIF_VALUE: 15
PIF_VALUE: 20
PIF_VALUE: 21
PIF_VALUE: 12
PIF_VALUE: 13
PIF_VALUE: 16
PIF_VALUE: 18
PIF_VALUE: 21
PIF_VALUE: 14
PIF_VALUE: 12
PIF_VALUE: 21
PIF_VALUE: 19
PIF_VALUE: 16
PIF_VALUE: 16
PIF_VALUE: 12
PIF_VALUE: 15
PIF_VALUE: 12
PIF_VALUE: 21
PIF_VALUE: 13
PIF_VALUE: 25
PIF_VALUE: 20
PIF_VALUE: 15
PIF_VALUE: 13
PIF_VALUE: 14
PIF_VALUE: 14
PIF_VALUE: 19
PIF_VALUE: 13
PIF_VALUE: 19
PIF_VALUE: 12
PIF_VALUE: 19
PIF_VALUE: 20
PIF_VALUE: 15
PIF_VALUE: 20
PIF_VALUE: 14
PIF_VALUE: 12
PIF_VALUE: 15
PIF_VALUE: 13
PIF_VALUE: 15
PIF_VALUE: 18
PIF_VALUE: 17
PIF_VALUE: 18

## 2021-05-31 ASSESSMENT — PAIN SCALES - GENERAL
PAINLEVEL_OUTOF10: 0

## 2021-05-31 NOTE — PROGRESS NOTES
Comprehensive Nutrition Assessment    Type and Reason for Visit:  Reassess    Nutrition Recommendations/Plan: Following changes made in additives; Na acetate- 25 mEq, K acetate- 45 to 75 mEq, K Phos- 25 to 50 mmol, Ca+- 5 to 10 mEq, Mg- 4 to 12 mEq, add MVI & trace elements. Nutrition Assessment:  TPN increased to 80 ml/hr with lipids discontinued since on Propofol at 15.2 ml providin kcal. Surgery notes may start trickle tube feedings tomorrow, pt already has G-tube in place. Malnutrition Assessment:  Malnutrition Status: At risk for malnutrition (Comment)    Context:  Chronic Illness     Findings of the 6 clinical characteristics of malnutrition:  Energy Intake:  Unable to assess  Weight Loss:  Unable to assess     Body Fat Loss:  Unable to assess     Muscle Mass Loss:  Unable to assess    Fluid Accumulation:  1 - Mild Extremities   Strength:  Not Performed    Estimated Daily Nutrient Needs:  Energy (kcal): [place] x 1.2= 2000 kcal; Weight Used for Energy Requirements:  Admission     Protein (g):  1.5g/kg= 135 g protein; Weight Used for Protein Requirements:  Ideal          Nutrition Related Findings:  Trace edema. Labs and meds reviewed. Wounds:   (possible wound at sacrum)       Current Nutrition Therapies:    Diet NPO Effective Now Exceptions are: Ice Chips, Sips of Water with Meds, Popsicles  PN-Adult 2-in-1 Central Line (Standard)  PN-Adult 2-in-1 \[facility] [place] [facility]\"" Financial (Standard)  Current Parenteral Nutrition Orders:  · Type and Formula: 2-in-1 Custom   · Lipids: None  · Duration: Continuous  · Rate/Volume: 80 ml/ 1920 ml  · Current PN Order Provides: 1690 kcal, 96 gm protein (without Propofol calories).  With Propofol 2091 kcal    Anthropometric Measures:  · Height: 6' 4\" (193 cm)  · Current Body Weight: 214 lb (97.1 kg) (weight discrepancy noted)   · Admission Body Weight: 184 lb (83.5 kg)    · Ideal Body Weight: 202 lbs; % Ideal Body Weight     · BMI: 26.1  · BMI Categories: Normal Weight

## 2021-05-31 NOTE — PLAN OF CARE
Problem: OXYGENATION/RESPIRATORY FUNCTION  Goal: Patient will maintain patent airway  5/31/2021 1703 by [employer] RCP  Outcome: Ongoing     Problem: OXYGENATION/RESPIRATORY FUNCTION  Goal: Patient will achieve/maintain normal respiratory rate/effort  Description: Respiratory rate and effort will be within normal limits for the patient  5/31/2021 1703 by [employer] RCP  Outcome: Ongoing     Problem: MECHANICAL VENTILATION  Goal: Patient will maintain patent airway  5/31/2021 1703 by [employer] RCP  Outcome: Ongoing     Problem: MECHANICAL VENTILATION  Goal: Patient will maintain patent airway  5/31/2021 1703 by [employer] RCP  Outcome: Ongoing     Problem: MECHANICAL VENTILATION  Goal: Oral health is maintained or improved  5/31/2021 1703 by [employer] RCP  Outcome: Ongoing     Problem: MECHANICAL VENTILATION  Goal: ET tube will be managed safely  5/31/2021 1703 by [employer] RCP  Outcome: Ongoing     Problem: MECHANICAL VENTILATION  Goal: Ability to express needs and understand communication  5/31/2021 1703 by [employer] RCP  Outcome: Ongoing     Problem: MECHANICAL VENTILATION  Goal: Mobility/activity is maintained at optimum level for patient  5/31/2021 1703 by [employer] RCP  Outcome: Ongoing   BRONCHOSPASM/BRONCHOCONSTRICTION     [x]         IMPROVE AERATION/BREATH SOUNDS  [x]   ADMINISTER BRONCHODILATOR THERAPY AS APPROPRIATE  [x]   ASSESS BREATH SOUNDS  [x]   IMPLEMENT AEROSOL/MDI PROTOCOL  [x]   PATIENT EDUCATION AS NEEDED   MOBILIZE SECRETIONS    [x]   ASSESS BREATH SOUNDS  [x]   ASSESS SPUTUM PRODUCTION  [x]   COUGH AND DEEP BREATHING  [x]  IMPLEMENT SECRETION MANAGEMENT PROTOCOL  [x]   PATIENT EDUCATION AS NEEDED

## 2021-05-31 NOTE — CONSULTS
[address]  [phone]               Cardiology Consult           Date of Admission:  5/24/2021  Date of Consultation:  5/31/2021      PCP:  Enma Nance MD      Chief Complaint: Paroxysmal atrial fib with RVR. History of Present Illness:  Pankaj Villagomez is a 77 y.o. male who presents with abdominal distention. DX  Atrial for with RVR  Small bowel obstruction  Exploratory lap with lysis of adhesions. Hypernatremic  Postop hypotension on low-dose Levophed.  h/o atrial fibrillation  Normal LV function with EF 63% per echo 04/2020  Bladder CA  h/o CVA  COPD    Had small bowel obstruction with surgery: Exploratory lap with adhesion lysis. Postop mild hypotension. On low-dose Levophed currently  A. fib with RVR last evening  Received single dose of digoxin  Ventricular rate is currently controlled. Not clear that patient has had A. fib in the past.    Currently, sedated with propofol  Intubated on vent. Low-dose levo for mild hypo-BP  A. fib with controlled VR 90-1 10 at rest.    EKG shows atrial fib with low voltage and poor R wave progression. No acute ST-T changes. Echo 04/2020  Normal LV function EF 63%. Mild LA enlargement  No primary valve disease  . PMH:   has a past medical history of Arthritis, Cancer ([address] Cerebral artery occlusion with cerebral infarction ([address] Chronic kidney disease, COPD (chronic obstructive pulmonary disease) ([address] Hypertension, Pneumonia, Psychiatric problem, and Seizures ([address] PSH:   has a past surgical history that includes back surgery; fracture surgery; hernia repair; Tonsillectomy; Carotid endarterectomy (Left, 09/20/2016); Abdomen surgery; hiatal hernia repair; Inguinal hernia repair (Bilateral); other surgical history; Colonoscopy (N/A, 8/31/2018); Upper gastrointestinal endoscopy (04/15/2020); Gastrostomy tube placement (04/15/2020);  Upper gastrointestinal endoscopy (4/15/2020); Upper gastrointestinal endoscopy (4/15/2020); and colectomy (N/A, 5/28/2021). Allergies: Allergies   Allergen Reactions    Bactrim [Sulfamethoxazole-Trimethoprim] Hives and Swelling    Ciprofloxacin Swelling     FACE        Home Meds:    Prior to Admission medications    Medication Sig Start Date End Date Taking? Authorizing Provider   cilostazol (PLETAL) 100 MG tablet Take 100 mg by mouth 2 times daily   Yes Historical Provider, MD   famotidine (PEPCID) 20 MG tablet Take 20 mg by mouth 2 times daily   Yes Historical Provider, MD   aspirin 325 MG tablet Take 325 mg by mouth daily   Yes Historical Provider, MD   potassium chloride (MICRO-K) 10 MEQ extended release capsule Take 20 mEq by mouth daily   Yes Historical Provider, MD   theophylline 80 MG/15ML elixir Take 18.8 mLs by mouth every 8 hours 4/6/20  Yes Sean Max MD   lisinopril-hydrochlorothiazide (PRINZIDE;ZESTORETIC) 10-12.5 MG per tablet Take 1 tablet by mouth daily 7/24/19  Yes Mabel Moe MD   atorvastatin (LIPITOR) 40 MG tablet Take 1 tablet by mouth daily 7/24/19  Yes Mabel Moe MD   albuterol sulfate  (90 Base) MCG/ACT inhaler Inhale 2 puffs into the lungs every 6 hours as needed for Wheezing 6/27/19  Yes Mabel Moe MD   gabapentin (NEURONTIN) 800 MG tablet Take 800 mg by mouth 3 times daily.   1/16/19  Yes Historical Provider, MD        [facility] Meds:    Current Facility-Administered Medications   Medication Dose Route Frequency Provider Last Rate Last Admin    sodium phosphate 14.79 mmol in dextrose 5 % 250 mL IVPB  0.16 mmol/kg Intravenous PRN Lore Rose MD 62.5 mL/hr at 05/31/21 0807 14.79 mmol at 05/31/21 4540    Or    sodium phosphate 29.58 mmol in dextrose 5 % 250 mL IVPB  0.32 mmol/kg Intravenous PRN Lore Rose MD        sodium bicarbonate 50 mEq in dextrose 5 % 1,000 mL infusion   Intravenous Continuous Lore Rose  mL/hr at 05/31/21 0240 New Bag at 05/31/21 0240    levalbuterol (XOPENEX) nebulizer solution 1.25 mg  1.25 mg Nebulization Q6H Jeffrey Stuart MD   1.25 mg at 05/31/21 0704    sodium chloride nebulizer 0.9 % solution 3 mL  3 mL Nebulization Q8H PRN Jeffrey Stuart MD        ipratropium (ATROVENT) 0.02 % nebulizer solution 0.5 mg  0.5 mg Nebulization Q6H Jeffrey Stuart MD   0.5 mg at 05/31/21 9499    PN-Adult 2-in-1 Central Line (Standard)   Intravenous Continuous TPN Sid Ring MD 70 mL/hr at 05/30/21 1747 New Bag at 05/30/21 1747    metoprolol (LOPRESSOR) injection 5 mg  5 mg Intravenous Q6H PRN Judy Gee MD        insulin lispro (HUMALOG) injection vial 0-18 Units  0-18 Units Subcutaneous Q6H Jeffrey Stuart MD   3 Units at 05/31/21 0547    norepinephrine (LEVOPHED) 16 mg in sodium chloride 0.9 % 250 mL infusion  2-100 mcg/min Intravenous Continuous Jeffrey Stuart MD 0.9 mL/hr at 05/31/21 1044 1 mcg/min at 05/31/21 1044    fat emulsion 20 % infusion 100 mL  100 mL Intravenous Daily Sid Ring MD   Stopped at 05/31/21 0547    glucose (GLUTOSE) 40 % oral gel 15 g  15 g Oral PRN Sid Ring MD        dextrose 50 % IV solution  12.5 g Intravenous PRN Sid Ring MD        glucagon (rDNA) injection 1 mg  1 mg Intramuscular PRN Sid Ring MD        dextrose 5 % solution  100 mL/hr Intravenous PRN Sid Ring MD        0.9 % sodium chloride infusion   Intravenous PRN Sid Ring MD        propofol injection  5-50 mcg/kg/min Intravenous Titrated Sid Ring MD 15.4 mL/hr at 05/31/21 0844 30 mcg/kg/min at 05/31/21 0844    HYDROmorphone (DILAUDID) injection 0.25 mg  0.25 mg Intravenous Q3H PRN Sid Ring MD        Or    HYDROmorphone (DILAUDID) injection 0.5 mg  0.5 mg Intravenous Q3H PRN iSd Ring MD        sodium chloride flush 0.9 % injection 10 mL  10 mL Intravenous 2 times per day Sid Ring MD   10 mL at 05/30/21 1935    sodium chloride flush 0.9 % injection 10 mL  10 mL Intravenous PRN Markie Garcia MD        0.9 % sodium chloride infusion  25 mL Intravenous PRN Markie Garcia MD        potassium chloride 10 mEq/100 mL IVPB (Peripheral Line)  10 mEq Intravenous PRN Markie Garcia  mL/hr at 05/31/21 0951 10 mEq at 05/31/21 0951    magnesium sulfate 1000 mg in dextrose 5% 100 mL IVPB  1,000 mg Intravenous PRN Markie Garcia MD        ondansetron [facility]) injection 4 mg  4 mg Intravenous Q6H PRN Markie Garcia MD        ondansetron [facility]) injection 4 mg  4 mg Intravenous Q6H PRN Markie Garcia MD        aspirin tablet 325 mg  325 mg Oral Daily Markie Garcia MD   325 mg at 05/27/21 1356    cilostazol (PLETAL) tablet 100 mg  100 mg Oral BID Markie Garcia MD   100 mg at 05/27/21 1407    potassium chloride (MICRO-K) extended release capsule 20 mEq  20 mEq Oral Daily Markie Garcia MD        diatrizoate meglumine-sodium (GASTROGRAFIN) 66-10 % solution 450 mL  450 mL Oral ONCE PRN Markie Garcia MD   450 mL at 05/27/21 1214    pantoprazole (PROTONIX) injection 40 mg  40 mg Intravenous BID Markie Garcia MD   40 mg at 05/31/21 5183    And    sodium chloride (PF) 0.9 % injection 10 mL  10 mL Intravenous BID Markie Garcia MD   10 mL at 05/31/21 0813    sodium chloride flush 0.9 % injection 10 mL  10 mL Intravenous PRN Markie Garcia MD   10 mL at 05/27/21 1152    iopamidol (ISOVUE-370) 76 % injection 75 mL  75 mL Intravenous ONCE PRN Markie Garcia MD        potassium chloride 10 mEq/100 mL IVPB (Peripheral Line)  10 mEq Intravenous PRN Markie Garcia  mL/hr at 05/31/21 0548 10 mEq at 05/31/21 0548    fentaNYL (SUBLIMAZE) injection 25 mcg  25 mcg Intravenous Q2H PRN Markie Garcia MD   25 mcg at 05/30/21 1331    fentaNYL (SUBLIMAZE) injection 50 mcg  50 mcg Intravenous Q2H PRN Markie Garcia MD   50 mcg at 05/28/21 1573    sodium chloride flush 0.9 % injection 10 mL  10 mL Intravenous 2 times per day Markie Garcia MD   10 mL at 05/30/21 1935    sodium chloride flush 0.9 % injection 10 mL  10 mL Intravenous PRN Allan Owens MD        0.9 % sodium chloride infusion  25 mL Intravenous PRN Allan Owens MD        potassium chloride (KLOR-CON M) extended release tablet 40 mEq  40 mEq Oral PRN Allan Owens MD        Or    potassium bicarb-citric acid (EFFER-K) effervescent tablet 40 mEq  40 mEq Oral PRN Allan Owens MD   40 mEq at 05/27/21 1406    Or    potassium chloride 10 mEq/100 mL IVPB (Peripheral Line)  10 mEq Intravenous PRN Allan Owens MD        magnesium sulfate 1000 mg in dextrose 5% 100 mL IVPB  1,000 mg Intravenous PRN Allan Owens MD        promethazine (PHENERGAN) tablet 12.5 mg  12.5 mg Oral Q6H PRN Allan Owens MD        Or    ondansetron [facility]) injection 4 mg  4 mg Intravenous Q6H PRN Allan Owens MD   4 mg at 05/27/21 1714    magnesium hydroxide (MILK OF MAGNESIA) 400 MG/5ML suspension 30 mL  30 mL Oral Daily PRN Allan Owens MD        acetaminophen (TYLENOL) tablet 650 mg  650 mg Oral Q6H PRKILLIAN Owens MD        Or    acetaminophen (TYLENOL) suppository 650 mg  650 mg Rectal Q6H PRKILLIAN Owens MD        cefepime (MAXIPIME) 2000 mg IVPB minibag  2,000 mg Intravenous Q12H Allan Owens MD   Stopped at 05/31/21 0840    metronidazole (FLAGYL) 500 mg in NaCl 100 mL IVPB premix  500 mg Intravenous Nicole Patrick MD   Stopped at 05/31/21 1050    hydrALAZINE (APRESOLINE) injection 10 mg  10 mg Intravenous Q6H PRKILLIAN Owens MD           Social History:       TOBACCO:   reports that he quit smoking about 13 months ago. His smoking use included cigarettes. He started smoking about 65 years ago. He has a 60.00 pack-year smoking history. He has never used smokeless tobacco.  ETOH:   reports no history of alcohol use. DRUGS:  reports no history of drug use.   OCCUPATION:          Family Histroy:         Problem Relation Age of Onset    Arthritis Mother  Atrial Fibrillation Mother     Hearing Loss Mother     Heart Disease Mother     Stroke Mother     Vision Loss Mother     Arthritis Father     Cancer Father     Heart Disease Father     High Blood Pressure Father     Stroke Father     Vision Loss Father            Review of Systems:   · Constitutional: there has been no unanticipated weight loss. There's been no change in energy level, sleep pattern, or activity level. · Eyes: No visual changes or diplopia. No scleral icterus. · ENT: No Headaches, hearing loss or vertigo. No mouth sores or sore throat. · Cardiovascular: No chest pain, dyspnea on exertion, palpitations or loss of consciousness. No cough, hemoptysis, pleuritic pain, or phlebitis. · Respiratory: No cough or wheezing, no sputum production. No hematemesis. · Gastrointestinal: No abdominal pain, appetite loss, blood in stools. No change in bowel or bladder habits. · Genitourinary: No dysuria, trouble voiding, or hematuria. · Musculoskeletal:  No gait disturbance, weakness or joint complaints. · Integumentary: No rash or pruritis. · Neurological: No headache, diplopia, change in muscle strength, numbness or tingling. No change in gait, balance, coordination, mood, affect, memory, mentation, behavior. · Psychiatric: No anxiety, or depression. · Endocrine: No temperature intolerance. No excessive thirst, fluid intake, or urination. No tremor. · Hematologic/Lymphatic: No abnormal bruising or bleeding, blood clots or swollen lymph nodes. · Allergic/Immunologic: No nasal congestion or hives. Physical Exam    Vital Signs: /77   Pulse 94   Temp 98 °F (36.7 °C) (Axillary)   Resp 25   Ht 6' 4\" (1.93 m)   Wt 214 lb 8.1 oz (97.3 kg)   SpO2 100%   BMI 26.11 kg/m²  O2 Flow Rate (L/min): 0 L/min     Admission Weight: 184 lb 4.8 oz (83.6 kg)     General appearance: Sedated, orotracheal tube, NG tube. On vent.     Head: Normocephalic, without obvious abnormality, atraumatic    Eyes: Conjunctivae/corneas clear. Neck: Trachea midline. No JVD noted. No carotid bruits. Lungs: clear to auscultation bilaterally. Mild decreased breath sounds at bases. Heart: Atrial fib with a regular rate and rhythm, S1, S2 normal, no murmur, click, rub or gallop    Abdomen: Soft. No abdominal bruits    Extremities: extremities normal, atraumatic, no cyanosis or edema    Skin: Skin color, texture, turgor normal. No rashes or lesions. Generalized pallor. Neurologic: Not evaluated           Labs:      CBC:   Recent Labs     05/29/21 0441 05/30/21 0446 05/30/21  1736 05/30/21  2316 05/31/21  0354   WBC 13.6* 15.5*  --   --  15.4*   HGB 10.4* 9.3* 8.7* 8.6* 8.3*   HCT 31.2* 27.3* 27.7* 26.3* 25.6*   MCV 87.6 87.2  --   --  88.5    286  --   --  215     BMP:   Recent Labs     05/29/21  0441 05/29/21  0441 05/29/21  1143 05/30/21 0446 05/30/21  1935 05/31/21  0354   *  --   --  146*  --  138   K 3.2*  --  4.0 3.9  --  3.5*   *  --   --  118*  --  110*   CO2 22  --   --  19*  --  21   PHOS 1.4*   < >  --  1.7* 2.5 1.9*   BUN 30*  --   --  28*  --  24*   CREATININE 1.07  --   --  0.85  --  0.81    < > = values in this interval not displayed. PT/INR: No results for input(s): PROTIME, INR in the last 72 hours. APTT: No results for input(s): APTT in the last 72 hours. MAG:   Recent Labs     05/29/21  0441 05/30/21 0446 05/31/21  0354   MG 2.0 1.8 1.5*     D Dimer: No results for input(s): DDIMER in the last 72 hours. Troponin T No results for input(s): TROPHS in the last 72 hours. ProBNP Invalid input(s): PRO-BNP    XR ABDOMEN (KUB) (SINGLE AP VIEW)    Result Date: 5/28/2021  Persistently dilated small bowel loops similar to small bowel series examination. XR ABDOMEN (KUB) (SINGLE AP VIEW)    Result Date: 5/26/2021  1. Interval decrease in bowel gas. Nonobstructive bowel gas pattern. 2.  Moderate amount stool burden in the rectum and proximal colon.      XR ABDOMEN (KUB) (SINGLE AP VIEW)    Result Date: 5/25/2021  Stable bowel gas pattern, with air present throughout the large and small bowel. No significant small bowel distension. The closed loop small bowel obstruction in the right abdomen is not evident radiographically. CT CHEST W CONTRAST    Result Date: 5/26/2021  Right infrahilar mass that may involve the right inferior pulmonary vein. This also may narrow bronchials traversing this region. Tissue sampling is recommended. Pleural based opacity in the right upper lobe posteriorly and nodular opacity in the right upper lobe laterally. PET-CT may be helpful in further characterization. CT ABDOMEN PELVIS W IV CONTRAST Additional Contrast? None    Result Date: 5/24/2021  1. Findings consistent with close loop small bowel obstruction with transition zone in the right abdomen. Right upper abdomen Swirling loops of mildly dilated small bowel and in tightly collapsed small bowel loop with a funneling appearance. Surgical consultation advised. 2. Views of the lung bases show partially imaged necrotic appearing 3.2 cm right hilar or infrahilar lymph nodes/parenchymal mass with surrounding patchy consolidation and septal thickening in the right lower and middle lobes. Findings worrisome for malignancy. Dedicated CT chest recommended. 3. 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 Vascular consultation advised. IR FLUORO GUIDED CVA DEVICE PLMT/REPLACE/REMOVAL    Result Date: 5/28/2021  Ultrasound and fluoroscopy guided right sided PICC insertion with good tip position in the central circulation. PICC is ready for use at this time.      XR CHEST PORTABLE    Result Date: 5/31/2021  Endotracheal tube in satisfactory position Bilateral pulmonary opacities right greater than left likely representing pulmonary edema with bilateral pleural effusions XR CHEST PORTABLE    Result Date: 5/30/2021  Endotracheal tube in satisfactory position Bilateral airspace disease right greater than left could represent edema or infection     XR CHEST PORTABLE    Result Date: 5/29/2021  Trace right effusion and mild atelectasis. Tubes and lines as above. However, endotracheal tube terminates at the louis oriented toward the right mainstem bronchus. RECOMMENDATION: Advise retraction of endotracheal tube by 3-4 cm. The findings were sent to the Radiology Results [address] at 8:11 am on 5/29/2021to be communicated to a licensed caregiver. XR CHEST PORTABLE    Result Date: 5/28/2021  Endotracheal tube with tip 3.6 cm from the louis. Enteric tube with tip in the proximal body versus fundus of the stomach and side-port likely in the distal esophagus. Suggest advancement by 5-10 cm. Right-sided PICC line with tip in the distal SVC. No pneumothoraces. Mild bibasilar likely atelectasis. FL SMALL BOWEL FOLLOW THROUGH ONLY    Result Date: 5/27/2021  Findings suggestive of high-grade ileus or partial/intermittent small bowel obstruction. Findings were discussed with Dr. Jamie Collins at 4:30 p.m. on 5/27/2021. Diagnosis:  Principal Problem:    Small bowel obstruction (HCC)  Active Problems:    Status post insertion of percutaneous endoscopic gastrostomy (PEG) tube (HCC)    Acute cystitis without hematuria    Mass of right lung    COPD (chronic obstructive pulmonary disease) (HCC)  Resolved Problems:    * No resolved hospital problems. *          Plan:    DX  Atrial fib with RVR  Small bowel obstruction  Exploratory lap with lysis of adhesions. Hypernatremic  Postop hypotension on low-dose Levophed.  h/o atrial fibrillation  Normal LV function with EF 63% per echo 04/2020  Bladder CA  h/o CVA  COPD    Ventricular rate with A. fib currently controlled. Holding anticoagulation until cleared by surgical service.   Normal renal function  Hypernatremia resolved. Beta-blockers as needed for A. fib/RVR. We will hold digoxin for now. Recent echo noted. Vasopressors/Levophed as needed for pressure support. Low-dose currently.         Electronically signed by Flor Abraham MD on 5/31/2021 at 11:38 AM

## 2021-05-31 NOTE — PROGRESS NOTES
Patient was seen and examined. Remains intubated on the ventilator. Afebrile vital signs are stable. Has had liquid bowel movements multiple during the night. G-tube NG tube bilious. Abdomen is soft. Incision is clean dry intact. CHELSEA drains are all serous serosanguineous. Extremity mild edema. Blood work was reviewed. Potassium is 3.5. Creatinine is normal.  Potassium is being replaced. WBC count is 15.4. Hemoglobin is 8.3. Patient is on TPN. Change H&H to every 12. Potentially start tube feeds low-dose tomorrow. Discussed with nursing staff. Overall stable and doing well.

## 2021-05-31 NOTE — PROGRESS NOTES
Progress Note    5/31/2021   2:11 PM    Name:  Carlos Bolaños  MRN:    [id number]     Acct:     [de-identified]   Room:  2002/2002-01  IP Day: 7     Admit Date: 5/24/2021  3:38 PM  PCP: oRn Barrios MD    Subjective:     C/C:   Chief Complaint   Patient presents with   Dolly Nakai Nausea    Emesis       Interval History: Status: not changed. Patient is intubated and sedated. Patient had a brief episode of A. fib with RVR last night. Currently heart rate is sinus rhythm and stable. Patient is on 1 mery of Levophed for vasopressor support. Recent labs reviewed sodium within normal limit 138, 3.5 magnesium 1.5, phosphorus 1.9. Hemoglobin 8.3    ROS:   all 10 systems reviewed and are negative except as noted    Review of Systems   Unable to perform ROS: Intubated       Medications: Allergies:    Allergies   Allergen Reactions    Bactrim [Sulfamethoxazole-Trimethoprim] Hives and Swelling    Ciprofloxacin Swelling     FACE       Current Meds: PN-Adult 2-in-1 Central Line (Standard), Continuous TPN  magnesium sulfate 1000 mg in dextrose 5% 100 mL IVPB, PRN  sodium phosphate 14.79 mmol in dextrose 5 % 250 mL IVPB, PRN   Or  sodium phosphate 29.58 mmol in dextrose 5 % 250 mL IVPB, PRN  sodium bicarbonate 50 mEq in dextrose 5 % 1,000 mL infusion, Continuous  levalbuterol (XOPENEX) nebulizer solution 1.25 mg, Q6H  sodium chloride nebulizer 0.9 % solution 3 mL, Q8H PRN  ipratropium (ATROVENT) 0.02 % nebulizer solution 0.5 mg, Q6H  PN-Adult 2-in-1 Central Line (Standard), Continuous TPN  metoprolol (LOPRESSOR) injection 5 mg, Q6H PRN  insulin lispro (HUMALOG) injection vial 0-18 Units, Q6H  norepinephrine (LEVOPHED) 16 mg in sodium chloride 0.9 % 250 mL infusion, Continuous  fat emulsion 20 % infusion 100 mL, Daily  glucose (GLUTOSE) 40 % oral gel 15 g, PRN  dextrose 50 % IV solution, PRN  glucagon (rDNA) injection 1 mg, PRN  dextrose 5 % solution, PRN  0.9 % sodium chloride infusion, PRN  propofol injection, Titrated  HYDROmorphone (DILAUDID) injection 0.25 mg, Q3H PRN   Or  HYDROmorphone (DILAUDID) injection 0.5 mg, Q3H PRN  sodium chloride flush 0.9 % injection 10 mL, 2 times per day  sodium chloride flush 0.9 % injection 10 mL, PRN  0.9 % sodium chloride infusion, PRN  potassium chloride 10 mEq/100 mL IVPB (Peripheral Line), PRN  magnesium sulfate 1000 mg in dextrose 5% 100 mL IVPB, PRN  ondansetron (ZOFRAN) injection 4 mg, Q6H PRN  ondansetron (ZOFRAN) injection 4 mg, Q6H PRN  aspirin tablet 325 mg, Daily  cilostazol (PLETAL) tablet 100 mg, BID  potassium chloride (MICRO-K) extended release capsule 20 mEq, Daily  diatrizoate meglumine-sodium (GASTROGRAFIN) 66-10 % solution 450 mL, ONCE PRN  pantoprazole (PROTONIX) injection 40 mg, BID   And  sodium chloride (PF) 0.9 % injection 10 mL, BID  sodium chloride flush 0.9 % injection 10 mL, PRN  iopamidol (ISOVUE-370) 76 % injection 75 mL, ONCE PRN  potassium chloride 10 mEq/100 mL IVPB (Peripheral Line), PRN  fentaNYL (SUBLIMAZE) injection 25 mcg, Q2H PRN  fentaNYL (SUBLIMAZE) injection 50 mcg, Q2H PRN  sodium chloride flush 0.9 % injection 10 mL, 2 times per day  sodium chloride flush 0.9 % injection 10 mL, PRN  0.9 % sodium chloride infusion, PRN  potassium chloride (KLOR-CON M) extended release tablet 40 mEq, PRN   Or  potassium bicarb-citric acid (EFFER-K) effervescent tablet 40 mEq, PRN   Or  potassium chloride 10 mEq/100 mL IVPB (Peripheral Line), PRN  magnesium sulfate 1000 mg in dextrose 5% 100 mL IVPB, PRN  promethazine (PHENERGAN) tablet 12.5 mg, Q6H PRN   Or  ondansetron (ZOFRAN) injection 4 mg, Q6H PRN  magnesium hydroxide (MILK OF MAGNESIA) 400 MG/5ML suspension 30 mL, Daily PRN  acetaminophen (TYLENOL) tablet 650 mg, Q6H PRN   Or  acetaminophen (TYLENOL) suppository 650 mg, Q6H PRN  cefepime (MAXIPIME) 2000 mg IVPB minibag, Q12H  metronidazole (FLAGYL) 500 mg in NaCl 100 mL IVPB premix, Q8H  hydrALAZINE (APRESOLINE) injection 10 mg, Q6H PRN        Data: Code Status:  Full Code    Family History   Problem Relation Age of Onset    Arthritis Mother     Atrial Fibrillation Mother     Hearing Loss Mother     Heart Disease Mother     Stroke Mother     Vision Loss Mother     Arthritis Father     Cancer Father     Heart Disease Father     High Blood Pressure Father     Stroke Father     Vision Loss Father        Social History     Socioeconomic History    Marital status:      Spouse name: Not on file    Number of children: Not on file    Years of education: Not on file    Highest education level: Not on file   Occupational History    Not on file   Tobacco Use    Smoking status: Former Smoker     Packs/day: 1.00     Years: 60.00     Pack years: 60.00     Types: Cigarettes     Start date:      Quit date: 2020     Years since quittin.1    Smokeless tobacco: Never Used   Vaping Use    Vaping Use: Never used   Substance and Sexual Activity    Alcohol use: No    Drug use: No    Sexual activity: Yes     Partners: Female   Other Topics Concern    Not on file   Social History Narrative    Not on file     Social Determinants of Health     Financial Resource Strain:     Difficulty of Paying Living Expenses:    Food Insecurity:     Worried About [address] in the Last Year:     [address] in the Last Year:    Transportation Needs:     Lack of Transportation (Medical):      Lack of Transportation (Non-Medical):    Physical Activity:     Days of Exercise per Week:     Minutes of Exercise per Session:    Stress:     Feeling of Stress :    Social Connections:     Frequency of Communication with Friends and Family:     Frequency of Social Gatherings with Friends and Family:     Attends Baptism Services:     Active Member of Clubs or Organizations:     Attends Club or Organization Meetings:     Marital Status:    Intimate Partner Violence:     Fear of Current or Ex-Partner:     Emotionally Abused:     Physically Abused:     Sexually Abused:        I/O (24Hr): Intake/Output Summary (Last 24 hours) at 5/31/2021 1411  Last data filed at 5/31/2021 0400  Gross per 24 hour   Intake 2244.19 ml   Output 1715 ml   Net 529.19 ml     Radiology:  XR ABDOMEN (KUB) (SINGLE AP VIEW)    Result Date: 5/28/2021  Persistently dilated small bowel loops similar to small bowel series examination. XR ABDOMEN (KUB) (SINGLE AP VIEW)    Result Date: 5/26/2021  1. Interval decrease in bowel gas. Nonobstructive bowel gas pattern. 2.  Moderate amount stool burden in the rectum and proximal colon. XR ABDOMEN (KUB) (SINGLE AP VIEW)    Result Date: 5/25/2021  Stable bowel gas pattern, with air present throughout the large and small bowel. No significant small bowel distension. The closed loop small bowel obstruction in the right abdomen is not evident radiographically. CT CHEST W CONTRAST    Result Date: 5/26/2021  Right infrahilar mass that may involve the right inferior pulmonary vein. This also may narrow bronchials traversing this region. Tissue sampling is recommended. Pleural based opacity in the right upper lobe posteriorly and nodular opacity in the right upper lobe laterally. PET-CT may be helpful in further characterization. CT ABDOMEN PELVIS W IV CONTRAST Additional Contrast? None    Result Date: 5/24/2021  1. Findings consistent with close loop small bowel obstruction with transition zone in the right abdomen. Right upper abdomen Swirling loops of mildly dilated small bowel and in tightly collapsed small bowel loop with a funneling appearance. Surgical consultation advised. 2. Views of the lung bases show partially imaged necrotic appearing 3.2 cm right hilar or infrahilar lymph nodes/parenchymal mass with surrounding patchy consolidation and septal thickening in the right lower and middle lobes. Findings worrisome for malignancy. Dedicated CT chest recommended.  3. 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 Vascular consultation advised. IR FLUORO GUIDED CVA DEVICE PLMT/REPLACE/REMOVAL    Result Date: 5/28/2021  Ultrasound and fluoroscopy guided right sided PICC insertion with good tip position in the central circulation. PICC is ready for use at this time. XR CHEST PORTABLE    Result Date: 5/31/2021  Endotracheal tube in satisfactory position Bilateral pulmonary opacities right greater than left likely representing pulmonary edema with bilateral pleural effusions     XR CHEST PORTABLE    Result Date: 5/30/2021  Endotracheal tube in satisfactory position Bilateral airspace disease right greater than left could represent edema or infection     XR CHEST PORTABLE    Result Date: 5/29/2021  Trace right effusion and mild atelectasis. Tubes and lines as above. However, endotracheal tube terminates at the louis oriented toward the right mainstem bronchus. RECOMMENDATION: Advise retraction of endotracheal tube by 3-4 cm. The findings were sent to the Radiology Results [address] at 8:11 am on 5/29/2021to be communicated to a licensed caregiver. XR CHEST PORTABLE    Result Date: 5/28/2021  Endotracheal tube with tip 3.6 cm from the louis. Enteric tube with tip in the proximal body versus fundus of the stomach and side-port likely in the distal esophagus. Suggest advancement by 5-10 cm. Right-sided PICC line with tip in the distal SVC. No pneumothoraces. Mild bibasilar likely atelectasis. FL SMALL BOWEL FOLLOW THROUGH ONLY    Result Date: 5/27/2021  Findings suggestive of high-grade ileus or partial/intermittent small bowel obstruction. Findings were discussed with Dr. Jen Valadez at 4:30 p.m. on 5/27/2021.        Labs:  Recent Results (from the past 24 hour(s))   POC Glucose Fingerstick    Collection Time: 05/30/21  5:03 PM   Result Value Ref Range    POC Glucose 136 (H) 75 - 110 mg/dL   HEMOGLOBIN AND HEMATOCRIT, BLOOD    Collection Time: 05/30/21  5:36 PM   Result Value Ref Range    Hemoglobin 8.7 (L) 13.5 - 17.5 g/dL    Hematocrit 27.7 (L) 41 - 53 %   POC Glucose Fingerstick    Collection Time: 05/30/21  7:26 PM   Result Value Ref Range    POC Glucose 126 (H) 75 - 110 mg/dL   PHOSPHORUS    Collection Time: 05/30/21  7:35 PM   Result Value Ref Range    Phosphorus 2.5 2.5 - 4.5 mg/dL   HEMOGLOBIN AND HEMATOCRIT, BLOOD    Collection Time: 05/30/21 11:16 PM   Result Value Ref Range    Hemoglobin 8.6 (L) 13.5 - 17.5 g/dL    Hematocrit 26.3 (L) 41 - 53 %   POC Glucose Fingerstick    Collection Time: 05/31/21 12:14 AM   Result Value Ref Range    POC Glucose 162 (H) 75 - 110 mg/dL   Basic Metabolic Panel w/ Reflex to MG    Collection Time: 05/31/21  3:54 AM   Result Value Ref Range    Glucose 167 (H) 70 - 99 mg/dL    BUN 24 (H) 8 - 23 mg/dL    CREATININE 0.81 0.70 - 1.20 mg/dL    Bun/Cre Ratio NOT REPORTED 9 - 20    Calcium 7.3 (L) 8.6 - 10.4 mg/dL    Sodium 138 135 - 144 mmol/L    Potassium 3.5 (L) 3.7 - 5.3 mmol/L    Chloride 110 (H) 98 - 107 mmol/L    CO2 21 20 - 31 mmol/L    Anion Gap 7 (L) 9 - 17 mmol/L    GFR Non-African American >60 >60 mL/min    GFR African American >60 >60 mL/min    GFR Comment          GFR Staging NOT REPORTED    CBC with DIFF    Collection Time: 05/31/21  3:54 AM   Result Value Ref Range    WBC 15.4 (H) 3.5 - 11.0 k/uL    RBC 2.90 (L) 4.5 - 5.9 m/uL    Hemoglobin 8.3 (L) 13.5 - 17.5 g/dL    Hematocrit 25.6 (L) 41 - 53 %    MCV 88.5 80 - 100 fL    MCH 28.8 26 - 34 pg    MCHC 32.5 31 - 37 g/dL    RDW 15.2 (H) 11.5 - 14.9 %    Platelets 852 871 - 925 k/uL    MPV 7.7 6.0 - 12.0 fL    NRBC Automated NOT REPORTED per 100 WBC    Differential Type NOT REPORTED     Immature Granulocytes NOT REPORTED 0 %    Absolute Immature Granulocyte NOT REPORTED 0.00 - 0.30 k/uL    WBC Morphology NOT REPORTED     RBC Morphology NOT REPORTED     Platelet Estimate NOT REPORTED     Seg Neutrophils 73 (H) 36 - 66 %    Lymphocytes 18 (L) 24 - 44 %    Monocytes 7 1 - 7 %    Eosinophils % 2 0 - 4 %    Basophils 0 0 - 2 %    nRBC 1 (H) 0 per 100 WBC    Segs Absolute 11.28 (H) 1.3 - 9.1 k/uL    Absolute Lymph # 2.78 1.0 - 4.8 k/uL    Absolute Mono # 1.08 0.1 - 1.3 k/uL    Absolute Eos # 0.31 0.0 - 0.4 k/uL    Basophils Absolute 0.00 0.0 - 0.2 k/uL    Morphology ANISOCYTOSIS PRESENT     Morphology 1+ ELLIPTOCYTES     Morphology 1+ ECHINOCYTES     Morphology FEW SCHISTOCYTES     Morphology SLT POLYCHROMASIA    Magnesium    Collection Time: 05/31/21  3:54 AM   Result Value Ref Range    Magnesium 1.5 (L) 1.6 - 2.6 mg/dL   Phosphorus    Collection Time: 05/31/21  3:54 AM   Result Value Ref Range    Phosphorus 1.9 (L) 2.5 - 4.5 mg/dL   BLOOD GAS, ARTERIAL    Collection Time: 05/31/21  4:31 AM   Result Value Ref Range    pH, Arterial 7.457 (H) 7.350 - 7.450    pCO2, Arterial 29.2 (L) 35.0 - 45.0 mmHg    pO2, Arterial 111.0 (H) 80.0 - 100.0 mmHg    HCO3, Arterial 20.6 (L) 22.0 - 26.0 mmol/L    Positive Base Excess, Art NOT REPORTED 0.0 - 2.0 mmol/L    Negative Base Excess, Art 3.3 (H) 0.0 - 2.0 mmol/L    O2 Sat, Arterial 97.2 95 - 98 %    Total Hb NOT REPORTED 12.0 - 16.0 g/dl    Oxyhemoglobin NOT REPORTED 95.0 - 98.0 %    Carboxyhemoglobin 1.0 0 - 5 %    Methemoglobin 1.1 0.0 - 1.9 %    Pt Temp 37     pH, Art, Temp Adj NOT REPORTED 7.350 - 7.450    pCO2, Art, Temp Adj NOT REPORTED 35.0 - 45.0    pO2, Art, Temp Adj NOT REPORTED 80.0 - 100.0 mmHg    O2 Device/Flow/% VENTILATOR     Respiratory Rate 18     Andrew Test PASS     Sample Site Left Radial Artery     Pt.  Position SEMI-FOWLERS     Mode PRVC     Set Rate 18     Total Rate 25          FIO2 30     Peep/Cpap 7     PSV NOT REPORTED     Text for Respiratory RESULTS GIVEN TO RN     NOTIFICATION NOT REPORTED     NOTIFICATION TIME NOT REPORTED    POC Glucose Fingerstick    Collection Time: 05/31/21  7:26 AM   Result Value Ref Range    POC Glucose 141 (H) 75 - 110 mg/dL   EKG 12 Lead    Collection Time: 21  9:23 AM   Result Value Ref Range    Ventricular Rate 96 BPM    Atrial Rate 57 BPM    QRS Duration 78 ms    Q-T Interval 384 ms    QTc Calculation (Bazett) 485 ms    R Axis 72 degrees    T Axis 161 degrees   POC Glucose Fingerstick    Collection Time: 21 11:39 AM   Result Value Ref Range    POC Glucose 99 75 - 110 mg/dL       Physical Examination:        Vitals:  BP 95/64   Pulse 91   Temp 98.5 °F (36.9 °C) (Axillary)   Resp 25   Ht 6' 4\" (1.93 m)   Wt 214 lb 8.1 oz (97.3 kg)   SpO2 100%   BMI 26.11 kg/m²   Temp (24hrs), Av.5 °F (36.9 °C), Min:98 °F (36.7 °C), Max:99.1 °F (37.3 °C)    Recent Labs     21  1926 21  0014 21  0726 21  1139   POCGLU 126* 162* 141* 99         Physical Exam  Vitals reviewed. Constitutional:       Appearance: He is not diaphoretic. Comments: Sedated intubated   HENT:      Head: Normocephalic and atraumatic. Right Ear: External ear normal.      Left Ear: External ear normal.      Mouth/Throat:      Mouth: Mucous membranes are moist.   Cardiovascular:      Rate and Rhythm: Normal rate and regular rhythm. Pulses: Normal pulses. Heart sounds: Normal heart sounds. Pulmonary:      Breath sounds: Normal breath sounds. Comments: On ventilator  Abdominal:      General: Bowel sounds are normal. There is no distension. Palpations: Abdomen is soft. Comments: S/p small bowel resection . G-tube with bilious aspirate   Musculoskeletal:         General: No deformity. Cervical back: Normal range of motion and neck supple. No rigidity. Skin:     General: Skin is warm and dry. Capillary Refill: Capillary refill takes less than 2 seconds. Coloration: Skin is not jaundiced.    Neurological:      Comments: Sedated and intubated         Assessment:        Primary Problem  Small bowel obstruction (HCC)     Principal Problem:    Small bowel obstruction (HCC)  Active Problems:    Status post insertion of percutaneous endoscopic gastrostomy (PEG) tube (HCC)    Acute cystitis without hematuria    Mass of right lung    COPD (chronic obstructive pulmonary disease) ([facility])  Resolved Problems:    * No resolved hospital problems. *      Past Medical History:   Diagnosis Date    Arthritis     Cancer [facility])     bladder 1980s    Cerebral artery occlusion with cerebral infarction [facility])     TIA 2010    Chronic kidney disease     COPD [facility]chronic obstructive pulmonary disease) ([facility] [address]     Hypertension     Pneumonia     Psychiatric problem     depression, social anxiety    Seizures ([facility]         Plan:        1. IV cefepime  2. IV Flagyl  3. DC IV fluids  4. Keep potassium above 4magnesium above 2  5. Keep MAP above 65  6. TPN at goal  1. PPI pronics 40 mg IV  2. Guarded prognosis  3. CBC, CMP  4. DVT Prophylaxis no pharmacological DVT prophylaxis due to low hemoglobin  5. EPCs  6. PT/OT to evaluate and treat  7. Pain control  8. Replace electrolytes magnesium potassium and phosphorus as per sliding scale  9. Home medications reviewed and appropriate medications continued  10.  Reviewed labs and imaging studies from last 24 hours and results explained to patient      Electronically signed by Scot Vines MD

## 2021-05-31 NOTE — PLAN OF CARE
Problem: Falls - Risk of:  Goal: Will remain free from falls  Description: Will remain free from falls  5/31/2021 0321 by Perri Marx RN  Outcome: Ongoing  Note: Bed remains in lowest position and side rails up x2. Patient remains free of falls at this time. RN will continue to monitor. Goal: Absence of physical injury  Description: Absence of physical injury  5/31/2021 0321 by Perri Marx RN  Outcome: Ongoing  Note: Fall assessment performed and appropriate measures implemented. Room freed from clutter. Bed in lowest position with wheels locked. ID band in place. Problem: Skin Integrity:  Goal: Will show no infection signs and symptoms  Description: Will show no infection signs and symptoms  5/31/2021 0321 by Perri Marx RN  Outcome: Ongoing  Note: Patient afebrile. No signs of infection noted. Goal: Absence of new skin breakdown  Description: Absence of new skin breakdown  5/31/2021 0321 by Perri Marx RN  Outcome: Ongoing  Note: Patient turned and repositioned every 2 hours and as needed for comfort. Skin remains dry and intact. No new skin breakdown noted. Cream placed on bottom due to redness. Problem: SAFETY  Goal: Free from accidental physical injury  5/31/2021 0321 by Perri Marx RN  Outcome: Ongoing  Note: Fall assessment performed and appropriate measures implemented. Room freed from clutter. Goal: Free from intentional harm  5/31/2021 0321 by Perri Marx RN  Outcome: Ongoing     Problem: DAILY CARE  Goal: Daily care needs are met  5/31/2021 0321 by Perri Marx RN  Outcome: Ongoing     Problem: PAIN  Goal: Patient's pain/discomfort is manageable  5/31/2021 0321 by Preri Marx RN  Outcome: Ongoing     Problem: SKIN INTEGRITY  Goal: Skin integrity is maintained or improved  5/31/2021 0321 by Perri Marx RN  Outcome: Ongoing     Problem: KNOWLEDGE DEFICIT  Goal: Patient/S.O. demonstrates understanding of disease process, treatment plan, medications, and discharge instructions.   5/31/2021 0321 by Shadia Martin RN  Outcome: Ongoing     Problem: DISCHARGE BARRIERS  Goal: Patient's continuum of care needs are met  5/31/2021 0321 by Shadia Martin RN  Outcome: Ongoing  Problem: Pain:  Goal: Pain level will decrease  Description: Pain level will decrease  5/31/2021 0321 by Shadia Martin RN  Outcome: Ongoing  Goal: Control of acute pain  Description: Control of acute pain  5/31/2021 0321 by Shadia Martin RN  Outcome: Ongoing  Note: Pain assessed at regular intervals using CPOT. No signs of pain assessed. Goal: Control of chronic pain  Description: Control of chronic pain  5/31/2021 0321 by Shadia Martin RN  Outcome: Ongoing     Problem: Nutrition  Goal: Optimal nutrition therapy  5/31/2021 0321 by Shadia Martin RN  Outcome: Ongoing      Problem: Musculor/Skeletal Functional Status  Goal: Highest potential functional level  5/31/2021 0321 by Shadia Martin RN  Outcome: Ongoing  Note: Patient orally intubated and sedated. Not following any commands. Goal: Absence of falls  5/31/2021 0321 by Shadia Martin RN  Outcome: Ongoing     Problem: Musculor/Skeletal Functional Status  Goal: Highest potential functional level  5/31/2021 0321 by Shadia Martin RN  Outcome: Ongoing  Goal: Absence of falls  5/31/2021 0321 by Shadia Martin RN  Outcome: Ongoing     Problem: Non-Violent Restraints  Goal: Removal from restraints as soon as assessed to be safe  5/31/2021 0321 by Shadia Martin RN  Outcome: Ongoing  Note: Attempts to pull at tubes when released. ROM assessed every 2 hours and visual safety checks completed hourly. Restraints maintained to preserve the patient's airway. Goal: No harm/injury to patient while restraints in use  5/31/2021 0321 by Shadia Martin RN  Outcome: Ongoing  Note: Skin integrity assessed and ROM performed every two hours. No signs of injury noted.    Goal: Patient's dignity will be maintained  5/31/2021 0321 by Shadia Martin RN  Outcome: Ongoing     Problem: OXYGENATION/RESPIRATORY FUNCTION  Goal: Patient will maintain patent airway  5/31/2021 0321 by Parish Irizarry RN  Outcome: Ongoing  Note: Patient remains orally intubated and sedated on vent. Oral care and suctioning performed every two hours and as needed. Airway remains patent. Goal: Patient will achieve/maintain normal respiratory rate/effort  Description: Respiratory rate and effort will be within normal limits for the patient  5/31/2021 0321 by Parish Irizarry RN  Outcome: Ongoing  Note: Patient breathing over vent. Sedation increased as needed to help keep patient comfortable. Problem: MECHANICAL VENTILATION  Goal: Patient will maintain patent airway  5/31/2021 0321 by Parish Irizarry RN  Outcome: Ongoing  Note: Patient remains orally intubated and sedated on vent. Oral care and suctioning performed every two hours and as needed. Airway remains patent.    Goal: Patient will maintain patent airway  5/31/2021 0321 by Parish Irizarry RN  Outcome: Ongoing  Goal: Oral health is maintained or improved  5/31/2021 0321 by Parish Irizarry RN  Outcome: Ongoing  Goal: ET tube will be managed safely  5/31/2021 0321 by Parish Irizarry RN  Outcome: Ongoing  Goal: Ability to express needs and understand communication  5/31/2021 0321 by Parish Irizarry RN  Outcome: Ongoing  Goal: Mobility/activity is maintained at optimum level for patient  5/31/2021 0321 by Parish Irizarry RN  Outcome: Ongoing

## 2021-05-31 NOTE — PROGRESS NOTES
Department of Internal Medicine  Nephrology [facility], MD  Progress Note    Reason for consultation: Management of oliguric acute kidney injury. Consulting physician: Sherryle Lea, MD    Interval history:   Patient was seen and examined today in the intensive care unit where he remains intubated and on ventilator support. He is hypotensive and on Levophed drip at 1 mcg/min. He is nonoliguric. Patient is in +ve fluid balance, + edema  Finger tips/Digits are showing cyanosis. Scr WNL  SNa improved to 138    History of presenting illness: This is a 77 y.o. male with a significant past medical history of Bladder cancer, seizure disorder, osteoarthritis and s/p Cerebrovascular accident [has G-tube in place], who presented to the hospital on 5/24/2021 with complaints of diffuse abdominal pain of 1 day duration associated with nausea and vomiting. Also he complained of difficulty urinating and bladder scan showed 586 mL of urine retention. .  BUN/creatinine was 49/1.05 mg/dL with serum bicarbonate 18 mmol/L at presentation. He was admitted and eventually underwent exploratory laparotomy with release of adhesive band causing small bowel obstruction, small bowel resection with primary anastomosis on 5/28/2021. Postoperatively, he was intubated and admitted to the intensive care unit but noted to be oliguric and hence nephrology consultation. Issa catheter was flushed and clots removed and urine output has improved. History was obtained by chart review as patient is currently intubated.     Scheduled Meds:   levalbuterol  1.25 mg Nebulization Q6H    ipratropium  0.5 mg Nebulization Q6H    insulin lispro  0-18 Units Subcutaneous Q6H    fat emulsion  100 mL Intravenous Daily    sodium chloride flush  10 mL Intravenous 2 times per day    aspirin  325 mg Oral Daily    cilostazol  100 mg Oral BID    potassium chloride  20 mEq Oral Daily    pantoprazole  40 mg Intravenous BID    And    sodium

## 2021-06-01 ENCOUNTER — APPOINTMENT (OUTPATIENT)
Dept: INTERVENTIONAL RADIOLOGY/VASCULAR | Age: 76
DRG: 329 | End: 2021-06-01
Payer: COMMERCIAL

## 2021-06-01 ENCOUNTER — APPOINTMENT (OUTPATIENT)
Dept: GENERAL RADIOLOGY | Age: 76
DRG: 329 | End: 2021-06-01
Payer: COMMERCIAL

## 2021-06-01 PROBLEM — I48.0 PAF (PAROXYSMAL ATRIAL FIBRILLATION) (HCC): Status: ACTIVE | Noted: 2021-06-01

## 2021-06-01 LAB
ABSOLUTE EOS #: 0.55 K/UL (ref 0–0.4)
ABSOLUTE IMMATURE GRANULOCYTE: ABNORMAL K/UL (ref 0–0.3)
ABSOLUTE LYMPH #: 1.1 K/UL (ref 1–4.8)
ABSOLUTE MONO #: 0.83 K/UL (ref 0.1–1.3)
ALLEN TEST: ABNORMAL
ANION GAP SERPL CALCULATED.3IONS-SCNC: 11 MMOL/L (ref 9–17)
BASOPHILS # BLD: 0 % (ref 0–2)
BASOPHILS ABSOLUTE: 0 K/UL (ref 0–0.2)
BUN BLDV-MCNC: 24 MG/DL (ref 8–23)
BUN/CREAT BLD: ABNORMAL (ref 9–20)
CALCIUM SERPL-MCNC: 7.9 MG/DL (ref 8.6–10.4)
CARBOXYHEMOGLOBIN: 1.2 % (ref 0–5)
CHLORIDE BLD-SCNC: 108 MMOL/L (ref 98–107)
CO2: 22 MMOL/L (ref 20–31)
CREAT SERPL-MCNC: 0.74 MG/DL (ref 0.7–1.2)
DIFFERENTIAL TYPE: ABNORMAL
EOSINOPHILS RELATIVE PERCENT: 4 % (ref 0–4)
FIO2: 30
GFR AFRICAN AMERICAN: >60 ML/MIN
GFR NON-AFRICAN AMERICAN: >60 ML/MIN
GFR SERPL CREATININE-BSD FRML MDRD: ABNORMAL ML/MIN/{1.73_M2}
GFR SERPL CREATININE-BSD FRML MDRD: ABNORMAL ML/MIN/{1.73_M2}
GLUCOSE BLD-MCNC: 106 MG/DL (ref 75–110)
GLUCOSE BLD-MCNC: 120 MG/DL (ref 75–110)
GLUCOSE BLD-MCNC: 129 MG/DL (ref 75–110)
GLUCOSE BLD-MCNC: 130 MG/DL (ref 75–110)
GLUCOSE BLD-MCNC: 133 MG/DL (ref 70–99)
GLUCOSE BLD-MCNC: 137 MG/DL (ref 75–110)
GLUCOSE BLD-MCNC: 141 MG/DL (ref 75–110)
HCO3 ARTERIAL: 23.4 MMOL/L (ref 22–26)
HCT VFR BLD CALC: 26.7 % (ref 41–53)
HCT VFR BLD CALC: 26.8 % (ref 41–53)
HCT VFR BLD CALC: 27.5 % (ref 41–53)
HEMOGLOBIN: 8.7 G/DL (ref 13.5–17.5)
HEMOGLOBIN: 8.8 G/DL (ref 13.5–17.5)
HEMOGLOBIN: 8.9 G/DL (ref 13.5–17.5)
IMMATURE GRANULOCYTES: ABNORMAL %
LYMPHOCYTES # BLD: 8 % (ref 24–44)
MAGNESIUM: 2 MG/DL (ref 1.6–2.6)
MCH RBC QN AUTO: 29.3 PG (ref 26–34)
MCHC RBC AUTO-ENTMCNC: 33 G/DL (ref 31–37)
MCV RBC AUTO: 89 FL (ref 80–100)
METAMYELOCYTES ABSOLUTE COUNT: 0.14 K/UL
METAMYELOCYTES: 1 %
METHEMOGLOBIN: 1 % (ref 0–1.9)
MODE: ABNORMAL
MONOCYTES # BLD: 6 % (ref 1–7)
MORPHOLOGY: ABNORMAL
NEGATIVE BASE EXCESS, ART: 0.1 MMOL/L (ref 0–2)
NOTIFICATION TIME: ABNORMAL
NOTIFICATION: ABNORMAL
NRBC AUTOMATED: ABNORMAL PER 100 WBC
NUCLEATED RED BLOOD CELLS: 1 PER 100 WBC
O2 DEVICE/FLOW/%: ABNORMAL
O2 SAT, ARTERIAL: 97 % (ref 95–98)
OXYHEMOGLOBIN: ABNORMAL % (ref 95–98)
PATIENT TEMP: 37
PCO2 ARTERIAL: 31 MMHG (ref 35–45)
PCO2, ART, TEMP ADJ: ABNORMAL (ref 35–45)
PDW BLD-RTO: 15.4 % (ref 11.5–14.9)
PEEP/CPAP: 7
PH ARTERIAL: 7.49 (ref 7.35–7.45)
PH, ART, TEMP ADJ: ABNORMAL (ref 7.35–7.45)
PHOSPHORUS: 2.5 MG/DL (ref 2.5–4.5)
PLATELET # BLD: 239 K/UL (ref 150–450)
PLATELET ESTIMATE: ABNORMAL
PMV BLD AUTO: 8.3 FL (ref 6–12)
PO2 ARTERIAL: 108 MMHG (ref 80–100)
PO2, ART, TEMP ADJ: ABNORMAL MMHG (ref 80–100)
POSITIVE BASE EXCESS, ART: ABNORMAL MMOL/L (ref 0–2)
POTASSIUM SERPL-SCNC: 4.2 MMOL/L (ref 3.7–5.3)
PSV: ABNORMAL
PT. POSITION: ABNORMAL
RBC # BLD: 3.02 M/UL (ref 4.5–5.9)
RBC # BLD: ABNORMAL 10*6/UL
RESPIRATORY RATE: 18
SAMPLE SITE: ABNORMAL
SEG NEUTROPHILS: 81 % (ref 36–66)
SEGMENTED NEUTROPHILS ABSOLUTE COUNT: 11.14 K/UL (ref 1.3–9.1)
SET RATE: 18
SODIUM BLD-SCNC: 141 MMOL/L (ref 135–144)
TEXT FOR RESPIRATORY: ABNORMAL
TOTAL HB: ABNORMAL G/DL (ref 12–16)
TOTAL RATE: 26
VT: 500
WBC # BLD: 13.8 K/UL (ref 3.5–11)
WBC # BLD: ABNORMAL 10*3/UL

## 2021-06-01 PROCEDURE — 51702 INSERT TEMP BLADDER CATH: CPT

## 2021-06-01 PROCEDURE — 2500000003 HC RX 250 WO HCPCS: Performed by: SURGERY

## 2021-06-01 PROCEDURE — 2000000000 HC ICU R&B

## 2021-06-01 PROCEDURE — 02HV33Z INSERTION OF INFUSION DEVICE INTO SUPERIOR VENA CAVA, PERCUTANEOUS APPROACH: ICD-10-PCS | Performed by: RADIOLOGY

## 2021-06-01 PROCEDURE — 80048 BASIC METABOLIC PNL TOTAL CA: CPT

## 2021-06-01 PROCEDURE — 82805 BLOOD GASES W/O2 SATURATION: CPT

## 2021-06-01 PROCEDURE — 85014 HEMATOCRIT: CPT

## 2021-06-01 PROCEDURE — 02PYX3Z REMOVAL OF INFUSION DEVICE FROM GREAT VESSEL, EXTERNAL APPROACH: ICD-10-PCS | Performed by: RADIOLOGY

## 2021-06-01 PROCEDURE — 84100 ASSAY OF PHOSPHORUS: CPT

## 2021-06-01 PROCEDURE — 82947 ASSAY GLUCOSE BLOOD QUANT: CPT

## 2021-06-01 PROCEDURE — 6360000002 HC RX W HCPCS: Performed by: SURGERY

## 2021-06-01 PROCEDURE — 36600 WITHDRAWAL OF ARTERIAL BLOOD: CPT

## 2021-06-01 PROCEDURE — 71045 X-RAY EXAM CHEST 1 VIEW: CPT

## 2021-06-01 PROCEDURE — 2580000003 HC RX 258: Performed by: SURGERY

## 2021-06-01 PROCEDURE — 36415 COLL VENOUS BLD VENIPUNCTURE: CPT

## 2021-06-01 PROCEDURE — 6360000002 HC RX W HCPCS: Performed by: INTERNAL MEDICINE

## 2021-06-01 PROCEDURE — 85025 COMPLETE CBC W/AUTO DIFF WBC: CPT

## 2021-06-01 PROCEDURE — 6360000002 HC RX W HCPCS: Performed by: FAMILY MEDICINE

## 2021-06-01 PROCEDURE — 83735 ASSAY OF MAGNESIUM: CPT

## 2021-06-01 PROCEDURE — 36584 COMPL RPLCMT PICC RS&I: CPT | Performed by: RADIOLOGY

## 2021-06-01 PROCEDURE — 94640 AIRWAY INHALATION TREATMENT: CPT

## 2021-06-01 PROCEDURE — C9113 INJ PANTOPRAZOLE SODIUM, VIA: HCPCS | Performed by: SURGERY

## 2021-06-01 PROCEDURE — 85018 HEMOGLOBIN: CPT

## 2021-06-01 PROCEDURE — 2700000000 HC OXYGEN THERAPY PER DAY

## 2021-06-01 PROCEDURE — 97110 THERAPEUTIC EXERCISES: CPT

## 2021-06-01 PROCEDURE — 94003 VENT MGMT INPAT SUBQ DAY: CPT

## 2021-06-01 PROCEDURE — 94761 N-INVAS EAR/PLS OXIMETRY MLT: CPT

## 2021-06-01 PROCEDURE — 2709999900 IR FLUORO GUIDED CVA DEVICE PLMT/REPLACE/REMOVAL

## 2021-06-01 RX ADMIN — IPRATROPIUM BROMIDE 0.5 MG: 0.5 SOLUTION RESPIRATORY (INHALATION) at 02:53

## 2021-06-01 RX ADMIN — IPRATROPIUM BROMIDE 0.5 MG: 0.5 SOLUTION RESPIRATORY (INHALATION) at 06:56

## 2021-06-01 RX ADMIN — SODIUM CHLORIDE, PRESERVATIVE FREE 10 ML: 5 INJECTION INTRAVENOUS at 09:00

## 2021-06-01 RX ADMIN — PROPOFOL 20 MCG/KG/MIN: 10 INJECTION, EMULSION INTRAVENOUS at 22:45

## 2021-06-01 RX ADMIN — CALCIUM GLUCONATE: 98 INJECTION, SOLUTION INTRAVENOUS at 19:00

## 2021-06-01 RX ADMIN — IPRATROPIUM BROMIDE 0.5 MG: 0.5 SOLUTION RESPIRATORY (INHALATION) at 13:05

## 2021-06-01 RX ADMIN — LEVALBUTEROL HYDROCHLORIDE 1.25 MG: 1.25 SOLUTION, CONCENTRATE RESPIRATORY (INHALATION) at 02:53

## 2021-06-01 RX ADMIN — PROPOFOL 20 MCG/KG/MIN: 10 INJECTION, EMULSION INTRAVENOUS at 14:30

## 2021-06-01 RX ADMIN — POTASSIUM CHLORIDE 10 MEQ: 7.46 INJECTION, SOLUTION INTRAVENOUS at 00:56

## 2021-06-01 RX ADMIN — METRONIDAZOLE 500 MG: 500 INJECTION, SOLUTION INTRAVENOUS at 17:29

## 2021-06-01 RX ADMIN — LEVALBUTEROL HYDROCHLORIDE 1.25 MG: 1.25 SOLUTION, CONCENTRATE RESPIRATORY (INHALATION) at 13:05

## 2021-06-01 RX ADMIN — FENTANYL CITRATE 25 MCG: 50 INJECTION INTRAMUSCULAR; INTRAVENOUS at 13:32

## 2021-06-01 RX ADMIN — SODIUM CHLORIDE, PRESERVATIVE FREE 10 ML: 5 INJECTION INTRAVENOUS at 19:54

## 2021-06-01 RX ADMIN — METRONIDAZOLE 500 MG: 500 INJECTION, SOLUTION INTRAVENOUS at 00:57

## 2021-06-01 RX ADMIN — PANTOPRAZOLE SODIUM 40 MG: 40 INJECTION, POWDER, FOR SOLUTION INTRAVENOUS at 12:19

## 2021-06-01 RX ADMIN — Medication 10 ML: at 12:19

## 2021-06-01 RX ADMIN — LEVALBUTEROL HYDROCHLORIDE 1.25 MG: 1.25 SOLUTION, CONCENTRATE RESPIRATORY (INHALATION) at 06:57

## 2021-06-01 RX ADMIN — METRONIDAZOLE 500 MG: 500 INJECTION, SOLUTION INTRAVENOUS at 12:18

## 2021-06-01 RX ADMIN — SODIUM CHLORIDE, PRESERVATIVE FREE 10 ML: 5 INJECTION INTRAVENOUS at 19:55

## 2021-06-01 RX ADMIN — IPRATROPIUM BROMIDE 0.5 MG: 0.5 SOLUTION RESPIRATORY (INHALATION) at 19:07

## 2021-06-01 RX ADMIN — LEVALBUTEROL HYDROCHLORIDE 1.25 MG: 1.25 SOLUTION, CONCENTRATE RESPIRATORY (INHALATION) at 19:07

## 2021-06-01 RX ADMIN — PANTOPRAZOLE SODIUM 40 MG: 40 INJECTION, POWDER, FOR SOLUTION INTRAVENOUS at 19:54

## 2021-06-01 RX ADMIN — CEFEPIME 2000 MG: 2 INJECTION, POWDER, FOR SOLUTION INTRAVENOUS at 06:37

## 2021-06-01 RX ADMIN — CEFEPIME 2000 MG: 2 INJECTION, POWDER, FOR SOLUTION INTRAVENOUS at 19:33

## 2021-06-01 RX ADMIN — Medication 10 ML: at 19:54

## 2021-06-01 ASSESSMENT — PULMONARY FUNCTION TESTS
PIF_VALUE: 21
PIF_VALUE: 20
PIF_VALUE: 33
PIF_VALUE: 32
PIF_VALUE: 31
PIF_VALUE: 25
PIF_VALUE: 42
PIF_VALUE: 28
PIF_VALUE: 20
PIF_VALUE: 31
PIF_VALUE: 38
PIF_VALUE: 17
PIF_VALUE: 22
PIF_VALUE: 23
PIF_VALUE: 19
PIF_VALUE: 23
PIF_VALUE: 35
PIF_VALUE: 34
PIF_VALUE: 24
PIF_VALUE: 27
PIF_VALUE: 28
PIF_VALUE: 19
PIF_VALUE: 24
PIF_VALUE: 48
PIF_VALUE: 17
PIF_VALUE: 22
PIF_VALUE: 23
PIF_VALUE: 33
PIF_VALUE: 27
PIF_VALUE: 34
PIF_VALUE: 18
PIF_VALUE: 24
PIF_VALUE: 19
PIF_VALUE: 14
PIF_VALUE: 22
PIF_VALUE: 19
PIF_VALUE: 22
PIF_VALUE: 22
PIF_VALUE: 27
PIF_VALUE: 18
PIF_VALUE: 23
PIF_VALUE: 20
PIF_VALUE: 26
PIF_VALUE: 20
PIF_VALUE: 36
PIF_VALUE: 16
PIF_VALUE: 27
PIF_VALUE: 18
PIF_VALUE: 16
PIF_VALUE: 32
PIF_VALUE: 19
PIF_VALUE: 48
PIF_VALUE: 42
PIF_VALUE: 22
PIF_VALUE: 20
PIF_VALUE: 20
PIF_VALUE: 16
PIF_VALUE: 23
PIF_VALUE: 17
PIF_VALUE: 21
PIF_VALUE: 33
PIF_VALUE: 20
PIF_VALUE: 22
PIF_VALUE: 24
PIF_VALUE: 18
PIF_VALUE: 40
PIF_VALUE: 18
PIF_VALUE: 21
PIF_VALUE: 31
PIF_VALUE: 38
PIF_VALUE: 30

## 2021-06-01 ASSESSMENT — PAIN SCALES - GENERAL: PAINLEVEL_OUTOF10: 0

## 2021-06-01 NOTE — PROGRESS NOTES
Physical Therapy  Facility/Department: [facility] ICU  Daily Treatment Note  NAME: Dyllan Lott  :   MRN: [id number]    Date of Service: 2021    Discharge Recommendations:  Patient would benefit from continued therapy after discharge   PT Equipment Recommendations  Equipment Needed: No    Assessment   Body structures, Functions, Activity limitations: Decreased functional mobility ; Decreased ADL status; Decreased ROM; Decreased strength;Decreased endurance;Decreased sensation; Increased pain  Assessment: PROM completed this date to prevent risk of skin breakdown and contractures. Pt currently sedated and on vent. Pt is post op abdominal procedure. No updated goals due to pt's PLOF at baseline. Treatment Diagnosis: Impaired functional mobility 2* SBO  Specific instructions for Next Treatment: ROM, stretching, bed mobility, repositioning  Prognosis: Guarded  Decision Making: Medium Complexity  History: 77 y.o. male who presents with abdominal pain, nausea, vomiting. Patient was noted by family to have abdominal pain, nausea, vomiting, called EMS, transferred to the emergency department. Patient is at baseline, mumbling incoherently, unable to obtain HPI or ROS from patient, but sister is present and is able to provide information. Patient was diagnosed with a neuromuscular disorder, unknown etiology, minimally verbal at baseline, but interactive. Patient sister reports that he started having generalized abdominal pain this morning, some episodes of emesis. Exam: ROM, positioning  Clinical Presentation: Pt sedated on vent  Barriers to Learning: sedation  REQUIRES PT FOLLOW UP: Yes  Activity Tolerance  Activity Tolerance: Treatment limited secondary to medical complications (free text) (sedated on vent)     Patient Diagnosis(es): The encounter diagnosis was Small bowel obstruction ([facility] [address]      has a past medical history of Arthritis, Cancer ([facility] [address] Cerebral artery occlusion with cerebral infarction [facility]), Chronic kidney disease, COPD (chronic obstructive pulmonary disease) ([facility] [address] Hypertension, Pneumonia, Psychiatric problem, and Seizures ([facility] [address] has a past surgical history that includes back surgery; fracture surgery; hernia repair; Tonsillectomy; Carotid endarterectomy (Left, 09/20/2016); Abdomen surgery; hiatal hernia repair; Inguinal hernia repair (Bilateral); other surgical history; Colonoscopy (N/A, 8/31/2018); Upper gastrointestinal endoscopy (04/15/2020); Gastrostomy tube placement (04/15/2020); Upper gastrointestinal endoscopy (4/15/2020); Upper gastrointestinal endoscopy (4/15/2020); and colectomy (N/A, 5/28/2021). Restrictions  Restrictions/Precautions  Restrictions/Precautions: General Precautions, Fall Risk  Required Braces or Orthoses?: Yes (B PRAFOS in bed)  Implants present? : Metal implants (plate in head)  Required Braces or Orthoses  Other: Abdominal Binder  Right Lower Extremity Brace:  (PRAFO)  Left Lower Extremity Brace:  (PRAFO)  Position Activity Restriction  Other position/activity restrictions: PT OT eval and treat  Subjective   General  Chart Reviewed: Yes  Additional Pertinent Hx: CA, CVA, COPD, expressive aphasia  Family / Caregiver Present: Yes (son briefly came during session)  Referring Practitioner: Sherryle Lea, MD  Subjective  Subjective: CHRISSIE Gilbert PROM. Pt is sedated on vent. Recent ex lap on 5/28/21. General Comment  Comments: B UE restraints this date - secured at start/end of session. Pain Screening  Patient Currently in Pain:  (PAT)  Vital Signs  Patient Currently in Pain:  (PAT)  Oxygen Therapy  SpO2: 100 %  O2 Device: Ventilator  Patient Observation  Observations: mild L UE edema, B feet cold to touch/shiny in appearance       Orientation  Orientation  Overall Orientation Status:  (PAT)  Cognition      Objective   Bed mobility  Comment: No formal bed mobility. Pt positioned with pillows for skin protection.   Transfers  Sit to Stand: Unable to assess  Stand to sit: Unable to assess  Comment: Pt does not transfer - will be progressing to junior transfers at home. Ambulation  Ambulation?: No  Stairs/Curb  Stairs?: No     Balance  Comments: PAT - pt remains in bed  Other exercises  Other exercises?: Yes  Other exercises 1: PROM BLE and BUE ex x  10 reps  Other exercises 2: bilateral gastroc gentle stretching x 3x 30sec hold.    Other exercises 3: B finger extension stretch to prevent contractures - 3x 30 second holds  Other exercises 4: Skin inspection to check for integrity of skin as well as pillow positioning  Other exercises 5: B PRAFOs donned'doffed   PROM RLE (degrees)  RLE PROM: WFL  RLE General PROM: unable to obtain neutral ankle - requested PRAFO boots  PROM LLE (degrees)  LLE PROM: WFL  LLE General PROM: unable to obtain neutral ankle - requested PRAFO boots  AROM RUE (degrees)  RUE General AROM: See OT  AROM LUE (degrees)  LUE General AROM: See OT  Strength RLE  Strength RLE: Exception  Comment: supine  R Hip Flexion: 0/5  R Knee Flexion: 2-/5  R Knee Extension: 0/5  R Ankle Dorsiflexion: 0/5  R Ankle Plantar flexion: 0/5  Strength LLE  Strength LLE: Exception  Comment: supine  L Hip Flexion: 1+/5  L Knee Flexion: 2-/5  L Knee Extension: 1+/5  L Ankle Dorsiflexion: 0/5  L Ankle Plantar Flexion: 0/5  Strength RUE  Comment: See OT  Strength LUE  Comment: See OT                 G-Code     OutComes Score                                                     AM-PAC Score     AM-PAC Inpatient Mobility without Stair Climbing Raw Score : 5 (05/26/21 1249)  AM-PAC Inpatient without Stair Climbing T-Scale Score : 23.59 (05/26/21 1249)  Mobility Inpatient CMS 0-100% Score: 100 (05/26/21 1249)  Mobility Inpatient without Stair CMS G-Code Modifier : CN (05/26/21 1249)       Goals  Short term goals  Time Frame for Short term goals: 4-5 days - no change to goals post op  Short term goal 1: Pt to demo mod x2 rolling each direction in bed to prepare for sling placement at home for [employer] transfers. Short term goal 2: Pt to tolerate 30-45 minute PT session. Short term goal 3: Pt to tolerate stretching of B LE with up to 30 second holds, 3x each. Short term goal 4: Pt to tolerate 10-15 reps of BLE strengthening exercises progressing from [facility] to [facility]. Short term goal 5: Pt to complete core strengthening exercises with good technique. 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

## 2021-06-01 NOTE — PROGRESS NOTES
recorded  CVP PRESSURE RANGE:  No data recorded  Mechanical Ventilation Data   SETTINGS (Comprehensive)  Vent Information  $Ventilation: $Subsequent Day  Skin Assessment: Clean, dry, & intact  Equipment ID: [id number]  Equipment Changed: HME  Vent Type: Servo i  Vent Mode: PRVC  Vt Ordered: 500 mL  Rate Set: 18 bmp  Pressure Support: 7 cmH20  FiO2 : 30 %  SpO2: 100 %  SpO2/FiO2 ratio: 326.67  Sensitivity: 5  PEEP/CPAP: 7  I Time/ I Time %: 1.11 s  Humidification Source: HME  Additional Respiratory  Assessments  Pulse: 85  Resp: 23  SpO2: 100 %  End Tidal CO2: 24  Position: Semi-Santiago's  Humidification Source: HME  Oral Care Completed?: Yes  Oral Care: Mouth suctioned  Cuff Pressure (cm H2O): 24 cm H2O       ABGs:   Lab Results   Component Value Date    PHART 7.485 06/01/2021    PO2ART 108.0 06/01/2021    MWD1VOV 31.0 06/01/2021       Lab Results   Component Value Date    MODE PRVC 06/01/2021         Medications   IV   PN-Adult 2-in-1 Central Line (Standard) 80 mL/hr at 05/31/21 1759    norepinephrine 1 mcg/min (06/01/21 0717)    dextrose      sodium chloride      propofol 10 mcg/kg/min (06/01/21 0605)      levalbuterol  1.25 mg Nebulization Q6H    ipratropium  0.5 mg Nebulization Q6H    insulin lispro  0-18 Units Subcutaneous Q6H    sodium chloride flush  10 mL Intravenous 2 times per day    aspirin  325 mg Oral Daily    cilostazol  100 mg Oral BID    potassium chloride  20 mEq Oral Daily    pantoprazole  40 mg Intravenous BID    And    sodium chloride (PF)  10 mL Intravenous BID    sodium chloride flush  10 mL Intravenous 2 times per day    cefepime  2,000 mg Intravenous Q12H    metroNIDAZOLE  500 mg Intravenous Q8H       Diet/Nutrition   Diet NPO Effective Now Exceptions are: Ice Chips, Sips of Water with Meds, Popsicles  PN-Adult 2-in-1 Central Line (Standard)    Exam   VITALS    height is 6' 4\" (1.93 m) and weight is 206 lb 2.1 oz (93.5 kg). His axillary temperature is 99.3 °F (37.4 °C).  His blood pressure is 119/84 and his pulse is 85. His respiration is 23 and oxygen saturation is 100%. Ventilator Settings (Basic)  Vent Mode: PRVC Rate Set: 18 bmp/Vt Ordered: 500 mL/ /FiO2 : 30 %    Constitutional - Sedated on vent  General Appearance  well developed, well nourished  HEENT - Life support devices in place (ET, NG),normocephalic, atraumatic. PERRLA  Lungs - Chest expands equally, no wheezes, + coarse rhonchi. Cardiovascular - Heart sounds are normal.  normal rate and rhythm irregular, no murmur, gallop or rub. Abdomen - soft, no guarding, nondistended,   Neurologic -open eyes at times, moves all extremities, does not follow commands  Skin - no bruising or bleeding  Extremities - no cyanosis, clubbing, mild edema    Lab Results   CBC     Lab Results   Component Value Date    WBC 13.8 06/01/2021    RBC 3.02 06/01/2021    HGB 8.7 06/01/2021    HCT 26.7 06/01/2021     06/01/2021    MCV 89.0 06/01/2021    MCH 29.3 06/01/2021    MCHC 33.0 06/01/2021    RDW 15.4 06/01/2021    NRBC 1 06/01/2021    METASPCT 1 06/01/2021    LYMPHOPCT 8 06/01/2021    MONOPCT 6 06/01/2021    BASOPCT 0 06/01/2021    MONOSABS 0.83 06/01/2021    LYMPHSABS 1.10 06/01/2021    EOSABS 0.55 06/01/2021    BASOSABS 0.00 06/01/2021    DIFFTYPE NOT REPORTED 06/01/2021       BMP   Lab Results   Component Value Date     06/01/2021    K 4.2 06/01/2021     06/01/2021    CO2 22 06/01/2021    BUN 24 06/01/2021    CREATININE 0.74 06/01/2021    GLUCOSE 133 06/01/2021    CALCIUM 7.9 06/01/2021       LFTS  Lab Results   Component Value Date    ALKPHOS 45 05/29/2021    ALT 20 05/29/2021    AST 56 05/29/2021    PROT 5.7 05/29/2021    BILITOT 0.34 05/29/2021    LABALBU 3.1 05/29/2021       INR  No results for input(s): PROTIME, INR in the last 72 hours. APTT  No results for input(s): APTT in the last 72 hours.     Lactic Acid  Lab Results   Component Value Date    LACTA 1.6 05/30/2021    LACTA 2.4 05/29/2021    LACTA 0.7 05/27/2021 BNP   No results for input(s): BNP in the last 72 hours. Cultures       Radiology     Plain Films ET tube acceptable position, mild congestion and small pleural effusions         CT Scans    See actual reports for details    SYSTEM ASSESSMENT      Neuro       Respiratory   Wean oxygen as tolerated.  Keep O2 sat > 88%       Hemodynamics       Gastrointestinal/Nutrition       Renal       Infectious Disease       Hematology/Oncology       Endocrine       Social/Spiritual/DNR/Disposition/Other     Requiring ventilatory support post ex lap 5/28  Respiratory alkalosis  Hypervolemia, negative fluid balance   Bilateral pleural effusions  History of COPD/possible CYNDI  Paroxysmal A. fib, preserved LV function,   history of CVA seizure  SBO/ Ex lap with SB resection and primary anastomosis, 5/28  Acute kidney injury as per nephrology     Plan:  Daily sedation vacation and weaning trial  Decrease respiratory rate to 12  Off Lasix, fluid balance was negative by 3723 mL  Bronchodilators  Anticoagulation as noted by cardiology awaiting surgery clearance  Discussed with staff, noted patient's baseline is nonverbal      Critical Care Time   30 min    Electronically signed by Em Albright MD on 6/1/2021 at 11:23 AM

## 2021-06-01 NOTE — PLAN OF CARE
Problem: SKIN INTEGRITY  Goal: Skin integrity is maintained or improved  6/1/2021 1645 by Ken Cope RCP  Outcome: Ongoing     Problem: OXYGENATION/RESPIRATORY FUNCTION  Goal: Patient will achieve/maintain normal respiratory rate/effort  Description: Respiratory rate and effort will be within normal limits for the patient  6/1/2021 1645 by Ken Cope RCP  Outcome: Ongoing     Problem: MECHANICAL VENTILATION  Goal: Patient will maintain patent airway  6/1/2021 1645 by Ken Cope RCP  Outcome: Ongoing     Problem: MECHANICAL VENTILATION  Goal: ET tube will be managed safely  6/1/2021 1645 by Ken Cope RCP  Outcome: Ongoing     Problem: MECHANICAL VENTILATION  Goal: Ability to express needs and understand communication  6/1/2021 1645 by Ken Cope RCP  Outcome: Ongoing

## 2021-06-01 NOTE — PROGRESS NOTES
Weight: 184 lb (83.5 kg)     · Ideal Body Weight: 202 lbs; % Ideal Body Weight     · BMI: 25.1  · BMI Categories: Normal Weight (BMI 22.0 to 24.9) age over 72       Nutrition Diagnosis:   · Inadequate oral intake related to altered GI function as evidenced by NPO or clear liquid status due to medical condition, nutrition support - parenteral nutrition      Nutrition Interventions:   Food and/or Nutrient Delivery:  Start Tube Feeding, Modify Parenteral Nutrition  Nutrition Education/Counseling:  No recommendation at this time   Coordination of Nutrition Care:  Continue to monitor while inpatient    Goals:  provide more than 75% of nutrition needs       Nutrition Monitoring and Evaluation:   Behavioral-Environmental Outcomes:  None Identified   Food/Nutrient Intake Outcomes:  Enteral Nutrition Intake/Tolerance, Parenteral Nutrition Intake/Tolerance  Physical Signs/Symptoms Outcomes:  Biochemical Data, GI Status, Fluid Status or Edema, Nutrition Focused Physical Findings, Skin, Weight     Discharge Planning: Too soon to determine     Some areas of assessment may be incomplete due to COVID-19 precautions. Paola Varghese R.D., L.D.   Clinical Dietitian  Office: [phone]

## 2021-06-01 NOTE — PROGRESS NOTES
Progress Note    6/1/2021   1:24 PM    Name:  Stefanie Olmedo  MRN:    [id number]     Acct:     [de-identified]   Room:  2002/2002-01  IP Day: 8     Admit Date: 5/24/2021  3:38 PM  PCP: Tania Brantley MD    Subjective:     C/C:   Chief Complaint   Patient presents with   Heather Manual Nausea    Emesis       Interval History: Status: not changed. Patient had a new PICC line placed today due to the PICC tip displacement. today patient is intubated currently being weaned off of propofol open eyes on verbal command. Patient is in a negative fluid balance. On Levophed for vasopressor support. Blood pressure and heart rate stable with a MAP of 100. Patient has paroxysmal atrial fibrillation but currently in sinus rhythm. recent labs reviewed blood sugar readings stable. Hemoglobin 8.7    ROS:   all 10 systems reviewed and are negative except as noted    Review of Systems   Unable to perform ROS: Intubated       Medications: Allergies:    Allergies   Allergen Reactions    Bactrim [Sulfamethoxazole-Trimethoprim] Hives and Swelling    Ciprofloxacin Swelling     FACE       Current Meds: PN-Adult 2-in-1 Central Line (Standard), Continuous TPN  sodium phosphate 14.79 mmol in dextrose 5 % 250 mL IVPB, PRN   Or  sodium phosphate 29.58 mmol in dextrose 5 % 250 mL IVPB, PRN  levalbuterol (XOPENEX) nebulizer solution 1.25 mg, Q6H  sodium chloride nebulizer 0.9 % solution 3 mL, Q8H PRN  ipratropium (ATROVENT) 0.02 % nebulizer solution 0.5 mg, Q6H  metoprolol (LOPRESSOR) injection 5 mg, Q6H PRN  insulin lispro (HUMALOG) injection vial 0-18 Units, Q6H  norepinephrine (LEVOPHED) 16 mg in sodium chloride 0.9 % 250 mL infusion, Continuous  glucose (GLUTOSE) 40 % oral gel 15 g, PRN  dextrose 50 % IV solution, PRN  glucagon (rDNA) injection 1 mg, PRN  dextrose 5 % solution, PRN  0.9 % sodium chloride infusion, PRN  propofol injection, Titrated  HYDROmorphone (DILAUDID) injection 0.25 mg, Q3H PRN   Or  HYDROmorphone (DILAUDID) injection 0.5 mg, Q3H PRN  sodium chloride flush 0.9 % injection 10 mL, 2 times per day  sodium chloride flush 0.9 % injection 10 mL, PRN  potassium chloride 10 mEq/100 mL IVPB (Peripheral Line), PRN  ondansetron (ZOFRAN) injection 4 mg, Q6H PRN  ondansetron (ZOFRAN) injection 4 mg, Q6H PRN  aspirin tablet 325 mg, Daily  cilostazol (PLETAL) tablet 100 mg, BID  potassium chloride (MICRO-K) extended release capsule 20 mEq, Daily  diatrizoate meglumine-sodium (GASTROGRAFIN) 66-10 % solution 450 mL, ONCE PRN  pantoprazole (PROTONIX) injection 40 mg, BID   And  sodium chloride (PF) 0.9 % injection 10 mL, BID  iopamidol (ISOVUE-370) 76 % injection 75 mL, ONCE PRN  fentaNYL (SUBLIMAZE) injection 25 mcg, Q2H PRN  fentaNYL (SUBLIMAZE) injection 50 mcg, Q2H PRN  sodium chloride flush 0.9 % injection 10 mL, 2 times per day  potassium chloride (KLOR-CON M) extended release tablet 40 mEq, PRN   Or  potassium bicarb-citric acid (EFFER-K) effervescent tablet 40 mEq, PRN   Or  potassium chloride 10 mEq/100 mL IVPB (Peripheral Line), PRN  magnesium sulfate 1000 mg in dextrose 5% 100 mL IVPB, PRN  promethazine (PHENERGAN) tablet 12.5 mg, Q6H PRN   Or  ondansetron (ZOFRAN) injection 4 mg, Q6H PRN  magnesium hydroxide (MILK OF MAGNESIA) 400 MG/5ML suspension 30 mL, Daily PRN  acetaminophen (TYLENOL) tablet 650 mg, Q6H PRN   Or  acetaminophen (TYLENOL) suppository 650 mg, Q6H PRN  cefepime (MAXIPIME) 2000 mg IVPB minibag, Q12H  metronidazole (FLAGYL) 500 mg in NaCl 100 mL IVPB premix, Q8H  hydrALAZINE (APRESOLINE) injection 10 mg, Q6H PRN        Data:     Code Status:  Full Code    Family History   Problem Relation Age of Onset    Arthritis Mother     Atrial Fibrillation Mother     Hearing Loss Mother     Heart Disease Mother     Stroke Mother     Vision Loss Mother     Arthritis Father     Cancer Father     Heart Disease Father     High Blood Pressure Father     Stroke Father     Vision Loss Father        Social History     Socioeconomic History    Marital status:      Spouse name: Not on file    Number of children: Not on file    Years of education: Not on file    Highest education level: Not on file   Occupational History    Not on file   Tobacco Use    Smoking status: Former Smoker     Packs/day: 1.00     Years: 60.00     Pack years: 60.00     Types: Cigarettes     Start date:      Quit date: 2020     Years since quittin.1    Smokeless tobacco: Never Used   Vaping Use    Vaping Use: Never used   Substance and Sexual Activity    Alcohol use: No    Drug use: No    Sexual activity: Yes     Partners: Female   Other Topics Concern    Not on file   Social History Narrative    Not on file     Social Determinants of Health     Financial Resource Strain:     Difficulty of Paying Living Expenses:    Food Insecurity:     Worried About [address] [employer] in the Last Year:     [employer] in the Last Year:    Transportation Needs:     Lack of Transportation (Medical):  Lack of Transportation (Non-Medical):    Physical Activity:     Days of Exercise per Week:     Minutes of Exercise per Session:    Stress:     Feeling of Stress :    Social Connections:     Frequency of Communication with Friends and Family:     Frequency of Social Gatherings with Friends and Family:     Attends Oriental orthodox Services:     Active Member of Clubs or Organizations:     Attends Club or Organization Meetings:     Marital Status:    Intimate Partner Violence:     Fear of Current or Ex-Partner:     Emotionally Abused:     Physically Abused:     Sexually Abused:        I/O (24Hr): Intake/Output Summary (Last 24 hours) at 2021 1324  Last data filed at 2021 0420  Gross per 24 hour   Intake 3791.83 ml   Output 7515 ml   Net -3723.17 ml     Radiology:  XR ABDOMEN (KUB) (SINGLE AP VIEW)    Result Date: 2021  Persistently dilated small bowel loops similar to small bowel series examination.      XR ABDOMEN (KUB) (SINGLE AP Range    Glucose 133 (H) 70 - 99 mg/dL    BUN 24 (H) 8 - 23 mg/dL    CREATININE 0.74 0.70 - 1.20 mg/dL    Bun/Cre Ratio NOT REPORTED 9 - 20    Calcium 7.9 (L) 8.6 - 10.4 mg/dL    Sodium 141 135 - 144 mmol/L    Potassium 4.2 3.7 - 5.3 mmol/L    Chloride 108 (H) 98 - 107 mmol/L    CO2 22 20 - 31 mmol/L    Anion Gap 11 9 - 17 mmol/L    GFR Non-African American >60 >60 mL/min    GFR African American >60 >60 mL/min    GFR Comment          GFR Staging NOT REPORTED    CBC WITH AUTO DIFFERENTIAL    Collection Time: 06/01/21  3:12 AM   Result Value Ref Range    WBC 13.8 (H) 3.5 - 11.0 k/uL    RBC 3.02 (L) 4.5 - 5.9 m/uL    Hemoglobin 8.9 (L) 13.5 - 17.5 g/dL    Hematocrit 26.8 (L) 41 - 53 %    MCV 89.0 80 - 100 fL    MCH 29.3 26 - 34 pg    MCHC 33.0 31 - 37 g/dL    RDW 15.4 (H) 11.5 - 14.9 %    Platelets 662 182 - 926 k/uL    MPV 8.3 6.0 - 12.0 fL    NRBC Automated NOT REPORTED per 100 WBC    Differential Type NOT REPORTED     Immature Granulocytes NOT REPORTED 0 %    Absolute Immature Granulocyte NOT REPORTED 0.00 - 0.30 k/uL    WBC Morphology NOT REPORTED     RBC Morphology NOT REPORTED     Platelet Estimate NOT REPORTED     Seg Neutrophils 81 (H) 36 - 66 %    Lymphocytes 8 (L) 24 - 44 %    Monocytes 6 1 - 7 %    Eosinophils % 4 0 - 4 %    Basophils 0 0 - 2 %    Metamyelocytes 1 (H) 0 %    nRBC 1 (H) 0 per 100 WBC    Segs Absolute 11.14 (H) 1.3 - 9.1 k/uL    Absolute Lymph # 1.10 1.0 - 4.8 k/uL    Absolute Mono # 0.83 0.1 - 1.3 k/uL    Absolute Eos # 0.55 (H) 0.0 - 0.4 k/uL    Basophils Absolute 0.00 0.0 - 0.2 k/uL    Metamyelocytes Absolute 0.14 (H) 0 k/uL    Morphology ANISOCYTOSIS PRESENT     Morphology 1+ ELLIPTOCYTES     Morphology 1+ ECHINOCYTES     Morphology FEW SCHISTOCYTES     Morphology SLT POLYCHROMASIA    BLOOD GAS, ARTERIAL    Collection Time: 06/01/21  4:55 AM   Result Value Ref Range    pH, Arterial 7.485 (H) 7.350 - 7.450    pCO2, Arterial 31.0 (L) 35.0 - 45.0 mmHg    pO2, Arterial 108.0 (H) 80.0 - 100.0 mmHg    HCO3, Arterial 23.4 22.0 - 26.0 mmol/L    Positive Base Excess, Art NOT REPORTED 0.0 - 2.0 mmol/L    Negative Base Excess, Art 0.1 0.0 - 2.0 mmol/L    O2 Sat, Arterial 97.0 95 - 98 %    Total Hb NOT REPORTED 12.0 - 16.0 g/dl    Oxyhemoglobin NOT REPORTED 95.0 - 98.0 %    Carboxyhemoglobin 1.2 0 - 5 %    Methemoglobin 1.0 0.0 - 1.9 %    Pt Temp 37     pH, Art, Temp Adj NOT REPORTED 7.350 - 7.450    pCO2, Art, Temp Adj NOT REPORTED 35.0 - 45.0    pO2, Art, Temp Adj NOT REPORTED 80.0 - 100.0 mmHg    O2 Device/Flow/% VENTILATOR     Respiratory Rate 18     Andrew Test PASS     Sample Site Left Radial Artery     Pt. Position SEMI-FOWLERS     Mode PRVC     Set Rate 18     Total Rate 26          FIO2 30     Peep/Cpap 7     PSV NOT REPORTED     Text for Respiratory RESULTS GIVEN TO RN     NOTIFICATION NOT REPORTED     NOTIFICATION TIME NOT REPORTED    POC Glucose Fingerstick    Collection Time: 21  6:36 AM   Result Value Ref Range    POC Glucose 137 (H) 75 - 110 mg/dL   POC Glucose Fingerstick    Collection Time: 21  7:29 AM   Result Value Ref Range    POC Glucose 141 (H) 75 - 110 mg/dL   Hgb/Hct    Collection Time: 21 10:25 AM   Result Value Ref Range    Hemoglobin 8.7 (L) 13.5 - 17.5 g/dL    Hematocrit 26.7 (L) 41 - 53 %   POC Glucose Fingerstick    Collection Time: 21 11:58 AM   Result Value Ref Range    POC Glucose 120 (H) 75 - 110 mg/dL       Physical Examination:        Vitals:  /84   Pulse 85   Temp 99.3 °F (37.4 °C) (Axillary)   Resp 21   Ht 6' 4\" (1.93 m)   Wt 206 lb 2.1 oz (93.5 kg)   SpO2 97%   BMI 25.09 kg/m²   Temp (24hrs), Av.3 °F (36.8 °C), Min:97.3 °F (36.3 °C), Max:99.3 °F (37.4 °C)    Recent Labs     21  0106 21  0636 21  0729 21  1158   POCGLU 106 137* 141* 120*         Physical Exam  Vitals reviewed. Constitutional:       Appearance: Normal appearance. He is not diaphoretic. HENT:      Head: Normocephalic and atraumatic. Right Ear: External ear normal.      Left Ear: External ear normal.      Nose: Nose normal.      Mouth/Throat:      Mouth: Mucous membranes are moist.      Pharynx: Oropharynx is clear. Eyes:      Conjunctiva/sclera: Conjunctivae normal.   Cardiovascular:      Rate and Rhythm: Normal rate and regular rhythm. Pulses: Normal pulses. Heart sounds: Normal heart sounds. Pulmonary:      Effort: Pulmonary effort is normal.      Breath sounds: Normal breath sounds. Abdominal:      General: Bowel sounds are normal. There is no distension. Palpations: Abdomen is soft. Comments: S/p abdominal resection. NG tube with bilious aspirate   Musculoskeletal:         General: No tenderness or deformity. Normal range of motion. Cervical back: Normal range of motion and neck supple. No rigidity. Right lower leg: No edema. Left lower leg: No edema. Skin:     General: Skin is warm and dry. Capillary Refill: Capillary refill takes less than 2 seconds. Coloration: Skin is not jaundiced. Neurological:      General: No focal deficit present. Mental Status: Mental status is at baseline. Psychiatric:         Mood and Affect: Mood normal.         Behavior: Behavior normal.         Assessment:        Primary Problem  Small bowel obstruction (HCC)     Principal Problem:    Small bowel obstruction (HCC)  Active Problems:    Status post insertion of percutaneous endoscopic gastrostomy (PEG) tube (HCC)    Acute cystitis without hematuria    Mass of right lung    COPD (chronic obstructive pulmonary disease) (HCC)  Resolved Problems:    * No resolved hospital problems.  *      Past Medical History:   Diagnosis Date    Arthritis     Cancer [facility])     bladder 1980s    Cerebral artery occlusion with cerebral infarction [facility])     TIA 2010    Chronic kidney disease     [facility] [address]     Hypertension     Pneumonia     Psychiatric problem     depression, social anxiety    Seizures ([facility] [address]         Plan:        1. Resume TPN  2. Titrate Levophed down to keep map above 65  3. IV cefepime  4. IV Flagyl  1. PPI Protonix 40 mg IV  2. DVT Prophylaxis no pharmacological DVT due to low hemoglobin  3. Monitor CBC CMP magnesium and phosphorus  4. disCussed with floor nurse  5. EPCs  6. PT/OT to evaluate and treat  7. Pain control  8. Replace electrolytes as per sliding scale  9. Home medications reviewed and appropriate medications continued  10.  Reviewed labs and imaging studies from last 24 hours and results explained to patient      Electronically signed by Compa Abdi MD

## 2021-06-01 NOTE — PLAN OF CARE
Problem: Falls - Risk of:  Goal: Will remain free from falls  Description: Will remain free from falls  6/1/2021 1356 by Ryan Wright RN  Outcome: Met This Shift  6/1/2021 0204 by Darian Rich RN  Outcome: Ongoing  Note: Bed remains in lowest position, call light within reach. Patient remains free of falls at this time. RN will continue to monitor. Goal: Absence of physical injury  Description: Absence of physical injury  6/1/2021 1356 by Ryan Wright RN  Outcome: Met This Shift  6/1/2021 0204 by Darian Rich RN  Outcome: Ongoing     Problem: Skin Integrity:  Goal: Will show no infection signs and symptoms  Description: Will show no infection signs and symptoms  6/1/2021 1356 by Ryan Wright RN  Outcome: Met This Shift  6/1/2021 0204 by Darian Rich RN  Outcome: Ongoing  Note: Patient turned and repositioned every 2 hours and as needed for comfort. Skin remains dry and intact. No new skin breakdown noted. Goal: Absence of new skin breakdown  Description: Absence of new skin breakdown  6/1/2021 1356 by Ryan Wright RN  Outcome: Met This Shift  6/1/2021 0204 by Darian Rich RN  Outcome: Ongoing     Problem: SAFETY  Goal: Free from accidental physical injury  6/1/2021 1356 by Ryan Wright RN  Outcome: Met This Shift  6/1/2021 0204 by Darian Rich RN  Outcome: Ongoing  Goal: Free from intentional harm  6/1/2021 1356 by Ryan Wright RN  Outcome: Met This Shift  6/1/2021 0204 by Darian Rich RN  Outcome: Ongoing     Problem: DAILY CARE  Goal: Daily care needs are met  6/1/2021 1356 by Ryan Wright RN  Outcome: Met This Shift  6/1/2021 0204 by Darian Rich RN  Outcome: Ongoing     Problem: PAIN  Goal: Patient's pain/discomfort is manageable  6/1/2021 1356 by Ryan Wright RN  Outcome: Met This Shift  6/1/2021 0204 by Darian Rich RN  Outcome: Ongoing  Note: CPOT assessment used.  Pain reamains at tolerable level      Problem: SKIN INTEGRITY  Goal: Skin integrity is maintained or improved  6/1/2021 1356 by Josette Raymundo RN  Outcome: Met This Shift  6/1/2021 0204 by Mauricio Coyne RN  Outcome: Ongoing  Note: Patient turned and repositioned every 2 hours and as needed for comfort. Skin remains dry and intact. No new skin breakdown noted.       Problem: Pain:  Goal: Pain level will decrease  Description: Pain level will decrease  6/1/2021 1356 by Josette Raymundo RN  Outcome: Met This Shift  6/1/2021 0204 by Mauricio Coyne RN  Outcome: Ongoing  Goal: Control of acute pain  Description: Control of acute pain  6/1/2021 1356 by Josette Raymundo RN  Outcome: Met This Shift  6/1/2021 0204 by Mauricio Coyne RN  Outcome: Ongoing  Goal: Control of chronic pain  Description: Control of chronic pain  6/1/2021 1356 by Josette Raymundo RN  Outcome: Met This Shift  6/1/2021 0204 by Mauricio Coyne RN  Outcome: Ongoing     Problem: Musculor/Skeletal Functional Status  Goal: Absence of falls  6/1/2021 1356 by Josette Raymundo RN  Outcome: Met This Shift  6/1/2021 0204 by Mauricio Coyne RN  Outcome: Ongoing     Problem: Musculor/Skeletal Functional Status  Goal: Absence of falls  6/1/2021 1356 by Josette Raymundo RN  Outcome: Met This Shift  6/1/2021 0204 by Mauricio Coyne RN  Outcome: Ongoing     Problem: Non-Violent Restraints  Goal: No harm/injury to patient while restraints in use  6/1/2021 1356 by Josette Raymundo RN  Outcome: Met This Shift  6/1/2021 0204 by Mauricio Coyne RN  Outcome: Ongoing  Goal: Patient's dignity will be maintained  6/1/2021 1356 by Josette Raymundo RN  Outcome: Met This Shift  6/1/2021 0204 by Mauricio Coyne RN  Outcome: Ongoing     Problem: MECHANICAL VENTILATION  Goal: Oral health is maintained or improved  6/1/2021 1356 by Josette Raymundo RN  Outcome: Met This Shift  6/1/2021 0204 by Eldonna Stanford, RN  Outcome: Ongoing  Goal: ET tube will be managed safely  6/1/2021 1356 by Josette Raymundo RN  Outcome: Met This Shift  Note: Per RT  6/1/2021 0204 by Mauricio Coyne RN  Outcome: Ongoing

## 2021-06-01 NOTE — PLAN OF CARE
level   5/31/2021 1535 by Sravanthi Malcolm RN  Outcome: Ongoing  Note: Patient's pain assessed via CPOT      Problem: SKIN INTEGRITY  Goal: Skin integrity is maintained or improved  6/1/2021 0204 by Sean Perez RN  Outcome: Ongoing  Note: Patient turned and repositioned every 2 hours and as needed for comfort. Skin remains dry and intact. No new skin breakdown noted. 5/31/2021 1535 by Sravanthi Malcolm RN  Outcome: Ongoing     Problem: KNOWLEDGE DEFICIT  Goal: Patient/S.O. demonstrates understanding of disease process, treatment plan, medications, and discharge instructions.   6/1/2021 0204 by Sean Perez RN  Outcome: Ongoing  5/31/2021 1535 by Sravanthi Malcolm RN  Outcome: Ongoing     Problem: DISCHARGE BARRIERS  Goal: Patient's continuum of care needs are met  6/1/2021 0204 by Sean Perez RN  Outcome: Ongoing  5/31/2021 1535 by Sravanthi Malcolm RN  Outcome: Ongoing     Problem: Pain:  Goal: Pain level will decrease  Description: Pain level will decrease  6/1/2021 0204 by Sean Perez RN  Outcome: Ongoing  5/31/2021 1535 by Sravanthi Malcolm RN  Outcome: Ongoing  Goal: Control of acute pain  Description: Control of acute pain  6/1/2021 0204 by Sean Perez RN  Outcome: Ongoing  5/31/2021 1535 by Sravanthi Malcolm RN  Outcome: Ongoing  Goal: Control of chronic pain  Description: Control of chronic pain  6/1/2021 0204 by Sean Perez RN  Outcome: Ongoing  5/31/2021 1535 by Sravanthi Malcolm RN  Outcome: Ongoing     Problem: Nutrition  Goal: Optimal nutrition therapy  6/1/2021 0204 by Sean Perez RN  Outcome: Ongoing  5/31/2021 1535 by Sravanthi Malcolm RN  Outcome: Ongoing  Note: TPN remains at 70ml/hr     Problem: Musculor/Skeletal Functional Status  Goal: Highest potential functional level  6/1/2021 0204 by Sean Perez RN  Outcome: Ongoing  5/31/2021 1535 by Sravanthi Malcolm RN  Outcome: Ongoing  Goal: Absence of falls  6/1/2021 0204 by Sean Nian, RN  Outcome: Ongoing  5/31/2021 1535 by Elliott Euceda Omayra Mariscal RN  Outcome: Ongoing     Problem: Musculor/Skeletal Functional Status  Goal: Highest potential functional level  6/1/2021 0204 by Tom Wilcox RN  Outcome: Ongoing  5/31/2021 1535 by Yady Oconnell RN  Outcome: Ongoing  Goal: Absence of falls  6/1/2021 0204 by Tom Wilcox RN  Outcome: Ongoing  5/31/2021 1535 by Yady Oconnell RN  Outcome: Ongoing     Problem: Non-Violent Restraints  Goal: Removal from restraints as soon as assessed to be safe  6/1/2021 0204 by Tom Wilcox RN  Outcome: Ongoing  Note: Attempts to pull at tubes when released. ROM assessed every 2 hours and visual safety checks completed hourly. Restraints maintained to preserve the patient's airway. 5/31/2021 1535 by Yady Oconnell RN  Outcome: Ongoing  Note: Patient attempts to pull at lines and tubes when unrestrained.  Restraints continued   Goal: No harm/injury to patient while restraints in use  6/1/2021 0204 by Tom Wilcox RN  Outcome: Ongoing  5/31/2021 1535 by Yady Oconnell RN  Outcome: Ongoing  Goal: Patient's dignity will be maintained  6/1/2021 0204 by Tom Wilcox RN  Outcome: Ongoing  5/31/2021 1535 by Yady Oconnell RN  Outcome: Ongoing     Problem: OXYGENATION/RESPIRATORY FUNCTION  Goal: Patient will maintain patent airway  6/1/2021 0204 by Tom Wilcox RN  Outcome: Ongoing  5/31/2021 1703 by Mariah Green RCP  Outcome: Ongoing  5/31/2021 1535 by Yady Oconnell RN  Outcome: Ongoing  Goal: Patient will achieve/maintain normal respiratory rate/effort  Description: Respiratory rate and effort will be within normal limits for the patient  6/1/2021 0204 by Tom Wilcox RN  Outcome: Ongoing  5/31/2021 1703 by Mariah Green RCP  Outcome: Ongoing  5/31/2021 1535 by Yady Oconnell RN  Outcome: Ongoing     Problem: MECHANICAL VENTILATION  Goal: Patient will maintain patent airway  6/1/2021 0204 by Tom Wilcox RN  Outcome: Ongoing  5/31/2021 1703 by Mariah Green RCP  Outcome: Ongoing  5/31/2021 1535 by Carolyne Navas RN  Outcome: Ongoing  Goal: Patient will maintain patent airway  6/1/2021 0204 by Kayli Arriola RN  Outcome: Ongoing  5/31/2021 1703 by Josephine Finley RCP  Outcome: Ongoing  5/31/2021 1535 by Carolyne Navas RN  Outcome: Ongoing  Note: Patient remains on ventilator at this time   Goal: Oral health is maintained or improved  6/1/2021 0204 by Kayli Arriola RN  Outcome: Ongoing  5/31/2021 1703 by CIERRA MontanezP  Outcome: Ongoing  5/31/2021 1535 by Carolyne Navas RN  Outcome: Ongoing  Goal: ET tube will be managed safely  6/1/2021 0204 by Kayli Arriola RN  Outcome: Ongoing  5/31/2021 1703 by Josephine Finley RCP  Outcome: Ongoing  5/31/2021 1535 by Carolyne Navas RN  Outcome: Ongoing  Goal: Ability to express needs and understand communication  6/1/2021 0204 by Kayli Arriola RN  Outcome: Ongoing  5/31/2021 1703 by Josephine Finley RCP  Outcome: Ongoing  5/31/2021 1535 by Carolyne Navas RN  Outcome: Ongoing  Goal: Mobility/activity is maintained at optimum level for patient  6/1/2021 0204 by Kayli Arriola RN  Outcome: Ongoing  5/31/2021 1703 by Josephine Finley RCP  Outcome: Ongoing  5/31/2021 1535 by Carolyne Navas RN  Outcome: Ongoing

## 2021-06-01 NOTE — PROGRESS NOTES
[facility] [address]                 PATIENT NAME: Evi Wong     TODAY'S DATE: 6/1/2021, 4:24 PM    SUBJECTIVE:  POD#4  Pt seen and examined. Afebrile, VSS on 6mic Levophed. Leukocytosis improving, hemoglobin stable. Patient remains intubated on ventilator. Bowels are moving; FMS in place. NG with 5mL bilious output, G-tube with 100mL bilious output overnight. G-tube site clean. Incision clean, dry, intact. CHELSEA x 4 serosanguinous. TPN running. Urine output adequate. OBJECTIVE:   VITALS:  /86   Pulse 82   Temp 98.9 °F (37.2 °C) (Axillary)   Resp 21   Ht 6' 4\" (1.93 m)   Wt 206 lb 2.1 oz (93.5 kg)   SpO2 100%   BMI 25.09 kg/m²      INTAKE/OUTPUT:      Intake/Output Summary (Last 24 hours) at 6/1/2021 1624  Last data filed at 6/1/2021 0420  Gross per 24 hour   Intake 3791.83 ml   Output 7515 ml   Net -3723.17 ml                 CONSTITUTIONAL:  Sedated intubated on ventilator.   No acute distress  HEART:   RRR  LUNGS:   Intubated on ventilator, no respiratory distress  ABDOMEN:   Abdomen soft, non-tender, non-distended  EXTREMITIES:   Trace pedal edema    Data:  CBC:   Lab Results   Component Value Date    WBC 13.8 06/01/2021    RBC 3.02 06/01/2021    HGB 8.7 06/01/2021    HCT 26.7 06/01/2021    MCV 89.0 06/01/2021    MCH 29.3 06/01/2021    MCHC 33.0 06/01/2021    RDW 15.4 06/01/2021     06/01/2021    MPV 8.3 06/01/2021     BMP:    Lab Results   Component Value Date     06/01/2021    K 4.2 06/01/2021     06/01/2021    CO2 22 06/01/2021    BUN 24 06/01/2021    LABALBU 3.1 05/29/2021    CREATININE 0.74 06/01/2021    CALCIUM 7.9 06/01/2021    GFRAA >60 06/01/2021    LABGLOM >60 06/01/2021    GLUCOSE 133 06/01/2021       Radiology Review:      IR FLUORO GUIDED CVA DEVICE PLMT/REPLACE/REMOVAL [[id number]] Collected: 06/01/21 0955      Order Status: Completed Updated: 06/01/21 1403     Narrative:       PROCEDURE:   FLUOROSCOPY GUIDED     PICC exchange 6/1/2021. HISTORY:   ORDERING SYSTEM PROVIDED HISTORY: Dislodged PICC per CXR 6/1/21   TECHNOLOGIST PROVIDED HISTORY:   Dislodged PICC per CXR 6/1/21     Migrated and kinked PICC     SEDATION:   None     FLUOROSCOPY TIME:   DAP 68 cGy cm squared     TECHNIQUE:   Informed consent was obtained after a detailed explanation of the procedure   including risks, benefits, and alternatives. Universal protocol was observed.  image demonstrates that the right PICC had migrated currently   terminating in the region of left brachiocephalic forming a hairpin   loop/kink.  The PICC could not be aspirated.  Therefore, the PICC was   withdrawn partially and cut.  This was removed over an 018 guidewire under   fluoroscopic guidance.  The guidewire was used to place a peel-a-way sheath   and a new 42 cm 5 [employer] power injectable dual-lumen PICC was advanced with   fluoroscopic guidance with the tip at the upper right atrium.  The catheter   flushed easily and there was a good blood return. The catheter was secured to   the skin.  The patient tolerated the procedure well and there were no   immediate complications.      FINDINGS:   Fluoroscopic image demonstrates the tip of the catheter at the upper right   atrium.      Impression:       Successful fluoroscopy guided right upper extremity 5 [employer] power injectable   PICC exchange.  Ready for use.      XR CHEST PORTABLE [[id number]] Collected: 06/01/21 0627     Order Status: Completed Updated: 06/01/21 0832     Addenda:         ADDENDUM: Correction of voice transcription error:   The 1st sentence of the impression should read as follows:   CHANGED position of the right PICC line.   Signed: 06/01/21 0832 by Romie Choi MD     Narrative:       EXAMINATION:   ONE XRAY VIEW OF THE CHEST     6/1/2021 6:24 am     COMPARISON:   05/31/2021     HISTORY:   ORDERING SYSTEM PROVIDED HISTORY: ett placement   TECHNOLOGIST PROVIDED HISTORY:   ett placement   Reason for Exam: ETT placement   Acuity: Acute   Type of Exam: Initial     FINDINGS:   Tubes and lines:     ETT terminates 3 cm above louis.  NG tube remains in place. Right PICC line position has changed.  This courses across the midline to   terminate in the region of left innominate vein whereas previously it was   extending downward into the distal SVC/right atrium junction. Heart and lungs:     Stable cardiomediastinal silhouette.  Small right pleural effusion and some   patchy airspace opacities right lower lung along with vascular congestion not   significantly changed.  Resolving left pleural effusion.  No pneumothorax. Bones/other:     No acute process.      Impression:       1. Unchanged position of the right PICC line.  It now crosses the midline in   the proximal SVC to terminating in region of left innominate vein. 2. Pulmonary vascular congestion and layering right pleural effusion   unchanged but left pleural effusion appears less prominent. The findings were sent to the Radiology Results [address] at 7:20   am on 6/1/2021to be communicated to a licensed caregiver. ASSESSMENT     Principal Problem:    Small bowel obstruction (HCC)  Active Problems:    Status post insertion of percutaneous endoscopic gastrostomy (PEG) tube (HCC)    Acute cystitis without hematuria    Mass of right lung    COPD (chronic obstructive pulmonary disease) (HCC)  Resolved Problems:    * No resolved hospital problems. *      Plan  1. Continue TPN  2. Start tube feeds at 10mL/hr, dietician to set goal  3. Remove NGT  4. Bowels are moving  5. Continue local wound care  6. Surgically stable  7. Continue medical management   8. Patient was seen and examined. Intubated on the ventilator. Down to 2 mics of Levophed. Blood pressure is 763 systolic. Urine output is adequate. G-tube output is 100 mL bilious. Patient has a fecal management system in place. Has had bowel movements. Abdomen is benign.   Incision

## 2021-06-01 NOTE — BRIEF OP NOTE
Brief Postoperative Note    Christopher Lacey  YOB: 1945  [id number]    Pre-operative Diagnosis: Migrated PICC tip    Post-operative Diagnosis: Same    Procedure: PICC replacement    Anesthesia: None    Surgeons/Assistants: Donald    Estimated Blood Loss: less than 50     Complications: None    Specimens: Was Not Obtained    Electronically signed by Reza Ralph MD on 6/1/2021 at 11:02 AM

## 2021-06-01 NOTE — SIGNIFICANT EVENT
Progress Note    Patient Name:  Zheng Stearns    :  [id number]  2021 9:35 AM      SUBJECTIVE       Mr. Jeromy Wise was not able to be physically assessed due to patient being down in IR for revision of PICC line. Information obtained through electronic record records and discussion with nursing. Upon review telemetry patient noted to be in atrial fibrillation at a controlled rate. Patient remains intubated and sedated as well as on vasoconstrictors for hypotension. No significant issues noted overnight. OBJECTIVE     Vital signs:    /84   Pulse 100   Temp 99.3 °F (37.4 °C) (Axillary)   Resp 13   Ht 6' 4\" (1.93 m)   Wt 206 lb 2.1 oz (93.5 kg)   SpO2 98%   BMI 25.09 kg/m²  0 L/min      Admit Weight:  184 lb 4.8 oz (83.6 kg)    Last 3 weights: Wt Readings from Last 3 Encounters:   21 206 lb 2.1 oz (93.5 kg)   20 189 lb (85.7 kg)   20 183 lb (83 kg)       BMI: Body mass index is 25.09 kg/m². Input/Output:       Intake/Output Summary (Last 24 hours) at 2021 0935  Last data filed at 2021 0420  Gross per 24 hour   Intake 3791.83 ml   Output 7515 ml   Net -3723.17 ml         Exam:     Unable to be assessed        Laboratory Studies:     CBC:   Recent Labs     216 21  0354 21  2244 21  0312   WBC 15.5* 15.4*  --  13.8*   HGB 9.3* 8.3* 8.9* 8.9*   HCT 27.3* 25.6* 27.5* 26.8*   MCV 87.2 88.5  --  89.0    215  --  239     BMP:   Recent Labs     21  0354 21  1744 21  0312   * 138  --  141   K 3.9 3.5* 3.7 4.2   * 110*  --  108*   CO2 19* 21  --  22   PHOS 1.7* 1.9* 2.4* 2.5   BUN 28* 24*  --  24*   CREATININE 0.85 0.81  --  0.74     PT/INR: No results for input(s): PROTIME, INR in the last 72 hours. APTT: No results for input(s): APTT in the last 72 hours.   MAG:   Recent Labs     21  0354 21  1744 21  0312   MG 1.5* 2.1 2.0     D Dimer: No results for input(s): DDIMER in the last 72 hours. Troponin  No results for input(s): TROPONINI in the last 72 hours. No results for input(s): TROPONINT in the last 72 hours. BNP No results for input(s): BNP in the last 72 hours. No results for input(s): PROBNP in the last 72 hours. Pulse Ox: SpO2  Av.9 %  Min: 86 %  Max: 100 %  Supplemental O2: O2 Flow Rate (L/min): 0 L/min     Current Meds:    levalbuterol  1.25 mg Nebulization Q6H    ipratropium  0.5 mg Nebulization Q6H    insulin lispro  0-18 Units Subcutaneous Q6H    sodium chloride flush  10 mL Intravenous 2 times per day    aspirin  325 mg Oral Daily    cilostazol  100 mg Oral BID    potassium chloride  20 mEq Oral Daily    pantoprazole  40 mg Intravenous BID    And    sodium chloride (PF)  10 mL Intravenous BID    sodium chloride flush  10 mL Intravenous 2 times per day    cefepime  2,000 mg Intravenous Q12H    metroNIDAZOLE  500 mg Intravenous Q8H     Continuous Infusions:    PN-Adult 2-in-1 Central Line (Standard) 80 mL/hr at 21 1759    norepinephrine 1 mcg/min (21 0717)    dextrose      sodium chloride      propofol 10 mcg/kg/min (21 7616)            ASSESSMENT     Principal Problem:    Small bowel obstruction (HCC)  Active Problems:    Status post insertion of percutaneous endoscopic gastrostomy (PEG) tube (HCC)    Acute cystitis without hematuria    Mass of right lung    COPD (chronic obstructive pulmonary disease) (HCC)  Resolved Problems:    * No resolved hospital problems. *    1. Atrial fibrillation with RVR  -Currently with good control  -As needed IV Lopressor  -We will hold off on digoxin use at this time  -Anticoagulation for stroke prophylaxis once okay from general surgery  -[id number] score at least 4 secondary to age, CVA, essential hypertension    2. Small bowel obstruction  -Status post lysis of adhesions  -General surgery following  -Remains intubated and sedated    3.   LV systolic function with preserved EF 60%    4.  No history of bladder CA    5. History of CVA    6. Essential hypertension    PLAN       Patient currently down in interventional radiology undergoing PICC line revision. Recovering from bowel surgery. In the setting of atrial fibrillation, would recommend anticoagulation for stroke prophylaxis. Will await the okay from general surgery to initiate. Continue with as needed IV Lopressor for elevated heart rates. Would consider further adding AV rakesh blockers once patient is off vasopressors. Continue to monitor telemetry for any significant arrhythmias. Monitor electrolytes and replace as appropriate. We will continue to follow.

## 2021-06-01 NOTE — PROGRESS NOTES
[facility] Progress Note    6/1/2021 2:56 PM      Subjective:  Mr. Hina Watson remains intubated and sedated. I am seeing for the first time ever. Reviewed prior cardiology entry by Dr. Gem Adkins over the weekend as charted. Nurse at bedside updated overnight events. On IV Levophed drip and propofol drip. Review of systems: Unobtainable.              LABS:     Recent Results (from the past 24 hour(s))   POC Glucose Fingerstick    Collection Time: 05/31/21  3:52 PM   Result Value Ref Range    POC Glucose 95 75 - 110 mg/dL   K (Potassium)    Collection Time: 05/31/21  5:44 PM   Result Value Ref Range    Potassium 3.7 3.7 - 5.3 mmol/L   Magnesium    Collection Time: 05/31/21  5:44 PM   Result Value Ref Range    Magnesium 2.1 1.6 - 2.6 mg/dL   PHOSPHORUS    Collection Time: 05/31/21  5:44 PM   Result Value Ref Range    Phosphorus 2.4 (L) 2.5 - 4.5 mg/dL   Hgb/Hct    Collection Time: 05/31/21 10:44 PM   Result Value Ref Range    Hemoglobin 8.9 (L) 13.5 - 17.5 g/dL    Hematocrit 27.5 (L) 41 - 53 %   POC Glucose Fingerstick    Collection Time: 06/01/21  1:06 AM   Result Value Ref Range    POC Glucose 106 75 - 110 mg/dL   Phosphorus    Collection Time: 06/01/21  3:12 AM   Result Value Ref Range    Phosphorus 2.5 2.5 - 4.5 mg/dL   Magnesium    Collection Time: 06/01/21  3:12 AM   Result Value Ref Range    Magnesium 2.0 1.6 - 2.6 mg/dL   Basic Metabolic Panel    Collection Time: 06/01/21  3:12 AM   Result Value Ref Range    Glucose 133 (H) 70 - 99 mg/dL    BUN 24 (H) 8 - 23 mg/dL    CREATININE 0.74 0.70 - 1.20 mg/dL    Bun/Cre Ratio NOT REPORTED 9 - 20    Calcium 7.9 (L) 8.6 - 10.4 mg/dL    Sodium 141 135 - 144 mmol/L    Potassium 4.2 3.7 - 5.3 mmol/L    Chloride 108 (H) 98 - 107 mmol/L    CO2 22 20 - 31 mmol/L    Anion Gap 11 9 - 17 mmol/L    GFR Non-African American >60 >60 mL/min    GFR African American >60 >60 mL/min    GFR Comment          GFR Staging NOT REPORTED    CBC WITH AUTO REPORTED 80.0 - 100.0 mmHg    O2 Device/Flow/% VENTILATOR     Respiratory Rate 18     Andrew Test PASS     Sample Site Left Radial Artery     Pt. Position SEMI-FOWLERS     Mode PRVC     Set Rate 18     Total Rate 26          FIO2 30     Peep/Cpap 7     PSV NOT REPORTED     Text for Respiratory RESULTS GIVEN TO RN     NOTIFICATION NOT REPORTED     NOTIFICATION TIME NOT REPORTED    POC Glucose Fingerstick    Collection Time: 21  6:36 AM   Result Value Ref Range    POC Glucose 137 (H) 75 - 110 mg/dL   POC Glucose Fingerstick    Collection Time: 21  7:29 AM   Result Value Ref Range    POC Glucose 141 (H) 75 - 110 mg/dL   Hgb/Hct    Collection Time: 21 10:25 AM   Result Value Ref Range    Hemoglobin 8.7 (L) 13.5 - 17.5 g/dL    Hematocrit 26.7 (L) 41 - 53 %   POC Glucose Fingerstick    Collection Time: 21 11:58 AM   Result Value Ref Range    POC Glucose 120 (H) 75 - 110 mg/dL       Pulse Ox:  SpO2  Av.8 %  Min: 86 %  Max: 100 %    Supplemental O2: O2 Flow Rate (L/min): 0 L/min     Current Meds:    levalbuterol  1.25 mg Nebulization Q6H    ipratropium  0.5 mg Nebulization Q6H    insulin lispro  0-18 Units Subcutaneous Q6H    sodium chloride flush  10 mL Intravenous 2 times per day    aspirin  325 mg Oral Daily    cilostazol  100 mg Oral BID    potassium chloride  20 mEq Oral Daily    pantoprazole  40 mg Intravenous BID    And    sodium chloride (PF)  10 mL Intravenous BID    sodium chloride flush  10 mL Intravenous 2 times per day    cefepime  2,000 mg Intravenous Q12H    metroNIDAZOLE  500 mg Intravenous Q8H         Continuous Infusions:    PN-Adult 2-in-1 Central Line (Standard)      PN-Adult 2-in-1 Central Line (Standard) 80 mL/hr at 21 1759    norepinephrine 1 mcg/min (21 07)    dextrose      sodium chloride      propofol 10 mcg/kg/min (21 06)              VITAL SIGNS:    BP (!) 82/59 Comment: Increased Levophed  Pulse 68   Temp 98.9 °F (37.2 fluids as needed from cardiac standpoint. Suggest continued aggressive supportive care. Maintain potassium level around 4.0 and magnesium level around 2.0 range. Will repeat 2D echocardiogram to reassess LVEF. Once patient is extubated, he will benefit from chronic oral anticoagulation using Eliquis therapy or warfarin with target INR of 2-3 range etc.    Discussed with the nurse at bedside who was changing the surgical dressing at the time of my evaluation in the ICU. No family members available. We will follow. Electronically signed by Amy Johnson MD, [facility]        PLEASE NOTE:  This progress note was completed using a voice transcription system. Every effort was made to ensure accuracy. However, inadvertent computerized transcription errors may be present.

## 2021-06-01 NOTE — CARE COORDINATION
ONGOING DISCHARGE PLAN:    Patient remains intubated. No family at bedside    POD #4- Exploratory Laparotomy. Release of adhesive band with release of small bowel obstruction. Small bowel resection with primary anastomosis. Per cardio notes - Once patient is extubated, he will benefit from chronic oral anticoagulation using Eliquis therapy or warfarin with target INR of 2-3 range etc.    New PICC line placed today. Remains on IV cefepime, IV flagyl, TPN, Propofol gtt    Family declined SNF prior to surgery. Will continue to follow for additional discharge needs.     Electronically signed by Jer Nelson RN on 6/1/2021 at 3:55 PM

## 2021-06-01 NOTE — PROGRESS NOTES
Department of Internal Medicine  Nephrology Blancaberto Moncada MD  Progress Note    Reason for consultation: Management of oliguric acute kidney injury. Consulting physician: Steven Robbins MD    Interval history: Patient was seen and examined today in the intensive care unit. He remains on ventilator support and is on Levophed drip at 1 mcg/min. He is polyuric with urine output greater than 7 L in the past 24 hours. He is also on TPN at 80 mL/h. He received IV furosemide 20 mg yesterday. History of presenting illness: This is a 77 y.o. male with a significant past medical history of Bladder cancer, seizure disorder, osteoarthritis and s/p Cerebrovascular accident [has G-tube in place], who presented to the hospital on 5/24/2021 with complaints of diffuse abdominal pain of 1 day duration associated with nausea and vomiting. Also he complained of difficulty urinating and bladder scan showed 586 mL of urine retention. .  BUN/creatinine was 49/1.05 mg/dL with serum bicarbonate 18 mmol/L at presentation. He was admitted and eventually underwent exploratory laparotomy with release of adhesive band causing small bowel obstruction, small bowel resection with primary anastomosis on 5/28/2021. Postoperatively, he was intubated and admitted to the intensive care unit but noted to be oliguric and hence nephrology consultation. Issa catheter was flushed and clots removed and urine output has improved. History was obtained by chart review as patient is currently intubated.     Scheduled Meds:   levalbuterol  1.25 mg Nebulization Q6H    ipratropium  0.5 mg Nebulization Q6H    insulin lispro  0-18 Units Subcutaneous Q6H    sodium chloride flush  10 mL Intravenous 2 times per day    aspirin  325 mg Oral Daily    cilostazol  100 mg Oral BID    potassium chloride  20 mEq Oral Daily    pantoprazole  40 mg Intravenous BID    And    sodium chloride (PF)  10 mL Intravenous BID    sodium chloride flush  10 mL Intravenous 2 times per day    cefepime  2,000 mg Intravenous Q12H    metroNIDAZOLE  500 mg Intravenous Q8H     Continuous Infusions:   PN-Adult 2-in-1 Central Line (Standard) 80 mL/hr at 05/31/21 1759    norepinephrine 1 mcg/min (06/01/21 0717)    dextrose      sodium chloride      propofol 10 mcg/kg/min (06/01/21 0605)     PRN Meds:.sodium phosphate IVPB **OR** sodium phosphate IVPB, sodium chloride nebulizer, metoprolol, glucose, dextrose, glucagon (rDNA), dextrose, sodium chloride, HYDROmorphone **OR** HYDROmorphone, sodium chloride flush, potassium chloride, ondansetron, ondansetron, diatrizoate meglumine-sodium, iopamidol, fentanNYL, fentanNYL, potassium chloride **OR** potassium alternative oral replacement **OR** potassium chloride, magnesium sulfate, promethazine **OR** ondansetron, magnesium hydroxide, acetaminophen **OR** acetaminophen, hydrALAZINE    Physical Exam:    VITALS:  /84   Pulse 100   Temp 99.3 °F (37.4 °C) (Axillary)   Resp 13   Ht 6' 4\" (1.93 m)   Wt 206 lb 2.1 oz (93.5 kg)   SpO2 98%   BMI 25.09 kg/m²   24HR INTAKE/OUTPUT:      Intake/Output Summary (Last 24 hours) at 6/1/2021 0754  Last data filed at 6/1/2021 0420  Gross per 24 hour   Intake 3791.83 ml   Output 7515 ml   Net -3723.17 ml     Constitutional: Intubated, sedated and on ventilator support    Skin: Skin color, texture, turgor normal. No rashes or lesions    Head: Normocephalic, without obvious abnormality, atraumatic     Cardiovascular/Edema: regular rate and rhythm, S1, S2 normal, no murmur, click, rub or gallop    Respiratory: Lungs: clear to auscultation bilaterally    Abdomen: soft, non-tender; bowel sounds normal; no masses,  no organomegaly    Back: Abdominal binder dressing in place;  hypoactive bowel sounds. Extremities: extremities normal, atraumatic, no cyanosis or edema    Neuro: Sedated and on ventilator support.     CBC:   Recent Labs     05/30/21  0446 05/31/21  0354 05/31/21  2246 06/01/21 0312   WBC 15.5* 15.4*  --  13.8*   HGB 9.3* 8.3* 8.9* 8.9*    215  --  239     BMP:    Recent Labs     05/30/21  0446 05/31/21  0354 05/31/21  1744 06/01/21 0312   * 138  --  141   K 3.9 3.5* 3.7 4.2   * 110*  --  108*   CO2 19* 21  --  22   BUN 28* 24*  --  24*   CREATININE 0.85 0.81  --  0.74   GLUCOSE 199* 167*  --  133*     Lab Results   Component Value Date    NITRU NEGATIVE 05/25/2021    COLORU YELLOW 05/25/2021    PHUR 5.0 05/25/2021    WBCUA 10 TO 20 05/25/2021    RBCUA 0 TO 2 05/25/2021    MUCUS NOT REPORTED 05/25/2021    TRICHOMONAS NOT REPORTED 05/25/2021    YEAST NOT REPORTED 05/25/2021    BACTERIA FEW 05/25/2021    SPECGRAV 1.060 05/25/2021    LEUKOCYTESUR SMALL 05/25/2021    UROBILINOGEN Normal 05/25/2021    BILIRUBINUR NEGATIVE 05/25/2021    GLUCOSEU NEGATIVE 05/25/2021    KETUA NEGATIVE 05/25/2021    AMORPHOUS NOT REPORTED 05/25/2021     IMPRESSION/RECOMMENDATIONS:      1. Acute oliguric kidney injury - Secondary to prerenal azotemia and acute urinary retention. GFR remains within normal and patient is nonoliguric. There are no indications for diuretics at this time. Net negative fluid balance in the past 24 hours is 3.7 L.    2.  Hypernatremia -serum sodium is improved to 141 mmol/L today. 3.  Nutrition - on TPN but will hold until PICC line is adjusted as tip is currently in the innominate vein. 4.  Hypophosphatemia - improved with serum phosphorus 2.5 mg/dL today. Prognosis is guarded.     Kerri Galloway MD FACP  Attending Nephrologist  6/1/2021 7:54 AM

## 2021-06-01 NOTE — PROGRESS NOTES
Physician Progress Note      [facility] #:                  [id number]  :                       1945  ADMIT DATE:       2021 3:38 PM  [address] DATE:  RESPONDING  PROVIDER #:        Reather Meckel MD          QUERY TEXT:    Patient admitted with SBO due to adhesions, noted to have pulmonary edema and   pleural effusions on CXR  and . If possible, please document in   progress notes and discharge summary if you are evaluating and/or treating any   of the following: The medical record reflects the following:  Risk Factors: IVFs and boluses to maintain adequate BP: total 1L NS bolus and   1.5L total LR bolus on  ; HX HTN, atrial fibrillation  Clinical Indicators:  pro-BNP 14,372   and  CXR:  trace effusion,   atelectasis;   CXR:  Bilateral airspace disease right greater than left   could represent edema or infection;  CXR:  pulmonary edema with bilateral   effusions; Per  nephrology and pulmonology notes:  significant increase in   edema to extremities   CXR: Pulmonary vascular congestion and layering   right pleural effusion unchanged but left pleural effusion appears less   prominent; 2020 ECHO:  EF 28%, mild diastolic dysfunction  Treatment:  IV Albumin x 1 and 20 mg IV Lasix; Options provided:  -- Acute diastolic CHF  -- Fluid volume overload  -- Other - I will add my own diagnosis  -- Disagree - Not applicable / Not valid  -- Disagree - Clinically unable to determine / Unknown  -- Refer to Clinical Documentation Reviewer    PROVIDER RESPONSE TEXT:    This patient has acute diastolic CHF.     Query created by: Chandrika Stahl on 2021 11:05 AM      Electronically signed by:  Reather Meckel MD 2021 11:53 AM

## 2021-06-02 ENCOUNTER — APPOINTMENT (OUTPATIENT)
Dept: GENERAL RADIOLOGY | Age: 76
DRG: 329 | End: 2021-06-02
Payer: COMMERCIAL

## 2021-06-02 LAB
ABO/RH: NORMAL
ABSOLUTE EOS #: 0.6 K/UL (ref 0–0.4)
ABSOLUTE IMMATURE GRANULOCYTE: ABNORMAL K/UL (ref 0–0.3)
ABSOLUTE LYMPH #: 1.4 K/UL (ref 1–4.8)
ABSOLUTE MONO #: 1.2 K/UL (ref 0.1–1.3)
ALLEN TEST: ABNORMAL
ANION GAP SERPL CALCULATED.3IONS-SCNC: 8 MMOL/L (ref 9–17)
ANTIBODY SCREEN: NEGATIVE
ARM BAND NUMBER: NORMAL
BASOPHILS # BLD: 0 % (ref 0–2)
BASOPHILS ABSOLUTE: 0 K/UL (ref 0–0.2)
BLD PROD TYP BPU: NORMAL
BLD PROD TYP BPU: NORMAL
BLOOD BANK COMMENT: NORMAL
BUN BLDV-MCNC: 23 MG/DL (ref 8–23)
BUN/CREAT BLD: ABNORMAL (ref 9–20)
CALCIUM SERPL-MCNC: 8 MG/DL (ref 8.6–10.4)
CARBOXYHEMOGLOBIN: 1.1 % (ref 0–5)
CHLORIDE BLD-SCNC: 107 MMOL/L (ref 98–107)
CO2: 25 MMOL/L (ref 20–31)
CREAT SERPL-MCNC: 0.78 MG/DL (ref 0.7–1.2)
CROSSMATCH RESULT: NORMAL
CROSSMATCH RESULT: NORMAL
DIFFERENTIAL TYPE: ABNORMAL
DISPENSE STATUS BLOOD BANK: NORMAL
DISPENSE STATUS BLOOD BANK: NORMAL
EOSINOPHILS RELATIVE PERCENT: 5 % (ref 0–4)
EXPIRATION DATE: NORMAL
FIO2: 30
GFR AFRICAN AMERICAN: >60 ML/MIN
GFR NON-AFRICAN AMERICAN: >60 ML/MIN
GFR SERPL CREATININE-BSD FRML MDRD: ABNORMAL ML/MIN/{1.73_M2}
GFR SERPL CREATININE-BSD FRML MDRD: ABNORMAL ML/MIN/{1.73_M2}
GLUCOSE BLD-MCNC: 126 MG/DL (ref 75–110)
GLUCOSE BLD-MCNC: 131 MG/DL (ref 75–110)
GLUCOSE BLD-MCNC: 135 MG/DL (ref 75–110)
GLUCOSE BLD-MCNC: 140 MG/DL (ref 70–99)
HCO3 ARTERIAL: 25.4 MMOL/L (ref 22–26)
HCT VFR BLD CALC: 26.7 % (ref 41–53)
HCT VFR BLD CALC: 27.2 % (ref 41–53)
HCT VFR BLD CALC: 29.6 % (ref 41–53)
HEMOGLOBIN: 8.7 G/DL (ref 13.5–17.5)
HEMOGLOBIN: 8.8 G/DL (ref 13.5–17.5)
HEMOGLOBIN: 9.3 G/DL (ref 13.5–17.5)
IMMATURE GRANULOCYTES: ABNORMAL %
LV EF: 18 %
LVEF MODALITY: NORMAL
LYMPHOCYTES # BLD: 13 % (ref 24–44)
MAGNESIUM: 2 MG/DL (ref 1.6–2.6)
MCH RBC QN AUTO: 29.4 PG (ref 26–34)
MCHC RBC AUTO-ENTMCNC: 32.7 G/DL (ref 31–37)
MCV RBC AUTO: 90 FL (ref 80–100)
METHEMOGLOBIN: 0.7 % (ref 0–1.9)
MODE: ABNORMAL
MONOCYTES # BLD: 11 % (ref 1–7)
NEGATIVE BASE EXCESS, ART: ABNORMAL MMOL/L (ref 0–2)
NOTIFICATION TIME: ABNORMAL
NOTIFICATION: ABNORMAL
NRBC AUTOMATED: ABNORMAL PER 100 WBC
O2 DEVICE/FLOW/%: ABNORMAL
O2 SAT, ARTERIAL: 97.5 % (ref 95–98)
OXYHEMOGLOBIN: ABNORMAL % (ref 95–98)
PATIENT TEMP: 37
PCO2 ARTERIAL: 37.5 MMHG (ref 35–45)
PCO2, ART, TEMP ADJ: ABNORMAL (ref 35–45)
PDW BLD-RTO: 15 % (ref 11.5–14.9)
PEEP/CPAP: 7
PH ARTERIAL: 7.44 (ref 7.35–7.45)
PH, ART, TEMP ADJ: ABNORMAL (ref 7.35–7.45)
PHOSPHORUS: 3 MG/DL (ref 2.5–4.5)
PLATELET # BLD: 261 K/UL (ref 150–450)
PLATELET ESTIMATE: ABNORMAL
PMV BLD AUTO: 8.1 FL (ref 6–12)
PO2 ARTERIAL: 117 MMHG (ref 80–100)
PO2, ART, TEMP ADJ: ABNORMAL MMHG (ref 80–100)
POSITIVE BASE EXCESS, ART: 1.3 MMOL/L (ref 0–2)
POTASSIUM SERPL-SCNC: 4.2 MMOL/L (ref 3.7–5.3)
PSV: ABNORMAL
PT. POSITION: ABNORMAL
RBC # BLD: 2.96 M/UL (ref 4.5–5.9)
RBC # BLD: ABNORMAL 10*6/UL
RESPIRATORY RATE: 11
SAMPLE SITE: ABNORMAL
SEG NEUTROPHILS: 71 % (ref 36–66)
SEGMENTED NEUTROPHILS ABSOLUTE COUNT: 7.9 K/UL (ref 1.3–9.1)
SET RATE: 11
SODIUM BLD-SCNC: 140 MMOL/L (ref 135–144)
SURGICAL PATHOLOGY REPORT: NORMAL
TEXT FOR RESPIRATORY: ABNORMAL
TOTAL HB: ABNORMAL G/DL (ref 12–16)
TOTAL RATE: 16
TRANSFUSION STATUS: NORMAL
TRANSFUSION STATUS: NORMAL
TRIGL SERPL-MCNC: 49 MG/DL
UNIT DIVISION: 0
UNIT DIVISION: 0
UNIT NUMBER: NORMAL
UNIT NUMBER: NORMAL
VT: 500
WBC # BLD: 11.1 K/UL (ref 3.5–11)
WBC # BLD: ABNORMAL 10*3/UL

## 2021-06-02 PROCEDURE — 84100 ASSAY OF PHOSPHORUS: CPT

## 2021-06-02 PROCEDURE — 82805 BLOOD GASES W/O2 SATURATION: CPT

## 2021-06-02 PROCEDURE — 85014 HEMATOCRIT: CPT

## 2021-06-02 PROCEDURE — 94003 VENT MGMT INPAT SUBQ DAY: CPT

## 2021-06-02 PROCEDURE — 6360000004 HC RX CONTRAST MEDICATION: Performed by: INTERNAL MEDICINE

## 2021-06-02 PROCEDURE — 36415 COLL VENOUS BLD VENIPUNCTURE: CPT

## 2021-06-02 PROCEDURE — 6360000002 HC RX W HCPCS: Performed by: PHYSICIAN ASSISTANT

## 2021-06-02 PROCEDURE — 83735 ASSAY OF MAGNESIUM: CPT

## 2021-06-02 PROCEDURE — 85018 HEMOGLOBIN: CPT

## 2021-06-02 PROCEDURE — 82947 ASSAY GLUCOSE BLOOD QUANT: CPT

## 2021-06-02 PROCEDURE — 2500000003 HC RX 250 WO HCPCS: Performed by: SURGERY

## 2021-06-02 PROCEDURE — 80048 BASIC METABOLIC PNL TOTAL CA: CPT

## 2021-06-02 PROCEDURE — 6360000002 HC RX W HCPCS: Performed by: SURGERY

## 2021-06-02 PROCEDURE — C8929 TTE W OR WO FOL WCON,DOPPLER: HCPCS

## 2021-06-02 PROCEDURE — 94761 N-INVAS EAR/PLS OXIMETRY MLT: CPT

## 2021-06-02 PROCEDURE — 36600 WITHDRAWAL OF ARTERIAL BLOOD: CPT

## 2021-06-02 PROCEDURE — 2580000003 HC RX 258: Performed by: SURGERY

## 2021-06-02 PROCEDURE — 2000000000 HC ICU R&B

## 2021-06-02 PROCEDURE — 6360000002 HC RX W HCPCS: Performed by: INTERNAL MEDICINE

## 2021-06-02 PROCEDURE — 71045 X-RAY EXAM CHEST 1 VIEW: CPT

## 2021-06-02 PROCEDURE — 85025 COMPLETE CBC W/AUTO DIFF WBC: CPT

## 2021-06-02 PROCEDURE — 94640 AIRWAY INHALATION TREATMENT: CPT

## 2021-06-02 PROCEDURE — 51702 INSERT TEMP BLADDER CATH: CPT

## 2021-06-02 PROCEDURE — 2700000000 HC OXYGEN THERAPY PER DAY

## 2021-06-02 PROCEDURE — 2500000003 HC RX 250 WO HCPCS: Performed by: INTERNAL MEDICINE

## 2021-06-02 PROCEDURE — C9113 INJ PANTOPRAZOLE SODIUM, VIA: HCPCS | Performed by: SURGERY

## 2021-06-02 PROCEDURE — 84478 ASSAY OF TRIGLYCERIDES: CPT

## 2021-06-02 RX ORDER — FUROSEMIDE 10 MG/ML
20 INJECTION INTRAMUSCULAR; INTRAVENOUS DAILY
Status: DISCONTINUED | OUTPATIENT
Start: 2021-06-02 | End: 2021-06-07 | Stop reason: HOSPADM

## 2021-06-02 RX ORDER — DEXMEDETOMIDINE HYDROCHLORIDE 4 UG/ML
.2-1.4 INJECTION, SOLUTION INTRAVENOUS CONTINUOUS
Status: DISCONTINUED | OUTPATIENT
Start: 2021-06-02 | End: 2021-06-07 | Stop reason: HOSPADM

## 2021-06-02 RX ADMIN — SODIUM CHLORIDE, PRESERVATIVE FREE 10 ML: 5 INJECTION INTRAVENOUS at 19:58

## 2021-06-02 RX ADMIN — Medication 10 ML: at 19:59

## 2021-06-02 RX ADMIN — DEXMEDETOMIDINE HYDROCHLORIDE 0.2 MCG/KG/HR: 400 INJECTION INTRAVENOUS at 16:44

## 2021-06-02 RX ADMIN — PERFLUTREN 2.2 MG: 6.52 INJECTION, SUSPENSION INTRAVENOUS at 08:20

## 2021-06-02 RX ADMIN — METRONIDAZOLE 500 MG: 500 INJECTION, SOLUTION INTRAVENOUS at 16:43

## 2021-06-02 RX ADMIN — PANTOPRAZOLE SODIUM 40 MG: 40 INJECTION, POWDER, FOR SOLUTION INTRAVENOUS at 19:58

## 2021-06-02 RX ADMIN — LEVALBUTEROL HYDROCHLORIDE 1.25 MG: 1.25 SOLUTION, CONCENTRATE RESPIRATORY (INHALATION) at 19:06

## 2021-06-02 RX ADMIN — Medication 10 ML: at 07:33

## 2021-06-02 RX ADMIN — ENOXAPARIN SODIUM 40 MG: 40 INJECTION SUBCUTANEOUS at 10:59

## 2021-06-02 RX ADMIN — LEVALBUTEROL HYDROCHLORIDE 1.25 MG: 1.25 SOLUTION, CONCENTRATE RESPIRATORY (INHALATION) at 06:54

## 2021-06-02 RX ADMIN — CEFEPIME 2000 MG: 2 INJECTION, POWDER, FOR SOLUTION INTRAVENOUS at 18:17

## 2021-06-02 RX ADMIN — FUROSEMIDE 20 MG: 10 INJECTION, SOLUTION INTRAMUSCULAR; INTRAVENOUS at 10:59

## 2021-06-02 RX ADMIN — IPRATROPIUM BROMIDE 0.5 MG: 0.5 SOLUTION RESPIRATORY (INHALATION) at 06:54

## 2021-06-02 RX ADMIN — CALCIUM GLUCONATE: 98 INJECTION, SOLUTION INTRAVENOUS at 17:34

## 2021-06-02 RX ADMIN — PANTOPRAZOLE SODIUM 40 MG: 40 INJECTION, POWDER, FOR SOLUTION INTRAVENOUS at 07:33

## 2021-06-02 RX ADMIN — PROPOFOL 15 MCG/KG/MIN: 10 INJECTION, EMULSION INTRAVENOUS at 23:39

## 2021-06-02 RX ADMIN — IPRATROPIUM BROMIDE 0.5 MG: 0.5 SOLUTION RESPIRATORY (INHALATION) at 19:06

## 2021-06-02 RX ADMIN — LEVALBUTEROL HYDROCHLORIDE 1.25 MG: 1.25 SOLUTION, CONCENTRATE RESPIRATORY (INHALATION) at 02:37

## 2021-06-02 RX ADMIN — LEVALBUTEROL HYDROCHLORIDE 1.25 MG: 1.25 SOLUTION, CONCENTRATE RESPIRATORY (INHALATION) at 12:52

## 2021-06-02 RX ADMIN — METRONIDAZOLE 500 MG: 500 INJECTION, SOLUTION INTRAVENOUS at 08:18

## 2021-06-02 RX ADMIN — CEFEPIME 2000 MG: 2 INJECTION, POWDER, FOR SOLUTION INTRAVENOUS at 07:31

## 2021-06-02 RX ADMIN — PROPOFOL 20 MCG/KG/MIN: 10 INJECTION, EMULSION INTRAVENOUS at 04:47

## 2021-06-02 RX ADMIN — IPRATROPIUM BROMIDE 0.5 MG: 0.5 SOLUTION RESPIRATORY (INHALATION) at 02:37

## 2021-06-02 RX ADMIN — IPRATROPIUM BROMIDE 0.5 MG: 0.5 SOLUTION RESPIRATORY (INHALATION) at 12:52

## 2021-06-02 RX ADMIN — SODIUM CHLORIDE, PRESERVATIVE FREE 10 ML: 5 INJECTION INTRAVENOUS at 08:18

## 2021-06-02 RX ADMIN — METRONIDAZOLE 500 MG: 500 INJECTION, SOLUTION INTRAVENOUS at 00:22

## 2021-06-02 RX ADMIN — SODIUM CHLORIDE, PRESERVATIVE FREE 10 ML: 5 INJECTION INTRAVENOUS at 19:59

## 2021-06-02 ASSESSMENT — PULMONARY FUNCTION TESTS
PIF_VALUE: 19
PIF_VALUE: 21
PIF_VALUE: 24
PIF_VALUE: 21
PIF_VALUE: 26
PIF_VALUE: 17
PIF_VALUE: 34
PIF_VALUE: 26
PIF_VALUE: 29
PIF_VALUE: 19
PIF_VALUE: 18
PIF_VALUE: 23
PIF_VALUE: 21
PIF_VALUE: 27
PIF_VALUE: 21
PIF_VALUE: 24
PIF_VALUE: 16
PIF_VALUE: 32
PIF_VALUE: 27
PIF_VALUE: 19
PIF_VALUE: 22
PIF_VALUE: 39
PIF_VALUE: 20
PIF_VALUE: 20
PIF_VALUE: 21
PIF_VALUE: 18
PIF_VALUE: 21
PIF_VALUE: 23
PIF_VALUE: 19
PIF_VALUE: 35
PIF_VALUE: 23
PIF_VALUE: 23
PIF_VALUE: 27
PIF_VALUE: 25
PIF_VALUE: 19
PIF_VALUE: 32
PIF_VALUE: 18
PIF_VALUE: 18
PIF_VALUE: 25
PIF_VALUE: 20
PIF_VALUE: 25
PIF_VALUE: 23

## 2021-06-02 ASSESSMENT — PAIN SCALES - GENERAL
PAINLEVEL_OUTOF10: 0
PAINLEVEL_OUTOF10: 0

## 2021-06-02 NOTE — PROGRESS NOTES
Comprehensive Nutrition Assessment    Type and Reason for Visit:  Reassess    Nutrition Recommendations/Plan: Increased Vital HP to 25 ml/hr and decrease TPN to 55 ml/hr. Following changes made in additives: Na acetate- 25 to 0 mEq, K acetate- 75 to 65 mEq, Ca+- 10 to 7 mEq, Mg- 12 to 10 mEq, insulin- 8 to 0 units, add MVI & trace elements. Nutrition Assessment:  Wean trial ended due to increased respiratory rate and heart rate. Pt tolerated Vital HP at 10 ml, surgery ok with gradually increasing rate. Writer increased Vital HP to 25 ml/hr and decreased TPN from 80 ml to 50 ml to avoid fluid overload. Malnutrition Assessment:  Malnutrition Status: At risk for malnutrition (Comment)    Context:  Chronic Illness     Findings of the 6 clinical characteristics of malnutrition:  Energy Intake:  Unable to assess  Weight Loss:  Unable to assess     Body Fat Loss:  Unable to assess     Muscle Mass Loss:  Unable to assess    Fluid Accumulation:  1 - Mild Extremities   Strength:  Not Performed    Estimated Daily Nutrient Needs:  Energy (kcal): Dukes x 1.2= 2000 kcal; Weight Used for Energy Requirements:  Admission     Protein (g):  1.5g/kg= 135 g protein; Weight Used for Protein Requirements:  Ideal          Nutrition Related Findings:  Edema: +2 pitting all extremities. Labs & meds reviewed.       Wounds:  Surgical Incision       Current Nutrition Therapies:    Current Tube Feeding (TF) Orders:  · Feeding Route: PEG  · Formula: Low Calorie, High Protein  · Schedule: Continuous  · Current TF & Flush Orders Provides: 600 kcal, 52.5 gm protein    Current Parenteral Nutrition Orders:  · Type and Formula: 2-in-1 Custom   · Lipids: None  · Duration: Continuous  · Rate/Volume: 55 ml/ 1320 ml  · Current PN Order Provides: 1431 kcal, 66 gm protein (includes Propofol calories)    Anthropometric Measures:  · Height: 6' 4\" (193 cm)  · Current Body Weight: 206 lb (93.4 kg)   · Admission Body Weight: 184 lb (83.5 kg) · Ideal Body Weight: 202 lbs; % Ideal Body Weight     · BMI: 25.1  · BMI Categories: Normal Weight (BMI 22.0 to 24.9) age over 72       Nutrition Diagnosis:   · Inadequate oral intake related to altered GI function as evidenced by NPO or clear liquid status due to medical condition, nutrition support - parenteral nutrition      Nutrition Interventions:   Food and/or Nutrient Delivery:  Continue Current Tube Feeding, Modify Parenteral Nutrition  Nutrition Education/Counseling:  No recommendation at this time   Coordination of Nutrition Care:  Continue to monitor while inpatient    Goals:  provide more than 75% of nutrition needs       Nutrition Monitoring and Evaluation:   Behavioral-Environmental Outcomes:  None Identified   Food/Nutrient Intake Outcomes:  Enteral Nutrition Intake/Tolerance, Parenteral Nutrition Intake/Tolerance  Physical Signs/Symptoms Outcomes:  Biochemical Data, GI Status, Fluid Status or Edema, Nutrition Focused Physical Findings, Skin, Weight     Discharge Planning: Too soon to determine     Some areas of assessment may be incomplete due to COVID-19 precautions. Tyrone Moreira R.D., L.D.   Clinical Dietitian  Office: [phone]

## 2021-06-02 NOTE — PROGRESS NOTES
potassium chloride  20 mEq Oral Daily    pantoprazole  40 mg Intravenous BID    And    sodium chloride (PF)  10 mL Intravenous BID    sodium chloride flush  10 mL Intravenous 2 times per day    cefepime  2,000 mg Intravenous Q12H    metroNIDAZOLE  500 mg Intravenous Q8H     Continuous Infusions:   PN-Adult 2-in-1 Central Line (Standard) 80 mL/hr at 06/01/21 1900    norepinephrine Stopped (06/02/21 0641)    dextrose      sodium chloride      propofol 20 mcg/kg/min (06/02/21 0447)     PRN Meds:.sodium phosphate IVPB **OR** sodium phosphate IVPB, sodium chloride nebulizer, metoprolol, glucose, dextrose, glucagon (rDNA), dextrose, sodium chloride, HYDROmorphone **OR** HYDROmorphone, sodium chloride flush, potassium chloride, ondansetron, diatrizoate meglumine-sodium, iopamidol, fentanNYL, fentanNYL, potassium chloride **OR** potassium alternative oral replacement **OR** potassium chloride, magnesium sulfate, promethazine **OR** ondansetron, magnesium hydroxide, acetaminophen **OR** acetaminophen, hydrALAZINE    Physical Exam:    VITALS:  /75   Pulse 95   Temp 98.1 °F (36.7 °C) (Axillary)   Resp 22   Ht 6' 4\" (1.93 m)   Wt 206 lb 2.1 oz (93.5 kg)   SpO2 100%   BMI 25.09 kg/m²   24HR INTAKE/OUTPUT:      Intake/Output Summary (Last 24 hours) at 6/2/2021 1014  Last data filed at 6/2/2021 0415  Gross per 24 hour   Intake 2689.48 ml   Output 1490 ml   Net 1199.48 ml     Constitutional: Intubated, sedated and on ventilator support    Skin: Skin color, texture, turgor normal. No rashes or lesions    Head: Normocephalic, without obvious abnormality, atraumatic     Cardiovascular/Edema: regular rate and rhythm, S1, S2 normal, no murmur, click, rub or gallop    Respiratory: Lungs: clear to auscultation bilaterally    Abdomen: soft, non-tender; bowel sounds normal; no masses,  no organomegaly    Back: Abdominal binder dressing in place;  hypoactive bowel sounds.     Extremities: extremities normal, atraumatic,

## 2021-06-02 NOTE — PLAN OF CARE
Problem: OXYGENATION/RESPIRATORY FUNCTION  Goal: Patient will maintain patent airway  6/2/2021 1910 by Prince Fariba RCP  Outcome: Ongoing  6/2/2021 0721 by Michell Lopez RCP  Outcome: Ongoing  Goal: Patient will achieve/maintain normal respiratory rate/effort  Description: Respiratory rate and effort will be within normal limits for the patient  6/2/2021 1910 by Prince Fariba RCP  Outcome: Ongoing  6/2/2021 0721 by Michell Lopez RCP  Outcome: Ongoing     Problem: MECHANICAL VENTILATION  Goal: Patient will maintain patent airway  6/2/2021 1910 by Prince Fariba RCP  Outcome: Ongoing  6/2/2021 0721 by Michell Lopez RCP  Outcome: Ongoing  Goal: Patient will maintain patent airway  6/2/2021 1910 by Prince Fariba RCP  Outcome: Ongoing  6/2/2021 0721 by Michell Lopez RCP  Outcome: Ongoing  Goal: ET tube will be managed safely  6/2/2021 1910 by Prince Fariba RCP  Outcome: Ongoing  6/2/2021 0721 by Michell Lopez RCP  Outcome: Ongoing

## 2021-06-02 NOTE — PLAN OF CARE
Problem: OXYGENATION/RESPIRATORY FUNCTION  Goal: Patient will maintain patent airway  6/2/2021 0721 by Raheem Davidson RCP  Outcome: Ongoing     Problem: OXYGENATION/RESPIRATORY FUNCTION  Goal: Patient will achieve/maintain normal respiratory rate/effort  Description: Respiratory rate and effort will be within normal limits for the patient  6/2/2021 2254 by Raheem Davidson RCP  Outcome: Ongoing     Problem: MECHANICAL VENTILATION  Goal: Patient will maintain patent airway  6/2/2021 0721 by Raheem Davidson RCP  Outcome: Ongoing     Problem: MECHANICAL VENTILATION  Goal: Patient will maintain patent airway  6/2/2021 0721 by Raheem Davidson RCP  Outcome: Ongoing

## 2021-06-02 NOTE — PROGRESS NOTES
Automated NOT REPORTED per 100 WBC    Differential Type NOT REPORTED     Seg Neutrophils 71 (H) 36 - 66 %    Lymphocytes 13 (L) 24 - 44 %    Monocytes 11 (H) 1 - 7 %    Eosinophils % 5 (H) 0 - 4 %    Basophils 0 0 - 2 %    Immature Granulocytes NOT REPORTED 0 %    Segs Absolute 7.90 1.3 - 9.1 k/uL    Absolute Lymph # 1.40 1.0 - 4.8 k/uL    Absolute Mono # 1.20 0.1 - 1.3 k/uL    Absolute Eos # 0.60 (H) 0.0 - 0.4 k/uL    Basophils Absolute 0.00 0.0 - 0.2 k/uL    Absolute Immature Granulocyte NOT REPORTED 0.00 - 0.30 k/uL    WBC Morphology NOT REPORTED     RBC Morphology NOT REPORTED     Platelet Estimate NOT REPORTED    Triglyceride    Collection Time: 06/02/21  4:03 AM   Result Value Ref Range    Triglycerides 49 <150 mg/dL   BLOOD GAS, ARTERIAL    Collection Time: 06/02/21  4:25 AM   Result Value Ref Range    pH, Arterial 7.440 7.350 - 7.450    pCO2, Arterial 37.5 35.0 - 45.0 mmHg    pO2, Arterial 117.0 (H) 80.0 - 100.0 mmHg    HCO3, Arterial 25.4 22.0 - 26.0 mmol/L    Positive Base Excess, Art 1.3 0.0 - 2.0 mmol/L    Negative Base Excess, Art NOT REPORTED 0.0 - 2.0 mmol/L    O2 Sat, Arterial 97.5 95 - 98 %    Total Hb NOT REPORTED 12.0 - 16.0 g/dl    Oxyhemoglobin NOT REPORTED 95.0 - 98.0 %    Carboxyhemoglobin 1.1 0 - 5 %    Methemoglobin 0.7 0.0 - 1.9 %    Pt Temp 37.0     pH, Art, Temp Adj NOT REPORTED 7.350 - 7.450    pCO2, Art, Temp Adj NOT REPORTED 35.0 - 45.0    pO2, Art, Temp Adj NOT REPORTED 80.0 - 100.0 mmHg    O2 Device/Flow/% VENTILATOR     Respiratory Rate 11     Andrew Test PASS     Sample Site Right Radial Artery     Pt.  Position SEMI-FOWLERS     Mode PRVC     Set Rate 11     Total Rate 16          FIO2 30     Peep/Cpap 7     PSV NOT REPORTED     Text for Respiratory RESULTED TO RN     NOTIFICATION NOT REPORTED     NOTIFICATION TIME NOT REPORTED    POC Glucose Fingerstick    Collection Time: 06/02/21  5:23 AM   Result Value Ref Range    POC Glucose 135 (H) 75 - 110 mg/dL   POC Glucose Fingerstick    Collection Time: 21 12:44 PM   Result Value Ref Range    POC Glucose 126 (H) 75 - 110 mg/dL   Hgb/Hct    Collection Time: 21  3:44 PM   Result Value Ref Range    Hemoglobin 9.3 (L) 13.5 - 17.5 g/dL    Hematocrit 29.6 (L) 41 - 53 %       Pulse Ox: SpO2  Av.4 %  Min: 91 %  Max: 100 %    Supplemental O2: O2 Flow Rate (L/min): 0 L/min     Current Meds:    enoxaparin  40 mg Subcutaneous Daily    furosemide  20 mg Intravenous Daily    levalbuterol  1.25 mg Nebulization Q6H    ipratropium  0.5 mg Nebulization Q6H    insulin lispro  0-18 Units Subcutaneous Q6H    sodium chloride flush  10 mL Intravenous 2 times per day    aspirin  325 mg Oral Daily    cilostazol  100 mg Oral BID    potassium chloride  20 mEq Oral Daily    pantoprazole  40 mg Intravenous BID    And    sodium chloride (PF)  10 mL Intravenous BID    sodium chloride flush  10 mL Intravenous 2 times per day    cefepime  2,000 mg Intravenous Q12H    metroNIDAZOLE  500 mg Intravenous Q8H         Continuous Infusions:    dexmedetomidine      PN-Adult 2-in-1 Central Line (Standard)      PN-Adult 2-in-1 [employer] (Standard) 80 mL/hr at 21 1900    norepinephrine Stopped (21 0641)    dextrose      sodium chloride      propofol 20 mcg/kg/min (21 0447)              VITAL SIGNS:    BP (!) 82/56   Pulse 81   Temp 98.2 °F (36.8 °C) (Oral)   Resp 24   Ht 6' 4\" (1.93 m)   Wt 206 lb 2.1 oz (93.5 kg)   SpO2 100%   BMI 25.09 kg/m²  0 L/min      Admit Weight:  184 lb 4.8 oz (83.6 kg)    Last 3 weights: Wt Readings from Last 3 Encounters:   21 206 lb 2.1 oz (93.5 kg)   20 189 lb (85.7 kg)   20 183 lb (83 kg)       BMI: Body mass index is 25.09 kg/m².      INPUT/OUTPUT:          Intake/Output Summary (Last 24 hours) at 2021 1645  Last data filed at 2021 1600  Gross per 24 hour   Intake 2779.48 ml   Output 5585 ml   Net -2805.52 ml         Telemetry shows  Atrial fibrillation. EXAM:     General appearance:  Intubated and sedated. Chest: clear bilaterally. No tenderness. No rhonchi or wheezing. Cardiac:  Irregularly irregular rate and rhythm. No significant murmur or gallop or rubs. Abdomen: soft,  Postop abdominal surgery drainage and dressing in place. Extremities: No worsening bilateral lower leg edema. Skin:  warm and dry. 2D echocardiogram-pending. ASSESSMENT:    Atrial fibrillation with controlled ventricular rate.     Status post lysis of adhesions following small bowel obstruction and remains intubated since surgery due to acute respiratory failure.     Hypotension requiring IV Levophed     History of CVA.     Other problems as charted. REC/PLAN:      Status quo. Brief review of 2D echo images at bedside revealed severe left ventricular systolic dysfunction with wall motion abnormality. Official report pending. Continue on all current cardiac medications as charted. Overall guarded prognosis. Discussed with the charge nurse. Will follow. Electronically signed by Garima Browne MD, [facility] [place]        PLEASE NOTE:  This progress note was completed using a voice transcription system. Every effort was made to ensure accuracy. However, inadvertent computerized transcription errors may be present.

## 2021-06-02 NOTE — PROGRESS NOTES
Progress Note    6/2/2021   12:29 PM    Name:  Brendan Posey  MRN:    [id number]     Acct:     [de-identified]   Room:  2002/2002-01  IP Day: 9     Admit Date: 5/24/2021  3:38 PM  PCP: Chi Pena MD    Subjective:     C/C:   Chief Complaint   Patient presents with   [facility] Nausea    Emesis       Interval History: Status: not changed. Currently patient is on ventilator weaning trial. Tube feed started today. Off of Levophed. On propofol. Vital signs reviewed blood pressure and heart rate stable. Recent labs reviewed. Recent checks x-ray report shows improved right basilar opacity resolving pulmonary vascular congestion. ROS:   all 10 systems reviewed and are negative except as noted    Review of Systems   Unable to perform ROS: Intubated       Medications: Allergies:    Allergies   Allergen Reactions    Bactrim [Sulfamethoxazole-Trimethoprim] Hives and Swelling    Ciprofloxacin Swelling     FACE       Current Meds: enoxaparin (LOVENOX) injection 40 mg, Daily  furosemide (LASIX) injection 20 mg, Daily  PN-Adult 2-in-1 Central Line (Standard), Continuous TPN  sodium phosphate 14.79 mmol in dextrose 5 % 250 mL IVPB, PRN   Or  sodium phosphate 29.58 mmol in dextrose 5 % 250 mL IVPB, PRN  levalbuterol (XOPENEX) nebulizer solution 1.25 mg, Q6H  sodium chloride nebulizer 0.9 % solution 3 mL, Q8H PRN  ipratropium (ATROVENT) 0.02 % nebulizer solution 0.5 mg, Q6H  metoprolol (LOPRESSOR) injection 5 mg, Q6H PRN  insulin lispro (HUMALOG) injection vial 0-18 Units, Q6H  norepinephrine (LEVOPHED) 16 mg in sodium chloride 0.9 % 250 mL infusion, Continuous  glucose (GLUTOSE) 40 % oral gel 15 g, PRN  dextrose 50 % IV solution, PRN  glucagon (rDNA) injection 1 mg, PRN  dextrose 5 % solution, PRN  0.9 % sodium chloride infusion, PRN  propofol injection, Titrated  HYDROmorphone (DILAUDID) injection 0.25 mg, Q3H PRN   Or  HYDROmorphone (DILAUDID) injection 0.5 mg, Q3H PRN  sodium chloride flush 0.9 % injection 10 mL, 2 times per day  sodium chloride flush 0.9 % injection 10 mL, PRN  potassium chloride 10 mEq/100 mL IVPB (Peripheral Line), PRN  ondansetron (ZOFRAN) injection 4 mg, Q6H PRN  aspirin tablet 325 mg, Daily  cilostazol (PLETAL) tablet 100 mg, BID  potassium chloride (MICRO-K) extended release capsule 20 mEq, Daily  diatrizoate meglumine-sodium (GASTROGRAFIN) 66-10 % solution 450 mL, ONCE PRN  pantoprazole (PROTONIX) injection 40 mg, BID   And  sodium chloride (PF) 0.9 % injection 10 mL, BID  iopamidol (ISOVUE-370) 76 % injection 75 mL, ONCE PRN  fentaNYL (SUBLIMAZE) injection 25 mcg, Q2H PRN  fentaNYL (SUBLIMAZE) injection 50 mcg, Q2H PRN  sodium chloride flush 0.9 % injection 10 mL, 2 times per day  potassium chloride (KLOR-CON M) extended release tablet 40 mEq, PRN   Or  potassium bicarb-citric acid (EFFER-K) effervescent tablet 40 mEq, PRN   Or  potassium chloride 10 mEq/100 mL IVPB (Peripheral Line), PRN  magnesium sulfate 1000 mg in dextrose 5% 100 mL IVPB, PRN  promethazine (PHENERGAN) tablet 12.5 mg, Q6H PRN   Or  ondansetron (ZOFRAN) injection 4 mg, Q6H PRN  magnesium hydroxide (MILK OF MAGNESIA) 400 MG/5ML suspension 30 mL, Daily PRN  acetaminophen (TYLENOL) tablet 650 mg, Q6H PRN   Or  acetaminophen (TYLENOL) suppository 650 mg, Q6H PRN  cefepime (MAXIPIME) 2000 mg IVPB minibag, Q12H  metronidazole (FLAGYL) 500 mg in NaCl 100 mL IVPB premix, Q8H  hydrALAZINE (APRESOLINE) injection 10 mg, Q6H PRN        Data:     Code Status:  Full Code    Family History   Problem Relation Age of Onset    Arthritis Mother     Atrial Fibrillation Mother     Hearing Loss Mother     Heart Disease Mother     Stroke Mother     Vision Loss Mother     Arthritis Father     Cancer Father     Heart Disease Father     High Blood Pressure Father     Stroke Father     Vision Loss Father        Social History     Socioeconomic History    Marital status:      Spouse name: Not on file    Number of children: Not on file    Years of injectable PICC exchange. Ready for use. IR FLUORO GUIDED CVA DEVICE PLMT/REPLACE/REMOVAL    Result Date: 5/28/2021  Ultrasound and fluoroscopy guided right sided PICC insertion with good tip position in the central circulation. PICC is ready for use at this time. XR CHEST PORTABLE    Result Date: 6/2/2021  Heart size top-normal.  Oval resolving pulmonary vascular congestion, and left effusion. Improved right basilar opacity and effusion. Support tubes and lines as above. XR CHEST PORTABLE    Addendum Date: 6/1/2021    ADDENDUM: Correction of voice transcription error: The 1st sentence of the impression should read as follows: CHANGED position of the right PICC line. Result Date: 6/1/2021  1. Unchanged position of the right PICC line. It now crosses the midline in the proximal SVC to terminating in region of left innominate vein. 2. Pulmonary vascular congestion and layering right pleural effusion unchanged but left pleural effusion appears less prominent. The findings were sent to the Radiology Results [address] at 7:20 am on 6/1/2021to be communicated to a licensed caregiver. XR CHEST PORTABLE    Result Date: 5/31/2021  Endotracheal tube in satisfactory position Bilateral pulmonary opacities right greater than left likely representing pulmonary edema with bilateral pleural effusions     XR CHEST PORTABLE    Result Date: 5/30/2021  Endotracheal tube in satisfactory position Bilateral airspace disease right greater than left could represent edema or infection     XR CHEST PORTABLE    Result Date: 5/29/2021  Trace right effusion and mild atelectasis. Tubes and lines as above. However, endotracheal tube terminates at the louis oriented toward the right mainstem bronchus. RECOMMENDATION: Advise retraction of endotracheal tube by 3-4 cm. The findings were sent to the Radiology Results [address] at 8:11 am on 5/29/2021to be communicated to a licensed caregiver.      XR CHEST PORTABLE    Result Date: 5/28/2021  Endotracheal tube with tip 3.6 cm from the louis. Enteric tube with tip in the proximal body versus fundus of the stomach and side-port likely in the distal esophagus. Suggest advancement by 5-10 cm. Right-sided PICC line with tip in the distal SVC. No pneumothoraces. Mild bibasilar likely atelectasis. FL SMALL BOWEL FOLLOW THROUGH ONLY    Result Date: 5/27/2021  Findings suggestive of high-grade ileus or partial/intermittent small bowel obstruction. Findings were discussed with Dr. Cj Hinojosa at 4:30 p.m. on 5/27/2021.        Labs:  Recent Results (from the past 24 hour(s))   POC Glucose Fingerstick    Collection Time: 06/01/21  5:13 PM   Result Value Ref Range    POC Glucose 129 (H) 75 - 110 mg/dL   Hgb/Hct    Collection Time: 06/01/21 10:42 PM   Result Value Ref Range    Hemoglobin 8.8 (L) 13.5 - 17.5 g/dL    Hematocrit 27.5 (L) 41 - 53 %   POC Glucose Fingerstick    Collection Time: 06/01/21 11:11 PM   Result Value Ref Range    POC Glucose 130 (H) 75 - 110 mg/dL   Basic Metabolic Panel w/ Reflex to MG    Collection Time: 06/02/21  4:03 AM   Result Value Ref Range    Glucose 140 (H) 70 - 99 mg/dL    BUN 23 8 - 23 mg/dL    CREATININE 0.78 0.70 - 1.20 mg/dL    Bun/Cre Ratio NOT REPORTED 9 - 20    Calcium 8.0 (L) 8.6 - 10.4 mg/dL    Sodium 140 135 - 144 mmol/L    Potassium 4.2 3.7 - 5.3 mmol/L    Chloride 107 98 - 107 mmol/L    CO2 25 20 - 31 mmol/L    Anion Gap 8 (L) 9 - 17 mmol/L    GFR Non-African American >60 >60 mL/min    GFR African American >60 >60 mL/min    GFR Comment          GFR Staging NOT REPORTED    Phosphorus    Collection Time: 06/02/21  4:03 AM   Result Value Ref Range    Phosphorus 3.0 2.5 - 4.5 mg/dL   Magnesium    Collection Time: 06/02/21  4:03 AM   Result Value Ref Range    Magnesium 2.0 1.6 - 2.6 mg/dL   CBC WITH AUTO DIFFERENTIAL    Collection Time: 06/02/21  4:03 AM   Result Value Ref Range    WBC 11.1 (H) 3.5 - 11.0 k/uL    RBC 2.96 (L) 4.5 - 5.9

## 2021-06-02 NOTE — PLAN OF CARE
Problem: Falls - Risk of:  Goal: Will remain free from falls  Description: Will remain free from falls  6/2/2021 0343 by Ja Garcia RN  Outcome: Ongoing  Note: Bed remains in lowest position and side rails up x2. Patient remains free of falls at this time. RN will continue to monitor. Goal: Absence of physical injury  Description: Absence of physical injury  6/2/2021 0343 by Ja Garcia RN  Outcome: Ongoing     Problem: Skin Integrity:  Goal: Will show no infection signs and symptoms  Description: Will show no infection signs and symptoms  6/2/2021 0343 by Ja Garcia RN  Outcome: Ongoing  Note: Patient afebrile. No signs of infection noted. Goal: Absence of new skin breakdown  Description: Absence of new skin breakdown  6/2/2021 0343 by Ja Garcia RN  Outcome: Ongoing     Problem: SAFETY  Goal: Free from accidental physical injury  6/2/2021 0343 by Ja Garcia RN  Outcome: Ongoing  Goal: Free from intentional harm  6/2/2021 0343 by Ja Garcia RN  Outcome: Ongoing     Problem: DAILY CARE  Goal: Daily care needs are met  6/2/2021 0343 by Ja Garcia RN  Outcome: Ongoing     Problem: PAIN  Goal: Patient's pain/discomfort is manageable  6/2/2021 0343 by Ja Garcia RN  Outcome: Ongoing     Problem: SKIN INTEGRITY  Goal: Skin integrity is maintained or improved  6/2/2021 0343 by Ja Garcia RN  Outcome: Ongoing     Problem: KNOWLEDGE DEFICIT  Goal: Patient/S.O. demonstrates understanding of disease process, treatment plan, medications, and discharge instructions.   6/2/2021 0343 by Ja Garcia RN  Outcome: Ongoing     Problem: DISCHARGE BARRIERS  Goal: Patient's continuum of care needs are met  6/2/2021 0343 by Ja Garcia RN  Outcome: Ongoing     Problem: Pain:  Goal: Pain level will decrease  Description: Pain level will decrease  6/2/2021 0343 by Ja Garcia RN  Outcome: Ongoing  Goal: Control of acute pain  Description: Control of acute pain  6/2/2021 0343 by Ja Garcia RN  Outcome: Ongoing  Note: Pain VENTILATION  Goal: Patient will maintain patent airway  6/2/2021 0343 by Thaddeus Barajas RN  Outcome: Ongoing  Note: Patient remains orally intubated and sedated on vent. Oral care performed every two hours and suctioning as needed. Airway remains patent.     Goal: Patient will maintain patent airway  6/2/2021 0343 by Thaddeus Barajas RN  Outcome: Ongoing  Goal: Oral health is maintained or improved  6/2/2021 0343 by Thaddeus Barajas RN  Outcome: Ongoing  6/1/2021 1356 by Esha Pablo RN  Outcome: Met This Shift  Goal: ET tube will be managed safely  6/2/2021 0343 by Thaddeus Barajas RN  Outcome: Ongoing  Goal: Ability to express needs and understand communication  6/2/2021 0343 by Thaddeus Barajas RN  Outcome: Ongoing  Goal: Mobility/activity is maintained at optimum level for patient  6/2/2021 0343 by Thaddeus Barajas RN  Outcome: Ongoing     Problem: Musculor/Skeletal Functional Status  Goal: Highest potential functional level  6/2/2021 0343 by Thaddeus Barajas RN  Outcome: Ongoing  Goal: Absence of falls  6/2/2021 0343 by Thaddeus Barajas RN  Outcome: Ongoing

## 2021-06-02 NOTE — PROGRESS NOTES
ICU Progress Note (Vent)   [facility]    Patient - Jarett Amaya,  Age - 77 y.o.    - 1945      Room Number -    MRN -  [id number]   [facility]  Date of Admission -  2021  3:38 PM     Follow-up: Acute respiratory failure    Events of Past 24 Hours   On PS 12, 7 of PEEP and 30% FiO2  On  propofol drip now down to 10 for weaning trial,  Discussed with staff, no fever or chills  Moderate thick tracheal secretions  On tube feed , no diarrhea  Adequate urine output, fluid balance +1199 ml  No new skin rash or lesion    Vitals    height is 6' 4\" (1.93 m) and weight is 206 lb 2.1 oz (93.5 kg). His axillary temperature is 98.1 °F (36.7 °C). His blood pressure is 118/80 and his pulse is 86. His respiration is 30 and oxygen saturation is 100%. Temperature Range: Temp: 98.1 °F (36.7 °C) Temp  Av.5 °F (36.9 °C)  Min: 98.1 °F (36.7 °C)  Max: 99.1 °F (37.3 °C)  BP Range:  Systolic (38HOW), OQY:844 , Min:67 , STEVE:553     Diastolic (44QED), NWK:07, Min:37, Max:108    Pulse Range: Pulse  Av.7  Min: 65  Max: 96  Respiration Range: Resp  Av.9  Min: 15  Max: 36  Current Pulse Ox[de-identified]  SpO2: 100 %  24HR Pulse Ox Range:  SpO2  Av.3 %  Min: 91 %  Max: 100 %  Oxygen Amount and Delivery: O2 Flow Rate (L/min): 0 L/min      Wt Readings from Last 3 Encounters:   21 206 lb 2.1 oz (93.5 kg)   20 189 lb (85.7 kg)   20 183 lb (83 kg)     I/O       Intake/Output Summary (Last 24 hours) at 2021 1248  Last data filed at 2021 0800  Gross per 24 hour   Intake 2815.48 ml   Output 1490 ml   Net 1325.48 ml     I/O last 3 completed shifts:   In: 2689.5 [I.V.:1161.8; NG/GT:96; IV Piggyback:711.7]  Out: 1490 [Urine:1300; Drains:190]     DRAIN/TUBE OUTPUT:     Invasive Lines   ETT Day -     Lines -      ICP PRESSURE RANGE:  No data recorded  CVP PRESSURE RANGE:  No data recorded  Mechanical Ventilation Data   SETTINGS (Comprehensive)  Vent Information  $Ventilation: $Subsequent Day  Skin Assessment: Clean, dry, & intact  Suction Catheter Diameter: 14  Equipment ID: [id number]  Equipment Changed: HME  Vent Type: Servo i  Vent Mode: CPAP  Vt Ordered: 500 mL  Rate Set: 11 bmp  Pressure Support: 12 cmH20  FiO2 : 30 %  SpO2: 100 %  SpO2/FiO2 ratio: 333.33  Sensitivity: 5  PEEP/CPAP: 7  I Time/ I Time %: 1.82 s  Humidification Source: HME  Additional Respiratory  Assessments  Pulse: 86  Resp: 30  SpO2: 100 %  End Tidal CO2: 26  Position: Semi-Santiago's  Humidification Source: E  Oral Care Completed?: Yes  Oral Care: Teeth brushed, Mouth swabbed, Mouth moisturizer, Mouth suctioned, Suction toothette  Cuff Pressure (cm H2O): 24 cm H2O       ABGs:   Lab Results   Component Value Date    PHART 7.440 06/02/2021    PO2ART 117.0 06/02/2021    NDF2AVE 37.5 06/02/2021       Lab Results   Component Value Date    MODE PRVC 06/02/2021         Medications   IV   dexmedetomidine      PN-Adult 2-in-1 Central Line (Standard) 80 mL/hr at 06/01/21 1900    norepinephrine Stopped (06/02/21 0641)    dextrose      sodium chloride      propofol 20 mcg/kg/min (06/02/21 0447)      enoxaparin  40 mg Subcutaneous Daily    furosemide  20 mg Intravenous Daily    levalbuterol  1.25 mg Nebulization Q6H    ipratropium  0.5 mg Nebulization Q6H    insulin lispro  0-18 Units Subcutaneous Q6H    sodium chloride flush  10 mL Intravenous 2 times per day    aspirin  325 mg Oral Daily    cilostazol  100 mg Oral BID    potassium chloride  20 mEq Oral Daily    pantoprazole  40 mg Intravenous BID    And    sodium chloride (PF)  10 mL Intravenous BID    sodium chloride flush  10 mL Intravenous 2 times per day    cefepime  2,000 mg Intravenous Q12H    metroNIDAZOLE  500 mg Intravenous Q8H       Diet/Nutrition   PN-Adult 2-in-1 Central Line (Standard)  DIET TUBE FEED CONTINUOUS/CYCLIC NPO; Low Calorie High Protein; Gastrostomy; 25    Exam   VITALS    height is 6' 4\" (1.93 m) and weight is 206 lb 2.1 oz (93.5 kg). His axillary temperature is 98.1 °F (36.7 °C). His blood pressure is 118/80 and his pulse is 86. His respiration is 30 and oxygen saturation is 100%. Ventilator Settings (Basic)  Vent Mode: CPAP Rate Set: 11 bmp/Vt Ordered: 500 mL/ /FiO2 : 30 %    Constitutional -awake and alert on vent  General Appearance  well developed, well nourished  HEENT - Life support devices in place (ET, NG),normocephalic, atraumatic. PERRLA  Lungs - Chest expands equally, no wheezes, + coarse rhonchi. Cardiovascular - Heart sounds are normal.  normal rate and rhythm irregular, no murmur, gallop or rub. Abdomen - soft, no guarding, nondistended,   Neurologic -following commands, no restlessness or agitation  Skin - no bruising or bleeding  Extremities - no cyanosis, clubbing, mild edema    Lab Results   CBC     Lab Results   Component Value Date    WBC 11.1 06/02/2021    RBC 2.96 06/02/2021    HGB 8.7 06/02/2021    HCT 26.7 06/02/2021     06/02/2021    MCV 90.0 06/02/2021    MCH 29.4 06/02/2021    MCHC 32.7 06/02/2021    RDW 15.0 06/02/2021    NRBC 1 06/01/2021    METASPCT 1 06/01/2021    LYMPHOPCT 13 06/02/2021    MONOPCT 11 06/02/2021    BASOPCT 0 06/02/2021    MONOSABS 1.20 06/02/2021    LYMPHSABS 1.40 06/02/2021    EOSABS 0.60 06/02/2021    BASOSABS 0.00 06/02/2021    DIFFTYPE NOT REPORTED 06/02/2021       BMP   Lab Results   Component Value Date     06/02/2021    K 4.2 06/02/2021     06/02/2021    CO2 25 06/02/2021    BUN 23 06/02/2021    CREATININE 0.78 06/02/2021    GLUCOSE 140 06/02/2021    CALCIUM 8.0 06/02/2021       LFTS  Lab Results   Component Value Date    ALKPHOS 45 05/29/2021    ALT 20 05/29/2021    AST 56 05/29/2021    PROT 5.7 05/29/2021    BILITOT 0.34 05/29/2021    LABALBU 3.1 05/29/2021       INR  No results for input(s): PROTIME, INR in the last 72 hours. APTT  No results for input(s): APTT in the last 72 hours.     Lactic Acid  Lab Results Component Value Date    LACTA 1.6 05/30/2021    LACTA 2.4 05/29/2021    LACTA 0.7 05/27/2021        BNP   No results for input(s): BNP in the last 72 hours. Cultures       Radiology     Plain Films ET tube acceptable position, decreased congestion and pleural effusions         CT Scans    See actual reports for details    SYSTEM ASSESSMENT      Neuro       Respiratory   Wean oxygen as tolerated.  Keep O2 sat > 88%       Hemodynamics       Gastrointestinal/Nutrition       Renal       Infectious Disease       Hematology/Oncology       Endocrine       Social/Spiritual/DNR/Disposition/Other     Requiring ventilatory support post ex lap 5/28  Respiratory alkalosis/improved  Hypervolemia,   Decreased bilateral pleural effusions  History of COPD/possible CYNDI  Paroxysmal A. fib, preserved LV function,   history of CVA seizure  SBO/ Ex lap with SB resection and primary anastomosis, 5/28  Acute kidney injury/improved, nephrology following  Pseudomonal UTI on cefepime    Plan:  Daily sedation vacation and weaning trial with lower pressure support at 7-8, and can use Precedex instead for the weaning trial  Diuresis  Bronchodilators  On Lovenox 40 for DVT prophylaxis  Discussed with staff,   Discussed with the son and daughter at bedside and they are both in agreement for DNR CCA no intubation but okay for CPR, noted that patient did not want to be on vent for long but they agreed to give him a good weaning trial and  if  able to to extubate otherwise can wait a little bit longer      Critical Care Time   30 min    Electronically signed by Nicholas Weaver MD on 6/2/2021 at 12:48 PM

## 2021-06-02 NOTE — PLAN OF CARE
Nutrition Problem #1: Inadequate oral intake  Intervention: Food and/or Nutrient Delivery: Continue Current Tube Feeding, Modify Parenteral Nutrition  Nutritional Goals: provide more than 75% of nutrition needs

## 2021-06-02 NOTE — CARE COORDINATION
ONGOING DISCHARGE PLAN:    Patient remains on [address] per son was for the patient to return to home and have services resumed with [facility] for skilled services and [facility]. POD #5 exp lap with release of bowel obstruction, bowel resection. Will follow for needs once patient is extubated. Code status discussion [facility] No re-intubation, CPR Yes     GLORY HERED[facility] - 23%    Will continue to follow for additional discharge needs.     Electronically signed by Susana Schafer RN on 6/2/2021 at 2:41 PM

## 2021-06-03 ENCOUNTER — APPOINTMENT (OUTPATIENT)
Dept: GENERAL RADIOLOGY | Age: 76
DRG: 329 | End: 2021-06-03
Payer: COMMERCIAL

## 2021-06-03 LAB
ABSOLUTE EOS #: 0.5 K/UL (ref 0–0.4)
ABSOLUTE IMMATURE GRANULOCYTE: ABNORMAL K/UL (ref 0–0.3)
ABSOLUTE LYMPH #: 1.4 K/UL (ref 1–4.8)
ABSOLUTE MONO #: 1.4 K/UL (ref 0.1–1.3)
ALLEN TEST: ABNORMAL
ANION GAP SERPL CALCULATED.3IONS-SCNC: 8 MMOL/L (ref 9–17)
BASOPHILS # BLD: 0 % (ref 0–2)
BASOPHILS ABSOLUTE: 0 K/UL (ref 0–0.2)
BUN BLDV-MCNC: 27 MG/DL (ref 8–23)
BUN/CREAT BLD: ABNORMAL (ref 9–20)
CALCIUM SERPL-MCNC: 8.3 MG/DL (ref 8.6–10.4)
CARBOXYHEMOGLOBIN: 1.1 % (ref 0–5)
CHLORIDE BLD-SCNC: 104 MMOL/L (ref 98–107)
CO2: 28 MMOL/L (ref 20–31)
CREAT SERPL-MCNC: 0.7 MG/DL (ref 0.7–1.2)
DIFFERENTIAL TYPE: ABNORMAL
EOSINOPHILS RELATIVE PERCENT: 4 % (ref 0–4)
FIO2: 30
GFR AFRICAN AMERICAN: >60 ML/MIN
GFR NON-AFRICAN AMERICAN: >60 ML/MIN
GFR SERPL CREATININE-BSD FRML MDRD: ABNORMAL ML/MIN/{1.73_M2}
GFR SERPL CREATININE-BSD FRML MDRD: ABNORMAL ML/MIN/{1.73_M2}
GLUCOSE BLD-MCNC: 121 MG/DL (ref 70–99)
GLUCOSE BLD-MCNC: 127 MG/DL (ref 75–110)
GLUCOSE BLD-MCNC: 127 MG/DL (ref 75–110)
GLUCOSE BLD-MCNC: 131 MG/DL (ref 75–110)
GLUCOSE BLD-MCNC: 97 MG/DL (ref 75–110)
HCO3 ARTERIAL: 29.3 MMOL/L (ref 22–26)
HCT VFR BLD CALC: 26.1 % (ref 41–53)
HCT VFR BLD CALC: 26.6 % (ref 41–53)
HCT VFR BLD CALC: 28 % (ref 41–53)
HEMOGLOBIN: 8.6 G/DL (ref 13.5–17.5)
HEMOGLOBIN: 8.7 G/DL (ref 13.5–17.5)
HEMOGLOBIN: 9 G/DL (ref 13.5–17.5)
IMMATURE GRANULOCYTES: ABNORMAL %
LYMPHOCYTES # BLD: 12 % (ref 24–44)
MAGNESIUM: 2 MG/DL (ref 1.6–2.6)
MCH RBC QN AUTO: 28.4 PG (ref 26–34)
MCHC RBC AUTO-ENTMCNC: 32.3 G/DL (ref 31–37)
MCV RBC AUTO: 88.1 FL (ref 80–100)
METHEMOGLOBIN: 0.9 % (ref 0–1.9)
MODE: ABNORMAL
MONOCYTES # BLD: 12 % (ref 1–7)
NEGATIVE BASE EXCESS, ART: ABNORMAL MMOL/L (ref 0–2)
NOTIFICATION TIME: ABNORMAL
NOTIFICATION: ABNORMAL
NRBC AUTOMATED: ABNORMAL PER 100 WBC
O2 DEVICE/FLOW/%: ABNORMAL
O2 SAT, ARTERIAL: 97.3 % (ref 95–98)
OXYHEMOGLOBIN: ABNORMAL % (ref 95–98)
PATIENT TEMP: 37
PCO2 ARTERIAL: 37.1 MMHG (ref 35–45)
PCO2, ART, TEMP ADJ: ABNORMAL (ref 35–45)
PDW BLD-RTO: 15 % (ref 11.5–14.9)
PEEP/CPAP: 7
PH ARTERIAL: 7.51 (ref 7.35–7.45)
PH, ART, TEMP ADJ: ABNORMAL (ref 7.35–7.45)
PHOSPHORUS: 3.3 MG/DL (ref 2.5–4.5)
PLATELET # BLD: 280 K/UL (ref 150–450)
PLATELET ESTIMATE: ABNORMAL
PMV BLD AUTO: 8 FL (ref 6–12)
PO2 ARTERIAL: 123 MMHG (ref 80–100)
PO2, ART, TEMP ADJ: ABNORMAL MMHG (ref 80–100)
POSITIVE BASE EXCESS, ART: 6.2 MMOL/L (ref 0–2)
POTASSIUM SERPL-SCNC: 4.1 MMOL/L (ref 3.7–5.3)
PSV: ABNORMAL
PT. POSITION: ABNORMAL
RBC # BLD: 3.17 M/UL (ref 4.5–5.9)
RBC # BLD: ABNORMAL 10*6/UL
RESPIRATORY RATE: 12
SAMPLE SITE: ABNORMAL
SEG NEUTROPHILS: 72 % (ref 36–66)
SEGMENTED NEUTROPHILS ABSOLUTE COUNT: 8.2 K/UL (ref 1.3–9.1)
SET RATE: 12
SODIUM BLD-SCNC: 140 MMOL/L (ref 135–144)
TEXT FOR RESPIRATORY: ABNORMAL
TOTAL HB: ABNORMAL G/DL (ref 12–16)
TOTAL RATE: 26
VT: 50
WBC # BLD: 11.5 K/UL (ref 3.5–11)
WBC # BLD: ABNORMAL 10*3/UL

## 2021-06-03 PROCEDURE — 85025 COMPLETE CBC W/AUTO DIFF WBC: CPT

## 2021-06-03 PROCEDURE — 71045 X-RAY EXAM CHEST 1 VIEW: CPT

## 2021-06-03 PROCEDURE — 6360000002 HC RX W HCPCS: Performed by: PHYSICIAN ASSISTANT

## 2021-06-03 PROCEDURE — 51702 INSERT TEMP BLADDER CATH: CPT

## 2021-06-03 PROCEDURE — C9113 INJ PANTOPRAZOLE SODIUM, VIA: HCPCS | Performed by: SURGERY

## 2021-06-03 PROCEDURE — 31720 CLEARANCE OF AIRWAYS: CPT

## 2021-06-03 PROCEDURE — 36415 COLL VENOUS BLD VENIPUNCTURE: CPT

## 2021-06-03 PROCEDURE — 6360000002 HC RX W HCPCS: Performed by: INTERNAL MEDICINE

## 2021-06-03 PROCEDURE — 6360000002 HC RX W HCPCS: Performed by: SURGERY

## 2021-06-03 PROCEDURE — 2500000003 HC RX 250 WO HCPCS: Performed by: SURGERY

## 2021-06-03 PROCEDURE — 36600 WITHDRAWAL OF ARTERIAL BLOOD: CPT

## 2021-06-03 PROCEDURE — 82947 ASSAY GLUCOSE BLOOD QUANT: CPT

## 2021-06-03 PROCEDURE — 2700000000 HC OXYGEN THERAPY PER DAY

## 2021-06-03 PROCEDURE — 2580000003 HC RX 258: Performed by: SURGERY

## 2021-06-03 PROCEDURE — 94003 VENT MGMT INPAT SUBQ DAY: CPT

## 2021-06-03 PROCEDURE — 6370000000 HC RX 637 (ALT 250 FOR IP): Performed by: SURGERY

## 2021-06-03 PROCEDURE — 84100 ASSAY OF PHOSPHORUS: CPT

## 2021-06-03 PROCEDURE — 85014 HEMATOCRIT: CPT

## 2021-06-03 PROCEDURE — 94761 N-INVAS EAR/PLS OXIMETRY MLT: CPT

## 2021-06-03 PROCEDURE — 80048 BASIC METABOLIC PNL TOTAL CA: CPT

## 2021-06-03 PROCEDURE — 94640 AIRWAY INHALATION TREATMENT: CPT

## 2021-06-03 PROCEDURE — 83735 ASSAY OF MAGNESIUM: CPT

## 2021-06-03 PROCEDURE — 85018 HEMOGLOBIN: CPT

## 2021-06-03 PROCEDURE — 6370000000 HC RX 637 (ALT 250 FOR IP): Performed by: INTERNAL MEDICINE

## 2021-06-03 PROCEDURE — 2060000000 HC ICU INTERMEDIATE R&B

## 2021-06-03 PROCEDURE — 82805 BLOOD GASES W/O2 SATURATION: CPT

## 2021-06-03 RX ORDER — ASPIRIN 81 MG/1
81 TABLET, CHEWABLE ORAL DAILY
Status: DISCONTINUED | OUTPATIENT
Start: 2021-06-03 | End: 2021-06-06

## 2021-06-03 RX ADMIN — Medication 10 ML: at 19:54

## 2021-06-03 RX ADMIN — LEVALBUTEROL HYDROCHLORIDE 1.25 MG: 1.25 SOLUTION, CONCENTRATE RESPIRATORY (INHALATION) at 07:04

## 2021-06-03 RX ADMIN — SODIUM CHLORIDE, PRESERVATIVE FREE 10 ML: 5 INJECTION INTRAVENOUS at 19:47

## 2021-06-03 RX ADMIN — LEVALBUTEROL HYDROCHLORIDE 1.25 MG: 1.25 SOLUTION, CONCENTRATE RESPIRATORY (INHALATION) at 14:22

## 2021-06-03 RX ADMIN — CILOSTAZOL 100 MG: 100 TABLET ORAL at 21:59

## 2021-06-03 RX ADMIN — METRONIDAZOLE 500 MG: 500 INJECTION, SOLUTION INTRAVENOUS at 23:27

## 2021-06-03 RX ADMIN — LEVALBUTEROL HYDROCHLORIDE 1.25 MG: 1.25 SOLUTION, CONCENTRATE RESPIRATORY (INHALATION) at 01:33

## 2021-06-03 RX ADMIN — IPRATROPIUM BROMIDE 0.5 MG: 0.5 SOLUTION RESPIRATORY (INHALATION) at 01:33

## 2021-06-03 RX ADMIN — METRONIDAZOLE 500 MG: 500 INJECTION, SOLUTION INTRAVENOUS at 16:16

## 2021-06-03 RX ADMIN — ACETAMINOPHEN 650 MG: 325 TABLET ORAL at 14:24

## 2021-06-03 RX ADMIN — IPRATROPIUM BROMIDE 0.5 MG: 0.5 SOLUTION RESPIRATORY (INHALATION) at 14:22

## 2021-06-03 RX ADMIN — ENOXAPARIN SODIUM 40 MG: 40 INJECTION SUBCUTANEOUS at 08:45

## 2021-06-03 RX ADMIN — PANTOPRAZOLE SODIUM 40 MG: 40 INJECTION, POWDER, FOR SOLUTION INTRAVENOUS at 19:54

## 2021-06-03 RX ADMIN — POTASSIUM CHLORIDE 20 MEQ: 10 CAPSULE, COATED, EXTENDED RELEASE ORAL at 08:44

## 2021-06-03 RX ADMIN — LEVALBUTEROL HYDROCHLORIDE 1.25 MG: 1.25 SOLUTION, CONCENTRATE RESPIRATORY (INHALATION) at 19:14

## 2021-06-03 RX ADMIN — SODIUM CHLORIDE, PRESERVATIVE FREE 10 ML: 5 INJECTION INTRAVENOUS at 10:47

## 2021-06-03 RX ADMIN — IPRATROPIUM BROMIDE 0.5 MG: 0.5 SOLUTION RESPIRATORY (INHALATION) at 07:04

## 2021-06-03 RX ADMIN — IPRATROPIUM BROMIDE 0.5 MG: 0.5 SOLUTION RESPIRATORY (INHALATION) at 19:14

## 2021-06-03 RX ADMIN — FUROSEMIDE 20 MG: 10 INJECTION, SOLUTION INTRAMUSCULAR; INTRAVENOUS at 08:44

## 2021-06-03 RX ADMIN — METRONIDAZOLE 500 MG: 500 INJECTION, SOLUTION INTRAVENOUS at 00:53

## 2021-06-03 RX ADMIN — ASPIRIN 81 MG CHEWABLE TABLET 81 MG: 81 TABLET CHEWABLE at 08:52

## 2021-06-03 RX ADMIN — PANTOPRAZOLE SODIUM 40 MG: 40 INJECTION, POWDER, FOR SOLUTION INTRAVENOUS at 08:44

## 2021-06-03 RX ADMIN — CEFEPIME 2000 MG: 2 INJECTION, POWDER, FOR SOLUTION INTRAVENOUS at 06:31

## 2021-06-03 RX ADMIN — CEFEPIME 2000 MG: 2 INJECTION, POWDER, FOR SOLUTION INTRAVENOUS at 19:54

## 2021-06-03 RX ADMIN — Medication 10 ML: at 08:44

## 2021-06-03 RX ADMIN — SODIUM CHLORIDE, PRESERVATIVE FREE 10 ML: 5 INJECTION INTRAVENOUS at 19:55

## 2021-06-03 RX ADMIN — SODIUM CHLORIDE, PRESERVATIVE FREE 10 ML: 5 INJECTION INTRAVENOUS at 09:00

## 2021-06-03 RX ADMIN — METRONIDAZOLE 500 MG: 500 INJECTION, SOLUTION INTRAVENOUS at 08:59

## 2021-06-03 ASSESSMENT — PAIN SCALES - WONG BAKER
WONGBAKER_NUMERICALRESPONSE: 0

## 2021-06-03 ASSESSMENT — PAIN SCALES - GENERAL
PAINLEVEL_OUTOF10: 0
PAINLEVEL_OUTOF10: 3
PAINLEVEL_OUTOF10: 0

## 2021-06-03 ASSESSMENT — PULMONARY FUNCTION TESTS
PIF_VALUE: 20
PIF_VALUE: 13
PIF_VALUE: 12
PIF_VALUE: 13
PIF_VALUE: 13
PIF_VALUE: 19
PIF_VALUE: 24
PIF_VALUE: 11
PIF_VALUE: 22
PIF_VALUE: 13
PIF_VALUE: 11
PIF_VALUE: 22
PIF_VALUE: 12
PIF_VALUE: 17
PIF_VALUE: 20
PIF_VALUE: 23
PIF_VALUE: 13
PIF_VALUE: 25
PIF_VALUE: 13
PIF_VALUE: 11
PIF_VALUE: 33
PIF_VALUE: 13
PIF_VALUE: 19
PIF_VALUE: 28
PIF_VALUE: 40
PIF_VALUE: 1
PIF_VALUE: 17
PIF_VALUE: 13
PIF_VALUE: 13
PIF_VALUE: 33
PIF_VALUE: 13
PIF_VALUE: 23
PIF_VALUE: 27
PIF_VALUE: 17

## 2021-06-03 ASSESSMENT — ENCOUNTER SYMPTOMS: VOMITING: 0

## 2021-06-03 NOTE — PLAN OF CARE
Problem: Falls - Risk of:  Goal: Will remain free from falls  Description: Will remain free from falls  Outcome: Ongoing  Note: Bed remains locked and in lowest position, side rails up x2, fall precautions in place. Patient remains free of falls at this time. RN will continue to monitor. Problem: Falls - Risk of:  Goal: Absence of physical injury  Description: Absence of physical injury  Outcome: Ongoing     Problem: Skin Integrity:  Goal: Will show no infection signs and symptoms  Description: Will show no infection signs and symptoms  Outcome: Ongoing     Problem: Skin Integrity:  Goal: Absence of new skin breakdown  Description: Absence of new skin breakdown  Outcome: Ongoing  Note: Patient turned and repositioned every 2 hours and as needed for comfort. Skin kept clean and dry. Interventions to prevent skin breakdown implemented. Problem: SAFETY  Goal: Free from accidental physical injury  Outcome: Ongoing  Note: Fall assessment performed and appropriate measures implemented. Room freed from clutter. Bed in lowest position with wheels locked. ID band in place. Problem: SAFETY  Goal: Free from intentional harm  Outcome: Ongoing     Problem: DAILY CARE  Goal: Daily care needs are met  Outcome: Ongoing     Problem: Pain:  Goal: Pain level will decrease  Description: Pain level will decrease  Outcome: Ongoing     Problem: Pain:  Goal: Control of acute pain  Description: Control of acute pain  Outcome: Ongoing     Problem: Pain:  Goal: Control of chronic pain  Description: Control of chronic pain  Outcome: Ongoing     Problem: Nutrition  Goal: Optimal nutrition therapy  Outcome: Ongoing  Note: Pt receiving TPN and tube feed.      Problem: Musculor/Skeletal Functional Status  Goal: Highest potential functional level  Outcome: Ongoing     Problem: Musculor/Skeletal Functional Status  Goal: Absence of falls  Outcome: Ongoing     Problem: Non-Violent Restraints  Goal: Removal from restraints as soon as assessed to be safe  Outcome: Ongoing  Note: Attempts to pull at tubes when released. ROM assessed every 2 hours and visual safety checks completed hourly. Restraints maintained to preserve the patient's airway. Problem: Non-Violent Restraints  Goal: No harm/injury to patient while restraints in use  Outcome: Ongoing  Note: Attempts to pull at tubes when released. ROM assessed every 2 hours and visual safety checks completed hourly. Restraints maintained to preserve the patient's airway. Problem: Non-Violent Restraints  Goal: Patient's dignity will be maintained  Outcome: Ongoing     Problem: OXYGENATION/RESPIRATORY FUNCTION  Goal: Patient will maintain patent airway  6/3/2021 0327 by Luisa Cast RN  Outcome: Ongoing     Problem: OXYGENATION/RESPIRATORY FUNCTION  Goal: Patient will achieve/maintain normal respiratory rate/effort  Description: Respiratory rate and effort will be within normal limits for the patient  6/3/2021 0327 by Luisa Cast RN  Outcome: Ongoing     Problem: MECHANICAL VENTILATION  Goal: Patient will maintain patent airway  6/3/2021 0327 by Luisa Cast RN  Outcome: Ongoing     Problem: MECHANICAL VENTILATION  Goal: Patient will maintain patent airway  6/3/2021 0327 by Luisa Cast RN  Outcome: Ongoing     Problem: MECHANICAL VENTILATION  Goal: ET tube will be managed safely  6/3/2021 0327 by Luisa Cast RN  Outcome: Ongoing  Note: Patient remains orally intubated and sedated on vent. Oral care performed every two hours and suctioning as needed. ETT secured at 25 cm.      Problem: MECHANICAL VENTILATION  Goal: Ability to express needs and understand communication  Outcome: Ongoing     Problem: MECHANICAL VENTILATION  Goal: Mobility/activity is maintained at optimum level for patient  Outcome: Ongoing

## 2021-06-03 NOTE — PLAN OF CARE
Nutrition Problem #1: Inadequate oral intake  Intervention: Food and/or Nutrient Delivery: Discontinue Parenteral Nutrition, Modify Tube Feeding  Nutritional Goals: provide more than 75% of nutrition needs

## 2021-06-03 NOTE — PROGRESS NOTES
[facility]                 PATIENT NAME: Dyllan Lott     TODAY'S DATE: 6/3/2021, 4:09 PM    SUBJECTIVE:  POD#6  Pt seen and examined. Afebrile, VSS. No leukocytosis, hemoglobin stable. Patient remains intubated on ventilator, awake and following commands on wean trial. Patient is having bowel movements. Tolerating tube feeds at 25mL/hr. G-tube site clean. TPN running. Incision clean, dry, intact. CHELSEA x 4 serosanguinous. Urine output excellent.       OBJECTIVE:   VITALS:  /61   Pulse 86   Temp 98.8 °F (37.1 °C) (Axillary)   Resp 17   Ht 6' 4\" (1.93 m)   Wt 186 lb 11.2 oz (84.7 kg)   SpO2 99%   BMI 22.73 kg/m²      INTAKE/OUTPUT:      Intake/Output Summary (Last 24 hours) at 6/3/2021 1609  Last data filed at 6/3/2021 1200  Gross per 24 hour   Intake 3244.11 ml   Output 1475 ml   Net 1769.11 ml                 CONSTITUTIONAL:  awake and following commands on wean trial.  No acute distress  HEART:   RRR  LUNGS:   Intubated on ventilator, no respiratory distress  ABDOMEN:   Abdomen soft, non-tender, non-distended  EXTREMITIES:  Trace pedal edema    Data:  CBC:   Lab Results   Component Value Date    WBC 11.5 06/03/2021    RBC 3.17 06/03/2021    HGB 9.0 06/03/2021    HCT 28.0 06/03/2021    MCV 88.1 06/03/2021    MCH 28.4 06/03/2021    MCHC 32.3 06/03/2021    RDW 15.0 06/03/2021     06/03/2021    MPV 8.0 06/03/2021     BMP:    Lab Results   Component Value Date     06/03/2021    K 4.1 06/03/2021     06/03/2021    CO2 28 06/03/2021    BUN 27 06/03/2021    LABALBU 3.1 05/29/2021    CREATININE 0.70 06/03/2021    CALCIUM 8.3 06/03/2021    GFRAA >60 06/03/2021    LABGLOM >60 06/03/2021    GLUCOSE 121 06/03/2021       Radiology Review:      XR CHEST PORTABLE [[id number]] Collected: 06/03/21 0555      Order Status: Completed Updated: 06/03/21 0629     Narrative:       EXAMINATION:   ONE XRAY VIEW OF THE CHEST     6/3/2021 5:05 am     COMPARISON:   06/02/2021, 06/01/2021     HISTORY:   ORDERING SYSTEM PROVIDED HISTORY: ett placement   TECHNOLOGIST PROVIDED HISTORY:   ett placement   Reason for Exam: ett placement   Acuity: Unknown   Type of Exam: Ongoing   Additional signs and symptoms: ett placement   Relevant Medical/Surgical History: ett placement     FINDINGS:   The endotracheal tube terminates in appropriate position above the louis. Right PICC line remains in place.  The cardiac and mediastinal contours   appear unchanged.  Basilar opacities and small effusions, right greater than   left, are again demonstrated and without appreciable change.      Impression:       1. Endotracheal tube remains in appropriate position. 2. No appreciable change in basilar opacities and small effusions, right   greater than left. ASSESSMENT     Principal Problem:    Small bowel obstruction (HCC)  Active Problems:    Status post insertion of percutaneous endoscopic gastrostomy (PEG) tube (HCC)    Acute cystitis without hematuria    Mass of right lung    COPD (chronic obstructive pulmonary disease) ([facility])    PAF (paroxysmal atrial fibrillation) ([facility] [address]  Resolved Problems:    * No resolved hospital problems. *      Plan  1. Continue tube feeds as tolerated to goal  2. Wean and DC TPN  3. Bowels are moving  4. Surgically stable  5. Okay to restart full dose lovenox tomorrow  6. Extubate per pulmonary, hopefully today  7. Continue medical management   8. Tube feeds are being advanced. Patient was seen and examined. No acute surgical change. Blood work is reviewed. TPN being weaned. patient is extubated today.       Electronically signed by Atilio Bhagat PA-C  [id number] [address]

## 2021-06-03 NOTE — PROGRESS NOTES
Order obtained for extubation. SpO2 of 100 on 30% FiO2. Patient extubated and placed on 35% O2 via NIV  Post extubation SpO2 is 100% with HR  79 bpm and RR 22 breaths/min. Patient had strong cough that was non-productive. Extubation Well tolerated by patient. Debbie Tipton RCP   11:54 AM

## 2021-06-03 NOTE — PROGRESS NOTES
Rate 12     Total Rate 26     VT 50     FIO2 30     Peep/Cpap 7     PSV NOT REPORTED     Text for Respiratory RESULTS TO RN     NOTIFICATION NOT REPORTED     NOTIFICATION TIME NOT REPORTED    Basic Metabolic Panel w/ Reflex to MG    Collection Time: 06/03/21  4:48 AM   Result Value Ref Range    Glucose 121 (H) 70 - 99 mg/dL    BUN 27 (H) 8 - 23 mg/dL    CREATININE 0.70 0.70 - 1.20 mg/dL    Bun/Cre Ratio NOT REPORTED 9 - 20    Calcium 8.3 (L) 8.6 - 10.4 mg/dL    Sodium 140 135 - 144 mmol/L    Potassium 4.1 3.7 - 5.3 mmol/L    Chloride 104 98 - 107 mmol/L    CO2 28 20 - 31 mmol/L    Anion Gap 8 (L) 9 - 17 mmol/L    GFR Non-African American >60 >60 mL/min    GFR African American >60 >60 mL/min    GFR Comment          GFR Staging NOT REPORTED    Phosphorus    Collection Time: 06/03/21  4:48 AM   Result Value Ref Range    Phosphorus 3.3 2.5 - 4.5 mg/dL   Magnesium    Collection Time: 06/03/21  4:48 AM   Result Value Ref Range    Magnesium 2.0 1.6 - 2.6 mg/dL   CBC WITH AUTO DIFFERENTIAL    Collection Time: 06/03/21  4:48 AM   Result Value Ref Range    WBC 11.5 (H) 3.5 - 11.0 k/uL    RBC 3.17 (L) 4.5 - 5.9 m/uL    Hemoglobin 9.0 (L) 13.5 - 17.5 g/dL    Hematocrit 28.0 (L) 41 - 53 %    MCV 88.1 80 - 100 fL    MCH 28.4 26 - 34 pg    MCHC 32.3 31 - 37 g/dL    RDW 15.0 (H) 11.5 - 14.9 %    Platelets 903 301 - 596 k/uL    MPV 8.0 6.0 - 12.0 fL    NRBC Automated NOT REPORTED per 100 WBC    Differential Type NOT REPORTED     Seg Neutrophils 72 (H) 36 - 66 %    Lymphocytes 12 (L) 24 - 44 %    Monocytes 12 (H) 1 - 7 %    Eosinophils % 4 0 - 4 %    Basophils 0 0 - 2 %    Immature Granulocytes NOT REPORTED 0 %    Segs Absolute 8.20 1.3 - 9.1 k/uL    Absolute Lymph # 1.40 1.0 - 4.8 k/uL    Absolute Mono # 1.40 (H) 0.1 - 1.3 k/uL    Absolute Eos # 0.50 (H) 0.0 - 0.4 k/uL    Basophils Absolute 0.00 0.0 - 0.2 k/uL    Absolute Immature Granulocyte NOT REPORTED 0.00 - 0.30 k/uL    WBC Morphology NOT REPORTED     RBC Morphology NOT REPORTED     Platelet Estimate NOT REPORTED        Pulse Ox: SpO2  Av.6 %  Min: 98 %  Max: 100 %    Supplemental O2: O2 Flow Rate (L/min): 0 L/min     Current Meds:    metoprolol tartrate  12.5 mg Oral BID    aspirin  81 mg Oral Daily    enoxaparin  40 mg Subcutaneous Daily    furosemide  20 mg Intravenous Daily    levalbuterol  1.25 mg Nebulization Q6H    ipratropium  0.5 mg Nebulization Q6H    insulin lispro  0-18 Units Subcutaneous Q6H    sodium chloride flush  10 mL Intravenous 2 times per day    cilostazol  100 mg Oral BID    potassium chloride  20 mEq Oral Daily    pantoprazole  40 mg Intravenous BID    And    sodium chloride (PF)  10 mL Intravenous BID    sodium chloride flush  10 mL Intravenous 2 times per day    cefepime  2,000 mg Intravenous Q12H    metroNIDAZOLE  500 mg Intravenous Q8H         Continuous Infusions:    dexmedetomidine Stopped (21)    PN-Adult 2-in-1 Central Line (Standard) 55 mL/hr at 21 1734    norepinephrine Stopped (21 0641)    dextrose      sodium chloride      propofol 15 mcg/kg/min (21 2339)              VITAL SIGNS:    BP (!) 107/51   Pulse 78   Temp 98.8 °F (37.1 °C) (Tympanic)   Resp 23   Ht 6' 4\" (1.93 m)   Wt 186 lb 11.2 oz (84.7 kg)   SpO2 100%   BMI 22.73 kg/m²  0 L/min      Admit Weight:  184 lb 4.8 oz (83.6 kg)    Last 3 weights: Wt Readings from Last 3 Encounters:   21 186 lb 11.2 oz (84.7 kg)   20 189 lb (85.7 kg)   20 183 lb (83 kg)       BMI: Body mass index is 22.73 kg/m². INPUT/OUTPUT:          Intake/Output Summary (Last 24 hours) at 6/3/2021 0628  Last data filed at 6/3/2021 0525  Gross per 24 hour   Intake 3334.11 ml   Output 5560 ml   Net -2225.89 ml         Telemetry shows atrial fibrillation with controlled ventricular rate. EXAM:     General appearance: Remains intubated and sedated. Neck: No JVD. Chest: clear bilaterally. No tenderness.   No rhonchi or wheezing. Cardiac: Irregular rate and rhythm. No significant murmur or gallop or rubs. Abdomen: soft. Extremities: no cyanosis, no clubbing, no calf tenderness, no worsening bilateral lower leg edema. Skin:  warm and dry. Neuro: Sedated. 2D echocardiogram-pending. ASSESSMENT:    Atrial fibrillation with controlled ventricular rate.     Status post lysis of adhesions following small bowel obstruction and remains intubated since surgery due to acute respiratory failure.     Hypotension requiring IV Levophed earlier this admission and off currently. Severe anemia with hemoglobin around 9.0 range.     History of CVA.     Other problems as charted. REC/PLAN:    Relatively stable cardiac wise. Will decrease aspirin to 81 mg daily, add low-dose metoprolol 12.5 mg twice daily with holding parameters and plan to switch over to longer acting form once he can take orally. If okay with the surgeon, change subcutaneous Lovenox from 40 mg daily to 1 mg/kg twice daily for thromboembolic prophylaxis given persistent atrial fibrillation. Hopefully discharge soon. Discussed with the nurse at bedside. We will follow. Electronically signed by Amy Johnson MD, [facility]        PLEASE NOTE:  This progress note was completed using a voice transcription system. Every effort was made to ensure accuracy. However, inadvertent computerized transcription errors may be present.

## 2021-06-03 NOTE — PROGRESS NOTES
Progress Note    6/3/2021   1:34 PM    Name:  Ramses Coles  MRN:    [id number]     Acct:     [de-identified]   Room:  2002/2002-01  IP Day: 8     Admit Date: 5/24/2021  3:38 PM  PCP: Surya Patterson MD    Subjective:     C/C:   Chief Complaint   Patient presents with   Teo Galla Nausea    Emesis       Interval History: Status: not changed. Patient is on BiPAP. Tolerating G-tube feeding well. I's and O's noted, good urine output patient is in a negative balance approximately 2000 mL. No overnight arrhythmia reported. Vital signs reviewed and stable. Recent labs reviewed hemoglobin 9    ROS:   all 10 systems reviewed and are negative except as noted    Review of Systems   Unable to perform ROS: Acuity of condition   Constitutional: Negative for fever. Gastrointestinal: Negative for vomiting. Psychiatric/Behavioral: Negative for agitation. Medications: Allergies:    Allergies   Allergen Reactions    Bactrim [Sulfamethoxazole-Trimethoprim] Hives and Swelling    Ciprofloxacin Swelling     FACE       Current Meds: metoprolol tartrate (LOPRESSOR) tablet 12.5 mg, BID  aspirin chewable tablet 81 mg, Daily  enoxaparin (LOVENOX) injection 40 mg, Daily  furosemide (LASIX) injection 20 mg, Daily  dexmedetomidine (PRECEDEX) 400 mcg in sodium chloride 0.9 % 100 mL infusion, Continuous  PN-Adult 2-in-1 Central Line (Standard), Continuous TPN  sodium phosphate 14.79 mmol in dextrose 5 % 250 mL IVPB, PRN   Or  sodium phosphate 29.58 mmol in dextrose 5 % 250 mL IVPB, PRN  levalbuterol (XOPENEX) nebulizer solution 1.25 mg, Q6H  sodium chloride nebulizer 0.9 % solution 3 mL, Q8H PRN  ipratropium (ATROVENT) 0.02 % nebulizer solution 0.5 mg, Q6H  metoprolol (LOPRESSOR) injection 5 mg, Q6H PRN  insulin lispro (HUMALOG) injection vial 0-18 Units, Q6H  norepinephrine (LEVOPHED) 16 mg in sodium chloride 0.9 % 250 mL infusion, Continuous  glucose (GLUTOSE) 40 % oral gel 15 g, PRN  dextrose 50 % IV solution, PRN  glucagon (rDNA) injection 1 mg, PRN  dextrose 5 % solution, PRN  0.9 % sodium chloride infusion, PRN  propofol injection, Titrated  HYDROmorphone (DILAUDID) injection 0.25 mg, Q3H PRN   Or  HYDROmorphone (DILAUDID) injection 0.5 mg, Q3H PRN  sodium chloride flush 0.9 % injection 10 mL, 2 times per day  sodium chloride flush 0.9 % injection 10 mL, PRN  potassium chloride 10 mEq/100 mL IVPB (Peripheral Line), PRN  ondansetron (ZOFRAN) injection 4 mg, Q6H PRN  cilostazol (PLETAL) tablet 100 mg, BID  potassium chloride (MICRO-K) extended release capsule 20 mEq, Daily  diatrizoate meglumine-sodium (GASTROGRAFIN) 66-10 % solution 450 mL, ONCE PRN  pantoprazole (PROTONIX) injection 40 mg, BID   And  sodium chloride (PF) 0.9 % injection 10 mL, BID  iopamidol (ISOVUE-370) 76 % injection 75 mL, ONCE PRN  fentaNYL (SUBLIMAZE) injection 25 mcg, Q2H PRN  fentaNYL (SUBLIMAZE) injection 50 mcg, Q2H PRN  sodium chloride flush 0.9 % injection 10 mL, 2 times per day  potassium chloride (KLOR-CON M) extended release tablet 40 mEq, PRN   Or  potassium bicarb-citric acid (EFFER-K) effervescent tablet 40 mEq, PRN   Or  potassium chloride 10 mEq/100 mL IVPB (Peripheral Line), PRN  magnesium sulfate 1000 mg in dextrose 5% 100 mL IVPB, PRN  promethazine (PHENERGAN) tablet 12.5 mg, Q6H PRN   Or  ondansetron (ZOFRAN) injection 4 mg, Q6H PRN  magnesium hydroxide (MILK OF MAGNESIA) 400 MG/5ML suspension 30 mL, Daily PRN  acetaminophen (TYLENOL) tablet 650 mg, Q6H PRN   Or  acetaminophen (TYLENOL) suppository 650 mg, Q6H PRN  cefepime (MAXIPIME) 2000 mg IVPB minibag, Q12H  metronidazole (FLAGYL) 500 mg in NaCl 100 mL IVPB premix, Q8H  hydrALAZINE (APRESOLINE) injection 10 mg, Q6H PRN        Data:     Code Status:  Full Code    Family History   Problem Relation Age of Onset    Arthritis Mother     Atrial Fibrillation Mother     Hearing Loss Mother     Heart Disease Mother     Stroke Mother     Vision Loss Mother     Arthritis Father     Cancer Father  Heart Disease Father     High Blood Pressure Father     Stroke Father     Vision Loss Father        Social History     Socioeconomic History    Marital status:      Spouse name: Not on file    Number of children: Not on file    Years of education: Not on file    Highest education level: Not on file   Occupational History    Not on file   Tobacco Use    Smoking status: Former Smoker     Packs/day: 1.00     Years: 60.00     Pack years: 60.00     Types: Cigarettes     Start date:      Quit date: 2020     Years since quittin.1    Smokeless tobacco: Never Used   Vaping Use    Vaping Use: Never used   Substance and Sexual Activity    Alcohol use: No    Drug use: No    Sexual activity: Yes     Partners: Female   Other Topics Concern    Not on file   Social History Narrative    Not on file     Social Determinants of Health     Financial Resource Strain:     Difficulty of Paying Living Expenses:    Food Insecurity:     Worried About Running Out of Food in the Last Year:     [address] in the Last Year:    Transportation Needs:     Lack of Transportation (Medical):  Lack of Transportation (Non-Medical):    Physical Activity:     Days of Exercise per Week:     Minutes of Exercise per Session:    Stress:     Feeling of Stress :    Social Connections:     Frequency of Communication with Friends and Family:     Frequency of Social Gatherings with Friends and Family:     Attends Yazidism Services:     Active Member of Clubs or Organizations:     Attends Club or Organization Meetings:     Marital Status:    Intimate Partner Violence:     Fear of Current or Ex-Partner:     Emotionally Abused:     Physically Abused:     Sexually Abused:        I/O (24Hr):     Intake/Output Summary (Last 24 hours) at 6/3/2021 1334  Last data filed at 6/3/2021 1200  Gross per 24 hour   Intake 3274.11 ml   Output 2420 ml   Net 854.11 ml     Radiology:  XR ABDOMEN (KUB) (SINGLE AP VIEW)    Result Date: 5/28/2021  Persistently dilated small bowel loops similar to small bowel series examination. IR FLUORO GUIDED CVA DEVICE PLMT/REPLACE/REMOVAL    Result Date: 6/1/2021  Successful fluoroscopy guided right upper extremity 5 Divehi power injectable PICC exchange. Ready for use. IR FLUORO GUIDED CVA DEVICE PLMT/REPLACE/REMOVAL    Result Date: 5/28/2021  Ultrasound and fluoroscopy guided right sided PICC insertion with good tip position in the central circulation. PICC is ready for use at this time. XR CHEST PORTABLE    Result Date: 6/3/2021  1. Endotracheal tube remains in appropriate position. 2. No appreciable change in basilar opacities and small effusions, right greater than left. XR CHEST PORTABLE    Result Date: 6/2/2021  Heart size top-normal.  Oval resolving pulmonary vascular congestion, and left effusion. Improved right basilar opacity and effusion. Support tubes and lines as above. XR CHEST PORTABLE    Addendum Date: 6/1/2021    ADDENDUM: Correction of voice transcription error: The 1st sentence of the impression should read as follows: CHANGED position of the right PICC line. Result Date: 6/1/2021  1. Unchanged position of the right PICC line. It now crosses the midline in the proximal SVC to terminating in region of left innominate vein. 2. Pulmonary vascular congestion and layering right pleural effusion unchanged but left pleural effusion appears less prominent. The findings were sent to the Radiology Results [address] at 7:20 am on 6/1/2021to be communicated to a licensed caregiver.      XR CHEST PORTABLE    Result Date: 5/31/2021  Endotracheal tube in satisfactory position Bilateral pulmonary opacities right greater than left likely representing pulmonary edema with bilateral pleural effusions     XR CHEST PORTABLE    Result Date: 5/30/2021  Endotracheal tube in satisfactory position Bilateral airspace disease right greater than left could represent edema or infection     XR CHEST PORTABLE    Result Date: 5/29/2021  Trace right effusion and mild atelectasis. Tubes and lines as above. However, endotracheal tube terminates at the louis oriented toward the right mainstem bronchus. RECOMMENDATION: Advise retraction of endotracheal tube by 3-4 cm. The findings were sent to the Radiology Results [id number] at 8:11 am on 5/29/2021to be communicated to a licensed caregiver. XR CHEST PORTABLE    Result Date: 5/28/2021  Endotracheal tube with tip 3.6 cm from the louis. Enteric tube with tip in the proximal body versus fundus of the stomach and side-port likely in the distal esophagus. Suggest advancement by 5-10 cm. Right-sided PICC line with tip in the distal SVC. No pneumothoraces. Mild bibasilar likely atelectasis. FL SMALL BOWEL FOLLOW THROUGH ONLY    Result Date: 5/27/2021  Findings suggestive of high-grade ileus or partial/intermittent small bowel obstruction. Findings were discussed with Dr. Dave Going at 4:30 p.m. on 5/27/2021.        Labs:  Recent Results (from the past 24 hour(s))   Hgb/Hct    Collection Time: 06/02/21  3:44 PM   Result Value Ref Range    Hemoglobin 9.3 (L) 13.5 - 17.5 g/dL    Hematocrit 29.6 (L) 41 - 53 %   POC Glucose Fingerstick    Collection Time: 06/02/21  5:45 PM   Result Value Ref Range    POC Glucose 131 (H) 75 - 110 mg/dL   Hgb/Hct    Collection Time: 06/02/21 10:57 PM   Result Value Ref Range    Hemoglobin 8.8 (L) 13.5 - 17.5 g/dL    Hematocrit 27.2 (L) 41 - 53 %   POC Glucose Fingerstick    Collection Time: 06/03/21 12:27 AM   Result Value Ref Range    POC Glucose 127 (H) 75 - 110 mg/dL   BLOOD GAS, ARTERIAL    Collection Time: 06/03/21  3:40 AM   Result Value Ref Range    pH, Arterial 7.506 (H) 7.350 - 7.450    pCO2, Arterial 37.1 35.0 - 45.0 mmHg    pO2, Arterial 123.0 (H) 80.0 - 100.0 mmHg    HCO3, Arterial 29.3 (H) 22.0 - 26.0 mmol/L    Positive Base Excess, Art 6.2 (H) 0.0 - 2.0 mmol/L    Negative Base Excess, Art NOT REPORTED 0.0 - 2.0 mmol/L    O2 Sat, Arterial 97.3 95 - 98 %    Total Hb NOT REPORTED 12.0 - 16.0 g/dl    Oxyhemoglobin NOT REPORTED 95.0 - 98.0 %    Carboxyhemoglobin 1.1 0 - 5 %    Methemoglobin 0.9 0.0 - 1.9 %    Pt Temp 37.0     pH, Art, Temp Adj NOT REPORTED 7.350 - 7.450    pCO2, Art, Temp Adj NOT REPORTED 35.0 - 45.0    pO2, Art, Temp Adj NOT REPORTED 80.0 - 100.0 mmHg    O2 Device/Flow/% VENTILATOR     Respiratory Rate 12     Andrew Test PASS     Sample Site Right Radial Artery     Pt.  Position SEMI-FOWLERS     Mode PRVC     Set Rate 12     Total Rate 26     VT 50     FIO2 30     Peep/Cpap 7     PSV NOT REPORTED     Text for Respiratory RESULTS TO RN     NOTIFICATION NOT REPORTED     NOTIFICATION TIME NOT REPORTED    Basic Metabolic Panel w/ Reflex to MG    Collection Time: 06/03/21  4:48 AM   Result Value Ref Range    Glucose 121 (H) 70 - 99 mg/dL    BUN 27 (H) 8 - 23 mg/dL    CREATININE 0.70 0.70 - 1.20 mg/dL    Bun/Cre Ratio NOT REPORTED 9 - 20    Calcium 8.3 (L) 8.6 - 10.4 mg/dL    Sodium 140 135 - 144 mmol/L    Potassium 4.1 3.7 - 5.3 mmol/L    Chloride 104 98 - 107 mmol/L    CO2 28 20 - 31 mmol/L    Anion Gap 8 (L) 9 - 17 mmol/L    GFR Non-African American >60 >60 mL/min    GFR African American >60 >60 mL/min    GFR Comment          GFR Staging NOT REPORTED    Phosphorus    Collection Time: 06/03/21  4:48 AM   Result Value Ref Range    Phosphorus 3.3 2.5 - 4.5 mg/dL   Magnesium    Collection Time: 06/03/21  4:48 AM   Result Value Ref Range    Magnesium 2.0 1.6 - 2.6 mg/dL   CBC WITH AUTO DIFFERENTIAL    Collection Time: 06/03/21  4:48 AM   Result Value Ref Range    WBC 11.5 (H) 3.5 - 11.0 k/uL    RBC 3.17 (L) 4.5 - 5.9 m/uL    Hemoglobin 9.0 (L) 13.5 - 17.5 g/dL    Hematocrit 28.0 (L) 41 - 53 %    MCV 88.1 80 - 100 fL    MCH 28.4 26 - 34 pg    MCHC 32.3 31 - 37 g/dL    RDW 15.0 (H) 11.5 - 14.9 %    Platelets 221 001 - 482 k/uL    MPV 8.0 6.0 - 12.0 fL    NRBC Automated NOT REPORTED Right lower leg: Edema (1+) present. Left lower leg: Edema (1+) present. Skin:     General: Skin is warm and dry. Capillary Refill: Capillary refill takes less than 2 seconds. Coloration: Skin is not jaundiced. Neurological:      Mental Status: Mental status is at baseline. Psychiatric:         Mood and Affect: Mood normal.         Assessment:        Primary Problem  Small bowel obstruction (HCC)     Principal Problem:    Small bowel obstruction (HCC)  Active Problems:    Status post insertion of percutaneous endoscopic gastrostomy (PEG) tube (HCC)    Acute cystitis without hematuria    Mass of right lung    COPD (chronic obstructive pulmonary disease) (HCC)    PAF (paroxysmal atrial fibrillation) ([facility]  Resolved Problems:    * No resolved hospital problems. *      Past Medical History:   Diagnosis Date    Arthritis     Cancer [facility])     bladder 1980s    Cerebral artery occlusion with cerebral infarction [facility])     TIA 2010    Chronic kidney disease     COPD (chronic obstructive pulmonary disease) ([facility] [facility]     Hypertension     Pneumonia     Psychiatric problem     depression, social anxiety    Seizures ([facility]         Plan:        1. IV cefepime  2. IV Flagyl  1. PPI Protonix 40 mg IV daily  2. Increase G-tube feeding as tolerated wean off TPN  3. Lopressor 12.5 mg per G-tube twice daily  4. Lasix 20 mg IV daily  5. 2D echo report reviewed which shows decreased ejection fraction  6. CBC, CMP  7. Will resume full therapeutic dose of Lovenox once cleared by surgery. 8. DVT Prophylaxis  9. EPCs  10. PT/OT to evaluate and treat  11. Pain control  12. Replace electrolytes as per sliding scale  13. Home medications reviewed and appropriate medications continued  14.  Reviewed labs and imaging studies from last 24 hours and results explained to patient      Electronically signed by Bruce Whitaker MD O-Z Flap Text: The defect edges were debeveled with a #15 scalpel blade.  Given the location of the defect, shape of the defect and the proximity to free margins an O-Z flap was deemed most appropriate.  Using a sterile surgical marker, an appropriate transposition flap was drawn incorporating the defect and placing the expected incisions within the relaxed skin tension lines where possible. The area thus outlined was incised deep to adipose tissue with a #15 scalpel blade.  The skin margins were undermined to an appropriate distance in all directions utilizing iris scissors.

## 2021-06-03 NOTE — PROGRESS NOTES
Comprehensive Nutrition Assessment    Type and Reason for Visit:  Reassess    Nutrition Recommendations/Plan:   1. Continue NPO status  2. Wean off TPN   3. Start increasing tube feeding rate after TPN is weaned off later today. Goal tube feeding rate 75 ml/hr with Vital HP via PEG. Increase rate by 20 ml every 4 hours until reach goal rate. 4. Adjust tube feeding rate with Propofol rate as needed. Nutrition Assessment:  Patient remains intubated and sedated, wean trial failed 6/2/21 due to increased heart rate. Per Charge Nurse increase tube feeding rate, patient to be weaned off TPN today. Receiving Propofol 7.7 ml/hr providing 203 kcal/day. Malnutrition Assessment:  Malnutrition Status: At risk for malnutrition (Comment)    Context:  Chronic Illness     Findings of the 6 clinical characteristics of malnutrition:  Energy Intake:  Unable to assess  Weight Loss:  Unable to assess     Body Fat Loss:  Unable to assess     Muscle Mass Loss:  Unable to assess    Fluid Accumulation:  1 - Mild Extremities   Strength:  Not Performed    Estimated Daily Nutrient Needs:  Energy (kcal): Andrew x 1.2= 2000 kcal; Weight Used for Energy Requirements:  Admission     Protein (g):  1.5g/kg= 135 g protein; Weight Used for Protein Requirements:  Ideal          Nutrition Related Findings:  Edema: +2 pitting all extremities, +1 perineal. Labs and meds reviewed.       Wounds:  Surgical Incision       Current Nutrition Therapies:    DIET TUBE FEED CONTINUOUS/CYCLIC NPO; Low Calorie High Protein; Gastrostomy; 25  PN-Adult 2-in-1 Central Line (Standard)   · Current Tube Feeding (TF) Orders:   · Feeding Route: PEG  · Formula: Low Calorie, High Protein  · Schedule: Continuous  · Current TF & Flush Orders Provides: 600 kcal 52.5 gm protein  · Goal TF & Flush Orders Provides: 1800 kcal, 157 gm protein, 1505 ml free water    Anthropometric Measures:  · Height: 6' 4\" (193 cm)  · Current Body Weight: 186 lb 11.2 oz (84.7 kg) · Admission Body Weight: 184 lb (83.5 kg)    · Ideal Body Weight: 202 lbs; % Ideal Body Weight     · BMI: 22.7  · BMI Categories: Normal Weight (BMI 22.0 to 24.9) age over 72       Nutrition Diagnosis:   · Inadequate oral intake related to altered GI function as evidenced by NPO or clear liquid status due to medical condition, nutrition support - parenteral nutrition      Nutrition Interventions:   Food and/or Nutrient Delivery:  Discontinue Parenteral Nutrition, Modify Tube Feeding  Nutrition Education/Counseling:  No recommendation at this time   Coordination of Nutrition Care:  Continue to monitor while inpatient    Goals:  provide more than 75% of nutrition needs       Nutrition Monitoring and Evaluation:   Behavioral-Environmental Outcomes:  None Identified   Food/Nutrient Intake Outcomes:  Enteral Nutrition Intake/Tolerance  Physical Signs/Symptoms Outcomes:  Biochemical Data, GI Status, Fluid Status or Edema, Nutrition Focused Physical Findings, Skin, Weight     Discharge Planning:     Too soon to determine       Some areas of assessment may be incomplete due to COVID-19 precautions    Otto Lopez RD, LD  Office phone [phone]

## 2021-06-03 NOTE — FLOWSHEET NOTE
06/03/21 8354   Provider Notification   Reason for Communication Review case   Provider Name Dr. Yoel Arrieta   Provider Notification Physician   Method of Communication Face to face   Response At bedside   Notification Time      MD rounding at bedside and updated on pt condition. See orders.

## 2021-06-03 NOTE — PROGRESS NOTES
at the bedside discussing POC with the patient and family, would like to know if the surgeon would like to go back to full dose Lovenox.  sent secure message, awaiting response.

## 2021-06-03 NOTE — CARE COORDINATION
DISCHARGE PLANNING NOTE:    Pt extubated this AM.  On bipap. Per previous d/c planner, pt is current with [facility] and referral sent to [facility] for [address]. POD#6 exploratory lap with release of bowel obstruction, small bowel resection. Active order for IV Cefepime, IV Flagyl, and IV Lasix 20mg daily. To start TF today. Will continue to follow for additional discharge needs.     Electronically signed by Lucy Laws RN on 6/3/2021 at 1:35 PM

## 2021-06-03 NOTE — PROGRESS NOTES
Physical Therapy    DATE: 6/3/2021    NAME: Ramses Coles  MRN: [id number]   : 1945      Patient not seen this date for Physical Therapy due to: Other: 6/3/21: Extubated this AM. Family at bedside. Will continue to follow.       Electronically signed by Damian Osler, PT on 6/3/2021 at 2:59 PM

## 2021-06-03 NOTE — PROGRESS NOTES
(Comprehensive)  Vent Information  $Ventilation: $Subsequent Day  Skin Assessment: Clean, dry, & intact  Suction Catheter Diameter: 14  Equipment ID: [id number]  Equipment Changed: HME  Vent Type: Servo i  Vent Mode: CPAP  Vt Ordered: 500 mL  Rate Set: 12 bmp  Pressure Support: 7 cmH20  FiO2 : 30 %  SpO2: 100 %  SpO2/FiO2 ratio: 333.33  Sensitivity: 5  PEEP/CPAP: 5  I Time/ I Time %: 1 s  Humidification Source: HME  Additional Respiratory  Assessments  Pulse: 80  Resp: 25  SpO2: 100 %  End Tidal CO2: 27  Position: Semi-Santiago's  Humidification Source: HME  Oral Care Completed?: Yes  Oral Care: Mouth swabbed, Mouth moisturizer, Mouth suctioned, Lip moisturizer applied, Suction toothette  Cuff Pressure (cm H2O): 24 cm H2O       ABGs:   Lab Results   Component Value Date    PHART 7.506 06/03/2021    PO2ART 123.0 06/03/2021    FIY8CIQ 37.1 06/03/2021       Lab Results   Component Value Date    MODE PRVC 06/03/2021         Medications   IV   dexmedetomidine Stopped (06/02/21 1818)    PN-Adult 2-in-1 Central Line (Standard) 55 mL/hr at 06/02/21 1734    norepinephrine Stopped (06/02/21 0641)    dextrose      sodium chloride      propofol Stopped (06/03/21 1137)      metoprolol tartrate  12.5 mg Oral BID    aspirin  81 mg Oral Daily    enoxaparin  40 mg Subcutaneous Daily    furosemide  20 mg Intravenous Daily    levalbuterol  1.25 mg Nebulization Q6H    ipratropium  0.5 mg Nebulization Q6H    insulin lispro  0-18 Units Subcutaneous Q6H    sodium chloride flush  10 mL Intravenous 2 times per day    cilostazol  100 mg Oral BID    potassium chloride  20 mEq Oral Daily    pantoprazole  40 mg Intravenous BID    And    sodium chloride (PF)  10 mL Intravenous BID    sodium chloride flush  10 mL Intravenous 2 times per day    cefepime  2,000 mg Intravenous Q12H    metroNIDAZOLE  500 mg Intravenous Q8H       Diet/Nutrition   DIET TUBE FEED CONTINUOUS/CYCLIC NPO; Low Calorie High Protein; Gastrostomy; 25  PN-Adult 2-in-1 Central Line (Standard)    Exam   VITALS    height is 6' 4\" (1.93 m) and weight is 186 lb 11.2 oz (84.7 kg). His axillary temperature is 98.8 °F (37.1 °C). His blood pressure is 111/74 and his pulse is 80. His respiration is 25 and oxygen saturation is 100%. Ventilator Settings (Basic)  Vent Mode: CPAP Rate Set: 12 bmp/Vt Ordered: 500 mL/ /FiO2 : 30 %    Constitutional -awake and alert on vent  General Appearance  well developed, well nourished  HEENT - Life support devices in place (ET, NG),normocephalic, atraumatic. PERRLA  Lungs - Chest expands equally, no wheezes, + coarse rhonchi. Cardiovascular - Heart sounds are normal.  normal rate and rhythm irregular, no murmur, gallop or rub. Abdomen - soft, no guarding, nondistended,   Neurologic -following commands, no restlessness or agitation  Skin - no bruising or bleeding  Extremities - no cyanosis, clubbing, mild edema    Lab Results   CBC     Lab Results   Component Value Date    WBC 11.5 06/03/2021    RBC 3.17 06/03/2021    HGB 9.0 06/03/2021    HCT 28.0 06/03/2021     06/03/2021    MCV 88.1 06/03/2021    MCH 28.4 06/03/2021    MCHC 32.3 06/03/2021    RDW 15.0 06/03/2021    NRBC 1 06/01/2021    METASPCT 1 06/01/2021    LYMPHOPCT 12 06/03/2021    MONOPCT 12 06/03/2021    BASOPCT 0 06/03/2021    MONOSABS 1.40 06/03/2021    LYMPHSABS 1.40 06/03/2021    EOSABS 0.50 06/03/2021    BASOSABS 0.00 06/03/2021    DIFFTYPE NOT REPORTED 06/03/2021       BMP   Lab Results   Component Value Date     06/03/2021    K 4.1 06/03/2021     06/03/2021    CO2 28 06/03/2021    BUN 27 06/03/2021    CREATININE 0.70 06/03/2021    GLUCOSE 121 06/03/2021    CALCIUM 8.3 06/03/2021       LFTS  Lab Results   Component Value Date    ALKPHOS 45 05/29/2021    ALT 20 05/29/2021    AST 56 05/29/2021    PROT 5.7 05/29/2021    BILITOT 0.34 05/29/2021    LABALBU 3.1 05/29/2021       INR  No results for input(s): PROTIME, INR in the last 72 hours.     APTT  No results for input(s): APTT in the last 72 hours. Lactic Acid  Lab Results   Component Value Date    LACTA 1.6 05/30/2021    LACTA 2.4 05/29/2021    LACTA 0.7 05/27/2021        BNP   No results for input(s): BNP in the last 72 hours. Cultures       Radiology     Plain Films ET tube acceptable position, decreased congestion and small pleural effusions         CT Scans    See actual reports for details    SYSTEM ASSESSMENT      Neuro       Respiratory   Wean oxygen as tolerated. Keep O2 sat > 88%       Hemodynamics       Gastrointestinal/Nutrition       Renal       Infectious Disease       Hematology/Oncology       Endocrine       Social/Spiritual/DNR/Disposition/Other     Requiring ventilatory support post ex lap 5/42  Metabolic alkalosis  Hypervolemia, negative fluid balance  Decreased bilateral pleural effusions  History of COPD/possible CYNDI  Paroxysmal A. fib, preserved LV function,   history of CVA seizure  SBO/ Ex lap with SB resection and primary anastomosis, 5/28  Acute kidney injury/improved, nephrology following  Pseudomonal UTI on cefepime    Plan:   Will extubate to BiPAP  Diuresis  Bronchodilators  Antibiotic  On Lovenox  for DVT prophylaxis  Discussed with staff,   Discussed with  daughter at bedside and she is okay to proceed with extubation   Patient is DNR CCA with no re-intubation     Critical Care Time   30 min    Electronically signed by Rupert Cockayne, MD on 6/3/2021 at 11:44 AM

## 2021-06-03 NOTE — PROGRESS NOTES
Department of Internal Medicine  Nephrology Dhiraj Burris MD  Progress Note    Reason for consultation: Management of oliguric acute kidney injury. Consulting physician: Tammy Wang MD    Interval history: Patient extubated this morning. Currently is on BiPAP. Diuresing satisfactorily on IV Lasix 20 mg daily negative balance of 2.2L. Renal function is stable he is nonoliguric but continues to exhibit upper and lower extremity edema. Chest x-ray shows no appreciable change in bibasilar opacities and small effusions. His blood pressure is borderline with systolic 64-18 mm hg  He is tolerating tube feeding at 25 mils per hour and TPN is gradually being weaned. Output 2000 mls today. History of presenting illness: This is a 77 y.o. male with a significant past medical history of Bladder cancer, seizure disorder, osteoarthritis and s/p Cerebrovascular accident [has G-tube in place], who presented to the hospital on 5/24/2021 with complaints of diffuse abdominal pain of 1 day duration associated with nausea and vomiting. Also he complained of difficulty urinating and bladder scan showed 586 mL of urine retention. .  BUN/creatinine was 49/1.05 mg/dL with serum bicarbonate 18 mmol/L at presentation. He was admitted and eventually underwent exploratory laparotomy with release of adhesive band causing small bowel obstruction, small bowel resection with primary anastomosis on 5/28/2021. Postoperatively, he was intubated and admitted to the intensive care unit but noted to be oliguric and hence nephrology consultation. Issa catheter was flushed and clots removed and urine output has improved. History was obtained by chart review as patient is currently intubated.     Scheduled Meds:   metoprolol tartrate  12.5 mg Oral BID    aspirin  81 mg Oral Daily    enoxaparin  40 mg Subcutaneous Daily    furosemide  20 mg Intravenous Daily    levalbuterol  1.25 mg Nebulization Q6H    ipratropium

## 2021-06-04 LAB
ABSOLUTE EOS #: 0.3 K/UL (ref 0–0.4)
ABSOLUTE IMMATURE GRANULOCYTE: ABNORMAL K/UL (ref 0–0.3)
ABSOLUTE LYMPH #: 1.1 K/UL (ref 1–4.8)
ABSOLUTE MONO #: 1 K/UL (ref 0.1–1.3)
ANION GAP SERPL CALCULATED.3IONS-SCNC: 8 MMOL/L (ref 9–17)
BASOPHILS # BLD: 1 % (ref 0–2)
BASOPHILS ABSOLUTE: 0.1 K/UL (ref 0–0.2)
BUN BLDV-MCNC: 31 MG/DL (ref 8–23)
BUN/CREAT BLD: ABNORMAL (ref 9–20)
CALCIUM SERPL-MCNC: 8.4 MG/DL (ref 8.6–10.4)
CHLORIDE BLD-SCNC: 104 MMOL/L (ref 98–107)
CO2: 28 MMOL/L (ref 20–31)
CREAT SERPL-MCNC: 0.77 MG/DL (ref 0.7–1.2)
DIFFERENTIAL TYPE: ABNORMAL
EOSINOPHILS RELATIVE PERCENT: 3 % (ref 0–4)
GFR AFRICAN AMERICAN: >60 ML/MIN
GFR NON-AFRICAN AMERICAN: >60 ML/MIN
GFR SERPL CREATININE-BSD FRML MDRD: ABNORMAL ML/MIN/{1.73_M2}
GFR SERPL CREATININE-BSD FRML MDRD: ABNORMAL ML/MIN/{1.73_M2}
GLUCOSE BLD-MCNC: 102 MG/DL (ref 75–110)
GLUCOSE BLD-MCNC: 106 MG/DL (ref 75–110)
GLUCOSE BLD-MCNC: 112 MG/DL (ref 70–99)
GLUCOSE BLD-MCNC: 117 MG/DL (ref 75–110)
GLUCOSE BLD-MCNC: 98 MG/DL (ref 75–110)
HCT VFR BLD CALC: 25.1 % (ref 41–53)
HCT VFR BLD CALC: 26.1 % (ref 41–53)
HCT VFR BLD CALC: 26.3 % (ref 41–53)
HEMOGLOBIN: 8.3 G/DL (ref 13.5–17.5)
HEMOGLOBIN: 8.5 G/DL (ref 13.5–17.5)
HEMOGLOBIN: 8.6 G/DL (ref 13.5–17.5)
IMMATURE GRANULOCYTES: ABNORMAL %
LYMPHOCYTES # BLD: 11 % (ref 24–44)
MAGNESIUM: 2 MG/DL (ref 1.6–2.6)
MCH RBC QN AUTO: 28.9 PG (ref 26–34)
MCHC RBC AUTO-ENTMCNC: 33.1 G/DL (ref 31–37)
MCV RBC AUTO: 87.3 FL (ref 80–100)
MONOCYTES # BLD: 10 % (ref 1–7)
NRBC AUTOMATED: ABNORMAL PER 100 WBC
PDW BLD-RTO: 14.8 % (ref 11.5–14.9)
PHOSPHORUS: 2.8 MG/DL (ref 2.5–4.5)
PLATELET # BLD: 234 K/UL (ref 150–450)
PLATELET ESTIMATE: ABNORMAL
PMV BLD AUTO: 7.8 FL (ref 6–12)
POTASSIUM SERPL-SCNC: 4.3 MMOL/L (ref 3.7–5.3)
RBC # BLD: 2.99 M/UL (ref 4.5–5.9)
RBC # BLD: ABNORMAL 10*6/UL
SEG NEUTROPHILS: 75 % (ref 36–66)
SEGMENTED NEUTROPHILS ABSOLUTE COUNT: 7.4 K/UL (ref 1.3–9.1)
SODIUM BLD-SCNC: 140 MMOL/L (ref 135–144)
WBC # BLD: 9.9 K/UL (ref 3.5–11)
WBC # BLD: ABNORMAL 10*3/UL

## 2021-06-04 PROCEDURE — 94003 VENT MGMT INPAT SUBQ DAY: CPT

## 2021-06-04 PROCEDURE — 83735 ASSAY OF MAGNESIUM: CPT

## 2021-06-04 PROCEDURE — 85025 COMPLETE CBC W/AUTO DIFF WBC: CPT

## 2021-06-04 PROCEDURE — 2580000003 HC RX 258: Performed by: SURGERY

## 2021-06-04 PROCEDURE — 94640 AIRWAY INHALATION TREATMENT: CPT

## 2021-06-04 PROCEDURE — 6360000002 HC RX W HCPCS: Performed by: SURGERY

## 2021-06-04 PROCEDURE — 6360000002 HC RX W HCPCS: Performed by: INTERNAL MEDICINE

## 2021-06-04 PROCEDURE — 6370000000 HC RX 637 (ALT 250 FOR IP): Performed by: INTERNAL MEDICINE

## 2021-06-04 PROCEDURE — C9113 INJ PANTOPRAZOLE SODIUM, VIA: HCPCS | Performed by: SURGERY

## 2021-06-04 PROCEDURE — 85018 HEMOGLOBIN: CPT

## 2021-06-04 PROCEDURE — 84100 ASSAY OF PHOSPHORUS: CPT

## 2021-06-04 PROCEDURE — 94660 CPAP INITIATION&MGMT: CPT

## 2021-06-04 PROCEDURE — 85014 HEMATOCRIT: CPT

## 2021-06-04 PROCEDURE — 2060000000 HC ICU INTERMEDIATE R&B

## 2021-06-04 PROCEDURE — 6370000000 HC RX 637 (ALT 250 FOR IP): Performed by: SURGERY

## 2021-06-04 PROCEDURE — 2500000003 HC RX 250 WO HCPCS: Performed by: SURGERY

## 2021-06-04 PROCEDURE — 97110 THERAPEUTIC EXERCISES: CPT

## 2021-06-04 PROCEDURE — 2700000000 HC OXYGEN THERAPY PER DAY

## 2021-06-04 PROCEDURE — 51702 INSERT TEMP BLADDER CATH: CPT

## 2021-06-04 PROCEDURE — 6360000002 HC RX W HCPCS: Performed by: PHYSICIAN ASSISTANT

## 2021-06-04 PROCEDURE — 36415 COLL VENOUS BLD VENIPUNCTURE: CPT

## 2021-06-04 PROCEDURE — 6360000002 HC RX W HCPCS: Performed by: FAMILY MEDICINE

## 2021-06-04 PROCEDURE — 94761 N-INVAS EAR/PLS OXIMETRY MLT: CPT

## 2021-06-04 PROCEDURE — 82947 ASSAY GLUCOSE BLOOD QUANT: CPT

## 2021-06-04 PROCEDURE — 80048 BASIC METABOLIC PNL TOTAL CA: CPT

## 2021-06-04 RX ORDER — FUROSEMIDE 10 MG/ML
20 INJECTION INTRAMUSCULAR; INTRAVENOUS ONCE
Status: COMPLETED | OUTPATIENT
Start: 2021-06-04 | End: 2021-06-04

## 2021-06-04 RX ORDER — LISINOPRIL 5 MG/1
2.5 TABLET ORAL DAILY
Status: DISCONTINUED | OUTPATIENT
Start: 2021-06-05 | End: 2021-06-07 | Stop reason: HOSPADM

## 2021-06-04 RX ORDER — METOPROLOL SUCCINATE 25 MG/1
25 TABLET, EXTENDED RELEASE ORAL NIGHTLY
Status: DISCONTINUED | OUTPATIENT
Start: 2021-06-04 | End: 2021-06-07 | Stop reason: HOSPADM

## 2021-06-04 RX ADMIN — PANTOPRAZOLE SODIUM 40 MG: 40 INJECTION, POWDER, FOR SOLUTION INTRAVENOUS at 10:29

## 2021-06-04 RX ADMIN — PANTOPRAZOLE SODIUM 40 MG: 40 INJECTION, POWDER, FOR SOLUTION INTRAVENOUS at 20:24

## 2021-06-04 RX ADMIN — Medication 10 ML: at 10:29

## 2021-06-04 RX ADMIN — CILOSTAZOL 100 MG: 100 TABLET ORAL at 23:08

## 2021-06-04 RX ADMIN — SODIUM CHLORIDE, PRESERVATIVE FREE 10 ML: 5 INJECTION INTRAVENOUS at 23:08

## 2021-06-04 RX ADMIN — ENOXAPARIN SODIUM 80 MG: 80 INJECTION SUBCUTANEOUS at 23:06

## 2021-06-04 RX ADMIN — IPRATROPIUM BROMIDE 0.5 MG: 0.5 SOLUTION RESPIRATORY (INHALATION) at 14:54

## 2021-06-04 RX ADMIN — METRONIDAZOLE 500 MG: 500 INJECTION, SOLUTION INTRAVENOUS at 10:37

## 2021-06-04 RX ADMIN — LEVALBUTEROL HYDROCHLORIDE 1.25 MG: 1.25 SOLUTION, CONCENTRATE RESPIRATORY (INHALATION) at 14:54

## 2021-06-04 RX ADMIN — CILOSTAZOL 100 MG: 100 TABLET ORAL at 10:33

## 2021-06-04 RX ADMIN — LEVALBUTEROL HYDROCHLORIDE 1.25 MG: 1.25 SOLUTION, CONCENTRATE RESPIRATORY (INHALATION) at 01:04

## 2021-06-04 RX ADMIN — CEFEPIME 2000 MG: 2 INJECTION, POWDER, FOR SOLUTION INTRAVENOUS at 20:25

## 2021-06-04 RX ADMIN — FUROSEMIDE 20 MG: 10 INJECTION, SOLUTION INTRAMUSCULAR; INTRAVENOUS at 18:09

## 2021-06-04 RX ADMIN — IPRATROPIUM BROMIDE 0.5 MG: 0.5 SOLUTION RESPIRATORY (INHALATION) at 19:45

## 2021-06-04 RX ADMIN — IPRATROPIUM BROMIDE 0.5 MG: 0.5 SOLUTION RESPIRATORY (INHALATION) at 07:38

## 2021-06-04 RX ADMIN — SODIUM CHLORIDE, PRESERVATIVE FREE 10 ML: 5 INJECTION INTRAVENOUS at 23:05

## 2021-06-04 RX ADMIN — POTASSIUM CHLORIDE 20 MEQ: 10 CAPSULE, COATED, EXTENDED RELEASE ORAL at 10:28

## 2021-06-04 RX ADMIN — Medication 10 ML: at 20:24

## 2021-06-04 RX ADMIN — LEVALBUTEROL HYDROCHLORIDE 1.25 MG: 1.25 SOLUTION, CONCENTRATE RESPIRATORY (INHALATION) at 19:45

## 2021-06-04 RX ADMIN — METRONIDAZOLE 500 MG: 500 INJECTION, SOLUTION INTRAVENOUS at 18:10

## 2021-06-04 RX ADMIN — FENTANYL CITRATE 25 MCG: 50 INJECTION INTRAMUSCULAR; INTRAVENOUS at 12:05

## 2021-06-04 RX ADMIN — CEFEPIME 2000 MG: 2 INJECTION, POWDER, FOR SOLUTION INTRAVENOUS at 06:11

## 2021-06-04 RX ADMIN — LEVALBUTEROL HYDROCHLORIDE 1.25 MG: 1.25 SOLUTION, CONCENTRATE RESPIRATORY (INHALATION) at 07:38

## 2021-06-04 RX ADMIN — METRONIDAZOLE 500 MG: 500 INJECTION, SOLUTION INTRAVENOUS at 23:29

## 2021-06-04 RX ADMIN — ASPIRIN 81 MG CHEWABLE TABLET 81 MG: 81 TABLET CHEWABLE at 10:30

## 2021-06-04 RX ADMIN — FUROSEMIDE 20 MG: 10 INJECTION, SOLUTION INTRAMUSCULAR; INTRAVENOUS at 10:29

## 2021-06-04 RX ADMIN — ENOXAPARIN SODIUM 40 MG: 40 INJECTION SUBCUTANEOUS at 10:28

## 2021-06-04 RX ADMIN — SODIUM CHLORIDE, PRESERVATIVE FREE 10 ML: 5 INJECTION INTRAVENOUS at 23:09

## 2021-06-04 RX ADMIN — IPRATROPIUM BROMIDE 0.5 MG: 0.5 SOLUTION RESPIRATORY (INHALATION) at 01:04

## 2021-06-04 ASSESSMENT — PAIN SCALES - GENERAL
PAINLEVEL_OUTOF10: 0
PAINLEVEL_OUTOF10: 4

## 2021-06-04 NOTE — PROGRESS NOTES
[facility]   OCCUPATIONAL THERAPY MISSED TREATMENT NOTE   INPATIENT   Date: 21  Patient Name: Mirna Moon       Room: 4880/4821-34  MRN: [id number]   Account #: [de-identified]    : 1945  (72 y.o.)  Gender: male   Referring Practitioner: Bernabe Tejada MD  Diagnosis: Small bowel obstruction             REASON FOR MISSED TREATMENT:  Hold treatment per nursing request   -   Oxygen Alarm initiating beeping upon my entry; Pt oxygen  while sleeping soundly. During 2 min observation pt observed with heavy inhale during sleep with sudden drops in O2 and rapid recovery. This occurred 3x in 2 mins. Treatment deferred for rest, nursing informed of observation.         BARI Eli

## 2021-06-04 NOTE — PROGRESS NOTES
[facility] CARDIOLOGY Progress Note    6/4/2021 7:06 AM      Subjective:  Mr. Lori Nogueira Was extubated and transferred to step-down telemetric monitoring bed. At the time of my evaluation he is moaning and unable to get any pertinent history from the patient. Review of systems:  unobtainable.              LABS:     Recent Results (from the past 24 hour(s))   POC Glucose Fingerstick    Collection Time: 06/03/21 11:11 AM   Result Value Ref Range    POC Glucose 131 (H) 75 - 110 mg/dL   Hgb/Hct    Collection Time: 06/03/21  5:47 PM   Result Value Ref Range    Hemoglobin 8.7 (L) 13.5 - 17.5 g/dL    Hematocrit 26.6 (L) 41 - 53 %   POC Glucose Fingerstick    Collection Time: 06/03/21  5:57 PM   Result Value Ref Range    POC Glucose 127 (H) 75 - 110 mg/dL   POC Glucose Fingerstick    Collection Time: 06/03/21 11:20 PM   Result Value Ref Range    POC Glucose 97 75 - 110 mg/dL   Hgb/Hct    Collection Time: 06/03/21 11:42 PM   Result Value Ref Range    Hemoglobin 8.6 (L) 13.5 - 17.5 g/dL    Hematocrit 26.1 (L) 41 - 53 %   POC Glucose Fingerstick    Collection Time: 06/04/21  4:59 AM   Result Value Ref Range    POC Glucose 98 75 - 110 mg/dL   Basic Metabolic Panel w/ Reflex to MG    Collection Time: 06/04/21  5:37 AM   Result Value Ref Range    Glucose 112 (H) 70 - 99 mg/dL    BUN 31 (H) 8 - 23 mg/dL    CREATININE 0.77 0.70 - 1.20 mg/dL    Bun/Cre Ratio NOT REPORTED 9 - 20    Calcium 8.4 (L) 8.6 - 10.4 mg/dL    Sodium 140 135 - 144 mmol/L    Potassium 4.3 3.7 - 5.3 mmol/L    Chloride 104 98 - 107 mmol/L    CO2 28 20 - 31 mmol/L    Anion Gap 8 (L) 9 - 17 mmol/L    GFR Non-African American >60 >60 mL/min    GFR African American >60 >60 mL/min    GFR Comment          GFR Staging NOT REPORTED    Phosphorus    Collection Time: 06/04/21  5:37 AM   Result Value Ref Range    Phosphorus 2.8 2.5 - 4.5 mg/dL   Magnesium    Collection Time: 06/04/21  5:37 AM   Result Value Ref Range    Magnesium 2.0 1.6 - 2.6 mg/dL   CBC WITH

## 2021-06-04 NOTE — PROGRESS NOTES
Progress Note    6/4/2021   4:31 PM    Name:  Neena Valentin  MRN:    [id number]     Acct:     [de-identified]   Room:  2103/2103-01   Day: 6     Admit Date: 5/24/2021  3:38 PM  PCP: Waleska Hendrickson MD    Subjective:     C/C:   Chief Complaint   Patient presents with   Sena Mcclellan Nausea    Emesis       Interval History: Status: not changed. Patient is tolerating tube feeds. Had a soft bowel movement. Vital signs reviewed, patient oxygen saturation is 96% on 3 L of oxygen via nasal cannula, blood pressure and heart rate stable. recent labs reviewed hemoglobin 8.3. Adequate urine output    ROS:   all 10 systems reviewed and are negative except as noted    Review of Systems   Unable to perform ROS: Patient nonverbal (Patient mumbles due to previous CVA)       Medications: Allergies:    Allergies   Allergen Reactions    Bactrim [Sulfamethoxazole-Trimethoprim] Hives and Swelling    Ciprofloxacin Swelling     FACE       Current Meds: metoprolol succinate (TOPROL XL) extended release tablet 25 mg, Nightly  [START ON 6/5/2021] lisinopril (PRINIVIL;ZESTRIL) tablet 2.5 mg, Daily  enoxaparin (LOVENOX) injection 80 mg, BID  furosemide (LASIX) injection 20 mg, Once  aspirin chewable tablet 81 mg, Daily  furosemide (LASIX) injection 20 mg, Daily  dexmedetomidine (PRECEDEX) 400 mcg in sodium chloride 0.9 % 100 mL infusion, Continuous  sodium phosphate 14.79 mmol in dextrose 5 % 250 mL IVPB, PRN   Or  sodium phosphate 29.58 mmol in dextrose 5 % 250 mL IVPB, PRN  levalbuterol (XOPENEX) nebulizer solution 1.25 mg, Q6H  sodium chloride nebulizer 0.9 % solution 3 mL, Q8H PRN  ipratropium (ATROVENT) 0.02 % nebulizer solution 0.5 mg, Q6H  metoprolol (LOPRESSOR) injection 5 mg, Q6H PRN  insulin lispro (HUMALOG) injection vial 0-18 Units, Q6H  norepinephrine (LEVOPHED) 16 mg in sodium chloride 0.9 % 250 mL infusion, Continuous  glucose (GLUTOSE) 40 % oral gel 15 g, PRN  dextrose 50 % IV solution, PRN  glucagon (rDNA) injection 1 mg, PRN  dextrose 5 % solution, PRN  0.9 % sodium chloride infusion, PRN  propofol injection, Titrated  HYDROmorphone (DILAUDID) injection 0.25 mg, Q3H PRN   Or  HYDROmorphone (DILAUDID) injection 0.5 mg, Q3H PRN  sodium chloride flush 0.9 % injection 10 mL, 2 times per day  sodium chloride flush 0.9 % injection 10 mL, PRN  potassium chloride 10 mEq/100 mL IVPB (Peripheral Line), PRN  ondansetron (ZOFRAN) injection 4 mg, Q6H PRN  cilostazol (PLETAL) tablet 100 mg, BID  potassium chloride (MICRO-K) extended release capsule 20 mEq, Daily  diatrizoate meglumine-sodium (GASTROGRAFIN) 66-10 % solution 450 mL, ONCE PRN  pantoprazole (PROTONIX) injection 40 mg, BID   And  sodium chloride (PF) 0.9 % injection 10 mL, BID  iopamidol (ISOVUE-370) 76 % injection 75 mL, ONCE PRN  fentaNYL (SUBLIMAZE) injection 25 mcg, Q2H PRN  fentaNYL (SUBLIMAZE) injection 50 mcg, Q2H PRN  sodium chloride flush 0.9 % injection 10 mL, 2 times per day  potassium chloride (KLOR-CON M) extended release tablet 40 mEq, PRN   Or  potassium bicarb-citric acid (EFFER-K) effervescent tablet 40 mEq, PRN   Or  potassium chloride 10 mEq/100 mL IVPB (Peripheral Line), PRN  magnesium sulfate 1000 mg in dextrose 5% 100 mL IVPB, PRN  promethazine (PHENERGAN) tablet 12.5 mg, Q6H PRN   Or  ondansetron (ZOFRAN) injection 4 mg, Q6H PRN  magnesium hydroxide (MILK OF MAGNESIA) 400 MG/5ML suspension 30 mL, Daily PRN  acetaminophen (TYLENOL) tablet 650 mg, Q6H PRN   Or  acetaminophen (TYLENOL) suppository 650 mg, Q6H PRN  cefepime (MAXIPIME) 2000 mg IVPB minibag, Q12H  metronidazole (FLAGYL) 500 mg in NaCl 100 mL IVPB premix, Q8H  hydrALAZINE (APRESOLINE) injection 10 mg, Q6H PRN        Data:     Code Status:  DNR-CCA    Family History   Problem Relation Age of Onset    Arthritis Mother     Atrial Fibrillation Mother     Hearing Loss Mother     Heart Disease Mother     Stroke Mother     Vision Loss Mother     Arthritis Father     Cancer Father     Heart Disease Father     High Blood Pressure Father     Stroke Father     Vision Loss Father        Social History     Socioeconomic History    Marital status:      Spouse name: Not on file    Number of children: Not on file    Years of education: Not on file    Highest education level: Not on file   Occupational History    Not on file   Tobacco Use    Smoking status: Former Smoker     Packs/day: 1.00     Years: 60.00     Pack years: 60.00     Types: Cigarettes     Start date:      Quit date: 2020     Years since quittin.1    Smokeless tobacco: Never Used   Vaping Use    Vaping Use: Never used   Substance and Sexual Activity    Alcohol use: No    Drug use: No    Sexual activity: Yes     Partners: Female   Other Topics Concern    Not on file   Social History Narrative    Not on file     Social Determinants of Health     Financial Resource Strain:     Difficulty of Paying Living Expenses:    Food Insecurity:     Worried About Running Out of Food in the Last Year:     [address] in the Last Year:    Transportation Needs:     Lack of Transportation (Medical):  Lack of Transportation (Non-Medical):    Physical Activity:     Days of Exercise per Week:     Minutes of Exercise per Session:    Stress:     Feeling of Stress :    Social Connections:     Frequency of Communication with Friends and Family:     Frequency of Social Gatherings with Friends and Family:     Attends Spiritism Services:     Active Member of Clubs or Organizations:     Attends Club or Organization Meetings:     Marital Status:    Intimate Partner Violence:     Fear of Current or Ex-Partner:     Emotionally Abused:     Physically Abused:     Sexually Abused:        I/O (24Hr):     Intake/Output Summary (Last 24 hours) at 2021 1631  Last data filed at 2021 1418  Gross per 24 hour   Intake 2620 ml   Output 4075 ml   Net -1455 ml     Radiology:  IR FLUORO GUIDED CVA DEVICE PLMT/REPLACE/REMOVAL    Result Date: 6/1/2021  Successful fluoroscopy guided right upper extremity 5 Turkmen power injectable PICC exchange. Ready for use. XR CHEST PORTABLE    Result Date: 6/3/2021  1. Endotracheal tube remains in appropriate position. 2. No appreciable change in basilar opacities and small effusions, right greater than left. XR CHEST PORTABLE    Result Date: 6/2/2021  Heart size top-normal.  Oval resolving pulmonary vascular congestion, and left effusion. Improved right basilar opacity and effusion. Support tubes and lines as above. XR CHEST PORTABLE    Addendum Date: 6/1/2021    ADDENDUM: Correction of voice transcription error: The 1st sentence of the impression should read as follows: CHANGED position of the right PICC line. Result Date: 6/1/2021  1. Unchanged position of the right PICC line. It now crosses the midline in the proximal SVC to terminating in region of left innominate vein. 2. Pulmonary vascular congestion and layering right pleural effusion unchanged but left pleural effusion appears less prominent. The findings were sent to the Radiology Results [address] at 7:20 am on 6/1/2021to be communicated to a licensed caregiver. XR CHEST PORTABLE    Result Date: 5/31/2021  Endotracheal tube in satisfactory position Bilateral pulmonary opacities right greater than left likely representing pulmonary edema with bilateral pleural effusions     XR CHEST PORTABLE    Result Date: 5/30/2021  Endotracheal tube in satisfactory position Bilateral airspace disease right greater than left could represent edema or infection     XR CHEST PORTABLE    Result Date: 5/29/2021  Trace right effusion and mild atelectasis. Tubes and lines as above. However, endotracheal tube terminates at the louis oriented toward the right mainstem bronchus. RECOMMENDATION: Advise retraction of endotracheal tube by 3-4 cm.  The findings were sent to the Radiology Results [address] at 8:11 am on 5/29/2021to be communicated to a licensed caregiver. XR CHEST PORTABLE    Result Date: 5/28/2021  Endotracheal tube with tip 3.6 cm from the louis. Enteric tube with tip in the proximal body versus fundus of the stomach and side-port likely in the distal esophagus. Suggest advancement by 5-10 cm. Right-sided PICC line with tip in the distal SVC. No pneumothoraces. Mild bibasilar likely atelectasis.        Labs:  Recent Results (from the past 24 hour(s))   Hgb/Hct    Collection Time: 06/03/21  5:47 PM   Result Value Ref Range    Hemoglobin 8.7 (L) 13.5 - 17.5 g/dL    Hematocrit 26.6 (L) 41 - 53 %   POC Glucose Fingerstick    Collection Time: 06/03/21  5:57 PM   Result Value Ref Range    POC Glucose 127 (H) 75 - 110 mg/dL   POC Glucose Fingerstick    Collection Time: 06/03/21 11:20 PM   Result Value Ref Range    POC Glucose 97 75 - 110 mg/dL   Hgb/Hct    Collection Time: 06/03/21 11:42 PM   Result Value Ref Range    Hemoglobin 8.6 (L) 13.5 - 17.5 g/dL    Hematocrit 26.1 (L) 41 - 53 %   POC Glucose Fingerstick    Collection Time: 06/04/21  4:59 AM   Result Value Ref Range    POC Glucose 98 75 - 110 mg/dL   Basic Metabolic Panel w/ Reflex to MG    Collection Time: 06/04/21  5:37 AM   Result Value Ref Range    Glucose 112 (H) 70 - 99 mg/dL    BUN 31 (H) 8 - 23 mg/dL    CREATININE 0.77 0.70 - 1.20 mg/dL    Bun/Cre Ratio NOT REPORTED 9 - 20    Calcium 8.4 (L) 8.6 - 10.4 mg/dL    Sodium 140 135 - 144 mmol/L    Potassium 4.3 3.7 - 5.3 mmol/L    Chloride 104 98 - 107 mmol/L    CO2 28 20 - 31 mmol/L    Anion Gap 8 (L) 9 - 17 mmol/L    GFR Non-African American >60 >60 mL/min    GFR African American >60 >60 mL/min    GFR Comment          GFR Staging NOT REPORTED    Phosphorus    Collection Time: 06/04/21  5:37 AM   Result Value Ref Range    Phosphorus 2.8 2.5 - 4.5 mg/dL   Magnesium    Collection Time: 06/04/21  5:37 AM   Result Value Ref Range    Magnesium 2.0 1.6 - 2.6 mg/dL   CBC WITH AUTO DIFFERENTIAL    Collection Time: 06/04/21 5: 37 AM   Result Value Ref Range    WBC 9.9 3.5 - 11.0 k/uL    RBC 2.99 (L) 4.5 - 5.9 m/uL    Hemoglobin 8.6 (L) 13.5 - 17.5 g/dL    Hematocrit 26.1 (L) 41 - 53 %    MCV 87.3 80 - 100 fL    MCH 28.9 26 - 34 pg    MCHC 33.1 31 - 37 g/dL    RDW 14.8 11.5 - 14.9 %    Platelets 633 580 - 966 k/uL    MPV 7.8 6.0 - 12.0 fL    NRBC Automated NOT REPORTED per 100 WBC    Differential Type NOT REPORTED     Seg Neutrophils 75 (H) 36 - 66 %    Lymphocytes 11 (L) 24 - 44 %    Monocytes 10 (H) 1 - 7 %    Eosinophils % 3 0 - 4 %    Basophils 1 0 - 2 %    Immature Granulocytes NOT REPORTED 0 %    Segs Absolute 7.40 1.3 - 9.1 k/uL    Absolute Lymph # 1.10 1.0 - 4.8 k/uL    Absolute Mono # 1.00 0.1 - 1.3 k/uL    Absolute Eos # 0.30 0.0 - 0.4 k/uL    Basophils Absolute 0.10 0.0 - 0.2 k/uL    Absolute Immature Granulocyte NOT REPORTED 0.00 - 0.30 k/uL    WBC Morphology NOT REPORTED     RBC Morphology NOT REPORTED     Platelet Estimate NOT REPORTED    Hgb/Hct    Collection Time: 21 10:27 AM   Result Value Ref Range    Hemoglobin 8.3 (L) 13.5 - 17.5 g/dL    Hematocrit 25.1 (L) 41 - 53 %   POC Glucose Fingerstick    Collection Time: 21  2:18 PM   Result Value Ref Range    POC Glucose 102 75 - 110 mg/dL       Physical Examination:        Vitals:  BP 95/65   Pulse 97   Temp 97.8 °F (36.6 °C) (Axillary)   Resp 18   Ht 6' 4\" (1.93 m)   Wt 186 lb 11.2 oz (84.7 kg)   SpO2 96%   BMI 22.73 kg/m²   Temp (24hrs), Av.1 °F (36.7 °C), Min:97.5 °F (36.4 °C), Max:99.5 °F (37.5 °C)    Recent Labs     21  8327 21  4503 21  3831 21  1418   POCGLU 127* 97 98 102         Physical Exam  Vitals reviewed. Constitutional:       Appearance: Normal appearance. He is not diaphoretic. HENT:      Head: Normocephalic and atraumatic.       Right Ear: External ear normal.      Left Ear: External ear normal.      Nose: Nose normal.      Mouth/Throat:      Mouth: Mucous membranes are moist.      Pharynx: Oropharynx is 1. Start Lovenox 1 mg/kg subcu twice daily for A. Fib  2. IV cefepime  3. Lasix 20 mg IV daily  4. Given extra dose of Lasix 20 mg IV now  5. IV Flagyl  1. PPI Protonix 40 mg IV daily  2. DVT Prophylaxis Lovenox  3. Metoprolol 25 mg XL daily  4. Advance G-tube feeds to goal  5. EPCs  6. PT/OT to evaluate and treat  7. Pain control  8. Replace electrolytes as per sliding scale  9. Home medications reviewed and appropriate medications continued  10.  Reviewed labs and imaging studies from last 24 hours and results explained to patient      Electronically signed by Shadia Valadez MD

## 2021-06-04 NOTE — PROGRESS NOTES
Physical Therapy  DATE: 2021  NAME: Soniya Machado  MRN: [id number]   : 1945    Discharge Recommendations: Continue to Assess (pending progress)     Subjective: PT is in bed upon arrival. [facility] ok's pt for bed ex's. Pain: facials during ROM, completed to tolerance. Patient follows: Some Commands  Is patient on ventilator: No  Is patient on sedation: No  Precautions: Issa, General Precautions, Fall Risk    Therapeutic exercises:  UE/LE(s)  Bilateral PROM with BLE's and  Active-assist range of motion BUE's all planes x 15 reps  bilateral gastrocnemius stretching 3 reps x 30 seconds    Goals  Short Term Goals  Short term goal 1: Pt to demo mod x2 rolling each direction in bed to prepare for sling placement at home for junior transfers. Short term goal 2: Pt to tolerate 30-45 minute PT session. Short term goal 3: Pt to tolerate stretching of B LE with up to 30 second holds, 3x each. Short term goal 4: Pt to tolerate 10-15 reps of BLE strengthening exercises progressing from PROM to [facility]. Short term goal 5: Pt to complete core strengthening exercises with good technique. Plan: Progress functional mobility as medically appropriate.    Time In: 1507  Time Out: 1530  Time Coded Minutes (treatment minutes): 23  Rehab Potential: fair  Times per week: 3x/week    Negro Trevino PTA

## 2021-06-04 NOTE — PROGRESS NOTES
Department of Internal Medicine  Nephrology [facility], MD  Progress Note    Reason for consultation: Management of oliguric acute kidney injury. Consulting physician: El Huitron MD    Interval history:   Patient seen and examined. Currently is on nasal cannula. Diuresing satisfactorily on IV Lasix 20 mg daily, urine output 3.8 L yesterday in 24 hours. Renal function is stable he is nonoliguric but continues to exhibit upper and lower extremity edema. Chest x-ray shows no appreciable change in bibasilar opacities and small effusions. His blood pressure is borderline with systolic blood pressure in 90s  He is tolerating tube feeding   Serum creatinine 0.7 mg/dL      History of presenting illness: This is a 77 y.o. male with a significant past medical history of Bladder cancer, seizure disorder, osteoarthritis and s/p Cerebrovascular accident [has G-tube in place], who presented to the hospital on 5/24/2021 with complaints of diffuse abdominal pain of 1 day duration associated with nausea and vomiting. Also he complained of difficulty urinating and bladder scan showed 586 mL of urine retention. .  BUN/creatinine was 49/1.05 mg/dL with serum bicarbonate 18 mmol/L at presentation. He was admitted and eventually underwent exploratory laparotomy with release of adhesive band causing small bowel obstruction, small bowel resection with primary anastomosis on 5/28/2021. Postoperatively, he was intubated and admitted to the intensive care unit but noted to be oliguric and hence nephrology consultation. Sisa catheter was flushed and clots removed and urine output has improved. History was obtained by chart review as patient is currently intubated.     Scheduled Meds:   metoprolol tartrate  12.5 mg Oral BID    aspirin  81 mg Oral Daily    enoxaparin  40 mg Subcutaneous Daily    furosemide  20 mg Intravenous Daily    levalbuterol  1.25 mg Nebulization Q6H    ipratropium  0.5 mg Nebulization Q6H    insulin lispro  0-18 Units Subcutaneous Q6H    sodium chloride flush  10 mL Intravenous 2 times per day    cilostazol  100 mg Oral BID    potassium chloride  20 mEq Oral Daily    pantoprazole  40 mg Intravenous BID    And    sodium chloride (PF)  10 mL Intravenous BID    sodium chloride flush  10 mL Intravenous 2 times per day    cefepime  2,000 mg Intravenous Q12H    metroNIDAZOLE  500 mg Intravenous Q8H     Continuous Infusions:   dexmedetomidine Stopped (06/02/21 1818)    norepinephrine Stopped (06/02/21 0641)    dextrose      sodium chloride      propofol Stopped (06/03/21 1137)     PRN Meds:.sodium phosphate IVPB **OR** sodium phosphate IVPB, sodium chloride nebulizer, metoprolol, glucose, dextrose, glucagon (rDNA), dextrose, sodium chloride, HYDROmorphone **OR** HYDROmorphone, sodium chloride flush, potassium chloride, ondansetron, diatrizoate meglumine-sodium, iopamidol, fentanNYL, fentanNYL, potassium chloride **OR** potassium alternative oral replacement **OR** potassium chloride, magnesium sulfate, promethazine **OR** ondansetron, magnesium hydroxide, acetaminophen **OR** acetaminophen, hydrALAZINE    Physical Exam:    VITALS:  BP 97/63   Pulse 86   Temp 97.5 °F (36.4 °C) (Axillary)   Resp 19   Ht 6' 4\" (1.93 m)   Wt 186 lb 11.2 oz (84.7 kg)   SpO2 94%   BMI 22.73 kg/m²   24HR INTAKE/OUTPUT:      Intake/Output Summary (Last 24 hours) at 6/4/2021 1223  Last data filed at 6/4/2021 1218  Gross per 24 hour   Intake 2987 ml   Output 3105 ml   Net -118 ml     Constitutional: Intubated, sedated and on ventilator support    Skin: Skin color, texture, turgor normal. No rashes or lesions    Head: Normocephalic, without obvious abnormality, atraumatic     Cardiovascular/Edema: regular rate and rhythm, S1, S2 normal, no murmur, click, rub or gallop    Respiratory: Lungs: clear to auscultation bilaterally    Abdomen: soft, non-tender; bowel sounds normal; no masses,  no organomegaly    Back: Abdominal binder dressing in place;  hypoactive bowel sounds. Extremities: extremities normal, atraumatic, no cyanosis or edema    Neuro: Sedated and on ventilator support. CBC:   Recent Labs     06/02/21  0403 06/03/21 0448 06/03/21  2342 06/04/21  0537 06/04/21  1027   WBC 11.1* 11.5*  --  9.9  --    HGB 8.7* 9.0* 8.6* 8.6* 8.3*    280  --  234  --      BMP:    Recent Labs     06/02/21  0403 06/03/21  0448 06/04/21  0537    140 140   K 4.2 4.1 4.3    104 104   CO2 25 28 28   BUN 23 27* 31*   CREATININE 0.78 0.70 0.77   GLUCOSE 140* 121* 112*     Lab Results   Component Value Date    NITRU NEGATIVE 05/25/2021    COLORU YELLOW 05/25/2021    PHUR 5.0 05/25/2021    WBCUA 10 TO 20 05/25/2021    RBCUA 0 TO 2 05/25/2021    MUCUS NOT REPORTED 05/25/2021    TRICHOMONAS NOT REPORTED 05/25/2021    YEAST NOT REPORTED 05/25/2021    BACTERIA FEW 05/25/2021    SPECGRAV 1.060 05/25/2021    LEUKOCYTESUR SMALL 05/25/2021    UROBILINOGEN Normal 05/25/2021    BILIRUBINUR NEGATIVE 05/25/2021    GLUCOSEU NEGATIVE 05/25/2021    KETUA NEGATIVE 05/25/2021    AMORPHOUS NOT REPORTED 05/25/2021     IMPRESSION/RECOMMENDATIONS:      1. Acute oliguric kidney injury - Secondary to prerenal azotemia and acute urinary retention. GFR remains within normal and patient is nonoliguric. Serum creatinine remains within normal limits    2. Edema -continue IV Lasix 20 mg daily    3. Nutrition -continue PEG tube to goal     4. Hypophosphatemia - improved stable    Prognosis is guarded.     MD Lilliam   Attending Nephrologist  6/4/2021 12:23 PM

## 2021-06-04 NOTE — PROGRESS NOTES
[facility] [address]                 PATIENT NAME: Ramses Coles     TODAY'S DATE: 6/4/2021, 1:12 PM    SUBJECTIVE:  POD#7  Pt seen and examined. Afebrile, VSS. No leukocytosis, hemoglobins table. Patient extubated yesterday, transferred to PCU. Abdomen is soft. Patient is having bowel movements. Tolerating tube feeds at 50mL/hr, goal set at 75mL/hr per dietary. No N/V. Incision clean, dry, intact. CHELSEA x 4 serosanguinous. Urine output adequate. OBJECTIVE:   VITALS:  BP 97/63   Pulse 86   Temp 97.5 °F (36.4 °C) (Axillary)   Resp 19   Ht 6' 4\" (1.93 m)   Wt 186 lb 11.2 oz (84.7 kg)   SpO2 94%   BMI 22.73 kg/m²      INTAKE/OUTPUT:      Intake/Output Summary (Last 24 hours) at 6/4/2021 1312  Last data filed at 6/4/2021 1218  Gross per 24 hour   Intake 2987 ml   Output 3105 ml   Net -118 ml                 CONSTITUTIONAL:  awake and alert.   No acute distress  HEART:   RRR  LUNGS:   Decreased air entry at bases, no wheezing  ABDOMEN:   Abdomen soft, non-tender, non-distended  EXTREMITIES:   No pedal edema    Data:  CBC:   Lab Results   Component Value Date    WBC 9.9 06/04/2021    RBC 2.99 06/04/2021    HGB 8.3 06/04/2021    HCT 25.1 06/04/2021    MCV 87.3 06/04/2021    MCH 28.9 06/04/2021    MCHC 33.1 06/04/2021    RDW 14.8 06/04/2021     06/04/2021    MPV 7.8 06/04/2021     BMP:    Lab Results   Component Value Date     06/04/2021    K 4.3 06/04/2021     06/04/2021    CO2 28 06/04/2021    BUN 31 06/04/2021    LABALBU 3.1 05/29/2021    CREATININE 0.77 06/04/2021    CALCIUM 8.4 06/04/2021    GFRAA >60 06/04/2021    LABGLOM >60 06/04/2021    GLUCOSE 112 06/04/2021       Radiology Review:       XR CHEST PORTABLE [[id number]] Collected: 06/03/21 0555     Order Status: Completed Updated: 06/03/21 0629     Narrative:       EXAMINATION:   ONE XRAY VIEW OF THE CHEST     6/3/2021 5:05 am     COMPARISON:   06/02/2021, 06/01/2021     HISTORY:   ORDERING SYSTEM PROVIDED HISTORY: ett placement   TECHNOLOGIST PROVIDED HISTORY:   ett placement   Reason for Exam: ett placement   Acuity: Unknown   Type of Exam: Ongoing   Additional signs and symptoms: ett placement   Relevant Medical/Surgical History: ett placement     FINDINGS:   The endotracheal tube terminates in appropriate position above the louis. Right PICC line remains in place.  The cardiac and mediastinal contours   appear unchanged.  Basilar opacities and small effusions, right greater than   left, are again demonstrated and without appreciable change.      Impression:       1. Endotracheal tube remains in appropriate position. 2. No appreciable change in basilar opacities and small effusions, right   greater than left. ASSESSMENT     Principal Problem:    Small bowel obstruction (HCC)  Active Problems:    Status post insertion of percutaneous endoscopic gastrostomy (PEG) tube (HCC)    Acute cystitis without hematuria    Mass of right lung    COPD (chronic obstructive pulmonary disease) ([facility])    PAF (paroxysmal atrial fibrillation) ([facility] [address]  Resolved Problems:    * No resolved hospital problems. *      Plan  1. Increase tube feeds to goal as tolerated  2. TPN weaned off  3. Bowels are moving   4. Okay to restart full dose lovenox per primary request  5. Surgically stable  6. Continue medical management   7. Patient was seen and examined. Awake. Tolerating tube feeds almost to goal.  Bowels are moving. Abdomen is benign. Incision is clean. CHELSEA drains are serous serosanguineous. Blood work reviewed. Surgically stable. Discharge planning.       Electronically signed by Maame Cortez PA-C  [id number] [address]

## 2021-06-04 NOTE — PROGRESS NOTES
11 06/04/2021    MONOPCT 10 06/04/2021    BASOPCT 1 06/04/2021    MONOSABS 1.00 06/04/2021    LYMPHSABS 1.10 06/04/2021    EOSABS 0.30 06/04/2021    BASOSABS 0.10 06/04/2021    DIFFTYPE NOT REPORTED 06/04/2021       BMP   Lab Results   Component Value Date     06/04/2021    K 4.3 06/04/2021     06/04/2021    CO2 28 06/04/2021    BUN 31 06/04/2021    CREATININE 0.77 06/04/2021    GLUCOSE 112 06/04/2021    CALCIUM 8.4 06/04/2021       LFTS  Lab Results   Component Value Date    ALKPHOS 45 05/29/2021    ALT 20 05/29/2021    AST 56 05/29/2021    PROT 5.7 05/29/2021    BILITOT 0.34 05/29/2021    LABALBU 3.1 05/29/2021       INR  No results for input(s): PROTIME, INR in the last 72 hours. APTT  No results for input(s): APTT in the last 72 hours. Lactic Acid  Lab Results   Component Value Date    LACTA 1.6 05/30/2021    LACTA 2.4 05/29/2021    LACTA 0.7 05/27/2021        BNP   No results for input(s): BNP in the last 72 hours. Cultures       Radiology     Plain Films          CT Scans    See actual reports for details    SYSTEM ASSESSMENT      Neuro       Respiratory   Wean oxygen as tolerated.  Keep O2 sat > 88%       Hemodynamics       Gastrointestinal/Nutrition       Renal       Infectious Disease       Hematology/Oncology       Endocrine       Social/Spiritual/DNR/Disposition/Other     Requiring ventilatory support post ex lap 5/28, extubated 6/3  Metabolic alkalosis  Hypervolemia, negative fluid balance  Decreased bilateral pleural effusions  History of COPD/possible CYNDI  Paroxysmal A. fib, preserved LV function,   history of CVA seizure  SBO/ Ex lap with SB resection and primary anastomosis, 5/28  Acute kidney injury/improved, nephrology following  Pseudomonal UTI on cefepime and Cipro, day #10 of cefepime    Plan:  Wean O2 as tolerated  Diuresis  Bronchodilators  No objection to stop antibiotic if okay with surgery  On Lovenox  for DVT prophylaxis  Patient is DNR CCA with no intubation

## 2021-06-04 NOTE — PROGRESS NOTES
NONINVASIVE VENTILATION    PROVIDE OPTIMAL VENTILATION/ACCEPTABLE SPO2   IMPLEMENT NONINVASIVE VENTILATION PROTOCOL   MAINTAIN ACCEPTABLE SPO2   ASSESS SKIN INTEGRITY/BREAKDOWN SCORE   PATIENT EDUCATION AS NEEDED   BIPAP AS NEEDED        PROVIDE ADEQUATE OXYGENATION WITH ACCEPTABLE SP02/ABG'S    [x]  IDENTIFY APPROPRIATE OXYGEN THERAPY  [x]   MONITOR SP02/ABG'S AS NEEDED   [x]   PATIENT EDUCATION AS NEEDED    BRONCHOSPASM/BRONCHOCONSTRICTION     [x]         IMPROVE AERATION/BREATH SOUNDS  [x]   ADMINISTER BRONCHODILATOR THERAPY AS APPROPRIATE  [x]   ASSESS BREATH SOUNDS  [x]   IMPLEMENT AEROSOL/MDI PROTOCOL  [x]   PATIENT EDUCATION AS NEEDED    Pt currently on 3lnc SpO2 94%. Diminished through out.  Pt did wear bipap overnight

## 2021-06-05 LAB
ABSOLUTE EOS #: 0.3 K/UL (ref 0–0.4)
ABSOLUTE IMMATURE GRANULOCYTE: ABNORMAL K/UL (ref 0–0.3)
ABSOLUTE LYMPH #: 1.2 K/UL (ref 1–4.8)
ABSOLUTE MONO #: 0.9 K/UL (ref 0.1–1.3)
ANION GAP SERPL CALCULATED.3IONS-SCNC: 8 MMOL/L (ref 9–17)
BASOPHILS # BLD: 0 % (ref 0–2)
BASOPHILS ABSOLUTE: 0 K/UL (ref 0–0.2)
BUN BLDV-MCNC: 39 MG/DL (ref 8–23)
BUN/CREAT BLD: ABNORMAL (ref 9–20)
CALCIUM SERPL-MCNC: 8 MG/DL (ref 8.6–10.4)
CHLORIDE BLD-SCNC: 101 MMOL/L (ref 98–107)
CO2: 32 MMOL/L (ref 20–31)
CREAT SERPL-MCNC: 0.91 MG/DL (ref 0.7–1.2)
DIFFERENTIAL TYPE: ABNORMAL
EOSINOPHILS RELATIVE PERCENT: 4 % (ref 0–4)
GFR AFRICAN AMERICAN: >60 ML/MIN
GFR NON-AFRICAN AMERICAN: >60 ML/MIN
GFR SERPL CREATININE-BSD FRML MDRD: ABNORMAL ML/MIN/{1.73_M2}
GFR SERPL CREATININE-BSD FRML MDRD: ABNORMAL ML/MIN/{1.73_M2}
GLUCOSE BLD-MCNC: 110 MG/DL (ref 75–110)
GLUCOSE BLD-MCNC: 111 MG/DL (ref 75–110)
GLUCOSE BLD-MCNC: 115 MG/DL (ref 75–110)
GLUCOSE BLD-MCNC: 133 MG/DL (ref 70–99)
HCT VFR BLD CALC: 25.1 % (ref 41–53)
HEMOGLOBIN: 8.4 G/DL (ref 13.5–17.5)
IMMATURE GRANULOCYTES: ABNORMAL %
LYMPHOCYTES # BLD: 14 % (ref 24–44)
MCH RBC QN AUTO: 29.2 PG (ref 26–34)
MCHC RBC AUTO-ENTMCNC: 33.4 G/DL (ref 31–37)
MCV RBC AUTO: 87.5 FL (ref 80–100)
MONOCYTES # BLD: 10 % (ref 1–7)
NRBC AUTOMATED: ABNORMAL PER 100 WBC
PDW BLD-RTO: 14.5 % (ref 11.5–14.9)
PLATELET # BLD: 249 K/UL (ref 150–450)
PLATELET ESTIMATE: ABNORMAL
PMV BLD AUTO: 7.8 FL (ref 6–12)
POTASSIUM SERPL-SCNC: 3.6 MMOL/L (ref 3.7–5.3)
RBC # BLD: 2.87 M/UL (ref 4.5–5.9)
RBC # BLD: ABNORMAL 10*6/UL
SEG NEUTROPHILS: 72 % (ref 36–66)
SEGMENTED NEUTROPHILS ABSOLUTE COUNT: 6.4 K/UL (ref 1.3–9.1)
SODIUM BLD-SCNC: 141 MMOL/L (ref 135–144)
WBC # BLD: 8.9 K/UL (ref 3.5–11)
WBC # BLD: ABNORMAL 10*3/UL

## 2021-06-05 PROCEDURE — 94660 CPAP INITIATION&MGMT: CPT

## 2021-06-05 PROCEDURE — 6360000002 HC RX W HCPCS: Performed by: INTERNAL MEDICINE

## 2021-06-05 PROCEDURE — 6360000002 HC RX W HCPCS: Performed by: SURGERY

## 2021-06-05 PROCEDURE — 82947 ASSAY GLUCOSE BLOOD QUANT: CPT

## 2021-06-05 PROCEDURE — 6360000002 HC RX W HCPCS: Performed by: FAMILY MEDICINE

## 2021-06-05 PROCEDURE — 2500000003 HC RX 250 WO HCPCS: Performed by: SURGERY

## 2021-06-05 PROCEDURE — 85025 COMPLETE CBC W/AUTO DIFF WBC: CPT

## 2021-06-05 PROCEDURE — C9113 INJ PANTOPRAZOLE SODIUM, VIA: HCPCS | Performed by: SURGERY

## 2021-06-05 PROCEDURE — 2060000000 HC ICU INTERMEDIATE R&B

## 2021-06-05 PROCEDURE — 6370000000 HC RX 637 (ALT 250 FOR IP): Performed by: SURGERY

## 2021-06-05 PROCEDURE — 80048 BASIC METABOLIC PNL TOTAL CA: CPT

## 2021-06-05 PROCEDURE — 36415 COLL VENOUS BLD VENIPUNCTURE: CPT

## 2021-06-05 PROCEDURE — 94761 N-INVAS EAR/PLS OXIMETRY MLT: CPT

## 2021-06-05 PROCEDURE — 2700000000 HC OXYGEN THERAPY PER DAY

## 2021-06-05 PROCEDURE — 2580000003 HC RX 258: Performed by: SURGERY

## 2021-06-05 PROCEDURE — 94640 AIRWAY INHALATION TREATMENT: CPT

## 2021-06-05 PROCEDURE — 51702 INSERT TEMP BLADDER CATH: CPT

## 2021-06-05 PROCEDURE — 6370000000 HC RX 637 (ALT 250 FOR IP): Performed by: INTERNAL MEDICINE

## 2021-06-05 RX ORDER — ONDANSETRON 4 MG/1
TABLET, FILM COATED ORAL
Qty: 20 TABLET | Refills: 0 | Status: SHIPPED | OUTPATIENT
Start: 2021-06-05 | End: 2021-10-22

## 2021-06-05 RX ORDER — OXYCODONE HYDROCHLORIDE AND ACETAMINOPHEN 5; 325 MG/1; MG/1
1 TABLET ORAL EVERY 6 HOURS PRN
Qty: 28 TABLET | Refills: 0 | Status: SHIPPED | OUTPATIENT
Start: 2021-06-05 | End: 2021-06-12

## 2021-06-05 RX ORDER — CEPHALEXIN 500 MG/1
CAPSULE ORAL
Qty: 21 CAPSULE | Refills: 0 | Status: ON HOLD | OUTPATIENT
Start: 2021-06-05 | End: 2021-08-04 | Stop reason: HOSPADM

## 2021-06-05 RX ADMIN — FUROSEMIDE 20 MG: 10 INJECTION, SOLUTION INTRAMUSCULAR; INTRAVENOUS at 08:41

## 2021-06-05 RX ADMIN — ENOXAPARIN SODIUM 80 MG: 80 INJECTION SUBCUTANEOUS at 08:41

## 2021-06-05 RX ADMIN — LEVALBUTEROL HYDROCHLORIDE 1.25 MG: 1.25 SOLUTION, CONCENTRATE RESPIRATORY (INHALATION) at 08:03

## 2021-06-05 RX ADMIN — LEVALBUTEROL HYDROCHLORIDE 1.25 MG: 1.25 SOLUTION, CONCENTRATE RESPIRATORY (INHALATION) at 19:10

## 2021-06-05 RX ADMIN — LISINOPRIL 2.5 MG: 5 TABLET ORAL at 08:42

## 2021-06-05 RX ADMIN — IPRATROPIUM BROMIDE 0.5 MG: 0.5 SOLUTION RESPIRATORY (INHALATION) at 19:10

## 2021-06-05 RX ADMIN — ENOXAPARIN SODIUM 80 MG: 80 INJECTION SUBCUTANEOUS at 23:43

## 2021-06-05 RX ADMIN — METRONIDAZOLE 500 MG: 500 INJECTION, SOLUTION INTRAVENOUS at 09:15

## 2021-06-05 RX ADMIN — IPRATROPIUM BROMIDE 0.5 MG: 0.5 SOLUTION RESPIRATORY (INHALATION) at 14:20

## 2021-06-05 RX ADMIN — ASPIRIN 81 MG CHEWABLE TABLET 81 MG: 81 TABLET CHEWABLE at 08:42

## 2021-06-05 RX ADMIN — METRONIDAZOLE 500 MG: 500 INJECTION, SOLUTION INTRAVENOUS at 17:50

## 2021-06-05 RX ADMIN — SODIUM CHLORIDE, PRESERVATIVE FREE 10 ML: 5 INJECTION INTRAVENOUS at 23:40

## 2021-06-05 RX ADMIN — LEVALBUTEROL HYDROCHLORIDE 1.25 MG: 1.25 SOLUTION, CONCENTRATE RESPIRATORY (INHALATION) at 02:03

## 2021-06-05 RX ADMIN — PANTOPRAZOLE SODIUM 40 MG: 40 INJECTION, POWDER, FOR SOLUTION INTRAVENOUS at 08:41

## 2021-06-05 RX ADMIN — ACETAMINOPHEN 650 MG: 325 TABLET ORAL at 03:17

## 2021-06-05 RX ADMIN — FENTANYL CITRATE 25 MCG: 50 INJECTION INTRAMUSCULAR; INTRAVENOUS at 15:16

## 2021-06-05 RX ADMIN — CILOSTAZOL 100 MG: 100 TABLET ORAL at 08:42

## 2021-06-05 RX ADMIN — Medication 10 ML: at 23:36

## 2021-06-05 RX ADMIN — Medication 10 ML: at 08:41

## 2021-06-05 RX ADMIN — CEFEPIME 2000 MG: 2 INJECTION, POWDER, FOR SOLUTION INTRAVENOUS at 05:57

## 2021-06-05 RX ADMIN — IPRATROPIUM BROMIDE 0.5 MG: 0.5 SOLUTION RESPIRATORY (INHALATION) at 02:03

## 2021-06-05 RX ADMIN — PANTOPRAZOLE SODIUM 40 MG: 40 INJECTION, POWDER, FOR SOLUTION INTRAVENOUS at 23:37

## 2021-06-05 RX ADMIN — LEVALBUTEROL HYDROCHLORIDE 1.25 MG: 1.25 SOLUTION, CONCENTRATE RESPIRATORY (INHALATION) at 14:20

## 2021-06-05 RX ADMIN — ONDANSETRON 4 MG: 2 INJECTION INTRAMUSCULAR; INTRAVENOUS at 15:15

## 2021-06-05 RX ADMIN — CILOSTAZOL 100 MG: 100 TABLET ORAL at 23:42

## 2021-06-05 RX ADMIN — IPRATROPIUM BROMIDE 0.5 MG: 0.5 SOLUTION RESPIRATORY (INHALATION) at 08:03

## 2021-06-05 RX ADMIN — POTASSIUM CHLORIDE 20 MEQ: 10 CAPSULE, COATED, EXTENDED RELEASE ORAL at 08:41

## 2021-06-05 ASSESSMENT — PAIN SCALES - GENERAL
PAINLEVEL_OUTOF10: 0
PAINLEVEL_OUTOF10: 3
PAINLEVEL_OUTOF10: 0
PAINLEVEL_OUTOF10: 2
PAINLEVEL_OUTOF10: 0
PAINLEVEL_OUTOF10: 5
PAINLEVEL_OUTOF10: 0

## 2021-06-05 ASSESSMENT — ENCOUNTER SYMPTOMS
VOMITING: 0
SHORTNESS OF BREATH: 0

## 2021-06-05 ASSESSMENT — PAIN DESCRIPTION - PAIN TYPE
TYPE: SURGICAL PAIN
TYPE: CHRONIC PAIN

## 2021-06-05 ASSESSMENT — PAIN SCALES - WONG BAKER
WONGBAKER_NUMERICALRESPONSE: 0

## 2021-06-05 ASSESSMENT — PAIN DESCRIPTION - LOCATION: LOCATION: LEG

## 2021-06-05 NOTE — PROGRESS NOTES
METASPCT 1 06/01/2021    LYMPHOPCT 14 06/05/2021    MONOPCT 10 06/05/2021    BASOPCT 0 06/05/2021    MONOSABS 0.90 06/05/2021    LYMPHSABS 1.20 06/05/2021    EOSABS 0.30 06/05/2021    BASOSABS 0.00 06/05/2021    DIFFTYPE NOT REPORTED 06/05/2021       BMP   Lab Results   Component Value Date     06/05/2021    K 3.6 06/05/2021     06/05/2021    CO2 32 06/05/2021    BUN 39 06/05/2021    CREATININE 0.91 06/05/2021    GLUCOSE 133 06/05/2021    CALCIUM 8.0 06/05/2021       LFTS  Lab Results   Component Value Date    ALKPHOS 45 05/29/2021    ALT 20 05/29/2021    AST 56 05/29/2021    PROT 5.7 05/29/2021    BILITOT 0.34 05/29/2021    LABALBU 3.1 05/29/2021       INR  No results for input(s): PROTIME, INR in the last 72 hours. APTT  No results for input(s): APTT in the last 72 hours. Lactic Acid  Lab Results   Component Value Date    LACTA 1.6 05/30/2021    LACTA 2.4 05/29/2021    LACTA 0.7 05/27/2021        BNP   No results for input(s): BNP in the last 72 hours. Cultures       Radiology     Plain Films          CT Scans    See actual reports for details    SYSTEM ASSESSMENT      Neuro       Respiratory   Wean oxygen as tolerated.  Keep O2 sat > 88%       Hemodynamics       Gastrointestinal/Nutrition       Renal       Infectious Disease       Hematology/Oncology       Endocrine       Social/Spiritual/DNR/Disposition/Other     Requiring ventilatory support post ex lap 5/28, extubated 6/3  Hypervolemia, negative fluid balance   bilateral pleural effusions  History of COPD/possible CYNDI  Paroxysmal A. fib, preserved LV function,   history of CVA seizure  SBO/ Ex lap with SB resection and primary anastomosis, 5/28  Acute kidney injury/improved, nephrology following  Pseudomonal UTI sensitive to cefepime and Cipro, day #12 of cefepime and Flagyl    Plan:  Wean O2 as tolerated  Diuresis per nephrology  Bronchodilators  DC antibiotics  On full anticoagulation with Lovenox    Patient is DNR CCA with no intubation Electronically signed by Oscar Pruitt MD on 6/5/2021 at 6:39 PM

## 2021-06-05 NOTE — PLAN OF CARE
Problem: Falls - Risk of:  Goal: Will remain free from falls  Description: Will remain free from falls  Outcome: Ongoing  Note: Patient nonverbal.  Very weak. Making no attempt to get out of bed per self. Bed alarm on. Goal: Absence of physical injury  Description: Absence of physical injury  Outcome: Ongoing     Problem: Skin Integrity:  Goal: Will show no infection signs and symptoms  Description: Will show no infection signs and symptoms  Outcome: Ongoing  Note: Waffle mattress on. Buttocks reddened. Assisted with turning and repositioning. Goal: Absence of new skin breakdown  Description: Absence of new skin breakdown  Outcome: Ongoing     Problem: SAFETY  Goal: Free from accidental physical injury  Outcome: Ongoing  Goal: Free from intentional harm  Outcome: Ongoing     Problem: DAILY CARE  Goal: Daily care needs are met  Outcome: Ongoing     Problem: PAIN  Goal: Patient's pain/discomfort is manageable  Outcome: Ongoing  Note: Denies pain. Problem: SKIN INTEGRITY  Goal: Skin integrity is maintained or improved  Outcome: Ongoing     Problem: KNOWLEDGE DEFICIT  Goal: Patient/S.O. demonstrates understanding of disease process, treatment plan, medications, and discharge instructions.   Outcome: Ongoing     Problem: DISCHARGE BARRIERS  Goal: Patient's continuum of care needs are met  Outcome: Ongoing     Problem: Pain:  Goal: Pain level will decrease  Description: Pain level will decrease  Outcome: Ongoing  Goal: Control of acute pain  Description: Control of acute pain  Outcome: Ongoing  Goal: Control of chronic pain  Description: Control of chronic pain  Outcome: Ongoing     Problem: Nutrition  Goal: Optimal nutrition therapy  Outcome: Ongoing     Problem: Musculor/Skeletal Functional Status  Goal: Highest potential functional level  Outcome: Ongoing  Goal: Absence of falls  Outcome: Ongoing     Problem: Musculor/Skeletal Functional Status  Goal: Highest potential functional level  Outcome:

## 2021-06-05 NOTE — PROGRESS NOTES
Comprehensive Nutrition Assessment    Type and Reason for Visit:  Reassess    Nutrition Recommendations/Plan: Change tube feeding to Vital AF 1.2 at 50 mL per hr with goal of 70 mL per hr. NPO. Nutrition Assessment:  Chart review indicates pt has been tolerating tube feeding at goal. Pt was extubated on 6/3 and no longer is receiving additional kcal from propofol. Tube feeding to be changed to Vital AF 1.2 with goal of 70 mL per hr. Nurse states it is unclear if pt will be discharge to home or alternate location. Pt receives IV medications. Malnutrition Assessment:  Malnutrition Status: At risk for malnutrition (Comment)    Context:  Chronic Illness     Findings of the 6 clinical characteristics of malnutrition:  Energy Intake:  Mild decrease in energy intake (Comment) (Improving with nutritional support)  Weight Loss:  Unable to assess     Body Fat Loss:  Unable to assess     Muscle Mass Loss:  Unable to assess    Fluid Accumulation:  1 - Mild (May not be related to nutritional status) Extremities   Strength:  Not Performed    Estimated Daily Nutrient Needs:  Energy (kcal): [place] x 1.2= 2000 kcal; Weight Used for Energy Requirements:  Admission     Protein (g):  1.5g/kg= 135 g protein; Weight Used for Protein Requirements:  Ideal        Fluid (ml/day):  2000 mL or that determined by physician; Method Used for Fluid Requirements:  1 ml/kcal      Nutrition Related Findings:  Edema: +1 pittine RUE, LUE, RLE, LLE; +1 perineal. Labs and meds reviewed. Wounds:  Surgical Incision       Current Nutrition Therapies:    ADULT TUBE FEEDING; Gastrostomy; Peptide Based; Continuous; 50; Yes; 10; Q 8 hours; 70; 30; Q 8 hours  Current Tube Feeding (TF) Orders:  · Feeding Route: PEG  · Formula: Semi-Elemental  · Schedule: Continuous  · Goal TF & Flush Orders Provides: 2016 kcal, 126 gm protein, 1362 mL free fluid (excluding flushes).  Once IV fluids discontinued, additional water flushes recommended. Anthropometric Measures:  · Height: 6' 4\" (193 cm)  · Current Body Weight: 186 lb 11.2 oz (84.7 kg)   · Admission Body Weight: 184 lb (83.5 kg)    · Ideal Body Weight: 202 lbs; % Ideal Body Weight     · BMI: 22.7  · BMI Categories: Normal Weight (BMI 22.0 to 24.9) age over 72       Nutrition Diagnosis:   · Inadequate oral intake related to cognitive or neurological impairment as evidenced by NPO or clear liquid status due to medical condition    Nutrition Interventions:   Food and/or Nutrient Delivery:  Continue NPO, Modify Tube Feeding  Nutrition Education/Counseling:  No recommendation at this time   Coordination of Nutrition Care:  Continue to monitor while inpatient    Goals:  provide more than 75% of nutrition needs       Nutrition Monitoring and Evaluation:   Behavioral-Environmental Outcomes:  None Identified   Food/Nutrient Intake Outcomes:  Enteral Nutrition Intake/Tolerance  Physical Signs/Symptoms Outcomes:  Biochemical Data, GI Status, Fluid Status or Edema, Nutrition Focused Physical Findings, Skin, Weight     Discharge Planning:    Enteral Nutrition     Some areas of assessment may be incomplete due to standard COVID-19 Precautions. Sam Nunez R.D., L.D.   Phone: [phone]

## 2021-06-05 NOTE — PROGRESS NOTES
BRONCHOSPASM/BRONCHOCONSTRICTION     [x]         IMPROVE AERATION/BREATH SOUNDS  [x]   ADMINISTER BRONCHODILATOR THERAPY AS APPROPRIATE  [x]   ASSESS BREATH SOUNDS  []   IMPLEMENT AEROSOL/MDI PROTOCOL  [x]   PATIENT EDUCATION AS NEEDED    PATIENT REFUSES TO WEAR BIPAP     [x] Risks and benefits explained to patient   [x] Patient refuses to wear Bipap \"Nodding head no repeatedly. \"  [x] Patient verbalizes understanding of information presented.

## 2021-06-05 NOTE — PLAN OF CARE
Nutrition Problem #1: Inadequate oral intake  Intervention: Food and/or Nutrient Delivery: Continue NPO, Modify Tube Feeding  Nutritional Goals: provide more than 75% of nutrition needs

## 2021-06-05 NOTE — PROGRESS NOTES
Progress Note    6/5/2021   1:50 PM    Name:  Kalli Zarco  MRN:    [id number]     Acct:     [de-identified]   Room:  2103/2103-01   Day: 12     Admit Date: 5/24/2021  3:38 PM  PCP: Henna Newsome MD    Subjective:     C/C:   Chief Complaint   Patient presents with   Gatica Nausea    Emesis       Interval History: Status: not changed. Patient is G-tube feedings are at goal there have been no reports of vomiting. Had a soft bowel movement. Recent vital signs reviewed and stable. Recent labs reviewed potassium 3.6 hemoglobin 8.4    ROS:   all 10 systems reviewed and are negative except as noted    Review of Systems   Unable to perform ROS: Patient nonverbal   Respiratory: Negative for shortness of breath. Gastrointestinal: Negative for vomiting. Medications: Allergies:    Allergies   Allergen Reactions    Bactrim [Sulfamethoxazole-Trimethoprim] Hives and Swelling    Ciprofloxacin Swelling     FACE       Current Meds: metoprolol succinate (TOPROL XL) extended release tablet 25 mg, Nightly  lisinopril (PRINIVIL;ZESTRIL) tablet 2.5 mg, Daily  enoxaparin (LOVENOX) injection 80 mg, BID  aspirin chewable tablet 81 mg, Daily  furosemide (LASIX) injection 20 mg, Daily  dexmedetomidine (PRECEDEX) 400 mcg in sodium chloride 0.9 % 100 mL infusion, Continuous  sodium phosphate 14.79 mmol in dextrose 5 % 250 mL IVPB, PRN   Or  sodium phosphate 29.58 mmol in dextrose 5 % 250 mL IVPB, PRN  levalbuterol (XOPENEX) nebulizer solution 1.25 mg, Q6H  sodium chloride nebulizer 0.9 % solution 3 mL, Q8H PRN  ipratropium (ATROVENT) 0.02 % nebulizer solution 0.5 mg, Q6H  metoprolol (LOPRESSOR) injection 5 mg, Q6H PRN  insulin lispro (HUMALOG) injection vial 0-18 Units, Q6H  norepinephrine (LEVOPHED) 16 mg in sodium chloride 0.9 % 250 mL infusion, Continuous  glucose (GLUTOSE) 40 % oral gel 15 g, PRN  dextrose 50 % IV solution, PRN  glucagon (rDNA) injection 1 mg, PRN  dextrose 5 % solution, PRN  0.9 % sodium chloride infusion, PRN  propofol injection, Titrated  HYDROmorphone (DILAUDID) injection 0.25 mg, Q3H PRN   Or  HYDROmorphone (DILAUDID) injection 0.5 mg, Q3H PRN  sodium chloride flush 0.9 % injection 10 mL, 2 times per day  sodium chloride flush 0.9 % injection 10 mL, PRN  potassium chloride 10 mEq/100 mL IVPB (Peripheral Line), PRN  ondansetron (ZOFRAN) injection 4 mg, Q6H PRN  cilostazol (PLETAL) tablet 100 mg, BID  potassium chloride (MICRO-K) extended release capsule 20 mEq, Daily  diatrizoate meglumine-sodium (GASTROGRAFIN) 66-10 % solution 450 mL, ONCE PRN  pantoprazole (PROTONIX) injection 40 mg, BID   And  sodium chloride (PF) 0.9 % injection 10 mL, BID  iopamidol (ISOVUE-370) 76 % injection 75 mL, ONCE PRN  fentaNYL (SUBLIMAZE) injection 25 mcg, Q2H PRN  fentaNYL (SUBLIMAZE) injection 50 mcg, Q2H PRN  sodium chloride flush 0.9 % injection 10 mL, 2 times per day  potassium chloride (KLOR-CON M) extended release tablet 40 mEq, PRN   Or  potassium bicarb-citric acid (EFFER-K) effervescent tablet 40 mEq, PRN   Or  potassium chloride 10 mEq/100 mL IVPB (Peripheral Line), PRN  magnesium sulfate 1000 mg in dextrose 5% 100 mL IVPB, PRN  promethazine (PHENERGAN) tablet 12.5 mg, Q6H PRN   Or  ondansetron (ZOFRAN) injection 4 mg, Q6H PRN  magnesium hydroxide (MILK OF MAGNESIA) 400 MG/5ML suspension 30 mL, Daily PRN  acetaminophen (TYLENOL) tablet 650 mg, Q6H PRN   Or  acetaminophen (TYLENOL) suppository 650 mg, Q6H PRN  cefepime (MAXIPIME) 2000 mg IVPB minibag, Q12H  metronidazole (FLAGYL) 500 mg in NaCl 100 mL IVPB premix, Q8H  hydrALAZINE (APRESOLINE) injection 10 mg, Q6H PRN        Data:     Code Status:  DNR-CCA    Family History   Problem Relation Age of Onset    Arthritis Mother     Atrial Fibrillation Mother     Hearing Loss Mother     Heart Disease Mother     Stroke Mother     Vision Loss Mother     Arthritis Father     Cancer Father     Heart Disease Father     High Blood Pressure Father     Stroke Father     Vision Loss Father        Social History     Socioeconomic History    Marital status:      Spouse name: Not on file    Number of children: Not on file    Years of education: Not on file    Highest education level: Not on file   Occupational History    Not on file   Tobacco Use    Smoking status: Former Smoker     Packs/day: 1.00     Years: 60.00     Pack years: 60.00     Types: Cigarettes     Start date:      Quit date: 2020     Years since quittin.1    Smokeless tobacco: Never Used   Vaping Use    Vaping Use: Never used   Substance and Sexual Activity    Alcohol use: No    Drug use: No    Sexual activity: Yes     Partners: Female   Other Topics Concern    Not on file   Social History Narrative    Not on file     Social Determinants of Health     Financial Resource Strain:     Difficulty of Paying Living Expenses:    Food Insecurity:     Worried About Running Out of Food in the Last Year:     [address] in the Last Year:    Transportation Needs:     Lack of Transportation (Medical):  Lack of Transportation (Non-Medical):    Physical Activity:     Days of Exercise per Week:     Minutes of Exercise per Session:    Stress:     Feeling of Stress :    Social Connections:     Frequency of Communication with Friends and Family:     Frequency of Social Gatherings with Friends and Family:     Attends [employer] Services:     Active Member of Clubs or Organizations:     Attends Club or Organization Meetings:     Marital Status:    Intimate Partner Violence:     Fear of Current or Ex-Partner:     Emotionally Abused:     Physically Abused:     Sexually Abused:        I/O (24Hr):     Intake/Output Summary (Last 24 hours) at 2021 1350  Last data filed at 2021 1039  Gross per 24 hour   Intake 2199 ml   Output 3805 ml   Net -1606 ml     Radiology:  IR FLUORO GUIDED CVA DEVICE PLMT/REPLACE/REMOVAL    Result Date: 2021  Successful fluoroscopy guided right upper extremity 5 [place] power injectable PICC exchange. Ready for use. XR CHEST PORTABLE    Result Date: 6/3/2021  1. Endotracheal tube remains in appropriate position. 2. No appreciable change in basilar opacities and small effusions, right greater than left. XR CHEST PORTABLE    Result Date: 6/2/2021  Heart size top-normal.  Oval resolving pulmonary vascular congestion, and left effusion. Improved right basilar opacity and effusion. Support tubes and lines as above. XR CHEST PORTABLE    Addendum Date: 6/1/2021    ADDENDUM: Correction of voice transcription error: The 1st sentence of the impression should read as follows: CHANGED position of the right PICC line. Result Date: 6/1/2021  1. Unchanged position of the right PICC line. It now crosses the midline in the proximal SVC to terminating in region of left innominate vein. 2. Pulmonary vascular congestion and layering right pleural effusion unchanged but left pleural effusion appears less prominent. The findings were sent to the Radiology Results [address] at 7:20 am on 6/1/2021to be communicated to a licensed caregiver.      XR CHEST PORTABLE    Result Date: 5/31/2021  Endotracheal tube in satisfactory position Bilateral pulmonary opacities right greater than left likely representing pulmonary edema with bilateral pleural effusions     XR CHEST PORTABLE    Result Date: 5/30/2021  Endotracheal tube in satisfactory position Bilateral airspace disease right greater than left could represent edema or infection       Labs:  Recent Results (from the past 24 hour(s))   POC Glucose Fingerstick    Collection Time: 06/04/21  2:18 PM   Result Value Ref Range    POC Glucose 102 75 - 110 mg/dL   POC Glucose Fingerstick    Collection Time: 06/04/21  6:03 PM   Result Value Ref Range    POC Glucose 106 75 - 110 mg/dL   Hgb/Hct    Collection Time: 06/04/21 10:49 PM   Result Value Ref Range    Hemoglobin 8.5 (L) 13.5 - 17.5 g/dL    Hematocrit 26.3 (L) 41 110 mg/dL       Physical Examination:        Vitals:  /62   Pulse 90   Temp 97.8 °F (36.6 °C) (Axillary)   Resp 20   Ht 6' 4\" (1.93 m)   Wt 186 lb 11.2 oz (84.7 kg)   SpO2 97%   BMI 22.73 kg/m²   Temp (24hrs), Av °F (36.7 °C), Min:97.7 °F (36.5 °C), Max:98.6 °F (37 °C)    Recent Labs     21  1803 21  2327 21  0556 21  1211   POCGLU 106 117* 115* 111*         Physical Exam  Vitals reviewed. Constitutional:       Appearance: Normal appearance. He is not diaphoretic. HENT:      Head: Normocephalic and atraumatic. Right Ear: External ear normal.      Left Ear: External ear normal.      Nose: Nose normal.      Mouth/Throat:      Mouth: Mucous membranes are moist.      Pharynx: Oropharynx is clear. Eyes:      Conjunctiva/sclera: Conjunctivae normal.   Cardiovascular:      Rate and Rhythm: Normal rate and regular rhythm. Pulses: Normal pulses. Heart sounds: Normal heart sounds. Pulmonary:      Effort: Pulmonary effort is normal.      Breath sounds: Normal breath sounds. Abdominal:      General: Bowel sounds are normal. There is no distension. Palpations: Abdomen is soft. Comments: G-tube in place  S/p small bowel resection   Musculoskeletal:         General: No tenderness or deformity. Normal range of motion. Cervical back: Normal range of motion and neck supple. No rigidity. Right lower leg: No edema. Left lower leg: No edema. Skin:     General: Skin is warm and dry. Capillary Refill: Capillary refill takes less than 2 seconds. Coloration: Skin is not jaundiced. Neurological:      General: No focal deficit present. Mental Status: Mental status is at baseline.    Psychiatric:         Mood and Affect: Mood normal.         Behavior: Behavior normal.         Assessment:        Primary Problem  Small bowel obstruction (HCC)     Principal Problem:    Small bowel obstruction (HCC)  Active Problems:    Status post insertion of percutaneous endoscopic gastrostomy (PEG) tube ([facility]    Acute cystitis without hematuria    Mass of right lung    COPD (chronic obstructive pulmonary disease) (HCC)    PAF (paroxysmal atrial fibrillation) ([facility]  Resolved Problems:    * No resolved hospital problems. *      Past Medical History:   Diagnosis Date    Arthritis     Cancer [facility])     bladder 1980s    Cerebral artery occlusion with cerebral infarction [facility])     TIA 2010    Chronic kidney disease     COPD (chronic obstructive pulmonary disease) ([facility]     Hypertension     Pneumonia     Psychiatric problem     depression, social anxiety    Seizures ([facility]         Plan:        1. G-tube feeding at goal  2. IV cefepime, IV Flagyl  3. Lasix 20 mg IV daily  4. Continue metoprolol and lisinopril  1. PPI Protonix 40 mg IV daily  2. DVT Prophylaxis full therapeutic dose of Lovenox  3. EPCs  4. PT/OT to evaluate and treat  5. Pain control  6. Replace electrolytes as per sliding scale  7. Home medications reviewed and appropriate medications continued  8.  Reviewed labs and imaging studies from last 24 hours and results explained to patient      Electronically signed by Genesis Alarcon MD

## 2021-06-05 NOTE — PROGRESS NOTES
No cp  No chf symptoms currently  Vitals:    06/05/21 0805   BP:    Pulse:    Resp: 20   Temp:    SpO2: 97%     nad  rrr  No mrg  ctab    Impression  1. New onset LV dysfunction, normal LV function 4/2021 in fact  2. Hypotension post STU/SBO reduction. 3. Atrial fib/atrial flutter, prior sinus jerome with 1st degree av block    Oral a/c if ok with surgery, eliquis 5mg bid. [id number] is 4, immediate risk of bleedin significant post op but at baseline benefit should outweigh risk.   Med therapy  F/u echo as outpt  Will need ischemic eval if persistent LV dysfunction in a month or so  BB/ACEi for now, further meds depending on repeat echo

## 2021-06-05 NOTE — PROGRESS NOTES
Patient was seen and examined. Awake alert in no acute distress. Febrile vital signs are stable. Tolerating tube feeds. Bowels are moving. Abdomen is benign. CHELSEA drains are serous. Incision is clean. Extremity positive edema. Blood work was reviewed. Potassium is 3.6. Creatinine is normal.  WBC count is normal.  Hemoglobin is stable. Surgically stable for discharge. Discussed with charge nurse. Prescriptions in the chart.

## 2021-06-06 ENCOUNTER — APPOINTMENT (OUTPATIENT)
Dept: GENERAL RADIOLOGY | Age: 76
DRG: 329 | End: 2021-06-06
Payer: COMMERCIAL

## 2021-06-06 LAB
ABSOLUTE EOS #: 0.3 K/UL (ref 0–0.4)
ABSOLUTE IMMATURE GRANULOCYTE: ABNORMAL K/UL (ref 0–0.3)
ABSOLUTE LYMPH #: 1.2 K/UL (ref 1–4.8)
ABSOLUTE MONO #: 0.9 K/UL (ref 0.1–1.3)
ANION GAP SERPL CALCULATED.3IONS-SCNC: 7 MMOL/L (ref 9–17)
BASOPHILS # BLD: 0 % (ref 0–2)
BASOPHILS ABSOLUTE: 0 K/UL (ref 0–0.2)
BUN BLDV-MCNC: 44 MG/DL (ref 8–23)
BUN/CREAT BLD: ABNORMAL (ref 9–20)
CALCIUM SERPL-MCNC: 8.2 MG/DL (ref 8.6–10.4)
CHLORIDE BLD-SCNC: 100 MMOL/L (ref 98–107)
CO2: 32 MMOL/L (ref 20–31)
CREAT SERPL-MCNC: 0.89 MG/DL (ref 0.7–1.2)
DIFFERENTIAL TYPE: ABNORMAL
EOSINOPHILS RELATIVE PERCENT: 4 % (ref 0–4)
GFR AFRICAN AMERICAN: >60 ML/MIN
GFR NON-AFRICAN AMERICAN: >60 ML/MIN
GFR SERPL CREATININE-BSD FRML MDRD: ABNORMAL ML/MIN/{1.73_M2}
GFR SERPL CREATININE-BSD FRML MDRD: ABNORMAL ML/MIN/{1.73_M2}
GLUCOSE BLD-MCNC: 104 MG/DL (ref 75–110)
GLUCOSE BLD-MCNC: 105 MG/DL (ref 75–110)
GLUCOSE BLD-MCNC: 112 MG/DL (ref 70–99)
GLUCOSE BLD-MCNC: 121 MG/DL (ref 75–110)
HCT VFR BLD CALC: 24.4 % (ref 41–53)
HCT VFR BLD CALC: 26.5 % (ref 41–53)
HEMOGLOBIN: 7.9 G/DL (ref 13.5–17.5)
HEMOGLOBIN: 8.6 G/DL (ref 13.5–17.5)
IMMATURE GRANULOCYTES: ABNORMAL %
LYMPHOCYTES # BLD: 16 % (ref 24–44)
MCH RBC QN AUTO: 28.8 PG (ref 26–34)
MCHC RBC AUTO-ENTMCNC: 32.5 G/DL (ref 31–37)
MCV RBC AUTO: 88.5 FL (ref 80–100)
MONOCYTES # BLD: 12 % (ref 1–7)
NRBC AUTOMATED: ABNORMAL PER 100 WBC
PDW BLD-RTO: 15 % (ref 11.5–14.9)
PLATELET # BLD: 237 K/UL (ref 150–450)
PLATELET ESTIMATE: ABNORMAL
PMV BLD AUTO: 7.8 FL (ref 6–12)
POTASSIUM SERPL-SCNC: 3.7 MMOL/L (ref 3.7–5.3)
RBC # BLD: 2.76 M/UL (ref 4.5–5.9)
RBC # BLD: ABNORMAL 10*6/UL
SEG NEUTROPHILS: 68 % (ref 36–66)
SEGMENTED NEUTROPHILS ABSOLUTE COUNT: 5 K/UL (ref 1.3–9.1)
SODIUM BLD-SCNC: 139 MMOL/L (ref 135–144)
WBC # BLD: 7.3 K/UL (ref 3.5–11)
WBC # BLD: ABNORMAL 10*3/UL

## 2021-06-06 PROCEDURE — 2060000000 HC ICU INTERMEDIATE R&B

## 2021-06-06 PROCEDURE — 36415 COLL VENOUS BLD VENIPUNCTURE: CPT

## 2021-06-06 PROCEDURE — 6360000002 HC RX W HCPCS: Performed by: INTERNAL MEDICINE

## 2021-06-06 PROCEDURE — 85014 HEMATOCRIT: CPT

## 2021-06-06 PROCEDURE — C9113 INJ PANTOPRAZOLE SODIUM, VIA: HCPCS | Performed by: SURGERY

## 2021-06-06 PROCEDURE — 94761 N-INVAS EAR/PLS OXIMETRY MLT: CPT

## 2021-06-06 PROCEDURE — 94660 CPAP INITIATION&MGMT: CPT

## 2021-06-06 PROCEDURE — 6370000000 HC RX 637 (ALT 250 FOR IP): Performed by: FAMILY MEDICINE

## 2021-06-06 PROCEDURE — 6370000000 HC RX 637 (ALT 250 FOR IP): Performed by: SURGERY

## 2021-06-06 PROCEDURE — 6370000000 HC RX 637 (ALT 250 FOR IP): Performed by: INTERNAL MEDICINE

## 2021-06-06 PROCEDURE — 2700000000 HC OXYGEN THERAPY PER DAY

## 2021-06-06 PROCEDURE — 80048 BASIC METABOLIC PNL TOTAL CA: CPT

## 2021-06-06 PROCEDURE — 2580000003 HC RX 258: Performed by: SURGERY

## 2021-06-06 PROCEDURE — 85018 HEMOGLOBIN: CPT

## 2021-06-06 PROCEDURE — 6360000002 HC RX W HCPCS: Performed by: SURGERY

## 2021-06-06 PROCEDURE — 82947 ASSAY GLUCOSE BLOOD QUANT: CPT

## 2021-06-06 PROCEDURE — 71045 X-RAY EXAM CHEST 1 VIEW: CPT

## 2021-06-06 PROCEDURE — 94640 AIRWAY INHALATION TREATMENT: CPT

## 2021-06-06 PROCEDURE — 85025 COMPLETE CBC W/AUTO DIFF WBC: CPT

## 2021-06-06 RX ADMIN — HYDROMORPHONE HYDROCHLORIDE 0.5 MG: 1 INJECTION, SOLUTION INTRAMUSCULAR; INTRAVENOUS; SUBCUTANEOUS at 18:34

## 2021-06-06 RX ADMIN — FUROSEMIDE 20 MG: 10 INJECTION, SOLUTION INTRAMUSCULAR; INTRAVENOUS at 09:39

## 2021-06-06 RX ADMIN — HYDROMORPHONE HYDROCHLORIDE 0.5 MG: 1 INJECTION, SOLUTION INTRAMUSCULAR; INTRAVENOUS; SUBCUTANEOUS at 13:19

## 2021-06-06 RX ADMIN — LISINOPRIL 2.5 MG: 5 TABLET ORAL at 09:39

## 2021-06-06 RX ADMIN — IPRATROPIUM BROMIDE 0.5 MG: 0.5 SOLUTION RESPIRATORY (INHALATION) at 18:46

## 2021-06-06 RX ADMIN — Medication 10 ML: at 20:38

## 2021-06-06 RX ADMIN — PANTOPRAZOLE SODIUM 40 MG: 40 INJECTION, POWDER, FOR SOLUTION INTRAVENOUS at 09:39

## 2021-06-06 RX ADMIN — SODIUM CHLORIDE, PRESERVATIVE FREE 10 ML: 5 INJECTION INTRAVENOUS at 10:14

## 2021-06-06 RX ADMIN — APIXABAN 5 MG: 5 TABLET, FILM COATED ORAL at 20:38

## 2021-06-06 RX ADMIN — SODIUM CHLORIDE, PRESERVATIVE FREE 10 ML: 5 INJECTION INTRAVENOUS at 20:41

## 2021-06-06 RX ADMIN — LEVALBUTEROL HYDROCHLORIDE 1.25 MG: 1.25 SOLUTION, CONCENTRATE RESPIRATORY (INHALATION) at 13:37

## 2021-06-06 RX ADMIN — POTASSIUM CHLORIDE 20 MEQ: 10 CAPSULE, COATED, EXTENDED RELEASE ORAL at 09:39

## 2021-06-06 RX ADMIN — LEVALBUTEROL HYDROCHLORIDE 1.25 MG: 1.25 SOLUTION, CONCENTRATE RESPIRATORY (INHALATION) at 18:46

## 2021-06-06 RX ADMIN — LEVALBUTEROL HYDROCHLORIDE 1.25 MG: 1.25 SOLUTION, CONCENTRATE RESPIRATORY (INHALATION) at 03:41

## 2021-06-06 RX ADMIN — Medication 10 ML: at 09:39

## 2021-06-06 RX ADMIN — IPRATROPIUM BROMIDE 0.5 MG: 0.5 SOLUTION RESPIRATORY (INHALATION) at 06:56

## 2021-06-06 RX ADMIN — ASPIRIN 81 MG CHEWABLE TABLET 81 MG: 81 TABLET CHEWABLE at 09:39

## 2021-06-06 RX ADMIN — PANTOPRAZOLE SODIUM 40 MG: 40 INJECTION, POWDER, FOR SOLUTION INTRAVENOUS at 20:38

## 2021-06-06 RX ADMIN — FENTANYL CITRATE 50 MCG: 50 INJECTION INTRAMUSCULAR; INTRAVENOUS at 03:00

## 2021-06-06 RX ADMIN — CILOSTAZOL 100 MG: 100 TABLET ORAL at 09:39

## 2021-06-06 RX ADMIN — LEVALBUTEROL HYDROCHLORIDE 1.25 MG: 1.25 SOLUTION, CONCENTRATE RESPIRATORY (INHALATION) at 06:56

## 2021-06-06 RX ADMIN — IPRATROPIUM BROMIDE 0.5 MG: 0.5 SOLUTION RESPIRATORY (INHALATION) at 13:37

## 2021-06-06 RX ADMIN — IPRATROPIUM BROMIDE 0.5 MG: 0.5 SOLUTION RESPIRATORY (INHALATION) at 03:41

## 2021-06-06 RX ADMIN — APIXABAN 5 MG: 5 TABLET, FILM COATED ORAL at 13:22

## 2021-06-06 ASSESSMENT — PAIN SCALES - GENERAL
PAINLEVEL_OUTOF10: 9
PAINLEVEL_OUTOF10: 7
PAINLEVEL_OUTOF10: 9
PAINLEVEL_OUTOF10: 5

## 2021-06-06 ASSESSMENT — PAIN DESCRIPTION - PAIN TYPE: TYPE: SURGICAL PAIN

## 2021-06-06 ASSESSMENT — ENCOUNTER SYMPTOMS
VOMITING: 0
SHORTNESS OF BREATH: 0
NAUSEA: 0

## 2021-06-06 ASSESSMENT — PAIN DESCRIPTION - LOCATION: LOCATION: ABDOMEN

## 2021-06-06 NOTE — PROGRESS NOTES
Patient was seen and examined. Awake alert in no acute distress. Tolerating tube feeds at goal.  Bowels are moving. Afebrile vital signs are stable. Abdomen is soft. Incision is clean. CHELSEA drains are serous. Extremity positive edema. BMP is normal.  Hemoglobin is 7.9. WBC count is normal.    Recheck hemoglobin at noon. If stable then may start Eliquis and discontinue Lovenox. Discharge planning from my standpoint. Surgically stable. Discussed with nurse taking care of the patient.

## 2021-06-06 NOTE — PLAN OF CARE
Problem: Falls - Risk of:  Goal: Will remain free from falls  Description: Will remain free from falls  Outcome: Met This Shift  Goal: Absence of physical injury  Description: Absence of physical injury  Outcome: Met This Shift  Note: Complete bedrest; repositioned every 2 hours

## 2021-06-06 NOTE — PROGRESS NOTES
Progress Note    [facility]    Patient - Sage Barfield,  Age - 77 y.o.    - 1945      Room Number - 2103/2103-01   MRN -  [id number]   Acct # - [de-identified]  Date of Admission -  2021  3:38 PM     Follow-up: Acute respiratory failure    Events of Past 24 Hours   On 1 L O2   no fever or chills  No short of breath, cough or wheezing  Adequate urine output, fluid balance -2761 ml      Vitals    height is 6' 4\" (1.93 m) and weight is 183 lb 10.3 oz (83.3 kg). His axillary temperature is 98.2 °F (36.8 °C). His blood pressure is 103/58 (abnormal) and his pulse is 78. His respiration is 20 and oxygen saturation is 91%. Temperature Range: Temp: 98.2 °F (36.8 °C) Temp  Av °F (36.7 °C)  Min: 97.7 °F (36.5 °C)  Max: 98.2 °F (36.8 °C)  BP Range:  Systolic (59JDV), QPW:884 , Min:94 , UKA:362     Diastolic (12AVA), AIDEN:31, Min:57, Max:69    Pulse Range: Pulse  Av.2  Min: 78  Max: 101  Respiration Range: Resp  Av  Min: 20  Max: 20  Current Pulse Ox[de-identified]  SpO2: 91 %  24HR Pulse Ox Range:  SpO2  Av.8 %  Min: 77 %  Max: 98 %  Oxygen Amount and Delivery: O2 Flow Rate (L/min): 1 L/min      Wt Readings from Last 3 Encounters:   21 183 lb 10.3 oz (83.3 kg)   20 189 lb (85.7 kg)   20 183 lb (83 kg)     I/O       Intake/Output Summary (Last 24 hours) at 2021 1809  Last data filed at 2021 1733  Gross per 24 hour   Intake 135 ml   Output 2683 ml   Net -2548 ml     I/O last 3 completed shifts:   In: 135 [NG/GT:135]  Out: 2311 [Urine:2150; Drains:161]        ABGs:   Lab Results   Component Value Date    PHART 7.506 2021    PO2ART 123.0 2021    KPM7NGA 37.1 2021       Lab Results   Component Value Date    MODE PRVC 2021         Medications   IV   dexmedetomidine Stopped (21 1818)    norepinephrine Stopped (21 0641)    dextrose      sodium chloride      propofol Stopped (21 1137)      apixaban  5 mg Oral BID    metoprolol succinate  25 mg Oral Nightly    lisinopril  2.5 mg Oral Daily    aspirin  81 mg Oral Daily    furosemide  20 mg Intravenous Daily    levalbuterol  1.25 mg Nebulization Q6H    ipratropium  0.5 mg Nebulization Q6H    insulin lispro  0-18 Units Subcutaneous Q6H    sodium chloride flush  10 mL Intravenous 2 times per day    cilostazol  100 mg Oral BID    potassium chloride  20 mEq Oral Daily    pantoprazole  40 mg Intravenous BID    And    sodium chloride (PF)  10 mL Intravenous BID    sodium chloride flush  10 mL Intravenous 2 times per day       Diet/Nutrition   ADULT TUBE FEEDING; Gastrostomy; Peptide Based; Continuous; 50; Yes; 10; Q 8 hours; 70; 30; Q 8 hours    Exam   VITALS    height is 6' 4\" (1.93 m) and weight is 183 lb 10.3 oz (83.3 kg). His axillary temperature is 98.2 °F (36.8 °C). His blood pressure is 103/58 (abnormal) and his pulse is 78. His respiration is 20 and oxygen saturation is 91%. Ventilator Settings (Basic)  Vent Mode: Bi-Level Rate Set: 16 bmp/Vt Ordered: 500 mL/ /FiO2 : 24 %    Constitutional -awake and alert, nonverbal  General Appearance  well developed, well nourished  HEENT - normocephalic, atraumatic. PERRLA  Lungs - Chest expands equally, no wheezes, + coarse sounds  Cardiovascular - Heart sounds are normal.  normal rate and rhythm irregular, no murmur, gallop or rub.   Abdomen - soft, no guarding, nondistended,   Neurologic -following commands, no restlessness or agitation  Extremities - no cyanosis, clubbing, edema    Lab Results   CBC     Lab Results   Component Value Date    WBC 7.3 06/06/2021    RBC 2.76 06/06/2021    HGB 8.6 06/06/2021    HCT 26.5 06/06/2021     06/06/2021    MCV 88.5 06/06/2021    MCH 28.8 06/06/2021    MCHC 32.5 06/06/2021    RDW 15.0 06/06/2021    NRBC 1 06/01/2021    METASPCT 1 06/01/2021    LYMPHOPCT 16 06/06/2021    MONOPCT 12 06/06/2021    BASOPCT 0 06/06/2021    MONOSABS 0.90 06/06/2021 LYMPHSABS 1.20 06/06/2021    EOSABS 0.30 06/06/2021    BASOSABS 0.00 06/06/2021    DIFFTYPE NOT REPORTED 06/06/2021       BMP   Lab Results   Component Value Date     06/06/2021    K 3.7 06/06/2021     06/06/2021    CO2 32 06/06/2021    BUN 44 06/06/2021    CREATININE 0.89 06/06/2021    GLUCOSE 112 06/06/2021    CALCIUM 8.2 06/06/2021       LFTS  Lab Results   Component Value Date    ALKPHOS 45 05/29/2021    ALT 20 05/29/2021    AST 56 05/29/2021    PROT 5.7 05/29/2021    BILITOT 0.34 05/29/2021    LABALBU 3.1 05/29/2021       INR  No results for input(s): PROTIME, INR in the last 72 hours. APTT  No results for input(s): APTT in the last 72 hours. Lactic Acid  Lab Results   Component Value Date    LACTA 1.6 05/30/2021    LACTA 2.4 05/29/2021    LACTA 0.7 05/27/2021        BNP   No results for input(s): BNP in the last 72 hours. Cultures       Radiology     Plain Films          CT Scans    See actual reports for details    SYSTEM ASSESSMENT      Neuro       Respiratory   Wean oxygen as tolerated.  Keep O2 sat > 88%       Hemodynamics       Gastrointestinal/Nutrition       Renal       Infectious Disease       Hematology/Oncology       Endocrine       Social/Spiritual/DNR/Disposition/Other     Requiring ventilatory support post ex lap 5/28, extubated 6/3  Hypervolemia, negative fluid balance   bilateral pleural effusions  History of COPD/possible CYNDI  Paroxysmal A. fib, preserved LV function,   history of CVA seizure  SBO/ Ex lap with SB resection and primary anastomosis, 5/28  Acute kidney injury/improved, nephrology following  Pseudomonal UTI, received 12 days  of cefepime and Flagyl    Plan:  Wean O2 as tolerated  Diuresis per nephrology  Bronchodilators  On Eliquis  Patient is DNR CCA with no intubation   Discussed with staff, no objection for discharge to [facility]       Electronically signed by Rohan Obando MD on 6/6/2021 at 6:09 PM

## 2021-06-06 NOTE — PROGRESS NOTES
Progress Note    6/6/2021   2:41 PM    Name:  Clint Smith  MRN:    [id number]     Acct:     [de-identified]   Room:  2103/2103-01  IP Day: 15     Admit Date: 5/24/2021  3:38 PM  PCP: Santo Smallwood MD    Subjective:     C/C:   Chief Complaint   Patient presents with   Genevia Nares Nausea    Emesis       Interval History: Status: not changed. Patient is alert. Patient is on G-tube feeds at goal there have been no reports of vomiting, nausea breath or fever. Recent vital signs reviewed, patient is on 1 L of oxygen via nasal cannula with an oxygen saturation of 96%. recent labs reviewed hemoglobin 8.6    ROS:   all 10 systems reviewed and are negative except as noted    Review of Systems   Unable to perform ROS: Patient nonverbal   Respiratory: Negative for shortness of breath. Gastrointestinal: Negative for nausea and vomiting. Medications: Allergies:    Allergies   Allergen Reactions    Bactrim [Sulfamethoxazole-Trimethoprim] Hives and Swelling    Ciprofloxacin Swelling     FACE       Current Meds: apixaban (ELIQUIS) tablet 5 mg, BID  metoprolol succinate (TOPROL XL) extended release tablet 25 mg, Nightly  lisinopril (PRINIVIL;ZESTRIL) tablet 2.5 mg, Daily  aspirin chewable tablet 81 mg, Daily  furosemide (LASIX) injection 20 mg, Daily  dexmedetomidine (PRECEDEX) 400 mcg in sodium chloride 0.9 % 100 mL infusion, Continuous  sodium phosphate 14.79 mmol in dextrose 5 % 250 mL IVPB, PRN   Or  sodium phosphate 29.58 mmol in dextrose 5 % 250 mL IVPB, PRN  levalbuterol (XOPENEX) nebulizer solution 1.25 mg, Q6H  sodium chloride nebulizer 0.9 % solution 3 mL, Q8H PRN  ipratropium (ATROVENT) 0.02 % nebulizer solution 0.5 mg, Q6H  metoprolol (LOPRESSOR) injection 5 mg, Q6H PRN  insulin lispro (HUMALOG) injection vial 0-18 Units, Q6H  norepinephrine (LEVOPHED) 16 mg in sodium chloride 0.9 % 250 mL infusion, Continuous  glucose (GLUTOSE) 40 % oral gel 15 g, PRN  dextrose 50 % IV solution, PRN  glucagon (rDNA) injection 1 mg, PRN  dextrose 5 % solution, PRN  0.9 % sodium chloride infusion, PRN  propofol injection, Titrated  HYDROmorphone (DILAUDID) injection 0.25 mg, Q3H PRN   Or  HYDROmorphone (DILAUDID) injection 0.5 mg, Q3H PRN  sodium chloride flush 0.9 % injection 10 mL, 2 times per day  sodium chloride flush 0.9 % injection 10 mL, PRN  potassium chloride 10 mEq/100 mL IVPB (Peripheral Line), PRN  ondansetron (ZOFRAN) injection 4 mg, Q6H PRN  cilostazol (PLETAL) tablet 100 mg, BID  potassium chloride (MICRO-K) extended release capsule 20 mEq, Daily  diatrizoate meglumine-sodium (GASTROGRAFIN) 66-10 % solution 450 mL, ONCE PRN  pantoprazole (PROTONIX) injection 40 mg, BID   And  sodium chloride (PF) 0.9 % injection 10 mL, BID  iopamidol (ISOVUE-370) 76 % injection 75 mL, ONCE PRN  sodium chloride flush 0.9 % injection 10 mL, 2 times per day  potassium chloride (KLOR-CON M) extended release tablet 40 mEq, PRN   Or  potassium bicarb-citric acid (EFFER-K) effervescent tablet 40 mEq, PRN   Or  potassium chloride 10 mEq/100 mL IVPB (Peripheral Line), PRN  magnesium sulfate 1000 mg in dextrose 5% 100 mL IVPB, PRN  promethazine (PHENERGAN) tablet 12.5 mg, Q6H PRN   Or  ondansetron (ZOFRAN) injection 4 mg, Q6H PRN  magnesium hydroxide (MILK OF MAGNESIA) 400 MG/5ML suspension 30 mL, Daily PRN  acetaminophen (TYLENOL) tablet 650 mg, Q6H PRN   Or  acetaminophen (TYLENOL) suppository 650 mg, Q6H PRN  hydrALAZINE (APRESOLINE) injection 10 mg, Q6H PRN        Data:     Code Status:  DNR-CCA    Family History   Problem Relation Age of Onset    Arthritis Mother     Atrial Fibrillation Mother     Hearing Loss Mother     Heart Disease Mother     Stroke Mother     Vision Loss Mother     Arthritis Father     Cancer Father     Heart Disease Father     High Blood Pressure Father     Stroke Father     Vision Loss Father        Social History     Socioeconomic History    Marital status:      Spouse name: Not on file    Number of in appropriate position. 2. No appreciable change in basilar opacities and small effusions, right greater than left. XR CHEST PORTABLE    Result Date: 6/2/2021  Heart size top-normal.  Oval resolving pulmonary vascular congestion, and left effusion. Improved right basilar opacity and effusion. Support tubes and lines as above. XR CHEST PORTABLE    Addendum Date: 6/1/2021    ADDENDUM: Correction of voice transcription error: The 1st sentence of the impression should read as follows: CHANGED position of the right PICC line. Result Date: 6/1/2021  1. Unchanged position of the right PICC line. It now crosses the midline in the proximal SVC to terminating in region of left innominate vein. 2. Pulmonary vascular congestion and layering right pleural effusion unchanged but left pleural effusion appears less prominent. The findings were sent to the Radiology Results [address] at 7:20 am on 6/1/2021to be communicated to a licensed caregiver.      XR CHEST PORTABLE    Result Date: 5/31/2021  Endotracheal tube in satisfactory position Bilateral pulmonary opacities right greater than left likely representing pulmonary edema with bilateral pleural effusions       Labs:  Recent Results (from the past 24 hour(s))   POC Glucose Fingerstick    Collection Time: 06/05/21 11:36 PM   Result Value Ref Range    POC Glucose 110 75 - 110 mg/dL   Basic Metabolic Panel w/ Reflex to MG    Collection Time: 06/06/21  5:22 AM   Result Value Ref Range    Glucose 112 (H) 70 - 99 mg/dL    BUN 44 (H) 8 - 23 mg/dL    CREATININE 0.89 0.70 - 1.20 mg/dL    Bun/Cre Ratio NOT REPORTED 9 - 20    Calcium 8.2 (L) 8.6 - 10.4 mg/dL    Sodium 139 135 - 144 mmol/L    Potassium 3.7 3.7 - 5.3 mmol/L    Chloride 100 98 - 107 mmol/L    CO2 32 (H) 20 - 31 mmol/L    Anion Gap 7 (L) 9 - 17 mmol/L    GFR Non-African American >60 >60 mL/min    GFR African American >60 >60 mL/min    GFR Comment          GFR Staging NOT REPORTED    CBC WITH AUTO DIFFERENTIAL    Collection Time: 21  5:22 AM   Result Value Ref Range    WBC 7.3 3.5 - 11.0 k/uL    RBC 2.76 (L) 4.5 - 5.9 m/uL    Hemoglobin 7.9 (L) 13.5 - 17.5 g/dL    Hematocrit 24.4 (L) 41 - 53 %    MCV 88.5 80 - 100 fL    MCH 28.8 26 - 34 pg    MCHC 32.5 31 - 37 g/dL    RDW 15.0 (H) 11.5 - 14.9 %    Platelets 111 944 - 612 k/uL    MPV 7.8 6.0 - 12.0 fL    NRBC Automated NOT REPORTED per 100 WBC    Differential Type NOT REPORTED     Immature Granulocytes NOT REPORTED 0 %    Absolute Immature Granulocyte NOT REPORTED 0.00 - 0.30 k/uL    WBC Morphology NOT REPORTED     RBC Morphology NOT REPORTED     Platelet Estimate NOT REPORTED     Seg Neutrophils 68 (H) 36 - 66 %    Lymphocytes 16 (L) 24 - 44 %    Monocytes 12 (H) 1 - 7 %    Eosinophils % 4 0 - 4 %    Basophils 0 0 - 2 %    Segs Absolute 5.00 1.3 - 9.1 k/uL    Absolute Lymph # 1.20 1.0 - 4.8 k/uL    Absolute Mono # 0.90 0.1 - 1.3 k/uL    Absolute Eos # 0.30 0.0 - 0.4 k/uL    Basophils Absolute 0.00 0.0 - 0.2 k/uL   POC Glucose Fingerstick    Collection Time: 21  7:18 AM   Result Value Ref Range    POC Glucose 121 (H) 75 - 110 mg/dL   POC Glucose Fingerstick    Collection Time: 21 11:48 AM   Result Value Ref Range    POC Glucose 105 75 - 110 mg/dL   Hgb/Hct    Collection Time: 21 11:51 AM   Result Value Ref Range    Hemoglobin 8.6 (L) 13.5 - 17.5 g/dL    Hematocrit 26.5 (L) 41 - 53 %       Physical Examination:        Vitals:  BP (!) 103/58   Pulse 78   Temp 98.2 °F (36.8 °C) (Axillary)   Resp 20   Ht 6' 4\" (1.93 m)   Wt 183 lb 10.3 oz (83.3 kg)   SpO2 97%   BMI 22.35 kg/m²   Temp (24hrs), Av °F (36.7 °C), Min:97.7 °F (36.5 °C), Max:98.2 °F (36.8 °C)    Recent Labs     21  1211 21  2336 21  0718 21  1148   POCGLU 111* 110 121* 105         Physical Exam  Vitals reviewed. Constitutional:       Appearance: Normal appearance. He is not diaphoretic. HENT:      Head: Normocephalic and atraumatic. Protonix 40 mg IV daily  2. DVT Prophylaxis on Eliquis  3. Nocturnal home O2 eval tonight. 4. Home O2 eval in a.m.  5. Chest x-ray today  6. Discussed with son at bedside. 7. Family prefers home with home care. 8. EPCs  9. PT/OT to evaluate and treat  10. Pain control  11. Replace electrolytes as per sliding scale  12. Home medications reviewed and appropriate medications continued  13.  Reviewed labs and imaging studies from last 24 hours and results explained to patient      Electronically signed by Joan Shook MD

## 2021-06-06 NOTE — PLAN OF CARE
Problem: Falls - Risk of:  Goal: Will remain free from falls  Description: Will remain free from falls  6/6/2021 1529 by Loida Gibson RN  Outcome: Ongoing  Note: No falls this shift. Call light within reach and siderails x2. Bed in lowest position. Patient safety maintained. Problem: Falls - Risk of:  Goal: Absence of physical injury  Description: Absence of physical injury  6/6/2021 1529 by Loida Gibson RN  Outcome: Ongoing  Note: No falls this shift. Call light within reach and siderails x2. Bed in lowest position. Patient safety maintained. Problem: Skin Integrity:  Goal: Will show no infection signs and symptoms  Description: Will show no infection signs and symptoms  6/6/2021 1529 by Loida Gibson RN  Outcome: Ongoing     Problem: Skin Integrity:  Goal: Absence of new skin breakdown  Description: Absence of new skin breakdown  6/6/2021 1529 by Loida Gibson RN  Outcome: Ongoing  Note: Skin assessment as charted. No new areas of breakdown. Problem: SAFETY  Goal: Free from accidental physical injury  6/6/2021 1529 by Loida Gibson RN  Outcome: Ongoing  Note: No falls this shift. Call light within reach and siderails x2. Bed in lowest position. Patient safety maintained. Problem: SAFETY  Goal: Free from intentional harm  6/6/2021 1529 by Loida Gibson RN  Outcome: Ongoing  Note: No falls this shift. Call light within reach and siderails x2. Bed in lowest position. Patient safety maintained. Problem: DAILY CARE  Goal: Daily care needs are met  6/6/2021 1529 by Loida Gibson RN  Outcome: Ongoing     Problem: PAIN  Goal: Patient's pain/discomfort is manageable  6/6/2021 1529 by Loida Gibson RN  Outcome: Ongoing  Note: Pain decreased with prescribed medication. Problem: SKIN INTEGRITY  Goal: Skin integrity is maintained or improved  6/6/2021 1529 by Loida Gibson RN  Outcome: Ongoing  Note: Skin assessment as charted. No new areas of breakdown. Problem: OXYGENATION/RESPIRATORY FUNCTION  Goal: Patient will achieve/maintain normal respiratory rate/effort  Description: Respiratory rate and effort will be within normal limits for the patient  6/6/2021 1529 by Ayden Wheat RN  Outcome: Ongoing  Note: Respiratory assessment as charted. No signs or symptoms of distress. Problem: MECHANICAL VENTILATION  Goal: Patient will maintain patent airway  6/6/2021 1529 by Ayden Wheat RN  Note: Respiratory assessment as charted. No signs or symptoms of distress. Problem: Musculor/Skeletal Functional Status  Goal: Highest potential functional level  Outcome: Ongoing     Problem: Musculor/Skeletal Functional Status  Goal: Absence of falls  Outcome: Ongoing  Note: No falls this shift. Call light within reach and siderails x2. Bed in lowest position. Patient safety maintained.

## 2021-06-06 NOTE — PROGRESS NOTES
No events overnight  Rate controlled afib   No cp  He is noncommunicative this am    Vitals:    06/06/21 0756   BP:    Pulse: 101   Resp:    Temp:    SpO2:      nad  rrr  No mrg    Impression  1. Acute systolic chf  Severe lv dysfunction, new onset in setting of sepsis  This is potentially takotsubo  On acei/bb  Repeat echo in 1 month  If persistent LV dysfunction at that echo, then ischemic eval and augment medical therapy    2. Afib, persistent, on home eliquis  Restart a/c when ok with surgical service  3. Hx sinus jerome 1st degree av block    dispo per surgical and medical services, no further cardiovascular plans inpatient.

## 2021-06-06 NOTE — PROGRESS NOTES
Writer rounded with Dr. Orville Abdalla. He wants to repeat the HgB at noon, if stable Eliquis can be restarted and Lovenox discontinue. Dr. Orville Abdalla stated okay for discharge from surgery standpoint.

## 2021-06-06 NOTE — CARE COORDINATION
ONGOING DISCHARGE PLAN:    Writer to bedside and met with patients sister Ronak Priest to verify what the patient has at home as well as to discuss discharge planning. The patient lives in his own home and there is always family at home with him. Ronak Priest is the patients sister and between her and the patients son Miroslava Hughes and Orta wife they are the primary care providers for the patient. Ronak Jahairas stated that the patient is bed bound and they have a ramp, lift, hospital bed and motor wheelchair at home. The use [facility] for tube feeding. Ronak Priest stated that the discharge plan will be home with family resuming care, [facility] resumed and they still do wants [facility] for a home health aid. Writer inquired with Simons Cem if patient has taken Eliquis at home as Janelle Dickey sees that was ordered today. Per Ronak Priest he has not. Writer called [facility] in [place] and verified that patients prescriptions filled there and that he has not ever filled any prescriptions for Eliquis. Writer spoke with Mary in the pharmacy and called in the following prescription under Dr Sunny Pedraza: Eliquis 5mg oral BID #60. Writer informed that there is no OOP to the patient for this medication. Will follow for home respiratory needs. Will continue to follow for additional discharge needs.     Electronically signed by Cristal Harris RN on 6/6/2021 at 3:20 PM

## 2021-06-07 VITALS
WEIGHT: 183.64 LBS | SYSTOLIC BLOOD PRESSURE: 101 MMHG | HEIGHT: 76 IN | BODY MASS INDEX: 22.36 KG/M2 | OXYGEN SATURATION: 95 % | DIASTOLIC BLOOD PRESSURE: 70 MMHG | TEMPERATURE: 98.1 F | RESPIRATION RATE: 18 BRPM | HEART RATE: 92 BPM

## 2021-06-07 LAB
ABSOLUTE EOS #: 0.3 K/UL (ref 0–0.4)
ABSOLUTE IMMATURE GRANULOCYTE: ABNORMAL K/UL (ref 0–0.3)
ABSOLUTE LYMPH #: 1.3 K/UL (ref 1–4.8)
ABSOLUTE MONO #: 0.9 K/UL (ref 0.1–1.3)
ANION GAP SERPL CALCULATED.3IONS-SCNC: 8 MMOL/L (ref 9–17)
BASOPHILS # BLD: 1 % (ref 0–2)
BASOPHILS ABSOLUTE: 0.1 K/UL (ref 0–0.2)
BUN BLDV-MCNC: 47 MG/DL (ref 8–23)
BUN/CREAT BLD: ABNORMAL (ref 9–20)
CALCIUM SERPL-MCNC: 8.3 MG/DL (ref 8.6–10.4)
CHLORIDE BLD-SCNC: 102 MMOL/L (ref 98–107)
CO2: 30 MMOL/L (ref 20–31)
CREAT SERPL-MCNC: 0.85 MG/DL (ref 0.7–1.2)
DIFFERENTIAL TYPE: ABNORMAL
EOSINOPHILS RELATIVE PERCENT: 5 % (ref 0–4)
GFR AFRICAN AMERICAN: >60 ML/MIN
GFR NON-AFRICAN AMERICAN: >60 ML/MIN
GFR SERPL CREATININE-BSD FRML MDRD: ABNORMAL ML/MIN/{1.73_M2}
GFR SERPL CREATININE-BSD FRML MDRD: ABNORMAL ML/MIN/{1.73_M2}
GLUCOSE BLD-MCNC: 105 MG/DL (ref 75–110)
GLUCOSE BLD-MCNC: 110 MG/DL (ref 75–110)
GLUCOSE BLD-MCNC: 116 MG/DL (ref 70–99)
GLUCOSE BLD-MCNC: 122 MG/DL (ref 75–110)
HCT VFR BLD CALC: 25.4 % (ref 41–53)
HEMOGLOBIN: 8.3 G/DL (ref 13.5–17.5)
IMMATURE GRANULOCYTES: ABNORMAL %
LYMPHOCYTES # BLD: 19 % (ref 24–44)
MCH RBC QN AUTO: 28.5 PG (ref 26–34)
MCHC RBC AUTO-ENTMCNC: 32.5 G/DL (ref 31–37)
MCV RBC AUTO: 87.8 FL (ref 80–100)
MONOCYTES # BLD: 13 % (ref 1–7)
NRBC AUTOMATED: ABNORMAL PER 100 WBC
PDW BLD-RTO: 15 % (ref 11.5–14.9)
PLATELET # BLD: 245 K/UL (ref 150–450)
PLATELET ESTIMATE: ABNORMAL
PMV BLD AUTO: 7.7 FL (ref 6–12)
POTASSIUM SERPL-SCNC: 3.8 MMOL/L (ref 3.7–5.3)
RBC # BLD: 2.9 M/UL (ref 4.5–5.9)
RBC # BLD: ABNORMAL 10*6/UL
SEG NEUTROPHILS: 62 % (ref 36–66)
SEGMENTED NEUTROPHILS ABSOLUTE COUNT: 4.4 K/UL (ref 1.3–9.1)
SODIUM BLD-SCNC: 140 MMOL/L (ref 135–144)
WBC # BLD: 7.1 K/UL (ref 3.5–11)
WBC # BLD: ABNORMAL 10*3/UL

## 2021-06-07 PROCEDURE — C9113 INJ PANTOPRAZOLE SODIUM, VIA: HCPCS | Performed by: SURGERY

## 2021-06-07 PROCEDURE — 94640 AIRWAY INHALATION TREATMENT: CPT

## 2021-06-07 PROCEDURE — 85025 COMPLETE CBC W/AUTO DIFF WBC: CPT

## 2021-06-07 PROCEDURE — 31720 CLEARANCE OF AIRWAYS: CPT

## 2021-06-07 PROCEDURE — 94761 N-INVAS EAR/PLS OXIMETRY MLT: CPT

## 2021-06-07 PROCEDURE — 94660 CPAP INITIATION&MGMT: CPT

## 2021-06-07 PROCEDURE — 6370000000 HC RX 637 (ALT 250 FOR IP): Performed by: SURGERY

## 2021-06-07 PROCEDURE — 6360000002 HC RX W HCPCS: Performed by: INTERNAL MEDICINE

## 2021-06-07 PROCEDURE — 6370000000 HC RX 637 (ALT 250 FOR IP): Performed by: FAMILY MEDICINE

## 2021-06-07 PROCEDURE — 97110 THERAPEUTIC EXERCISES: CPT

## 2021-06-07 PROCEDURE — 94762 N-INVAS EAR/PLS OXIMTRY CONT: CPT

## 2021-06-07 PROCEDURE — 2580000003 HC RX 258: Performed by: SURGERY

## 2021-06-07 PROCEDURE — 36415 COLL VENOUS BLD VENIPUNCTURE: CPT

## 2021-06-07 PROCEDURE — 6370000000 HC RX 637 (ALT 250 FOR IP): Performed by: INTERNAL MEDICINE

## 2021-06-07 PROCEDURE — 80048 BASIC METABOLIC PNL TOTAL CA: CPT

## 2021-06-07 PROCEDURE — 82947 ASSAY GLUCOSE BLOOD QUANT: CPT

## 2021-06-07 PROCEDURE — 6360000002 HC RX W HCPCS: Performed by: SURGERY

## 2021-06-07 RX ORDER — FUROSEMIDE 20 MG/1
40 TABLET ORAL DAILY
Qty: 60 TABLET | Refills: 3 | Status: SHIPPED | OUTPATIENT
Start: 2021-06-07

## 2021-06-07 RX ORDER — FERROUS SULFATE 325(65) MG
325 TABLET ORAL 2 TIMES DAILY
Qty: 60 TABLET | Refills: 0 | Status: SHIPPED | OUTPATIENT
Start: 2021-06-07 | End: 2021-10-22 | Stop reason: DRUGHIGH

## 2021-06-07 RX ORDER — LISINOPRIL 2.5 MG/1
2.5 TABLET ORAL DAILY
Qty: 30 TABLET | Refills: 3 | Status: ON HOLD | OUTPATIENT
Start: 2021-06-08 | End: 2021-11-02 | Stop reason: HOSPADM

## 2021-06-07 RX ORDER — METOPROLOL SUCCINATE 25 MG/1
25 TABLET, EXTENDED RELEASE ORAL NIGHTLY
Qty: 30 TABLET | Refills: 3 | Status: SHIPPED | OUTPATIENT
Start: 2021-06-07 | End: 2021-10-22 | Stop reason: DRUGHIGH

## 2021-06-07 RX ADMIN — Medication 10 ML: at 10:15

## 2021-06-07 RX ADMIN — LEVALBUTEROL HYDROCHLORIDE 1.25 MG: 1.25 SOLUTION, CONCENTRATE RESPIRATORY (INHALATION) at 14:17

## 2021-06-07 RX ADMIN — LEVALBUTEROL HYDROCHLORIDE 1.25 MG: 1.25 SOLUTION, CONCENTRATE RESPIRATORY (INHALATION) at 01:48

## 2021-06-07 RX ADMIN — Medication 10 ML: at 12:04

## 2021-06-07 RX ADMIN — IPRATROPIUM BROMIDE 0.5 MG: 0.5 SOLUTION RESPIRATORY (INHALATION) at 08:07

## 2021-06-07 RX ADMIN — CILOSTAZOL 100 MG: 100 TABLET ORAL at 10:14

## 2021-06-07 RX ADMIN — HYDROMORPHONE HYDROCHLORIDE 0.25 MG: 1 INJECTION, SOLUTION INTRAMUSCULAR; INTRAVENOUS; SUBCUTANEOUS at 15:51

## 2021-06-07 RX ADMIN — HYDROMORPHONE HYDROCHLORIDE 0.25 MG: 1 INJECTION, SOLUTION INTRAMUSCULAR; INTRAVENOUS; SUBCUTANEOUS at 12:04

## 2021-06-07 RX ADMIN — SODIUM CHLORIDE, PRESERVATIVE FREE 10 ML: 5 INJECTION INTRAVENOUS at 10:20

## 2021-06-07 RX ADMIN — FUROSEMIDE 20 MG: 10 INJECTION, SOLUTION INTRAMUSCULAR; INTRAVENOUS at 10:13

## 2021-06-07 RX ADMIN — POTASSIUM CHLORIDE 20 MEQ: 10 CAPSULE, COATED, EXTENDED RELEASE ORAL at 10:23

## 2021-06-07 RX ADMIN — LEVALBUTEROL HYDROCHLORIDE 1.25 MG: 1.25 SOLUTION, CONCENTRATE RESPIRATORY (INHALATION) at 08:07

## 2021-06-07 RX ADMIN — IPRATROPIUM BROMIDE 0.5 MG: 0.5 SOLUTION RESPIRATORY (INHALATION) at 14:17

## 2021-06-07 RX ADMIN — LISINOPRIL 2.5 MG: 5 TABLET ORAL at 10:13

## 2021-06-07 RX ADMIN — PANTOPRAZOLE SODIUM 40 MG: 40 INJECTION, POWDER, FOR SOLUTION INTRAVENOUS at 10:20

## 2021-06-07 RX ADMIN — APIXABAN 5 MG: 5 TABLET, FILM COATED ORAL at 10:13

## 2021-06-07 RX ADMIN — SODIUM CHLORIDE, PRESERVATIVE FREE 10 ML: 5 INJECTION INTRAVENOUS at 10:24

## 2021-06-07 RX ADMIN — IPRATROPIUM BROMIDE 0.5 MG: 0.5 SOLUTION RESPIRATORY (INHALATION) at 01:48

## 2021-06-07 ASSESSMENT — PAIN DESCRIPTION - PROGRESSION: CLINICAL_PROGRESSION: NOT CHANGED

## 2021-06-07 ASSESSMENT — PAIN SCALES - GENERAL
PAINLEVEL_OUTOF10: 4
PAINLEVEL_OUTOF10: 6
PAINLEVEL_OUTOF10: 0

## 2021-06-07 ASSESSMENT — PAIN SCALES - WONG BAKER: WONGBAKER_NUMERICALRESPONSE: 0

## 2021-06-07 NOTE — PROGRESS NOTES
Home Oxygen Evaluation    A home oxygen evaluation has been completed. [x]Patient is an inpatient. It is expected that the patient will be discharged within the next 48 hours. Patient prescribed 02 level is: 0 LPM  Patient Resting SpO2 on prescribed O2 level: 95    Patient was put on room air while resting for: >60 minutes. Patient resting SpO2 on room air: 95      Patient was walked: n/a feet  Patient exertion SpO2 on room air: n/a    Patient exertion SpO2 on prescribed O2 level: n/a      Patient non-ambulatory. Unable to complete exertion portion of testing.     JUDIT MACK RCP   8:10 AM

## 2021-06-07 NOTE — PLAN OF CARE
Nutrition Problem #1: Inadequate oral intake  Intervention: Food and/or Nutrient Delivery: Continue NPO, Continue Current Tube Feeding  Nutritional Goals: provide more than 75% of nutrition needs

## 2021-06-07 NOTE — DISCHARGE SUMMARY
Discharge Summary      Patient ID: Kecia Sawyer    MRN: [id number]     Acct:  [de-identified]       Patient's PCP: Karyna Saavedra MD    Admit Date: 5/24/2021     Discharge Date:   6/7/2021    Admitting Physician: Mel Torres MD    Discharge Physician: Genesis Alarcon MD     Discharge Diagnoses:    Primary Problem  Small bowel obstruction [facility])    Principal Problem:    Small bowel obstruction ([facility]  Active Problems:    Status post insertion of percutaneous endoscopic gastrostomy (PEG) tube ([facility]    Acute cystitis without hematuria    Mass of right lung    COPD (chronic obstructive pulmonary disease) ([facility]    PAF (paroxysmal atrial fibrillation) ([facility]  Resolved Problems:    * No resolved hospital problems. *    Past Medical History:   Diagnosis Date    Arthritis     Cancer [facility])     bladder 1980s    Cerebral artery occlusion with cerebral infarction [facility])     TIA 2010    Chronic kidney disease     COPD (chronic obstructive pulmonary disease) ([facility]     Hypertension     Pneumonia     Psychiatric problem     depression, social anxiety    Seizures ([facility]      The patient was seen and examined on day of discharge and this discharge summary is in conjunction with daily progress note from day of discharge. Code Status:  [facility] Course:   H&P Reviewed. Patient with a medical history of CHF, COPD, CVA, G-tube in place was admitted with small bowel obstruction. General surgery was consulted. Patient was started on IV antibiotics patient had small bowel resection per general surgery. Patient developed PAF during hospital stay and was started on anticoagulation per cardiology. Patient symptoms improved during hospital stay. Patient was at goal for tube feeds per G-tube before discharge. Patient did not qualify for home O2. SNF was recommended for patient. Per family request patient is being discharged home with home care in stable condition. Discharge day progress note:     Today Patient was seen and examined at bedside today. Hemodynamically stable. No chest pain, shortness of breath, fever, chills, nausea, vomiting, palpitations, or abdominal pain reported. No events reported overnight. Review of Systems    Physical Exam    Consults:  IP CONSULT TO GENERAL SURGERY  IP CONSULT TO PRIMARY CARE PROVIDER  IP CONSULT TO PULMONOLOGY  IP CONSULT TO CARDIOLOGY  IP CONSULT TO CARDIOLOGY  IP CONSULT TO DIETITIAN  IP CONSULT TO CASE MANAGEMENT  IP CONSULT TO SOCIAL WORK  IP CONSULT TO NEPHROLOGY  IP CONSULT TO CARDIOLOGY  IP CONSULT TO DIETITIAN    Significant Diagnostic Studies: as above, and as follows:    Treatments: as above    Disposition: home with home care    Discharged Condition: Stable    Follow Up:  Rama Joe MD in one week  General surgery in 2 weeks  Cardiology in 2 weeks    Discharge Medications:    [place], [address] Medication Instructions OST:[id number]    Printed on:06/07/21 3530   Medication Information                      albuterol sulfate  (90 Base) MCG/ACT inhaler  Inhale 2 puffs into the lungs every 6 hours as needed for Wheezing             apixaban (ELIQUIS) 5 MG TABS tablet  Take 1 tablet by mouth 2 times daily             atorvastatin (LIPITOR) 40 MG tablet  Take 1 tablet by mouth daily             cephALEXin (KEFLEX) 500 MG capsule  500 mgTake three times daily             cilostazol (PLETAL) 100 MG tablet  Take 100 mg by mouth 2 times daily             famotidine (PEPCID) 20 MG tablet  Take 20 mg by mouth 2 times daily             ferrous sulfate (IRON 325) 325 (65 Fe) MG tablet  Take 1 tablet by mouth 2 times daily             furosemide (LASIX) 20 MG tablet  Take 2 tablets by mouth daily             gabapentin (NEURONTIN) 800 MG tablet  Take 800 mg by mouth 3 times daily.               lisinopril (PRINIVIL;ZESTRIL) 2.5 MG tablet  Take 1 tablet by mouth daily             metoprolol succinate (TOPROL XL) 25 MG extended release tablet  Take 1 tablet by mouth nightly             ondansetron (ZOFRAN) 4 MG tablet  Take every six hours as needed             oxyCODONE-acetaminophen (PERCOCET) 5-325 MG per tablet  Take 1 tablet by mouth every 6 hours as needed for Pain for up to 7 days. . Take lowest dose possible to manage pain             potassium chloride (MICRO-K) 10 MEQ extended release capsule  Take 20 mEq by mouth daily             theophylline 80 MG/15ML elixir  Take 18.8 mLs by mouth every 8 hours                          Time Spent on discharge is more than  35 min in the examination, evaluation, counseling and review of medications and discharge plan.       Electronically signed by Rebeca Ashley MD     Copy sent to Dr. Dago Zhang MD

## 2021-06-07 NOTE — PROGRESS NOTES
[facility]                 PATIENT NAME: Vanesa Khan     TODAY'S DATE: 6/7/2021, 12:42 PM    SUBJECTIVE:  POD#10  Pt seen and examined. Afebrile, VSS. No leukocytosis, hemoglobin stable. Patient is doing well, resting comfortably. PEG site clean. Tolerating tube feeds at goal. Bowels are moving. Incision clean, dry, intact. CHELSEA x 4 serosanguinous. Urine output adequate. OBJECTIVE:   VITALS:  /76   Pulse 81   Temp 97.7 °F (36.5 °C) (Oral)   Resp 18   Ht 6' 4\" (1.93 m)   Wt 183 lb 10.3 oz (83.3 kg)   SpO2 95%   BMI 22.35 kg/m²      INTAKE/OUTPUT:      Intake/Output Summary (Last 24 hours) at 6/7/2021 1242  Last data filed at 6/7/2021 1125  Gross per 24 hour   Intake 452 ml   Output 2007 ml   Net -1555 ml                 CONSTITUTIONAL:  awake and alert.   No acute distress  HEART:   RRR  LUNGS:   Decreased air entry at bases, no wheezing   ABDOMEN:   Abdomen soft, non-tender, non-distended  EXTREMITIES:   No pedal edema    Data:  CBC:   Lab Results   Component Value Date    WBC 7.1 06/07/2021    RBC 2.90 06/07/2021    HGB 8.3 06/07/2021    HCT 25.4 06/07/2021    MCV 87.8 06/07/2021    MCH 28.5 06/07/2021    MCHC 32.5 06/07/2021    RDW 15.0 06/07/2021     06/07/2021    MPV 7.7 06/07/2021     BMP:    Lab Results   Component Value Date     06/07/2021    K 3.8 06/07/2021     06/07/2021    CO2 30 06/07/2021    BUN 47 06/07/2021    LABALBU 3.1 05/29/2021    CREATININE 0.85 06/07/2021    CALCIUM 8.3 06/07/2021    GFRAA >60 06/07/2021    LABGLOM >60 06/07/2021    GLUCOSE 116 06/07/2021       Radiology Review:  No new images to review      ASSESSMENT     Principal Problem:    Small bowel obstruction (H[facility])  Active Problems:    Status post insertion of percutaneous endoscopic gastrostomy (PEG) tube ([facility])    Acute cystitis without hematuria    Mass of right lung    COPD (chronic obstructive pulmonary disease) (H[facility])    PAF (paroxysmal atrial fibrillation) [facility])  Resolved Problems:    * No resolved hospital problems. *      Plan  1. Continue tube feeds as tolerated  2. Bowels are moving  3. Surgically stable  4. Continue medical management   5.  Discharge planning       Electronically signed by Akira Arriaga PA-C  [id number] [address]

## 2021-06-07 NOTE — PROGRESS NOTES
Physical Therapy  Facility/Department: [facility] PROGRESSIVE CARE  Daily Treatment Note  NAME: Pankaj Villagomez  :   MRN: [id number]    Date of Service: 2021    Discharge Recommendations:  Patient would benefit from continued therapy after discharge   PT Equipment Recommendations  Equipment Needed: No    Assessment   Body structures, Functions, Activity limitations: Decreased functional mobility ; Decreased ADL status; Decreased ROM; Decreased strength;Decreased endurance;Decreased sensation; Increased pain  Assessment: PROM completed this date to prevent risk of skin breakdown and contractures. Pt is post op abdominal procedure. No updated goals due to pt's PLOF at baseline. Treatment Diagnosis: Impaired functional mobility 2* SBO  Specific instructions for Next Treatment: ROM, stretching, bed mobility, repositioning  Prognosis: Guarded  Decision Making: Medium Complexity  History: 77 y.o. male who presents with abdominal pain, nausea, vomiting. Patient was noted by family to have abdominal pain, nausea, vomiting, called EMS, transferred to the emergency department. Patient is at baseline, mumbling incoherently, unable to obtain HPI or ROS from patient, but sister is present and is able to provide information. Patient was diagnosed with a neuromuscular disorder, unknown etiology, minimally verbal at baseline, but interactive. Patient sister reports that he started having generalized abdominal pain this morning, some episodes of emesis. Exam: ROM, positioning  Clinical Presentation: Pt sedated on vent  Barriers to Learning: sedation  REQUIRES PT FOLLOW UP: Yes  Activity Tolerance  Activity Tolerance: Treatment limited secondary to medical complications (free text) (sedated on vent)     Patient Diagnosis(es): The encounter diagnosis was Small bowel obstruction ([address]      has a past medical history of Arthritis, Cancer ([address] Cerebral artery occlusion with cerebral infarction ([address] Chronic kidney disease, COPD mobility to help assist with rolling. PT demos moaning during mobility, potentially painful. Transfers  Sit to Stand: Unable to assess  Stand to sit: Unable to assess  Comment: Pt does not transfer - will be progressing to junior transfers at home. Ambulation  Ambulation?: No  Stairs/Curb  Stairs?: No     Balance  Comments: PAT - pt remains in bed  Other exercises  Other exercises?: Yes  Other exercises 1: PROM BLE ex x  15 reps  Other exercises 2: bilateral gastroc gentle stretching x 3x 30sec hold. Other exercises 4: Skin inspection to check for integrity of skin as well as pillow positioning  Other exercises 5: B PRAFOs donned'doffed                 Goals  Short term goals  Time Frame for Short term goals: 4-5 days - no change to goals post op  Short term goal 1: Pt to demo mod x2 rolling each direction in bed to prepare for sling placement at home for junior transfers. Short term goal 2: Pt to tolerate 30-45 minute PT session. Short term goal 3: Pt to tolerate stretching of B LE with up to 30 second holds, 3x each. Short term goal 4: Pt to tolerate 10-15 reps of BLE strengthening exercises progressing from [facility] to [facility]. Short term goal 5: Pt to complete core strengthening exercises with good technique. Patient Goals   Patient goals : Pt did not state    Plan    Plan  Times per week: 3x/week  Specific instructions for Next Treatment: ROM, stretching, bed mobility, repositioning  Current Treatment Recommendations: ROM, Strengthening, Functional Mobility Training, Endurance Training, Positioning, Equipment Evaluation, Education, & procurement, Patient/Caregiver Education & Training, Safety Education & Training, Pain Management  Safety Devices  Type of devices:  All fall risk precautions in place, Call light within reach, Left in bed, Nurse notified, Patient at risk for falls (CHRISSIE Love)      Therapy Time   Individual Concurrent Group Co-treatment   Time In [address] [place] [address] Dr         Time Out 0851         Minutes 23 Anna Larose, PTA

## 2021-06-07 NOTE — CARE COORDINATION
DISCHARGE PLANNING NOTE:    Follow up appointment made for patient with Dr. Angela Rod  on 6/24 at 1:45 PM.  Notified patient of date and time. Patient verbalizes understanding. Appointment entered into discharge navigator. Plan is to return to home and will have services through [facility] resumed and new A services through [facility]. Hopeful to switch IV lasix to PO and discharge later today or tomorrow. ORANGE [facility]ER - 25% - F/U appt scheduled.      Electronically signed by Salvador Kumar RN on 6/7/2021 at 11:13 AM

## 2021-06-07 NOTE — PLAN OF CARE
Highest potential functional level  Outcome: Ongoing  Goal: Absence of falls  Outcome: Ongoing     Problem: Musculor/Skeletal Functional Status  Goal: Highest potential functional level  Outcome: Ongoing  Goal: Absence of falls  Outcome: Ongoing     Problem: Non-Violent Restraints  Goal: Removal from restraints as soon as assessed to be safe  Outcome: Ongoing  Goal: No harm/injury to patient while restraints in use  Outcome: Ongoing  Goal: Patient's dignity will be maintained  Outcome: Ongoing     Problem: OXYGENATION/RESPIRATORY FUNCTION  Goal: Patient will maintain patent airway  Outcome: Ongoing  Goal: Patient will achieve/maintain normal respiratory rate/effort  Description: Respiratory rate and effort will be within normal limits for the patient  Outcome: Ongoing     Problem: MECHANICAL VENTILATION  Goal: Patient will maintain patent airway  Outcome: Ongoing  Goal: Patient will maintain patent airway  Outcome: Ongoing  Goal: Oral health is maintained or improved  Outcome: Ongoing  Goal: Ability to express needs and understand communication  Outcome: Ongoing  Goal: Mobility/activity is maintained at optimum level for patient  Outcome: Ongoing     Problem: Musculor/Skeletal Functional Status  Goal: Highest potential functional level  Outcome: Ongoing  Goal: Absence of falls  Outcome: Ongoing

## 2021-06-07 NOTE — PROGRESS NOTES
Pt unable to urinate prior to discharge. #16 francis placed with 400 quick return of urine noted. Secured to leg with commercial campos. Report given to transport team. Sister called and informed as well as son about transport time.  Copy of discharge instructions given to son

## 2021-06-07 NOTE — FLOWSHEET NOTE
06/07/21 1536   Encounter Summary   Services provided to: Patient   Referral/Consult From: [facility]   Continue Visiting   (6/7/21)   Complexity of Encounter Low   Length of Encounter 15 minutes   Spiritual/Roman Catholic   Type Spiritual support   Assessment Unable to respond   Intervention Prayer

## 2021-06-07 NOTE — PROGRESS NOTES
mEq/100 mL IVPB (Peripheral Line), PRN  ondansetron (ZOFRAN) injection 4 mg, Q6H PRN  cilostazol (PLETAL) tablet 100 mg, BID  potassium chloride (MICRO-K) extended release capsule 20 mEq, Daily  diatrizoate meglumine-sodium (GASTROGRAFIN) 66-10 % solution 450 mL, ONCE PRN  pantoprazole (PROTONIX) injection 40 mg, BID   And  sodium chloride (PF) 0.9 % injection 10 mL, BID  iopamidol (ISOVUE-370) 76 % injection 75 mL, ONCE PRN  sodium chloride flush 0.9 % injection 10 mL, 2 times per day  potassium chloride (KLOR-CON M) extended release tablet 40 mEq, PRN   Or  potassium bicarb-citric acid (EFFER-K) effervescent tablet 40 mEq, PRN   Or  potassium chloride 10 mEq/100 mL IVPB (Peripheral Line), PRN  magnesium sulfate 1000 mg in dextrose 5% 100 mL IVPB, PRN  promethazine (PHENERGAN) tablet 12.5 mg, Q6H PRN   Or  ondansetron (ZOFRAN) injection 4 mg, Q6H PRN  magnesium hydroxide (MILK OF MAGNESIA) 400 MG/5ML suspension 30 mL, Daily PRN  acetaminophen (TYLENOL) tablet 650 mg, Q6H PRN   Or  acetaminophen (TYLENOL) suppository 650 mg, Q6H PRN  hydrALAZINE (APRESOLINE) injection 10 mg, Q6H PRN        Data:     Code Status:  DNR-CCA    Family History   Problem Relation Age of Onset    Arthritis Mother     Atrial Fibrillation Mother     Hearing Loss Mother     Heart Disease Mother     Stroke Mother     Vision Loss Mother     Arthritis Father     Cancer Father     Heart Disease Father     High Blood Pressure Father     Stroke Father     Vision Loss Father        Social History     Socioeconomic History    Marital status:      Spouse name: Not on file    Number of children: Not on file    Years of education: Not on file    Highest education level: Not on file   Occupational History    Not on file   Tobacco Use    Smoking status: Former Smoker     Packs/day: 1.00     Years: 60.00     Pack years: 60.00     Types: Cigarettes     Start date:      Quit date: 2020     Years since quittin.1    Smokeless tobacco: Never Used   Vaping Use    Vaping Use: Never used   Substance and Sexual Activity    Alcohol use: No    Drug use: No    Sexual activity: Yes     Partners: Female   Other Topics Concern    Not on file   Social History Narrative    Not on file     Social Determinants of Health     Financial Resource Strain:     Difficulty of Paying Living Expenses:    Food Insecurity:     Worried About Running Out of Food in the Last Year:     [address] in the Last Year:    Transportation Needs:     Lack of Transportation (Medical):  Lack of Transportation (Non-Medical):    Physical Activity:     Days of Exercise per Week:     Minutes of Exercise per Session:    Stress:     Feeling of Stress :    Social Connections:     Frequency of Communication with Friends and Family:     Frequency of Social Gatherings with Friends and Family:     Attends Mu-ism Services:     Active Member of Clubs or Organizations:     Attends Club or Organization Meetings:     Marital Status:    Intimate Partner Violence:     Fear of Current or Ex-Partner:     Emotionally Abused:     Physically Abused:     Sexually Abused:        I/O (24Hr): Intake/Output Summary (Last 24 hours) at 6/7/2021 1529  Last data filed at 6/7/2021 1300  Gross per 24 hour   Intake 317 ml   Output 1657 ml   Net -1340 ml     Radiology:  IR FLUORO GUIDED CVA DEVICE PLMT/REPLACE/REMOVAL    Result Date: 6/1/2021  Successful fluoroscopy guided right upper extremity 5 [place] power injectable PICC exchange. Ready for use. XR CHEST PORTABLE    Result Date: 6/6/2021  Increased small right pleural effusion and right basilar atelectasis or airspace disease as compared to prior. Increased left basilar pleuroparenchymal disease is seen to a lesser extent. XR CHEST PORTABLE    Result Date: 6/3/2021  1. Endotracheal tube remains in appropriate position.  2. No appreciable change in basilar opacities and small effusions, right greater than left.     XR CHEST PORTABLE    Result Date: 6/2/2021  Heart size top-normal.  Oval resolving pulmonary vascular congestion, and left effusion. Improved right basilar opacity and effusion. Support tubes and lines as above. XR CHEST PORTABLE    Addendum Date: 6/1/2021    ADDENDUM: Correction of voice transcription error: The 1st sentence of the impression should read as follows: CHANGED position of the right PICC line. Result Date: 6/1/2021  1. Unchanged position of the right PICC line. It now crosses the midline in the proximal SVC to terminating in region of left innominate vein. 2. Pulmonary vascular congestion and layering right pleural effusion unchanged but left pleural effusion appears less prominent. The findings were sent to the Radiology Results [address] at 7:20 am on 6/1/2021to be communicated to a licensed caregiver.        Labs:  Recent Results (from the past 24 hour(s))   POC Glucose Fingerstick    Collection Time: 06/06/21  4:48 PM   Result Value Ref Range    POC Glucose 104 75 - 110 mg/dL   POC Glucose Fingerstick    Collection Time: 06/07/21 12:02 AM   Result Value Ref Range    POC Glucose 110 75 - 110 mg/dL   Basic Metabolic Panel w/ Reflex to MG    Collection Time: 06/07/21  5:39 AM   Result Value Ref Range    Glucose 116 (H) 70 - 99 mg/dL    BUN 47 (H) 8 - 23 mg/dL    CREATININE 0.85 0.70 - 1.20 mg/dL    Bun/Cre Ratio NOT REPORTED 9 - 20    Calcium 8.3 (L) 8.6 - 10.4 mg/dL    Sodium 140 135 - 144 mmol/L    Potassium 3.8 3.7 - 5.3 mmol/L    Chloride 102 98 - 107 mmol/L    CO2 30 20 - 31 mmol/L    Anion Gap 8 (L) 9 - 17 mmol/L    GFR Non-African American >60 >60 mL/min    GFR African American >60 >60 mL/min    GFR Comment          GFR Staging NOT REPORTED    CBC WITH AUTO DIFFERENTIAL    Collection Time: 06/07/21  5:39 AM   Result Value Ref Range    WBC 7.1 3.5 - 11.0 k/uL    RBC 2.90 (L) 4.5 - 5.9 m/uL    Hemoglobin 8.3 (L) 13.5 - 17.5 g/dL    Hematocrit 25.4 (L) 41 - 53 % MCV 87.8 80 - 100 fL    MCH 28.5 26 - 34 pg    MCHC 32.5 31 - 37 g/dL    RDW 15.0 (H) 11.5 - 14.9 %    Platelets 093 827 - 895 k/uL    MPV 7.7 6.0 - 12.0 fL    NRBC Automated NOT REPORTED per 100 WBC    Differential Type NOT REPORTED     Seg Neutrophils 62 36 - 66 %    Lymphocytes 19 (L) 24 - 44 %    Monocytes 13 (H) 1 - 7 %    Eosinophils % 5 (H) 0 - 4 %    Basophils 1 0 - 2 %    Immature Granulocytes NOT REPORTED 0 %    Segs Absolute 4.40 1.3 - 9.1 k/uL    Absolute Lymph # 1.30 1.0 - 4.8 k/uL    Absolute Mono # 0.90 0.1 - 1.3 k/uL    Absolute Eos # 0.30 0.0 - 0.4 k/uL    Basophils Absolute 0.10 0.0 - 0.2 k/uL    Absolute Immature Granulocyte NOT REPORTED 0.00 - 0.30 k/uL    WBC Morphology NOT REPORTED     RBC Morphology NOT REPORTED     Platelet Estimate NOT REPORTED    POC Glucose Fingerstick    Collection Time: 21  5:51 AM   Result Value Ref Range    POC Glucose 105 75 - 110 mg/dL   POC Glucose Fingerstick    Collection Time: 21 11:25 AM   Result Value Ref Range    POC Glucose 122 (H) 75 - 110 mg/dL       Physical Examination:        Vitals:  /70   Pulse 92   Temp 98.1 °F (36.7 °C) (Axillary)   Resp 18   Ht 6' 4\" (1.93 m)   Wt 183 lb 10.3 oz (83.3 kg)   SpO2 95%   BMI 22.35 kg/m²   Temp (24hrs), Av.9 °F (36.6 °C), Min:97.7 °F (36.5 °C), Max:98.1 °F (36.7 °C)    Recent Labs     21  1648 21  0002 21  0551 21  1125   POCGLU 104 110 105 122*         Physical Exam  Vitals reviewed. Constitutional:       Appearance: Normal appearance. He is not diaphoretic. HENT:      Head: Normocephalic and atraumatic. Right Ear: External ear normal.      Left Ear: External ear normal.      Nose: Nose normal.      Mouth/Throat:      Mouth: Mucous membranes are moist.      Pharynx: Oropharynx is clear. Eyes:      Conjunctiva/sclera: Conjunctivae normal.   Cardiovascular:      Rate and Rhythm: Normal rate and regular rhythm. Pulses: Normal pulses.       Heart sounds: Normal heart sounds. Pulmonary:      Effort: Pulmonary effort is normal.      Breath sounds: Normal breath sounds. Abdominal:      General: Bowel sounds are normal. There is no distension. Palpations: Abdomen is soft. Comments: G-tube in place. S/p small bowel resection   Musculoskeletal:         General: No tenderness or deformity. Normal range of motion. Cervical back: Normal range of motion and neck supple. No rigidity. Skin:     General: Skin is warm and dry. Capillary Refill: Capillary refill takes less than 2 seconds. Coloration: Skin is not jaundiced. Neurological:      General: No focal deficit present. Mental Status: He is alert. Mental status is at baseline. Psychiatric:         Mood and Affect: Mood normal.         Behavior: Behavior normal.         Assessment:        Primary Problem  Small bowel obstruction (HCC)     Principal Problem:    Small bowel obstruction (HCC)  Active Problems:    Status post insertion of percutaneous endoscopic gastrostomy (PEG) tube (HCC)    Acute cystitis without hematuria    Mass of right lung    COPD (chronic obstructive pulmonary disease) ([facility])    PAF (paroxysmal atrial fibrillation) ([facility])  Resolved Problems:    * No resolved hospital problems. *      Past Medical History:   Diagnosis Date    Arthritis     Cancer [facility])     bladder 1980s    Cerebral artery occlusion with cerebral infarction [facility])     TIA 2010    Chronic kidney disease     [facility]     Hypertension     Pneumonia     Psychiatric problem     depression, social anxiety    Seizures ([facility]         Plan:        1. Continue Eliquis 5 mg p.o. twice daily for A. Fib.  2. On home O2 eval patient did not qualify for home oxygen. 3. 6/6/2021 chest x-ray report reviewed  4. G-tube feed at goal  1. PPI  2. DVT Prophylaxis  3. EPCs  4. PT/OT to evaluate and treat  5. Pain control  6. Replace electrolytes as per sliding scale  7.  Home medications reviewed and appropriate medications continued  8.  Reviewed labs and imaging studies from last 24 hours and results explained to patient      Electronically signed by Sotero Dwyer MD

## 2021-06-07 NOTE — PROGRESS NOTES
06/07/21 0512   Oxygen Therapy/Pulse Ox   $Pulse Oximeter $Overnight     Pt remained on RA t/o study. Report placed in pt chart.

## 2021-06-07 NOTE — CARE COORDINATION
DISCHARGE PLANNING NOTE:    [facility] called and notified that they are unable to accept this patient back due to staffing issues. I spoke with patients sister Nigel Chris and she would like to go with [facility] as he had them in the past.     I spoke with Altaf Lara from [facility] and gave her all of this patients information and she will get back with me shortly as to whether they can accept this patient or not.      Electronically signed by Roe Rodriguez RN on 6/7/2021 at 3:30 PM

## 2021-06-07 NOTE — PROGRESS NOTES
Progress Note    Patient Name:  Neri Fonseca    :  [id number]  2021 9:12 AM      SUBJECTIVE       Mr. Sally العراقي  has no chest pain, shortness of breath, palpitations, nausea or vomiting      OBJECTIVE     Vital signs:    /76   Pulse 81   Temp 97.7 °F (36.5 °C) (Oral)   Resp 18   Ht 6' 4\" (1.93 m)   Wt 183 lb 10.3 oz (83.3 kg)   SpO2 95%   BMI 22.35 kg/m²  0 L/min      Admit Weight:  184 lb 4.8 oz (83.6 kg)    Last 3 weights: Wt Readings from Last 3 Encounters:   21 183 lb 10.3 oz (83.3 kg)   20 189 lb (85.7 kg)   20 183 lb (83 kg)       BMI: Body mass index is 22.35 kg/m². Input/Output:       Intake/Output Summary (Last 24 hours) at 2021 0912  Last data filed at 2021 0610  Gross per 24 hour   Intake 452 ml   Output 1757 ml   Net -1305 ml         Exam:     General appearance: awake and alert moves all ext   Lungs: no rhonchi, no wheezes, no rales  Heart: S1 and S2 no murmur  Abdomen: positive bowel sounds, no bruits, no masses  Extremities: warm and dry, no cyanosis, no clubbing        Laboratory Studies:     CBC:   Recent Labs     21  0505 21  0522 21  1151 21  0539   WBC 8.9 7.3  --  7.1   HGB 8.4* 7.9* 8.6* 8.3*   HCT 25.1* 24.4* 26.5* 25.4*   MCV 87.5 88.5  --  87.8    237  --  245     BMP:   Recent Labs     21  0505 21  0522 21  0539    139 140   K 3.6* 3.7 3.8    100 102   CO2 32* 32* 30   BUN 39* 44* 47*   CREATININE 0.91 0.89 0.85     PT/INR: No results for input(s): PROTIME, INR in the last 72 hours. APTT: No results for input(s): APTT in the last 72 hours. MAG: No results for input(s): MG in the last 72 hours. D Dimer: No results for input(s): DDIMER in the last 72 hours. Troponin  No results for input(s): TROPONINI in the last 72 hours. No results for input(s): TROPONINT in the last 72 hours. BNP No results for input(s): BNP in the last 72 hours.           No results for input(s): PROBNP in the last 72 hours. Pulse Ox: SpO2  Av.1 %  Min: 77 %  Max: 97 %  Supplemental O2: O2 Flow Rate (L/min): 0 L/min     Current Meds:    apixaban  5 mg Oral BID    metoprolol succinate  25 mg Oral Nightly    lisinopril  2.5 mg Oral Daily    furosemide  20 mg Intravenous Daily    levalbuterol  1.25 mg Nebulization Q6H    ipratropium  0.5 mg Nebulization Q6H    insulin lispro  0-18 Units Subcutaneous Q6H    sodium chloride flush  10 mL Intravenous 2 times per day    cilostazol  100 mg Oral BID    potassium chloride  20 mEq Oral Daily    pantoprazole  40 mg Intravenous BID    And    sodium chloride (PF)  10 mL Intravenous BID    sodium chloride flush  10 mL Intravenous 2 times per day     Continuous Infusions:    dexmedetomidine Stopped (21)    norepinephrine Stopped (2141)    dextrose      sodium chloride      propofol Stopped (21 1137)            ASSESSMENT/PLAN     1. Acute systolic HF, great diuresis since admission w/ IV lasix (-10.5L)  2. New onset systolic dysfunction, 31-16% on echo (2021). Possible [place]. Lisinopril, Toprol, IV Lasix currently. 3. Persistent Atrial Fibrillation. AC w/ . Well rate controlled. 4. Mild hypokalemia, 3.6 this am    Patient stable from CV standpoint. Excellent diuresis. Per attending, repeat echo in 1 month for EF improvement. If it remains decreased at that time will need ischemic evaluation. Transition to PO diuretics when able, would expect d/c shortly thereafter.

## 2021-06-07 NOTE — PROGRESS NOTES
Comprehensive Nutrition Assessment    Type and Reason for Visit:  Reassess    Nutrition Recommendations/Plan: Pt being discharged home with son today. Indicated it was ok to use the product they already have at home which is a 1.5 kcal/ml product. Nutrition Assessment:  Nurse contacted writer indicating pt being discharged home with son questioning if he should go home on the tube feeding product currently on which is Vital AF. Went to talk with son who indicated they have a lot of product at home that is a 1.5 but not totally sure of exact name. Writer indicated it was fine to use that, is more concentrated so be sure he gets enough fluid. Son states he gives him maybe at least couple 28 ounzes things of water per day. Watches his urine to be sure not getting dark, RN present duing the conversation indicating pt has known to drink fluids at home. Malnutrition Assessment:  Malnutrition Status: At risk for malnutrition (Comment)    Context:  Chronic Illness     Findings of the 6 clinical characteristics of malnutrition:  Energy Intake:  Mild decrease in energy intake (Comment) (Improving with nutritional support)  Weight Loss:  Unable to assess     Body Fat Loss:  Unable to assess     Muscle Mass Loss:  Unable to assess    Fluid Accumulation:  1 - Mild (May not be related to nutritional status) Extremities   Strength:  Not Performed    Estimated Daily Nutrient Needs:  Energy (kcal): [place] x 1.2= 2000 kcal; Weight Used for Energy Requirements:  Admission     Protein (g):  1.5g/kg= 135 g protein; Weight Used for Protein Requirements:  Ideal        Fluid (ml/day):  2000 mL or that determined by physician; Method Used for Fluid Requirements:  1 ml/kcal      Nutrition Related Findings:  Edema: +1 pittine RUE, LUE, RLE, LLE; +1 perineal. Labs and meds reviewed.       Wounds:  Surgical Incision       Current Nutrition Therapies:    Current Tube Feeding (TF) Orders:  · Feeding Route: PEG  · Formula: Semi-Elemental  · Schedule: Continuous  · Goal TF & Flush Orders Provides: 2016 kcal, 126 gm protein, 1362 mL free fluid (excluding flushes). Once IV fluids discontinued, additional water flushes recommended. Anthropometric Measures:  · Height: 6' 4\" (193 cm)  · Current Body Weight: 183 lb (83 kg)   · Admission Body Weight: 184 lb (83.5 kg)    · Ideal Body Weight: 202 lbs; % Ideal Body Weight     · BMI: 22.3  · BMI Categories: Normal Weight (BMI 22.0 to 24.9) age over 72       Nutrition Diagnosis:   · Inadequate oral intake related to cognitive or neurological impairment as evidenced by NPO or clear liquid status due to medical condition      Nutrition Interventions:   Food and/or Nutrient Delivery:  Continue NPO, Continue Current Tube Feeding  Nutrition Education/Counseling:  No recommendation at this time   Coordination of Nutrition Care:  Continue to monitor while inpatient    Goals:  provide more than 75% of nutrition needs       Nutrition Monitoring and Evaluation:   Behavioral-Environmental Outcomes:  None Identified   Food/Nutrient Intake Outcomes:  Enteral Nutrition Intake/Tolerance  Physical Signs/Symptoms Outcomes:  Biochemical Data, GI Status, Fluid Status or Edema, Nutrition Focused Physical Findings, Skin, Weight     Discharge Planning:    Enteral Nutrition     Some areas of assessment may be incomplete due to COVID-19 precautions. Bronwyn Azul R.D. L.KAYLEN.   Clinical Dietitian  Office: [phone]

## 2021-06-08 ENCOUNTER — CARE COORDINATION (OUTPATIENT)
Dept: CASE MANAGEMENT | Age: 76
End: 2021-06-08

## 2021-06-08 DIAGNOSIS — N30.00 ACUTE CYSTITIS WITHOUT HEMATURIA: Primary | ICD-10-CM

## 2021-06-08 PROCEDURE — 1111F DSCHRG MED/CURRENT MED MERGE: CPT

## 2021-06-08 NOTE — CARE COORDINATION
Karli [facility] Transitions Initial Follow Up Call    Call within 2 business days of discharge: Yes    Patient: Carlos Bolaños Patient : 1945   MRN: [id number]  Reason for Admission: emesisi  Discharge Date: 21 RARS: Readmission Risk Score: 27      Last Discharge [facility]       Complaint Diagnosis Description Type Department Provider    21 Nausea; Emesis Small bowel obstruction ([address] . .. ED to Hosp-Admission (Discharged) (ADMITTED) Crystal Hutton MD; Juanita Neither. .. Spoke with: Ghanshyam Estrada caregiver    Facility: [address] [place] [address]    Non-face-to-face services provided:  Obtained and reviewed discharge summary and/or continuity of care documents  Assessment and support for treatment adherence and medication management-reviewed discharge instructions and medications, 1111F order completed, patient has follow up appointment scheduled with pcp  (non [facility])and cardiologist, reviewed covid precautions and reviewed healthcare decison makers, explained role of CTN, provided contact information,  patient has vns with Darling [place], nurse scheduled to see patient today at 4 pm, will continue to follow//JU   Writer spoke to patient's caregiver Ghanshyam Natalie, patient doing really well, no new needs or concerns at this time, will continue to follow//  Transitions of Care Initial Call    Was this an external facility discharge? No [place] [facility] [place]    Challenges to be reviewed by the provider         Advance Care Planning:   Does patient have an Advance Directive:  reviewed and current. Was this a readmission? No  Patient stated reason for admission:   Patients top risk factors for readmission: functional physical ability    Care Transition Nurse (CTN) contacted the patient by telephone to perform post hospital discharge assessment. Verified name and  with caregiver as identifiers. Provided introduction to self, and explanation of the CTN role.      CTN reviewed discharge instructions, medical action plan and red discharged with any Home Care or Post Acute Services: Yes  Post Acute Services: Home Health (Comment: Raúl)  Do you feel like you have everything you need to keep you well at home?: Yes  Care Transitions Interventions         Follow Up  No future appointments.     Remy Angulo RN

## 2021-06-15 ENCOUNTER — CARE COORDINATION (OUTPATIENT)
Dept: CASE MANAGEMENT | Age: 76
End: 2021-06-15

## 2021-06-15 NOTE — CARE COORDINATION
[facility] Transitions Follow Up Call    6/15/2021    Patient: Ramses Coles  Patient : 1945   MRN: [id number]  Reason for Admission: SBO  Discharge Date: 21 RARS: Readmission Risk Score: 32         Spoke with: [address] Transitions Subsequent and Final Call    Subsequent and Final Calls  Do you have any ongoing symptoms?: Yes  Onset of Patient-reported symptoms: Other  Patient-reported symptoms: Nausea, Other  Interventions for patient-reported symptoms: Other  Do you have any questions related to your medications?: No  Do you currently have any active services?: Yes  Are you currently active with any services?: Home Health  Do you have any needs or concerns that I can assist you with?: No  Care Transitions Interventions  Other Interventions:       Reports continues with tube feeding, reports nausea, no emesis. Takes antiemetics. Continues with incontinent, liquid stool. Denies additional question/concern. States, \"We are getting there\". Follow Up:  Nausea, liquid stool. No future appointments.     Latrell Graham RN

## 2021-06-22 ENCOUNTER — CARE COORDINATION (OUTPATIENT)
Dept: CASE MANAGEMENT | Age: 76
End: 2021-06-22

## 2021-06-22 NOTE — CARE COORDINATION
Kar[facility] Transitions Follow Up Call    2021    Patient: Puja Cruz  Patient : 1945   MRN: [id number]  Reason for Admission:   Discharge Date: 21 RARS: Readmission Risk Score: 32         Spoke with: Chavo Varner spoke to patient's Pradip Garibay, no new needs or concerns, patient has vns/[place], has all his appointments, has contact information, encouraged to call with any needs, care transitions completed//JU    Care Transitions Subsequent and Final Call    Subsequent and Final Calls  Do you currently have any active services?: Yes  Are you currently active with any services?: Home Health  Do you have any needs or concerns that I can assist you with?: No  Care Transitions Interventions  Other Interventions: Follow Up  No future appointments.     Esther Rodriguez RN

## 2021-06-23 ENCOUNTER — HOSPITAL ENCOUNTER (OUTPATIENT)
Facility: CLINIC | Age: 76
Setting detail: SPECIMEN
Discharge: HOME | End: 2021-06-23
Payer: COMMERCIAL

## 2021-06-23 LAB
ABSOLUTE EOS #: 0.31 K/UL (ref 0–0.44)
ABSOLUTE IMMATURE GRANULOCYTE: <0.03 K/UL (ref 0–0.3)
ABSOLUTE LYMPH #: 1.17 K/UL (ref 1.1–3.7)
ABSOLUTE MONO #: 0.57 K/UL (ref 0.1–1.2)
ANION GAP SERPL CALCULATED.3IONS-SCNC: 10 MMOL/L (ref 9–17)
BASOPHILS # BLD: 0 % (ref 0–2)
BASOPHILS ABSOLUTE: <0.03 K/UL (ref 0–0.2)
BUN BLDV-MCNC: 28 MG/DL (ref 8–23)
BUN/CREAT BLD: ABNORMAL (ref 9–20)
CALCIUM SERPL-MCNC: 8.8 MG/DL (ref 8.6–10.4)
CHLORIDE BLD-SCNC: 101 MMOL/L (ref 98–107)
CO2: 25 MMOL/L (ref 20–31)
CREAT SERPL-MCNC: 0.81 MG/DL (ref 0.7–1.2)
DIFFERENTIAL TYPE: ABNORMAL
EOSINOPHILS RELATIVE PERCENT: 5 % (ref 1–4)
GFR AFRICAN AMERICAN: >60 ML/MIN
GFR NON-AFRICAN AMERICAN: >60 ML/MIN
GFR SERPL CREATININE-BSD FRML MDRD: ABNORMAL ML/MIN/{1.73_M2}
GFR SERPL CREATININE-BSD FRML MDRD: ABNORMAL ML/MIN/{1.73_M2}
GLUCOSE BLD-MCNC: 108 MG/DL (ref 70–99)
HCT VFR BLD CALC: 31 % (ref 40.7–50.3)
HEMOGLOBIN: 9.8 G/DL (ref 13–17)
IMMATURE GRANULOCYTES: 0 %
LYMPHOCYTES # BLD: 18 % (ref 24–43)
MCH RBC QN AUTO: 28.3 PG (ref 25.2–33.5)
MCHC RBC AUTO-ENTMCNC: 31.6 G/DL (ref 28.4–34.8)
MCV RBC AUTO: 89.6 FL (ref 82.6–102.9)
MONOCYTES # BLD: 9 % (ref 3–12)
NRBC AUTOMATED: 0 PER 100 WBC
PDW BLD-RTO: 14.6 % (ref 11.8–14.4)
PLATELET # BLD: 310 K/UL (ref 138–453)
PLATELET ESTIMATE: ABNORMAL
PMV BLD AUTO: 9.2 FL (ref 8.1–13.5)
POTASSIUM SERPL-SCNC: 3.7 MMOL/L (ref 3.7–5.3)
RBC # BLD: 3.46 M/UL (ref 4.21–5.77)
RBC # BLD: ABNORMAL 10*6/UL
SEG NEUTROPHILS: 68 % (ref 36–65)
SEGMENTED NEUTROPHILS ABSOLUTE COUNT: 4.27 K/UL (ref 1.5–8.1)
SODIUM BLD-SCNC: 136 MMOL/L (ref 135–144)
WBC # BLD: 6.4 K/UL (ref 3.5–11.3)
WBC # BLD: ABNORMAL 10*3/UL

## 2021-06-23 PROCEDURE — 83550 IRON BINDING TEST: CPT

## 2021-06-23 PROCEDURE — 85025 COMPLETE CBC W/AUTO DIFF WBC: CPT

## 2021-06-23 PROCEDURE — 83540 ASSAY OF IRON: CPT

## 2021-06-23 PROCEDURE — 80048 BASIC METABOLIC PNL TOTAL CA: CPT

## 2021-06-23 NOTE — CARE COORDINATION
Continuity of Care Form    Patient Name: Jarett Amaya   :  [birth year]  MRN:  [id number]    Admit date:  2021  Discharge date:  2021    Code Status Order: Full Code   Advance Directives:   [facility] Documentation       Date/Time Healthcare Directive Type of Healthcare Directive Copy in [address] Agent's Name Healthcare Agent's Phone Number    [phone]  Yes, patient has an advance directive for healthcare treatment  Durable power of  for health care  No, copy requested from family  Healthcare power of   Leandro GogoMichelle galvan 109 Physician:  Jyoti Yoon MD  PCP: Andry Engel MD    Discharging Nurse: [facility] Unit/Room#: 2105/2105-01  Discharging Unit Phone Number: [phone]    Emergency Contact:   Extended Emergency Contact Information  Primary Emergency Contact: [facility] Marco Antonio , [address] Phone: [phone]  Mobile Phone: [phone]  Relation: Brother/Sister  Secondary Emergency Contact: Jeanne Zepeda  [place] Phone: [phone]  Relation: Child    Past Surgical History:  Past Surgical History:   Procedure Laterality Date    ABDOMEN SURGERY      BACK SURGERY      spinal surgery 2005     CAROTID ENDARTERECTOMY Left 2016    COLONOSCOPY N/A 2018    COLONOSCOPY POLYPECTOMY COLD BIOPSY performed by Mayra Phillips MD at [facility]      left face    GASTROSTOMY TUBE PLACEMENT  04/15/2020    HERNIA REPAIR      HIATAL HERNIA REPAIR      INGUINAL HERNIA REPAIR Bilateral     OTHER SURGICAL HISTORY      mesh infected in inguinal canal, had to remove. Removed testiscle at this time.      TONSILLECTOMY      UPPER GASTROINTESTINAL ENDOSCOPY  04/15/2020       EGD CONTROL HEMORRHAGE    UPPER GASTROINTESTINAL ENDOSCOPY  4/15/2020    EGD CONTROL HEMORRHAGE performed by Adela Andersen MD at [facility] ENDOSCOPY  4/15/2020    EGD ESOPHAGOGASTRODUODENOSCOPY PEG TUBE INSERTION performed by Minerva Álvarez MD at [facility]       Immunization History: There is no immunization history on file for this patient.     Active Problems:  Patient Active Problem List   Diagnosis Code    Constipation K59.00    Change in bowel habits R19.4    Rectal bleeding K62.5    Aortic dissection ([facility]) I71.00    Bradycardia R00.1    Other dysphagia R13.19    Enteritis K52.9    Aspiration pneumonia of both lower lobes ([facility]) J69.0    Tobacco abuse Z72.0    Aspiration pneumonitis ([address] J69.0    Status post insertion of percutaneous endoscopic gastrostomy (PEG) tube ([address] Z93.1    Small bowel obstruction ([address] K56.609    Acute cystitis without hematuria N30.00    Mass of right lung R91.8    COPD (chronic obstructive pulmonary disease) ([facility]) J44.9       Isolation/Infection:   Isolation            No Isolation          Patient Infection Status       Infection Onset Added Last Indicated Last Indicated By Review Planned Expiration Resolved Resolved By    None active    Resolved    COVID-19 Rule Out 05/24/21 05/24/21 05/24/21 COVID-19, Rapid (Ordered)   05/24/21 Rule-Out Test Resulted    C-diff Rule Out 01/21/21 01/21/21 01/21/21 C. difficile toxin Molecular (Ordered)   01/21/21 Rule-Out Test Resulted    C-diff Rule Out 12/10/20 12/10/20 12/10/20 C. difficile toxin Molecular (Ordered)   12/11/20 Rule-Out Test Resulted    C-diff Rule Out 10/19/20 10/19/20 10/19/20 C. difficile toxin Molecular (Ordered)   10/20/20 Rule-Out Test Resulted    C-diff Rule Out 09/22/20 09/22/20 09/22/20 C. difficile toxin Molecular (Ordered)   09/23/20 Rule-Out Test Resulted    C-diff Rule Out 08/10/20 08/10/20 08/10/20 Gastrointestinal Panel, Molecular (Ordered)   08/10/20 Rule-Out Test Resulted    COVID-19 Rule Out 04/07/20 04/07/20 04/07/20 COVID-19 (Ordered)   04/08/20 Rule-Out Test Resulted            Nurse Assessment:  Last Vital Signs: BP (!) 119/59   Pulse 98   Temp 98.2 °F (36.8 °C) (Axillary)   Resp 16   Ht 6' 4\" (1.93 m)   Wt 188 lb (85.3 kg)   SpO2 93%   BMI 22.88 kg/m²     Last documented pain score (0-10 scale): Pain Level: 2  Last Weight:   Wt Readings from Last 1 Encounters:   05/26/21 188 lb (85.3 kg)     Mental Status:  oriented, alert and coherent    IV Access:  - None    Nursing Mobility/ADLs:  Walking   Dependent  Transfer  Dependent  Bathing  Dependent  Dressing  Dependent  Toileting  Dependent  Feeding  Dependent  Med Admin  Dependent  Med Delivery   peg tube    Wound Care Documentation and Therapy:           Buttocks/sacrum: Cleanse with soap and water, pat dry. Apply thin layer of zinc cream twice daily and as needed when incontinent     Left inner thigh: Apply skin barrier wipe to reddened area, cover with foam dressing. Change every 3 days and as needed if loose or soiled     Elimination:  Continence:   · Bowel: No  · Bladder: Francis  Urinary Catheter: Francis removed today, if patient does not void by 1700, francis will be placed again and discharged home with. Colostomy/Ileostomy/Ileal Conduit: No       Date of Last BM: 6/6/24    Intake/Output Summary (Last 24 hours) at 5/26/2021 0821  Last data filed at 5/26/2021 0800  Gross per 24 hour   Intake 4252 ml   Output 1150 ml   Net 3102 ml     I/O last 3 completed shifts: In: 2416 [I.V.:4152; IV Piggyback:100]  Out: 960 [Urine:750; Emesis/NG output:210]    Safety Concerns:     History of Falls (last 30 days) and At Risk for Falls    Impairments/Disabilities:      Speech    Nutrition Therapy:  Current Nutrition Therapy:   - Tube Feedings:  as per home tube feed prior to discharge    Routes of Feeding: Gastrostomy Tube  Liquids: No Liquids  Daily Fluid Restriction: no  Last Modified Barium Swallow with Video (Video Swallowing Test): not done    Treatments at the Time of Hospital Discharge:   Respiratory Treatments:   Oxygen Therapy:  is not on home oxygen therapy.   Ventilator:    - No ventilator support    Rehab Therapies: Physical Therapy and Occupational Therapy  Weight Bearing Status/Restrictions:  Bedbound  Other Medical Equipment (for information only, NOT a DME order):  Hospital bed, lift, motor wheelchair  Other Treatments: skilled nursing assessment; medication education and monitor; home health aid    Home health care agency's  to evaluate patient two weeks prior to discharge from home health to determine post-discharge services. Patient's personal belongings (please select all that are sent with patient):  None    RN SIGNATURE:  Electronically signed by Edith Emery RN on 6/7/21 at 2:23 PM EDT    CASE MANAGEMENT/SOCIAL WORK SECTION    Inpatient Status Date: 5/24/21    Readmission Risk Assessment Score:  Readmission Risk              Risk of Unplanned Readmission:  13           Discharging to [address]  [address] [facility] [address] [place] [address]/[address]  [address]  [phone]        / signature: Electronically signed by Han eHrrera RN on 5/26/21 at 8:22 AM EDT    PHYSICIAN SECTION    Prognosis: Good    Condition at Discharge: Stable    Rehab Potential (if transferring to Rehab): Good    Recommended Labs or Other Treatments After Discharge: Continue tube feeds at goal as tolerated. Wash the incision with Hibiclens or soap and water twice daily. Drain site care. Prescriptions called into [facility] in [place]. Physician Certification: I certify the above information and transfer of Puja Cruz  is necessary for the continuing treatment of the diagnosis listed and that he requires [facility] for less 30 days.      Update Admission H&P: No change in H&P    PHYSICIAN SIGNATURE:  Electronically signed by Elle Espinal MD on 6/5/21 at 9:45 AM EDT                      Current Discharge Medication List    START taking these medications    Medication Dose   ferrous sulfate (IRON 325) 325 (65 Fe) MG tablet 325 mg   Take 1 tablet by mouth 2 times daily   Quantity: 60 tablet Refills: 0       apixaban (ELIQUIS) 5 MG TABS tablet 5 mg   Take 1 tablet by mouth 2 times daily   Quantity: 60 tablet Refills: 1       lisinopril (PRINIVIL;ZESTRIL) 2.5 MG tablet 2.5 mg   Take 1 tablet by mouth daily   Quantity: 30 tablet Refills: 3       metoprolol succinate (TOPROL XL) 25 MG extended release tablet 25 mg   Take 1 tablet by mouth nightly   Quantity: 30 tablet Refills: 3       cephALEXin (KEFLEX) 500 MG capsule    500 mgTake three times daily   Quantity: 21 capsule Refills: 0       ondansetron (ZOFRAN) 4 MG tablet    Take every six hours as needed   Quantity: 20 tablet Refills: 0       oxyCODONE-acetaminophen (PERCOCET) 5-325 MG per tablet 1 tablet   Take 1 tablet by mouth every 6 hours as needed for Pain for up to 7 days.  . Take lowest dose possible to manage pain   Quantity: 28 tablet Refills: 0       Comments: Reduce doses taken as pain becomes manageable      CONTINUE these medications which have CHANGED or have new prescriptions    Medication Dose   furosemide (LASIX) 20 MG tablet 40 mg   Take 2 tablets by mouth daily   Quantity: 60 tablet Refills: 3           CONTINUE these medications which have NOT CHANGED    Medication Dose   cilostazol (PLETAL) 100 MG tablet 100 mg   Take 100 mg by mouth 2 times daily       famotidine (PEPCID) 20 MG tablet 20 mg   Take 20 mg by mouth 2 times daily       potassium chloride (MICRO-K) 10 MEQ extended release capsule 20 mEq   Take 20 mEq by mouth daily       theophylline 80 MG/15ML elixir 100 mg   Take 18.8 mLs by mouth every 8 hours   Quantity: 1 Bottle Refills: 3       atorvastatin (LIPITOR) 40 MG tablet 40 mg   Take 1 tablet by mouth daily   Quantity: 30 tablet Refills: 5       albuterol sulfate  (90 Base) MCG/ACT inhaler 2 puffs   Inhale 2 puffs into the lungs every 6 hours as needed for Wheezing Quantity: 3 Inhaler Refills: 6       gabapentin (NEURONTIN) 800 MG tablet 800 mg   Take 800 mg by mouth 3 times daily.     Refills: 0            STOP taking these previous medications    Medication Dose Reason for Stopping Comments   (STOP TAKING) aspirin 325 MG tablet 325 mg           (STOP TAKING) lisinopril-hydrochlorothiazide (PRINZIDE;ZESTORETIC) 10-12.5 MG per tablet 1 tablet ambulatory

## 2021-06-24 LAB
IRON SATURATION: 12 % (ref 20–55)
IRON: 29 UG/DL (ref 59–158)
TOTAL IRON BINDING CAPACITY: 246 UG/DL (ref 250–450)
UNSATURATED IRON BINDING CAPACITY: 217 UG/DL (ref 112–347)

## 2021-07-08 ENCOUNTER — HOSPITAL ENCOUNTER (OUTPATIENT)
Age: 76
Setting detail: SPECIMEN
Discharge: HOME | End: 2021-07-08
Payer: COMMERCIAL

## 2021-07-08 LAB
-: ABNORMAL
AMORPHOUS: ABNORMAL
BACTERIA: ABNORMAL
BILIRUBIN URINE: NEGATIVE
CASTS UA: ABNORMAL /LPF
COLOR: ABNORMAL
COMMENT UA: ABNORMAL
CRYSTALS, UA: ABNORMAL /HPF
CRYSTALS, UA: ABNORMAL /HPF
EPITHELIAL CELLS UA: ABNORMAL /HPF
GLUCOSE URINE: NEGATIVE
KETONES, URINE: NEGATIVE
LEUKOCYTE ESTERASE, URINE: ABNORMAL
MUCUS: ABNORMAL
NITRITE, URINE: NEGATIVE
OTHER OBSERVATIONS UA: ABNORMAL
PH UA: 7.5 (ref 5–8)
PROTEIN UA: ABNORMAL
RBC UA: ABNORMAL /HPF
RENAL EPITHELIAL, UA: ABNORMAL /HPF
SPECIFIC GRAVITY UA: 1.02 (ref 1–1.03)
TRICHOMONAS: ABNORMAL
TURBIDITY: ABNORMAL
URINE HGB: ABNORMAL
UROBILINOGEN, URINE: NORMAL
WBC UA: ABNORMAL /HPF
YEAST: ABNORMAL

## 2021-07-08 PROCEDURE — 87086 URINE CULTURE/COLONY COUNT: CPT

## 2021-07-08 PROCEDURE — 81001 URINALYSIS AUTO W/SCOPE: CPT

## 2021-07-10 LAB
CULTURE: ABNORMAL
Lab: ABNORMAL
SPECIMEN DESCRIPTION: ABNORMAL

## 2021-07-28 ENCOUNTER — HOSPITAL ENCOUNTER (OUTPATIENT)
Age: 76
Setting detail: SPECIMEN
Discharge: HOME | End: 2021-07-28
Payer: COMMERCIAL

## 2021-07-28 LAB
-: ABNORMAL
AMORPHOUS: ABNORMAL
BACTERIA: ABNORMAL
BILIRUBIN URINE: NEGATIVE
CASTS UA: ABNORMAL /LPF
COLOR: YELLOW
COMMENT UA: ABNORMAL
CRYSTALS, UA: ABNORMAL /HPF
CRYSTALS, UA: ABNORMAL /HPF
EPITHELIAL CELLS UA: ABNORMAL /HPF
GLUCOSE URINE: NEGATIVE
KETONES, URINE: NEGATIVE
LEUKOCYTE ESTERASE, URINE: ABNORMAL
MUCUS: ABNORMAL
NITRITE, URINE: POSITIVE
OTHER OBSERVATIONS UA: ABNORMAL
PH UA: 8.5 (ref 5–8)
PROTEIN UA: ABNORMAL
RBC UA: ABNORMAL /HPF
RENAL EPITHELIAL, UA: ABNORMAL /HPF
SPECIFIC GRAVITY UA: 1.02 (ref 1–1.03)
TRICHOMONAS: ABNORMAL
TURBIDITY: ABNORMAL
URINE HGB: ABNORMAL
UROBILINOGEN, URINE: NORMAL
WBC UA: ABNORMAL /HPF
YEAST: ABNORMAL

## 2021-07-28 PROCEDURE — 87186 SC STD MICRODIL/AGAR DIL: CPT

## 2021-07-28 PROCEDURE — 87086 URINE CULTURE/COLONY COUNT: CPT

## 2021-07-28 PROCEDURE — 87077 CULTURE AEROBIC IDENTIFY: CPT

## 2021-07-28 PROCEDURE — 81001 URINALYSIS AUTO W/SCOPE: CPT

## 2021-08-02 ENCOUNTER — HOSPITAL ENCOUNTER (INPATIENT)
Age: 76
LOS: 2 days | Discharge: HOME HEALTH | DRG: 699 | End: 2021-08-05
Attending: EMERGENCY MEDICINE
Payer: COMMERCIAL

## 2021-08-02 DIAGNOSIS — N39.0 URINARY TRACT INFECTION ASSOCIATED WITH INDWELLING URETHRAL CATHETER, INITIAL ENCOUNTER (HCC): Primary | ICD-10-CM

## 2021-08-02 DIAGNOSIS — T83.511A URINARY TRACT INFECTION ASSOCIATED WITH INDWELLING URETHRAL CATHETER, INITIAL ENCOUNTER (HCC): Primary | ICD-10-CM

## 2021-08-02 LAB
CULTURE: ABNORMAL
Lab: ABNORMAL
SPECIMEN DESCRIPTION: ABNORMAL

## 2021-08-02 PROCEDURE — 51702 INSERT TEMP BLADDER CATH: CPT

## 2021-08-02 PROCEDURE — 99285 EMERGENCY DEPT VISIT HI MDM: CPT

## 2021-08-02 PROCEDURE — 99284 EMERGENCY DEPT VISIT MOD MDM: CPT

## 2021-08-02 PROCEDURE — 96365 THER/PROPH/DIAG IV INF INIT: CPT

## 2021-08-03 ENCOUNTER — APPOINTMENT (OUTPATIENT)
Dept: ULTRASOUND IMAGING | Age: 76
DRG: 699 | End: 2021-08-03
Payer: COMMERCIAL

## 2021-08-03 ENCOUNTER — APPOINTMENT (OUTPATIENT)
Dept: GENERAL RADIOLOGY | Age: 76
DRG: 699 | End: 2021-08-03
Payer: COMMERCIAL

## 2021-08-03 PROBLEM — D50.9 IRON DEFICIENCY ANEMIA: Status: ACTIVE | Noted: 2021-08-03

## 2021-08-03 PROBLEM — E87.1 HYPONATREMIA: Status: ACTIVE | Noted: 2021-08-03

## 2021-08-03 PROBLEM — T83.511A URINARY TRACT INFECTION ASSOCIATED WITH INDWELLING URETHRAL CATHETER (HCC): Status: ACTIVE | Noted: 2021-08-03

## 2021-08-03 PROBLEM — N39.0 UTI (URINARY TRACT INFECTION): Status: ACTIVE | Noted: 2021-08-03

## 2021-08-03 PROBLEM — I50.20 HEART FAILURE WITH REDUCED EJECTION FRACTION (HCC): Status: ACTIVE | Noted: 2021-08-03

## 2021-08-03 LAB
ABSOLUTE EOS #: 0.42 K/UL (ref 0–0.44)
ABSOLUTE IMMATURE GRANULOCYTE: 0.04 K/UL (ref 0–0.3)
ABSOLUTE LYMPH #: 1.86 K/UL (ref 1.1–3.7)
ABSOLUTE MONO #: 1.02 K/UL (ref 0.1–1.2)
ALBUMIN SERPL-MCNC: 3.4 G/DL (ref 3.5–5.2)
ALBUMIN/GLOBULIN RATIO: 0.9 (ref 1–2.5)
ALP BLD-CCNC: 61 U/L (ref 40–129)
ALT SERPL-CCNC: 9 U/L (ref 5–41)
ANION GAP SERPL CALCULATED.3IONS-SCNC: 11 MMOL/L (ref 9–17)
ANION GAP SERPL CALCULATED.3IONS-SCNC: 16 MMOL/L (ref 9–17)
AST SERPL-CCNC: 18 U/L
BASOPHILS # BLD: 0 % (ref 0–2)
BASOPHILS ABSOLUTE: 0.04 K/UL (ref 0–0.2)
BILIRUB SERPL-MCNC: 0.26 MG/DL (ref 0.3–1.2)
BILIRUBIN URINE: NEGATIVE
BNP INTERPRETATION: ABNORMAL
BUN BLDV-MCNC: 37 MG/DL (ref 8–23)
BUN BLDV-MCNC: 41 MG/DL (ref 8–23)
BUN/CREAT BLD: ABNORMAL (ref 9–20)
BUN/CREAT BLD: ABNORMAL (ref 9–20)
CALCIUM SERPL-MCNC: 8.6 MG/DL (ref 8.6–10.4)
CALCIUM SERPL-MCNC: 9.1 MG/DL (ref 8.6–10.4)
CHLORIDE BLD-SCNC: 100 MMOL/L (ref 98–107)
CHLORIDE BLD-SCNC: 97 MMOL/L (ref 98–107)
CO2: 19 MMOL/L (ref 20–31)
CO2: 22 MMOL/L (ref 20–31)
COLOR: ABNORMAL
COMMENT UA: ABNORMAL
CREAT SERPL-MCNC: 0.94 MG/DL (ref 0.7–1.2)
CREAT SERPL-MCNC: 1.02 MG/DL (ref 0.7–1.2)
DIFFERENTIAL TYPE: ABNORMAL
EOSINOPHILS RELATIVE PERCENT: 5 % (ref 1–4)
GFR AFRICAN AMERICAN: >60 ML/MIN
GFR AFRICAN AMERICAN: >60 ML/MIN
GFR NON-AFRICAN AMERICAN: >60 ML/MIN
GFR NON-AFRICAN AMERICAN: >60 ML/MIN
GFR SERPL CREATININE-BSD FRML MDRD: ABNORMAL ML/MIN/{1.73_M2}
GLUCOSE BLD-MCNC: 139 MG/DL (ref 70–99)
GLUCOSE BLD-MCNC: 89 MG/DL (ref 70–99)
GLUCOSE URINE: NEGATIVE
HCT VFR BLD CALC: 32 % (ref 40.7–50.3)
HCT VFR BLD CALC: 32.3 % (ref 40.7–50.3)
HEMOGLOBIN: 10.2 G/DL (ref 13–17)
HEMOGLOBIN: 10.2 G/DL (ref 13–17)
IMMATURE GRANULOCYTES: 0 %
INR BLD: 1.2
KETONES, URINE: NEGATIVE
LACTIC ACID, SEPSIS WHOLE BLOOD: 1.2 MMOL/L (ref 0.5–1.9)
LACTIC ACID, SEPSIS WHOLE BLOOD: 1.4 MMOL/L (ref 0.5–1.9)
LACTIC ACID, SEPSIS WHOLE BLOOD: 2.4 MMOL/L (ref 0.5–1.9)
LACTIC ACID, SEPSIS: ABNORMAL MMOL/L (ref 0.5–1.9)
LACTIC ACID, SEPSIS: NORMAL MMOL/L (ref 0.5–1.9)
LACTIC ACID, SEPSIS: NORMAL MMOL/L (ref 0.5–1.9)
LEUKOCYTE ESTERASE, URINE: ABNORMAL
LYMPHOCYTES # BLD: 20 % (ref 24–43)
MCH RBC QN AUTO: 27.8 PG (ref 25.2–33.5)
MCH RBC QN AUTO: 28 PG (ref 25.2–33.5)
MCHC RBC AUTO-ENTMCNC: 31.6 G/DL (ref 28.4–34.8)
MCHC RBC AUTO-ENTMCNC: 31.9 G/DL (ref 28.4–34.8)
MCV RBC AUTO: 87.9 FL (ref 82.6–102.9)
MCV RBC AUTO: 88 FL (ref 82.6–102.9)
MONOCYTES # BLD: 11 % (ref 3–12)
NITRITE, URINE: NEGATIVE
NRBC AUTOMATED: 0 PER 100 WBC
NRBC AUTOMATED: 0 PER 100 WBC
PDW BLD-RTO: 14.7 % (ref 11.8–14.4)
PDW BLD-RTO: 14.9 % (ref 11.8–14.4)
PH UA: >9 (ref 5–8)
PLATELET # BLD: 244 K/UL (ref 138–453)
PLATELET # BLD: 270 K/UL (ref 138–453)
PLATELET ESTIMATE: ABNORMAL
PMV BLD AUTO: 8.7 FL (ref 8.1–13.5)
PMV BLD AUTO: 9 FL (ref 8.1–13.5)
POTASSIUM SERPL-SCNC: 3.8 MMOL/L (ref 3.7–5.3)
POTASSIUM SERPL-SCNC: 3.8 MMOL/L (ref 3.7–5.3)
PRO-BNP: 1738 PG/ML
PROTEIN UA: ABNORMAL
PROTHROMBIN TIME: 12.4 SEC (ref 9.1–12.3)
RBC # BLD: 3.64 M/UL (ref 4.21–5.77)
RBC # BLD: 3.67 M/UL (ref 4.21–5.77)
RBC # BLD: ABNORMAL 10*6/UL
SEG NEUTROPHILS: 64 % (ref 36–65)
SEGMENTED NEUTROPHILS ABSOLUTE COUNT: 5.82 K/UL (ref 1.5–8.1)
SODIUM BLD-SCNC: 132 MMOL/L (ref 135–144)
SODIUM BLD-SCNC: 133 MMOL/L (ref 135–144)
SPECIFIC GRAVITY UA: 1.01 (ref 1–1.03)
THEOPHYLLINE DATE LAST DOSE: NORMAL
THEOPHYLLINE DOSE AMOUNT: NORMAL
THEOPHYLLINE LEVEL: 8.6 UG/ML (ref 5–15)
THEOPHYLLINE TIME LAST DOSE: NORMAL
TOTAL PROTEIN: 7.1 G/DL (ref 6.4–8.3)
TURBIDITY: ABNORMAL
URINE HGB: ABNORMAL
UROBILINOGEN, URINE: NORMAL
WBC # BLD: 7.1 K/UL (ref 3.5–11.3)
WBC # BLD: 9.2 K/UL (ref 3.5–11.3)
WBC # BLD: ABNORMAL 10*3/UL

## 2021-08-03 PROCEDURE — 6360000002 HC RX W HCPCS: Performed by: INTERNAL MEDICINE

## 2021-08-03 PROCEDURE — 6370000000 HC RX 637 (ALT 250 FOR IP): Performed by: NURSE PRACTITIONER

## 2021-08-03 PROCEDURE — 6370000000 HC RX 637 (ALT 250 FOR IP): Performed by: INTERNAL MEDICINE

## 2021-08-03 PROCEDURE — 6360000002 HC RX W HCPCS: Performed by: HEALTH CARE PROVIDER

## 2021-08-03 PROCEDURE — 81003 URINALYSIS AUTO W/O SCOPE: CPT

## 2021-08-03 PROCEDURE — 85027 COMPLETE CBC AUTOMATED: CPT

## 2021-08-03 PROCEDURE — 2580000003 HC RX 258: Performed by: HEALTH CARE PROVIDER

## 2021-08-03 PROCEDURE — G0378 HOSPITAL OBSERVATION PER HR: HCPCS

## 2021-08-03 PROCEDURE — 1200000000 HC SEMI PRIVATE

## 2021-08-03 PROCEDURE — 99222 1ST HOSP IP/OBS MODERATE 55: CPT | Performed by: INTERNAL MEDICINE

## 2021-08-03 PROCEDURE — 80053 COMPREHEN METABOLIC PANEL: CPT

## 2021-08-03 PROCEDURE — 80048 BASIC METABOLIC PNL TOTAL CA: CPT

## 2021-08-03 PROCEDURE — 83880 ASSAY OF NATRIURETIC PEPTIDE: CPT

## 2021-08-03 PROCEDURE — 36415 COLL VENOUS BLD VENIPUNCTURE: CPT

## 2021-08-03 PROCEDURE — 2580000003 HC RX 258: Performed by: INTERNAL MEDICINE

## 2021-08-03 PROCEDURE — 80198 ASSAY OF THEOPHYLLINE: CPT

## 2021-08-03 PROCEDURE — 83605 ASSAY OF LACTIC ACID: CPT

## 2021-08-03 PROCEDURE — 96366 THER/PROPH/DIAG IV INF ADDON: CPT

## 2021-08-03 PROCEDURE — 87086 URINE CULTURE/COLONY COUNT: CPT

## 2021-08-03 PROCEDURE — 85025 COMPLETE CBC W/AUTO DIFF WBC: CPT

## 2021-08-03 PROCEDURE — 93005 ELECTROCARDIOGRAM TRACING: CPT | Performed by: HEALTH CARE PROVIDER

## 2021-08-03 PROCEDURE — 87040 BLOOD CULTURE FOR BACTERIA: CPT

## 2021-08-03 PROCEDURE — 76775 US EXAM ABDO BACK WALL LIM: CPT

## 2021-08-03 PROCEDURE — 96367 TX/PROPH/DG ADDL SEQ IV INF: CPT

## 2021-08-03 PROCEDURE — 85610 PROTHROMBIN TIME: CPT

## 2021-08-03 PROCEDURE — 96365 THER/PROPH/DIAG IV INF INIT: CPT

## 2021-08-03 PROCEDURE — 96361 HYDRATE IV INFUSION ADD-ON: CPT

## 2021-08-03 PROCEDURE — 71045 X-RAY EXAM CHEST 1 VIEW: CPT

## 2021-08-03 RX ORDER — METOPROLOL SUCCINATE 25 MG/1
25 TABLET, EXTENDED RELEASE ORAL NIGHTLY
Status: DISCONTINUED | OUTPATIENT
Start: 2021-08-03 | End: 2021-08-03

## 2021-08-03 RX ORDER — CILOSTAZOL 100 MG/1
100 TABLET ORAL 2 TIMES DAILY
Status: DISCONTINUED | OUTPATIENT
Start: 2021-08-03 | End: 2021-08-05 | Stop reason: HOSPADM

## 2021-08-03 RX ORDER — LANOLIN ALCOHOL/MO/W.PET/CERES
325 CREAM (GRAM) TOPICAL 2 TIMES DAILY
Status: DISCONTINUED | OUTPATIENT
Start: 2021-08-03 | End: 2021-08-05 | Stop reason: HOSPADM

## 2021-08-03 RX ORDER — ACETAMINOPHEN 650 MG/1
650 SUPPOSITORY RECTAL EVERY 6 HOURS PRN
Status: DISCONTINUED | OUTPATIENT
Start: 2021-08-03 | End: 2021-08-05 | Stop reason: HOSPADM

## 2021-08-03 RX ORDER — ATORVASTATIN CALCIUM 80 MG/1
40 TABLET, FILM COATED ORAL DAILY
Status: DISCONTINUED | OUTPATIENT
Start: 2021-08-03 | End: 2021-08-05 | Stop reason: HOSPADM

## 2021-08-03 RX ORDER — ACETAMINOPHEN 325 MG/1
650 TABLET ORAL EVERY 6 HOURS PRN
Status: DISCONTINUED | OUTPATIENT
Start: 2021-08-03 | End: 2021-08-05 | Stop reason: HOSPADM

## 2021-08-03 RX ORDER — FUROSEMIDE 20 MG/1
40 TABLET ORAL DAILY
Status: DISCONTINUED | OUTPATIENT
Start: 2021-08-03 | End: 2021-08-05 | Stop reason: HOSPADM

## 2021-08-03 RX ORDER — FAMOTIDINE 20 MG/1
20 TABLET, FILM COATED ORAL 2 TIMES DAILY
Status: DISCONTINUED | OUTPATIENT
Start: 2021-08-03 | End: 2021-08-05 | Stop reason: HOSPADM

## 2021-08-03 RX ORDER — LISINOPRIL 2.5 MG/1
2.5 TABLET ORAL DAILY
Status: DISCONTINUED | OUTPATIENT
Start: 2021-08-03 | End: 2021-08-05 | Stop reason: HOSPADM

## 2021-08-03 RX ORDER — GABAPENTIN 800 MG/1
800 TABLET ORAL 3 TIMES DAILY
Status: DISCONTINUED | OUTPATIENT
Start: 2021-08-03 | End: 2021-08-05 | Stop reason: HOSPADM

## 2021-08-03 RX ORDER — LACTOBACILLUS RHAMNOSUS GG 10B CELL
2 CAPSULE ORAL 3 TIMES DAILY
Status: DISCONTINUED | OUTPATIENT
Start: 2021-08-03 | End: 2021-08-05 | Stop reason: HOSPADM

## 2021-08-03 RX ORDER — 0.9 % SODIUM CHLORIDE 0.9 %
1000 INTRAVENOUS SOLUTION INTRAVENOUS ONCE
Status: COMPLETED | OUTPATIENT
Start: 2021-08-03 | End: 2021-08-03

## 2021-08-03 RX ORDER — BACILLUS COAGULANS/INULIN 1B-250 MG
CAPSULE ORAL 2 TIMES DAILY
COMMUNITY

## 2021-08-03 RX ORDER — SODIUM CHLORIDE 0.9 % (FLUSH) 0.9 %
5-40 SYRINGE (ML) INJECTION PRN
Status: DISCONTINUED | OUTPATIENT
Start: 2021-08-03 | End: 2021-08-05 | Stop reason: HOSPADM

## 2021-08-03 RX ORDER — POLYETHYLENE GLYCOL 3350 17 G/17G
17 POWDER, FOR SOLUTION ORAL DAILY PRN
Status: DISCONTINUED | OUTPATIENT
Start: 2021-08-03 | End: 2021-08-05 | Stop reason: HOSPADM

## 2021-08-03 RX ORDER — SODIUM CHLORIDE 0.9 % (FLUSH) 0.9 %
5-40 SYRINGE (ML) INJECTION EVERY 12 HOURS SCHEDULED
Status: DISCONTINUED | OUTPATIENT
Start: 2021-08-03 | End: 2021-08-05 | Stop reason: HOSPADM

## 2021-08-03 RX ORDER — THEOPHYLLINE ANHYDROUS 80 MG/15ML
100 SOLUTION ORAL EVERY 8 HOURS SCHEDULED
Status: DISCONTINUED | OUTPATIENT
Start: 2021-08-03 | End: 2021-08-05 | Stop reason: HOSPADM

## 2021-08-03 RX ORDER — SODIUM CHLORIDE 9 MG/ML
25 INJECTION, SOLUTION INTRAVENOUS PRN
Status: DISCONTINUED | OUTPATIENT
Start: 2021-08-03 | End: 2021-08-05 | Stop reason: HOSPADM

## 2021-08-03 RX ORDER — ALBUTEROL SULFATE 90 UG/1
2 AEROSOL, METERED RESPIRATORY (INHALATION) EVERY 6 HOURS PRN
Status: DISCONTINUED | OUTPATIENT
Start: 2021-08-03 | End: 2021-08-05 | Stop reason: HOSPADM

## 2021-08-03 RX ORDER — ONDANSETRON 4 MG/1
4 TABLET, ORALLY DISINTEGRATING ORAL EVERY 8 HOURS PRN
Status: DISCONTINUED | OUTPATIENT
Start: 2021-08-03 | End: 2021-08-05 | Stop reason: HOSPADM

## 2021-08-03 RX ORDER — ONDANSETRON 2 MG/ML
4 INJECTION INTRAMUSCULAR; INTRAVENOUS EVERY 6 HOURS PRN
Status: DISCONTINUED | OUTPATIENT
Start: 2021-08-03 | End: 2021-08-05 | Stop reason: HOSPADM

## 2021-08-03 RX ADMIN — CILOSTAZOL 100 MG: 100 TABLET ORAL at 11:22

## 2021-08-03 RX ADMIN — Medication 2 CAPSULE: at 15:27

## 2021-08-03 RX ADMIN — ACETAMINOPHEN 650 MG: 325 TABLET ORAL at 20:42

## 2021-08-03 RX ADMIN — THEOPHYLLINE 100 MG: 80 SOLUTION ORAL at 17:27

## 2021-08-03 RX ADMIN — GABAPENTIN 800 MG: 800 TABLET ORAL at 11:22

## 2021-08-03 RX ADMIN — ATORVASTATIN CALCIUM 40 MG: 80 TABLET, FILM COATED ORAL at 11:22

## 2021-08-03 RX ADMIN — FERROUS SULFATE TAB EC 325 MG (65 MG FE EQUIVALENT) 325 MG: 325 (65 FE) TABLET DELAYED RESPONSE at 11:22

## 2021-08-03 RX ADMIN — THEOPHYLLINE 100 MG: 80 SOLUTION ORAL at 06:05

## 2021-08-03 RX ADMIN — CEFEPIME HYDROCHLORIDE 2000 MG: 2 INJECTION, POWDER, FOR SOLUTION INTRAVENOUS at 15:31

## 2021-08-03 RX ADMIN — SODIUM CHLORIDE 1000 ML: 9 INJECTION, SOLUTION INTRAVENOUS at 01:50

## 2021-08-03 RX ADMIN — APIXABAN 5 MG: 5 TABLET, FILM COATED ORAL at 11:22

## 2021-08-03 RX ADMIN — CEFEPIME HYDROCHLORIDE 1000 MG: 1 INJECTION, POWDER, FOR SOLUTION INTRAMUSCULAR; INTRAVENOUS at 02:50

## 2021-08-03 RX ADMIN — Medication 1250 MG: at 03:35

## 2021-08-03 RX ADMIN — FAMOTIDINE 20 MG: 20 TABLET, FILM COATED ORAL at 11:23

## 2021-08-03 SDOH — ECONOMIC STABILITY: FOOD INSECURITY: WITHIN THE PAST 12 MONTHS, THE FOOD YOU BOUGHT JUST DIDN'T LAST AND YOU DIDN'T HAVE MONEY TO GET MORE.: NEVER TRUE

## 2021-08-03 SDOH — SOCIAL STABILITY: SOCIAL NETWORK
IN A TYPICAL WEEK, HOW MANY TIMES DO YOU TALK ON THE PHONE WITH FAMILY, FRIENDS, OR NEIGHBORS?: MORE THAN THREE TIMES A WEEK

## 2021-08-03 SDOH — SOCIAL STABILITY: SOCIAL INSECURITY: WITHIN THE LAST YEAR, HAVE YOU BEEN AFRAID OF YOUR PARTNER OR EX-PARTNER?: NO

## 2021-08-03 SDOH — SOCIAL STABILITY: SOCIAL NETWORK
DO YOU BELONG TO ANY CLUBS OR ORGANIZATIONS SUCH AS CHURCH GROUPS UNIONS, FRATERNAL OR ATHLETIC GROUPS, OR SCHOOL GROUPS?: NO

## 2021-08-03 SDOH — ECONOMIC STABILITY: TRANSPORTATION INSECURITY
IN THE PAST 12 MONTHS, HAS THE LACK OF TRANSPORTATION KEPT YOU FROM MEDICAL APPOINTMENTS OR FROM GETTING MEDICATIONS?: NO

## 2021-08-03 SDOH — HEALTH STABILITY: PHYSICAL HEALTH: ON AVERAGE, HOW MANY DAYS PER WEEK DO YOU ENGAGE IN MODERATE TO STRENUOUS EXERCISE (LIKE A BRISK WALK)?: 0 DAYS

## 2021-08-03 SDOH — SOCIAL STABILITY: SOCIAL NETWORK: HOW OFTEN DO YOU ATTEND CHURCH OR RELIGIOUS SERVICES?: MORE THAN 4 TIMES PER YEAR

## 2021-08-03 SDOH — ECONOMIC STABILITY: FOOD INSECURITY: WITHIN THE PAST 12 MONTHS, YOU WORRIED THAT YOUR FOOD WOULD RUN OUT BEFORE YOU GOT MONEY TO BUY MORE.: NEVER TRUE

## 2021-08-03 SDOH — ECONOMIC STABILITY: INCOME INSECURITY: IN THE LAST 12 MONTHS, WAS THERE A TIME WHEN YOU WERE NOT ABLE TO PAY THE MORTGAGE OR RENT ON TIME?: NO

## 2021-08-03 SDOH — SOCIAL STABILITY: SOCIAL NETWORK: HOW OFTEN DO YOU GET TOGETHER WITH FRIENDS OR RELATIVES?: MORE THAN THREE TIMES A WEEK

## 2021-08-03 SDOH — HEALTH STABILITY: MENTAL HEALTH: HOW MANY STANDARD DRINKS CONTAINING ALCOHOL DO YOU HAVE ON A TYPICAL DAY?: 1 OR 2

## 2021-08-03 SDOH — SOCIAL STABILITY: SOCIAL INSECURITY: WITHIN THE LAST YEAR, HAVE YOU BEEN HUMILIATED OR EMOTIONALLY ABUSED IN OTHER WAYS BY YOUR PARTNER OR EX-PARTNER?: NO

## 2021-08-03 SDOH — SOCIAL STABILITY: SOCIAL INSECURITY
WITHIN THE LAST YEAR, HAVE YOU BEEN KICKED, HIT, SLAPPED, OR OTHERWISE PHYSICALLY HURT BY YOUR PARTNER OR EX-PARTNER?: NO

## 2021-08-03 SDOH — ECONOMIC STABILITY: INCOME INSECURITY: HOW HARD IS IT FOR YOU TO PAY FOR THE VERY BASICS LIKE FOOD, HOUSING, MEDICAL CARE, AND HEATING?: NOT HARD AT ALL

## 2021-08-03 SDOH — SOCIAL STABILITY: SOCIAL NETWORK: HOW OFTEN DO YOU ATTENT MEETINGS OF THE CLUB OR ORGANIZATION YOU BELONG TO?: NEVER

## 2021-08-03 SDOH — HEALTH STABILITY: MENTAL HEALTH
STRESS IS WHEN SOMEONE FEELS TENSE, NERVOUS, ANXIOUS, OR CAN'T SLEEP AT NIGHT BECAUSE THEIR MIND IS TROUBLED. HOW STRESSED ARE YOU?: VERY MUCH

## 2021-08-03 SDOH — SOCIAL STABILITY: SOCIAL INSECURITY
WITHIN THE LAST YEAR, HAVE TO BEEN RAPED OR FORCED TO HAVE ANY KIND OF SEXUAL ACTIVITY BY YOUR PARTNER OR EX-PARTNER?: NO

## 2021-08-03 SDOH — SOCIAL STABILITY: SOCIAL NETWORK: ARE YOU MARRIED, WIDOWED, DIVORCED, SEPARATED, NEVER MARRIED, OR LIVING WITH A PARTNER?: WIDOWED

## 2021-08-03 SDOH — ECONOMIC STABILITY: HOUSING INSECURITY: IN THE LAST 12 MONTHS, HOW MANY PLACES HAVE YOU LIVED?: 1

## 2021-08-03 SDOH — HEALTH STABILITY: PHYSICAL HEALTH: ON AVERAGE, HOW MANY MINUTES DO YOU ENGAGE IN EXERCISE AT THIS LEVEL?: 0 MIN

## 2021-08-03 SDOH — HEALTH STABILITY: MENTAL HEALTH: HOW OFTEN DO YOU HAVE A DRINK CONTAINING ALCOHOL?: MONTHLY OR LESS

## 2021-08-03 SDOH — ECONOMIC STABILITY: TRANSPORTATION INSECURITY
IN THE PAST 12 MONTHS, HAS LACK OF TRANSPORTATION KEPT YOU FROM MEETINGS, WORK, OR FROM GETTING THINGS NEEDED FOR DAILY LIVING?: NO

## 2021-08-03 SDOH — ECONOMIC STABILITY: HOUSING INSECURITY
IN THE LAST 12 MONTHS, WAS THERE A TIME WHEN YOU DID NOT HAVE A STEADY PLACE TO SLEEP OR SLEPT IN A SHELTER (INCLUDING NOW)?: NO

## 2021-08-03 ASSESSMENT — PAIN SCALES - GENERAL
PAINLEVEL_OUTOF10: 0
PAINLEVEL_OUTOF10: 3
PAINLEVEL_OUTOF10: 0

## 2021-08-03 ASSESSMENT — PATIENT HEALTH QUESTIONNAIRE - PHQ9
1. LITTLE INTEREST OR PLEASURE IN DOING THINGS: NOT AT ALL
DEPRESSION UNABLE TO ASSESS: YES
SUM OF ALL RESPONSES TO PHQ9 QUESTIONS 1 & 2: 1
2. FEELING DOWN, DEPRESSED OR HOPELESS: SEVERAL DAYS

## 2021-08-03 NOTE — ED NOTES
[facility]  ED  Emergency Department Encounter  EmergencyMedicine Resident     Pt Name:Juni Karuse  MRN: [id number]  Birthdate 1945  Date of evaluation: 8/3/21  PCP:  Hakan Cid MD    This patient was evaluated in the Emergency Department for symptoms described in the history of present illness. The patient was evaluated in the context of the global COVID-19 pandemic, which necessitated consideration that the patient might be at risk for infection with the SARS-CoV-2 virus that causes COVID-19. Institutional protocols and algorithms that pertain to the evaluation of patients at risk for COVID-19 are in a state of rapid change based on information released by regulatory bodies including the CDC and federal and state organizations. These policies and algorithms were followed during the patient's care in the ED. CHIEF COMPLAINT       Chief Complaint   Patient presents with    Urinary Tract Infection     Concern for Sepsis       HISTORY OF PRESENT ILLNESS  (Location/Symptom, Timing/Onset, Context/Setting, Quality, Duration, Modifying Factors, Severity.)      Alyson Pathak is a 77 y.o. male who presents who presents via EMS for concerns of possible UTI/urosepsis.       Member lives at home, but is taking care of by several family members who take different shifts. Spoke with one of his caretakers. She states that members had about 3 to 4 weeks of dark-colored urine. Family has tried to get patient worked up and treated. UA 3 weeks ago was negative. Patient received another UA about a week ago which resulted as positive for proteus, enterococcus, and pseudomonas. Patient's physician put in a prescription today for the UTI, the family was unable to get it filled.      Of note, the patient has a history of a neurologic degenerative condition. Patient has a indwelling Issa and J-tube.   Patient's family member states that he does still consume some liquids, but has had increased difficulty over the last week or so with swallowing. Also states the patient's been sleeping more over the last of couple days, but denies any other ROS.      PAST MEDICAL / SURGICAL / SOCIAL / FAMILY HISTORY      has a past medical history of Arthritis, Cancer ([facility] Cerebral artery occlusion with cerebral infarction ([facility] [address] Chronic kidney disease, COPD (chronic obstructive pulmonary disease) ([facility] [address] Hypertension, Pneumonia, Psychiatric problem, and Seizures ([facility] [address] has a past surgical history that includes back surgery; fracture surgery; hernia repair; Tonsillectomy; Carotid endarterectomy (Left, 2016); Abdomen surgery; hiatal hernia repair; Inguinal hernia repair (Bilateral); other surgical history; Colonoscopy (N/A, 2018); Upper gastrointestinal endoscopy (04/15/2020); Gastrostomy tube placement (04/15/2020); Upper gastrointestinal endoscopy (4/15/2020); Upper gastrointestinal endoscopy (4/15/2020); and colectomy (N/A, 2021).     Social History     Socioeconomic History    Marital status:      Spouse name: Not on file    Number of children: Not on file    Years of education: Not on file    Highest education level: Not on file   Occupational History    Not on file   Tobacco Use    Smoking status: Former Smoker     Packs/day: 1.00     Years: 60.00     Pack years: 60.00     Types: Cigarettes     Start date:      Quit date: 2020     Years since quittin.3    Smokeless tobacco: Never Used   Vaping Use    Vaping Use: Never used   Substance and Sexual Activity    Alcohol use: No    Drug use: No    Sexual activity: Yes     Partners: Female   Other Topics Concern    Not on file   Social History Narrative    Not on file     Social Determinants of Health     Financial Resource Strain:     Difficulty of Paying Living Expenses:    Food Insecurity:     Worried About Running Out of Food in the Last Year:     [address] in the Last Year:    Transportation Needs:     Lack of Transportation (Medical):  Lack of Transportation (Non-Medical):    Physical Activity:     Days of Exercise per Week:     Minutes of Exercise per Session:    Stress:     Feeling of Stress :    Social Connections:     Frequency of Communication with Friends and Family:     Frequency of Social Gatherings with Friends and Family:     Attends Hoah[employer]ism Services:     Active Member of Clubs or Organizations:     Attends Club or Organization Meetings:     Marital Status:    Intimate Partner Violence:     Fear of Current or Ex-Partner:     Emotionally Abused:     Physically Abused:     Sexually Abused:        Family History   Problem Relation Age of Onset    Arthritis Mother     Atrial Fibrillation Mother    [facility] Hearing Loss Mother     Heart Disease Mother     Stroke Mother     Vision Loss Mother     Arthritis Father     Cancer Father     Heart Disease Father     High Blood Pressure Father     Stroke Father     Vision Loss Father        Allergies:  Bactrim [sulfamethoxazole-trimethoprim] and Ciprofloxacin    Home Medications:  Prior to Admission medications    Medication Sig Start Date End Date Taking?  Authorizing Provider   ferrous sulfate (IRON 325) 325 (65 Fe) MG tablet Take 1 tablet by mouth 2 times daily 6/7/21   Alyce Mejía MD   apixaban (ELIQUIS) 5 MG TABS tablet Take 1 tablet by mouth 2 times daily 6/7/21   Alyce Mejía MD   lisinopril (PRINIVIL;ZESTRIL) 2.5 MG tablet Take 1 tablet by mouth daily 6/8/21   Alyce Mejía MD   metoprolol succinate (TOPROL XL) 25 MG extended release tablet Take 1 tablet by mouth nightly 6/7/21   Alyce Mejía MD   furosemide (LASIX) 20 MG tablet Take 2 tablets by mouth daily 6/7/21   Alyce Mejía MD   cephALEXin (KEFLEX) 500 MG capsule 500 mgTake three times daily 6/5/21   Álvaro Galeano MD   ondansetron (ZOFRAN) 4 MG tablet Take every six hours as needed 6/5/21   Álvaro Galeano MD   cilostazol (PLETAL) 100 MG tablet Take 100 mg by mouth 2 times daily    Historical Provider, MD   famotidine (PEPCID) 20 MG tablet Take 20 mg by mouth 2 times daily    Historical Provider, MD   potassium chloride (MICRO-K) 10 MEQ extended release capsule Take 20 mEq by mouth daily    Historical Provider, MD   theophylline 80 MG/15ML elixir Take 18.8 mLs by mouth every 8 hours 4/6/20   Josey Andrade MD   atorvastatin (LIPITOR) 40 MG tablet Take 1 tablet by mouth daily 7/24/19   David Turcios MD   albuterol sulfate  (90 Base) MCG/ACT inhaler Inhale 2 puffs into the lungs every 6 hours as needed for Wheezing 6/27/19   David Turcios MD   gabapentin (NEURONTIN) 800 MG tablet Take 800 mg by mouth 3 times daily. 1/16/19   Historical Provider, MD       REVIEW OF SYSTEMS    (2-9 systems for level 4, 10 or more for level 5)      Review of Systems   Unable to perform ROS: Patient nonverbal (Discussed with family member. Only notable ROS is increased lethargy over the last few days, patient has been sleeping more than usual.)       PHYSICAL EXAM   (up to 7 for level 4, 8 or more for level 5)      INITIAL VITALS:   /67   Pulse 73   Temp 97.6 °F (36.4 °C) (Oral)   Resp 20   SpO2 97% Comment: pt. desats to mid 80s placed in 2 L O2    Physical Exam  Constitutional:       General: He is not in acute distress. Appearance: He is not toxic-appearing. HENT:      Head: Normocephalic and atraumatic. Right Ear: There is impacted cerumen. Left Ear: There is impacted cerumen. Nose: Nose normal.      Mouth/Throat:      Mouth: Mucous membranes are dry. Pharynx: Oropharynx is clear. Eyes:      Extraocular Movements: Extraocular movements intact. Conjunctiva/sclera: Conjunctivae normal.      Pupils: Pupils are equal, round, and reactive to light. Cardiovascular:      Rate and Rhythm: Normal rate. Rhythm irregular. Pulses: Normal pulses. Heart sounds: Normal heart sounds. No murmur heard. No friction rub.  No Question:   Suspected Organism(s)     Answer:   proteus/psuedomonas    vancomycin (VANCOCIN) 1250 mg in dextrose 5 % 250 mL IVPB     Order Specific Question:   Antimicrobial Indications     Answer:   Urinary Tract Infection       DDX: Urosepsis vs aspiration PNA vs complicated UTI     DIAGNOSTIC RESULTS / EMERGENCY DEPARTMENT COURSE / MDM   LAB RESULTS:  Results for orders placed or performed during the hospital encounter of 08/02/21   CBC auto differential   Result Value Ref Range    WBC 9.2 3.5 - 11.3 k/uL    RBC 3.67 (L) 4.21 - 5.77 m/uL    Hemoglobin 10.2 (L) 13.0 - 17.0 g/dL    Hematocrit 32.3 (L) 40.7 - 50.3 %    MCV 88.0 82.6 - 102.9 fL    MCH 27.8 25.2 - 33.5 pg    MCHC 31.6 28.4 - 34.8 g/dL    RDW 14.9 (H) 11.8 - 14.4 %    Platelets 730 778 - 442 k/uL    MPV 9.0 8.1 - 13.5 fL    NRBC Automated 0.0 0.0 per 100 WBC    Differential Type NOT REPORTED     Seg Neutrophils 64 36 - 65 %    Lymphocytes 20 (L) 24 - 43 %    Monocytes 11 3 - 12 %    Eosinophils % 5 (H) 1 - 4 %    Basophils 0 0 - 2 %    Immature Granulocytes 0 0 %    Segs Absolute 5.82 1.50 - 8.10 k/uL    Absolute Lymph # 1.86 1.10 - 3.70 k/uL    Absolute Mono # 1.02 0.10 - 1.20 k/uL    Absolute Eos # 0.42 0.00 - 0.44 k/uL    Basophils Absolute 0.04 0.00 - 0.20 k/uL    Absolute Immature Granulocyte 0.04 0.00 - 0.30 k/uL    WBC Morphology NOT REPORTED     RBC Morphology ANISOCYTOSIS PRESENT     Platelet Estimate NOT REPORTED    Comprehensive Metabolic Panel w/ Reflex to MG   Result Value Ref Range    Glucose 89 70 - 99 mg/dL    BUN 41 (H) 8 - 23 mg/dL    CREATININE 1.02 0.70 - 1.20 mg/dL    Bun/Cre Ratio NOT REPORTED 9 - 20    Calcium 9.1 8.6 - 10.4 mg/dL    Sodium 132 (L) 135 - 144 mmol/L    Potassium 3.8 3.7 - 5.3 mmol/L    Chloride 97 (L) 98 - 107 mmol/L    CO2 19 (L) 20 - 31 mmol/L    Anion Gap 16 9 - 17 mmol/L    Alkaline Phosphatase 61 40 - 129 U/L    ALT 9 5 - 41 U/L    AST 18 <40 U/L    Total Bilirubin 0.26 (L) 0.3 - 1.2 mg/dL    Total Protein 7.1 6.4 - 8.3 g/dL    Albumin 3.4 (L) 3.5 - 5.2 g/dL    Albumin/Globulin Ratio 0.9 (L) 1.0 - 2.5    GFR Non-African American >60 >60 mL/min    GFR African American >60 >60 mL/min    GFR Comment          GFR Staging NOT REPORTED    Urinalysis   Result Value Ref Range    Color, UA RED (A) YELLOW    Turbidity UA TURBID (A) CLEAR    Glucose, Ur NEGATIVE NEGATIVE    Bilirubin Urine NEGATIVE NEGATIVE    Ketones, Urine NEGATIVE NEGATIVE    Specific Gravity, UA 1.011 1.005 - 1.030    Urine Hgb LARGE (A) NEGATIVE    pH, UA >9.0 (H) 5.0 - 8.0    Protein, UA 3+ (A) NEGATIVE    Urobilinogen, Urine Normal Normal    Nitrite, Urine NEGATIVE NEGATIVE    Leukocyte Esterase, Urine MODERATE (A) NEGATIVE    Urinalysis Comments       Microscopic exam not performed based on chemical results unless requested in original order. Lactate, Sepsis   Result Value Ref Range    Lactic Acid, Sepsis NOT REPORTED 0.5 - 1.9 mmol/L    Lactic Acid, Sepsis, Whole Blood 2.4 (H) 0.5 - 1.9 mmol/L   Theophylline   Result Value Ref Range    Theophylline Lvl 8.6 5.0 - 15.0 ug/mL    Theophylline Dose amount NOT REPORTED     Theophylline Date last dose NOT REPORTED     Theophylline Time last dose NOT REPORTED        IMPRESSION: 77year old male with known untreated UTI. RADIOLOGY:  XR CHEST PORTABLE    Result Date: 8/3/2021  EXAMINATION: ONE XRAY VIEW OF THE CHEST 8/3/2021 1:22 am COMPARISON: 06/06/2021 HISTORY: ORDERING SYSTEM PROVIDED HISTORY: septic w/up; incr O2 requirements TECHNOLOGIST PROVIDED HISTORY: septic w/up; incr O2 requirements FINDINGS: Heart size and pulmonary vasculature are normal.  Lung volumes are low. Interstitial markings are mildly prominent and there is mild right basilar atelectasis improved from previous. No pneumothorax or pleural effusion. Surrounding osseous and soft tissue structures are unremarkable. Mild interstitial prominence, likely chronic. Mild right basilar atelectasis.      EKG  Afib with a ventricular rate of 62, normal QRS duration, normal axis, septal infarct, age undetermined, normal QT corrected    All EKG's are interpreted by the Emergency Department Physician who either signs or Co-signs this chart in the absence of a cardiologist.    EMERGENCY DEPARTMENT COURSE:  ED Course as of Aug 03 0455   Tue Aug 03, 2021   0130 Patient's UA culture results from 28 July 21. Patient's UA was positive for Proteus, Enterococcus, Pseudomonas. Susceptibilities checked, we will start on vancomycin and cefepime. [JS]   5112 X-ray reviewed. No acute pulmonary processes identified. [JS]   0302 Summary  Contrast was utilized on this technically difficult study. Left ventricle is normal in size and wall thickness. Severe global hypokinesis  Severely reduced LV systolic function, EF 53-59%  Left atrium is mildly dilated. Normal right ventricular size and function. No significant valvular regurgitation or stenosis seen. No significant pericardial effusion is seen. IVC not well visualized    [JS]   0302 ECHO results from May reviewed:  Severe global hypokinesis  Severely reduced LV systolic function, EF 86-68%  Left atrium is mildly dilated. Normal right ventricular size and function. No significant valvular regurgitation or stenosis seen. [JS]   [id number] Nursing staff noticed francis appears to be leaking, will replace. [JS]   8881 Plan to admit. [JS]      ED Course User Index  [JS] Quyen Beal DO       CONSULTS:  IP CONSULT TO HOSPITALIST  PHARMACY TO DOSE VANCOMYCIN      FINAL IMPRESSION      1. Urinary tract [facility] with indwelling urethral catheter[facility]          [facility] 08/03/2021 04:47:28 AM       PATIENT REFERRED TO:  No follow-up provider specified.     DISCHARGE MEDICATIONS:  New Prescriptions    No medications on file       Razia , DO  Emergency Medicine Resident    (Please note that portions of thisnote were completed with a voice

## 2021-08-03 NOTE — PLAN OF CARE
Nutrition Problem #1: Inadequate oral intake  Intervention: Food and/or Nutrient Delivery: Continue NPO, Start Tube Feeding  Nutritional Goals: EN intake to meet >75% of estimated nutrition needs

## 2021-08-03 NOTE — CONSULTS
Comprehensive Nutrition Assessment    Type and Reason for Visit:  Initial, Consult (TF Order/Management)    Nutrition Recommendations/Plan:   - Continue NPO  - Start tube feeding via J tube with standard with fiber formula (Jevity 1.5 Saeed) with 250 mL bolus 5x/d  - Monitor tube feeding tolerance/adequacy    Nutrition Assessment:  Consulted for tube feeding order/management. Patient has a J tube and was receiving tube feeding prior to admission. Spoke with RD at home infusion company ([employer]) who was able to report that patient was receiving Peptamen 1.5 Saeed 250 mL bolus 5x/d via pump. Per RN, patient's sister reports that patient failed a swallow study and was NPO prior to admission, but will drink beer (1-2 cans/day) and eat mashed potatoes. Patient's sister reports that patient coughs when he drinks liquids and eats solids. Labs reviewed include hyponatremia. Malnutrition Assessment:  Malnutrition Status: Moderate malnutrition    Context:  Acute Illness     Findings of the 6 clinical characteristics of malnutrition:  Energy Intake:  Unable to assess  Weight Loss:  Unable to assess     Body Fat Loss:  1 - Mild body fat loss- Buccal region, Orbital, Triceps   Muscle Mass Loss:  1 - Mild muscle mass loss- Temples (temporalis), Clavicles (pectoralis & deltoids), Hand (interosseous)  Fluid Accumulation:  No significant fluid accumulation     Strength:  Not Performed    Estimated Daily Nutrient Needs:  Energy (kcal):  4246-1988 kcal/d (23-25 kcal/kg); Weight Used for Energy Requirements:  Current (80.9 kg)     Protein (g):   g protein/d (1.0-1.2 g/kg); Weight Used for Protein Requirements:  Current (80.9 kg)        Fluid (ml/day):  8181-1551 mL fluid/d or per MD; Method Used for Fluid Requirements:  1 ml/kcal      Nutrition Related Findings:  Labs: Na 133 (L). Meds: Theophylline, Ferrous Sulfate, Lasix, Probiotics. Date of last BM unknown. Hypoactive bowel sounds.       Wounds:   Pressure area to buttocks, per RN       Current Nutrition Therapies:    Diet NPO  Current Tube Feeding (TF) Orders:  · Feeding Route: PEJ  · Formula: Standard with Fiber (Jevity 1.5 Saeed)  · Schedule: Bolus  · Goal TF & Flush Orders Provides: Jevity 1.5 Saeed 250 mL bolus 5x/d = 1250 mL total volume, 1875 kcal, 80 g protein    Anthropometric Measures:  · Height: 6' 4\" (193 cm)  · Current Body Weight: 178 lb 5.6 oz (80.9 kg)   · Admission Body Weight: 183 lb (83 kg)    · Usual Body Weight: 183 lb 10.3 oz (83.3 kg) (6/6/2021)     · Ideal Body Weight: 202 lbs; % Ideal Body Weight 88.3 %   · BMI: 21.7  · Adjusted Body Weight:  No Adjustment   · BMI Categories: Normal Weight (BMI 18.5-24. 9)       Nutrition Diagnosis:   · Inadequate oral intake related to swallowing difficulty as evidenced by NPO or clear liquid status due to medical condition, nutrition support - enteral nutrition    Nutrition Interventions:   Food and/or Nutrient Delivery:  Continue NPO, Start Tube Feeding  Nutrition Education/Counseling:  No recommendation at this time   Coordination of Nutrition Care:  Continue to monitor while inpatient    Goals:  EN intake to meet >75% of estimated nutrition needs       Nutrition Monitoring and Evaluation:   Behavioral-Environmental Outcomes:  None Identified   Food/Nutrient Intake Outcomes:  Enteral Nutrition Intake/Tolerance  Physical Signs/Symptoms Outcomes:  Biochemical Data, GI Status, Fluid Status or Edema, Nutrition Focused Physical Findings, Skin, Weight     Discharge Planning:     Too soon to determine     Electronically signed by Emre De Jesus RD, LD on 8/3/21 at 4:02 PM EDT    Contact: [phone]

## 2021-08-03 NOTE — CONSULTS
[facility] -   Infectious diseases evaluation  admission date 8/2/2021    reason for consultation:       Impression :   Current:  · Bladder spasm  · Suspected complicated cystis  · Past CVA and aphasia  · Past dysphagia and has a PEG for T feed  · Still eats by mouth  · Past many c diffs  · Sacral area excoriated at admission    Other:  · BPH  · Past bladder cancer and post resection  Discussion / summary of stay / plan of care   ·   Recommendations   · Cefepime 2 g q 12 5 ays  · Call  for opinion  · culturelle due to frequent past c diff s    Infection Control Recommendations   · [place] Precautions  · Contact Isolation       Antimicrobial Stewardship Recommendations   · Simplification of therapy  · Targeted therapy    Coordination ofOutpatient Care:   · Estimated Length of IV antimicrobials:  · Patient will need Midline / picc Catheter Insertion:   · Patient will need SNF:  · Patient will need outpatient wound care:     History of Present Illness:   Initial history:  Shmuel Jeffers is a 77y.o.-year-old male with a past history of stroke, PEG placement, has a chronic indwelling Francis. He noticed leaking around the Francis along with dysuria bladder spasm and going to many times to the bathroom. Symptoms ongoing for about 2 weeks associated with hematuria. Due to past stroke unable to communicate very well  Had LGF  Past c diff many times  No diarrhea this time    Indwelling francis wo  following at home  RN changes it  monthly  PCP comes ho me too    Admitted to the hospital, no leukocytosis, lactic acid was slightly elevated but then corrected, BNP 1738 as below. Urine analysis showing hemoglobins and leukocyte esterase    More consulted for complicated cystitis. Pt alert appropriate, expressive  Aphasia, but seems to understand    CXR neg for pneumonia but pt noticed to have a wet cough.       Interval changes  8/3/2021   Patient Vitals for the past 8 hrs:   BP Temp Temp src Pulse Resp SpO2 Height Weight   08/03/21 1312 106/64 98.1 °F (36.7 °C) Oral 74 18 98 %     08/03/21 1047 (!) 88/59 98.6 °F (37 °C) Axillary 82 18 96 %     08/03/21 0800 (!) 88/59   82 18 96 %     08/03/21 0614 105/67   69  97 %     08/03/21 0552       6' 4\" (1.93 m) 183 lb (83 kg)       Summary of relevant labs:  Labs:  WBC7.1   CREATININE0.94   Pro-BNP1,738High   Lactic Acid, Sepsis, Whole Blood1.4     Micro:  Blood culture X 2  8/3  Imaging:  Chest x-ray  Mild interstitial prominence, likely chronic. Mild right basilar atelectasis. I have personally reviewed the past medical history, past surgical history, medications, social history, and family history, and I haveupdated the database accordingly. Allergies:   Bactrim [sulfamethoxazole-trimethoprim] and Ciprofloxacin     Review of Systems:     Review of Systems   Unable to perform ROS: Other (non verbal aphasic)       Physical Examination :       Physical Exam  Constitutional:       Appearance: He is normal weight. He is not ill-appearing. HENT:      Head: Normocephalic and atraumatic. Nose: Nose normal.      Mouth/Throat:      Mouth: Mucous membranes are moist.   Eyes:      General: No scleral icterus. Conjunctiva/sclera: Conjunctivae normal.   Cardiovascular:      Rate and Rhythm: Normal rate and regular rhythm. Heart sounds: Normal heart sounds. No murmur heard. Pulmonary:      Effort: No respiratory distress. Breath sounds: Normal breath sounds. Abdominal:      General: There is no distension. Palpations: Abdomen is soft. Genitourinary:     Comments: Indwelling francis - urin clear  Musculoskeletal:         General: No swelling or tenderness. Cervical back: No tenderness. Skin:     Coloration: Skin is not jaundiced or pale. Neurological:      Mental Status: He is alert.       Comments: Weak x 4 limbs right > left   Psychiatric:      Comments: Calm seems to understand the discussion         Past Medical History:     Past Medical History:   Diagnosis Date    Arthritis     Cancer [facility])     bladder 1980s    Cerebral artery occlusion with cerebral infarction [facility])     TIA 2010    Chronic kidney disease     [facility] [address]     Hypertension     Pneumonia     Psychiatric problem     depression, social anxiety    Seizures ([facility] [address]        Past Surgical  History:     Past Surgical History:   Procedure Laterality Date    ABDOMEN SURGERY      BACK SURGERY      spinal surgery 2005     CAROTID ENDARTERECTOMY Left 09/20/2016    COLECTOMY N/A 5/28/2021    Exploratory Laparotomy. Release of adhesive band with release of small bowel obstruction. Small bowel resection with primary anastomosis performed by Reno Tran MD at [address] COLONOSCOPY N/A 8/31/2018    COLONOSCOPY POLYPECTOMY COLD BIOPSY performed by Miguelito Cazares MD at [address] [place] [address]      left face    GASTROSTOMY TUBE PLACEMENT  04/15/2020    HERNIA REPAIR      HIATAL HERNIA REPAIR      INGUINAL HERNIA REPAIR Bilateral     OTHER SURGICAL HISTORY      mesh infected in inguinal canal, had to remove. Removed testiscle at this time.      TONSILLECTOMY      UPPER GASTROINTESTINAL ENDOSCOPY  04/15/2020       EGD CONTROL HEMORRHAGE    UPPER GASTROINTESTINAL ENDOSCOPY  4/15/2020    EGD CONTROL HEMORRHAGE performed by So Gannon MD at [address] [facility]  4/15/2020    EGD ESOPHAGOGASTRODUODENOSCOPY PEG TUBE INSERTION performed by So Gannon MD at [facility]       Medications:      apixaban  5 mg Oral BID    atorvastatin  40 mg Oral Daily    cilostazol  100 mg Oral BID    famotidine  20 mg Oral BID    ferrous sulfate  325 mg Oral BID    gabapentin  800 mg Oral TID    metoprolol succinate  25 mg Oral Nightly    [Held by provider] lisinopril  2.5 mg Oral Daily    furosemide  40 mg Oral Daily    theophylline  100 mg Oral 3 times per day    sodium chloride flush  5-40 mL Intravenous 2 times per day    cefepime  1,000 mg Intravenous Q12H    vancomycin  1,250 mg Intravenous Q12H    vancomycin (VANCOCIN) intermittent dosing (placeholder)   Other RX Placeholder       Social History:     Social History     Socioeconomic History    Marital status:      Spouse name: Not on file    Number of children: Not on file    Years of education: Not on file    Highest education level: Not on file   Occupational History    Not on file   Tobacco Use    Smoking status: Former Smoker     Packs/day: 1.00     Years: 60.00     Pack years: 60.00     Types: Cigarettes     Start date:      Quit date: 2020     Years since quittin.3    Smokeless tobacco: Never Used   Vaping Use    Vaping Use: Never used   Substance and Sexual Activity    Alcohol use: No    Drug use: No    Sexual activity: Yes     Partners: Female   Other Topics Concern    Not on file   Social History Narrative    Not on file     Social Determinants of Health     Financial Resource Strain:     Difficulty of Paying Living Expenses:    Food Insecurity:     Worried About Running Out of Food in the Last Year:     Ran Out of Food in the Last Year:    Transportation Needs:     Lack of Transportation (Medical):      Lack of Transportation (Non-Medical):    Physical Activity:     Days of Exercise per Week:     Minutes of Exercise per Session:    Stress:     Feeling of Stress :    Social Connections:     Frequency of Communication with Friends and Family:     Frequency of Social Gatherings with Friends and Family:     Attends Baptism Services:     Active Member of Clubs or Organizations:     Attends Club or Organization Meetings:     Marital Status:    Intimate Partner Violence:     Fear of Current or Ex-Partner:     Emotionally Abused:     Physically Abused:     Sexually Abused:        Family History:     Family History   Problem Relation Age of Onset    Arthritis Mother     Atrial Fibrillation Mother    [facility] Hearing Loss Mother     Heart Disease Mother     Stroke Mother     Vision Loss Mother     Arthritis Father     Cancer Father     Heart Disease Father     High Blood Pressure Father     Stroke Father     Vision Loss Father       Medical Decision Making:   I have independently reviewed/ordered the following labs:    CBC with Differential:   Recent Labs     08/03/21  0234 08/03/21  0604   WBC 9.2 7.1   HGB 10.2* 10.2*   HCT 32.3* 32.0*    244   LYMPHOPCT 20*  --    MONOPCT 11  --      BMP:  Recent Labs     08/03/21  0234 08/03/21  0604   * 133*   K 3.8 3.8   CL 97* 100   CO2 19* 22   BUN 41* 37*   CREATININE 1.02 0.94     Hepatic Function Panel:   Recent Labs     08/03/21  0234   PROT 7.1   LABALBU 3.4*   BILITOT 0.26*   ALKPHOS 61   ALT 9   AST 18     No results for input(s): RPR in the last 72 hours. No results for input(s): HIV in the last 72 hours. No results for input(s): BC in the last 72 hours. Lab Results   Component Value Date    CREATININE 0.94 08/03/2021    GLUCOSE 139 08/03/2021       Detailed results: Thank you for allowing us to participate in the care of this patient. Please call with questions. This note is created with the assistance of a speech recognition program.  While intending to generate adocument that actually reflects the content of the visit, the document can still have some errors including those of syntax and sound a like substitutions which may escape proof reading. It such instances, actual meaningcan be extrapolated by contextual diversion.     Castillo Burden MD  Office: [phone]  Perfect serve / office [phone]

## 2021-08-03 NOTE — H&P
[facility]  Office: [address], DO, Jarrod Wright, DO, Rachana Dozier, DO, Terrell Cano, DO, Richie Fox MD, Nithin Morales MD, Kari Méndez MD, Kiley Munson MD, Matty Vera MD, Ankush Rod MD, Neha Peter MD, Brian Kaur DO, Lisbeth Bateman MD, Blas Magdaleno DO, Jose R Lopez MD,  Luis Felipe Soto DO, Isai Loco MD, Sulma Adair MD, Zaki Flores MD, Simon Henry MD, Tano Goldman MD, Lenny Vasquez MD, Aurelia Mckee MD, Jorge Alberto Moss, [facility], [facility], CNP, Nakul Galdamez, CNP, Fareed Quiles, CNS, Cherrieradha El, CNP, Claude Leep, CNP, Elinor Sesay, CNP, Teena Wiseman, CNP, Valeria George, CNP, Gustavo Posada PA-C, Krista Alonzo, [facility], Tana Fletcher, CNP, Adrienne Coley, CNP, Justine Jeronimo, CNP, Mani Connelly, CNP, Jody Oglesby, CNP, Kristen Khan, CNP, Regan Godwin, [facility], Adarsh Daugherty, [address] [place] [address]    HISTORY AND PHYSICAL EXAMINATION            Date:   8/3/2021  Patient name:  Guillaume Wyatt  Date of admission:  8/2/2021 11:42 PM  MRN:   [id number]  Account:  [de-identified]  YOB: 1945  PCP:    Emilio Koyanagi, MD  Room:   39/39  Code Status:    DNR-CCA    Chief Complaint:     Chief Complaint   Patient presents with    Urinary Tract Infection     Concern for Sepsis       History Obtained From:     Patient son Cuba Palacios , electronic medical record    History of Present Illness:     Guillaume Wyatt is a 77 y.o. Non- / non  male who presents for evaluation of dysuria, bladder spasm, and polyuria. Pt has a chronic francis catheter in place and has also noted some leaking around the francis. Pt called [facility] who advised transfer to our hospital for evaluation. Pt has had some pain for about two weeks. Pt has a lot of hematuria. At baseline, pt is awake, alert, but cannot communicate well because of underlying neurologic disease.  Pt appears to be in a lot of pain currently. On admission, CMP significant for sodium of 133, proBNP 1738, CBC significant for hemoglobin of 10.2 but otherwise unremarkable. UA did show moderate leuk esterase with large hemoglobin and findings consistent with a UTI. He does have a history of Pseudomonas, enterococci and was started on antibiotics admitted to the hospital for further care. Past Medical History:     Past Medical History:   Diagnosis Date    Arthritis     Cancer [facility])     bladder 1980s    Cerebral artery occlusion with cerebral infarction [facility])     TIA 2010    Chronic kidney disease     COPD [facility] obstructive [facility] disease) [facility] [facility]     Hypertension     Pneumonia     Psychiatric problem     depression, social anxiety    Seizures ([facility]         Past Surgical History:     Past Surgical History:   Procedure Laterality Date    ABDOMEN SURGERY      BACK SURGERY      spinal surgery 2005     CAROTID ENDARTERECTOMY Left 09/20/2016    COLECTOMY N/A 5/28/2021    Exploratory Laparotomy. Release of adhesive band with release of small bowel obstruction. Small bowel resection with primary anastomosis performed by Jus Kearney MD at [address] COLONOSCOPY N/A 8/31/2018    COLONOSCOPY POLYPECTOMY COLD BIOPSY performed by Ephraim Lopez MD at [address] [place] [address]      left face    GASTROSTOMY TUBE PLACEMENT  04/15/2020    HERNIA REPAIR      HIATAL HERNIA REPAIR      INGUINAL HERNIA REPAIR Bilateral     OTHER SURGICAL HISTORY      mesh infected in inguinal canal, had to remove. Removed testiscle at this time.      TONSILLECTOMY      UPPER GASTROINTESTINAL ENDOSCOPY  04/15/2020       EGD CONTROL HEMORRHAGE    UPPER GASTROINTESTINAL ENDOSCOPY  4/15/2020    EGD CONTROL HEMORRHAGE performed by Tabby Pleitez MD at [address] [place] [address] ENDOSCOPY  4/15/2020    EGD ESOPHAGOGASTRODUODENOSCOPY PEG TUBE INSERTION performed by Tabby Pleitez MD at [facility]        Medications Prior to Admission:     Prior to Admission medications    Medication Sig Start Date End Date Taking? Authorizing Provider   ferrous sulfate (IRON 325) 325 (65 Fe) MG tablet Take 1 tablet by mouth 2 times daily 6/7/21   Edie Carreno MD   apixaban (ELIQUIS) 5 MG TABS tablet Take 1 tablet by mouth 2 times daily 6/7/21   Edie Carreno MD   lisinopril (PRINIVIL;ZESTRIL) 2.5 MG tablet Take 1 tablet by mouth daily 6/8/21   Edie Carreno MD   metoprolol succinate (TOPROL XL) 25 MG extended release tablet Take 1 tablet by mouth nightly 6/7/21   Edie Carreno MD   furosemide (LASIX) 20 MG tablet Take 2 tablets by mouth daily 6/7/21   Edie Carreno MD   cephALEXin (KEFLEX) 500 MG capsule 500 mgTake three times daily 6/5/21   Reynaldo Johnston MD   ondansetron (ZOFRAN) 4 MG tablet Take every six hours as needed 6/5/21   Reynaldo Johnston MD   cilostazol (PLETAL) 100 MG tablet Take 100 mg by mouth 2 times daily    Historical Provider, MD   famotidine (PEPCID) 20 MG tablet Take 20 mg by mouth 2 times daily    Historical Provider, MD   potassium chloride (MICRO-K) 10 MEQ extended release capsule Take 20 mEq by mouth daily    Historical Provider, MD   theophylline 80 MG/15ML elixir Take 18.8 mLs by mouth every 8 hours 4/6/20   Higinio Hill MD   atorvastatin (LIPITOR) 40 MG tablet Take 1 tablet by mouth daily 7/24/19   Lisa Shirley MD   albuterol sulfate  (90 Base) MCG/ACT inhaler Inhale 2 puffs into the lungs every 6 hours as needed for Wheezing 6/27/19   Lisa Shirley MD   gabapentin (NEURONTIN) 800 MG tablet Take 800 mg by mouth 3 times daily. 1/16/19   Historical Provider, MD        Allergies:     Bactrim [sulfamethoxazole-trimethoprim] and Ciprofloxacin    Social History:     Tobacco:    reports that he quit smoking about 16 months ago. His smoking use included cigarettes. He started smoking about 65 years ago. He has a 60.00 pack-year smoking history.  He has never used smokeless tobacco.  Alcohol:      reports no history of alcohol use. Drug Use:  reports no history of drug use. Family History:     Family History   Problem Relation Age of Onset    Arthritis Mother     Atrial Fibrillation Mother     Hearing Loss Mother     Heart Disease Mother     Stroke Mother     Vision Loss Mother     Arthritis Father     Cancer Father     Heart Disease Father     High Blood Pressure Father     Stroke Father     Vision Loss Father        Review of Systems:     Positive and Negative as described in HPI. ROS difficult to obtain due to pts severe muscular disability. He is able to nod his head yes or no.      CONSTITUTIONAL:  negative for fevers, chills, sweats, fatigue, weight loss admits to pain  HEENT:  negative for vision, hearing changes, runny nose, throat pain  RESPIRATORY:  negative for shortness of breath, cough, congestion, wheezing  CARDIOVASCULAR:  negative for chest pain, palpitations  GASTROINTESTINAL:  negative for nausea, vomiting, diarrhea, constipation, change in bowel habits, abdominal pain   GENITOURINARY:  Admits to ifficulty of urination, burning with urination, frequency   INTEGUMENT:  negative for rash, skin lesions, easy bruising   HEMATOLOGIC/LYMPHATIC:  negative for swelling/edema   ALLERGIC/IMMUNOLOGIC:  negative for urticaria , itching  ENDOCRINE:  negative increase in drinking, increase in urination, hot or cold intolerance  MUSCULOSKELETAL:  negative joint pains, muscle aches, swelling of joints  NEUROLOGICAL:  negative for headaches, dizziness, lightheadedness, numbness, pain, tingling extremities  BEHAVIOR/PSYCH:  negative for depression, anxiety    Physical Exam:   BP (!) 88/59   Pulse 82   Temp 97.6 °F (36.4 °C) (Oral)   Resp 18   Ht 6' 4\" (1.93 m)   Wt 183 lb (83 kg)   SpO2 96%   BMI 22.28 kg/m²   Temp (24hrs), Av.6 °F (36.4 °C), Min:97.6 °F (36.4 °C), Max:97.6 °F (36.4 °C)    No results for input(s): POCGLU in the last 72 hours.    Intake/Output Summary (Last 24 hours) at 8/3/2021 1038  Last data filed at 8/3/2021 0806  Gross per 24 hour   Intake    Output 600 ml   Net -600 ml       General Appearance: alert,chronically ill appearing male in bed uncomfortable appearing. Mental status: pt not able to communicate verbally nods yes when I ask if hes in the hospital.   Head: normocephalic, atraumatic  Eye: no icterus, redness, pupils equal and reactive, extraocular eye movements intact, conjunctiva clear  Ear: normal external ear, no discharge, hearing intact  Nose: no drainage noted  Mouth: mucous membranes moist  Neck: supple, no carotid bruits, thyroid not palpable  Lungs: Bilateral equal air entry, clear to ausculation, no wheezing, rales or rhonchi, normal effort  Cardiovascular: tachycardic irregular rhythm, no murmur, gallop, rub  Abdomen: Soft, nontender, nondistended, normal bowel sounds, no hepatomegaly or splenomegaly Peg tube in place. Neurologic: There are no new focal motor or sensory deficits, decreased muscle tone and bulk, no abnormal sensation, normal speech, cranial nerves II through XII grossly intact  Skin: No gross lesions, rashes, bruising or bleeding on exposed skin area  Extremities: peripheral pulses palpable, no pedal edema or calf pain with palpation  Psych: normal affect    Investigations:      Laboratory Testing:  Recent Results (from the past 24 hour(s))   EKG 12 lead    Collection Time: 08/03/21 12:24 AM   Result Value Ref Range    Ventricular Rate 62 BPM    Atrial Rate 64 BPM    QRS Duration 74 ms    Q-T Interval 424 ms    QTc Calculation (Bazett) 430 ms    R Axis 62 degrees    T Axis 73 degrees   Culture, Blood 2    Collection Time: 08/03/21 12:35 AM    Specimen: Blood   Result Value Ref Range    Specimen Description . BLOOD     Special Requests NOT REPORTED     Culture NO GROWTH 6 HOURS    Culture, Blood 1    Collection Time: 08/03/21 12:38 AM    Specimen: Blood   Result Value Ref Range    Specimen Description . BLOOD     Special Requests LT FOREARM 9ML     Culture NO GROWTH 6 HOURS    CBC auto differential    Collection Time: 08/03/21  2:34 AM   Result Value Ref Range    WBC 9.2 3.5 - 11.3 k/uL    RBC 3.67 (L) 4.21 - 5.77 m/uL    Hemoglobin 10.2 (L) 13.0 - 17.0 g/dL    Hematocrit 32.3 (L) 40.7 - 50.3 %    MCV 88.0 82.6 - 102.9 fL    MCH 27.8 25.2 - 33.5 pg    MCHC 31.6 28.4 - 34.8 g/dL    RDW 14.9 (H) 11.8 - 14.4 %    Platelets 601 033 - 248 k/uL    MPV 9.0 8.1 - 13.5 fL    NRBC Automated 0.0 0.0 per 100 WBC    Differential Type NOT REPORTED     Seg Neutrophils 64 36 - 65 %    Lymphocytes 20 (L) 24 - 43 %    Monocytes 11 3 - 12 %    Eosinophils % 5 (H) 1 - 4 %    Basophils 0 0 - 2 %    Immature Granulocytes 0 0 %    Segs Absolute 5.82 1.50 - 8.10 k/uL    Absolute Lymph # 1.86 1.10 - 3.70 k/uL    Absolute Mono # 1.02 0.10 - 1.20 k/uL    Absolute Eos # 0.42 0.00 - 0.44 k/uL    Basophils Absolute 0.04 0.00 - 0.20 k/uL    Absolute Immature Granulocyte 0.04 0.00 - 0.30 k/uL    WBC Morphology NOT REPORTED     RBC Morphology ANISOCYTOSIS PRESENT     Platelet Estimate NOT REPORTED    Comprehensive Metabolic Panel w/ Reflex to MG    Collection Time: 08/03/21  2:34 AM   Result Value Ref Range    Glucose 89 70 - 99 mg/dL    BUN 41 (H) 8 - 23 mg/dL    CREATININE 1.02 0.70 - 1.20 mg/dL    Bun/Cre Ratio NOT REPORTED 9 - 20    Calcium 9.1 8.6 - 10.4 mg/dL    Sodium 132 (L) 135 - 144 mmol/L    Potassium 3.8 3.7 - 5.3 mmol/L    Chloride 97 (L) 98 - 107 mmol/L    CO2 19 (L) 20 - 31 mmol/L    Anion Gap 16 9 - 17 mmol/L    Alkaline Phosphatase 61 40 - 129 U/L    ALT 9 5 - 41 U/L    AST 18 <40 U/L    Total Bilirubin 0.26 (L) 0.3 - 1.2 mg/dL    Total Protein 7.1 6.4 - 8.3 g/dL    Albumin 3.4 (L) 3.5 - 5.2 g/dL    Albumin/Globulin Ratio 0.9 (L) 1.0 - 2.5    GFR Non-African American >60 >60 mL/min    GFR African American >60 >60 mL/min    GFR Comment          GFR Staging NOT REPORTED    Lactate, Sepsis    Collection Time: 08/03/21 2:34 AM   Result Value Ref Range    Lactic Acid, Sepsis NOT REPORTED 0.5 - 1.9 mmol/L    Lactic Acid, Sepsis, Whole Blood 2.4 (H) 0.5 - 1.9 mmol/L   Theophylline    Collection Time: 08/03/21  2:34 AM   Result Value Ref Range    Theophylline Lvl 8.6 5.0 - 15.0 ug/mL    Theophylline Dose amount NOT REPORTED     Theophylline Date last dose NOT REPORTED     Theophylline Time last dose NOT REPORTED    Urinalysis    Collection Time: 08/03/21  2:36 AM   Result Value Ref Range    Color, UA RED (A) YELLOW    Turbidity UA TURBID (A) CLEAR    Glucose, Ur NEGATIVE NEGATIVE    Bilirubin Urine NEGATIVE NEGATIVE    Ketones, Urine NEGATIVE NEGATIVE    Specific Gravity, UA 1.011 1.005 - 1.030    Urine Hgb LARGE (A) NEGATIVE    pH, UA >9.0 (H) 5.0 - 8.0    Protein, UA 3+ (A) NEGATIVE    Urobilinogen, Urine Normal Normal    Nitrite, Urine NEGATIVE NEGATIVE    Leukocyte Esterase, Urine MODERATE (A) NEGATIVE    Urinalysis Comments       Microscopic exam not performed based on chemical results unless requested in original order.    Lactate, Sepsis    Collection Time: 08/03/21  5:20 AM   Result Value Ref Range    Lactic Acid, Sepsis NOT REPORTED 0.5 - 1.9 mmol/L    Lactic Acid, Sepsis, Whole Blood 1.2 0.5 - 1.9 mmol/L   Basic Metabolic Panel w/ Reflex to MG    Collection Time: 08/03/21  6:04 AM   Result Value Ref Range    Glucose 139 (H) 70 - 99 mg/dL    BUN 37 (H) 8 - 23 mg/dL    CREATININE 0.94 0.70 - 1.20 mg/dL    Bun/Cre Ratio NOT REPORTED 9 - 20    Calcium 8.6 8.6 - 10.4 mg/dL    Sodium 133 (L) 135 - 144 mmol/L    Potassium 3.8 3.7 - 5.3 mmol/L    Chloride 100 98 - 107 mmol/L    CO2 22 20 - 31 mmol/L    Anion Gap 11 9 - 17 mmol/L    GFR Non-African American >60 >60 mL/min    GFR African American >60 >60 mL/min    GFR Comment          GFR Staging NOT REPORTED    CBC    Collection Time: 08/03/21  6:04 AM   Result Value Ref Range    WBC 7.1 3.5 - 11.3 k/uL    RBC 3.64 (L) 4.21 - 5.77 m/uL    Hemoglobin 10.2 (L) 13.0 - 17.0 g/dL Hematocrit 32.0 (L) 40.7 - 50.3 %    MCV 87.9 82.6 - 102.9 fL    MCH 28.0 25.2 - 33.5 pg    MCHC 31.9 28.4 - 34.8 g/dL    RDW 14.7 (H) 11.8 - 14.4 %    Platelets 841 685 - 897 k/uL    MPV 8.7 8.1 - 13.5 fL    NRBC Automated 0.0 0.0 per 100 WBC   Protime-INR    Collection Time: 08/03/21  6:04 AM   Result Value Ref Range    Protime 12.4 (H) 9.1 - 12.3 sec    INR 1.2    Brain Natriuretic Peptide    Collection Time: 08/03/21  6:04 AM   Result Value Ref Range    Pro-BNP 1,738 (H) <300 pg/mL    BNP Interpretation Pro-BNP Reference Range:    Lactate, Sepsis    Collection Time: 08/03/21  7:48 AM   Result Value Ref Range    Lactic Acid, Sepsis NOT REPORTED 0.5 - 1.9 mmol/L    Lactic Acid, Sepsis, Whole Blood 1.4 0.5 - 1.9 mmol/L       Imaging/Diagnostics:  XR CHEST PORTABLE    Result Date: 8/3/2021  Mild interstitial prominence, likely chronic. Mild right basilar atelectasis. Assessment :      Hospital Problems         Last Modified POA    * (Principal) Urinary tract infection associated with indwelling urethral catheter ([facility] [address] 8/3/2021 Yes    Status post insertion of percutaneous endoscopic gastrostomy (PEG) tube ([address] 8/3/2021 Yes    COPD (chronic obstructive pulmonary disease) ([facility] 8/3/2021 Yes    PAF (paroxysmal atrial fibrillation) ([address] 8/3/2021 Yes    Heart failure with reduced ejection fraction ([address] 8/3/2021 Yes    Hyponatremia 8/3/2021 Yes    Iron deficiency anemia 8/3/2021 Yes          Plan:     Patient status inpatient in the Med/Surge    1. Acute Urinary tract infection: Continue IV antibiotics. Follow up on Culture data. Repeat Uc. Exchange francis catheter. Check SOHEILA  2. HFrE-chronicF: EF 15-20: No acute exacerbation. Monitor. Restart ACE/BB. Pt does have chronic hypotension 2/2 his CHF. Strict I/O's. Check daily weights. Replace magnesium/potassium. Continue Lipitor. Hold lasix today given hypotension  3. PAF: currently in Afib. Start toprol, continue Eliquis.    4. Essential Hypertension: Blood pressure stable, on lower ent. 5. COPD no acute exacerbation: No evidence of COPD exacerbation  6. Iron def anemia: Restart ferrous sulfate  7. Hx CVA: continue ASA, statin. 8. Labs, Imaging, Ecg reviewed  9. Discussed with Infectious disease  10. DVT ppx  11. PT/OT. 12. Discharge planning per CM. 13. Previous notes reviewed from [facility] discharge from [facility] hospital 67/721  Consultations:   [address] TO INFECTIOUS DISEASES  IP CONSULT TO [address]     Patient is admitted as inpatient status because of co-morbidities listed above, severity of signs and symptoms as outlined, requirement for current medical therapies and most importantly because of direct risk to patient if care not provided in a hospital setting. Expected length of stay > 48 hours.     Tabby Palafox DO  8/3/2021  10:38 AM    Copy sent to Dr. Linda Rojas MD

## 2021-08-03 NOTE — DISCHARGE INSTR - COC
Continuity of Care Form    Patient Name: Oliver Sosa   :    MRN:  [id number]    Admit date:  2021  Discharge date: 2021    Code Status Order: DNR-CCA   Advance Directives:      Admitting Physician:  No admitting provider for patient encounter. PCP: Raul Anaya MD    Discharging Nurse: PRISCILLA -AMG [facility] Unit/Room#: 0317/0317-01  Discharging Unit Phone Number: [phone]    Emergency Contact:   Extended Emergency Contact Information  Primary Emergency Contact: [address], [address] Phone: [phone]  Mobile Phone: [phone]  Relation: Brother/Sister  Secondary Emergency Contact: Brannon Caba  Home Phone: [phone]  Work Phone: [phone]  Mobile Phone: [phone]  Relation: Child    Past Surgical History:  Past Surgical History:   Procedure Laterality Date    ABDOMEN SURGERY      BACK SURGERY      spinal surgery      CAROTID ENDARTERECTOMY Left 2016    COLECTOMY N/A 2021    Exploratory Laparotomy. Release of adhesive band with release of small bowel obstruction. Small bowel resection with primary anastomosis performed by Brenda Rice MD at [address] COLONOSCOPY N/A 2018    COLONOSCOPY POLYPECTOMY COLD BIOPSY performed by Damaris Bolaños MD at [address] [place] [address]      left face    GASTROSTOMY TUBE PLACEMENT  04/15/2020    HERNIA REPAIR      HIATAL HERNIA REPAIR      INGUINAL HERNIA REPAIR Bilateral     OTHER SURGICAL HISTORY      mesh infected in inguinal canal, had to remove. Removed testiscle at this time.  TONSILLECTOMY      UPPER GASTROINTESTINAL ENDOSCOPY  04/15/2020       EGD CONTROL HEMORRHAGE    UPPER GASTROINTESTINAL ENDOSCOPY  4/15/2020    EGD CONTROL HEMORRHAGE performed by Kameron Leung MD at [facility] ENDOSCOPY  4/15/2020    EGD ESOPHAGOGASTRODUODENOSCOPY PEG TUBE INSERTION performed by Kameron Leung MD at [facility] End[facility]       Immunization History:      There is no immunization history on file for this patient.     Active Problems:  Patient Active Problem List   Diagnosis Code    Constipation K59.00    Change in bowel habits R19.4    Rectal bleeding K62.5    Aortic dissection ([facility]) I71.00    Bradycardia R00.1    Other dysphagia R13.19    Enteritis K52.9    Aspiration pneumonia of both lower lobes ([facility]) J69.0    Tobacco abuse Z72.0    Aspiration pneumonitis ([address] J69.0    Status post insertion of percutaneous endoscopic gastrostomy (PEG) tube ([facility] Z93.1    Small bowel obstruction ([facility] K56.609    Acute cystitis without hematuria N30.00    Mass of right lung R91.8    COPD (chronic obstructive pulmonary disease) ([facility]) J44.9    PAF (paroxysmal atrial fibrillation) ([facility]) I48.0    Urinary tract infection associated with indwelling urethral catheter ([facility]) T83.511A, N39.0    Heart failure with reduced ejection fraction ([facility]) I50.20    Hyponatremia E87.1    Iron deficiency anemia D50.9       Isolation/Infection:   Isolation            No Isolation          Patient Infection Status       Infection Onset Added Last Indicated Last Indicated By Review Planned Expiration Resolved Resolved By    None active    Resolved    COVID-19 Rule Out 05/24/21 05/24/21 05/24/21 COVID-19, Rapid (Ordered)   05/24/21 Rule-Out Test Resulted    C-diff Rule Out 01/21/21 01/21/21 01/21/21 C. difficile toxin Molecular (Ordered)   01/21/21 Rule-Out Test Resulted    C-diff Rule Out 12/10/20 12/10/20 12/10/20 C. difficile toxin Molecular (Ordered)   12/11/20 Rule-Out Test Resulted    C-diff Rule Out 10/19/20 10/19/20 10/19/20 C. difficile toxin Molecular (Ordered)   10/20/20 Rule-Out Test Resulted    C-diff Rule Out 09/22/20 09/22/20 09/22/20 C. difficile toxin Molecular (Ordered)   09/23/20 Rule-Out Test Resulted    C-diff Rule Out 08/10/20 08/10/20 08/10/20 Gastrointestinal Panel, Molecular (Ordered)   08/10/20 Rule-Out Test Resulted    COVID-19 Rule Out 04/07/20 04/07/20 04/07/20 COVID-19 (Ordered)   04/08/20 Rule-Out Test Resulted            Nurse Assessment:  Last Vital Signs: BP (!) 112/56   Pulse 76   Temp 97.6 °F (36.4 °C) (Oral)   Resp 18   Ht 6' 4\" (1.93 m)   Wt 178 lb 5.6 oz (80.9 kg)   SpO2 (!) 87%   BMI 21.71 kg/m²     Last documented pain score (0-10 scale): Pain Level: 0  Last Weight:   Wt Readings from Last 1 Encounters:   08/03/21 178 lb 5.6 oz (80.9 kg)     Mental Status:  oriented, alert and coherent    IV Access:  PICC line right upper arm placed 8/4/2021    Nursing Mobility/ADLs:  Walking   Dependent  Transfer  Dependent  Bathing  Dependent  Dressing  Dependent  Toileting  Dependent  Feeding  Dependent  Med Admin  Dependent  Med Delivery   peg tube     Wound Care Documentation and Therapy:        Elimination:  Continence:   · Bowel: Yes  · Bladder: Yes  Urinary Catheter: inserted 8/3/2021    Colostomy/Ileostomy/Ileal Conduit: No       Date of Last BM: 8/5/2021    Intake/Output Summary (Last 24 hours) at 8/3/2021 1944  Last data filed at 8/3/2021 1900  Gross per 24 hour   Intake 120 ml   Output 1375 ml   Net -1255 ml     I/O last 3 completed shifts:  In: -   Out: 925 [Urine:925]    Safety Concerns:     None    Impairments/Disabilities:      Speech    Nutrition Therapy:  Current Nutrition Therapy:   - Tube Feedings:  Standard with fiber    Routes of Feeding: Gastrostomy Tube  Liquids: ***  Daily Fluid Restriction: no  Last Modified Barium Swallow with Video (Video Swallowing Test): not done    Treatments at the Time of Hospital Discharge:   Respiratory Treatments: ***  Oxygen Therapy:  is not on home oxygen therapy.   Ventilator:    - No ventilator support    Rehab Therapies: Physical Therapy and Occupational Therapy  Weight Bearing Status/Restrictions: No weight bearing restirctions  Other Medical Equipment (for information only, NOT a DME order):  ***  Other Treatments: Midline care and dressing change per protocol, IV Cefepime every 12 hours until 8/9/2021    Patient's personal belongings (please select all that are sent with patient):  foot drop boots     RN SIGNATURE:  Electronically signed by Teresa Cannon RN on 8/5/21 at 10:42 AM EDT    CASE MANAGEMENT/SOCIAL WORK SECTION    Inpatient Status Date: 8/3/21    Readmission Risk Assessment Score:  Readmission Risk              Risk of Unplanned Readmission:  24           Discharging to Facility/ Agency   · Name: [facility] [facility] [address]         Phone: [phone]       Fax: [phone]          [facility]  [address] [facility] [address]         Phone: [phone]       Fax: [phone]          Dialysis Facility (if applicable)   · Name:  · Address:  · Dialysis Schedule:  · Phone:  · Fax:    / signature:  Electronically signed by William Kerr RN on 8/5/2021 at 12:06 PM      PHYSICIAN SECTION    Prognosis: Fair    Condition at Discharge: Stable    Rehab Potential (if transferring to Rehab): Fair    Recommended Labs or Other Treatments After Discharge:   Maintain francis catheter - recommend exchange every 4 weeks by home health  Follow with urology  Complete IV antibiotics as ordered  CBC and basic metabolic panel on 5/0/7694. Results to Socorro Khan MD    Physician Certification: I certify the above information and transfer of Santa Judd  is necessary for the continuing treatment of the diagnosis listed and that he requires [address] for greater 30 days.      Update Admission H&P: Changes in H&P as follows - see discharge summary    PHYSICIAN SIGNATURE:  Electronically signed by Richard Baron MD on 8/4/21 at 1:33 PM EDT

## 2021-08-03 NOTE — PROGRESS NOTES
Pharmacy Note  Vancomycin Consult    Rex Taylor is a 77 y.o. male started on Vancomycin for UTI; consult received from Dr. Fermin Olguin to manage therapy. Also receiving the following antibiotics: cefepime. Patient Active Problem List   Diagnosis    Constipation    Change in bowel habits    Rectal bleeding    Aortic dissection (HCC)    Bradycardia    Other dysphagia    Enteritis    Aspiration pneumonia of both lower lobes (HCC)    Tobacco abuse    Aspiration pneumonitis (HCC)    Status post insertion of percutaneous endoscopic gastrostomy (PEG) tube (HCC)    Small bowel obstruction (HCC)    Acute cystitis without hematuria    Mass of right lung    COPD (chronic obstructive pulmonary disease) (HCC)    PAF (paroxysmal atrial fibrillation) (HCC)    UTI (urinary tract infection)     Allergies:  Bactrim [sulfamethoxazole-trimethoprim] and Ciprofloxacin     Temp max: 97.6 F  (36.4 C)    Recent Labs     08/03/21  0234   BUN 41*   CREATININE 1.02   WBC 9.2     No intake or output data in the 24 hours ending 08/03/21 0602  Culture Date      Source                       Results       Ht Readings from Last 1 Encounters:   08/03/21 6' 4\" (1.93 m)        Wt Readings from Last 1 Encounters:   08/03/21 183 lb (83 kg)       Body mass index is 22.28 kg/m². Estimated Creatinine Clearance: 75 mL/min (based on SCr of 1.02 mg/dL). Goal Trough Level: 10-20 mcg/mL    Assessment/Plan:  Will initiate Vancomycin  1250 mg IV every 12 hours. Timing of trough level will be determined based on culture results, renal function, and clinical response. Thank you for the consult. Will continue to follow.

## 2021-08-03 NOTE — ED PROVIDER NOTES
Faculty Sign-Out Attestation  Handoff taken on the following patient from prior Attending Physician: Justen Valera    I was available and discussed any additional care issues that arose and coordinated the management plans with the resident(s) caring for the patient during my duty period. Any areas of disagreement with residents documentation of care or procedures are noted on the chart. I was personally present for the key portions of any/all procedures during my duty period. I have documented in the chart those procedures where I was not present during the key portions.     Urine changes, / uti, j tube, francis, needing abx,   Labs pending,   Need admit    Harjeet Sheth DO  Attending Physician     Harjeet Sheth,   08/03/21 0226    Fluids given, abx started, cultures & admit,      Harjeet Sheth DO  08/03/21 0420

## 2021-08-03 NOTE — ED NOTES
Bed: 39  Expected date:   Expected time:   Means of arrival:   Comments:  YANIV Dowling 75, [address] [facility]  08/03/21 7126

## 2021-08-03 NOTE — ED NOTES
Stress:     Feeling of Stress :    Social Connections:     Frequency of Communication with Friends and Family:     Frequency of Social Gatherings with Friends and Family:     Attends Spiritism Services:     Active Member of Clubs or Organizations:     Attends Club or Organization Meetings:     Marital Status:    Intimate Partner Violence:     Fear of Current or Ex-Partner:     Emotionally Abused:     Physically Abused:     Sexually Abused:        Family History   Problem Relation Age of Onset    Arthritis Mother     Atrial Fibrillation Mother    [facility] Hearing Loss Mother     Heart Disease Mother     Stroke Mother     Vision Loss Mother     Arthritis Father     Cancer Father     Heart Disease Father     High Blood Pressure Father     Stroke Father     Vision Loss Father        Allergies:  Bactrim [sulfamethoxazole-trimethoprim] and Ciprofloxacin    Home Medications:  Prior to Admission medications    Medication Sig Start Date End Date Taking?  Authorizing Provider   ferrous sulfate (IRON 325) 325 (65 Fe) MG tablet Take 1 tablet by mouth 2 times daily 6/7/21   Jim Dennison MD   apixaban (ELIQUIS) 5 MG TABS tablet Take 1 tablet by mouth 2 times daily 6/7/21   Jim Dennison MD   lisinopril (PRINIVIL;ZESTRIL) 2.5 MG tablet Take 1 tablet by mouth daily 6/8/21   Jim Dennison MD   metoprolol succinate (TOPROL XL) 25 MG extended release tablet Take 1 tablet by mouth nightly 6/7/21   Jim Dennison MD   furosemide (LASIX) 20 MG tablet Take 2 tablets by mouth daily 6/7/21   Jim Dennison MD   cephALEXin (KEFLEX) 500 MG capsule 500 mgTake three times daily 6/5/21   Marian Barr MD   ondansetron (ZOFRAN) 4 MG tablet Take every six hours as needed 6/5/21   Marian Barr MD   cilostazol (PLETAL) 100 MG tablet Take 100 mg by mouth 2 times daily    Historical Provider, MD   famotidine (PEPCID) 20 MG tablet Take 20 mg by mouth 2 times daily    Historical Provider, MD   potassium chloride (MICRO-K) 10 MEQ extended release capsule Take 20 mEq by mouth daily    Historical Provider, MD   theophylline 80 MG/15ML elixir Take 18.8 mLs by mouth every 8 hours 4/6/20   Josey Andrade MD   atorvastatin (LIPITOR) 40 MG tablet Take 1 tablet by mouth daily 7/24/19   David Turcios MD   albuterol sulfate  (90 Base) MCG/ACT inhaler Inhale 2 puffs into the lungs every 6 hours as needed for Wheezing 6/27/19   David Turcios MD   gabapentin (NEURONTIN) 800 MG tablet Take 800 mg by mouth 3 times daily. 1/16/19   Historical Provider, MD       REVIEW OF SYSTEMS    (2-9 systems for level 4, 10 or more for level 5)      Review of Systems    PHYSICAL EXAM   (up to 7 for level 4, 8 or more for level 5)      INITIAL VITALS:   /67   Pulse 73   Resp 20   SpO2 97% Comment: pt. desats to mid 80s placed in 2 L O2    Physical Exam    DIFFERENTIAL  DIAGNOSIS     PLAN (LABS / IMAGING / EKG):  Orders Placed This Encounter   Procedures    Culture, Urine    Culture, Blood 1    Culture, Blood 2    XR CHEST PORTABLE    CBC auto differential    Comprehensive Metabolic Panel w/ Reflex to MG    Urinalysis    Lactate, Sepsis    THEOPHYLLINE LEVEL    Initiate Oxygen Therapy Protocol    Pulse oximetry, continuous    EKG 12 lead       MEDICATIONS ORDERED:  Orders Placed This Encounter   Medications    0.9 % sodium chloride bolus       DDX: ***    DIAGNOSTIC RESULTS / EMERGENCY DEPARTMENT COURSE / MDM   LAB RESULTS:  No results found for this visit on 08/02/21. IMPRESSION: ***    RADIOLOGY:  ***    EKG  ***    All EKG's are interpreted by the Emergency Department Physician who either signs or Co-signs this chart in the absence of a cardiologist.    EMERGENCY DEPARTMENT COURSE:  ***    PROCEDURES:  ***    CONSULTS:  None    CRITICAL CARE:  ***    FINAL IMPRESSION      No diagnosis found. DISPOSITION / PLAN     DISPOSITION        PATIENT REFERRED TO:  No follow-up provider specified.     DISCHARGE

## 2021-08-03 NOTE — ED NOTES
Pt resting comfortably in no acute distress. Respirations even and unlabored. Call light remains within reach. No needs at this time.        Reema Fletcher, CHRISSIE  08/03/21 4760

## 2021-08-03 NOTE — ED NOTES
Bed: 28  Expected date:   Expected time:   Means of arrival:   Comments:  Norma Jameson 07, [address] [facility]  08/02/21 9930

## 2021-08-03 NOTE — ED NOTES
Report called to Sandy Dalton all questions answered, pt left via transport to ultrasound then 317.    Electronically signed by Gordo Weaver RN on 8/3/2021 at 12:18 PM       Gordo Weaver RN  08/03/21 3178

## 2021-08-03 NOTE — ED PROVIDER NOTES
[facility]     Emergency Department     Faculty Attestation    I performed a history and physical examination of the patient and discussed management with the resident. I have reviewed and agree with the residents findings including all diagnostic interpretations, and treatment plans as written at the time of my review. Any areas of disagreement are noted on the chart. I was personally present for the key portions of any procedures. I have documented in the chart those procedures where I was not present during the key portions. For Physician Assistant/ Nurse Practitioner cases/documentation I have personally evaluated this patient and have completed at least one if not all key elements of the E/M (history, physical exam, and MDM). Additional findings are as noted. This patient was evaluated in the Emergency Department for symptoms described in the history of present illness. The patient was evaluated in the context of the global COVID-19 pandemic, which necessitated consideration that the patient might be at risk for infection with the SARS-CoV-2 virus that causes COVID-19. Institutional protocols and algorithms that pertain to the evaluation of patients at risk for COVID-19 are in a state of rapid change based on information released by regulatory bodies including the CDC and federal and state organizations. These policies and algorithms were followed during the patient's care in the ED. Primary Care Physician: Kathrin Aguilar MD    History: This is a 77 y.o. male who presents to the Emergency Department with complaint of sent in for concerns regular tract infection. No other further history can be obtained from the patient secondary to his condition. Per EMS family members stated they were concerned that he may have a urinary tract infection as his urine and his Issa is becoming darker.     Physical:   blood pressure is 112/67 and his pulse is 73. His respiration is 20 and oxygen saturation is 97%. Patient moans, lungs show some mild diminished breath sounds otherwise clear, heart irregular rate and rhythm, abdomen he does have a J-tube.  shows a Issa. Impression: Concerns for urinary tract infection    Plan: IV fluid, CBC, CMP, lactic acid, blood cultures, urinalysis, urine culture, EKG, troponin, chest x-ray      EKG Interpretation    Interpreted by me  Atrial fibrillation with a ventricular rate of 62, normal QRS duration, normal axis, septal infarct, age undetermined, normal QT corrected  Compared EKG of May 31, 2021, ventricular rate has decreased by 34        (Please note that portions of this note were completed with a voice recognition program.  Efforts were made to edit the dictations but occasionally words are mis-transcribed.)    Cindy Dears.  Divya Lloyd MD, [facility]  Attending Emergency Medicine Physician        David Yu MD  08/03/21 9716

## 2021-08-03 NOTE — ED NOTES
Pt. To the ED via squad w/ concerns of sepsis. Family concerned about appearence of urine. Pt. Has started treatment for UTI but family feels that he just wasn't acting right. Pt. Is at baseline mentation. Pt. Unable to communicate. Dr. Naila Hernandez at bedside to assess.       Debbie Huang RN  08/03/21 0441

## 2021-08-03 NOTE — CARE COORDINATION
Case Management Initial Discharge Plan  Juan Saini to pt's sister Lena Alonso to  discuss discharge plans. Information verified: address, contacts, phone number, , insurance Yes  Insurance Provider: [employer] [facility] [employer]    Emergency Contact/Next of Kin name & number: pt's sister Mayra Mcneil [phone], pt's son Neli Escobedo [phone]   Who are involved in patient's support system? Pt's family    PCP: Rose Pavon MD  Date of last visit: pt sees NP, Chadne Records with [address]. Last visit to home was 1 month ago      Discharge Planning    Living Arrangements:  Family Members     Home has 1 story  Pt uses ramp to get into front door    Patient able to perform ADL's:Independent    Current Services (outpatient & in home) [facility] [address] for Tube Feed  DME equipment: Electric WC, Electric Scooter, [address] [facility] bed, Nebulizer,   DME provider: n/a    Is patient receiving oral anticoagulation therapy? Yes    If indicated:   Physician managing anticoagulation treatment: n/a  Where does patient obtain lab work for ATC treatment? n/a      Potential Assistance Needed:  Home Care    Patient agreeable to home care: Yes, continue with [facility]  [place] of choice provided:  yes    Prior SNF/Rehab Placement and Facility: none  Agreeable to SNF/Rehab: No  [place] of choice provided: n/a     Evaluation: n/a    Expected Discharge date:  21    Patient expects to be discharged to:        If home: is the family and/or caregiver wiling & able to provide support at home? yes  Who will be providing this support? family    Follow Up Appointment: Best Day/ Time:      Transportation provider:  Danielle Orellana  Transportation arrangements needed for discharge: Yes    Readmission Risk              Risk of Unplanned Readmission:  24         Does patient have a readmission risk score greater than 14?: Yes  If yes, follow-up appointment must be

## 2021-08-03 NOTE — ED NOTES
Report Given to [facility]  All questions and concerns addressed      Anastacio Molina RN  08/03/21 2777

## 2021-08-03 NOTE — ED NOTES
Patients room smells of urine,  Writer went to boost patient up in bed and noticed brief soaked with urine  Patient must be having bladder spasms,  Writer spoke with Dr. Akhil Villegas   Patient has a small abrasion on right coccyx  Writer placed ricki Rosado RN  08/03/21 9026

## 2021-08-03 NOTE — ED NOTES
Gave report to [facility], RN  08/03/21 6181 Initiate Treatment: Restart Betamethasone to affected areas of trunk bid x 4 weeks prn flares Detail Level: Zone Plan: Continue daily moisturizing\\nSent with lab req to check Hepatic Function Panel and Hepatitis Panel per pts desire.

## 2021-08-03 NOTE — ED NOTES
Patient resting comfortably on stretcher, in no apparent distress  Respirations even and non-labored  Patient has no needs at this time  Call light remains within reach     [facility]  08/03/21 2410

## 2021-08-04 PROBLEM — E87.1 HYPONATREMIA: Status: RESOLVED | Noted: 2021-08-03 | Resolved: 2021-08-04

## 2021-08-04 LAB
CULTURE: ABNORMAL
INTERVENTION: NORMAL
Lab: ABNORMAL
SPECIMEN DESCRIPTION: ABNORMAL

## 2021-08-04 PROCEDURE — C1751 CATH, INF, PER/CENT/MIDLINE: HCPCS

## 2021-08-04 PROCEDURE — 6370000000 HC RX 637 (ALT 250 FOR IP): Performed by: NURSE PRACTITIONER

## 2021-08-04 PROCEDURE — 6370000000 HC RX 637 (ALT 250 FOR IP): Performed by: INTERNAL MEDICINE

## 2021-08-04 PROCEDURE — 76937 US GUIDE VASCULAR ACCESS: CPT

## 2021-08-04 PROCEDURE — 2580000003 HC RX 258: Performed by: NURSE PRACTITIONER

## 2021-08-04 PROCEDURE — 6360000002 HC RX W HCPCS: Performed by: INTERNAL MEDICINE

## 2021-08-04 PROCEDURE — 99232 SBSQ HOSP IP/OBS MODERATE 35: CPT | Performed by: INTERNAL MEDICINE

## 2021-08-04 PROCEDURE — G0378 HOSPITAL OBSERVATION PER HR: HCPCS

## 2021-08-04 PROCEDURE — 1200000000 HC SEMI PRIVATE

## 2021-08-04 PROCEDURE — 96366 THER/PROPH/DIAG IV INF ADDON: CPT

## 2021-08-04 PROCEDURE — 2580000003 HC RX 258: Performed by: INTERNAL MEDICINE

## 2021-08-04 RX ORDER — AMOXICILLIN 500 MG/1
500 CAPSULE ORAL 3 TIMES DAILY
Qty: 15 CAPSULE | Refills: 0 | Status: SHIPPED | OUTPATIENT
Start: 2021-08-04 | End: 2021-08-09

## 2021-08-04 RX ORDER — AMOXICILLIN 500 MG/1
500 CAPSULE ORAL EVERY 8 HOURS SCHEDULED
Status: DISCONTINUED | OUTPATIENT
Start: 2021-08-04 | End: 2021-08-05 | Stop reason: HOSPADM

## 2021-08-04 RX ADMIN — GABAPENTIN 800 MG: 800 TABLET ORAL at 20:52

## 2021-08-04 RX ADMIN — GABAPENTIN 800 MG: 800 TABLET ORAL at 00:23

## 2021-08-04 RX ADMIN — CILOSTAZOL 100 MG: 100 TABLET ORAL at 00:23

## 2021-08-04 RX ADMIN — FAMOTIDINE 20 MG: 20 TABLET, FILM COATED ORAL at 00:24

## 2021-08-04 RX ADMIN — METOPROLOL TARTRATE 25 MG: 25 TABLET ORAL at 09:53

## 2021-08-04 RX ADMIN — THEOPHYLLINE 100 MG: 80 SOLUTION ORAL at 01:12

## 2021-08-04 RX ADMIN — FAMOTIDINE 20 MG: 20 TABLET, FILM COATED ORAL at 09:52

## 2021-08-04 RX ADMIN — GABAPENTIN 800 MG: 800 TABLET ORAL at 15:15

## 2021-08-04 RX ADMIN — CILOSTAZOL 100 MG: 100 TABLET ORAL at 09:52

## 2021-08-04 RX ADMIN — THEOPHYLLINE 100 MG: 80 SOLUTION ORAL at 09:53

## 2021-08-04 RX ADMIN — CILOSTAZOL 100 MG: 100 TABLET ORAL at 20:52

## 2021-08-04 RX ADMIN — Medication 2 CAPSULE: at 09:52

## 2021-08-04 RX ADMIN — CEFEPIME HYDROCHLORIDE 2000 MG: 2 INJECTION, POWDER, FOR SOLUTION INTRAVENOUS at 15:14

## 2021-08-04 RX ADMIN — SODIUM CHLORIDE, PRESERVATIVE FREE 10 ML: 5 INJECTION INTRAVENOUS at 10:07

## 2021-08-04 RX ADMIN — SODIUM CHLORIDE, PRESERVATIVE FREE 10 ML: 5 INJECTION INTRAVENOUS at 00:24

## 2021-08-04 RX ADMIN — CEFEPIME HYDROCHLORIDE 2000 MG: 2 INJECTION, POWDER, FOR SOLUTION INTRAVENOUS at 23:08

## 2021-08-04 RX ADMIN — ATORVASTATIN CALCIUM 40 MG: 80 TABLET, FILM COATED ORAL at 09:53

## 2021-08-04 RX ADMIN — Medication 2 CAPSULE: at 00:24

## 2021-08-04 RX ADMIN — APIXABAN 5 MG: 5 TABLET, FILM COATED ORAL at 09:53

## 2021-08-04 RX ADMIN — GABAPENTIN 800 MG: 800 TABLET ORAL at 09:53

## 2021-08-04 RX ADMIN — AMOXICILLIN 500 MG: 500 CAPSULE ORAL at 15:17

## 2021-08-04 RX ADMIN — FAMOTIDINE 20 MG: 20 TABLET, FILM COATED ORAL at 20:52

## 2021-08-04 RX ADMIN — FERROUS SULFATE TAB EC 325 MG (65 MG FE EQUIVALENT) 325 MG: 325 (65 FE) TABLET DELAYED RESPONSE at 09:52

## 2021-08-04 RX ADMIN — METOPROLOL TARTRATE 25 MG: 25 TABLET ORAL at 20:52

## 2021-08-04 RX ADMIN — FUROSEMIDE 40 MG: 20 TABLET ORAL at 09:52

## 2021-08-04 RX ADMIN — APIXABAN 5 MG: 5 TABLET, FILM COATED ORAL at 00:23

## 2021-08-04 RX ADMIN — AMOXICILLIN 500 MG: 500 CAPSULE ORAL at 22:06

## 2021-08-04 RX ADMIN — FERROUS SULFATE TAB EC 325 MG (65 MG FE EQUIVALENT) 325 MG: 325 (65 FE) TABLET DELAYED RESPONSE at 00:23

## 2021-08-04 RX ADMIN — THEOPHYLLINE 100 MG: 80 SOLUTION ORAL at 17:35

## 2021-08-04 RX ADMIN — SODIUM CHLORIDE, PRESERVATIVE FREE 10 ML: 5 INJECTION INTRAVENOUS at 21:05

## 2021-08-04 RX ADMIN — FERROUS SULFATE TAB EC 325 MG (65 MG FE EQUIVALENT) 325 MG: 325 (65 FE) TABLET DELAYED RESPONSE at 20:52

## 2021-08-04 RX ADMIN — APIXABAN 5 MG: 5 TABLET, FILM COATED ORAL at 20:52

## 2021-08-04 RX ADMIN — CEFEPIME HYDROCHLORIDE 2000 MG: 2 INJECTION, POWDER, FOR SOLUTION INTRAVENOUS at 03:12

## 2021-08-04 RX ADMIN — Medication 2 CAPSULE: at 20:52

## 2021-08-04 RX ADMIN — Medication 2 CAPSULE: at 15:15

## 2021-08-04 ASSESSMENT — PAIN SCALES - GENERAL: PAINLEVEL_OUTOF10: 0

## 2021-08-04 NOTE — PROGRESS NOTES
Report handed off to night shift nurse, concerning the re-timing of the Cefipime dose due at 4 am tomorrow. Pt needs dose to be given at 11 pm tonight and the next dose for 8/5 to be given at 9 am. Writer spoke with pharmacist Shamir Tiwari and Shamir Tiwari will retime dose for 2300.  Nurse on day shift will need to call at 8 am to have the next dose due at 9am. Electronically signed by Neela Moore RN on 8/4/2021 at 7:36 PM

## 2021-08-04 NOTE — PROCEDURES
4 FR BARD Midline placed in Right Basilic vein. Line placed under sterile procedure. Line was trimmed at 17 cm marking, with 15 cm inserted and 2 cm out for line manipulation and dressing. Minimal bleeding during the procedure. Line flushes and draws with ease.

## 2021-08-04 NOTE — CARE COORDINATION
Transitional Planning    Spoke to  Patient's sister Abrahan Evans at bedside about plan for discharge. Home with [facility] does tube feeding. Cm told her [place] [facility] will also supply IV antibiotic that is ordered. She confirms there is someone who can learn to do IV antibiotics for patient at home. [address] [phone] at [facility] and left VM that patient will be discharged on IV antibiotics. Faxed script to [phone] at . Sherin Toure and told her that patient will have IV antibiotic at home. Cefepime 2 grams IV q 12 hours for 5 days. [address] at [facility] confirms receipt of IV antibiotic script. She has to run benefits. Anticipate discharge today or tomorrow. Patient still needs a mid line placed. [address] to patient's RN and she spoke to pharmacist about rescheduling antibiotic times. She is able to reshedule patient's antibiotic so he will get dose tonight at 9pm and tomorrow morning at 9am so patient can be transported home in afternoon and [employer] [employer] can be at patient's house around [address]. This was related to [place] with Raúl and she relates this will work for her [employer]. 1430 Spoke to patient's son Frances James and told him that plan is for patient to be picked up tomorrow early afternoon for transportation to home address. Frances James relates he will make sure someone is there. Waiting on approval from insurance for antibiotics. 12 Spoke to  at [facility], told her plan is for transport home around 1pm, next antibiotic due at 9pm. She expects to get benefit approval for antibiotic by the end of the day.       1500 CM left VM with Grant that  time is for 1pm tomorrow

## 2021-08-04 NOTE — PROGRESS NOTES
[facility] -   Infectious diseases evaluation  admission date 8/2/2021    reason for consultation:       Impression :   Current:  · Bladder spasm  · Suspected complicated cystis  · Past CVA and aphasia  · Past dysphagia and has a PEG for T feed  · Still eats by mouth  · Past many c diffs  · Sacral area excoriated at admission  · Hx QTC > 500     Other:  · BPH  · Past bladder cancer and post resection  Discussion / summary of stay / plan of care   ·   Recommendations   · Cefepime 2 g q 12 5 days  · Add amoxicillin po   · plan AB 7 days till 8/9/21  · OK for Dc w midline - ordered - reconsiled      Infection Control Recommendations   · [place] Precautions  · Contact Isolation       Antimicrobial Stewardship Recommendations   · Simplification of therapy  · Targeted therapy    Coordination ofOutpatient Care:   · Estimated Length of IV antimicrobials:  · Patient will need Midline / picc Catheter Insertion:   · Patient will need SNF:  · Patient will need outpatient wound care:     History of Present Illness:   Initial history:  Willard Mathew is a 77y.o.-year-old male with a past history of stroke, PEG placement, has a chronic indwelling Francis. He noticed leaking around the Francis along with dysuria bladder spasm and going to many times to the bathroom. Symptoms ongoing for about 2 weeks associated with hematuria. Due to past stroke unable to communicate very well  Had LGF  Past c diff many times  No diarrhea this time    Indwelling francis wo  following at home  RN changes it  monthly  PCP comes ho me too    Admitted to the hospital, no leukocytosis, lactic acid was slightly elevated but then corrected, BNP 1738 as below. Urine analysis showing hemoglobins and leukocyte esterase    More consulted for complicated cystitis. Pt alert appropriate, expressive  Aphasia, but seems to understand    CXR neg for pneumonia but pt noticed to have a wet cough.       Interval changes 8/4/2021   Patient Vitals for the past 8 hrs:   BP Temp Temp src Pulse Resp   08/04/21 0953 118/77   76    08/04/21 0800 118/68 98.2 °F (36.8 °C) Axillary 73 18   no fever  Still aphasic  abd soft  U cx 3 org, as below - all S to ci[pro but pr allergic too cipro and recent QTC was > 500    Summary of relevant labs:  Labs:  WBC7.1    CREATININE0.94   Pro-BNP1,738High   Lactic Acid, Sepsis, Whole Blood1.4     Micro:  Blood culture X 2  8/3  U cx 7/28  Proteus and Enterococcus pseudomonas   Imaging:  Chest x-ray  Mild interstitial prominence, likely chronic. Mild right basilar atelectasis. I have personally reviewed the past medical history, past surgical history, medications, social history, and family history, and I haveupdated the database accordingly. Allergies:   Bactrim [sulfamethoxazole-trimethoprim] and Ciprofloxacin     Review of Systems:     Review of Systems   Unable to perform ROS: Other (non verbal aphasic)       Physical Examination :       Physical Exam  Constitutional:       Appearance: He is normal weight. He is not ill-appearing or diaphoretic. HENT:      Head: Normocephalic and atraumatic. Nose: Nose normal.      Mouth/Throat:      Mouth: Mucous membranes are moist.   Eyes:      General: No scleral icterus. Conjunctiva/sclera: Conjunctivae normal.   Cardiovascular:      Rate and Rhythm: Normal rate and regular rhythm. Heart sounds: Normal heart sounds. No murmur heard. Pulmonary:      Effort: No respiratory distress. Breath sounds: Normal breath sounds. No stridor. Abdominal:      General: There is no distension. Palpations: Abdomen is soft. Genitourinary:     Comments: Indwelling francis - urin clear  Musculoskeletal:         General: No swelling, tenderness, deformity or signs of injury. Cervical back: No tenderness. Skin:     Coloration: Skin is not jaundiced or pale. Neurological:      Mental Status: He is alert.       Comments: Weak x 4 Daily    theophylline  100 mg Oral 3 times per day    sodium chloride flush  5-40 mL Intravenous 2 times per day    cefepime  2,000 mg Intravenous Q12H    lactobacillus  2 capsule Oral TID    metoprolol tartrate  25 mg Oral BID       Social History:     Social History     Socioeconomic History    Marital status:      Spouse name: Not on file    Number of children: Not on file    Years of education: Not on file    Highest education level: Not on file   Occupational History    Not on file   Tobacco Use    Smoking status: Former Smoker     Packs/day: 1.00     Years: 60.00     Pack years: 60.00     Types: Cigarettes     Start date:      Quit date: 2020     Years since quittin.3    Smokeless tobacco: Never Used   Vaping Use    Vaping Use: Never used   Substance and Sexual Activity    Alcohol use: No    Drug use: No    Sexual activity: Yes     Partners: Female   Other Topics Concern    Not on file   Social History Narrative    Not on file     Social Determinants of Health     Financial Resource Strain: Low Risk     Difficulty of Paying Living Expenses: Not hard at all   Food Insecurity: No Food Insecurity    Worried About Running Out of Food in the Last Year: Never true    Blas of Food in the Last Year: Never true   Transportation Needs: No Transportation Needs    Lack of Transportation (Medical): No    Lack of Transportation (Non-Medical): No   Physical Activity: Inactive    Days of Exercise per Week: 0 days    Minutes of Exercise per Session: 0 min   Stress: Stress Concern Present    Feeling of Stress : Very much   Social Connections:  Moderately Isolated    Frequency of Communication with Friends and Family: More than three times a week    Frequency of Social Gatherings with Friends and Family: More than three times a week    Attends Orthodox Services: More than 4 times per year    Active Member of [employer] or Organizations: No    Attends Club or Organization Meetings:

## 2021-08-04 NOTE — CONSULTS
Isa Disla, [address] [facility] [place], [facility]  Urology Consultation      Patient:  Alexa Nathan  MRN: [id number]  YOB: 1945    CHIEF COMPLAINT:  Chronic francis, UTI    HISTORY OF PRESENT ILLNESS:   Alexa Nathan is a 77 y.o. male who presents for evaluation of dysuria, bladder spasm, and polyuria. Pt has a chronic francis catheter in place and has also noted some leaking around the francis. Pt called [email or web address] who advised transfer to our hospital for evaluation. Pt has had some pain for about two weeks. At baseline, pt is awake, alert, but cannot communicate well because of underlying neurologic disease. UA did show moderate leuk esterase with large hemoglobin and findings consistent with a UTI. He does have a history of Pseudomonas, enterococci and was started on antibiotics admitted to the hospital for further care. ID is on board. Pt has history of CVA/bed bound. Had francis placed by home health care. Has had for a while now. No  history in the chart. He is on cefepime and ID recommends IV for 5 days. Per chart hx bladder cancer in 1980s. Francis was changed in ED yesterday. Patient's old records, notes and chart reviewed and summarized above. Past Medical History:    Past Medical History:   Diagnosis Date    Arthritis     Cancer [facility])     bladder 1980s    Cerebral artery occlusion with cerebral infarction [facility])     TIA 2010    Chronic kidney disease     COPD [facility]ronic obstructive [facility] disease) [facility] [address]     Hypertension     Pneumonia     Psychiatric problem     depression, social anxiety    Seizures ([facility] [address]        Past Surgical History:    Past Surgical History:   Procedure Laterality Date    ABDOMEN SURGERY      BACK SURGERY      spinal surgery 2005     CAROTID ENDARTERECTOMY Left 09/20/2016    COLECTOMY N/A 5/28/2021    Exploratory Laparotomy. Release of adhesive band with release of small bowel obstruction.   Small bowel resection with primary anastomosis performed by Álvaro Galeano MD at [address] [place] [address] COLONOSCOPY N/A 8/31/2018    COLONOSCOPY POLYPECTOMY COLD BIOPSY performed by Kelsey Bergman MD at [address] [place] [address]      left face    GASTROSTOMY TUBE PLACEMENT  04/15/2020    HERNIA REPAIR      HIATAL HERNIA REPAIR      INGUINAL HERNIA REPAIR Bilateral     OTHER SURGICAL HISTORY      mesh infected in inguinal canal, had to remove. Removed testiscle at this time.      TONSILLECTOMY      UPPER GASTROINTESTINAL ENDOSCOPY  04/15/2020       EGD CONTROL HEMORRHAGE    UPPER GASTROINTESTINAL ENDOSCOPY  4/15/2020    EGD CONTROL HEMORRHAGE performed by Karen Engel MD at [address] [place] [address] ENDOSCOPY  4/15/2020    EGD ESOPHAGOGASTRODUODENOSCOPY PEG TUBE INSERTION performed by Karen Engel MD at [facility]       Medications:      Current Facility-Administered Medications:     amoxicillin (AMOXIL) capsule 500 mg, 500 mg, Oral, 3 times per day, Norma Ashley MD    albuterol sulfate  (90 Base) MCG/ACT inhaler 2 puff, 2 puff, Inhalation, Q6H PRN, Reeves Pattee, APRN - CNP    apixaban (ELIQUIS) tablet 5 mg, 5 mg, Oral, BID, Reeves Pattee, APRN - CNP, 5 mg at 08/04/21 3616    atorvastatin (LIPITOR) tablet 40 mg, 40 mg, Oral, Daily, Reeves Pattee, APRN - CNP, 40 mg at 08/04/21 1593    cilostazol (PLETAL) tablet 100 mg, 100 mg, Oral, BID, Reeves Pattee, APRN - CNP, 100 mg at 08/04/21 5256    famotidine (PEPCID) tablet 20 mg, 20 mg, Oral, BID, Reeves Pattee, APRN - CNP, 20 mg at 08/04/21 3519    ferrous sulfate (FE TABS 325) EC tablet 325 mg, 325 mg, Oral, BID, Reeves Pattee, APRN - CNP, 325 mg at 08/04/21 2189    gabapentin (NEURONTIN) tablet 800 mg, 800 mg, Oral, TID, Reeves Pattee, APRN - CNP, 800 mg at 08/04/21 1321    [Held by provider] lisinopril (PRINIVIL;ZESTRIL) tablet 2.5 mg, 2.5 mg, Oral, Daily, Leandro Nelson, APRN - CNP    furosemide (LASIX) tablet 40 mg, 40 mg, Oral, Daily, Salvador Po, DO, 40 mg at 21 6411    theophylline 80 MG/15ML oral solution 100 mg, 100 mg, Oral, 3 times per day, RAGHAV Nieto CNP, 100 mg at 21 4982    sodium chloride flush 0.9 % injection 5-40 mL, 5-40 mL, Intravenous, 2 times per day, RAGHAV Nieto - CNP, 10 mL at 21 1007    sodium chloride flush 0.9 % injection 5-40 mL, 5-40 mL, Intravenous, PRN, RAGHAV Nieto CNP    0.9 % sodium chloride infusion, 25 mL, Intravenous, PRN, RAGHAV Nieto CNP    ondansetron (ZOFRAN-ODT) disintegrating tablet 4 mg, 4 mg, Oral, Q8H PRN **OR** ondansetron (ZOFRAN) injection 4 mg, 4 mg, Intravenous, Q6H PRN, RAGHAV Nieto CNP    acetaminophen (TYLENOL) tablet 650 mg, 650 mg, Oral, Q6H PRN, 650 mg at 21 **OR** acetaminophen (TYLENOL) suppository 650 mg, 650 mg, Rectal, Q6H PRN, RAGHAV Nieto CNP    polyethylene glycol (GLYCOLAX) packet 17 g, 17 g, Oral, Daily PRN, RAGHAV Nieto CNP    cefepime (MAXIPIME) 2000 mg IVPB minibag, 2,000 mg, Intravenous, Q12H, Norma Nathan MD, Stopped at 21 0800    lactobacillus (CULTURELLE) capsule 2 capsule, 2 capsule, Oral, TID, Norma Nathan MD, 2 capsule at 21 3354    metoprolol tartrate (LOPRESSOR) tablet 25 mg, 25 mg, Oral, BID, RAGHAV Huggins CNP, 25 mg at 21 6015    Allergies:     Allergies   Allergen Reactions    Bactrim [Sulfamethoxazole-Trimethoprim] Hives and Swelling    Ciprofloxacin Swelling     FACE       Social History:   Social History     Socioeconomic History    Marital status:      Spouse name: Not on file    Number of children: Not on file    Years of education: Not on file    Highest education level: Not on file   Occupational History    Not on file   Tobacco Use    Smoking status: Former Smoker     Packs/day: 1.00     Years: 60.00     Pack years: 60.00     Types: Cigarettes     Start date:      Quit date: 2020     Years since quittin.3    Smokeless tobacco: Never Used   Vaping Use    Vaping Use: Never used   Substance and Sexual Activity    Alcohol use: No    Drug use: No    Sexual activity: Yes     Partners: Female   Other Topics Concern    Not on file   Social History Narrative    Not on file     Social Determinants of Health     Financial Resource Strain: Low Risk     Difficulty of Paying Living Expenses: Not hard at all   Food Insecurity: No Food Insecurity    Worried About Running Out of Food in the Last Year: Never true    Blas of Food in the Last Year: Never true   Transportation Needs: No Transportation Needs    Lack of Transportation (Medical): No    Lack of Transportation (Non-Medical): No   Physical Activity: Inactive    Days of Exercise per Week: 0 days    Minutes of Exercise per Session: 0 min   Stress: Stress Concern Present    Feeling of Stress : Very much   Social Connections: Moderately Isolated    Frequency of Communication with Friends and Family: More than three times a week    Frequency of Social Gatherings with Friends and Family: More than three times a week    Attends Holiness Services: More than 4 times per year    Active Member of Clubs or Organizations: No    Attends Club or Organization Meetings: Never    Marital Status:    Intimate Partner Violence: Not At Risk    Fear of Current or Ex-Partner: No    Emotionally Abused: No    Physically Abused: No    Sexually Abused: No       Family History:    Family History   Problem Relation Age of Onset    Arthritis Mother     Atrial Fibrillation Mother     Hearing Loss Mother     Heart Disease Mother     Stroke Mother     Vision Loss Mother     Arthritis Father     Cancer Father     Heart Disease Father     High Blood Pressure Father     Stroke Father     Vision Loss Father        REVIEW OF SYSTEMS:  A comprehensive 14 point review of systems was not obtained as pt unable to effectively communicate. Physical Exam:    This a 77 y.o. male   Vitals:    08/04/21 0953   BP: 118/77   Pulse: 76   Resp:    Temp:    SpO2:        Constitutional: Patient in no acute distress  Neuro: Alert and oriented to person place and time  Psych: Mood and affect normal  Head: Atraumatic and normocephalic  Neck: Trachea midline  Lungs: Respiratory effort normal  Cardiovascular:  Regular rhythm  Abdomen: Soft, non-tender, non-distended. CVA tenderness none  Ext: 2+ DP pulses bilaterally  Skin: No rashes or bruising present  Bladder: Non-tender and not distended  Lymphatics: No palpable lymphadenopathy    :  Penis normal and circumcised, no erosion, catheter in place, urine yellow  Urethral meatus normal  Scrotal exam normal  Testicles normal bilaterally  Epididymis normal bilaterally  No evidence of inguinal hernia  Rectal exam deferred     Labs:  Recent Labs     08/03/21  0234 08/03/21  0604   WBC 9.2 7.1   HGB 10.2* 10.2*   HCT 32.3* 32.0*   MCV 88.0 87.9    244     Recent Labs     08/03/21  0234 08/03/21  0604   * 133*   K 3.8 3.8   CL 97* 100   CO2 19* 22   BUN 41* 37*   CREATININE 1.02 0.94       Recent Labs     08/03/21  0236   COLORU RED*   PHUR >9.0*   SPECGRAV 1.011   LEUKOCYTESUR MODERATE*   UROBILINOGEN Normal   BILIRUBINUR NEGATIVE       Culture Results:  8/3/21 urine pending  8/3/21 Blood no growth so far  7/28/21 Urine proteus and enterococcus  5/25/21 pseudomonas       -----------------------------------------------------------------  Imaging Results:  Right kidney with possible non-obstructing stone  No hydronephrosis bilaterally     US RETROPERITONEAL LIMITED    Result Date: 8/3/2021  EXAMINATION: ULTRASOUND OF THE KIDNEYS 8/3/2021 12:27 pm COMPARISON: CT scan 24 May 2021 HISTORY: ORDERING SYSTEM PROVIDED HISTORY: renal stone TECHNOLOGIST PROVIDED HISTORY: Bilateral Renal Ultrasound renal stone FINDINGS: This examination was challenging secondary to overall condition of the patient.  The right kidney measures 8.7 cm in length and the left kidney measures 12.2 cm in length. Kidneys demonstrate normal cortical echogenicity. 1 cm echogenic focus present within the inferior pole. No hydronephrosis on either side. No focal lesions. Bladder was decompressed by a catheter. Question nonobstructing 1 cm right renal calculus in a mildly diminutive right kidney. Left kidney without sonographic abnormality. Assessment and Plan   Impression:  78 yo male with complicated cystitis     problem list -   Complicated cystitis  Chronic francis  Right renal stone 1cm  Recurrent UTIs  BPH  Hx bladder cancer     Plan:   IV antibiotics per ID - cefepime total 5 days  Maintain francis catheter - recommend exchange in 4 weeks by home health  Follow up with urology for urinary retention and right renal stone  No acute  intervention  Ok for discharge      Thank you for involving us in the care of [facility]. Should you have any questions, please do not hesitate to contact us at any time.       Terri Gusman PA-C  Urology Service   12:18 PM 8/4/2021

## 2021-08-04 NOTE — PROGRESS NOTES
CLINICAL PHARMACY NOTE: MEDS TO BEDS    Total # of Prescriptions Filled: 1   The following medications were delivered to the patient:  · AMOXICILLIN 500MG CAPSULE    Additional Documentation: MEDS DELIVERD TO THE PT IN ROOM 317 ON 08.04.21 AT 14:27, NO CO PAY

## 2021-08-04 NOTE — DISCHARGE SUMMARY
[facility]  Office: [address], DO, Renzo Nadirf, DO, Anthonypablo Johnston, DO, Porsha Lundberg Blood, DO, Wallace Deleon MD, Suha Sargent MD, Ernesto Escobedo MD, Lea Mcghee MD, Jeevan Roberson MD, Lakesha Mckinney MD, Carmen Herron MD, Akira Dent, DO, Drew Scott MD, Em Sousa, DO, Jordyn Colon MD,  Nova Rodrigues, DO, Daylin Shepherd MD, Robert Adams MD, Rosalie Todd MD, Kylee Vila MD, Jorge Mckeon MD, Omari Hayden MD, Nadia Salamanca MD, Homero Silverio [facility], [facility], CNP, Cintia Dwyer, CNP, Hugh Andre, CNS, Austin Carnes, CNP, Gerilyn Cockayne, CNP, Anderson Fay, CNP, Rosalba Butts, CNP, Camillo Schaumann, CNP, Mj Yusuf PA-C, Janae Deleon, [facility], Diane Loud, CNP, Minh Denney, [facility], Meggan Garcia, CNP, Jacques Joya, CNP, Ambrose Herr, CNP, Katia Jennings, CNP, Gladies Kocher, [facility], Clair Wise, [address] [place] [address]    Discharge Summary     Patient ID: Oliver Sosa  :  [id number]   MRN: [id number]     ACCOUNT:  [de-identified]   Patient's PCP: Raul Anaya MD  Admit Date: 2021   Discharge Date:2021  Length of Stay: 1  Code Status:  DNR-CCA  Admitting Physician: No admitting provider for patient encounter. Discharge Physician: Kip Calvert MD     Active Discharge Diagnoses:     Hospital Problem Lists:  Principal Problem:    Urinary tract infection associated with indwelling urethral catheter [facility])  Active Problems:    Status post insertion of percutaneous endoscopic gastrostomy (PEG) tube (HCC)    COPD (chronic obstructive pulmonary disease) ([facility]    PAF (paroxysmal atrial fibrillation) ([facility]    Heart failure with reduced ejection fraction (HCC)    Iron deficiency anemia  Resolved Problems:    Hyponatremia         Discharged Condition: stable    Hospital Stay:     Hospital Course:      This is 77years old gentleman who was brought to the hospital because of dysuria and bladder spasms. Patient has a chronic Issa catheter. He was advised to come to the hospital for concern for UTI. Patient was admitted to the hospital.  He was found to be having UTI. He was evaluated by ID and urology. Urology recommended exchange of Issa catheter which was done and then outpatient follow-up. Infectious disease recommended discharge on cefepime and amoxicillin. Patient's mentation was at per his baseline as per the family. Renal ultrasound showed right-sided nonobstructing stone for which she will follow-up with urology as an outpatient. Patient was stable and he was discharged home in a stable condition. Patient received midline before discharge. Review of systems:  Was limited because of the patient's condition including aphasia. Physical examination    Respiratory exam: Bilateral air entry no rhonchi or wheezes  Cardiovascular examination: S1, S2  Abdominal examination: Soft, nontender, bowel sounds present  Extremities: nontender, no edema      Significant therapeutic interventions: As Above    Significant Diagnostic Studies:   Labs / Micro:  CBC:   Lab Results   Component Value Date    WBC 7.1 08/03/2021    RBC 3.64 08/03/2021    HGB 10.2 08/03/2021    HCT 32.0 08/03/2021    MCV 87.9 08/03/2021    MCH 28.0 08/03/2021    MCHC 31.9 08/03/2021    RDW 14.7 08/03/2021     08/03/2021     BMP:    Lab Results   Component Value Date    GLUCOSE 139 08/03/2021     08/03/2021    K 3.8 08/03/2021     08/03/2021    CO2 22 08/03/2021    ANIONGAP 11 08/03/2021    BUN 37 08/03/2021    CREATININE 0.94 08/03/2021    BUNCRER NOT REPORTED 08/03/2021    CALCIUM 8.6 08/03/2021    LABGLOM >60 08/03/2021    GFRAA >60 08/03/2021    GFR      08/03/2021    GFR NOT REPORTED 08/03/2021        Radiology:  XR CHEST PORTABLE    Result Date: 8/3/2021  Mild interstitial prominence, likely chronic. Mild right basilar atelectasis.      US RETROPERITONEAL LIMITED    Result Date: 8/3/2021  Question nonobstructing 1 cm right renal calculus in a mildly diminutive right kidney. Left kidney without sonographic abnormality. Consultations:    Consults:     Final Specialist Recommendations/Findings:   IP CONSULT TO HOSPITALIST  IP CONSULT TO INFECTIOUS DISEASES  IP CONSULT TO DIETITIAN  IP CONSULT TO UROLOGY      The patient was seen and examined on day of discharge and this discharge summary is in conjunction with any daily progress note from day of discharge.     Discharge plan:     Disposition: Home    Physician Follow Up:     Shonda Nesbitt MD  [place] [address]   [address] [facility] [phone]      With urology, please call for appointment     Ayo Forbes MD  [address] [facility]. [phone]    In 1 week         Requiring Further Evaluation/Follow Up POST HOSPITALIZATION/Incidental Findings: Outpatient hemoglobin check in 1 day results to be followed with PCP  Urine culture results to be followed by ID and PCP    Diet: Via PEG tube feeds    Activity: As tolerated        Discharge Medications:      Medication List      START taking these medications    amoxicillin 500 MG capsule  Commonly known as: AMOXIL  Take 1 capsule by mouth 3 times daily for 5 days Stop after 8/9/21     cefepime  infusion  Commonly known as: MAXIPIME  Infuse 2,000 mg intravenously every 12 hours for 5 days Stop after 8/9/21 and pull line  Compound per protocol        CONTINUE taking these medications    apixaban 5 MG Tabs tablet  Commonly known as: ELIQUIS  Take 1 tablet by mouth 2 times daily     atorvastatin 40 MG tablet  Commonly known as: LIPITOR  Take 1 tablet by mouth daily     cilostazol 100 MG tablet  Commonly known as: PLETAL     famotidine 20 MG tablet  Commonly known as: PEPCID     ferrous sulfate 325 (65 Fe) MG tablet  Commonly known as: IRON 325  Take 1 tablet by mouth 2 times daily     furosemide 20 MG tablet  Commonly known as: Lasix  Take 2 tablets by mouth daily gabapentin 800 MG tablet  Commonly known as: NEURONTIN     lisinopril 2.5 MG tablet  Commonly known as: PRINIVIL;ZESTRIL  Take 1 tablet by mouth daily     metoprolol succinate 25 MG extended release tablet  Commonly known as: TOPROL XL  Take 1 tablet by mouth nightly     ondansetron 4 MG tablet  Commonly known as: Zofran  Take every six hours as needed     potassium chloride 10 MEQ extended release capsule  Commonly known as: MICRO-K     Probiotic 1-250 BILLION-MG Caps     theophylline 80 MG/15ML elixir  Take 18.8 mLs by mouth every 8 hours        STOP taking these medications    albuterol sulfate  (90 Base) MCG/ACT inhaler     cephALEXin 500 MG capsule  Commonly known as: Sonia Cart           Where to Get Your Medications      These medications were sent to [address] [place]  [address]    Phone: [phone]   · amoxicillin 500 MG capsule     You can get these medications from any pharmacy    Bring a paper prescription for each of these medications  · cefepime  infusion         No discharge procedures on file. Time Spent on discharge is  32 mins in patient examination, evaluation, counseling as well as medication reconciliation, prescriptions for required medications, discharge plan and follow up. Electronically signed by   Subha Franklin MD  8/4/2021  1:37 PM      Thank you Dr. Sarah Frazier MD for the opportunity to be involved in this patient's care.

## 2021-08-04 NOTE — PROGRESS NOTES
Physician Progress Note      PATIENTTrudy Dianne  [facility] #:                  [id number]  :                       1945  ADMIT DATE:       2021 11:42 PM  [address] [place] DATE:  RESPONDING  PROVIDER #:        Claudio Londono MD          QUERY TEXT:    Pt admitted with UTI associated with indwelling francis and has moderate   malnutrition documented per Dietician note 8/3. Christine Hardy Please further specify type   of malnutrition with documentation in the medical record. The medical record reflects the following:    Risk Factors:  Hx CVA, dysphagia, CHF, EF 15-20%, Aphasia    Clinical Indicators: BMI 21.8, NPO current hospitalization, Consult Dietician   8/3:  Malnutrition Status: Moderate malnutrition  NPO except TF. Patient's sister reports that patient coughs when he drinks liquids and eats   solids. Treatment: Tube feedings per PEG, NPO d/t dysphagia , ferrous sulphate,    ASPEN Criteria:    https://aspenjournals. onlinelibrary. black. com/doi/full/10.1177/035329462738990  5      Please call with any questions. Christine [facility] [phone]  Options provided:  -- Moderate Malnutrition  -- Moderate Protein calorie malnutrition  -- Other - I will add my own diagnosis  -- Disagree - Not applicable / Not valid  -- Disagree - Clinically unable to determine / Unknown  -- Refer to Clinical Documentation Reviewer    PROVIDER RESPONSE TEXT:    This patient has moderate malnutrition. Query created by:  Gil Adler on 2021 11:02 AM      Electronically signed by:  Claudio Londono MD 2021 1:37 PM

## 2021-08-04 NOTE — PLAN OF CARE
Problem: Falls - Risk of:  Goal: Will remain free from falls  Description: Will remain free from falls  8/4/2021 0419 by Fernando Krishnan RN  Outcome: Ongoing  8/3/2021 1853 by Dianne Jones RN  Outcome: Ongoing  Goal: Absence of physical injury  Description: Absence of physical injury  8/4/2021 0419 by Fernando Krishnan RN  Outcome: Ongoing  8/3/2021 1853 by Dianne Jones RN  Outcome: Ongoing     Problem: Skin Integrity:  Goal: Will show no infection signs and symptoms  Description: Will show no infection signs and symptoms  8/4/2021 0419 by Fernando Krishnan RN  Outcome: Ongoing  8/3/2021 1853 by Dianne Jones RN  Outcome: Ongoing  Goal: Absence of new skin breakdown  Description: Absence of new skin breakdown  8/4/2021 0419 by Fernando Krishnan RN  Outcome: Ongoing  8/3/2021 1853 by Dianne Jones RN  Outcome: Ongoing     Problem: Nutrition  Goal: Optimal nutrition therapy  8/4/2021 0419 by Fernando Krishnan RN  Outcome: Ongoing  8/3/2021 1853 by Dianne Jones RN  Outcome: Ongoing  8/3/2021 1604 by Rebecca Sherman RD, LD  Note: Nutrition Problem #1: Inadequate oral intake  Intervention: Food and/or Nutrient Delivery: Continue NPO, Start Tube Feeding  Nutritional Goals: EN intake to meet >75% of estimated nutrition needs

## 2021-08-05 VITALS
OXYGEN SATURATION: 100 % | DIASTOLIC BLOOD PRESSURE: 57 MMHG | BODY MASS INDEX: 21.72 KG/M2 | WEIGHT: 178.35 LBS | HEART RATE: 76 BPM | HEIGHT: 76 IN | SYSTOLIC BLOOD PRESSURE: 95 MMHG | TEMPERATURE: 97.6 F | RESPIRATION RATE: 17 BRPM

## 2021-08-05 LAB
ABSOLUTE EOS #: 0.43 K/UL (ref 0–0.44)
ABSOLUTE IMMATURE GRANULOCYTE: <0.03 K/UL (ref 0–0.3)
ABSOLUTE LYMPH #: 1.29 K/UL (ref 1.1–3.7)
ABSOLUTE MONO #: 0.68 K/UL (ref 0.1–1.2)
ANION GAP SERPL CALCULATED.3IONS-SCNC: 12 MMOL/L (ref 9–17)
BASOPHILS # BLD: 0 % (ref 0–2)
BASOPHILS ABSOLUTE: 0.03 K/UL (ref 0–0.2)
BUN BLDV-MCNC: 22 MG/DL (ref 8–23)
BUN/CREAT BLD: ABNORMAL (ref 9–20)
CALCIUM SERPL-MCNC: 8.9 MG/DL (ref 8.6–10.4)
CHLORIDE BLD-SCNC: 100 MMOL/L (ref 98–107)
CO2: 21 MMOL/L (ref 20–31)
CREAT SERPL-MCNC: 0.76 MG/DL (ref 0.7–1.2)
DIFFERENTIAL TYPE: ABNORMAL
EKG ATRIAL RATE: 64 BPM
EKG Q-T INTERVAL: 424 MS
EKG QRS DURATION: 74 MS
EKG QTC CALCULATION (BAZETT): 430 MS
EKG R AXIS: 62 DEGREES
EKG T AXIS: 73 DEGREES
EKG VENTRICULAR RATE: 62 BPM
EOSINOPHILS RELATIVE PERCENT: 6 % (ref 1–4)
GFR AFRICAN AMERICAN: >60 ML/MIN
GFR NON-AFRICAN AMERICAN: >60 ML/MIN
GFR SERPL CREATININE-BSD FRML MDRD: ABNORMAL ML/MIN/{1.73_M2}
GFR SERPL CREATININE-BSD FRML MDRD: ABNORMAL ML/MIN/{1.73_M2}
GLUCOSE BLD-MCNC: 125 MG/DL (ref 70–99)
HCT VFR BLD CALC: 31.3 % (ref 40.7–50.3)
HEMOGLOBIN: 9.8 G/DL (ref 13–17)
IMMATURE GRANULOCYTES: 0 %
LYMPHOCYTES # BLD: 19 % (ref 24–43)
MAGNESIUM: 2 MG/DL (ref 1.6–2.6)
MCH RBC QN AUTO: 27.7 PG (ref 25.2–33.5)
MCHC RBC AUTO-ENTMCNC: 31.3 G/DL (ref 28.4–34.8)
MCV RBC AUTO: 88.4 FL (ref 82.6–102.9)
MONOCYTES # BLD: 10 % (ref 3–12)
NRBC AUTOMATED: 0 PER 100 WBC
PDW BLD-RTO: 14.6 % (ref 11.8–14.4)
PLATELET # BLD: 200 K/UL (ref 138–453)
PLATELET ESTIMATE: ABNORMAL
PMV BLD AUTO: 8.7 FL (ref 8.1–13.5)
POTASSIUM SERPL-SCNC: 3 MMOL/L (ref 3.7–5.3)
RBC # BLD: 3.54 M/UL (ref 4.21–5.77)
RBC # BLD: ABNORMAL 10*6/UL
SEG NEUTROPHILS: 65 % (ref 36–65)
SEGMENTED NEUTROPHILS ABSOLUTE COUNT: 4.46 K/UL (ref 1.5–8.1)
SODIUM BLD-SCNC: 133 MMOL/L (ref 135–144)
WBC # BLD: 6.9 K/UL (ref 3.5–11.3)
WBC # BLD: ABNORMAL 10*3/UL

## 2021-08-05 PROCEDURE — 6370000000 HC RX 637 (ALT 250 FOR IP): Performed by: NURSE PRACTITIONER

## 2021-08-05 PROCEDURE — 94761 N-INVAS EAR/PLS OXIMETRY MLT: CPT

## 2021-08-05 PROCEDURE — 2580000003 HC RX 258: Performed by: INTERNAL MEDICINE

## 2021-08-05 PROCEDURE — 80048 BASIC METABOLIC PNL TOTAL CA: CPT

## 2021-08-05 PROCEDURE — 6370000000 HC RX 637 (ALT 250 FOR IP): Performed by: INTERNAL MEDICINE

## 2021-08-05 PROCEDURE — G0378 HOSPITAL OBSERVATION PER HR: HCPCS

## 2021-08-05 PROCEDURE — 85025 COMPLETE CBC W/AUTO DIFF WBC: CPT

## 2021-08-05 PROCEDURE — 83735 ASSAY OF MAGNESIUM: CPT

## 2021-08-05 PROCEDURE — 99239 HOSP IP/OBS DSCHRG MGMT >30: CPT | Performed by: INTERNAL MEDICINE

## 2021-08-05 PROCEDURE — 36415 COLL VENOUS BLD VENIPUNCTURE: CPT

## 2021-08-05 PROCEDURE — 93010 ELECTROCARDIOGRAM REPORT: CPT | Performed by: INTERNAL MEDICINE

## 2021-08-05 PROCEDURE — 6360000002 HC RX W HCPCS: Performed by: INTERNAL MEDICINE

## 2021-08-05 PROCEDURE — 96366 THER/PROPH/DIAG IV INF ADDON: CPT

## 2021-08-05 RX ADMIN — THEOPHYLLINE 100 MG: 80 SOLUTION ORAL at 02:21

## 2021-08-05 RX ADMIN — THEOPHYLLINE 100 MG: 80 SOLUTION ORAL at 10:08

## 2021-08-05 RX ADMIN — Medication 2 CAPSULE: at 10:07

## 2021-08-05 RX ADMIN — ACETAMINOPHEN 650 MG: 325 TABLET ORAL at 06:02

## 2021-08-05 RX ADMIN — GABAPENTIN 800 MG: 800 TABLET ORAL at 10:06

## 2021-08-05 RX ADMIN — FUROSEMIDE 40 MG: 20 TABLET ORAL at 10:06

## 2021-08-05 RX ADMIN — CILOSTAZOL 100 MG: 100 TABLET ORAL at 10:07

## 2021-08-05 RX ADMIN — AMOXICILLIN 500 MG: 500 CAPSULE ORAL at 06:02

## 2021-08-05 RX ADMIN — FERROUS SULFATE TAB EC 325 MG (65 MG FE EQUIVALENT) 325 MG: 325 (65 FE) TABLET DELAYED RESPONSE at 10:06

## 2021-08-05 RX ADMIN — CEFEPIME HYDROCHLORIDE 2000 MG: 2 INJECTION, POWDER, FOR SOLUTION INTRAVENOUS at 09:40

## 2021-08-05 RX ADMIN — POTASSIUM BICARBONATE 40 MEQ: 782 TABLET, EFFERVESCENT ORAL at 10:22

## 2021-08-05 RX ADMIN — APIXABAN 5 MG: 5 TABLET, FILM COATED ORAL at 10:07

## 2021-08-05 RX ADMIN — FAMOTIDINE 20 MG: 20 TABLET, FILM COATED ORAL at 10:06

## 2021-08-05 RX ADMIN — ATORVASTATIN CALCIUM 40 MG: 80 TABLET, FILM COATED ORAL at 10:05

## 2021-08-05 ASSESSMENT — PAIN SCALES - GENERAL
PAINLEVEL_OUTOF10: 3
PAINLEVEL_OUTOF10: 0

## 2021-08-05 NOTE — CARE COORDINATION
Transitional planning. Spoke to Fernanda Montilla with [employer], she informed CM that pt's insurance is not in Network with [place].   Spoke to Tamela Hicks with [facility], faxed IV antibiotic order, face sheet and Midline note to [address]    [facility] with [employer], they received fax and will run benefits. She will call CM in 1/2  hour. Urszula SIMPSON 23. with [employer] called, pt is covered in full, supplies covered in full, they will coordinate care with [facility] and have IV antibiotics and supplies to pt's home in time for tonight's IV antibiotic administration. 1215 [facility] that [facility] will be delivering pt's IV antibiotics and that they were informed that [facility] would be providing  HC and IV antibiotic administration planned for 8p tonight. [facility] are aware of discharge today and will gather what they need in Epic.      [place] Confirmed 1p  [facility] transport time with Ovi Lomax with [facility]    Jamin Mittal 103 w/ [facility] called to inform CM that [facility] ambulance will transport pt at 1p

## 2021-08-05 NOTE — PROGRESS NOTES
Urology Progress Note  CC:  Chronic Issa  Subjective:   Tono Ordonez is a 77 y.o. male. His/Her current Diet is: Diet NPO  ADULT TUBE FEEDING; PEJ; Standard with Fiber; Bolus; 5 TIMES DAILY; 250; Infusion; 60; Before and after each bolus. The patient is * No surgery found * from   No acute urologic events overnight.    No chest pain, shortness of breath, nausea, vomiting, fevers, chills     UOP 1350 cc in 24 hr    Patient Vitals for the past 24 hrs:   BP Temp Temp src Pulse Resp SpO2   08/05/21 0406 (!) 112/59 98.2 °F (36.8 °C) Axillary 75 16    08/05/21 0224 (!) 106/51        08/05/21 0010 (!) 99/47 98.2 °F (36.8 °C) Oral 66 14 99 %   08/04/21 2129 121/78 98 °F (36.7 °C) Oral 70 21 96 %   08/04/21 2045 (!) 142/80 98.2 °F (36.8 °C) Axillary 70 20    08/04/21 0953 118/77   76     08/04/21 0800 118/68 98.2 °F (36.8 °C) Axillary 73 18        Intake/Output Summary (Last 24 hours) at 8/5/2021 0618  Last data filed at 8/5/2021 0500  Gross per 24 hour   Intake 120 ml   Output 1950 ml   Net -1830 ml       Recent Labs     08/03/21  0234 08/03/21  0604   WBC 9.2 7.1   HGB 10.2* 10.2*   HCT 32.3* 32.0*   MCV 88.0 87.9    244     Recent Labs     08/03/21  0234 08/03/21  0604   * 133*   K 3.8 3.8   CL 97* 100   CO2 19* 22   BUN 41* 37*   CREATININE 1.02 0.94       Recent Labs     08/03/21  0236   COLORU RED*   PHUR >9.0*   SPECGRAV 1.011   LEUKOCYTESUR MODERATE*   UROBILINOGEN Normal   BILIRUBINUR NEGATIVE       Current Facility-Administered Medications   Medication Dose Route Frequency Provider Last Rate Last Admin    amoxicillin (AMOXIL) capsule 500 mg  500 mg Oral 3 times per day Norma Ca MD   500 mg at 08/05/21 0602    albuterol sulfate  (90 Base) MCG/ACT inhaler 2 puff  2 puff Inhalation Q6H PRN RAGHAV Haskins CNP        apixaban (ELIQUIS) tablet 5 mg  5 mg Oral BID RAGHAV Haskins CNP   5 mg at 08/04/21 2052    atorvastatin (LIPITOR) tablet 40 mg  40 mg Oral Daily Nadia Elizabeth, APRN - CNP   40 mg at 08/04/21 7244    cilostazol (PLETAL) tablet 100 mg  100 mg Oral BID Nadia Elizabeth, APRN - CNP   100 mg at 08/04/21 2052    famotidine (PEPCID) tablet 20 mg  20 mg Oral BID Nadia Elizabeth, APRN - CNP   20 mg at 08/04/21 2052    ferrous sulfate (FE TABS 325) EC tablet 325 mg  325 mg Oral BID Nadia Elizabeth, APRN - CNP   325 mg at 08/04/21 2052    gabapentin (NEURONTIN) tablet 800 mg  800 mg Oral TID Nadia Elizabeth, APRN - CNP   800 mg at 08/04/21 2052    [Held by provider] lisinopril (PRINIVIL;ZESTRIL) tablet 2.5 mg  2.5 mg Oral Daily Nadia Elizabeth, APRN - CNP        furosemide (LASIX) tablet 40 mg  40 mg Oral Daily Mohammad LEX Poe, DO   40 mg at 08/04/21 2366    theophylline 80 MG/15ML oral solution 100 mg  100 mg Oral 3 times per day Nadia Elizabeth, APRN - CNP   100 mg at 08/05/21 0221    sodium chloride flush 0.9 % injection 5-40 mL  5-40 mL Intravenous 2 times per day Nadia Elizabeth, APRN - CNP   10 mL at 08/04/21 2105    sodium chloride flush 0.9 % injection 5-40 mL  5-40 mL Intravenous PRN Nadia Elizabeth, APRN - CNP        0.9 % sodium chloride infusion  25 mL Intravenous PRN Nadia Elizabeth, APRN - CNP        ondansetron (ZOFRAN-ODT) disintegrating tablet 4 mg  4 mg Oral Q8H PRN Nadia Elizabeth, APRN - CNP        Or    ondansetron [facility]) injection 4 mg  4 mg Intravenous Q6H PRN Nadia Elizabeth, APRN - CNP        acetaminophen (TYLENOL) tablet 650 mg  650 mg Oral Q6H PRN Nadia Elizabeth, APRN - CNP   650 mg at 08/05/21 0602    Or    acetaminophen (TYLENOL) suppository 650 mg  650 mg Rectal Q6H PRN Nadia Elizabeth, APRN - CNP        polyethylene glycol (GLYCOLAX) packet 17 g  17 g Oral Daily PRN Nadia Skates, APRN - CNP        cefepime (MAXIPIME) 2000 mg IVPB minibag  2,000 mg Intravenous Q12H Kannan Arenas MD   Stopped at 08/05/21 0302    lactobacillus (CULTURELLE) capsule 2 capsule  2 capsule Oral TID Kannan Arenas MD   2 capsule at 08/04/21 2052    metoprolol tartrate (LOPRESSOR) tablet 25 mg  25 mg Oral BID RAGHAV Pacheco - CNP   25 mg at 08/04/21 2052            Additional Lab/culture results:       Physical Exam:   NAD  Awake   Peripheral pulses palpable  Regular rate    Respirations nonlabored, symmetric chest rise bilaterally  Soft, NT, ND  Francis with yellow urine  No calf ttp bilaterally         Interval Imaging Findings:    Impression:     problem list -   Complicated cystitis  Chronic francis  Right non obstructing stone renal stone 1cm  Recurrent UTIs  BPH  Hx bladder cancer      Plan:   IV antibiotics per ID    Maintain francis catheter - recommend exchange in 4 weeks by home health, exchanged on 8/4/21  Follow up with urology for urinary retention and right renal stone  No acute  intervention  Ok for discharge    Santiago Dai MD,  PGY-5  Urology Resident  6:18 AM 8/5/2021

## 2021-08-05 NOTE — PLAN OF CARE
Problem: Falls - Risk of:  Goal: Will remain free from falls  Description: Will remain free from falls  Outcome: Completed  Goal: Absence of physical injury  Description: Absence of physical injury  Outcome: Completed     Problem: Skin Integrity:  Goal: Will show no infection signs and symptoms  Description: Will show no infection signs and symptoms  Outcome: Completed  Goal: Absence of new skin breakdown  Description: Absence of new skin breakdown  Outcome: Completed     Problem: Nutrition  Goal: Optimal nutrition therapy  Outcome: Completed

## 2021-08-05 NOTE — DISCHARGE SUMMARY
[facility]  Office: [address], DO, Shanna Charleston, DO, Coleman Quesada, DO, Aisha Celestinuty Blood, DO, Chevy Dennis MD, Jose L Cedillo MD, Regis Bird MD, Rohan López MD, Anali Lyn MD, Ayad Yusuf MD, Michael Potts MD, Su Thomas, DO, Vonda Ontiveros MD, Rubi Ingram, DO, Amy Ramirez MD,  Saintclair Lank, DO, Meeta Cason MD, Brianna Sutherland MD, Dianne Kaminski MD, Diaz Garcia MD, Mercy Puente MD, Evon Ang MD, Gilbert Florez MD, Daryle Springer, [facility], [facility], CNP, Clair Ortez, CNP, Tung Cherry, CNS, Carla Whitaker, CNP, James Marin, CNP, Ras Chapin, CNP, Lawanda Locke, CNP, Charlie Kaur, CNP, Kenn Isaac PA-C, Martín Hopper, [facility], Angie Fraser, CNP, Moshe Mckay, CNP, Ilene Veliz, CNP, Starr Sneed, CNP, Madeleine Beasley, CNP, Angella Blum, CNP, Jeff Sy, CNP, Victory Andrea, [address] [place] [address]    Discharge Summary     Patient ID: Teresa Jim  :     MRN: [id number]     ACCOUNT:  [de-identified]   Patient's PCP: Sarah Frazier MD  Admit Date: 2021   Discharge Date:2021  Length of Stay: 2  Code Status:  Prior  Admitting Physician: No admitting provider for patient encounter. Discharge Physician: Subha Franklin MD     Active Discharge Diagnoses:     Hospital Problem Lists:  Principal Problem:    Urinary tract infection associated with indwelling urethral catheter [facility])  Active Problems:    Status post insertion of percutaneous endoscopic gastrostomy (PEG) tube (HCC)    COPD (chronic obstructive pulmonary disease) ([facility]    PAF ([facility]oxysmal atrial fibrillation) [facility]    Heart failure with reduced ejection fraction (HCC)    Iron deficiency anemia  Resolved Problems:    Hyponatremia         Discharged Condition: stable    Hospital Stay:     Hospital Course:      This is 77years old gentleman who was brought to the hospital because of dysuria and bladder spasms. Patient has a chronic Issa catheter. He was advised to come to the hospital for concern for UTI. Patient was admitted to the hospital.  He was found to be having UTI. He was evaluated by ID and urology. Urology recommended exchange of Issa catheter which was done and then outpatient follow-up. Infectious disease recommended discharge on cefepime and amoxicillin. Patient's mentation was at per his baseline as per the family. Renal ultrasound showed right-sided nonobstructing stone for which she will follow-up with urology as an outpatient. Patient was stable and he was discharged home in a stable condition. Patient received midline before discharge. His discharge was delayed because home IV antibiotics had to be arranged. After that he was discharged home in a stable condition. Review of systems:  Was limited because of the patient's condition including aphasia.     Physical examination    Respiratory exam: Bilateral air entry no rhonchi or wheezes  Cardiovascular examination: S1, S2  Abdominal examination: Soft, nontender, bowel sounds present  Extremities: nontender, no edema      Significant therapeutic interventions: As Above    Significant Diagnostic Studies:   Labs / Micro:  CBC:   Lab Results   Component Value Date    WBC 6.9 08/05/2021    RBC 3.54 08/05/2021    HGB 9.8 08/05/2021    HCT 31.3 08/05/2021    MCV 88.4 08/05/2021    MCH 27.7 08/05/2021    MCHC 31.3 08/05/2021    RDW 14.6 08/05/2021     08/05/2021     BMP:    Lab Results   Component Value Date    GLUCOSE 125 08/05/2021     08/05/2021    K 3.0 08/05/2021     08/05/2021    CO2 21 08/05/2021    ANIONGAP 12 08/05/2021    BUN 22 08/05/2021    CREATININE 0.76 08/05/2021    BUNCRER NOT REPORTED 08/05/2021    CALCIUM 8.9 08/05/2021    LABGLOM >60 08/05/2021    GFRAA >60 08/05/2021    GFR      08/05/2021    GFR NOT REPORTED 08/05/2021        Radiology:  XR CHEST PORTABLE    Result Date: 8/3/2021  Mild interstitial prominence, likely chronic. Mild right basilar atelectasis. US RETROPERITONEAL LIMITED    Result Date: 8/3/2021  Question nonobstructing 1 cm right renal calculus in a mildly diminutive right kidney. Left kidney without sonographic abnormality. Consultations:    Consults:     Final Specialist Recommendations/Findings:   IP CONSULT TO HOSPITALIST  IP CONSULT TO INFECTIOUS DISEASES  IP CONSULT TO DIETITIAN  IP CONSULT TO UROLOGY  IP CONSULT TO HOME CARE NEEDS      The patient was seen and examined on day of discharge and this discharge summary is in conjunction with any daily progress note from day of discharge.     Discharge plan:     Disposition: Home    Physician Follow Up:     MD Sarbjit Borden [address] [facility] [phone]      With urology, please call for appointment     Sarah Frazier MD  [address] [facility]. [phone]    In 1 week         Requiring Further Evaluation/Follow Up POST HOSPITALIZATION/Incidental Findings: Outpatient hemoglobin check in 1 day results to be followed with PCP  Urine culture results to be followed by ID and PCP    Diet: Via PEG tube feeds    Activity: As tolerated        Discharge Medications:      Medication List      START taking these medications    amoxicillin 500 MG capsule  Commonly known as: AMOXIL  Take 1 capsule by mouth 3 times daily for 5 days Stop after 8/9/21     cefepime  infusion  Commonly known as: MAXIPIME  Infuse 2,000 mg intravenously every 12 hours for 5 days Stop after 8/9/21 and pull line  Compound per protocol        CONTINUE taking these medications    apixaban 5 MG Tabs tablet  Commonly known as: ELIQUIS  Take 1 tablet by mouth 2 times daily     atorvastatin 40 MG tablet  Commonly known as: LIPITOR  Take 1 tablet by mouth daily     cilostazol 100 MG tablet  Commonly known as: PLETAL     famotidine 20 MG tablet  Commonly known as: PEPCID     ferrous sulfate 325 (65 Fe) MG tablet  Commonly known as: IRON 325  Take 1 tablet by mouth 2 times daily     furosemide 20 MG tablet  Commonly known as: Lasix  Take 2 tablets by mouth daily     gabapentin 800 MG tablet  Commonly known as: NEURONTIN     lisinopril 2.5 MG tablet  Commonly known as: PRINIVIL;ZESTRIL  Take 1 tablet by mouth daily     metoprolol succinate 25 MG extended release tablet  Commonly known as: TOPROL XL  Take 1 tablet by mouth nightly     ondansetron 4 MG tablet  Commonly known as: Zofran  Take every six hours as needed     potassium chloride 10 MEQ extended release capsule  Commonly known as: MICRO-K     Probiotic 1-250 BILLION-MG Caps     theophylline 80 MG/15ML elixir  Take 18.8 mLs by mouth every 8 hours        STOP taking these medications    albuterol sulfate  (90 Base) MCG/ACT inhaler     cephALEXin 500 MG capsule  Commonly known as: Margaux Charles           Where to Get Your Medications      These medications were sent to [address] [address]    Phone: [phone]   · amoxicillin 500 MG capsule     You can get these medications from any pharmacy    Bring a paper prescription for each of these medications  · cefepime  infusion         No discharge procedures on file. Time Spent on discharge is  32 mins in patient examination, evaluation, counseling as well as medication reconciliation, prescriptions for required medications, discharge plan and follow up. Electronically signed by   Aubrey Patterson MD  8/5/2021  3:42 PM      Thank you Dr. Kathrin Aguilar MD for the opportunity to be involved in this patient's care.

## 2021-08-06 ENCOUNTER — CARE COORDINATION (OUTPATIENT)
Dept: CASE MANAGEMENT | Age: 76
End: 2021-08-06

## 2021-08-06 ENCOUNTER — HOSPITAL ENCOUNTER (OUTPATIENT)
Age: 76
Setting detail: SPECIMEN
Discharge: HOME | End: 2021-08-06
Payer: COMMERCIAL

## 2021-08-06 LAB
ABSOLUTE EOS #: 0.5 K/UL (ref 0–0.4)
ABSOLUTE IMMATURE GRANULOCYTE: ABNORMAL K/UL (ref 0–0.3)
ABSOLUTE LYMPH #: 1.4 K/UL (ref 1–4.8)
ABSOLUTE MONO #: 0.7 K/UL (ref 0.1–1.3)
BASOPHILS # BLD: 1 % (ref 0–2)
BASOPHILS ABSOLUTE: 0 K/UL (ref 0–0.2)
DIFFERENTIAL TYPE: ABNORMAL
EOSINOPHILS RELATIVE PERCENT: 7 % (ref 0–4)
HCT VFR BLD CALC: 29.1 % (ref 41–53)
HEMOGLOBIN: 9.8 G/DL (ref 13.5–17.5)
IMMATURE GRANULOCYTES: ABNORMAL %
LYMPHOCYTES # BLD: 21 % (ref 24–44)
MCH RBC QN AUTO: 28.6 PG (ref 26–34)
MCHC RBC AUTO-ENTMCNC: 33.8 G/DL (ref 31–37)
MCV RBC AUTO: 84.6 FL (ref 80–100)
MONOCYTES # BLD: 10 % (ref 1–7)
NRBC AUTOMATED: ABNORMAL PER 100 WBC
PDW BLD-RTO: 16 % (ref 11.5–14.9)
PLATELET # BLD: 251 K/UL (ref 150–450)
PLATELET ESTIMATE: ABNORMAL
PMV BLD AUTO: 6.8 FL (ref 6–12)
RBC # BLD: 3.44 M/UL (ref 4.5–5.9)
RBC # BLD: ABNORMAL 10*6/UL
SEG NEUTROPHILS: 61 % (ref 36–66)
SEGMENTED NEUTROPHILS ABSOLUTE COUNT: 4.1 K/UL (ref 1.3–9.1)
WBC # BLD: 6.6 K/UL (ref 3.5–11)
WBC # BLD: ABNORMAL 10*3/UL

## 2021-08-06 PROCEDURE — 85025 COMPLETE CBC W/AUTO DIFF WBC: CPT

## 2021-08-06 NOTE — CARE COORDINATION
Kar[facility] 45 Transitions Initial Follow Up Call - Attempted initial 24 hour transitional call to patient. Left VM to return call directly to CTN - attempted 2 contact numbers - messages left on both    Care Transitions Outreach Attempt - day #1    Call within 2 business days of discharge: Yes   Attempted to reach patient for transitions of care follow up. Unable to reach patient. Patient: Santa Judd Patient : 1945   MRN: [id number]    Reason for Admission: UTI  Discharge Date: 21 RARS: Readmission Risk Score: 21    Last Discharge [facility]       Complaint Diagnosis Description Type Department Provider    21 Urinary Tract Infection Urinary tract infection associated with indwelling urethral catheter, initial encounter [facility]) ED to Hosp-Admission (Discharged) (ADMITTED) [facility] BERENICE Beck MD; Jennifer Boston,... Was this an external facility discharge? No     Noted following upcoming appointments from discharge chart review:   [facility] follow up appointment(s):   Future Appointments   Date Time Provider Jo Wang   2021  2:00 PM Kunal Cruz MD [address] [facility] with: [facility] to confirm that home care visits were started last evening - IV administration of antibiotic every 12 hours for 5 days. Requested that home care nurse assist with scheduling appts since she will be at patient's home with family re: coordination of appts.   Routed to  also to assist.        Obed Myers RN

## 2021-08-09 ENCOUNTER — CARE COORDINATION (OUTPATIENT)
Dept: CASE MANAGEMENT | Age: 76
End: 2021-08-09

## 2021-08-09 LAB
CULTURE: NORMAL
CULTURE: NORMAL
Lab: NORMAL
Lab: NORMAL
SPECIMEN DESCRIPTION: NORMAL
SPECIMEN DESCRIPTION: NORMAL

## 2021-08-10 ENCOUNTER — CARE COORDINATION (OUTPATIENT)
Dept: CASE MANAGEMENT | Age: 76
End: 2021-08-10

## 2021-08-10 NOTE — CARE COORDINATION
[facility] 45 Transitions Initial Follow Up Call - spoke with Corey Casper, son    Call within 2 business days of discharge: Yes    Patient: Bebo Alan Patient : 1945   MRN: [id number]    Reason for Admission: UTI - retention - discharged with francis  Discharge Date: 21 RARS: Readmission Risk Score: 21      Last Discharge [address] [address]       Complaint Diagnosis Description Type Department Provider    21 Urinary Tract Infection Urinary tract infection associated with indwelling urethral catheter, initial encounter [facility]) ED to Hosp-Admission (Discharged) (ADMITTED) [facility] Warner Hardy MD; Cheng Britton,... Spoke with: Corey Casper, son. He requests that I talk to patient's sisterMarilu Felt re: appts, etc since she usually does the scheduling. Marilu Angela, sister is with patient 11-3 daily  Corey Casper, son is with patient 3-5 daily  Grant's wife, Tyron Purdy stays with patient from 5pm until Andria White is current with patient - twice daily visits for every 12 hour doses of IV Cefepime for 5 days - LD is tomorrow, 8/10. LD of amoxicillin po tomorrow also. Francis catheter is in place. Patient is bedridden & needs either transport or VV for follow up with urology - . ID f/u to be determined after completion of IV antibiotics. Son tells me that patient may want to stay @ [place] office - may be too difficult for him to follow with previous PCP, Dr Karon Short who is now with [facility] - since he is not at same [place] office. Corey Casper requests that Care Transitions communicates with sisterMarilu [place] to determine f/u. CTN contact # given    Non-face-to-face services provided:  Obtained and reviewed discharge summary and/or continuity of care documents      Was this an external facility discharge?  No     Challenges to be reviewed by the provider   Additional needs identified to be addressed with provider: No  none             Method of communication with provider : none      Advance Care Planning:   Does patient have an Advance Directive: reviewed and current and referral to internal ACP facilitator. Was this a readmission? No  Patient stated reason for admission: UTI  Patients top risk factors for readmission: medical condition-UTI    Care Transition Nurse (CTN) contacted the family SON by telephone to perform post hospital discharge assessment. Verified name and  with family as identifiers. Provided introduction to self, and explanation of the CTN role. CTN reviewed discharge instructions, medical action plan and red flags with family who verbalized understanding. Family given an opportunity to ask questions and does not have any further questions or concerns at this time. Were discharge instructions available to patient? Yes. Reviewed appropriate site of care based on symptoms and resources available to patient including: Specialist, Home health and CTN. The family agrees to contact the PCP office for questions related to their healthcare. Medication reconciliation was performed with family, who verbalizes understanding of administration of home medications. Covid Risk Education - covid testing negative in May      Reviewed and educated family on any new and changed medications related to discharge diagnosis. Was patient discharged with a pulse oximeter? No .       CTN provided contact information. Plan for follow-up call in 1-2 days based on severity of symptoms and risk factors.   Plan for next call: follow up appointment-check plan with other family member        Follow Up  Future Appointments   Date Time Provider Jo Wang   2021  2:00 PM Goyo Graham MD [address] [place] [address]       Mu Gonzalez RN

## 2021-08-11 NOTE — CARE COORDINATION
[facility] Trans[facility] Follow Up Call - call returned by patient's sister, Ricardo Casarez    8/10/2021    Patient: Reese Self  Patient : 1945   MRN: [id number]    Reason for Admission: UTI - retention - discharged with francis  Discharge Date: 21   RARS: Readmission Risk Score: 21       Spoke with: patient's sister, Ricardo Casarez who returned call to CTN. Sister clarified many of the questions from yesterday's initial call to patient's son, Kennedi Funk. Patient already has transportation from [place] - for urology appt on . - Has indwelling francis. Patient is following up with cardiology for echo . Confirmed that patient is followed by [facility] Physician service which is already scheduled. Raúl has been seeing patient twice daily for antibiotic infusion - last dose today - last dose of po amoxicillin today. Care Transitions episode resolved as patient is following with [facility] visiting physician group. Sister denies any questions or concerns - aware episode is resolved & is aware of all of her resources, including home care & visiting physicians & urology. Care Transitions Subsequent and Final Call    Schedule Follow Up Appointment with PCP: Completed  Subsequent and Final Calls  Do you have any ongoing symptoms?: No  Have your medications changed?: Yes  Do you have any questions related to your medications?: No  Do you currently have any active services?: Yes  Are you currently active with any services?: Home Health  Do you have any needs or concerns that I can assist you with?: No  Care Transitions Interventions  Other Interventions:         . T  Follow Up  Future Appointments   Date Time Provider Jo Wang   2021  2:00 PM Marylen Platts, MD [address] [place] [address] St Becka Machado RN

## 2021-08-29 NOTE — ED PROVIDER NOTES
[address] [facility] ED  Emergency Department Encounter  EmergencyMedicine Resident      Pt Name:Juni Logan  MRN: [id number]  Birthdate 1945  Date of evaluation: 8/3/21  PCP:  Ron Edmond MD     This patient was evaluated in the Emergency Department for symptoms described in the history of present illness. The patient was evaluated in the context of the global COVID-19 pandemic, which necessitated consideration that the patient might be at risk for infection with the SARS-CoV-2 virus that causes COVID-19. Institutional protocols and algorithms that pertain to the evaluation of patients at risk for COVID-19 are in a state of rapid change based on information released by regulatory bodies including the CDC and federal and state organizations.  These policies and algorithms were followed during the patient's care in the ED.     CHIEF COMPLAINT             Chief Complaint   Patient presents with    Urinary Tract Infection       Concern for Sepsis         HISTORY OF PRESENT ILLNESS  (Location/Symptom, Timing/Onset, Context/Setting, Quality, Duration, Modifying Factors, Severity.)       Ulysses Rued is a 77 y.o. male who presents who presents via EMS for concerns of possible UTI/urosepsis.       Member lives at home, but is taking care of by several family members who take different shifts.  Spoke with one of his caretakers. Caprice Rao states that members had about 3 to 4 weeks of dark-colored urine.  Family has tried to get patient worked up and treated. Latesha Alba 3 weeks ago was negative.  Patient received another UA about a week ago which resulted as positive for proteus, enterococcus, and pseudomonas. Patient's physician put in a prescription today for the UTI, the family was unable to get it filled.      Of note, the patient has a history of a neurologic degenerative condition.  Patient has a indwelling Issa and J-tube.  Patient's family member states that he does still consume some liquids, but has had increased difficulty over the last week or so with swallowing.   Also states the patient's been sleeping more over the last of couple days, but denies any other ROS.       PAST MEDICAL / SURGICAL / SOCIAL / FAMILY HISTORY       has a past medical history of Arthritis, Cancer ([facility] [address] Cerebral artery occlusion with cerebral infarction ([facility] [address] Chronic kidney disease, COPD (chronic obstructive pulmonary disease) ([facility] [address] Hypertension, Pneumonia, Psychiatric problem, and Seizures ([facility] [address]     has a past surgical history that includes back surgery; fracture surgery; hernia repair; Tonsillectomy; Carotid endarterectomy (Left, 2016); Abdomen surgery; hiatal hernia repair; Inguinal hernia repair (Bilateral); other surgical history; Colonoscopy (N/A, 2018); Upper gastrointestinal endoscopy (04/15/2020); Gastrostomy tube placement (04/15/2020); Upper gastrointestinal endoscopy (4/15/2020); Upper gastrointestinal endoscopy (4/15/2020); and colectomy (N/A, 2021).      Social History               Socioeconomic History    Marital status:        Spouse name: Not on file    Number of children: Not on file    Years of education: Not on file    Highest education level: Not on file   Occupational History    Not on file   Tobacco Use    Smoking status: Former Smoker       Packs/day: 1.00       Years: 60.00       Pack years: 60.00       Types: Cigarettes       Start date:        Quit date: 2020       Years since quittin.3    Smokeless tobacco: Never Used   Vaping Use    Vaping Use: Never used   Substance and Sexual Activity    Alcohol use: No    Drug use: No    Sexual activity: Yes       Partners: Female   Other Topics Concern    Not on file   Social History Narrative    Not on file      Social Determinants of Health          Financial Resource Strain:     Difficulty of Paying Living Expenses:    Food Insecurity:     Worried About Running Out of Food in the Last Year:     Blas stein Food in the Last Year:    Transportation Needs:     Lack of Transportation (Medical):  Lack of Transportation (Non-Medical):    Physical Activity:     Days of Exercise per Week:     Minutes of Exercise per Session:    Stress:     Feeling of Stress :    Social Connections:     Frequency of Communication with Friends and Family:     Frequency of Social Gatherings with Friends and Family:     Attends Temple Services:     Active Member of Clubs or Organizations:     Attends Club or Organization Meetings:     Marital Status:    Intimate Partner Violence:     Fear of Current or Ex-Partner:     Emotionally Abused:     Physically Abused:     Sexually Abused:             Family History         Family History   Problem Relation Age of Onset    Arthritis Mother      Atrial Fibrillation Mother      Hearing Loss Mother      Heart Disease Mother      Stroke Mother      Vision Loss Mother      Arthritis Father      Cancer Father      Heart Disease Father      High Blood Pressure Father      Stroke Father      Vision Loss Father              Allergies:  Bactrim [sulfamethoxazole-trimethoprim] and Ciprofloxacin     Home Medications:  Home Medications           Prior to Admission medications    Medication Sig Start Date End Date Taking?  Authorizing Provider   ferrous sulfate (IRON 325) 325 (65 Fe) MG tablet Take 1 tablet by mouth 2 times daily 6/7/21     Tamie Arias MD   apixaban (ELIQUIS) 5 MG TABS tablet Take 1 tablet by mouth 2 times daily 6/7/21     Tamie Arias MD   lisinopril (PRINIVIL;ZESTRIL) 2.5 MG tablet Take 1 tablet by mouth daily 6/8/21     Tamie Arias MD   metoprolol succinate (TOPROL XL) 25 MG extended release tablet Take 1 tablet by mouth nightly 6/7/21     Tamie Arais MD   furosemide (LASIX) 20 MG tablet Take 2 tablets by mouth daily 6/7/21     Tamie Arias MD   cephALEXin (KEFLEX) 500 MG capsule 500 mgTake three times daily 6/5/21     Justin Recinos MD ondansetron (ZOFRAN) 4 MG tablet Take every six hours as needed 6/5/21     Ras Clarke MD   cilostazol (PLETAL) 100 MG tablet Take 100 mg by mouth 2 times daily       Historical Provider, MD   famotidine (PEPCID) 20 MG tablet Take 20 mg by mouth 2 times daily       Historical Provider, MD   potassium chloride (MICRO-K) 10 MEQ extended release capsule Take 20 mEq by mouth daily       Historical Provider, MD   theophylline 80 MG/15ML elixir Take 18.8 mLs by mouth every 8 hours 4/6/20     Reji Agarwal MD   atorvastatin (LIPITOR) 40 MG tablet Take 1 tablet by mouth daily 7/24/19     Manpreet Hampton MD   albuterol sulfate  (90 Base) MCG/ACT inhaler Inhale 2 puffs into the lungs every 6 hours as needed for Wheezing 6/27/19     Manpreet Hampton MD   gabapentin (NEURONTIN) 800 MG tablet Take 800 mg by mouth 3 times daily. 1/16/19     Historical Provider, MD            REVIEW OF SYSTEMS    (2-9 systems for level 4, 10 or more for level 5)       Review of Systems   Unable to perform ROS: Patient nonverbal (Discussed with family member. Only notable ROS is increased lethargy over the last few days, patient has been sleeping more than usual.)         PHYSICAL EXAM   (up to 7 for level 4, 8 or more for level 5)       INITIAL VITALS:   /67   Pulse 73   Temp 97.6 °F (36.4 °C) (Oral)   Resp 20   SpO2 97% Comment: pt. desats to mid 80s placed in 2 L O2     Physical Exam  Constitutional:       General: He is not in acute distress. Appearance: He is not toxic-appearing. HENT:      Head: Normocephalic and atraumatic. Right Ear: There is impacted cerumen. Left Ear: There is impacted cerumen. Nose: Nose normal.      Mouth/Throat:      Mouth: Mucous membranes are dry. Pharynx: Oropharynx is clear. Eyes:      Extraocular Movements: Extraocular movements intact. Conjunctiva/sclera: Conjunctivae normal.      Pupils: Pupils are equal, round, and reactive to light. Cardiovascular:      Rate and Rhythm: Normal rate. Rhythm irregular. Pulses: Normal pulses. Heart sounds: Normal heart sounds. No murmur heard. No friction rub. No gallop. Comments: Afib  Pulmonary:      Effort: Pulmonary effort is normal. No respiratory distress. Breath sounds: No stridor. No wheezing, rhonchi or rales. Comments: Decreased LS right lower lung on auscultation  Abdominal:      General: There is no distension. Palpations: Abdomen is soft. Tenderness: There is no guarding. Comments: J-tube in place. No surrounding swelling, erythema or discharge. Genitourinary:     Penis: Normal.       Testes: Normal.   Musculoskeletal:         General: Signs of injury present. Comments: Small 1-2cm Stage I ulcer right buttock near midline   Skin:     General: Skin is warm and dry. Capillary Refill: Capillary refill takes less than 2 seconds. Coloration: Skin is pale. Findings: No erythema or rash. Comments: Midline healed surgical scar over lower abdomen   Neurological:      General: No focal deficit present. Mental Status: He is alert. Mental status is at baseline. Comments: Unable to do full assessment given patient's baseline, however is able to follow some simple commands.  Strength and tone globally decreased  Patient is at baseline          DIFFERENTIAL  DIAGNOSIS      PLAN (LABS / IMAGING / EKG):      Orders Placed This Encounter   Procedures    Culture, Urine    Culture, Blood 1    Culture, Blood 2    XR CHEST PORTABLE    CBC auto differential    Comprehensive Metabolic Panel w/ Reflex to MG    Urinalysis    Lactate, Sepsis    Theophylline    Inpatient consult to Hospitalist    Initiate Oxygen Therapy Protocol    Pulse oximetry, continuous    EKG 12 lead    Insert/Change Issa Catheter    PATIENT STATUS (FROM ED OR OR/PROCEDURAL) Inpatient         MEDICATIONS ORDERED:  Encounter Medications          Orders Placed This Encounter   Medications    0.9 % sodium chloride bolus    cefepime (MAXIPIME) 1000 mg IVPB minibag       Order Specific Question:   Antimicrobial Indications       Answer:   Urinary Tract Infection       Order Specific Question:   Suspected Organism(s)       Answer:   proteus/psuedomonas    vancomycin (VANCOCIN) 1250 mg in dextrose 5 % 250 mL IVPB       Order Specific Question:   Antimicrobial Indications       Answer:   Urinary Tract Infection            DDX: Urosepsis vs aspiration PNA vs complicated UTI      DIAGNOSTIC RESULTS / EMERGENCY DEPARTMENT COURSE / MDM   LAB RESULTS:         Results for orders placed or performed during the hospital encounter of 08/02/21   CBC auto differential   Result Value Ref Range     WBC 9.2 3.5 - 11.3 k/uL     RBC 3.67 (L) 4.21 - 5.77 m/uL     Hemoglobin 10.2 (L) 13.0 - 17.0 g/dL     Hematocrit 32.3 (L) 40.7 - 50.3 %     MCV 88.0 82.6 - 102.9 fL     MCH 27.8 25.2 - 33.5 pg     MCHC 31.6 28.4 - 34.8 g/dL     RDW 14.9 (H) 11.8 - 14.4 %     Platelets 687 126 - 385 k/uL     MPV 9.0 8.1 - 13.5 fL     NRBC Automated 0.0 0.0 per 100 WBC     Differential Type NOT REPORTED       Seg Neutrophils 64 36 - 65 %     Lymphocytes 20 (L) 24 - 43 %     Monocytes 11 3 - 12 %     Eosinophils % 5 (H) 1 - 4 %     Basophils 0 0 - 2 %     Immature Granulocytes 0 0 %     Segs Absolute 5.82 1.50 - 8.10 k/uL     Absolute Lymph # 1.86 1.10 - 3.70 k/uL     Absolute Mono # 1.02 0.10 - 1.20 k/uL     Absolute Eos # 0.42 0.00 - 0.44 k/uL     Basophils Absolute 0.04 0.00 - 0.20 k/uL     Absolute Immature Granulocyte 0.04 0.00 - 0.30 k/uL     WBC Morphology NOT REPORTED       RBC Morphology ANISOCYTOSIS PRESENT       Platelet Estimate NOT REPORTED     Comprehensive Metabolic Panel w/ Reflex to MG   Result Value Ref Range     Glucose 89 70 - 99 mg/dL     BUN 41 (H) 8 - 23 mg/dL     CREATININE 1.02 0.70 - 1.20 mg/dL     Bun/Cre Ratio NOT REPORTED 9 - 20     Calcium 9.1 8.6 - 10.4 mg/dL     Sodium 132 (L) 135 - 144 mmol/L     Potassium 3.8 3.7 - 5.3 mmol/L     Chloride 97 (L) 98 - 107 mmol/L     CO2 19 (L) 20 - 31 mmol/L     Anion Gap 16 9 - 17 mmol/L     Alkaline Phosphatase 61 40 - 129 U/L     ALT 9 5 - 41 U/L     AST 18 <40 U/L     Total Bilirubin 0.26 (L) 0.3 - 1.2 mg/dL     Total Protein 7.1 6.4 - 8.3 g/dL     Albumin 3.4 (L) 3.5 - 5.2 g/dL     Albumin/Globulin Ratio 0.9 (L) 1.0 - 2.5     GFR Non-African American >60 >60 mL/min     GFR African American >60 >60 mL/min     GFR Comment            GFR Staging NOT REPORTED     Urinalysis   Result Value Ref Range     Color, UA RED (A) YELLOW     Turbidity UA TURBID (A) CLEAR     Glucose, Ur NEGATIVE NEGATIVE     Bilirubin Urine NEGATIVE NEGATIVE     Ketones, Urine NEGATIVE NEGATIVE     Specific Gravity, UA 1.011 1.005 - 1.030     Urine Hgb LARGE (A) NEGATIVE     pH, UA >9.0 (H) 5.0 - 8.0     Protein, UA 3+ (A) NEGATIVE     Urobilinogen, Urine Normal Normal     Nitrite, Urine NEGATIVE NEGATIVE     Leukocyte Esterase, Urine MODERATE (A) NEGATIVE     Urinalysis Comments           Microscopic exam not performed based on chemical results unless requested in original order. Lactate, Sepsis   Result Value Ref Range     Lactic Acid, Sepsis NOT REPORTED 0.5 - 1.9 mmol/L     Lactic Acid, Sepsis, Whole Blood 2.4 (H) 0.5 - 1.9 mmol/L   Theophylline   Result Value Ref Range     Theophylline Lvl 8.6 5.0 - 15.0 ug/mL     Theophylline Dose amount NOT REPORTED       Theophylline Date last dose NOT REPORTED       Theophylline Time last dose NOT REPORTED           IMPRESSION: 77year old male with known untreated UTI.     RADIOLOGY:  XR CHEST PORTABLE     Result Date: 8/3/2021  EXAMINATION: ONE XRAY VIEW OF THE CHEST 8/3/2021 1:22 am COMPARISON: 06/06/2021 HISTORY: ORDERING SYSTEM PROVIDED HISTORY: septic w/up; incr O2 requirements TECHNOLOGIST PROVIDED HISTORY: septic w/up; incr O2 requirements FINDINGS: Heart size and pulmonary vasculature are normal.  Lung volumes are low.  Interstitial markings are mildly prominent and there is mild right basilar atelectasis improved from previous. No pneumothorax or pleural effusion. Surrounding osseous and soft tissue structures are unremarkable.      Mild interstitial prominence, likely chronic. Mild right basilar atelectasis.      EKG  Afib with a ventricular rate of 62, normal QRS duration, normal axis, septal infarct, age undetermined, normal QT corrected     All EKG's are interpreted by the Emergency Department Physician who either signs or Co-signs this chart in the absence of a cardiologist.     EMERGENCY DEPARTMENT COURSE:      ED Course as of Aug 03 0455   Tue Aug 03, 2021   0130 Patient's UA culture results from 28 July 21. Patient's UA was positive for Proteus, Enterococcus, Pseudomonas. Susceptibilities checked, we will start on vancomycin and cefepime. [JS]   4339 X-ray reviewed. No acute pulmonary processes identified. [JS]   0302 Summary  Contrast was utilized on this technically difficult study. Left ventricle is normal in size and wall thickness. Severe global hypokinesis  Severely reduced LV systolic function, EF 64-49%  Left atrium is mildly dilated. Normal right ventricular size and function. No significant valvular regurgitation or stenosis seen. No significant pericardial effusion is seen. IVC not well visualized    [JS]   0302 ECHO results from May reviewed:  Severe global hypokinesis  Severely reduced LV systolic function, EF 07-55%  Left atrium is mildly dilated. Normal right ventricular size and function. No significant valvular regurgitation or stenosis seen. [JS]   [id number] Nursing staff noticed francis appears to be leaking, will replace. [JS]   9842 Plan to admit.     [JS]       ED Course User Index  [JS] Rigoberto Irizarry DO         CONSULTS:  IP CONSULT TO HOSPITALIST  PHARMACY TO DOSE VANCOMYCIN        FINAL IMPRESSION       1. Urinary tract infection associated with indwelling urethral catheter, initial encounter [facility])           DISPOSITION / PLAN      DISPOSITION Admitted 08/03/2021 04:47:28 AM        PATIENT REFERRED TO:  No follow-up provider specified.     DISCHARGE MEDICATIONS:      New Prescriptions     No medications on file         Yoav Terry DO  Emergency Medicine Resident     (Please note that portions of thisnote were completed with a voice recognition program.  Efforts were made to edit the dictations but occasionally words are mis-transcribed.)     Lorelei Severs, DO Resident  08/03/21 DO Isidro Meyers  08/29/21 [id number]

## 2021-09-07 ENCOUNTER — OFFICE VISIT (OUTPATIENT)
Dept: UROLOGY | Age: 76
End: 2021-09-07
Payer: COMMERCIAL

## 2021-09-07 ENCOUNTER — HOSPITAL ENCOUNTER (OUTPATIENT)
Age: 76
Setting detail: SPECIMEN
Discharge: HOME | End: 2021-09-07
Payer: COMMERCIAL

## 2021-09-07 VITALS
OXYGEN SATURATION: 75 % | TEMPERATURE: 96.5 F | HEIGHT: 76 IN | DIASTOLIC BLOOD PRESSURE: 65 MMHG | BODY MASS INDEX: 21.68 KG/M2 | WEIGHT: 178 LBS | SYSTOLIC BLOOD PRESSURE: 98 MMHG | RESPIRATION RATE: 17 BRPM

## 2021-09-07 DIAGNOSIS — R30.0 DYSURIA: Primary | ICD-10-CM

## 2021-09-07 DIAGNOSIS — R30.0 DYSURIA: ICD-10-CM

## 2021-09-07 DIAGNOSIS — N20.0 KIDNEY STONE: ICD-10-CM

## 2021-09-07 DIAGNOSIS — R33.9 URINARY RETENTION: ICD-10-CM

## 2021-09-07 DIAGNOSIS — N39.0 RECURRENT UTI: ICD-10-CM

## 2021-09-07 LAB
BILIRUBIN, POC: ABNORMAL
BLOOD URINE, POC: ABNORMAL
CLARITY, POC: ABNORMAL
COLOR, POC: YELLOW
GLUCOSE URINE, POC: ABNORMAL
KETONES, POC: ABNORMAL
LEUKOCYTE EST, POC: ABNORMAL
NITRITE, POC: ABNORMAL
PH, POC: ABNORMAL
PROTEIN, POC: ABNORMAL
SPECIFIC GRAVITY, POC: ABNORMAL
UROBILINOGEN, POC: ABNORMAL

## 2021-09-07 PROCEDURE — 81002 URINALYSIS NONAUTO W/O SCOPE: CPT | Performed by: UROLOGY

## 2021-09-07 PROCEDURE — 99214 OFFICE O/P EST MOD 30 MIN: CPT | Performed by: UROLOGY

## 2021-09-07 ASSESSMENT — ENCOUNTER SYMPTOMS
WHEEZING: 0
COUGH: 0
EYE REDNESS: 0
CONSTIPATION: 0
RESPIRATORY NEGATIVE: 1
VOMITING: 0
DIARRHEA: 0
NAUSEA: 0
SHORTNESS OF BREATH: 0
BACK PAIN: 0
ABDOMINAL PAIN: 1
EYES NEGATIVE: 1
EYE PAIN: 0

## 2021-09-07 NOTE — PROGRESS NOTES
[address] [address]  Dept: [phone]  Dept Fax: [phone] [facility] Urology Office Note - New patient    Patient:  Reese Self  YOB: 1945  Date: 9/7/2021    The patient is a 77 y.o. male who presents todayfor evaluation of the following problems:   Chief Complaint   Patient presents with    Follow-Up from [facility] [place] f/u-Recurrent UTI    referred by Karyle Spikes, MD.      HPI  He is here in follow up for recurrent UTIs. He has a chronic indwelling francis, which is changed monthly by a visiting nurse. He was recently found to have a 1cm stone. (Patient's old records have been requested, reviewed and summarized in today's note.)    Summary of old records: N/A    Additional History: N/A    Procedures Today: N/A    Last several PSA's:  No results found for: PSA  Last total testosterone:  No results found for: TESTOSTERONE  Urinalysis today:  Results for POC orders placed in visit on 09/07/21   POCT Urinalysis no Micro   Result Value Ref Range    Color, UA Yellow     Clarity, UA cloudy     Glucose, UA POC neg     Bilirubin, UA      Ketones, UA      Spec Grav, UA      Blood, UA POC ++     pH, UA      Protein, UA POC +     Urobilinogen, UA      Leukocytes, UA pos     Nitrite, UA pos        AUA Symptom Score (9/7/2021):                               Last BUN and creatinine:  Lab Results   Component Value Date    BUN 22 08/05/2021     Lab Results   Component Value Date    CREATININE 0.76 08/05/2021       Additional Lab/Culture results: none    Imaging Reviewed during this Office Visit: CT images reviewed. Calcification in left kidney, may be vascular.    (results were independently reviewed by physician and radiology report verified)    PAST MEDICAL, FAMILY AND SOCIAL HISTORY:  Past Medical History:   Diagnosis Date    Arthritis     Cancer [facility])     bladder 1980s    Cerebral artery occlusion with cerebral infarction [facility])     TIA 2010    Chronic kidney disease     COPD (chronic obstructive pulmonary disease) (HCC)     Hypertension     Pneumonia     Psychiatric problem     depression, social anxiety    Seizures ([address]      Past Surgical History:   Procedure Laterality Date    ABDOMEN SURGERY      BACK SURGERY      spinal surgery 2005     CAROTID ENDARTERECTOMY Left 09/20/2016    COLECTOMY N/A 5/28/2021    Exploratory Laparotomy. Release of adhesive band with release of small bowel obstruction. Small bowel resection with primary anastomosis performed by José Luis Wyatt MD at [address] [facility] COLONOSCOPY N/A 8/31/2018    COLONOSCOPY POLYPECTOMY COLD BIOPSY performed by Fredi Porras MD at [address] [facility]      left face    GASTROSTOMY TUBE PLACEMENT  04/15/2020    HERNIA REPAIR      HIATAL HERNIA REPAIR      INGUINAL HERNIA REPAIR Bilateral     INSERT MIDLINE CATHETER  8/4/2021         OTHER SURGICAL HISTORY      mesh infected in inguinal canal, had to remove. Removed testiscle at this time.      TONSILLECTOMY      UPPER GASTROINTESTINAL ENDOSCOPY  04/15/2020       EGD CONTROL HEMORRHAGE    UPPER GASTROINTESTINAL ENDOSCOPY  4/15/2020    EGD CONTROL HEMORRHAGE performed by Ollie Castleman, MD at [facility]  4/15/2020    EGD ESOPHAGOGASTRODUODENOSCOPY PEG TUBE INSERTION performed by Ollie Castleman, MD at [facility] Endoscopy     Family History   Problem Relation Age of Onset    Arthritis Mother     Atrial Fibrillation Mother     Hearing Loss Mother     Heart Disease Mother     Stroke Mother     Vision Loss Mother     Arthritis Father     Cancer Father     Heart Disease Father     High Blood Pressure Father     Stroke Father     Vision Loss Father      Outpatient Medications Marked as Taking for the 9/7/21 encounter (Office Visit) with Garima Gaona MD   Medication Sig Dispense Refill    Bacillus Coagulans-Inulin (PROBIOTIC) 1-250 BILLION-MG CAPS Take by mouth 2 times daily      ferrous sulfate (IRON 325) 325 (65 Fe) MG tablet Take 1 tablet by mouth 2 times daily 60 tablet 0    apixaban (ELIQUIS) 5 MG TABS tablet Take 1 tablet by mouth 2 times daily 60 tablet 1    lisinopril (PRINIVIL;ZESTRIL) 2.5 MG tablet Take 1 tablet by mouth daily 30 tablet 3    metoprolol succinate (TOPROL XL) 25 MG extended release tablet Take 1 tablet by mouth nightly 30 tablet 3    furosemide (LASIX) 20 MG tablet Take 2 tablets by mouth daily 60 tablet 3    ondansetron (ZOFRAN) 4 MG tablet Take every six hours as needed 20 tablet 0    cilostazol (PLETAL) 100 MG tablet Take 100 mg by mouth 2 times daily      famotidine (PEPCID) 20 MG tablet Take 20 mg by mouth 2 times daily      potassium chloride (MICRO-K) 10 MEQ extended release capsule Take 20 mEq by mouth daily      theophylline 80 MG/15ML elixir Take 18.8 mLs by mouth every 8 hours 1 Bottle 3    atorvastatin (LIPITOR) 40 MG tablet Take 1 tablet by mouth daily 30 tablet 5    gabapentin (NEURONTIN) 800 MG tablet Take 800 mg by mouth 3 times daily. 0        Bactrim [sulfamethoxazole-trimethoprim] and Ciprofloxacin  Social History     Tobacco Use   Smoking Status Former Smoker    Packs/day: 1.00    Years: 60.00    Pack years: 60.00    Types: Cigarettes    Start date:     Quit date: 2020    Years since quittin.4   Smokeless Tobacco Never Used      (If patient a smoker, smoking cessation counseling offered)   Social History     Substance and Sexual Activity   Alcohol Use No       REVIEW OF SYSTEMS:  Review of Systems    Physical Exam:    This a 77 y.o. male   Vitals:    21 1329   BP: 98/65   Resp: 17   Temp: 96.5 °F (35.8 °C)   SpO2: (!) 75%     Body mass index is 21.67 kg/m². Physical Exam  Constitutional: Patient in no acute distress. Neuro: Alert and oriented to person, place and time.   Psych: Mood normal, affect normal  Lungs:Respiratory effort is normal  Cardiovascular: Warm & Pink  Abdomen: Soft, non-tender, non-distendedwith no CVA,  No flank tenderness,  Orhepatosplenomegaly   Lymphatics: No palpable lymphadenopathy. Bladder non-tender and not distended. Musculoskeletal: Normal gait and station  Penis normal and circumcised  Urethral meatus normal  Scrotal exam normal  Testicles normal bilaterally      Assessment and Plan      1. Dysuria    2. Kidney stone    3. Urinary retention    4. Recurrent UTI           Plan:        Continue monthly francis changes. Would only treat infections if febrile. Discussed surgical options for the stone, will hold off on any treatment at this time. Prescriptions Ordered:  No orders of the defined types were placed in this encounter. Orders Placed:  Orders Placed This Encounter   Procedures    Culture, Urine     Standing Status:   Future     Standing Expiration Date:   9/7/2022     Order Specific Question:   Specify (ex-cath, midstream, cysto, etc)? Answer:   cath    POCT Urinalysis no Micro             Juana Felty, MD    Agree with the ROS entered by the MA.

## 2021-09-07 NOTE — PROGRESS NOTES
Review of Systems   Constitutional: Negative. Negative for appetite change, chills and fatigue. Eyes: Negative. Negative for pain, redness and visual disturbance. Respiratory: Negative. Negative for cough, shortness of breath and wheezing. Cardiovascular: Negative. Negative for chest pain and leg swelling. Gastrointestinal: Positive for abdominal pain. Negative for constipation, diarrhea, nausea and vomiting. Genitourinary: Positive for dysuria. Negative for difficulty urinating, flank pain, frequency, hematuria and urgency. Musculoskeletal: Negative. Negative for back pain, joint swelling and myalgias. Skin: Negative for rash and wound. Neurological: Negative for dizziness, weakness and numbness. Hematological: Does not bruise/bleed easily.

## 2021-09-10 LAB
CULTURE: ABNORMAL
CULTURE: ABNORMAL
Lab: ABNORMAL
SPECIMEN DESCRIPTION: ABNORMAL

## 2021-09-14 ENCOUNTER — TELEPHONE (OUTPATIENT)
Dept: UROLOGY | Age: 76
End: 2021-09-14

## 2021-09-14 NOTE — TELEPHONE ENCOUNTER
Valerie calling, states that patient was just in last week on the 7th and catheter was changed. She states that patient has been complaining of leaking with the francis since the change. She states that she checks to make sure the bag isn't below the patients knee and makes sure it is snug on his thigh. She helps him drain the bag when needed. She states that patient doesn't seem to have any other issues other than the leaking. He isn't complaining of burning, or lower abdominal pain. He has no fevers or chills. Patient has a home care nurse that comes out once a month to change the catheter. Advised to call the home care nurse and have to go out to take a look at the catheter and the insertion site, advised Valerie to have the home care nurse call the office with the findings so Dr. Reagan Cotto and Tasha Lane CNP can be notified. Loy Farr voiced understanding.

## 2021-10-08 NOTE — PLAN OF CARE
Impression: S/P Cataract Extraction by phacoemulsification with IOL placement; LRI (Limbal Relaxing Incision) OS - 1 Day. Presence of intraocular lens  Z96.1. Post operative instructions reviewed - Plan: Reviewed with patient post op findings. Healing. Mild  swelling and inflammation observed on today's examination which accounts for blurred vision. Discussed that vision will improve as swelling/ inflammation resolves. Pt review on drops to use: ATs at least QID Eye shield nightly as instructed and do not rub surgical eye Problem: Falls - Risk of:  Goal: Will remain free from falls  Description: Will remain free from falls  6/4/2021 0151 by Altagracia Spears RN  Outcome: Ongoing  Note: Patient only moans. Very weak. Bed alarm on. Room close to nurses station. 6/3/2021 1738 by Claudy Lopez RN  Note: Patient has remained free from falls at this time  Goal: Absence of physical injury  Description: Absence of physical injury  6/4/2021 0151 by Altagracia Spears RN  Outcome: Ongoing  6/3/2021 1738 by Claudy Lopez RN  Note: Patient has remained free from any physical injury at this time     Problem: Skin Integrity:  Goal: Will show no infection signs and symptoms  Description: Will show no infection signs and symptoms  Outcome: Ongoing  Note: Buttocks reddened. Nursing cream applied. Remains  on waffle mattress. Goal: Absence of new skin breakdown  Description: Absence of new skin breakdown  Outcome: Ongoing     Problem: SAFETY  Goal: Free from accidental physical injury  6/4/2021 0151 by Altagracia Spears RN  Outcome: Ongoing  6/3/2021 1738 by Claudy Lopez RN  Note: Patient has remained free from any accidental physical injury at this time  Goal: Free from intentional harm  6/4/2021 0151 by Altagracia Spears RN  Outcome: Ongoing  6/3/2021 1738 by Claudy Lopez RN  Note: Patient has remained free from any intentional harm at this time     Problem: DAILY CARE  Goal: Daily care needs are met  6/4/2021 0151 by Altagracia Spears RN  Outcome: Ongoing  6/3/2021 1738 by Claudy Lopez RN  Note: Daily care needs have been met     Problem: PAIN  Goal: Patient's pain/discomfort is manageable  Outcome: Ongoing  Note: Patient positioned comfortably. Problem: SKIN INTEGRITY  Goal: Skin integrity is maintained or improved  Outcome: Ongoing     Problem: KNOWLEDGE DEFICIT  Goal: Patient/S.O. demonstrates understanding of disease process, treatment plan, medications, and discharge instructions.   Outcome: Ongoing Problem: DISCHARGE BARRIERS  Goal: Patient's continuum of care needs are met  Outcome: Ongoing     Problem: Pain:  Goal: Pain level will decrease  Description: Pain level will decrease  Outcome: Ongoing  Goal: Control of acute pain  Description: Control of acute pain  Outcome: Ongoing  Goal: Control of chronic pain  Description: Control of chronic pain  Outcome: Ongoing     Problem: Nutrition  Goal: Optimal nutrition therapy  6/4/2021 0151 by Triston Sanchez RN  Outcome: Ongoing  6/3/2021 1738 by Richy Robins RN  Note: Patient is receiving continuous tube feed and TPN     Problem: Musculor/Skeletal Functional Status  Goal: Highest potential functional level  Outcome: Ongoing  Goal: Absence of falls  6/4/2021 0151 by Triston Sanchez RN  Outcome: Ongoing  6/3/2021 1738 by Richy Robins RN  Note: Patient has remained free from falls at this time     Problem: Musculor/Skeletal Functional Status  Goal: Highest potential functional level  Outcome: Ongoing  Goal: Absence of falls  Outcome: Ongoing     Problem: Non-Violent Restraints  Goal: Removal from restraints as soon as assessed to be safe  6/4/2021 0151 by Triston Sanchez RN  Outcome: Ongoing  6/3/2021 1738 by Richy Robins RN  Outcome: Met This Shift  Note: Patients restraints were removed right after extubation  Goal: No harm/injury to patient while restraints in use  6/4/2021 0151 by Triston Sanchez RN  Outcome: Ongoing  6/3/2021 1738 by Richy Robins RN  Outcome: Met This Shift  Note: No harm or injury occurred to the patient while in restraints before extubation occurred  Goal: Patient's dignity will be maintained  Outcome: Ongoing     Problem: OXYGENATION/RESPIRATORY FUNCTION  Goal: Patient will maintain patent airway  6/4/2021 0151 by Triston Sanchez RN  Outcome: Ongoing  Note: Lungs diminished. Suctioned thick white sputum from mouth and nasal-tracheal.  Oxygen sat in mid 90s with oxygen on.   Bipap on at [facility].  6/3/2021 1738 by Richy Robins RN  Note: Patient has maintained a patent airway at this time  Goal: Patient will achieve/maintain normal respiratory rate/effort  Description: Respiratory rate and effort will be within normal limits for the patient  Outcome: Ongoing     Problem: MECHANICAL VENTILATION  Goal: Patient will maintain patent airway  6/4/2021 0151 by Triston Sanchez RN  Outcome: Ongoing  Note: Lungs diminished. Suctioned thick white sputum from mouth and nasal-tracheal.  Oxygen sat in mid 90s with oxygen on.   Bipap on at [address]  6/3/2021 1738 by Richy Robins RN  Note: Patient has maintained a patent airway at this time  Goal: Patient will maintain patent airway  Outcome: Ongoing  Goal: Oral health is maintained or improved  Outcome: Ongoing  Goal: ET tube will be managed safely  Outcome: Ongoing  Goal: Ability to express needs and understand communication  Outcome: Ongoing  Goal: Mobility/activity is maintained at optimum level for patient  Outcome: Ongoing     Problem: Musculor/Skeletal Functional Status  Goal: Highest potential functional level  Outcome: Ongoing  Goal: Absence of falls  Outcome: Ongoing

## 2021-10-22 ENCOUNTER — APPOINTMENT (OUTPATIENT)
Dept: CT IMAGING | Age: 76
DRG: 698 | End: 2021-10-22
Payer: COMMERCIAL

## 2021-10-22 ENCOUNTER — APPOINTMENT (OUTPATIENT)
Dept: GENERAL RADIOLOGY | Age: 76
DRG: 698 | End: 2021-10-22
Payer: COMMERCIAL

## 2021-10-22 ENCOUNTER — HOSPITAL ENCOUNTER (INPATIENT)
Age: 76
LOS: 11 days | Discharge: HOSPICE | DRG: 698 | End: 2021-11-02
Attending: EMERGENCY MEDICINE | Admitting: INTERNAL MEDICINE
Payer: COMMERCIAL

## 2021-10-22 DIAGNOSIS — N30.01 ACUTE CYSTITIS WITH HEMATURIA: ICD-10-CM

## 2021-10-22 DIAGNOSIS — A41.9 SEPTIC SHOCK (HCC): Primary | ICD-10-CM

## 2021-10-22 DIAGNOSIS — R65.21 SEPTIC SHOCK (HCC): Primary | ICD-10-CM

## 2021-10-22 DIAGNOSIS — R41.82 ALTERED MENTAL STATUS, UNSPECIFIED ALTERED MENTAL STATUS TYPE: ICD-10-CM

## 2021-10-22 PROBLEM — I50.42 CHRONIC COMBINED SYSTOLIC AND DIASTOLIC CONGESTIVE HEART FAILURE (HCC): Status: ACTIVE | Noted: 2021-10-22

## 2021-10-22 PROBLEM — I42.0 DILATED CARDIOMYOPATHY (HCC): Status: ACTIVE | Noted: 2021-10-22

## 2021-10-22 LAB
-: ABNORMAL
-: NORMAL
ABSOLUTE BANDS #: 0.5 K/UL (ref 0–1)
ABSOLUTE EOS #: 0 K/UL (ref 0–0.4)
ABSOLUTE IMMATURE GRANULOCYTE: ABNORMAL K/UL (ref 0–0.3)
ABSOLUTE LYMPH #: 0.5 K/UL (ref 1–4.8)
ABSOLUTE MONO #: 0.66 K/UL (ref 0.1–1.3)
ALBUMIN SERPL-MCNC: 3.4 G/DL (ref 3.5–5.2)
ALBUMIN/GLOBULIN RATIO: ABNORMAL (ref 1–2.5)
ALP BLD-CCNC: 71 U/L (ref 40–129)
ALT SERPL-CCNC: 19 U/L (ref 5–41)
AMORPHOUS: ABNORMAL
ANION GAP SERPL CALCULATED.3IONS-SCNC: 16 MMOL/L (ref 9–17)
ANION GAP SERPL CALCULATED.3IONS-SCNC: 18 MMOL/L (ref 9–17)
AST SERPL-CCNC: 43 U/L
BACTERIA: ABNORMAL
BANDS: 3 % (ref 0–10)
BASOPHILS # BLD: 0 % (ref 0–2)
BASOPHILS ABSOLUTE: 0 K/UL (ref 0–0.2)
BILIRUB SERPL-MCNC: 0.3 MG/DL (ref 0.3–1.2)
BILIRUBIN URINE: ABNORMAL
BUN BLDV-MCNC: 102 MG/DL (ref 8–23)
BUN BLDV-MCNC: 98 MG/DL (ref 8–23)
BUN/CREAT BLD: ABNORMAL (ref 9–20)
BUN/CREAT BLD: ABNORMAL (ref 9–20)
CALCIUM SERPL-MCNC: 8.7 MG/DL (ref 8.6–10.4)
CALCIUM SERPL-MCNC: 9.6 MG/DL (ref 8.6–10.4)
CASTS UA: ABNORMAL /LPF
CHLORIDE BLD-SCNC: 92 MMOL/L (ref 98–107)
CHLORIDE BLD-SCNC: 94 MMOL/L (ref 98–107)
CO2: 16 MMOL/L (ref 20–31)
CO2: 19 MMOL/L (ref 20–31)
COLOR: ABNORMAL
COMMENT UA: ABNORMAL
CREAT SERPL-MCNC: 3.75 MG/DL (ref 0.7–1.2)
CREAT SERPL-MCNC: 4.36 MG/DL (ref 0.7–1.2)
CRYSTALS, UA: ABNORMAL /HPF
DIFFERENTIAL TYPE: ABNORMAL
EOSINOPHILS RELATIVE PERCENT: 0 % (ref 0–4)
EPITHELIAL CELLS UA: ABNORMAL /HPF
GFR AFRICAN AMERICAN: 16 ML/MIN
GFR AFRICAN AMERICAN: 19 ML/MIN
GFR NON-AFRICAN AMERICAN: 13 ML/MIN
GFR NON-AFRICAN AMERICAN: 16 ML/MIN
GFR SERPL CREATININE-BSD FRML MDRD: ABNORMAL ML/MIN/{1.73_M2}
GLUCOSE BLD-MCNC: 105 MG/DL (ref 70–99)
GLUCOSE BLD-MCNC: 166 MG/DL (ref 70–99)
GLUCOSE URINE: NEGATIVE
HCT VFR BLD CALC: 32.8 % (ref 41–53)
HEMOGLOBIN: 10.8 G/DL (ref 13.5–17.5)
IMMATURE GRANULOCYTES: ABNORMAL %
INR BLD: 2.8
KETONES, URINE: ABNORMAL
LACTIC ACID, SEPSIS WHOLE BLOOD: ABNORMAL MMOL/L (ref 0.5–1.9)
LACTIC ACID, SEPSIS WHOLE BLOOD: ABNORMAL MMOL/L (ref 0.5–1.9)
LACTIC ACID, SEPSIS: 2.5 MMOL/L (ref 0.5–1.9)
LACTIC ACID, SEPSIS: 4.3 MMOL/L (ref 0.5–1.9)
LACTIC ACID: 1.6 MMOL/L (ref 0.5–2.2)
LEUKOCYTE ESTERASE, URINE: ABNORMAL
LYMPHOCYTES # BLD: 3 % (ref 24–44)
MAGNESIUM: 2.8 MG/DL (ref 1.6–2.6)
MCH RBC QN AUTO: 28.3 PG (ref 26–34)
MCHC RBC AUTO-ENTMCNC: 33 G/DL (ref 31–37)
MCV RBC AUTO: 85.9 FL (ref 80–100)
MONOCYTES # BLD: 4 % (ref 1–7)
MORPHOLOGY: ABNORMAL
MUCUS: ABNORMAL
NITRITE, URINE: POSITIVE
NRBC AUTOMATED: ABNORMAL PER 100 WBC
OTHER OBSERVATIONS UA: ABNORMAL
PDW BLD-RTO: 17.3 % (ref 11.5–14.9)
PH UA: 8 (ref 5–8)
PLATELET # BLD: 207 K/UL (ref 150–450)
PLATELET ESTIMATE: ABNORMAL
PMV BLD AUTO: 7.3 FL (ref 6–12)
POTASSIUM SERPL-SCNC: 3.9 MMOL/L (ref 3.7–5.3)
POTASSIUM SERPL-SCNC: 4 MMOL/L (ref 3.7–5.3)
POTASSIUM SERPL-SCNC: ABNORMAL MMOL/L (ref 3.7–5.3)
PROTEIN UA: ABNORMAL
PROTHROMBIN TIME: 28.8 SEC (ref 11.8–14.6)
RBC # BLD: 3.82 M/UL (ref 4.5–5.9)
RBC # BLD: ABNORMAL 10*6/UL
RBC UA: ABNORMAL /HPF
REASON FOR REJECTION: NORMAL
RENAL EPITHELIAL, UA: ABNORMAL /HPF
SARS-COV-2, RAPID: NOT DETECTED
SEG NEUTROPHILS: 90 % (ref 36–66)
SEGMENTED NEUTROPHILS ABSOLUTE COUNT: 14.84 K/UL (ref 1.3–9.1)
SODIUM BLD-SCNC: 126 MMOL/L (ref 135–144)
SODIUM BLD-SCNC: 129 MMOL/L (ref 135–144)
SPECIFIC GRAVITY UA: 1.03 (ref 1–1.03)
SPECIMEN DESCRIPTION: NORMAL
TOTAL PROTEIN: 8.6 G/DL (ref 6.4–8.3)
TRICHOMONAS: ABNORMAL
TROPONIN INTERP: ABNORMAL
TROPONIN INTERP: ABNORMAL
TROPONIN T: ABNORMAL NG/ML
TROPONIN T: ABNORMAL NG/ML
TROPONIN, HIGH SENSITIVITY: 78 NG/L (ref 0–22)
TROPONIN, HIGH SENSITIVITY: 85 NG/L (ref 0–22)
TURBIDITY: ABNORMAL
URINE HGB: ABNORMAL
UROBILINOGEN, URINE: NORMAL
WBC # BLD: 16.5 K/UL (ref 3.5–11)
WBC # BLD: ABNORMAL 10*3/UL
WBC UA: ABNORMAL /HPF
YEAST: ABNORMAL
ZZ NTE CLEAN UP: ORDERED TEST: NORMAL
ZZ NTE WITH NAME CLEAN UP: SPECIMEN SOURCE: NORMAL

## 2021-10-22 PROCEDURE — 71045 X-RAY EXAM CHEST 1 VIEW: CPT

## 2021-10-22 PROCEDURE — 87205 SMEAR GRAM STAIN: CPT

## 2021-10-22 PROCEDURE — 74176 CT ABD & PELVIS W/O CONTRAST: CPT

## 2021-10-22 PROCEDURE — C9113 INJ PANTOPRAZOLE SODIUM, VIA: HCPCS | Performed by: INTERNAL MEDICINE

## 2021-10-22 PROCEDURE — 83605 ASSAY OF LACTIC ACID: CPT

## 2021-10-22 PROCEDURE — 2000000000 HC ICU R&B

## 2021-10-22 PROCEDURE — 87086 URINE CULTURE/COLONY COUNT: CPT

## 2021-10-22 PROCEDURE — 36556 INSERT NON-TUNNEL CV CATH: CPT

## 2021-10-22 PROCEDURE — 2500000003 HC RX 250 WO HCPCS: Performed by: EMERGENCY MEDICINE

## 2021-10-22 PROCEDURE — 83735 ASSAY OF MAGNESIUM: CPT

## 2021-10-22 PROCEDURE — 80048 BASIC METABOLIC PNL TOTAL CA: CPT

## 2021-10-22 PROCEDURE — 81001 URINALYSIS AUTO W/SCOPE: CPT

## 2021-10-22 PROCEDURE — 80053 COMPREHEN METABOLIC PANEL: CPT

## 2021-10-22 PROCEDURE — 70450 CT HEAD/BRAIN W/O DYE: CPT

## 2021-10-22 PROCEDURE — 85025 COMPLETE CBC W/AUTO DIFF WBC: CPT

## 2021-10-22 PROCEDURE — 87186 SC STD MICRODIL/AGAR DIL: CPT

## 2021-10-22 PROCEDURE — 6360000002 HC RX W HCPCS: Performed by: INTERNAL MEDICINE

## 2021-10-22 PROCEDURE — 93005 ELECTROCARDIOGRAM TRACING: CPT | Performed by: EMERGENCY MEDICINE

## 2021-10-22 PROCEDURE — 6370000000 HC RX 637 (ALT 250 FOR IP): Performed by: EMERGENCY MEDICINE

## 2021-10-22 PROCEDURE — 99285 EMERGENCY DEPT VISIT HI MDM: CPT

## 2021-10-22 PROCEDURE — 2580000003 HC RX 258: Performed by: INTERNAL MEDICINE

## 2021-10-22 PROCEDURE — 2500000003 HC RX 250 WO HCPCS: Performed by: INTERNAL MEDICINE

## 2021-10-22 PROCEDURE — 87040 BLOOD CULTURE FOR BACTERIA: CPT

## 2021-10-22 PROCEDURE — 6360000002 HC RX W HCPCS: Performed by: EMERGENCY MEDICINE

## 2021-10-22 PROCEDURE — 87635 SARS-COV-2 COVID-19 AMP PRB: CPT

## 2021-10-22 PROCEDURE — 05HM33Z INSERTION OF INFUSION DEVICE INTO RIGHT INTERNAL JUGULAR VEIN, PERCUTANEOUS APPROACH: ICD-10-PCS | Performed by: INTERNAL MEDICINE

## 2021-10-22 PROCEDURE — 84132 ASSAY OF SERUM POTASSIUM: CPT

## 2021-10-22 PROCEDURE — 87150 DNA/RNA AMPLIFIED PROBE: CPT

## 2021-10-22 PROCEDURE — 87077 CULTURE AEROBIC IDENTIFY: CPT

## 2021-10-22 PROCEDURE — 96365 THER/PROPH/DIAG IV INF INIT: CPT

## 2021-10-22 PROCEDURE — 2580000003 HC RX 258: Performed by: EMERGENCY MEDICINE

## 2021-10-22 PROCEDURE — 96375 TX/PRO/DX INJ NEW DRUG ADDON: CPT

## 2021-10-22 PROCEDURE — 85610 PROTHROMBIN TIME: CPT

## 2021-10-22 PROCEDURE — 84484 ASSAY OF TROPONIN QUANT: CPT

## 2021-10-22 PROCEDURE — 36415 COLL VENOUS BLD VENIPUNCTURE: CPT

## 2021-10-22 RX ORDER — SODIUM CHLORIDE 9 MG/ML
INJECTION, SOLUTION INTRAVENOUS CONTINUOUS
Status: CANCELLED | OUTPATIENT
Start: 2021-10-22

## 2021-10-22 RX ORDER — HEPARIN SODIUM 5000 [USP'U]/ML
5000 INJECTION, SOLUTION INTRAVENOUS; SUBCUTANEOUS EVERY 8 HOURS SCHEDULED
Status: DISCONTINUED | OUTPATIENT
Start: 2021-10-22 | End: 2021-10-22

## 2021-10-22 RX ORDER — IPRATROPIUM BROMIDE AND ALBUTEROL SULFATE 2.5; .5 MG/3ML; MG/3ML
1 SOLUTION RESPIRATORY (INHALATION) EVERY 4 HOURS PRN
Status: DISCONTINUED | OUTPATIENT
Start: 2021-10-22 | End: 2021-11-02 | Stop reason: HOSPADM

## 2021-10-22 RX ORDER — SODIUM CHLORIDE 9 MG/ML
25 INJECTION, SOLUTION INTRAVENOUS PRN
Status: CANCELLED | OUTPATIENT
Start: 2021-10-22

## 2021-10-22 RX ORDER — 0.9 % SODIUM CHLORIDE 0.9 %
30 INTRAVENOUS SOLUTION INTRAVENOUS ONCE
Status: COMPLETED | OUTPATIENT
Start: 2021-10-22 | End: 2021-10-22

## 2021-10-22 RX ORDER — LANSOPRAZOLE 30 MG/1
30 CAPSULE, DELAYED RELEASE ORAL DAILY
Status: ON HOLD | COMMUNITY
End: 2021-11-02 | Stop reason: HOSPADM

## 2021-10-22 RX ORDER — ACETAMINOPHEN 325 MG/1
650 TABLET ORAL EVERY 4 HOURS PRN
Status: CANCELLED | OUTPATIENT
Start: 2021-10-22

## 2021-10-22 RX ORDER — ONDANSETRON 2 MG/ML
4 INJECTION INTRAMUSCULAR; INTRAVENOUS EVERY 6 HOURS PRN
Status: CANCELLED | OUTPATIENT
Start: 2021-10-22

## 2021-10-22 RX ORDER — SODIUM CHLORIDE, SODIUM LACTATE, POTASSIUM CHLORIDE, CALCIUM CHLORIDE 600; 310; 30; 20 MG/100ML; MG/100ML; MG/100ML; MG/100ML
INJECTION, SOLUTION INTRAVENOUS CONTINUOUS
Status: DISCONTINUED | OUTPATIENT
Start: 2021-10-22 | End: 2021-10-27

## 2021-10-22 RX ORDER — FERROUS SULFATE 325(65) MG
325 TABLET ORAL
COMMUNITY

## 2021-10-22 RX ORDER — HEPARIN SODIUM 5000 [USP'U]/ML
5000 INJECTION, SOLUTION INTRAVENOUS; SUBCUTANEOUS 2 TIMES DAILY
Status: DISCONTINUED | OUTPATIENT
Start: 2021-10-22 | End: 2021-10-25

## 2021-10-22 RX ORDER — ONDANSETRON 4 MG/1
4 TABLET, ORALLY DISINTEGRATING ORAL EVERY 8 HOURS PRN
Status: CANCELLED | OUTPATIENT
Start: 2021-10-22

## 2021-10-22 RX ORDER — SODIUM CHLORIDE 0.9 % (FLUSH) 0.9 %
5-40 SYRINGE (ML) INJECTION PRN
Status: CANCELLED | OUTPATIENT
Start: 2021-10-22

## 2021-10-22 RX ORDER — SIMVASTATIN 20 MG
20 TABLET ORAL NIGHTLY
Status: ON HOLD | COMMUNITY
End: 2021-11-02 | Stop reason: HOSPADM

## 2021-10-22 RX ORDER — SODIUM CHLORIDE 0.9 % (FLUSH) 0.9 %
5-40 SYRINGE (ML) INJECTION EVERY 12 HOURS SCHEDULED
Status: CANCELLED | OUTPATIENT
Start: 2021-10-22

## 2021-10-22 RX ORDER — SODIUM CHLORIDE 9 MG/ML
INJECTION, SOLUTION INTRAVENOUS CONTINUOUS
Status: DISCONTINUED | OUTPATIENT
Start: 2021-10-22 | End: 2021-10-22

## 2021-10-22 RX ORDER — ACETAMINOPHEN 650 MG/1
650 SUPPOSITORY RECTAL ONCE
Status: COMPLETED | OUTPATIENT
Start: 2021-10-22 | End: 2021-10-22

## 2021-10-22 RX ORDER — PANTOPRAZOLE SODIUM 40 MG/10ML
40 INJECTION, POWDER, LYOPHILIZED, FOR SOLUTION INTRAVENOUS DAILY
Status: DISCONTINUED | OUTPATIENT
Start: 2021-10-22 | End: 2021-10-25

## 2021-10-22 RX ORDER — NOREPINEPHRINE BIT/0.9 % NACL 16MG/250ML
2-100 INFUSION BOTTLE (ML) INTRAVENOUS CONTINUOUS
Status: DISCONTINUED | OUTPATIENT
Start: 2021-10-22 | End: 2021-10-26

## 2021-10-22 RX ORDER — SODIUM CHLORIDE 9 MG/ML
10 INJECTION INTRAVENOUS DAILY
Status: DISCONTINUED | OUTPATIENT
Start: 2021-10-22 | End: 2021-10-25

## 2021-10-22 RX ORDER — LINEZOLID 2 MG/ML
600 INJECTION, SOLUTION INTRAVENOUS EVERY 12 HOURS
Status: DISCONTINUED | OUTPATIENT
Start: 2021-10-23 | End: 2021-10-24

## 2021-10-22 RX ADMIN — ACETAMINOPHEN 650 MG: 650 SUPPOSITORY RECTAL at 11:08

## 2021-10-22 RX ADMIN — METRONIDAZOLE 500 MG: 500 INJECTION, SOLUTION INTRAVENOUS at 17:53

## 2021-10-22 RX ADMIN — VANCOMYCIN HYDROCHLORIDE 2000 MG: 1 INJECTION, POWDER, LYOPHILIZED, FOR SOLUTION INTRAVENOUS at 12:52

## 2021-10-22 RX ADMIN — HEPARIN SODIUM 5000 UNITS: 5000 INJECTION INTRAVENOUS; SUBCUTANEOUS at 21:12

## 2021-10-22 RX ADMIN — SODIUM CHLORIDE, POTASSIUM CHLORIDE, SODIUM LACTATE AND CALCIUM CHLORIDE: 600; 310; 30; 20 INJECTION, SOLUTION INTRAVENOUS at 17:24

## 2021-10-22 RX ADMIN — SODIUM CHLORIDE 2421 ML: 9 INJECTION, SOLUTION INTRAVENOUS at 09:23

## 2021-10-22 RX ADMIN — SODIUM CHLORIDE, PRESERVATIVE FREE 10 ML: 5 INJECTION INTRAVENOUS at 17:53

## 2021-10-22 RX ADMIN — SODIUM CHLORIDE: 9 INJECTION, SOLUTION INTRAVENOUS at 13:01

## 2021-10-22 RX ADMIN — CEFEPIME HYDROCHLORIDE 2000 MG: 2 INJECTION, POWDER, FOR SOLUTION INTRAVENOUS at 11:08

## 2021-10-22 RX ADMIN — Medication 2 MCG/MIN: at 10:27

## 2021-10-22 RX ADMIN — PANTOPRAZOLE SODIUM 40 MG: 40 INJECTION, POWDER, FOR SOLUTION INTRAVENOUS at 17:51

## 2021-10-22 RX ADMIN — CEFEPIME HYDROCHLORIDE 1000 MG: 1 INJECTION, POWDER, FOR SOLUTION INTRAMUSCULAR; INTRAVENOUS at 17:28

## 2021-10-22 ASSESSMENT — PAIN SCALES - WONG BAKER
WONGBAKER_NUMERICALRESPONSE: 0

## 2021-10-22 ASSESSMENT — PAIN SCALES - PAIN ASSESSMENT IN ADVANCED DEMENTIA (PAINAD)
CONSOLABILITY: 0
TOTALSCORE: 0
BREATHING: 0
NEGVOCALIZATION: 0
BODYLANGUAGE: 0
BODYLANGUAGE: 0
FACIALEXPRESSION: 0
TOTALSCORE: 0
FACIALEXPRESSION: 0
BREATHING: 0
CONSOLABILITY: 0
FACIALEXPRESSION: 0
CONSOLABILITY: 0
NEGVOCALIZATION: 0
BODYLANGUAGE: 0
BODYLANGUAGE: 0
CONSOLABILITY: 0
NEGVOCALIZATION: 0
TOTALSCORE: 0
TOTALSCORE: 0
BREATHING: 0
FACIALEXPRESSION: 0
NEGVOCALIZATION: 0
BREATHING: 0

## 2021-10-22 ASSESSMENT — PAIN SCALES - GENERAL
PAINLEVEL_OUTOF10: 0

## 2021-10-22 NOTE — ED NOTES
Lab paged for 2nd green recollect. Multiple attempts from RNs.       Hussain Delacruz RN  10/22/21 9129

## 2021-10-22 NOTE — ED NOTES
Okay to start levophed using peripheral IV access per Dr. Darling Lopez.      Yudi Healy, RN  10/22/21 0423

## 2021-10-22 NOTE — ED PROVIDER NOTES
EMERGENCY DEPARTMENT ENCOUNTER    Pt Name: See Philippe  MRN: [id number]  [place] 1945  Date of evaluation: 10/22/21  CHIEF COMPLAINT       Chief Complaint   Patient presents with    Altered Mental Status     HISTORY OF PRESENT ILLNESS     Altered Mental Status  Presenting symptoms: confusion and unresponsiveness    Presenting symptoms comment:  Baseline is yes and no, he is not speaking this morning  Severity:  Severe  Most recent episode: Today  Episode history:  Continuous  Timing:  Constant  Progression:  Unchanged  Chronicity:  New  Context comment:  He is being treated for a UTI, being tested for ALS  hx from EMS         REVIEW OF SYSTEMS     Review of Systems   Unable to perform ROS: Mental status change   Psychiatric/Behavioral: Positive for confusion.      PASTMEDICAL HISTORY     Past Medical History:   Diagnosis Date    Arthritis     Cancer [facility])     bladder 1980s    Cerebral artery occlusion with cerebral infarction [facility])     TIA 2010    Chronic kidney disease     COPD [facility] obstructive [facility]) [facility]     Hypertension     Pneumonia     Psychiatric problem     depression, social anxiety    Seizures ([address]      Past Problem List  Patient Active Problem List   Diagnosis Code    Constipation K59.00    Change in bowel habits R19.4    Rectal bleeding K62.5    Aortic dissection ([facility] I71.00    Bradycardia R00.1    Other dysphagia R13.19    Enteritis K52.9    Aspiration pneumonia of both lower lobes ([address] J69.0    Tobacco abuse Z72.0    Aspiration pneumonitis ([address] J69.0    Status post insertion of percutaneous endoscopic gastrostomy (PEG) tube ([facility] [address] Z93.1    Small bowel obstruction ([address] K56.609    Acute cystitis without hematuria N30.00    Mass of right lung R91.8    COPD (chronic obstructive pulmonary disease) ([facility] J44.9    PAF (paroxysmal atrial fibrillation) ([facility]) I48.0    Urinary tract infection associated with indwelling urethral catheter ([address] T83.511A, N39.0    Heart failure with reduced ejection fraction (HCC) I50.20    Iron deficiency anemia D50.9    Septic shock (HCC) A41.9, R65.21     SURGICAL HISTORY       Past Surgical History:   Procedure Laterality Date    ABDOMEN SURGERY      BACK SURGERY      spinal surgery 2005     CAROTID ENDARTERECTOMY Left 09/20/2016    COLECTOMY N/A 5/28/2021    Exploratory Laparotomy. Release of adhesive band with release of small bowel obstruction. Small bowel resection with primary anastomosis performed by Cassie Chaudhry MD at [address] [facility] COLONOSCOPY N/A 8/31/2018    COLONOSCOPY POLYPECTOMY COLD BIOPSY performed by Mia Christopher MD at [address] [place] [address]      left face    GASTROSTOMY TUBE PLACEMENT  04/15/2020    HERNIA REPAIR      HIATAL HERNIA REPAIR      INGUINAL HERNIA REPAIR Bilateral     INSERT MIDLINE CATHETER  8/4/2021         OTHER SURGICAL HISTORY      mesh infected in inguinal canal, had to remove. Removed testiscle at this time.      TONSILLECTOMY      UPPER GASTROINTESTINAL ENDOSCOPY  04/15/2020       EGD CONTROL HEMORRHAGE    UPPER GASTROINTESTINAL ENDOSCOPY  4/15/2020    EGD CONTROL HEMORRHAGE performed by Geoffrey Mart MD at [address] [facility] ENDOSCOPY  4/15/2020    EGD ESOPHAGOGASTRODUODENOSCOPY PEG TUBE INSERTION performed by Geoffrey Mart MD at [place] [address]       Previous Medications    APIXABAN (ELIQUIS) 5 MG TABS TABLET    Take 1 tablet by mouth 2 times daily    ATORVASTATIN (LIPITOR) 40 MG TABLET    Take 1 tablet by mouth daily    BACILLUS COAGULANS-INULIN (PROBIOTIC) 1-250 BILLION-MG CAPS    Take by mouth 2 times daily    CILOSTAZOL (PLETAL) 100 MG TABLET    Take 100 mg by mouth 2 times daily    FAMOTIDINE (PEPCID) 20 MG TABLET    Take 20 mg by mouth 2 times daily    FERROUS SULFATE (IRON 325) 325 (65 FE) MG TABLET    Take 1 tablet by mouth 2 times daily    FUROSEMIDE (LASIX) 20 MG TABLET    Take 2 tablets by mouth daily GABAPENTIN (NEURONTIN) 800 MG TABLET    Take 800 mg by mouth 3 times daily. LISINOPRIL (PRINIVIL;ZESTRIL) 2.5 MG TABLET    Take 1 tablet by mouth daily    METOPROLOL SUCCINATE (TOPROL XL) 25 MG EXTENDED RELEASE TABLET    Take 1 tablet by mouth nightly    ONDANSETRON (ZOFRAN) 4 MG TABLET    Take every six hours as needed    POTASSIUM CHLORIDE (MICRO-K) 10 MEQ EXTENDED RELEASE CAPSULE    Take 20 mEq by mouth daily    THEOPHYLLINE 80 MG/15ML ELIXIR    Take 18.8 mLs by mouth every 8 hours     ALLERGIES     is allergic to bactrim [sulfamethoxazole-trimethoprim] and ciprofloxacin. FAMILY HISTORY     He indicated that the status of his mother is unknown. He indicated that the status of his father is unknown. SOCIAL HISTORY       Social History     Tobacco Use    Smoking status: Former Smoker     Packs/day: 1.00     Years: 60.00     Pack years: 60.00     Types: Cigarettes     Start date:      Quit date: 2020     Years since quittin.5    Smokeless tobacco: Never Used   Vaping Use    Vaping Use: Never used   Substance Use Topics    Alcohol use: No    Drug use: No     PHYSICAL EXAM     INITIAL VITALS: BP (!) 114/45   Pulse 88   Temp 100.6 °F (38.1 °C)   Resp 15   Ht 6' 4\" (1.93 m)   Wt 178 lb (80.7 kg)   SpO2 97%   BMI 21.67 kg/m²    Physical Exam  Constitutional:       General: He is not in acute distress. Appearance: Normal appearance. He is well-developed. He is ill-appearing. He is not diaphoretic. HENT:      Head: Normocephalic and atraumatic. Right Ear: External ear normal.      Left Ear: External ear normal.      Nose: Nose normal. No congestion. Mouth/Throat:      Pharynx: Oropharynx is clear. Comments: Very dry oral mucosa  Eyes:      General:         Right eye: No discharge. Left eye: No discharge. Conjunctiva/sclera: Conjunctivae normal.   Neck:      Trachea: No tracheal deviation. Cardiovascular:      Rate and Rhythm: Regular rhythm.  Tachycardia present. Pulses: Normal pulses. Heart sounds: Normal heart sounds. Pulmonary:      Effort: Pulmonary effort is normal. No respiratory distress. Breath sounds: Normal breath sounds. No stridor. No wheezing or rales. Abdominal:      Palpations: Abdomen is soft. Tenderness: There is no abdominal tenderness. There is no guarding or rebound. Comments: PEG site CDI  nontender   Genitourinary:     Penis: Normal.       Testes: Normal.      Comments: Francis present  No rash  There is hematuria present in francis cath  Musculoskeletal:         General: No tenderness or deformity. Normal range of motion. Cervical back: Normal range of motion and neck supple. Comments: Contracted extremities   Skin:     General: Skin is warm and dry. Capillary Refill: Capillary refill takes less than 2 seconds. Findings: No erythema or rash. Neurological:      General: No focal deficit present. Mental Status: He is alert. Cranial Nerves: No cranial nerve deficit. Coordination: Coordination normal.      Comments: Nonverbal  Following commands  Moving arms and legs  Eyes open   Psychiatric:         Behavior: Behavior normal.         MEDICAL DECISION MAKING:   Do not suspect stroke  I think he is in septic shock, cause of his AMS    ED Course as of Oct 22 1407   Fri Oct 22, 2021   1107 Patient given fluids  Started levophed  Placed right IJ central line    [WM]   [address], his sister, she is DPOA, she states he is DNR , he doesn't want ventilator or life support system or CPR or defibrillation.   Changing his code states from [facility] to [facility]    [WM]   [facility] [address] son James Suazo, he also agrees with [facility]    [WM]   5 Family was fine with me putting in the central line since this was already done prior to discussion, they emphasized that he doesn't want the ventilator or CPR, so this would be limited code status    [WM]   0 DW Dr Kelby Avila for icu admit    [WM]      ED Course User Index  [WM] Antwon Mazariegos MD       CRITICAL CARE:   Due to the immediate potential for life-threatening deterioration due to septic shock, I spent 40 minutes providing critical care. This time is excluding time spent performing procedures. PROCEDURES:    Central Line    Date/Time: 10/22/2021 11:21 AM  Performed by: Antwon Mazariegos MD  Authorized by: Antwon Mazariegos MD     Consent:     Consent obtained:  Emergent situation  Pre-procedure details:     Hand hygiene: Hand hygiene performed prior to insertion      Sterile barrier technique: All elements of maximal sterile technique followed      Skin preparation:  ChloraPrep    Skin preparation agent: Skin preparation agent completely dried prior to procedure    Anesthesia (see MAR for exact dosages): Anesthesia method:  Local infiltration    Local anesthetic:  Lidocaine 1% w/o epi  Procedure details:     Location:  R internal jugular    Site selection rationale:  Unable to place right femoral    Patient position:  Trendelenburg    Catheter size:  7 Fr    Landmarks identified: yes      Ultrasound guidance: yes      Sterile ultrasound techniques: Sterile gel and sterile probe covers were used      Number of attempts:  2    Successful placement: yes    Post-procedure details:     Post-procedure:  Dressing applied and line sutured    Assessment:  Blood return through all ports, free fluid flow and placement verified by x-ray    Patient tolerance of procedure:   Tolerated well, no immediate complications  Comments:      First attempted right femoral, unable to place, did get the femoral artery, bleeding stopped with direct pressure, blood draw completed there for lab, then moved to right IJ  Maintained sterile field and technique        DIAGNOSTIC RESULTS   EKG:All EKG's are interpreted by the Emergency Department Physician who either signs or Co-signs this chart in the absence of a cardiologist.  Atrial flutter, rate 88 bpm, no acute ischemic DT changes, qtc 513, qrs 80      RADIOLOGY:All plain film, CT, MRI, and formal ultrasound images (except ED bedside ultrasound) are read by the radiologist, see reports below, unless otherwisenoted in MDM or here. CT ABDOMEN PELVIS WO CONTRAST Additional Contrast? None   Final Result   1. Partially visualized MASSLIKE LESION in the right parahilar and infrahilar   region CONCERNING FOR MALIGNANCY. Dedicated contrast enhanced CT of the   chest recommended for further evaluation. 2. No evidence of metastatic disease in the abdomen or the pelvis. 3. Bilateral nephrolithiasis. No hydronephrosis. Atrophic right kidney. 4.  Liquid stool in the colon indicates a diarrheal illness. No bowel wall   thickening or obstruction noted. 5. 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 Vascular surgery consultation recommended   especially for the right common iliac artery aneurysm. CT HEAD WO CONTRAST   Preliminary Result   No acute intracranial abnormality. XR CHEST PORTABLE   Final Result   Interval placed right IJ central venous catheter with no pneumothorax. Otherwise stable exam         XR CHEST PORTABLE   Preliminary Result   1. Mildly increased patchy opacities at the lung bases, possibly pneumonia   or atelectasis. 2.  Increased masslike opacity in the medial right base. This was previously   evaluated with CT on 05/26/2021. Consider re-evaluation with   contrast-enhanced CT to assess progression. 3.  Heterogeneous bony mineralization in the visualized right humeral   diaphysis, which is indeterminate. Dedicated radiographs of the right   humerus are recommended. LABS: All lab results were reviewed by myself, and all abnormals are listed below.   Labs Reviewed   CBC WITH AUTO DIFFERENTIAL - Abnormal; Notable for the following components:       Result Value    WBC 16.5 (*)     RBC 3.82 (*)     Hemoglobin 10.8 (*) Hematocrit 32.8 (*)     RDW 17.3 (*)     Seg Neutrophils 90 (*)     Lymphocytes 3 (*)     Segs Absolute 14.84 (*)     Absolute Lymph # 0.50 (*)     All other components within normal limits   COMPREHENSIVE METABOLIC PANEL - Abnormal; Notable for the following components:    Glucose 105 (*)      (*)     CREATININE 4.36 (*)     Sodium 129 (*)     Chloride 92 (*)     CO2 19 (*)     Anion Gap 18 (*)     AST 43 (*)     Total Protein 8.6 (*)     Albumin 3.4 (*)     GFR Non- 13 (*)     GFR  16 (*)     All other components within normal limits   LACTATE, SEPSIS - Abnormal; Notable for the following components:    Lactic Acid, Sepsis 2.5 (*)     All other components within normal limits   LACTATE, SEPSIS - Abnormal; Notable for the following components:    Lactic Acid, Sepsis 4.3 (*)     All other components within normal limits   TROPONIN - Abnormal; Notable for the following components:    Troponin, High Sensitivity 85 (*)     All other components within normal limits   URINE RT REFLEX TO CULTURE - Abnormal; Notable for the following components:    Color, UA Red (*)     Turbidity UA Turbid (*)     Bilirubin Urine NEGATIVE  Verified by ictotest. (*)     Ketones, Urine TRACE (*)     Urine Hgb LARGE (*)     Protein, UA 4+ (*)     Nitrite, Urine POSITIVE (*)     Leukocyte Esterase, Urine LARGE (*)     All other components within normal limits   MAGNESIUM - Abnormal; Notable for the following components:    Magnesium 2.8 (*)     All other components within normal limits   PROTIME-INR - Abnormal; Notable for the following components:    Protime 28.8 (*)     All other components within normal limits   MICROSCOPIC URINALYSIS - Abnormal; Notable for the following components:    Bacteria, UA MODERATE (*)     Other Observations UA Microscopic performed on uncentrifuged urine -  (*)     All other components within normal limits   TROPONIN - Abnormal; Notable for the following components: Troponin, High Sensitivity 78 (*)     All other components within normal limits   COVID-19, RAPID   CULTURE, BLOOD 1   CULTURE, BLOOD 1   CULTURE, URINE   SPECIMEN REJECTION   POTASSIUM   LACTIC ACID       EMERGENCY DEPARTMENTCOURSE:         Vitals:    Vitals:    10/22/21 1315 10/22/21 1320 10/22/21 1325 10/22/21 1330   BP: (!) 105/56 105/85 (!) 102/53 (!) 114/45   Pulse: 85 94 80 88   Resp: 14 18 15 15   Temp:       SpO2: 99% 98% 98% 97%   Weight:       Height:           The patient was given the following medications while in the emergency department:  Orders Placed This Encounter   Medications    0.9 % sodium chloride bolus    acetaminophen (TYLENOL) suppository 650 mg    cefepime (MAXIPIME) 2000 mg IVPB minibag     Order Specific Question:   Antimicrobial Indications     Answer:   Urinary Tract Infection    vancomycin (VANCOCIN) intermittent dosing (placeholder)     Order Specific Question:   Antimicrobial Indications     Answer:   Urinary Tract Infection    vancomycin (VANCOCIN) 2,000 mg in dextrose 5 % 500 mL IVPB     Order Specific Question:   Antimicrobial Indications     Answer:   Urinary Tract Infection    norepinephrine (LEVOPHED) 16 mg in sodium chloride 0.9 % 250 mL infusion    0.9 % sodium chloride infusion     CONSULTS:  PHARMACY TO DOSE VANCOMYCIN  IP CONSULT TO INTERNAL MEDICINE    FINAL IMPRESSION      1. Septic shock ([address]    2. Altered mental status, unspecified altered mental status type    3. Acute cystitis with hematuria          DISPOSITION/PLAN   DISPOSITION Admitted 10/22/2021 02:07:30 PM      PATIENT REFERRED TO:  No follow-up provider specified. DISCHARGE MEDICATIONS:  New Prescriptions    No medications on file     The care is provided during an unprecedented national emergency due to the novel coronavirus, COVID 19.   Hong King MD  Attending Emergency Physician                    Hong King MD  10/22/21 3823

## 2021-10-22 NOTE — PROGRESS NOTES
ordered at baseline and every 48 hours x at least 3 levels, then at least weekly. Vancomycin level ordered for 10/23 @ 0600. Will use trough guided method for dosing with a target trough of 10-15. Vancomycin Target Concentration Parameters  Treatment  Population Target AUC/CHARLEY Target Trough   Invasive MRSA Infection (bacteremia, pneumonia, meningitis, endocarditis, osteomyelitis)  Sepsis (undifferentiated) 400-600 N/A   Infection due to non-MRSA pathogen  Empiric treatment of non-invasive MRSA infection  (SSTI, UTI) <500 10-15 mg/L   CrCl < 29 mL/min  Rapidly fluctuating serum creatinine   TOD N/A < 15 mg/L     Renal replacement therapy is dosed by levels, per hospital protocol. Abbreviations  * Pauc: probability that AUC is >400 (efficacy); Pconc: probability that Ctrough is above 20 ?g/mL (toxicity); Tox: Probability of nephrotoxicity, based on Kate et al. Clin Infect Dis 2009. Thank you for the consult. Pharmacy will continue to follow.     Dilia Elizabeth, Reinaldo, [facility]  10/22/2021   1:01 PM

## 2021-10-22 NOTE — ED NOTES
Patient had indwelling francis upon arrival to ER and francis draining dark, brown-red urine and urine specimen sent to lab.      Selvin Sosa RN  10/22/21 7754

## 2021-10-22 NOTE — ED NOTES
Lab here and dr Koki Sanchez passed off blood for second blood culture and a repeat potassium     Caleb Hua RN  10/22/21 3781

## 2021-10-22 NOTE — PLAN OF CARE
Problem: Falls - Risk of:  Goal: Will remain free from falls  Description: Will remain free from falls  Outcome: Met This Shift   Pt assessed as a fall risk this shift. Remains free from falls and accidental injury at this time. Fall precautions in place, including falling star sign and fall risk band on pt. Floor free from obstacles, and bed is locked and in lowest position. Adequate lighting provided. Pt encouraged to call before getting OOB for any need. Bed alarm activated. Will continue to monitor needs during hourly rounding, and reinforce education on use of call light. Problem: Skin Integrity:  Goal: Absence of new skin breakdown  Description: Absence of new skin breakdown  Outcome: Met This Shift   Skin assessment complete. Coccyx reddened/ stage II. Sensicare applied PRN. Turned and repositioned every two hours. Area kept free from moisture. Proper nourishment and fluids encouraged, as appropriate. Will continue to monitor for additional needs and changes in skin breakdown.

## 2021-10-22 NOTE — ED NOTES
Report given to Kari Calles RN from ICU. Report method in person. The following was reviewed with receiving RN:   Current vital signs:  BP (!) 96/50   Pulse 82   Temp 98 °F (36.7 °C)   Resp 12   Ht 6' 4\" (1.93 m)   Wt 178 lb (80.7 kg)   SpO2 94%   BMI 21.67 kg/m²                MEWS Score: 0     Any medication or safety alerts were reviewed. Any pending diagnostics and notifications were also reviewed, as well as any safety concerns or issues, abnormal labs, abnormal imaging, and abnormal assessment findings. Questions were answered.             Violetta Estevez RN  10/22/21 0890

## 2021-10-22 NOTE — ED NOTES
Bed: 05  Expected date:   Expected time:   Means of arrival:   Comments:  CARLO [address], RN  10/22/21 8633

## 2021-10-22 NOTE — PROGRESS NOTES
Patient brought to room by this writer and transport. Patient hooked up to monitor, vitals taken and   patient changed.

## 2021-10-23 LAB
ABSOLUTE BANDS #: 1.1 K/UL (ref 0–1)
ABSOLUTE EOS #: 0 K/UL (ref 0–0.4)
ABSOLUTE IMMATURE GRANULOCYTE: ABNORMAL K/UL (ref 0–0.3)
ABSOLUTE LYMPH #: 0.55 K/UL (ref 1–4.8)
ABSOLUTE MONO #: 0.55 K/UL (ref 0.1–1.3)
ANION GAP SERPL CALCULATED.3IONS-SCNC: 15 MMOL/L (ref 9–17)
BANDS: 8 % (ref 0–10)
BASOPHILS # BLD: 0 % (ref 0–2)
BASOPHILS ABSOLUTE: 0 K/UL (ref 0–0.2)
BUN BLDV-MCNC: 98 MG/DL (ref 8–23)
BUN/CREAT BLD: ABNORMAL (ref 9–20)
CALCIUM SERPL-MCNC: 8.2 MG/DL (ref 8.6–10.4)
CHLORIDE BLD-SCNC: 99 MMOL/L (ref 98–107)
CO2: 17 MMOL/L (ref 20–31)
COMPLEMENT C3: 114 MG/DL (ref 90–180)
COMPLEMENT C4: 29 MG/DL (ref 10–40)
CREAT SERPL-MCNC: 4.28 MG/DL (ref 0.7–1.2)
CULTURE: ABNORMAL
DIFFERENTIAL TYPE: ABNORMAL
EOSINOPHILS RELATIVE PERCENT: 0 % (ref 0–4)
FIBRINOGEN: 766 MG/DL (ref 210–530)
FREE KAPPA/LAMBDA RATIO: 1.51 (ref 0.26–1.65)
GFR AFRICAN AMERICAN: 16 ML/MIN
GFR NON-AFRICAN AMERICAN: 14 ML/MIN
GFR SERPL CREATININE-BSD FRML MDRD: ABNORMAL ML/MIN/{1.73_M2}
GFR SERPL CREATININE-BSD FRML MDRD: ABNORMAL ML/MIN/{1.73_M2}
GLUCOSE BLD-MCNC: 114 MG/DL (ref 70–99)
HCT VFR BLD CALC: 23 % (ref 41–53)
HCT VFR BLD CALC: 25.4 % (ref 41–53)
HEMOGLOBIN: 7.6 G/DL (ref 13.5–17.5)
HEMOGLOBIN: 8.5 G/DL (ref 13.5–17.5)
IMMATURE GRANULOCYTES: ABNORMAL %
INR BLD: 2
KAPPA FREE LIGHT CHAINS QNT: 8.94 MG/DL (ref 0.37–1.94)
LACTATE DEHYDROGENASE: 305 U/L (ref 135–225)
LAMBDA FREE LIGHT CHAINS QNT: 5.91 MG/DL (ref 0.57–2.63)
LYMPHOCYTES # BLD: 4 % (ref 24–44)
Lab: ABNORMAL
MCH RBC QN AUTO: 28.8 PG (ref 26–34)
MCHC RBC AUTO-ENTMCNC: 33.3 G/DL (ref 31–37)
MCV RBC AUTO: 86.4 FL (ref 80–100)
METAMYELOCYTES ABSOLUTE COUNT: 0.27 K/UL
METAMYELOCYTES: 2 %
MONOCYTES # BLD: 4 % (ref 1–7)
MORPHOLOGY: ABNORMAL
NRBC AUTOMATED: ABNORMAL PER 100 WBC
PARTIAL THROMBOPLASTIN TIME: 47.9 SEC (ref 24–36)
PDW BLD-RTO: 16.6 % (ref 11.5–14.9)
PLATELET # BLD: 177 K/UL (ref 150–450)
PLATELET ESTIMATE: ABNORMAL
PMV BLD AUTO: 7.1 FL (ref 6–12)
POTASSIUM SERPL-SCNC: 3.5 MMOL/L (ref 3.7–5.3)
POTASSIUM SERPL-SCNC: 3.9 MMOL/L (ref 3.7–5.3)
PROTHROMBIN TIME: 22.6 SEC (ref 11.8–14.6)
RBC # BLD: 2.94 M/UL (ref 4.5–5.9)
RBC # BLD: ABNORMAL 10*6/UL
SEG NEUTROPHILS: 82 % (ref 36–66)
SEGMENTED NEUTROPHILS ABSOLUTE COUNT: 11.23 K/UL (ref 1.3–9.1)
SODIUM BLD-SCNC: 131 MMOL/L (ref 135–144)
SPECIMEN DESCRIPTION: ABNORMAL
TOTAL CK: 191 U/L (ref 39–308)
TROPONIN INTERP: ABNORMAL
TROPONIN T: ABNORMAL NG/ML
TROPONIN, HIGH SENSITIVITY: 76 NG/L (ref 0–22)
URIC ACID: 7.4 MG/DL (ref 3.4–7)
WBC # BLD: 13.7 K/UL (ref 3.5–11)
WBC # BLD: ABNORMAL 10*3/UL

## 2021-10-23 PROCEDURE — 36430 TRANSFUSION BLD/BLD COMPNT: CPT

## 2021-10-23 PROCEDURE — 2580000003 HC RX 258: Performed by: INTERNAL MEDICINE

## 2021-10-23 PROCEDURE — 85730 THROMBOPLASTIN TIME PARTIAL: CPT

## 2021-10-23 PROCEDURE — 2000000000 HC ICU R&B

## 2021-10-23 PROCEDURE — 2500000003 HC RX 250 WO HCPCS: Performed by: INTERNAL MEDICINE

## 2021-10-23 PROCEDURE — C9113 INJ PANTOPRAZOLE SODIUM, VIA: HCPCS | Performed by: INTERNAL MEDICINE

## 2021-10-23 PROCEDURE — 85014 HEMATOCRIT: CPT

## 2021-10-23 PROCEDURE — 82550 ASSAY OF CK (CPK): CPT

## 2021-10-23 PROCEDURE — 83615 LACTATE (LD) (LDH) ENZYME: CPT

## 2021-10-23 PROCEDURE — 85384 FIBRINOGEN ACTIVITY: CPT

## 2021-10-23 PROCEDURE — 85025 COMPLETE CBC W/AUTO DIFF WBC: CPT

## 2021-10-23 PROCEDURE — 84550 ASSAY OF BLOOD/URIC ACID: CPT

## 2021-10-23 PROCEDURE — 85610 PROTHROMBIN TIME: CPT

## 2021-10-23 PROCEDURE — 83883 ASSAY NEPHELOMETRY NOT SPEC: CPT

## 2021-10-23 PROCEDURE — P9017 PLASMA 1 DONOR FRZ W/IN 8 HR: HCPCS

## 2021-10-23 PROCEDURE — 84484 ASSAY OF TROPONIN QUANT: CPT

## 2021-10-23 PROCEDURE — 86225 DNA ANTIBODY NATIVE: CPT

## 2021-10-23 PROCEDURE — 6360000002 HC RX W HCPCS: Performed by: STUDENT IN AN ORGANIZED HEALTH CARE EDUCATION/TRAINING PROGRAM

## 2021-10-23 PROCEDURE — 86901 BLOOD TYPING SEROLOGIC RH(D): CPT

## 2021-10-23 PROCEDURE — 36415 COLL VENOUS BLD VENIPUNCTURE: CPT

## 2021-10-23 PROCEDURE — 86900 BLOOD TYPING SEROLOGIC ABO: CPT

## 2021-10-23 PROCEDURE — 86160 COMPLEMENT ANTIGEN: CPT

## 2021-10-23 PROCEDURE — 2580000003 HC RX 258: Performed by: FAMILY MEDICINE

## 2021-10-23 PROCEDURE — 86920 COMPATIBILITY TEST SPIN: CPT

## 2021-10-23 PROCEDURE — 86850 RBC ANTIBODY SCREEN: CPT

## 2021-10-23 PROCEDURE — 84132 ASSAY OF SERUM POTASSIUM: CPT

## 2021-10-23 PROCEDURE — 85018 HEMOGLOBIN: CPT

## 2021-10-23 PROCEDURE — 6360000002 HC RX W HCPCS: Performed by: INTERNAL MEDICINE

## 2021-10-23 PROCEDURE — 86927 PLASMA FRESH FROZEN: CPT

## 2021-10-23 PROCEDURE — 86038 ANTINUCLEAR ANTIBODIES: CPT

## 2021-10-23 PROCEDURE — 80048 BASIC METABOLIC PNL TOTAL CA: CPT

## 2021-10-23 RX ORDER — SODIUM CHLORIDE 9 MG/ML
INJECTION, SOLUTION INTRAVENOUS PRN
Status: COMPLETED | OUTPATIENT
Start: 2021-10-23 | End: 2021-10-23

## 2021-10-23 RX ORDER — POTASSIUM CHLORIDE 7.45 MG/ML
10 INJECTION INTRAVENOUS ONCE
Status: COMPLETED | OUTPATIENT
Start: 2021-10-23 | End: 2021-10-23

## 2021-10-23 RX ADMIN — SODIUM CHLORIDE, PRESERVATIVE FREE 10 ML: 5 INJECTION INTRAVENOUS at 08:55

## 2021-10-23 RX ADMIN — SODIUM CHLORIDE, POTASSIUM CHLORIDE, SODIUM LACTATE AND CALCIUM CHLORIDE: 600; 310; 30; 20 INJECTION, SOLUTION INTRAVENOUS at 01:17

## 2021-10-23 RX ADMIN — SODIUM CHLORIDE, POTASSIUM CHLORIDE, SODIUM LACTATE AND CALCIUM CHLORIDE: 600; 310; 30; 20 INJECTION, SOLUTION INTRAVENOUS at 19:22

## 2021-10-23 RX ADMIN — PANTOPRAZOLE SODIUM 40 MG: 40 INJECTION, POWDER, FOR SOLUTION INTRAVENOUS at 08:56

## 2021-10-23 RX ADMIN — SODIUM CHLORIDE: 900 INJECTION INTRAVENOUS at 19:27

## 2021-10-23 RX ADMIN — METRONIDAZOLE 500 MG: 500 INJECTION, SOLUTION INTRAVENOUS at 00:50

## 2021-10-23 RX ADMIN — HEPARIN SODIUM 5000 UNITS: 5000 INJECTION INTRAVENOUS; SUBCUTANEOUS at 08:55

## 2021-10-23 RX ADMIN — POTASSIUM CHLORIDE 10 MEQ: 7.46 INJECTION, SOLUTION INTRAVENOUS at 06:56

## 2021-10-23 RX ADMIN — CEFEPIME HYDROCHLORIDE 1000 MG: 1 INJECTION, POWDER, FOR SOLUTION INTRAMUSCULAR; INTRAVENOUS at 19:29

## 2021-10-23 RX ADMIN — SODIUM CHLORIDE, POTASSIUM CHLORIDE, SODIUM LACTATE AND CALCIUM CHLORIDE: 600; 310; 30; 20 INJECTION, SOLUTION INTRAVENOUS at 09:00

## 2021-10-23 RX ADMIN — METRONIDAZOLE 500 MG: 500 INJECTION, SOLUTION INTRAVENOUS at 19:19

## 2021-10-23 RX ADMIN — LINEZOLID 600 MG: 600 INJECTION, SOLUTION INTRAVENOUS at 08:56

## 2021-10-23 RX ADMIN — CEFEPIME HYDROCHLORIDE 1000 MG: 1 INJECTION, POWDER, FOR SOLUTION INTRAMUSCULAR; INTRAVENOUS at 05:30

## 2021-10-23 RX ADMIN — METRONIDAZOLE 500 MG: 500 INJECTION, SOLUTION INTRAVENOUS at 09:08

## 2021-10-23 RX ADMIN — LINEZOLID 600 MG: 600 INJECTION, SOLUTION INTRAVENOUS at 20:18

## 2021-10-23 ASSESSMENT — PAIN SCALES - WONG BAKER

## 2021-10-23 ASSESSMENT — PAIN SCALES - PAIN ASSESSMENT IN ADVANCED DEMENTIA (PAINAD)
BODYLANGUAGE: 0
BODYLANGUAGE: 0
CONSOLABILITY: 0
BODYLANGUAGE: 0
BREATHING: 0
TOTALSCORE: 0
FACIALEXPRESSION: 0
BREATHING: 0
FACIALEXPRESSION: 0
BODYLANGUAGE: 0
CONSOLABILITY: 0
FACIALEXPRESSION: 0
NEGVOCALIZATION: 0
TOTALSCORE: 0
NEGVOCALIZATION: 0
BODYLANGUAGE: 0
FACIALEXPRESSION: 0
BODYLANGUAGE: 0
NEGVOCALIZATION: 0
NEGVOCALIZATION: 0
CONSOLABILITY: 0
CONSOLABILITY: 0
TOTALSCORE: 0
FACIALEXPRESSION: 0
BODYLANGUAGE: 0
CONSOLABILITY: 0
CONSOLABILITY: 0
BODYLANGUAGE: 0
BREATHING: 0
FACIALEXPRESSION: 0
TOTALSCORE: 0
TOTALSCORE: 0
BREATHING: 0
TOTALSCORE: 0
CONSOLABILITY: 0
TOTALSCORE: 0
FACIALEXPRESSION: 0
NEGVOCALIZATION: 0
NEGVOCALIZATION: 0
TOTALSCORE: 0
NEGVOCALIZATION: 0
NEGVOCALIZATION: 0
BREATHING: 0
FACIALEXPRESSION: 0
CONSOLABILITY: 0
BREATHING: 0

## 2021-10-23 ASSESSMENT — ENCOUNTER SYMPTOMS
ABDOMINAL DISTENTION: 0
BLOOD IN STOOL: 0
DIARRHEA: 0
SHORTNESS OF BREATH: 0
COUGH: 0

## 2021-10-23 ASSESSMENT — PAIN SCALES - GENERAL
PAINLEVEL_OUTOF10: 0

## 2021-10-23 NOTE — PROGRESS NOTES
1 of 2 Blood culture positive- GRAM NEGATIVE RODS Detected: Klebsiella pneumoniae. Patient currently on Cefepime, Flagyl, and Zyvox. Pharmacist contacted, Per DipJar Amaury patient is covered with Cefepime.

## 2021-10-23 NOTE — CONSULTS
Department of Internal Medicine  Nephrology Anne Hansen MD   Consult Note    Reason for consultation: Management of acute kidney injury superimposed on chronic kidney disease stage IV. Consulting physician: Magno Humphreys MD    Attending physician: Rocío Alcaraz MD.    History of present illness: This is a 77 y.o. male with a significant past medical history of Systemic hypertension, chronic obstructive pulmonary disease, Bladder cancer, seizure disorder, osteoarthritis and s/p Cerebrovascular accident [has G-tube in place], who presented to the hospital on 5/24/2021 with complaints of confusion and disorientation. Apparently patient lives at home alone but family lives nearby. Laboratory studies at presentation were remarkable for serum sodium 129 mmol/L, serum bicarbonate 19 mmol/L and elevated BUNs/creatinine 102/4.36 mg/dL. I had seen patient during the prior presentation in May 2021 for acute kidney injury superimposed on chronic kidney disease stage IV and at that time serum creatinine was greater than 4 mg/dL. CODE STATUS is DNR CCA. Blood pressure at presentation this time was 114/45 work patient subsequently developed hypotension and is currently on Levophed drip at 6 mcg/min. He has a chronic indwelling Issa catheter and is nonoliguric. He is a poor historian and history was obtained primarily by chart review.     Bactrim [sulfamethoxazole-trimethoprim] and Ciprofloxacin    Past Medical History:   Diagnosis Date    Arthritis     Cancer [facility])     bladder 1980s    Cerebral artery occlusion with cerebral infarction [facility])     TIA 2010    Chronic kidney disease     COPD (chronic obstructive pulmonary disease) ([facility] [address]     Hypertension     Pneumonia     Psychiatric problem     depression, social anxiety    Seizures (HCC)      Scheduled Meds:   linezolid  600 mg IntraVENous Q12H    cefepime  1,000 mg IntraVENous Q12H    pantoprazole  40 mg IntraVENous Daily    And    sodium chloride (PF)  10 mL IntraVENous Daily    metroNIDAZOLE  500 mg IntraVENous Q8H    [Held by provider] heparin (porcine)  5,000 Units SubCUTAneous BID     Continuous Infusions:   sodium chloride      norepinephrine 6.933 mcg/min (10/23/21 1204)    lactated ringers 150 mL/hr at 10/23/21 0900     PRN Meds:.sodium chloride, ipratropium-albuterol    Family History   Problem Relation Age of Onset    Arthritis Mother     Atrial Fibrillation Mother     Hearing Loss Mother     Heart Disease Mother     Stroke Mother     Vision Loss Mother     Arthritis Father     Cancer Father     Heart Disease Father     High Blood Pressure Father     Stroke Father     Vision Loss Father      Social History     Socioeconomic History    Marital status:      Spouse name: Not on file    Number of children: Not on file    Years of education: Not on file    Highest education level: Not on file   Occupational History    Not on file   Tobacco Use    Smoking status: Former Smoker     Packs/day: 1.00     Years: 60.00     Pack years: 60.00     Types: Cigarettes     Start date:      Quit date: 2020     Years since quittin.5    Smokeless tobacco: Never Used   Vaping Use    Vaping Use: Never used   Substance and Sexual Activity    Alcohol use: No    Drug use: No    Sexual activity: Yes     Partners: Female   Other Topics Concern    Not on file   Social History Narrative    Not on file     Social Determinants of Health     Financial Resource Strain: Low Risk     Difficulty of Paying Living Expenses: Not hard at all   Food Insecurity: No Food Insecurity    Worried About Running Out of Food in the Last Year: Never true    Blas of Food in the Last Year: Never true   Transportation Needs: No Transportation Needs    Lack of Transportation (Medical): No    Lack of Transportation (Non-Medical):  No   Physical Activity: Inactive    Days of Exercise per Week: 0 days    Minutes of Exercise per Session: 0 min   Stress: Stress Concern Present    Feeling of Stress : Very much   Social Connections: Moderately Isolated    Frequency of Communication with Friends and Family: More than three times a week    Frequency of Social Gatherings with Friends and Family: More than three times a week    Attends Anabaptist Services: More than 4 times per year    Active Member of Clubs or Organizations: No    Attends Club or Organization Meetings: Never    Marital Status:    Intimate Partner Violence: Not At Risk    Fear of Current or Ex-Partner: No    Emotionally Abused: No    Physically Abused: No    Sexually Abused: No     Review of systems: Unobtainable due to confusion. Physical Exam:    VITALS:  /62   Pulse 88   Temp 98.5 °F (36.9 °C) (Axillary)   Resp 15   Ht 6' 4\" (1.93 m)   Wt 181 lb 10.5 oz (82.4 kg)   SpO2 99%   BMI 22.11 kg/m²   24HR INTAKE/OUTPUT:    Intake/Output Summary (Last 24 hours) at 10/23/2021 1404  Last data filed at 10/23/2021 0542  Gross per 24 hour   Intake 2359.83 ml   Output 700 ml   Net 1659.83 ml       Constitutional: fatigued, flushed and slowed mentation    Skin: Skin color, texture, turgor normal. No rashes or lesions    Head: Normocephalic, without obvious abnormality, atraumatic     Cardiovascular/Edema: regular rate and rhythm, S1, S2 normal, no murmur, click, rub or gallop    Respiratory: Lungs: clear to auscultation bilaterally    Abdomen: soft, non-tender; bowel sounds normal; no masses,  no organomegaly    Back: symmetric, no curvature. ROM normal. No CVA tenderness.     Extremities: extremities normal, atraumatic, no cyanosis or edema    Neuro:  Grossly normal      CBC:   Recent Labs     10/22/21  0926 10/23/21  0502   WBC 16.5* 13.7*   HGB 10.8* 8.5*    177     BMP:    Recent Labs     10/22/21  0926 10/22/21  1154 10/22/21  1725 10/23/21  0502 10/23/21  1014   *  --  126* 131*  --    K UNABLE TO REPORT POTASSIUM RESULT DUE TO GROSS HEMOLYSIS OF SPECIMEN. < > 3.9 3.5* 3.9   CL 92*  --  94* 99  --    CO2 19*  --  16* 17*  --    *  --  98* 98*  --    CREATININE 4.36*  --  3.75* 4.28*  --    GLUCOSE 105*  --  166* 114*  --     < > = values in this interval not displayed. Lab Results   Component Value Date    NITRU POSITIVE 10/22/2021    COLORU Red 10/22/2021    PHUR 8.0 10/22/2021    WBCUA TOO NUMEROUS TO COUNT 10/22/2021    RBCUA TOO NUMEROUS TO COUNT 10/22/2021    MUCUS NOT REPORTED 10/22/2021    TRICHOMONAS NOT REPORTED 10/22/2021    YEAST NOT REPORTED 10/22/2021    BACTERIA MODERATE 10/22/2021    CLARITYU cloudy 09/07/2021    SPECGRAV 1.026 10/22/2021    LEUKOCYTESUR LARGE 10/22/2021    UROBILINOGEN Normal 10/22/2021    BILIRUBINUR NEGATIVE  Verified by ictotest. 10/22/2021    BLOODU ++ 09/07/2021    GLUCOSEU NEGATIVE 10/22/2021    KETUA TRACE 10/22/2021    AMORPHOUS NOT REPORTED 10/22/2021     Urine Sodium:     Lab Results   Component Value Date    HELENA <20 05/29/2021     Urine Potassium:  No results found for: KUR  Urine Chloride:    Lab Results   Component Value Date    CLUR 46 05/29/2021     Urine Creatinine:     Lab Results   Component Value Date    LABCREA 94.6 05/29/2021     IMPRESSION/RECOMMENDATIONS:      1. Acute kidney injury superimposed on chronic kidney disease stage III [baseline serum creatinine 1.28 mg/dL on 8/11/2021] - Top differential is prerenal azotemia from poor oral intake as well as ischemic acute tubular necrosis. Obstructive uropathy would need to be excluded despite indwelling Issa catheter. Plan: Maintain mean arterial pressure greater than 65 mmHg. Lactated Ringer's fluid bolus 1 L over 4 hours. IV fluid 0.9 normal saline at 50 mL/h  Basic metabolic profile daily. 2.  Proteus Mirabella's and Pseudomonas aeruginosa urinary tract infection - Will await sensitivity results. Continue IV cefepime and Zyvox    3. Klebsiella pneumonia bacteremia - Continue IV cefepime    4. Hyponatremia - Hypovolemic.   Check serum and urine osmolality    5. Normocytic anemia - check iron saturation and ferritin. Prognosis is guarded. Thank you very much for the courtesy and confidence of this consultation.     Diaz Bain MD FACP  Attending Nephrologist  10/23/2021 1:52 PM

## 2021-10-23 NOTE — PROGRESS NOTES
0700: Received pt from [place], [place]. Patient is alert, awake, but nonverbal. Able to nod yes/no to questions. Appears in NAD. VSS. 2L NC. Rhonchi/dimished breath sounds bilaterally. Afib on monitor. No RVR present. Abdomen soft, non tender with bowel sounds presents. Levophed 16/250 currently infusing at 8 mcg/min blood pressure above 616 systolically. LR infusing at 150mL/hr via right IJ TLC. R IJ TLC is currently bleeding - pressure dressing applied by CHRISSIE Jett. 18g located to the right hand and flushed with 10cc syringe. Issa in place. AM care performed. Labs reviewed. 0900: TLC is still bleeding despite pressure dressing applied. Call placed to Dr. Lacey Wan at 1220. Awaiting call back. Issa changed at this time as it was not changed upon patient arrival. Family (sister) is currently at bedside. All questions answered and concerns addressed. 1700: Critical lab results. Discussed case with Dr. Lacey Wan. Consult placed Dr. Merlin Spears, cardiology. Called placed and Dr. Merlin Spears notified of consult/elevated results. 1750: FFP transfusion completed. Pt stable. VSS.     1800: pt remained stable. Still oozing blood from site. Will cont to montor. Pressure dressing reapplied. 1900: cased endorsed to Lester Ramirez RN.

## 2021-10-23 NOTE — CARE COORDINATION
CASE MANAGEMENT NOTE:    Admission Date:  10/22/2021 Mindy Tariq is a 77 y.o.  male    Admitted for : Acute cystitis with hematuria [N30.01]  Septic shock ([facility] [address] [A41.9, R65.21]  Altered mental status, unspecified altered mental status type [R41.82]    Met with:  Family, Susanne Corbin over the phone    PCP:  Dr. Jyothi Roland, Pt does have NP from Dr Illa Spurling who comes once a month. Insurance:  [address]      Is patient alert and oriented at time of discussion:  Yes, nonverbal    Current Residence/ Living Arrangements:  at home dependent on family care             Current Services PTA:  Yes, current with [facility] for tube feeding. Does patient go to outpatient dialysis: No  If yes, location and chair time: NA    Is patient agreeable to VNS: Yes, [facility] if needed    Freedom of choice provided:  Yes    List of [address] provided: No    VNS chosen:  Yes, Sister chose [facility]    DME: ramp, lift, motorized scooter, w/c, O2 portable concentrator, nebulizer and suction. Home Oxygen: Yes    Nebulizer: Yes    CPAP/BIPAP: No    Supplier: sister does not know supplier    Potential Assistance Needed: Yes    SNF needed: No    Freedom of choice and list provided: NA    Pharmacy:  [facility]       Does Patient want to use MEDS to BEDS? No    Is patient currently receiving oral anticoagulation therapy? Yes    Is the Patient an [facility] [place] with Readmission Risk Score greater than 14%? Yes  If yes, pt needs a follow up appointment made within 7 days. Family Members/Caregivers that pt would like involved in their care:    Yes    If yes, list name here:  Diane Hendrickson and Ziyad De [address] wife    Transportation Provider:  Family             Discharge Plan:  Home with family care and [facility] for Tube feeding. Possible VNS  Family chose [facility]. 10/23 [employer] From home with family. Family provides 24/7 care in pt's home.  DME: ramp,lift, motorized scooter,port 02 concentrator, nebulizer and suction. Pt is DNRCC-A no intubation. Family plans to take patient home with their care. Pt has chronic francis Sister Rose Harris did select [facility]. if patient needs any VNS. Referral made. Currently on IV Zyvox, flagyl, levophed, TLC right neck, oozing blood. ELiquis is on hold.  ORANGE HEADER 20 %//JF             Electronically signed by: Rudy Shepherd RN on 10/23/2021 at 2:53 PM

## 2021-10-23 NOTE — PROGRESS NOTES
Dr. Darrick Mckee informed of patient potassium 3.5 and  patient creatinine  level  4.28. Informed family unsure about Nephrology consult per Dr. Luisa Parmar note.  Order received for 10 meq of Potassium, recheck potassium at 10 am.

## 2021-10-23 NOTE — PROGRESS NOTES
Comprehensive Nutrition Assessment    Type and Reason for Visit:  Initial, Consult (Tube feed)    Nutrition Recommendations/Plan:   Recommend Jevity 1.5 (standard with fiber) tube feed at 50 ml per hour with 2 protein modulars daily to provide 2008 kcal, 129 g protein    Nutrition Assessment:  Pt admitted with Sepsis and found to have UTI and pneumonia. Pt receives bolus tube feeds of Jevity 1.5 given 250 ml 5 times daily (1875 kcal, 80 g protein). Pt is nonverbal.    Malnutrition Assessment:  Malnutrition Status: At risk for malnutrition (Comment)    Context:  Chronic Illness     Findings of the 6 clinical characteristics of malnutrition:  Energy Intake:  Unable to assess  Weight Loss:  No significant weight loss     Body Fat Loss:  Unable to assess     Muscle Mass Loss:  Unable to assess    Fluid Accumulation:  1 - Mild Extremities   Strength:  Not Performed    Estimated Daily Nutrient Needs:  Energy (kcal):   [place] x 1.2= 2000 kcal; Weight Used for Energy Requirements:  Admission     Protein (g):  1.5g/kg= 135-140 g; Weight Used for Protein Requirements:  Ideal          Nutrition Related Findings:  mild edema BLe, Labs: K3.5, BUN/Cr 98/4.28, Meds: Reviewed, PMH: Hilar mass, COPD, CHF, (5/21) Colectomy      Wounds:  Pressure Injury, Stage II, Surgical Incision       Current Nutrition Therapies:    No diet orders on file    Anthropometric Measures:  · Height: 6' 4\" (193 cm)  · Current Body Weight: 181 lb (82.1 kg)   · Admission Body Weight: 181 lb (82.1 kg)    · Usual Body Weight: 183 lb (83 kg) (5/21)     · Ideal Body Weight: 202 lbs; BMI: 22  · BMI Categories: Normal Weight (BMI 22.0 to 24.9) age over 72       Nutrition Diagnosis:   · Inadequate oral intake related to  (current medical condition) as evidenced by NPO or clear liquid status due to medical condition, nutrition support - enteral nutrition    Nutrition Interventions:   Food and/or Nutrient Delivery:  Start Tube Feeding  Nutrition Education/Counseling:  No recommendation at this time   Coordination of Nutrition Care:  Continue to monitor while inpatient    Goals:  provide more than 75% of nutrition needs       Nutrition Monitoring and Evaluation:   Food/Nutrient Intake Outcomes:  Enteral Nutrition Intake/Tolerance  Physical Signs/Symptoms Outcomes:  Biochemical Data, GI Status, Skin, Weight, Fluid Status or Edema     Discharge Planning:    Enteral Nutrition     Electronically signed by Vandana Butcher RD, LD on 10/23/21 at 2:32 PM EDT    Contact: [phone]

## 2021-10-23 NOTE — FLOWSHEET NOTE
Patient's son Ophelia Padilla was at bedside and said his dad would welcome prayer. As writer finished praying, patient clearly said \"Amen. \" Ophelia Padilla was appreciative of visit and care for his dad. 10/23/21 1332   Encounter Summary   Services provided to: Patient and family together   Referral/Consult From: [address] [place] [address] Visiting   (10-23-21)   Complexity of Encounter Moderate   Length of Encounter 15 minutes   Spiritual Assessment Completed Yes   Spiritual/Hinduism   Type Spiritual support   Assessment Approachable   Intervention Active listening;Explored feelings, thoughts, concerns;Prayer;Sustaining presence/ Ministry of presence; Discussed illness/injury and it's impact   Outcome Expressed gratitude;Engaged in conversation;Coping;Receptive

## 2021-10-23 NOTE — PROGRESS NOTES
ICU Progress Note (Non-Vent)  [facility]    Patient - Mindy Tariq,  Age - 77 y.o.    - 1945      Room Number -    MRN -  [id number]   Acct # - [de-identified]  Date of Admission -  10/22/2021  9:04 AM    Events of Past 24 Hours   He is alert but when I went in to see him his right IJ dressing was saturated and there was blood around the site. Alerted nurse who took down the dressing and it was holding pressure  Still remains on norepinephrine    Vitals    height is 6' 4\" (1.93 m) and weight is 181 lb 10.5 oz (82.4 kg). His axillary temperature is 98.1 °F (36.7 °C). His blood pressure is 97/52 (abnormal) and his pulse is 69. His respiration is 12 and oxygen saturation is 98%.        Temperature Range: Temp: 98.1 °F (36.7 °C) Temp  Av.7 °F (37.1 °C)  Min: 98 °F (36.7 °C)  Max: 100.6 °F (38.1 °C)  BP Range:  Systolic (47EKP), GQV:20 , Min:46 , SIO:126     Diastolic (07GHD), AMAN:02, Min:33, Max:121    Pulse Range: Pulse  Av.1  Min: 60  Max: 116  Respiration Range: Resp  Av  Min: 10  Max: 27  Current Pulse Ox[de-identified]  SpO2: 98 %  24HR Pulse Ox Range:  SpO2  Av.5 %  Min: 75 %  Max: 100 %  Oxygen Amount and Delivery: O2 Flow Rate (L/min): 2 L/min    Wt Readings from Last 3 Encounters:   10/22/21 181 lb 10.5 oz (82.4 kg)   21 178 lb (80.7 kg)   21 178 lb 5.6 oz (80.9 kg)     I/O       Intake/Output Summary (Last 24 hours) at 10/23/2021 0839  Last data filed at 10/23/2021 0542  Gross per 24 hour   Intake 2359.83 ml   Output 700 ml   Net 1659.83 ml     DRAIN/TUBE OUTPUT       Invasive Lines   ICP PRESSURE RANGE  No data recorded  CVP PRESSURE RANGE  No data recorded      Medications      linezolid  600 mg IntraVENous Q12H    cefepime  1,000 mg IntraVENous Q12H    pantoprazole  40 mg IntraVENous Daily    And    sodium chloride (PF)  10 mL IntraVENous Daily    metroNIDAZOLE  500 mg IntraVENous Q8H    heparin (porcine)  5,000 Units SubCUTAneous BID     ipratropium-albuterol  IV Drips/Infusions   norepinephrine 8 mcg/min (10/23/21 0545)    lactated ringers 150 mL/hr at 10/23/21 0117       Diet/Nutrition   No diet orders on file    Exam      Constitutional - Alert, arousable  General Appearance ill-appearing   HEENT -normocephalic, atraumatic. PERRLA  Lungs - Chest expands equally, diminished breath sounds   Cardiovascular - Heart sounds are normal.  normal rate and rhythm regular, no murmur, gallop or rub. Abdomen - soft, nontender, nondistended, no masses or organomegaly  Neurologic - CN II-XII are grossly intact.  There are no focal motor deficits  Skin - no bruising; bleeding around in the right IJ site   Extremities - no cyanosis, clubbing or edema    Lab Results   CBC     Lab Results   Component Value Date    WBC 13.7 10/23/2021    RBC 2.94 10/23/2021    HGB 8.5 10/23/2021    HCT 25.4 10/23/2021     10/23/2021    MCV 86.4 10/23/2021    MCH 28.8 10/23/2021    MCHC 33.3 10/23/2021    RDW 16.6 10/23/2021    NRBC 1 06/01/2021    METASPCT 2 10/23/2021    LYMPHOPCT 4 10/23/2021    MONOPCT 4 10/23/2021    BASOPCT 0 10/23/2021    MONOSABS 0.55 10/23/2021    LYMPHSABS 0.55 10/23/2021    EOSABS 0.00 10/23/2021    BASOSABS 0.00 10/23/2021    DIFFTYPE NOT REPORTED 10/23/2021       BMP   Lab Results   Component Value Date     10/23/2021    K 3.5 10/23/2021    CL 99 10/23/2021    CO2 17 10/23/2021    BUN 98 10/23/2021    CREATININE 4.28 10/23/2021    GLUCOSE 114 10/23/2021       LFTS  Lab Results   Component Value Date    ALKPHOS 71 10/22/2021    ALT 19 10/22/2021    AST 43 10/22/2021    PROT 8.6 10/22/2021    BILITOT 0.30 10/22/2021    LABALBU 3.4 10/22/2021       ABG ABGs:   Lab Results   Component Value Date    PHART 7.506 06/03/2021    PO2ART 123.0 06/03/2021    NWY4MYX 37.1 06/03/2021       Lab Results   Component Value Date    MODE [facility] 06/03/2021         INR  Recent Labs 10/22/21  0926   PROTIME 28.8*   INR 2.8       APTT  No results for input(s): APTT in the last 72 hours. Lactic Acid  Lab Results   Component Value Date    LACTA 1.6 10/22/2021    LACTA 1.6 05/30/2021    LACTA 2.4 05/29/2021        BNP   No results for input(s): BNP in the last 72 hours. Cultures       Radiology     CXR      CT Scans    (See actual reports for details)      SYSTEMS ASSESSMENT    Septic shock  UTI  Multifocal pneumonia, possible aspiration  Acute kidney injury  Right hilar mass  History of tobacco use  DNR CCA no intubation but pressors are okay  Cardiomyopathy with an EF of 15 to 20%  Possible COPD    Neuro   Appears to be at baseline with expressive aphasia    Respiratory   Wean oxygen as tolerated.  Keep O2 sat > 88%  Clinically, pneumonia is mild    Cardiovascular   Still remains on norepinephrine  Lactic acid now normalized     Gastrointestinal   Continue tube feeds  GI prophylaxis    Renal   We will get nephrology involved creatinine had improved but now back up  Need to make sure there is no obstruction, history of BPH    Infectious Disease   Awaiting culture results  Continue cefepime, Zyvox, and Flagyl      Hematology/Oncology   Decrease in hemoglobin is probably secondary to dilution effect  Hold pressure on right IJ    Endocrine   Blood sugars okay    Social/Spiritual/DNR/Disposition/Other     Multiple conversations yesterday with patient's sisters, he remains DNR CCA no intubation pressors are okay    Critical Care Time   35 min    Electronically signed by Daniel Gutierrez MD on 10/23/2021 at 8:39 AM

## 2021-10-23 NOTE — H&P
History and Physical      Name: Jo Mayen  MRN: [id number]     [place]: [de-identified]  Room: 2012/2012-01    Admit Date: 10/22/2021  PCP: Jenny Hsieh MD      Chief Complaint:     Chief Complaint   Patient presents with    Altered Mental Status       History Obtained From:     patient, family member -son, electronic medical record    History of Present Illness:      Jo Mayen is a  77 y.o.  male with a medical history of PAF, dilated cardiomyopathy presents with Altered Mental Status  Patient presented to the ER for her confusion and a decreased appetite. Per son patient was not answering questions. Per son there have been no reports of other nausea vomiting, shortness of breath cough, hematuria, hematemesis positive consciousness    Past Medical History:     Past Medical History:   Diagnosis Date    Arthritis     Cancer [facility])     bladder 1980s    Cerebral artery occlusion with cerebral infarction [facility])     TIA 2010    Chronic kidney disease     [facility]) [facility] [address] [facility]     Hypertension     Pneumonia     Psychiatric problem     depression, social anxiety    Seizures ([facility] [address] [facility]         Past Surgical History:     Past Surgical History:   Procedure Laterality Date    ABDOMEN SURGERY      BACK SURGERY      spinal surgery 2005     CAROTID ENDARTERECTOMY Left 09/20/2016    COLECTOMY N/A 5/28/2021    Exploratory Laparotomy. Release of adhesive band with release of small bowel obstruction. Small bowel resection with primary anastomosis performed by Anabel Trammell MD at [address] COLONOSCOPY N/A 8/31/2018    COLONOSCOPY POLYPECTOMY COLD BIOPSY performed by Kira Ball MD at [facility]      left face    GASTROSTOMY TUBE PLACEMENT  04/15/2020    HERNIA REPAIR      HIATAL HERNIA REPAIR      INGUINAL HERNIA REPAIR Bilateral     INSERT MIDLINE CATHETER  8/4/2021         OTHER SURGICAL HISTORY      mesh infected in inguinal canal, had to remove. Removed testiscle at this time.  TONSILLECTOMY      UPPER GASTROINTESTINAL ENDOSCOPY  04/15/2020       EGD CONTROL HEMORRHAGE    UPPER GASTROINTESTINAL ENDOSCOPY  4/15/2020    EGD CONTROL HEMORRHAGE performed by Augusta Ramirez MD at [address] [place] [address] ENDOSCOPY  4/15/2020    EGD ESOPHAGOGASTRODUODENOSCOPY PEG TUBE INSERTION performed by Augusta Ramirez MD at [facility]        Medications Prior to Admission:       Prior to Admission medications    Medication Sig Start Date End Date Taking? Authorizing Provider   ferrous sulfate (IRON 325) 325 (65 Fe) MG tablet Take 325 mg by mouth daily (with breakfast)   Yes Historical Provider, MD   lansoprazole (PREVACID) 30 MG delayed release capsule Take 30 mg by mouth daily   Yes Historical Provider, MD   metoprolol tartrate (LOPRESSOR) 25 MG tablet Take 12.5 mg by mouth 2 times daily   Yes Historical Provider, MD   simvastatin (ZOCOR) 20 MG tablet Take 20 mg by mouth nightly   Yes Historical Provider, MD   Bacillus Coagulans-Inulin (PROBIOTIC) 1-250 BILLION-MG CAPS Take by mouth 2 times daily   Yes Historical Provider, MD   apixaban (ELIQUIS) 5 MG TABS tablet Take 1 tablet by mouth 2 times daily 6/7/21  Yes Araceli Saenz MD   lisinopril (PRINIVIL;ZESTRIL) 2.5 MG tablet Take 1 tablet by mouth daily 6/8/21  Yes Araceli Saenz MD   furosemide (LASIX) 20 MG tablet Take 2 tablets by mouth daily 6/7/21  Yes Araceli Saenz MD   cilostazol (PLETAL) 100 MG tablet Take 100 mg by mouth 2 times daily   Yes Historical Provider, MD   famotidine (PEPCID) 20 MG tablet Take 20 mg by mouth 2 times daily   Yes Historical Provider, MD   theophylline 80 MG/15ML elixir Take 18.8 mLs by mouth every 8 hours 4/6/20  Yes Jaqui Montelongo MD   gabapentin (NEURONTIN) 800 MG tablet Take 800 mg by mouth 3 times daily.   1/16/19  Yes Historical Provider, MD        Allergies:       Bactrim [sulfamethoxazole-trimethoprim] and Ciprofloxacin    Social History: Oropharynx is clear. Eyes:      Conjunctiva/sclera: Conjunctivae normal.   Cardiovascular:      Rate and Rhythm: Normal rate and regular rhythm. Pulses: Normal pulses. Heart sounds: Normal heart sounds. Pulmonary:      Effort: Pulmonary effort is normal.      Breath sounds: Normal breath sounds. Comments: Right IJ in place with oozing blood  Abdominal:      General: Bowel sounds are normal. There is no distension. Palpations: Abdomen is soft. Musculoskeletal:         General: No tenderness or deformity. Normal range of motion. Cervical back: Normal range of motion and neck supple. No rigidity. Right lower leg: No edema. Left lower leg: No edema. Skin:     General: Skin is warm and dry. Capillary Refill: Capillary refill takes less than 2 seconds. Coloration: Skin is not jaundiced.    Neurological:      Comments: Not answering questions               Data:     Recent Results (from the past 24 hour(s))   Basic Metabolic Prof    Collection Time: 10/22/21  5:25 PM   Result Value Ref Range    Glucose 166 (H) 70 - 99 mg/dL    BUN 98 (HH) 8 - 23 mg/dL    CREATININE 3.75 (H) 0.70 - 1.20 mg/dL    Bun/Cre Ratio NOT REPORTED 9 - 20    Calcium 8.7 8.6 - 10.4 mg/dL    Sodium 126 (L) 135 - 144 mmol/L    Potassium 3.9 3.7 - 5.3 mmol/L    Chloride 94 (L) 98 - 107 mmol/L    CO2 16 (L) 20 - 31 mmol/L    Anion Gap 16 9 - 17 mmol/L    GFR Non-African American 16 (L) >60 mL/min    GFR  19 (L) >60 mL/min    GFR Comment          GFR Staging NOT REPORTED    Lactic Acid    Collection Time: 10/22/21  5:25 PM   Result Value Ref Range    Lactic Acid 1.6 0.5 - 2.2 mmol/L   Basic Metabolic Panel    Collection Time: 10/23/21  5:02 AM   Result Value Ref Range    Glucose 114 (H) 70 - 99 mg/dL    BUN 98 (HH) 8 - 23 mg/dL    CREATININE 4.28 (H) 0.70 - 1.20 mg/dL    Bun/Cre Ratio NOT REPORTED 9 - 20    Calcium 8.2 (L) 8.6 - 10.4 mg/dL    Sodium 131 (L) 135 - 144 mmol/L    Potassium 3.5 (L) 3.7 - 5.3 mmol/L    Chloride 99 98 - 107 mmol/L    CO2 17 (L) 20 - 31 mmol/L    Anion Gap 15 9 - 17 mmol/L    GFR Non-African American 14 (L) >60 mL/min    GFR  16 (L) >60 mL/min    GFR Comment          GFR Staging NOT REPORTED    CBC Auto Differential    Collection Time: 10/23/21  5:02 AM   Result Value Ref Range    WBC 13.7 (H) 3.5 - 11.0 k/uL    RBC 2.94 (L) 4.5 - 5.9 m/uL    Hemoglobin 8.5 (L) 13.5 - 17.5 g/dL    Hematocrit 25.4 (L) 41 - 53 %    MCV 86.4 80 - 100 fL    MCH 28.8 26 - 34 pg    MCHC 33.3 31 - 37 g/dL    RDW 16.6 (H) 11.5 - 14.9 %    Platelets 388 918 - 656 k/uL    MPV 7.1 6.0 - 12.0 fL    NRBC Automated NOT REPORTED per 100 WBC    Differential Type NOT REPORTED     Immature Granulocytes NOT REPORTED 0 %    Absolute Immature Granulocyte NOT REPORTED 0.00 - 0.30 k/uL    WBC Morphology NOT REPORTED     RBC Morphology NOT REPORTED     Platelet Estimate NOT REPORTED     Seg Neutrophils 82 (H) 36 - 66 %    Lymphocytes 4 (L) 24 - 44 %    Monocytes 4 1 - 7 %    Eosinophils % 0 0 - 4 %    Basophils 0 0 - 2 %    Bands 8 0 - 10 %    Metamyelocytes 2 (H) 0 %    Segs Absolute 11.23 (H) 1.3 - 9.1 k/uL    Absolute Lymph # 0.55 (L) 1.0 - 4.8 k/uL    Absolute Mono # 0.55 0.1 - 1.3 k/uL    Absolute Eos # 0.00 0.0 - 0.4 k/uL    Basophils Absolute 0.00 0.0 - 0.2 k/uL    Absolute Bands # 1.10 (H) 0.0 - 1.0 k/uL    Metamyelocytes Absolute 0.27 (H) 0 k/uL    Morphology ANISOCYTOSIS PRESENT     Morphology 1+ ELLIPTOCYTES     Morphology 1+ TEARDROPS     Morphology 1+ ECHINOCYTES    K (Potassium)    Collection Time: 10/23/21 10:14 AM   Result Value Ref Range    Potassium 3.9 3.7 - 5.3 mmol/L   Lactate Dehydrogenas    Collection Time: 10/23/21 10:14 AM   Result Value Ref Range     (H) 135 - 225 U/L   APTT    Collection Time: 10/23/21 10:14 AM   Result Value Ref Range    PTT 47.9 (H) 24.0 - 36.0 sec   PROTIME-INR    Collection Time: 10/23/21 10:14 AM   Result Value Ref Range    Protime 22.6 (H) 11.8 - 14.6 sec    INR 2.0    Fibrinogen    Collection Time: 10/23/21 10:14 AM   Result Value Ref Range    Fibrinogen 766 (H) 210 - 530 mg/dL       Assesment:     Primary Problem  Septic shock (HCC)    Principal Problem:    Septic shock (HCC)  Active Problems:    Multifocal pneumonia    Tobacco abuse    Acute cystitis without hematuria    Mass of right lung    COPD (chronic obstructive pulmonary disease) (HCC)    PAF (paroxysmal atrial fibrillation) (HCC)    Chronic combined systolic and diastolic congestive heart failure (HCC)    Dilated cardiomyopathy (HCC)  Resolved Problems:    * No resolved hospital problems. *      Plan:     1. IV Zyvox, IV Flagyl ,IV cefepime  2. IV Levophed for vasopressor support  3. Consult dietitian for peg tube feed management  4. IV fluids at 150 mils per hour  5. Discussed with son at bedside  6. Consult nephrology  7. Fibrinogen, PTT, PT, LDH elevated  8. CBC CMP  9. Pulmonology input noted  10. FFP 1 unit  11. Urine culture  12. Blood culture x2  13. DVT prophylaxis pharmacological DVT prophylaxis due to low hemoglobin  14.   EPCs  15.  check and replace electrolytes per sliding scale  16.  restart home medications        Electronically signed by Shimon Bueno MD     Copy sent to Dr. Hiral Rangel MD

## 2021-10-23 NOTE — PLAN OF CARE
Problem: Falls - Risk of:  Goal: Will remain free from falls  10/23/2021 8811 by Cheri Stahl RN  Outcome: Ongoing  Note: Pt remains free of falls this shift. Approprate safety measures in place   10/22/2021 1835 by Hunter Mullins RN  Outcome: Met This Shift  Goal: Absence of physical injury  Outcome: Ongoing     Problem: Skin Integrity:  Goal: Will show no infection signs and symptoms  Outcome: Ongoing  Note: Pt has no new skin breakdown this shift. Pt turned q2h. Goal: Absence of new skin breakdown  10/23/2021 0611 by Cheri Stahl RN  Outcome: Ongoing  10/22/2021 1835 by Hunter Mullins RN  Outcome: Met This Shift     Problem: SAFETY  Goal: Free from accidental physical injury  Outcome: Ongoing  Goal: Free from intentional harm  Outcome: Ongoing     Problem: DAILY CARE  Goal: Daily care needs are met  Outcome: Ongoing     Problem: PAIN  Goal: Patient's pain/discomfort is manageable  Outcome: Ongoing     Problem: SKIN INTEGRITY  Goal: Skin integrity is maintained or improved  10/23/2021 0611 by Cheri Stahl RN  Outcome: Ongoing  10/22/2021 1835 by Hunter Mullins RN  Outcome: Met This Shift     Problem: KNOWLEDGE DEFICIT  Goal: Patient/S.O. demonstrates understanding of disease process, treatment plan, medications, and discharge instructions. Outcome: Ongoing     Problem: DISCHARGE BARRIERS  Goal: Patient's continuum of care needs are met  Outcome: Ongoing     Problem: Discharge Planning:  Goal: Discharged to appropriate level of care  Outcome: Ongoing     Problem: Gas Exchange - Impaired:  Goal: Levels of oxygenation will improve  Outcome: Ongoing  Note: Currently on 2L NC.       Problem: Infection, Septic Shock:  Goal: Will show no infection signs and symptoms  Outcome: Ongoing     Problem: Infection - Ventilator-Associated Pneumonia:  Goal: Absence of pulmonary infection  Outcome: Ongoing     Problem: Serum Glucose Level - Abnormal:  Goal: Ability to maintain appropriate glucose levels will improve  Outcome: Ongoing     Problem: Tissue Perfusion, Altered:  Goal: Circulatory function within specified parameters  Outcome: Ongoing     Problem: Venous Thromboembolism:  Goal: Will show no signs or symptoms of venous thromboembolism  Outcome: Ongoing  Goal: Absence of signs or symptoms of impaired coagulation  Outcome: Ongoing

## 2021-10-24 PROBLEM — B96.1 BACTEREMIA DUE TO KLEBSIELLA PNEUMONIAE: Status: ACTIVE | Noted: 2021-10-24

## 2021-10-24 PROBLEM — R78.81 BACTEREMIA DUE TO KLEBSIELLA PNEUMONIAE: Status: ACTIVE | Noted: 2021-10-24

## 2021-10-24 LAB
ABSOLUTE BANDS #: 0.21 K/UL (ref 0–1)
ABSOLUTE EOS #: 0 K/UL (ref 0–0.4)
ABSOLUTE IMMATURE GRANULOCYTE: ABNORMAL K/UL (ref 0–0.3)
ABSOLUTE LYMPH #: 0.28 K/UL (ref 1–4.8)
ABSOLUTE MONO #: 0.28 K/UL (ref 0.1–1.3)
ANION GAP SERPL CALCULATED.3IONS-SCNC: 13 MMOL/L (ref 9–17)
BANDS: 3 % (ref 0–10)
BASOPHILS # BLD: 0 % (ref 0–2)
BASOPHILS ABSOLUTE: 0 K/UL (ref 0–0.2)
BUN BLDV-MCNC: 89 MG/DL (ref 8–23)
BUN/CREAT BLD: ABNORMAL (ref 9–20)
CALCIUM SERPL-MCNC: 8.6 MG/DL (ref 8.6–10.4)
CHLORIDE BLD-SCNC: 101 MMOL/L (ref 98–107)
CO2: 19 MMOL/L (ref 20–31)
CREAT SERPL-MCNC: 3.34 MG/DL (ref 0.7–1.2)
CULTURE: ABNORMAL
DIFFERENTIAL TYPE: ABNORMAL
EKG ATRIAL RATE: 178 BPM
EKG Q-T INTERVAL: 424 MS
EKG QRS DURATION: 80 MS
EKG QTC CALCULATION (BAZETT): 513 MS
EKG R AXIS: 72 DEGREES
EKG T AXIS: 51 DEGREES
EKG VENTRICULAR RATE: 88 BPM
EOSINOPHILS RELATIVE PERCENT: 0 % (ref 0–4)
FERRITIN: 977 UG/L (ref 30–400)
GFR AFRICAN AMERICAN: 22 ML/MIN
GFR NON-AFRICAN AMERICAN: 18 ML/MIN
GFR SERPL CREATININE-BSD FRML MDRD: ABNORMAL ML/MIN/{1.73_M2}
GFR SERPL CREATININE-BSD FRML MDRD: ABNORMAL ML/MIN/{1.73_M2}
GLUCOSE BLD-MCNC: 120 MG/DL (ref 70–99)
HCT VFR BLD CALC: 21.1 % (ref 41–53)
HCT VFR BLD CALC: 22.6 % (ref 41–53)
HCT VFR BLD CALC: 23.3 % (ref 41–53)
HEMOGLOBIN: 7.2 G/DL (ref 13.5–17.5)
HEMOGLOBIN: 7.4 G/DL (ref 13.5–17.5)
HEMOGLOBIN: 7.8 G/DL (ref 13.5–17.5)
IMMATURE GRANULOCYTES: ABNORMAL %
IRON SATURATION: 16 % (ref 20–55)
IRON: 22 UG/DL (ref 59–158)
LYMPHOCYTES # BLD: 4 % (ref 24–44)
Lab: ABNORMAL
Lab: ABNORMAL
MCH RBC QN AUTO: 27.9 PG (ref 26–34)
MCHC RBC AUTO-ENTMCNC: 33.6 G/DL (ref 31–37)
MCV RBC AUTO: 82.9 FL (ref 80–100)
MONOCYTES # BLD: 4 % (ref 1–7)
MORPHOLOGY: ABNORMAL
NRBC AUTOMATED: ABNORMAL PER 100 WBC
PDW BLD-RTO: 16.8 % (ref 11.5–14.9)
PLATELET # BLD: 167 K/UL (ref 150–450)
PLATELET ESTIMATE: ABNORMAL
PMV BLD AUTO: 6.9 FL (ref 6–12)
POTASSIUM SERPL-SCNC: 3.2 MMOL/L (ref 3.7–5.3)
RBC # BLD: 2.81 M/UL (ref 4.5–5.9)
RBC # BLD: ABNORMAL 10*6/UL
SEG NEUTROPHILS: 89 % (ref 36–66)
SEGMENTED NEUTROPHILS ABSOLUTE COUNT: 6.33 K/UL (ref 1.3–9.1)
SERUM OSMOLALITY: 313 MOSM/KG (ref 275–295)
SODIUM BLD-SCNC: 133 MMOL/L (ref 135–144)
SPECIMEN DESCRIPTION: ABNORMAL
SPECIMEN DESCRIPTION: ABNORMAL
TOTAL IRON BINDING CAPACITY: 140 UG/DL (ref 250–450)
UNSATURATED IRON BINDING CAPACITY: 118 UG/DL (ref 112–347)
WBC # BLD: 7.1 K/UL (ref 3.5–11)
WBC # BLD: ABNORMAL 10*3/UL

## 2021-10-24 PROCEDURE — 83930 ASSAY OF BLOOD OSMOLALITY: CPT

## 2021-10-24 PROCEDURE — 6370000000 HC RX 637 (ALT 250 FOR IP): Performed by: INTERNAL MEDICINE

## 2021-10-24 PROCEDURE — 6360000002 HC RX W HCPCS: Performed by: NURSE PRACTITIONER

## 2021-10-24 PROCEDURE — 83540 ASSAY OF IRON: CPT

## 2021-10-24 PROCEDURE — 2580000003 HC RX 258: Performed by: INTERNAL MEDICINE

## 2021-10-24 PROCEDURE — 6360000002 HC RX W HCPCS: Performed by: INTERNAL MEDICINE

## 2021-10-24 PROCEDURE — 36415 COLL VENOUS BLD VENIPUNCTURE: CPT

## 2021-10-24 PROCEDURE — 2580000003 HC RX 258: Performed by: NURSE PRACTITIONER

## 2021-10-24 PROCEDURE — 83550 IRON BINDING TEST: CPT

## 2021-10-24 PROCEDURE — 6360000002 HC RX W HCPCS: Performed by: FAMILY MEDICINE

## 2021-10-24 PROCEDURE — 2000000000 HC ICU R&B

## 2021-10-24 PROCEDURE — 85025 COMPLETE CBC W/AUTO DIFF WBC: CPT

## 2021-10-24 PROCEDURE — 93010 ELECTROCARDIOGRAM REPORT: CPT | Performed by: INTERNAL MEDICINE

## 2021-10-24 PROCEDURE — C9113 INJ PANTOPRAZOLE SODIUM, VIA: HCPCS | Performed by: INTERNAL MEDICINE

## 2021-10-24 PROCEDURE — 82728 ASSAY OF FERRITIN: CPT

## 2021-10-24 PROCEDURE — 94761 N-INVAS EAR/PLS OXIMETRY MLT: CPT

## 2021-10-24 PROCEDURE — 85018 HEMOGLOBIN: CPT

## 2021-10-24 PROCEDURE — 99222 1ST HOSP IP/OBS MODERATE 55: CPT | Performed by: NURSE PRACTITIONER

## 2021-10-24 PROCEDURE — 2500000003 HC RX 250 WO HCPCS: Performed by: INTERNAL MEDICINE

## 2021-10-24 PROCEDURE — 80048 BASIC METABOLIC PNL TOTAL CA: CPT

## 2021-10-24 PROCEDURE — 85014 HEMATOCRIT: CPT

## 2021-10-24 RX ORDER — MIDODRINE HYDROCHLORIDE 10 MG/1
10 TABLET ORAL 3 TIMES DAILY
Status: DISCONTINUED | OUTPATIENT
Start: 2021-10-24 | End: 2021-10-26

## 2021-10-24 RX ORDER — POTASSIUM CHLORIDE 7.45 MG/ML
10 INJECTION INTRAVENOUS PRN
Status: DISCONTINUED | OUTPATIENT
Start: 2021-10-24 | End: 2021-11-02 | Stop reason: HOSPADM

## 2021-10-24 RX ADMIN — PANTOPRAZOLE SODIUM 40 MG: 40 INJECTION, POWDER, FOR SOLUTION INTRAVENOUS at 09:04

## 2021-10-24 RX ADMIN — SODIUM CHLORIDE, POTASSIUM CHLORIDE, SODIUM LACTATE AND CALCIUM CHLORIDE: 600; 310; 30; 20 INJECTION, SOLUTION INTRAVENOUS at 09:04

## 2021-10-24 RX ADMIN — LINEZOLID 600 MG: 600 INJECTION, SOLUTION INTRAVENOUS at 09:03

## 2021-10-24 RX ADMIN — SODIUM CHLORIDE, PRESERVATIVE FREE 10 ML: 5 INJECTION INTRAVENOUS at 09:04

## 2021-10-24 RX ADMIN — POTASSIUM CHLORIDE 10 MEQ: 10 INJECTION, SOLUTION INTRAVENOUS at 11:18

## 2021-10-24 RX ADMIN — METRONIDAZOLE 500 MG: 500 INJECTION, SOLUTION INTRAVENOUS at 09:03

## 2021-10-24 RX ADMIN — MIDODRINE HYDROCHLORIDE 10 MG: 10 TABLET ORAL at 09:04

## 2021-10-24 RX ADMIN — METRONIDAZOLE 500 MG: 500 INJECTION, SOLUTION INTRAVENOUS at 00:57

## 2021-10-24 RX ADMIN — POTASSIUM CHLORIDE 10 MEQ: 10 INJECTION, SOLUTION INTRAVENOUS at 10:15

## 2021-10-24 RX ADMIN — SODIUM CHLORIDE, POTASSIUM CHLORIDE, SODIUM LACTATE AND CALCIUM CHLORIDE: 600; 310; 30; 20 INJECTION, SOLUTION INTRAVENOUS at 15:48

## 2021-10-24 RX ADMIN — POTASSIUM CHLORIDE 10 MEQ: 10 INJECTION, SOLUTION INTRAVENOUS at 14:33

## 2021-10-24 RX ADMIN — MIDODRINE HYDROCHLORIDE 10 MG: 10 TABLET ORAL at 20:23

## 2021-10-24 RX ADMIN — MIDODRINE HYDROCHLORIDE 10 MG: 10 TABLET ORAL at 14:26

## 2021-10-24 RX ADMIN — CEFTRIAXONE SODIUM 2000 MG: 2 INJECTION, POWDER, FOR SOLUTION INTRAMUSCULAR; INTRAVENOUS at 20:23

## 2021-10-24 RX ADMIN — CEFEPIME HYDROCHLORIDE 1000 MG: 1 INJECTION, POWDER, FOR SOLUTION INTRAMUSCULAR; INTRAVENOUS at 05:11

## 2021-10-24 RX ADMIN — POTASSIUM CHLORIDE 10 MEQ: 10 INJECTION, SOLUTION INTRAVENOUS at 09:04

## 2021-10-24 ASSESSMENT — PAIN SCALES - WONG BAKER

## 2021-10-24 ASSESSMENT — PAIN SCALES - GENERAL
PAINLEVEL_OUTOF10: 0

## 2021-10-24 ASSESSMENT — ENCOUNTER SYMPTOMS: VOMITING: 1

## 2021-10-24 NOTE — FLOWSHEET NOTE
SC visit with patient and his sister; patient nonverbal and answered writer by crying out; sister provided medical update; prayer welcomed by sister; listening presence and support;     10/24/21 1300   Encounter Summary   Services provided to: Patient and family together   Referral/Consult From: [address] [place] [address] Family members   Continue Visiting   (10/24/21)   Complexity of Encounter Moderate   Length of Encounter 15 minutes   Spiritual Assessment Completed Yes   Grief and Life Adjustment   Type Adjustment to illness   Assessment Approachable; Anxious; Hopeful;Helplessness   Intervention Active listening;Prayer;Sustaining presence/ Ministry of presence; Discussed illness/injury and it's impact   Outcome Expressed gratitude;Engaged in conversation;Coping;Expressed feelings/needs/concerns; Hopeful;Receptive

## 2021-10-24 NOTE — CARE COORDINATION
ONGOING DISCHARGE PLAN:    Pt unable to communicate due to condition. Spoke with family on the phone yesterday regarding discharge plan and patient confirms that plan is still home with [facility] and family care. Pt has family in house 24/7. Currently on levophed, IV zyvox,cefepime and flagyl. TLC to right neck still oozing. Will continue to follow for additional discharge needs.     Electronically signed by Alannah Dye RN on 10/24/2021 at 3:28 PM

## 2021-10-24 NOTE — PROGRESS NOTES
Progress Note    10/24/2021   1:57 PM    Name:  Yeny Deluca  MRN:    [id number]     Acct:     [de-identified]   Room:  2012/2012-01  IP Day: 2     Admit Date: 10/22/2021  9:04 AM  PCP: Christiano Daley MD    Subjective:     C/C:   Chief Complaint   Patient presents with    Altered Mental Status       Interval History: Status: not changed. Patient nods his head to answer questions. Is on vasopressor support. Mild oozing of blood from right IJ catheter site. Vital signs reviewed and stable. Recent labs reviewed sodium 133 creatinine 3.34, hemoglobin 7.4    ROS:   all 10 systems reviewed and are negative except as noted    Review of Systems   Unable to perform ROS: Mental status change   HENT: Positive for drooling. Gastrointestinal: Positive for vomiting. Genitourinary: Positive for hematuria. Psychiatric/Behavioral: Positive for confusion. Medications: Allergies:    Allergies   Allergen Reactions    Bactrim [Sulfamethoxazole-Trimethoprim] Hives and Swelling    Ciprofloxacin Swelling     FACE       Current Meds: potassium chloride 10 mEq/100 mL IVPB (Peripheral Line), PRN  midodrine (PROAMATINE) tablet 10 mg, TID  microfibrillar collagen powder 1 g, PRN  norepinephrine (LEVOPHED) 16 mg in sodium chloride 0.9 % 250 mL infusion, Continuous  linezolid (ZYVOX) IVPB 600 mg, Q12H  cefepime (MAXIPIME) 1000 mg IVPB minibag, Q12H  pantoprazole (PROTONIX) injection 40 mg, Daily   And  sodium chloride (PF) 0.9 % injection 10 mL, Daily  lactated ringers infusion, Continuous  metronidazole (FLAGYL) 500 mg in NaCl 100 mL IVPB premix, Q8H  [Held by provider] heparin (porcine) injection 5,000 Units, BID  ipratropium-albuterol (DUONEB) nebulizer solution 1 ampule, Q4H PRN        Data:     Code Status:  DNR-CCA    Family History   Problem Relation Age of Onset    Arthritis Mother     Atrial Fibrillation Mother     Hearing Loss Mother     Heart Disease Mother     Stroke Mother     Vision Loss Mother     Arthritis Father     Cancer Father     Heart Disease Father     High Blood Pressure Father     Stroke Father     Vision Loss Father        Social History     Socioeconomic History    Marital status:      Spouse name: Not on file    Number of children: Not on file    Years of education: Not on file    Highest education level: Not on file   Occupational History    Not on file   Tobacco Use    Smoking status: Former Smoker     Packs/day: 1.00     Years: 60.00     Pack years: 60.00     Types: Cigarettes     Start date:      Quit date: 2020     Years since quittin.5    Smokeless tobacco: Never Used   Vaping Use    Vaping Use: Never used   Substance and Sexual Activity    Alcohol use: No    Drug use: No    Sexual activity: Yes     Partners: Female   Other Topics Concern    Not on file   Social History Narrative    Not on file     Social Determinants of Health     Financial Resource Strain: Low Risk     Difficulty of Paying Living Expenses: Not hard at all   Food Insecurity: No Food Insecurity    Worried About [employer] in the Last Year: Never true    [facility] [employer] in the Last Year: Never true   Transportation Needs: No Transportation Needs    Lack of Transportation (Medical): No    Lack of Transportation (Non-Medical): No   Physical Activity: Inactive    Days of Exercise per Week: 0 days    Minutes of Exercise per Session: 0 min   Stress: Stress Concern Present    Feeling of Stress : Very much   Social Connections: Moderately Isolated    Frequency of Communication with Friends and Family: More than three times a week    Frequency of Social Gatherings with Friends and Family: More than three times a week    Attends Orthodoxy Services: More than 4 times per year    Active Member of Clubs or Organizations: No    Attends Club or Organization Meetings: Never    Marital Status:     Intimate Partner Violence: Not At Risk    Fear of Current or Ex-Partner: No  Emotionally Abused: No    Physically Abused: No    Sexually Abused: No       I/O (24Hr): Intake/Output Summary (Last 24 hours) at 10/24/2021 1357  Last data filed at 10/24/2021 0603  Gross per 24 hour   Intake 0 ml   Output 1300 ml   Net -1300 ml     Radiology:  CT ABDOMEN PELVIS WO CONTRAST Additional Contrast? None    Result Date: 10/22/2021  1. Partially visualized MASSLIKE LESION in the right parahilar and infrahilar region CONCERNING FOR MALIGNANCY. Dedicated contrast enhanced CT of the chest recommended for further evaluation. 2. No evidence of metastatic disease in the abdomen or the pelvis. 3. Bilateral nephrolithiasis. No hydronephrosis. Atrophic right kidney. 4.  Liquid stool in the colon indicates a diarrheal illness. No bowel wall thickening or obstruction noted. 5. 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 Vascular surgery consultation recommended especially for the right common iliac artery aneurysm. CT HEAD WO CONTRAST    Result Date: 10/22/2021  No acute intracranial abnormality. XR CHEST PORTABLE    Result Date: 10/22/2021  1. Mildly increased patchy opacities at the lung bases, possibly pneumonia or atelectasis. 2.  Increased masslike opacity in the medial right base. This was previously evaluated with CT on 05/26/2021. Consider re-evaluation with contrast-enhanced CT to assess progression. 3.  Heterogeneous bony mineralization in the visualized right humeral diaphysis, which is indeterminate. Dedicated radiographs of the right humerus are recommended. XR CHEST PORTABLE    Result Date: 10/22/2021  Interval placed right IJ central venous catheter with no pneumothorax.  Otherwise stable exam       Labs:  Recent Results (from the past 24 hour(s))   PREPARE FRESH FROZEN PLASMA, 1 Units    Collection Time: 10/23/21  2:36 PM   Result Value Ref Range    Unit Number [id number]     Product Code FRESH PLASMA Unit Divison 00     Dispense Status ISSUED     Transfusion Status OK TO TRANSFUSE    C3 COMPLEMENT    Collection Time: 10/23/21  3:20 PM   Result Value Ref Range    Complement C3 114 90 - 180 mg/dL   C4 COMPLEMENT    Collection Time: 10/23/21  3:20 PM   Result Value Ref Range    Complement C4 29 10 - 40 mg/dL   KAPPA/LAMBDA QUANT FREE LIGHT CHAINS SERUM    Collection Time: 10/23/21  3:20 PM   Result Value Ref Range    Kappa Free Light Chains QNT 8.94 (H) 0.37 - 1.94 mg/dL    Lambda Free Light Chains QNT 5.91 (H) 0.57 - 2.63 mg/dL    Free Kappa/Lambda Ratio 1.51 0.26 - 1.65   CK    Collection Time: 10/23/21  3:20 PM   Result Value Ref Range    Total  39 - 308 U/L   URIC ACID    Collection Time: 10/23/21  3:20 PM   Result Value Ref Range    Uric Acid 7.4 (H) 3.4 - 7.0 mg/dL   Troponin    Collection Time: 10/23/21  3:20 PM   Result Value Ref Range    Troponin, High Sensitivity 76 (HH) 0 - 22 ng/L    Troponin T NOT REPORTED <0.03 ng/mL    Troponin Interp NOT REPORTED    Hgb/Hct    Collection Time: 10/23/21 11:29 PM   Result Value Ref Range    Hemoglobin 7.6 (L) 13.5 - 17.5 g/dL    Hematocrit 23.0 (L) 41 - 53 %   Basic Metabolic Panel    Collection Time: 10/24/21  5:18 AM   Result Value Ref Range    Glucose 120 (H) 70 - 99 mg/dL    BUN 89 (H) 8 - 23 mg/dL    CREATININE 3.34 (H) 0.70 - 1.20 mg/dL    Bun/Cre Ratio NOT REPORTED 9 - 20    Calcium 8.6 8.6 - 10.4 mg/dL    Sodium 133 (L) 135 - 144 mmol/L    Potassium 3.2 (L) 3.7 - 5.3 mmol/L    Chloride 101 98 - 107 mmol/L    CO2 19 (L) 20 - 31 mmol/L    Anion Gap 13 9 - 17 mmol/L    GFR Non-African American 18 (L) >60 mL/min    GFR  22 (L) >60 mL/min    GFR Comment          GFR Staging NOT REPORTED    CBC Auto Differential    Collection Time: 10/24/21  5:18 AM   Result Value Ref Range    WBC 7.1 3.5 - 11.0 k/uL    RBC 2.81 (L) 4.5 - 5.9 m/uL    Hemoglobin 7.8 (L) 13.5 - 17.5 g/dL    Hematocrit 23.3 (L) 41 - 53 %    MCV 82.9 80 - 100 fL    MCH 27.9 26 - 34 pg    MCHC 33.6 31 - 37 g/dL    RDW 16.8 (H) 11.5 - 14.9 %    Platelets 515 908 - 248 k/uL    MPV 6.9 6.0 - 12.0 fL    NRBC Automated NOT REPORTED per 100 WBC    Differential Type NOT REPORTED     Immature Granulocytes NOT REPORTED 0 %    Absolute Immature Granulocyte NOT REPORTED 0.00 - 0.30 k/uL    WBC Morphology NOT REPORTED     RBC Morphology NOT REPORTED     Platelet Estimate NOT REPORTED     Seg Neutrophils 89 (H) 36 - 66 %    Lymphocytes 4 (L) 24 - 44 %    Monocytes 4 1 - 7 %    Eosinophils % 0 0 - 4 %    Basophils 0 0 - 2 %    Bands 3 0 - 10 %    Segs Absolute 6.33 1.3 - 9.1 k/uL    Absolute Lymph # 0.28 (L) 1.0 - 4.8 k/uL    Absolute Mono # 0.28 0.1 - 1.3 k/uL    Absolute Eos # 0.00 0.0 - 0.4 k/uL    Basophils Absolute 0.00 0.0 - 0.2 k/uL    Absolute Bands # 0.21 0.0 - 1.0 k/uL    Morphology ANISOCYTOSIS PRESENT     Morphology 1+ ELLIPTOCYTES     Morphology 1+ ECHINOCYTES    Hgb/Hct    Collection Time: 10/24/21 10:39 AM   Result Value Ref Range    Hemoglobin 7.4 (L) 13.5 - 17.5 g/dL    Hematocrit 22.6 (L) 41 - 53 %       Physical Examination:        Vitals:  /81   Pulse 94   Temp 98.5 °F (36.9 °C) (Axillary)   Resp 18   Ht 6' 4\" (1.93 m)   Wt 181 lb 10.5 oz (82.4 kg)   SpO2 100%   BMI 22.11 kg/m²   Temp (24hrs), Av.9 °F (37.2 °C), Min:98.5 °F (36.9 °C), Max:99.1 °F (37.3 °C)    No results for input(s): POCGLU in the last 72 hours. Physical Exam  Vitals reviewed. Constitutional:       Appearance: He is not diaphoretic. HENT:      Head: Normocephalic and atraumatic. Right Ear: External ear normal.      Left Ear: External ear normal.      Nose: Nose normal.      Mouth/Throat:      Mouth: Mucous membranes are moist.      Pharynx: Oropharynx is clear. Eyes:      Conjunctiva/sclera: Conjunctivae normal.   Neck:      Comments: Mild oozing of blood from right IJ catheter site  Cardiovascular:      Rate and Rhythm: Normal rate and regular rhythm. Pulses: Normal pulses. and treat  11. Pain control  12. Replace electrolytes as per sliding scale  13. Home medications reviewed and appropriate medications continued  14.  Reviewed labs and imaging studies from last 24 hours and results explained to patient      Electronically signed by Tierra Garcia MD

## 2021-10-24 NOTE — CONSULTS
gtt  Leukocytosis normalized      Summary of relevant labs:  Labs:  Creat 4.36-3.75-4.28-3.34    WBC 16.5-13.7-7.1      Micro:  10/22 Blood Cx - 2/2 Klebsiella pneumoniae - sensitive to cefepime  10/22 Urine Cx - multiple types of bacteria identified, likely contamination  10/22 COVID19 - not detected    Imaging:  10/22 CXR  1.  Mildly increased patchy opacities at the lung bases, possibly pneumonia or atelectasis. 2.  Increased masslike opacity in the medial right base.  This was previously evaluated with CT on 05/26/2021.  Consider re-evaluation with contrast-enhanced CT to assess progression. 3.  Heterogeneous bony mineralization in the visualized right humeral   diaphysis, which is indeterminate.  Dedicated radiographs of the right   humerus are recommended. 10/22 CT head w/o contrast  BRAIN/VENTRICLES: There is no acute intracranial hemorrhage, mass effect or midline shift.  No abnormal extra-axial fluid collection. The gray-white differentiation is maintained without evidence of an acute infarct. There is no evidence of hydrocephalus. Mild generalized cortical atrophy, stable.  Extensive streak artifact is   again noted on the left, related to stable postsurgical change, with metallic plate in the left frontotemporal skull region.  This obscures surrounding structures. ORBITS: The visualized portion of the orbits demonstrate no acute abnormality. SINUSES: The visualized paranasal sinuses and mastoid air cells demonstrate no acute abnormality. SOFT TISSUES/SKULL:  No acute abnormality of the visualized skull or soft tissues. 10/22 CT abdomen pelvis w/o contrast  1. Partially visualized MASSLIKE LESION in the right parahilar and infrahilar region CONCERNING FOR MALIGNANCY.  Dedicated contrast enhanced CT of the chest recommended for further evaluation. 2. No evidence of metastatic disease in the abdomen or the pelvis.    3. Bilateral nephrolithiasis.  No hydronephrosis.  Atrophic right kidney. 4. Liquid stool in the colon indicates a diarrheal illness.  No bowel wall thickening or obstruction noted. 5. 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  Vascular surgery consultation recommended especially for the right common iliac artery aneurysm. 10/22 CXR  Stable bibasilar atelectasis or infiltrates with unchanged mass density medial right lung base compared to earlier exam same day. Right IJ central venous catheter tip at the mid SVC.  No pneumothorax post procedure.  Cardiac size stable.  Osseous structures grossly intact.  Telemetry leads overlie the chest.     Patient Vitals for the past 8 hrs:   BP Pulse Resp SpO2   10/24/21 0730 114/81 94 18 100 %   10/24/21 0715 111/83 80 15 100 %   10/24/21 0700 133/80 79 18 100 %   10/24/21 0645 (!) 116/50 80 18 100 %   10/24/21 0630 83/61 78 16 100 %   10/24/21 0615 (!) 154/140 73 16 100 %   10/24/21 0600 124/62 85 18 100 %         I have personally reviewed the past medical history, past surgical history, medications, social history, and family history, and I haveupdated the database accordingly. Allergies:   Bactrim [sulfamethoxazole-trimethoprim] and Ciprofloxacin     Review of Systems:     Review of Systems    Physical Examination :       Physical Exam    Past Medical History:     Past Medical History:   Diagnosis Date    Arthritis     Cancer ([facility] [address] [address]     bladder 1980s    Cerebral artery occlusion with cerebral infarction [facility])     TIA 2010    Chronic kidney disease     COPD (chronic obstructive pulmonary disease) ([facility]     Hypertension     Pneumonia     Psychiatric problem     depression, social anxiety    Seizures ([facility] [address]        Past Surgical  History:     Past Surgical History:   Procedure Laterality Date    ABDOMEN SURGERY      BACK SURGERY      spinal surgery 2005     CAROTID ENDARTERECTOMY Left 09/20/2016    COLECTOMY N/A 5/28/2021    Exploratory Laparotomy. Release of adhesive band with release of small bowel obstruction. Small bowel resection with primary anastomosis performed by Brenda Álvarez MD at [address] COLONOSCOPY N/A 2018    COLONOSCOPY POLYPECTOMY COLD BIOPSY performed by Mitch Quintana MD at [address]      left face    GASTROSTOMY TUBE PLACEMENT  04/15/2020    HERNIA REPAIR      HIATAL HERNIA REPAIR      INGUINAL HERNIA REPAIR Bilateral     INSERT MIDLINE CATHETER  2021         OTHER SURGICAL HISTORY      mesh infected in inguinal canal, had to remove. Removed testiscle at this time.      TONSILLECTOMY      UPPER GASTROINTESTINAL ENDOSCOPY  04/15/2020       EGD CONTROL HEMORRHAGE    UPPER GASTROINTESTINAL ENDOSCOPY  4/15/2020    EGD CONTROL HEMORRHAGE performed by Sean Mercer MD at [facility]  4/15/2020    EGD ESOPHAGOGASTRODUODENOSCOPY PEG TUBE INSERTION performed by Sean Mercer MD at [facility]       Medications:      midodrine  10 mg Oral TID    linezolid  600 mg IntraVENous Q12H    cefepime  1,000 mg IntraVENous Q12H    pantoprazole  40 mg IntraVENous Daily    And    sodium chloride (PF)  10 mL IntraVENous Daily    metroNIDAZOLE  500 mg IntraVENous Q8H    [Held by provider] heparin (porcine)  5,000 Units SubCUTAneous BID       Social History:     Social History     Socioeconomic History    Marital status:      Spouse name: Not on file    Number of children: Not on file    Years of education: Not on file    Highest education level: Not on file   Occupational History    Not on file   Tobacco Use    Smoking status: Former Smoker     Packs/day: 1.00     Years: 60.00     Pack years: 60.00     Types: Cigarettes     Start date:      Quit date: 2020     Years since quittin.5    Smokeless tobacco: Never Used   Vaping Use    Vaping Use: Never used   Substance and Sexual Activity    Alcohol use: No    Drug use: No    Sexual activity: Yes     Partners: Female   Other Topics Concern    Not on file   Social History Narrative    Not on file     Social Determinants of Health     Financial Resource Strain: Low Risk     Difficulty of Paying Living Expenses: Not hard at all   Food Insecurity: No Food Insecurity    Worried About Running Out of Food in the Last Year: Never true    [address] in the Last Year: Never true   Transportation Needs: No Transportation Needs    Lack of Transportation (Medical): No    Lack of Transportation (Non-Medical): No   Physical Activity: Inactive    Days of Exercise per Week: 0 days    Minutes of Exercise per Session: 0 min   Stress: Stress Concern Present    Feeling of Stress : Very much   Social Connections: Moderately Isolated    Frequency of Communication with Friends and Family: More than three times a week    Frequency of Social Gatherings with Friends and Family: More than three times a week    Attends Hindu Services: More than 4 times per year    Active Member of Clubs or Organizations: No    Attends Club or Organization Meetings: Never    Marital Status:     Intimate Partner Violence: Not At Risk    Fear of Current or Ex-Partner: No    Emotionally Abused: No    Physically Abused: No    Sexually Abused: No       Family History:     Family History   Problem Relation Age of Onset    Arthritis Mother     Atrial Fibrillation Mother     Hearing Loss Mother     Heart Disease Mother     Stroke Mother     Vision Loss Mother     Arthritis Father     Cancer Father     Heart Disease Father     High Blood Pressure Father     Stroke Father     Vision Loss Father       Medical Decision Making:   I have independently reviewed/ordered the following labs:    CBC with Differential:   Recent Labs     10/23/21  0502 10/23/21  2329 10/24/21  0518 10/24/21  1039   WBC 13.7*  --  7.1  --    HGB 8.5*   < > 7.8* 7.4*   HCT 25.4*   < > 23.3* 22.6*     --  167  --    LYMPHOPCT 4*  --  4*  --    MONOPCT 4  --  4  --     < > = values in this interval not displayed. BMP:  Recent Labs     10/22/21  0926 10/22/21  1154 10/23/21  0502 10/23/21  0502 10/23/21  1014 10/24/21  0518   *   < > 131*  --   --  133*   K UNABLE TO REPORT POTASSIUM RESULT DUE TO GROSS HEMOLYSIS OF SPECIMEN. < > 3.5*   < > 3.9 3.2*   CL 92*   < > 99  --   --  101   CO2 19*   < > 17*  --   --  19*   *   < > 98*  --   --  89*   CREATININE 4.36*   < > 4.28*  --   --  3.34*   MG 2.8*  --   --   --   --   --     < > = values in this interval not displayed. Hepatic Function Panel:   Recent Labs     10/22/21  0926   PROT 8.6*   LABALBU 3.4*   BILITOT 0.30   ALKPHOS 71   ALT 19   AST 43*     No results for input(s): RPR in the last 72 hours. No results for input(s): HIV in the last 72 hours. No results for input(s): BC in the last 72 hours. Lab Results   Component Value Date    CREATININE 3.34 10/24/2021    GLUCOSE 120 10/24/2021       Detailed results: Thank you for allowing us to participate in the care of this patient. Please call with questions. This note is created with the assistance of a speech recognition program.  While intending to generate adocument that actually reflects the content of the visit, the document can still have some errors including those of syntax and sound a like substitutions which may escape proof reading. It such instances, actual meaningcan be extrapolated by contextual diversion.     RAGHAV Law - CNP  Office: [phone]  Perfect serve / office [phone]

## 2021-10-24 NOTE — PROGRESS NOTES
0700: received pt from [address], [facility]. Pt is alert, awake and appears in no acute distress. Still on the levophed drip 16/250 at 5.5 mcg/min. Will cont to titrate per protocol. AM care performed. Labs reviewed. R IJ TLC still bleeding and dressing changed. 1000: pt stable. titrating levophed per protocol. Family at bedside. 1500: turned levophed off. Pt stable MAP >65 mmHg. Will cont to monitor. 1600: Surgicel applied to site of TLC and dressing changed and reapplied. Will cont to monitor for any changes. 1800: pt stable. MAP >65 mmHg. Pt sleeping and is no acute distress. Site of TLC with minimal bleeding post surgicel application. Pt is hemodynamically stable. 1900: case endorsed to CHRISSIE CANTRELL.

## 2021-10-24 NOTE — PROGRESS NOTES
(porcine)  5,000 Units SubCUTAneous BID     potassium chloride, ipratropium-albuterol  IV Drips/Infusions   norepinephrine 4.5 mcg/min (10/24/21 0101)    lactated ringers 150 mL/hr at 10/23/21 1922       Diet/Nutrition   ADULT TUBE FEEDING; PEG; Standard with Fiber; Continuous; 20; Yes; 20; Q 4 hours; 50; 30; Q 6 hours; Protein; give protein modular twice daily at 8 am and 2pm with 30 ml free water before and after. Exam      Constitutional - Alert, arousable, expressive aphasia  General Appearance  well developed, well nourished  HEENT -normocephalic, atraumatic. PERRLA  Lungs - Chest expands equally, no wheezes, rales or rhonchi. Cardiovascular - Heart sounds are normal.  normal rate and rhythm regular, no murmur, gallop or rub. Abdomen - soft, nontender, nondistended, no masses or organomegaly  Neurologic - CN II-XII are grossly intact.  There are no focal motor deficits  Skin -no significant oozing right IJ  Extremities - no cyanosis, clubbing or edema    Lab Results   CBC     Lab Results   Component Value Date    WBC 7.1 10/24/2021    RBC 2.81 10/24/2021    HGB 7.8 10/24/2021    HCT 23.3 10/24/2021     10/24/2021    MCV 82.9 10/24/2021    MCH 27.9 10/24/2021    MCHC 33.6 10/24/2021    RDW 16.8 10/24/2021    NRBC 1 06/01/2021    METASPCT 2 10/23/2021    LYMPHOPCT 4 10/24/2021    MONOPCT 4 10/24/2021    BASOPCT 0 10/24/2021    MONOSABS 0.28 10/24/2021    LYMPHSABS 0.28 10/24/2021    EOSABS 0.00 10/24/2021    BASOSABS 0.00 10/24/2021    DIFFTYPE NOT REPORTED 10/24/2021       BMP   Lab Results   Component Value Date     10/24/2021    K 3.2 10/24/2021     10/24/2021    CO2 19 10/24/2021    BUN 89 10/24/2021    CREATININE 3.34 10/24/2021    GLUCOSE 120 10/24/2021       LFTS  Lab Results   Component Value Date    ALKPHOS 71 10/22/2021    ALT 19 10/22/2021    AST 43 10/22/2021    PROT 8.6 10/22/2021    BILITOT 0.30 10/22/2021    LABALBU 3.4 10/22/2021       ABG ABGs:   Lab Results   Component Value Date    PHART 7.506 06/03/2021    PO2ART 123.0 06/03/2021    SVW0UNX 37.1 06/03/2021       Lab Results   Component Value Date    MODE PRVC 06/03/2021         INR  Recent Labs     10/22/21  0926 10/23/21  1014   PROTIME 28.8* 22.6*   INR 2.8 2.0       APTT  Recent Labs     10/23/21  1014   APTT 47.9*       Lactic Acid  Lab Results   Component Value Date    LACTA 1.6 10/22/2021    LACTA 1.6 05/30/2021    LACTA 2.4 05/29/2021        BNP   No results for input(s): BNP in the last 72 hours. Cultures       Radiology     CXR      CT Scans    (See actual reports for details)      SYSTEMS ASSESSMENT    Septic shock-Klebsiella bacteremia  UTI  Multifocal pneumonia, possible aspiration  Acute kidney injury  Right hilar mass  History of tobacco use  DNR CCA no intubation but pressors are okay  Cardiomyopathy resolved, repeat echo done August 17 in the [facility] system shows EF of 60 to 65%  Possible COPD    Neuro   He is at his baseline    Respiratory   Wean oxygen as tolerated.  Keep O2 sat > 88%  Follow-up chest x-ray tomorrow  Cardiovascular   Aggressively wean off norepinephrine, keep systolic blood pressure greater than 90 or MAP greater than 65  Start ProAmatine  Echo at [address]  system shows normal LV function  Gastrointestinal   Continue tube feeds  GI prophylaxis    Renal   Appreciate nephrology input, creatinine has improved    Infectious Disease   Continue IV cefepime and Flagyl  This will cover not only aspiration pneumonia but patient UTI/Klebsiella bacteremia  Awaiting sensitivities however from blood cultures    Hematology/Oncology   Right IJ still oozing, if we are able to get him off norepinephrine would discontinue (placed in the emergency room)  Has received significant amount of fluids her hemoglobin also decreased due to dilution  Endocrine   Blood sugars okay    Social/Spiritual/DNR/Disposition/Other   No family here today    Critical Care Time   35 min    Electronically signed by Gabrielle Jackson Errol Osler, MD on 10/24/2021 at 8:12 AM

## 2021-10-24 NOTE — PROGRESS NOTES
Department of Internal Medicine  Nephrology Juan Pablo Gray MD  Progress Note    Reason for consultation: Management of acute kidney injury superimposed on chronic kidney disease stage IV. Consulting physician: Felicita Deal MD    Attending physician: Alhaji Cervantes MD.    Interval history: Patient was seen and examined today and he is encephalopathic. He is hypotensive and currently on Levophed drip at 2.5 mcg/min. He has indwelling Issa catheter which was exchanged on admission and he is nonoliguric. He is also on lactated Ringer's infusion at 150 mL/h. History of present illness: This is a 77 y.o. male with a significant past medical history of Systemic hypertension, chronic obstructive pulmonary disease, Bladder cancer, seizure disorder, osteoarthritis and s/p Cerebrovascular accident [has G-tube in place], who presented to the hospital on 5/24/2021 with complaints of confusion and disorientation. Apparently patient lives at home alone but family lives nearby. Laboratory studies at presentation were remarkable for serum sodium 129 mmol/L, serum bicarbonate 19 mmol/L and elevated BUN/creatinine 102/4.36 mg/dL. I had seen patient during the prior presentation in May 2021 for acute kidney injury superimposed on chronic kidney disease stage IV and at that time serum creatinine was greater than 4 mg/dL. CODE STATUS is DNR CCA. Blood pressure at presentation this time was 114/45 work patient subsequently developed hypotension and is currently on Levophed drip at 6 mcg/min. He has a chronic indwelling Issa catheter and is nonoliguric. He is a poor historian and history was obtained primarily by chart review.     Scheduled Meds:   midodrine  10 mg Oral TID    linezolid  600 mg IntraVENous Q12H    cefepime  1,000 mg IntraVENous Q12H    pantoprazole  40 mg IntraVENous Daily    And    sodium chloride (PF)  10 mL IntraVENous Daily    metroNIDAZOLE  500 mg IntraVENous Q8H    [Held by provider] heparin (porcine)  5,000 Units SubCUTAneous BID     Continuous Infusions:   norepinephrine 2.5 mcg/min (10/24/21 1119)    lactated ringers 150 mL/hr at 10/24/21 0904     Physical Exam:    VITALS:  /81   Pulse 94   Temp 98.5 °F (36.9 °C) (Axillary)   Resp 18   Ht 6' 4\" (1.93 m)   Wt 181 lb 10.5 oz (82.4 kg)   SpO2 100%   BMI 22.11 kg/m²   24HR INTAKE/OUTPUT:      Intake/Output Summary (Last 24 hours) at 10/24/2021 1232  Last data filed at 10/24/2021 0603  Gross per 24 hour   Intake 0 ml   Output 1300 ml   Net -1300 ml     Constitutional: fatigued, flushed and slowed mentation    Skin: Skin color, texture, turgor normal. No rashes or lesions    Head: Normocephalic, without obvious abnormality, atraumatic     Cardiovascular/Edema: regular rate and rhythm, S1, S2 normal, no murmur, click, rub or gallop    Respiratory: Lungs: clear to auscultation bilaterally    Abdomen: soft, non-tender; bowel sounds normal; no masses,  no organomegaly    Back: symmetric, no curvature. ROM normal. No CVA tenderness. Extremities: extremities normal, atraumatic, no cyanosis or edema    Neuro:  Grossly normal    CBC:   Recent Labs     10/22/21  0926 10/22/21  0926 10/23/21  0502 10/23/21  0502 10/23/21  2329 10/24/21  0518 10/24/21  1039   WBC 16.5*  --  13.7*  --   --  7.1  --    HGB 10.8*   < > 8.5*   < > 7.6* 7.8* 7.4*     --  177  --   --  167  --     < > = values in this interval not displayed. BMP:    Recent Labs     10/22/21  1725 10/22/21  1725 10/23/21  0502 10/23/21  1014 10/24/21  0518   *  --  131*  --  133*   K 3.9   < > 3.5* 3.9 3.2*   CL 94*  --  99  --  101   CO2 16*  --  17*  --  19*   BUN 98*  --  98*  --  89*   CREATININE 3.75*  --  4.28*  --  3.34*   GLUCOSE 166*  --  114*  --  120*    < > = values in this interval not displayed.      Lab Results   Component Value Date    NITRU POSITIVE 10/22/2021    COLORU Red 10/22/2021    PHUR 8.0 10/22/2021    WBCUA TOO NUMEROUS TO COUNT

## 2021-10-25 ENCOUNTER — APPOINTMENT (OUTPATIENT)
Dept: GENERAL RADIOLOGY | Age: 76
DRG: 698 | End: 2021-10-25
Payer: COMMERCIAL

## 2021-10-25 LAB
-: ABNORMAL
ABSOLUTE EOS #: 0.1 K/UL (ref 0–0.4)
ABSOLUTE IMMATURE GRANULOCYTE: ABNORMAL K/UL (ref 0–0.3)
ABSOLUTE LYMPH #: 0.6 K/UL (ref 1–4.8)
ABSOLUTE MONO #: 0.8 K/UL (ref 0.1–1.3)
AMORPHOUS: ABNORMAL
ANION GAP SERPL CALCULATED.3IONS-SCNC: 11 MMOL/L (ref 9–17)
ANTI DNA DOUBLE STRANDED: 6.1 IU/ML
ANTI-NUCLEAR ANTIBODY (ANA): NEGATIVE
BACTERIA: ABNORMAL
BASOPHILS # BLD: 0 % (ref 0–2)
BASOPHILS ABSOLUTE: 0 K/UL (ref 0–0.2)
BILIRUBIN URINE: NEGATIVE
BUN BLDV-MCNC: 71 MG/DL (ref 8–23)
BUN/CREAT BLD: ABNORMAL (ref 9–20)
C-REACTIVE PROTEIN: 128.7 MG/L (ref 0–5)
CALCIUM SERPL-MCNC: 8.9 MG/DL (ref 8.6–10.4)
CASTS UA: ABNORMAL /LPF
CHLORIDE BLD-SCNC: 107 MMOL/L (ref 98–107)
CHLORIDE, UR: 87 MMOL/L
CO2: 21 MMOL/L (ref 20–31)
COLOR: YELLOW
COMMENT UA: ABNORMAL
CREAT SERPL-MCNC: 2.58 MG/DL (ref 0.7–1.2)
CREATININE URINE: 32.6 MG/DL (ref 39–259)
CRYSTALS, UA: ABNORMAL /HPF
DATE, STOOL #1: NORMAL
DATE, STOOL #2: NORMAL
DATE, STOOL #3: NORMAL
DIFFERENTIAL TYPE: ABNORMAL
ENA ANTIBODIES SCREEN: 0.3 U/ML
EOSINOPHILS RELATIVE PERCENT: 2 % (ref 0–4)
EPITHELIAL CELLS UA: ABNORMAL /HPF
GFR AFRICAN AMERICAN: 30 ML/MIN
GFR NON-AFRICAN AMERICAN: 24 ML/MIN
GFR SERPL CREATININE-BSD FRML MDRD: ABNORMAL ML/MIN/{1.73_M2}
GFR SERPL CREATININE-BSD FRML MDRD: ABNORMAL ML/MIN/{1.73_M2}
GLUCOSE BLD-MCNC: 97 MG/DL (ref 70–99)
GLUCOSE URINE: NEGATIVE
HCT VFR BLD CALC: 21.9 % (ref 41–53)
HCT VFR BLD CALC: 22 % (ref 41–53)
HCT VFR BLD CALC: 22 % (ref 41–53)
HEMOCCULT SP1 STL QL: NEGATIVE
HEMOCCULT SP2 STL QL: NORMAL
HEMOCCULT SP3 STL QL: NORMAL
HEMOGLOBIN: 7.1 G/DL (ref 13.5–17.5)
HEMOGLOBIN: 7.2 G/DL (ref 13.5–17.5)
HEMOGLOBIN: 7.3 G/DL (ref 13.5–17.5)
IMMATURE GRANULOCYTES: ABNORMAL %
KETONES, URINE: NEGATIVE
LEUKOCYTE ESTERASE, URINE: ABNORMAL
LYMPHOCYTES # BLD: 10 % (ref 24–44)
MCH RBC QN AUTO: 28 PG (ref 26–34)
MCHC RBC AUTO-ENTMCNC: 33.3 G/DL (ref 31–37)
MCV RBC AUTO: 84.2 FL (ref 80–100)
MONOCYTES # BLD: 12 % (ref 1–7)
MUCUS: ABNORMAL
NITRITE, URINE: NEGATIVE
NRBC AUTOMATED: ABNORMAL PER 100 WBC
OSMOLALITY URINE: 337 MOSM/KG (ref 80–1300)
OTHER OBSERVATIONS UA: ABNORMAL
PDW BLD-RTO: 16.2 % (ref 11.5–14.9)
PH UA: 5 (ref 5–8)
PLATELET # BLD: 167 K/UL (ref 150–450)
PLATELET ESTIMATE: ABNORMAL
PMV BLD AUTO: 6.8 FL (ref 6–12)
POTASSIUM SERPL-SCNC: 3.2 MMOL/L (ref 3.7–5.3)
PROCALCITONIN: 6.05 NG/ML
PROTEIN UA: ABNORMAL
RBC # BLD: 2.6 M/UL (ref 4.5–5.9)
RBC # BLD: ABNORMAL 10*6/UL
RBC UA: ABNORMAL /HPF
RENAL EPITHELIAL, UA: ABNORMAL /HPF
SEG NEUTROPHILS: 76 % (ref 36–66)
SEGMENTED NEUTROPHILS ABSOLUTE COUNT: 4.8 K/UL (ref 1.3–9.1)
SODIUM BLD-SCNC: 139 MMOL/L (ref 135–144)
SODIUM,UR: 78 MMOL/L
SPECIFIC GRAVITY UA: 1.01 (ref 1–1.03)
TIME, STOOL #1: NORMAL
TIME, STOOL #2: NORMAL
TIME, STOOL #3: NORMAL
TOTAL PROTEIN, URINE: 29 MG/DL
TRICHOMONAS: ABNORMAL
TURBIDITY: CLEAR
URINE HGB: ABNORMAL
URINE TOTAL PROTEIN CREATININE RATIO: 0.89 (ref 0–0.2)
UROBILINOGEN, URINE: NORMAL
WBC # BLD: 6.3 K/UL (ref 3.5–11)
WBC # BLD: ABNORMAL 10*3/UL
WBC UA: ABNORMAL /HPF
YEAST: ABNORMAL

## 2021-10-25 PROCEDURE — 2580000003 HC RX 258: Performed by: INTERNAL MEDICINE

## 2021-10-25 PROCEDURE — 82436 ASSAY OF URINE CHLORIDE: CPT

## 2021-10-25 PROCEDURE — C9113 INJ PANTOPRAZOLE SODIUM, VIA: HCPCS | Performed by: INTERNAL MEDICINE

## 2021-10-25 PROCEDURE — 6370000000 HC RX 637 (ALT 250 FOR IP): Performed by: INTERNAL MEDICINE

## 2021-10-25 PROCEDURE — 86140 C-REACTIVE PROTEIN: CPT

## 2021-10-25 PROCEDURE — 6370000000 HC RX 637 (ALT 250 FOR IP): Performed by: FAMILY MEDICINE

## 2021-10-25 PROCEDURE — 84145 PROCALCITONIN (PCT): CPT

## 2021-10-25 PROCEDURE — 6360000002 HC RX W HCPCS: Performed by: INTERNAL MEDICINE

## 2021-10-25 PROCEDURE — 99222 1ST HOSP IP/OBS MODERATE 55: CPT | Performed by: INTERNAL MEDICINE

## 2021-10-25 PROCEDURE — 71045 X-RAY EXAM CHEST 1 VIEW: CPT

## 2021-10-25 PROCEDURE — 80048 BASIC METABOLIC PNL TOTAL CA: CPT

## 2021-10-25 PROCEDURE — 81001 URINALYSIS AUTO W/SCOPE: CPT

## 2021-10-25 PROCEDURE — 85018 HEMOGLOBIN: CPT

## 2021-10-25 PROCEDURE — 2580000003 HC RX 258: Performed by: NURSE PRACTITIONER

## 2021-10-25 PROCEDURE — 6360000002 HC RX W HCPCS: Performed by: FAMILY MEDICINE

## 2021-10-25 PROCEDURE — 85025 COMPLETE CBC W/AUTO DIFF WBC: CPT

## 2021-10-25 PROCEDURE — 83935 ASSAY OF URINE OSMOLALITY: CPT

## 2021-10-25 PROCEDURE — 6360000002 HC RX W HCPCS: Performed by: NURSE PRACTITIONER

## 2021-10-25 PROCEDURE — 99233 SBSQ HOSP IP/OBS HIGH 50: CPT | Performed by: INTERNAL MEDICINE

## 2021-10-25 PROCEDURE — 82570 ASSAY OF URINE CREATININE: CPT

## 2021-10-25 PROCEDURE — 84300 ASSAY OF URINE SODIUM: CPT

## 2021-10-25 PROCEDURE — 82270 OCCULT BLOOD FECES: CPT

## 2021-10-25 PROCEDURE — 84156 ASSAY OF PROTEIN URINE: CPT

## 2021-10-25 PROCEDURE — 36415 COLL VENOUS BLD VENIPUNCTURE: CPT

## 2021-10-25 PROCEDURE — 85014 HEMATOCRIT: CPT

## 2021-10-25 PROCEDURE — 2060000000 HC ICU INTERMEDIATE R&B

## 2021-10-25 RX ORDER — SODIUM CHLORIDE 9 MG/ML
10 INJECTION INTRAVENOUS 2 TIMES DAILY
Status: DISCONTINUED | OUTPATIENT
Start: 2021-10-25 | End: 2021-11-02 | Stop reason: HOSPADM

## 2021-10-25 RX ORDER — DOXYCYCLINE 25 MG/5ML
100 POWDER, FOR SUSPENSION ORAL 2 TIMES DAILY
Status: DISCONTINUED | OUTPATIENT
Start: 2021-10-25 | End: 2021-11-01 | Stop reason: RX

## 2021-10-25 RX ORDER — TRAMADOL HYDROCHLORIDE 50 MG/1
50 TABLET ORAL EVERY 8 HOURS PRN
Status: DISCONTINUED | OUTPATIENT
Start: 2021-10-25 | End: 2021-11-02 | Stop reason: HOSPADM

## 2021-10-25 RX ORDER — PANTOPRAZOLE SODIUM 40 MG/10ML
40 INJECTION, POWDER, LYOPHILIZED, FOR SOLUTION INTRAVENOUS 2 TIMES DAILY
Status: DISCONTINUED | OUTPATIENT
Start: 2021-10-25 | End: 2021-11-02 | Stop reason: HOSPADM

## 2021-10-25 RX ADMIN — PANTOPRAZOLE SODIUM 40 MG: 40 INJECTION, POWDER, FOR SOLUTION INTRAVENOUS at 08:48

## 2021-10-25 RX ADMIN — POTASSIUM CHLORIDE 10 MEQ: 10 INJECTION, SOLUTION INTRAVENOUS at 08:43

## 2021-10-25 RX ADMIN — POTASSIUM CHLORIDE 10 MEQ: 10 INJECTION, SOLUTION INTRAVENOUS at 06:34

## 2021-10-25 RX ADMIN — DOXYCYCLINE 100 MG: 25 FOR SUSPENSION ORAL at 16:54

## 2021-10-25 RX ADMIN — MIDODRINE HYDROCHLORIDE 10 MG: 10 TABLET ORAL at 15:30

## 2021-10-25 RX ADMIN — PANTOPRAZOLE SODIUM 40 MG: 40 INJECTION, POWDER, FOR SOLUTION INTRAVENOUS at 20:41

## 2021-10-25 RX ADMIN — POTASSIUM CHLORIDE 10 MEQ: 10 INJECTION, SOLUTION INTRAVENOUS at 09:57

## 2021-10-25 RX ADMIN — POTASSIUM CHLORIDE 10 MEQ: 10 INJECTION, SOLUTION INTRAVENOUS at 07:30

## 2021-10-25 RX ADMIN — SODIUM CHLORIDE, PRESERVATIVE FREE 10 ML: 5 INJECTION INTRAVENOUS at 20:41

## 2021-10-25 RX ADMIN — TRAMADOL HYDROCHLORIDE 50 MG: 50 TABLET ORAL at 11:41

## 2021-10-25 RX ADMIN — MIDODRINE HYDROCHLORIDE 10 MG: 10 TABLET ORAL at 08:48

## 2021-10-25 RX ADMIN — SODIUM CHLORIDE, PRESERVATIVE FREE 10 ML: 5 INJECTION INTRAVENOUS at 08:48

## 2021-10-25 RX ADMIN — CEFTRIAXONE SODIUM 2000 MG: 2 INJECTION, POWDER, FOR SOLUTION INTRAMUSCULAR; INTRAVENOUS at 17:29

## 2021-10-25 RX ADMIN — SODIUM CHLORIDE, POTASSIUM CHLORIDE, SODIUM LACTATE AND CALCIUM CHLORIDE: 600; 310; 30; 20 INJECTION, SOLUTION INTRAVENOUS at 20:40

## 2021-10-25 ASSESSMENT — PAIN SCALES - WONG BAKER

## 2021-10-25 ASSESSMENT — ENCOUNTER SYMPTOMS
ANAL BLEEDING: 0
COLOR CHANGE: 0
BACK PAIN: 0
RECTAL PAIN: 0
CHOKING: 0
PHOTOPHOBIA: 0
STRIDOR: 0
ABDOMINAL DISTENTION: 0
TROUBLE SWALLOWING: 0
BLOOD IN STOOL: 0
VOICE CHANGE: 0

## 2021-10-25 ASSESSMENT — PAIN SCALES - GENERAL
PAINLEVEL_OUTOF10: 5
PAINLEVEL_OUTOF10: 0
PAINLEVEL_OUTOF10: 3

## 2021-10-25 NOTE — PLAN OF CARE
Problem: Falls - Risk of:  Goal: Will remain free from falls  Description: Will remain free from falls  Outcome: Ongoing     Problem: Falls - Risk of:  Goal: Absence of physical injury  Description: Absence of physical injury  Outcome: Ongoing     Problem: Skin Integrity:  Goal: Will show no infection signs and symptoms  Description: Will show no infection signs and symptoms  Outcome: Ongoing     Problem: Skin Integrity:  Goal: Absence of new skin breakdown  Description: Absence of new skin breakdown  Outcome: Ongoing     Problem: SAFETY  Goal: Free from accidental physical injury  Outcome: Ongoing     Problem: SAFETY  Goal: Free from intentional harm  Outcome: Ongoing     Problem: DAILY CARE  Goal: Daily care needs are met  Outcome: Ongoing

## 2021-10-25 NOTE — CONSULTS
Cardiology Consult           Date of Admission:  10/22/2021  Date of Consultation:  10/25/2021      PCP:  Stefanie Canada MD      Chief Complaint: Elevated troponin    History of Present Illness:  Manny Bradford is a 77 y.o. male who presents with  History significant for frequent urinary tract infection, paroxysmal atrial fibrillation, diastolic heart failure with preserved EF 60%, previous CVA now bed ridden. Patient was brought to the ER by family due to concerns of altered mental status. Patient had a previous CVA and has been bed ridden and nonverbal over the last year. Typically would communicate through head nods and hand gestures. He was noted to be hypotensive in the ER and received fluid bolus. Lab work performed showed an elevated creatinine of 4.36, lactate of 4.3, WBC 16.5, UA positive for nitrates. Patient received vasopressors due to hypotension and was transferred to the ICU. Through lab work patients troponin was noted to be elevated at 78 with most recent high sensitivity troponin 76. EKG was unremarkable for any ischemic changes. Patient did not indicate any signs of chest pain. When I spoke with patient had just received pain medication and was not following any commands or answer questions. [de-identified] of information was obtained through electronic medical records as well as nursing staff and sister at bedside. PMH:   has a past medical history of Arthritis, Cancer ([address] Cerebral artery occlusion with cerebral infarction ([address] Chronic kidney disease, COPD (chronic obstructive pulmonary disease) ([address] Hypertension, Pneumonia, Psychiatric problem, and Seizures ([address] PSH:   has a past surgical history that includes back surgery; fracture surgery; hernia repair; Tonsillectomy; Carotid endarterectomy (Left, 09/20/2016); Abdomen surgery; hiatal hernia repair; Inguinal hernia repair (Bilateral); other surgical history; Colonoscopy (N/A, 8/31/2018);  Upper gastrointestinal endoscopy (04/15/2020); Gastrostomy tube placement (04/15/2020); Upper gastrointestinal endoscopy (4/15/2020); Upper gastrointestinal endoscopy (4/15/2020); colectomy (N/A, 5/28/2021); and Insert Midline Catheter (8/4/2021). Allergies: Allergies   Allergen Reactions    Bactrim [Sulfamethoxazole-Trimethoprim] Hives and Swelling    Ciprofloxacin Swelling     FACE        Home Meds:    Prior to Admission medications    Medication Sig Start Date End Date Taking? Authorizing Provider   ferrous sulfate (IRON 325) 325 (65 Fe) MG tablet Take 325 mg by mouth daily (with breakfast)   Yes Historical Provider, MD   lansoprazole (PREVACID) 30 MG delayed release capsule Take 30 mg by mouth daily   Yes Historical Provider, MD   metoprolol tartrate (LOPRESSOR) 25 MG tablet Take 12.5 mg by mouth 2 times daily   Yes Historical Provider, MD   simvastatin (ZOCOR) 20 MG tablet Take 20 mg by mouth nightly   Yes Historical Provider, MD   Bacillus Coagulans-Inulin (PROBIOTIC) 1-250 BILLION-MG CAPS Take by mouth 2 times daily   Yes Historical Provider, MD   apixaban (ELIQUIS) 5 MG TABS tablet Take 1 tablet by mouth 2 times daily 6/7/21  Yes Coleen Simon MD   lisinopril (PRINIVIL;ZESTRIL) 2.5 MG tablet Take 1 tablet by mouth daily 6/8/21  Yes Coleen Simon MD   furosemide (LASIX) 20 MG tablet Take 2 tablets by mouth daily 6/7/21  Yes Coleen Simon MD   cilostazol (PLETAL) 100 MG tablet Take 100 mg by mouth 2 times daily   Yes Historical Provider, MD   famotidine (PEPCID) 20 MG tablet Take 20 mg by mouth 2 times daily   Yes Historical Provider, MD   theophylline 80 MG/15ML elixir Take 18.8 mLs by mouth every 8 hours 4/6/20  Yes Jennifer Dwyer MD   gabapentin (NEURONTIN) 800 MG tablet Take 800 mg by mouth 3 times daily.   1/16/19  Yes Historical Provider, MD        Hospital Meds:    Current Facility-Administered Medications   Medication Dose Route Frequency Provider Last Rate Last Admin    traMADol Nazanin Medeiros) tablet 50 mg  50 mg Oral Q8H PRN Shiv Garcia MD   50 mg at 10/25/21 1141    potassium chloride 10 mEq/100 mL IVPB (Peripheral Line)  10 mEq IntraVENous PRN Gemma Griffin  mL/hr at 10/25/21 0957 10 mEq at 10/25/21 0957    midodrine (PROAMATINE) tablet 10 mg  10 mg Oral TID Tad Cherry MD   10 mg at 10/25/21 0848    microfibrillar collagen powder 1 g  1 g Topical PRN Tad Cherry MD        cefTRIAXone (ROCEPHIN) 2000 mg IVPB in D5W 50ml minibag  2,000 mg IntraVENous Q24H Minus Junes, APRN - CNP   Stopped at 10/24/21 2045    norepinephrine (LEVOPHED) 16 mg in sodium chloride 0.9 % 250 mL infusion  2-100 mcg/min IntraVENous Continuous Tad Cherry MD   Stopped at 10/24/21 1938    pantoprazole (PROTONIX) injection 40 mg  40 mg IntraVENous Daily Tad Cherry MD   40 mg at 10/25/21 0848    And    sodium chloride (PF) 0.9 % injection 10 mL  10 mL IntraVENous Daily Tad Cherry MD   10 mL at 10/25/21 0848    lactated ringers infusion   IntraVENous Continuous Tad Cherry  mL/hr at 10/25/21 1146 Rate Change at 10/25/21 1146    ipratropium-albuterol (DUONEB) nebulizer solution 1 ampule  1 ampule Inhalation Q4H PRN Tad Cherry MD           Social History:       TOBACCO:   reports that he quit smoking about 18 months ago. His smoking use included cigarettes. He started smoking about 65 years ago. He has a 60.00 pack-year smoking history. He has never used smokeless tobacco.  ETOH:   reports no history of alcohol use. DRUGS:  reports no history of drug use.   OCCUPATION:          Family Histroy:         Problem Relation Age of Onset    Arthritis Mother     Atrial Fibrillation Mother    Parveen Lasso Hearing Loss Mother     Heart Disease Mother     Stroke Mother     Vision Loss Mother     Arthritis Father     Cancer Father     Heart Disease Father     High Blood Pressure Father     Stroke Father     Vision Loss Father            Review of Systems:   · Constitutional: there has been no unanticipated weight loss. There's been no change in energy level, sleep pattern, or activity level. · Eyes: No visual changes or diplopia. No scleral icterus. · ENT: No Headaches, hearing loss or vertigo. No mouth sores or sore throat. · Cardiovascular: No chest pain, dyspnea on exertion, palpitations or loss of consciousness. No cough, hemoptysis, pleuritic pain, or phlebitis. · Respiratory: No cough or wheezing, no sputum production. No hematemesis. · Gastrointestinal: No abdominal pain, appetite loss, blood in stools. No change in bowel or bladder habits. · Genitourinary: No dysuria, trouble voiding, or hematuria. · Musculoskeletal:  No gait disturbance, weakness or joint complaints. · Integumentary: No rash or pruritis. · Neurological: No headache, diplopia, change in muscle strength, numbness or tingling. No change in gait, balance, coordination, mood, affect, memory, mentation, behavior. · Psychiatric: No anxiety, or depression. · Endocrine: No temperature intolerance. No excessive thirst, fluid intake, or urination. No tremor. · Hematologic/Lymphatic: No abnormal bruising or bleeding, blood clots or swollen lymph nodes. · Allergic/Immunologic: No nasal congestion or hives. Physical Exam    Vital Signs: /65   Pulse 80   Temp 99 °F (37.2 °C) (Axillary)   Resp 18   Ht 6' 4\" (1.93 m)   Wt 181 lb 10.5 oz (82.4 kg)   SpO2 93%   BMI 22.11 kg/m²  O2 Flow Rate (L/min): 2 L/min     Admission Weight: 178 lb (80.7 kg)     General appearance: Awake, Alert Cooperative    Head: Normocephalic, without obvious abnormality, atraumatic    Eyes: Conjunctivae/corneas clear. PERRL, EOM's intact.  Fundi benign    Neck: no adenopathy, no carotid bruit, no JVD, supple, symmetrical, trachea midline and thyroid: not enlarged, symmetric, no tenderness/mass/nodules    Lungs: clear to auscultation bilaterally    Heart: regular rate and rhythm, S1, S2 normal, no murmur, click, rub or gallop    Abdomen: Soft, non-tender. Bowel sounds normal. No masses,  no organomegaly    Extremities: extremities normal, atraumatic, no cyanosis or edema    Skin: Skin color, texture, turgor normal. No rashes or lesions    Neurologic: Grossly normal        MEDICAL DECISION MAKING/TESTING    Cardiac Cath:      Echo/Stress:    Interpretation Summary         Left Ventricle: Systolic function is normal with an ejection fraction of 60-65%.   Right Ventricle: Right ventricular size is borderline dilated. The right ventricular basal diameter is 42.0 mm. Systolic function is normal.    Mitral Valve: There is no regurgitation or stenosis.   Prior echocardiogram suggested severe LV dysfunction, LV function has now completely normalized       Study Information    Study Details A complete echo was performed using complete 2D. /67     Myocardial Findings    Left Ventricle Left ventricle appears normal in size. There is mild concentric increased wall thickness/hypertrophy. Systolic function is normal with an ejection fraction of 60-65%. No segmental wall motion abnormalities. Normal diastolic function is present. Lateral E' is 14.3 cm/s. Medial E' is 9.8 cm/s. Right Ventricle Right ventricular size is borderline dilated. The right ventricular basal diameter is 42.0 mm. Systolic function is normal.   Left Atrium Left atrium is normal in size. The left atrial volume index is 32.2 mL/m2. Right Atrium Right atrium is normal in size. The right atrial area is 16.2 cm2. Aortic Valve The aortic valve is trileaflet. There is no regurgitation or stenosis. Mitral Valve Mitral valve structure is normal. There is no regurgitation or stenosis. Tricuspid Valve Tricuspid valve appears to be normal. There is no regurgitation or stenosis. Pulmonic Valve Pulmonic valve structure is grossly normal. There is no regurgitation or stenosis. Ascending Aorta The aortic root is normal in size.    IVC/SVC IVC is not well visualized. Pericardium There is no pericardial effusion. Wall Motion    Wall Scoring Baseline Score Index: 1.00              The left ventricular wall motion is normal.                 Study Information    Physician Technologist Supporting Staff    RAO Gan      Vitals  Most recent update: 8/17/2021  1:42 PM  BP   109/67    Ht   190.5 cm (6' 3\")    Wt   83 kg (183 lb)    BSA   2.1 m²         2D Measurements    LV   LVIDd 4.4 cm      LVIDs 2.6 cm      IVS 1.2 cm      PW 1.2 cm      FS 41 %   41 %       Aorta   LVOT diameter 2 cm      Aortic root 3.4 cm      RV   RV diastolic dimension (basal) 42 mm      LA   LA dimension 4.1 cm      LA volume 71.4 cm3   67.9 cm3      LA Volume Index 32.2 ml/m2           Doppler Measurements    AV   AV valve area 2.74 cm2      AV peak chema 148 cm/s      AV peak gradient 9 mmHg      AV mean gradient 4 mmHg      AV VTI 28 cm      LVOT peak chema 125 cm/s      LVOT peak gradient 6 mmHg      LVOT mean gradient 2 mmHg      LVOT stroke volume 77 mL      LVOT peak VTI 24.4 cm       MV   MV Peak E Chema 82.7 cm/s      MV Peak A Chema 61.7 cm/s      E/A ratio 1.3       E wave deceleration time 222 ms            TDI Measurements    LV   MV TDI E' (lateral) 14.3 cm/s      Septal   MV TDI E' (medial) 9.8 cm/s      E/E' ratio 5.8                EKG:        CXR:         Labs:      CBC:   Recent Labs     10/23/21  0502 10/23/21  2329 10/24/21  0518 10/24/21  0518 10/24/21  1039 10/24/21  2232 10/25/21  0509   WBC 13.7*  --  7.1  --   --   --  6.3   HGB 8.5*   < > 7.8*   < > 7.4* 7.2* 7.3*   HCT 25.4*   < > 23.3*   < > 22.6* 21.1* 21.9*   MCV 86.4  --  82.9  --   --   --  84.2     --  167  --   --   --  167    < > = values in this interval not displayed.      BMP:   Recent Labs     10/23/21  0502 10/23/21  0502 10/23/21  1014 10/24/21  0518 10/25/21  0509   *  --   --  133* 139   K 3.5*   < > 3.9 3.2* 3.2*   CL 99  --   --  101 107   CO2 17*  --   --  19* 21   BUN 98*  --   -- 89* 71*   CREATININE 4.28*  --   --  3.34* 2.58*    < > = values in this interval not displayed. PT/INR:   Recent Labs     10/23/21  1014   PROTIME 22.6*   INR 2.0     APTT:   Recent Labs     10/23/21  1014   APTT 47.9*     MAG: No results for input(s): MG in the last 72 hours. D Dimer: No results for input(s): DDIMER in the last 72 hours. Troponin T   Recent Labs     10/23/21  1520   TROPONINT NOT REPORTED     ProBNP Invalid input(s): PRO-BNP          Diagnosis:  Principal Problem:    Septic shock ([facility] [address]  Active Problems:    Multifocal pneumonia    Tobacco abuse    Acute cystitis without hematuria    Mass of right lung    COPD (chronic obstructive pulmonary disease) ([facility])    PAF (paroxysmal atrial fibrillation) ([facility])    Chronic combined systolic and diastolic congestive heart failure ([facility])    Dilated cardiomyopathy ([facility] [address]    Bacteremia due to Klebsiella pneumoniae  Resolved Problems:    * No resolved hospital problems. *     1.  NSTEMI, type 2 demand ischemia  - mildly elevated in the setting of sepsis, UTI, elevated creatinine  - EKG without evidence of ischemic changes  -Patient and family declining any further ischemic workup    2. Sepsis secondary to UTI  - infectious disease following  - blood pressure stable at this time  - WBC 16.5  -lactate 4.3    3. LV systolic function with preserved EF 60%   -appears euvolemic    4. Acute kidney injury   - creatinine 4.36    5. Paroxysmal atrial fibrillation / flutter   - currently well rate controlled  - would resume Eliquis 5 mg b.i.d. if okay with other services    6. Remote history of CVA  - has been bedridden and nonverbal over the last year    Plan:    Mildly elevated troponin in the setting of sepsis, UTI, elevated creatinine. Discussed possibility of stress test with family and they verbalized no plans for any further ischemic workup considering patient condition. No further plans from a cardiovascular perspective.   Resume Eliquis 5 mg b.i.d. when okay with other services. Monitor telemetry for any significant arrhythmias. Monitor electrolytes replace as appropriate. Cardiology service will likely sign off at this time. Will defer final sign off to attending.

## 2021-10-25 NOTE — PLAN OF CARE
Nutrition Problem #1: Inadequate oral intake  Intervention: Food and/or Nutrient Delivery: Continue Current Tube Feeding  Nutritional Goals: provide more than 75% of nutrition needs

## 2021-10-25 NOTE — PLAN OF CARE
Problem: Infection, Septic Shock:  Goal: Will show no infection signs and symptoms  Description: Will show no infection signs and symptoms  Outcome: Ongoing     Problem: Discharge Planning:  Goal: Discharged to appropriate level of care  Description: Discharged to appropriate level of care  Outcome: Ongoing

## 2021-10-25 NOTE — PROGRESS NOTES
ICU Progress Note (Non-Vent)  [facility]    Patient - Kimberli Webb,  Age - 77 y.o.    - 1945      Room Number -    N -  [id number]   Acct # - [de-identified]  Date of Admission -  10/22/2021  9:04 AM    Events of Past 24 Hours     Patient's overall clinical status remains the same. He is intermittently tachycardic. He has worsening anemia along with bleeding from IV sites and black tarry stools. Systolic blood pressure is still grater than 90    Vitals    height is 6' 4\" (1.93 m) and weight is 181 lb 10.5 oz (82.4 kg). His axillary temperature is 98.7 °F (37.1 °C). His blood pressure is 128/73 and his pulse is 93. His respiration is 18 and oxygen saturation is 98%.        Temperature Range: Temp: 98.7 °F (37.1 °C) Temp  Av.2 °F (36.8 °C)  Min: 97.2 °F (36.2 °C)  Max: 98.7 °F (37.1 °C)  BP Range:  Systolic (26DYN), GAK:015 , Min:93 , BDI:088     Diastolic (76KLP), AVC:85, Min:43, Max:115    Pulse Range: Pulse  Av.8  Min: 55  Max: 109  Respiration Range: Resp  Av.8  Min: 14  Max: 24  Current Pulse Ox[de-identified]  SpO2: 98 %  24HR Pulse Ox Range:  SpO2  Av.7 %  Min: 95 %  Max: 100 %  Oxygen Amount and Delivery: O2 Flow Rate (L/min): 2 L/min    Wt Readings from Last 3 Encounters:   10/22/21 181 lb 10.5 oz (82.4 kg)   21 178 lb (80.7 kg)   21 178 lb 5.6 oz (80.9 kg)     I/O       Intake/Output Summary (Last 24 hours) at 10/25/2021 0939  Last data filed at 10/25/2021 0906  Gross per 24 hour   Intake 2764.8 ml   Output 4450 ml   Net -1685.2 ml     DRAIN/TUBE OUTPUT       Invasive Lines   ICP PRESSURE RANGE  No data recorded  CVP PRESSURE RANGE  No data recorded      Medications      midodrine  10 mg Oral TID    cefTRIAXone (ROCEPHIN) IV  2,000 mg IntraVENous Q24H    pantoprazole  40 mg IntraVENous Daily    And    sodium chloride (PF)  10 mL IntraVENous Daily    [Held by provider] heparin (porcine)  5,000 Units SubCUTAneous BID     potassium chloride, microfibrillar collagen, ipratropium-albuterol  IV Drips/Infusions   norepinephrine Stopped (10/24/21 1938)    lactated ringers 150 mL/hr at 10/24/21 1548       Diet/Nutrition   ADULT TUBE FEEDING; PEG; Standard with Fiber; Continuous; 20; Yes; 20; Q 4 hours; 50; 30; Q 6 hours; Protein; give protein modular twice daily at 8 am and 2pm with 30 ml free water before and after. Exam      Constitutional - Alert, arousable, expressive aphasia   General Appearance  well developed, well nourished  HEENT -normocephalic, atraumatic. PERRLA  Lungs - Chest expands equally, no wheezes, rales or rhonchi. Cardiovascular - Heart sounds are normal.  normal rate and rhythm regular, no murmur, gallop or rub. Abdomen - soft, nontender, nondistended, no masses or organomegaly  Neurologic - CN II-XII are grossly intact.  There are no focal motor deficits  Skin - no bruising, nurse reports bleeding at IV sites  Extremities - no cyanosis, clubbing or edema    Lab Results   CBC     Lab Results   Component Value Date    WBC 6.3 10/25/2021    RBC 2.60 10/25/2021    HGB 7.3 10/25/2021    HCT 21.9 10/25/2021     10/25/2021    MCV 84.2 10/25/2021    MCH 28.0 10/25/2021    MCHC 33.3 10/25/2021    RDW 16.2 10/25/2021    NRBC 1 06/01/2021    METASPCT 2 10/23/2021    LYMPHOPCT 10 10/25/2021    MONOPCT 12 10/25/2021    BASOPCT 0 10/25/2021    MONOSABS 0.80 10/25/2021    LYMPHSABS 0.60 10/25/2021    EOSABS 0.10 10/25/2021    BASOSABS 0.00 10/25/2021    DIFFTYPE NOT REPORTED 10/25/2021       BMP   Lab Results   Component Value Date     10/25/2021    K 3.2 10/25/2021     10/25/2021    CO2 21 10/25/2021    BUN 71 10/25/2021    CREATININE 2.58 10/25/2021    GLUCOSE 97 10/25/2021       LFTS  Lab Results   Component Value Date    ALKPHOS 71 10/22/2021    ALT 19 10/22/2021    AST 43 10/22/2021    PROT 8.6 10/22/2021    BILITOT 0.30 10/22/2021    LABALBU 3.4 10/22/2021       ABG ABGs:   Lab Results   Component Value Date    PHART 7.506 06/03/2021    PO2ART 123.0 06/03/2021    ZVM8QBH 37.1 06/03/2021       Lab Results   Component Value Date    MODE PRVC 06/03/2021         INR  Recent Labs     10/23/21  1014   PROTIME 22.6*   INR 2.0       APTT  Recent Labs     10/23/21  1014   APTT 47.9*       Lactic Acid  Lab Results   Component Value Date    LACTA 1.6 10/22/2021    LACTA 1.6 05/30/2021    LACTA 2.4 05/29/2021        BNP   No results for input(s): BNP in the last 72 hours. Cultures       Radiology     CXR          Lines in good place.     Pulmonary infiltrates in bilateral lower lobes  Right side is worse than CXR on 10/22    CT Scans    (See actual reports for details)      SYSTEMS ASSESSMENT  Septic shock-Klebsiella bacteremia  UTI  Multifocal pneumonia, possible aspiration  Acute kidney injury  Right hilar mass  History of tobacco use  DNR CCA no intubation but pressors are okay  Cardiomyopathy resolved, repeat echo done August 17 in the [facility] system shows EF of 60 to 65%  Possible COPD    Neuro   He is at his baseline     Respiratory   Patient no longer on O2, saturation is 99% on room air   CXR appears worse than previous one on 10/22  Cardiovascular   Systolic blood pressure still above 90  Still on norepinephrine, continue to wean off while keeping systolic blood pressure above 90 or MAP 65  Gastrointestinal   Continue tube feeds  GI prophylaxis    Renal   BUN and Creatinine continue to improve     Infectious Disease   Patient xray appears worse than previous, may need to change antibiotics     Hematology/Oncology   Still anemic   Nurse reports he is still bleeding from IV sites and is having black tarry stools - may need an EGD  Platelet count good   PTT continues to increase, PT decreasing but still high  Endocrine   Blood sugars good    Social/Spiritual/DNR/Disposition/Other   No family present at time of exam    Critical Care Time   28

## 2021-10-25 NOTE — PROGRESS NOTES
Progress Note    10/25/2021   1:44 PM    Name:  Rhys Sanchez  MRN:    [id number]     Acct:     [de-identified]   Room:  2012/2012-01  IP Day: 3     Admit Date: 10/22/2021  9:04 AM  PCP: Aram Guerrero MD    Subjective:     C/C:   Chief Complaint   Patient presents with    Altered Mental Status       Interval History: Status: not changed. Patient nods head to answer questions. Per family this is patient's baseline. Patient is 3 mics of Levophed. Vital signs stable. Recent labs reviewed potassium 3.2 creatinine 2.58, hemoglobin 7.3    ROS:   all 10 systems reviewed and are negative except as noted    Review of Systems   Constitutional: Negative for appetite change, chills and diaphoresis. HENT: Negative for drooling, ear pain, trouble swallowing and voice change. Eyes: Negative for photophobia and visual disturbance. Respiratory: Negative for choking and stridor. Cardiovascular: Negative for chest pain and palpitations. Gastrointestinal: Negative for abdominal distention, anal bleeding, blood in stool and rectal pain. Endocrine: Negative for polyphagia and polyuria. Genitourinary: Negative for dysuria, flank pain, hematuria and urgency. Musculoskeletal: Negative for back pain, myalgias and neck stiffness. Skin: Negative for color change, pallor and rash. Allergic/Immunologic: Negative for environmental allergies and food allergies. Neurological: Negative for tremors, seizures, facial asymmetry and numbness. Hematological: Negative for adenopathy. Does not bruise/bleed easily. Psychiatric/Behavioral: Positive for confusion. Negative for agitation and behavioral problems. Medications: Allergies:    Allergies   Allergen Reactions    Bactrim [Sulfamethoxazole-Trimethoprim] Hives and Swelling    Ciprofloxacin Swelling     FACE       Current Meds: traMADol (ULTRAM) tablet 50 mg, Q8H PRN  potassium chloride 10 mEq/100 mL IVPB (Peripheral Line), PRN  midodrine (PROAMATINE) tablet Physical Activity: Inactive    Days of Exercise per Week: 0 days    Minutes of Exercise per Session: 0 min   Stress: Stress Concern Present    Feeling of Stress : Very much   Social Connections: Moderately Isolated    Frequency of Communication with Friends and Family: More than three times a week    Frequency of Social Gatherings with Friends and Family: More than three times a week    Attends Sabianism Services: More than 4 times per year    Active Member of Clubs or Organizations: No    Attends Club or Organization Meetings: Never    Marital Status:    Intimate Partner Violence: Not At Risk    Fear of Current or Ex-Partner: No    Emotionally Abused: No    Physically Abused: No    Sexually Abused: No       I/O (24Hr): Intake/Output Summary (Last 24 hours) at 10/25/2021 1344  Last data filed at 10/25/2021 1202  Gross per 24 hour   Intake 2824.8 ml   Output 4480 ml   Net -1655.2 ml     Radiology:  CT ABDOMEN PELVIS WO CONTRAST Additional Contrast? None    Result Date: 10/22/2021  1. Partially visualized MASSLIKE LESION in the right parahilar and infrahilar region CONCERNING FOR MALIGNANCY. Dedicated contrast enhanced CT of the chest recommended for further evaluation. 2. No evidence of metastatic disease in the abdomen or the pelvis. 3. Bilateral nephrolithiasis. No hydronephrosis. Atrophic right kidney. 4.  Liquid stool in the colon indicates a diarrheal illness. No bowel wall thickening or obstruction noted. 5. 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 Vascular surgery consultation recommended especially for the right common iliac artery aneurysm. CT HEAD WO CONTRAST    Result Date: 10/22/2021  No acute intracranial abnormality. XR CHEST PORTABLE    Result Date: 10/25/2021  Increased right basilar atelectasis or pneumonia. Small right-sided pleural effusion, increased since the prior.      XR CHEST REPORTED     RBC Morphology NOT REPORTED     Platelet Estimate NOT REPORTED     Seg Neutrophils 76 (H) 36 - 66 %    Lymphocytes 10 (L) 24 - 44 %    Monocytes 12 (H) 1 - 7 %    Eosinophils % 2 0 - 4 %    Basophils 0 0 - 2 %    Segs Absolute 4.80 1.3 - 9.1 k/uL    Absolute Lymph # 0.60 (L) 1.0 - 4.8 k/uL    Absolute Mono # 0.80 0.1 - 1.3 k/uL    Absolute Eos # 0.10 0.0 - 0.4 k/uL    Basophils Absolute 0.00 0.0 - 0.2 k/uL   Procalcitonin    Collection Time: 10/25/21  5:09 AM   Result Value Ref Range    Procalcitonin 6.05 (H) <0.09 ng/mL   C-Reactive Protein    Collection Time: 10/25/21  5:09 AM   Result Value Ref Range    .7 (H) 0.0 - 5.0 mg/L   OSMOLALITY, URINE    Collection Time: 10/25/21 10:00 AM   Result Value Ref Range    Osmolality, Ur 337 80 - 1300 mOsm/kg   CHLORIDE, URINE, RANDOM    Collection Time: 10/25/21 11:00 AM   Result Value Ref Range    Chloride, Ur 87 mmol/L   Protein / Creatinine Ratio, Urine    Collection Time: 10/25/21 11:00 AM   Result Value Ref Range    Total Protein, Urine 29 mg/dL    Creatinine, Ur 32.6 (L) 39.0 - 259.0 mg/dL    Urine Total Protein Creatinine Ratio 0.89 (H) 0.00 - 0.20   SODIUM, URINE, RANDOM    Collection Time: 10/25/21 11:00 AM   Result Value Ref Range    Sodium,Ur 78 mmol/L   URINALYSIS    Collection Time: 10/25/21 11:00 AM   Result Value Ref Range    Color, UA Yellow Yellow    Turbidity UA Clear Clear    Glucose, Ur NEGATIVE NEGATIVE    Bilirubin Urine NEGATIVE NEGATIVE    Ketones, Urine NEGATIVE NEGATIVE    Specific Gravity, UA 1.010 1.000 - 1.030    Urine Hgb SMALL (A) NEGATIVE    pH, UA 5.0 5.0 - 8.0    Protein, UA 1+ (A) NEGATIVE    Urobilinogen, Urine Normal Normal    Nitrite, Urine NEGATIVE NEGATIVE    Leukocyte Esterase, Urine TRACE (A) NEGATIVE    Urinalysis Comments NOT REPORTED    Microscopic Urinalysis    Collection Time: 10/25/21 11:00 AM   Result Value Ref Range    -          WBC, UA 5 TO 10 /HPF    RBC, UA 5 TO 10 /HPF    Casts UA NOT REPORTED /LPF Crystals, UA NOT REPORTED None /HPF    Epithelial Cells UA 2 TO 5 /HPF    Renal Epithelial, UA NOT REPORTED 0 /HPF    Bacteria, UA FEW (A) None    Mucus, UA NOT REPORTED None    Trichomonas, UA NOT REPORTED None    Amorphous, UA NOT REPORTED None    Other Observations UA NOT REPORTED NOT REQ. Yeast, UA MODERATE (A) None       Physical Examination:        Vitals:  /65   Pulse 80   Temp 99 °F (37.2 °C) (Axillary)   Resp 18   Ht 6' 4\" (1.93 m)   Wt 181 lb 10.5 oz (82.4 kg)   SpO2 93%   BMI 22.11 kg/m²   Temp (24hrs), Av.3 °F (36.8 °C), Min:97.2 °F (36.2 °C), Max:99 °F (37.2 °C)    No results for input(s): POCGLU in the last 72 hours. Physical Exam  Vitals reviewed. Constitutional:       Appearance: Normal appearance. He is not diaphoretic. HENT:      Head: Normocephalic and atraumatic. Right Ear: External ear normal.      Left Ear: External ear normal.      Nose: Nose normal.      Mouth/Throat:      Mouth: Mucous membranes are moist.      Pharynx: Oropharynx is clear. Eyes:      Conjunctiva/sclera: Conjunctivae normal.   Cardiovascular:      Rate and Rhythm: Normal rate and regular rhythm. Pulses: Normal pulses. Heart sounds: Normal heart sounds. Pulmonary:      Effort: Pulmonary effort is normal.      Breath sounds: Examination of the right-lower field reveals rhonchi. Examination of the left-lower field reveals rhonchi. Rhonchi present. Abdominal:      General: Bowel sounds are normal. There is no distension. Palpations: Abdomen is soft. Musculoskeletal:         General: No tenderness or deformity. Normal range of motion. Cervical back: Normal range of motion and neck supple. No rigidity. Right lower leg: No edema. Left lower leg: No edema. Skin:     General: Skin is warm and dry. Capillary Refill: Capillary refill takes less than 2 seconds. Coloration: Skin is not jaundiced.    Neurological:      Mental Status: Mental status is at baseline. Psychiatric:         Mood and Affect: Mood normal.         Behavior: Behavior normal.         Assessment:        Primary Problem  Septic shock (HCC)     Principal Problem:    Septic shock (HCC)  Active Problems:    Multifocal pneumonia    Tobacco abuse    Acute cystitis without hematuria    Mass of right lung    COPD (chronic obstructive pulmonary disease) (HCC)    PAF (paroxysmal atrial fibrillation) (HCC)    Chronic combined systolic and diastolic congestive heart failure (HCC)    Dilated cardiomyopathy (HCC)    Bacteremia due to Klebsiella pneumoniae  Resolved Problems:    * No resolved hospital problems. *      Past Medical History:   Diagnosis Date    Arthritis     Cancer [facility])     bladder 1980s    Cerebral artery occlusion with cerebral infarction [facility])     TIA 2010    Chronic kidney disease     [facility] obstructive [facility]) [facility] [address]     Hypertension     Pneumonia     Psychiatric problem     depression, social anxiety    Seizures ([facility] [address]         Plan:        1. IV Rocephin 2 g daily per ID  1. Continue tube feed  2. Wean off IV Levophed to keep map above 65  3. IV fluid at 100 mL/h  4. Urine culture sensitivities pending  5. Collagenase right IJ catheter site   6. Cardiology input noted  7. Discussed with sister, son and nurse at bedside  8. FOBT  9. Consult GI for anemia  10. H&H every 12 hours  11. CBC, CMP  12. DVT Prophylaxis Heparin on hold due to low hemoglobin  13. EPCs  14. PT/OT to evaluate and treat  15. Pain control  16. Replace electrolytes as per sliding scale  17. Home medications reviewed and appropriate medications continued  18.  Reviewed labs and imaging studies from last 24 hours and results explained to patient    Electronically signed by Roselia Mayers MD

## 2021-10-25 NOTE — PROGRESS NOTES
Department of Internal Medicine  Nephrology Bettie Amor MD  Progress Note    Reason for consultation: Management of acute kidney injury superimposed on chronic kidney disease stage 4..    Consulting physician: Zurdo Guevara MD    Attending physician: Crystal Ovalle MD.    Interval history: Patient was seen and examined today and he is encephalopathic. He is hypotensive and currently off Levophed since yesterday evening-he has indwelling Issa catheter which was exchanged on admission and he is nonoliguric. He is also on lactated Ringer's infusion at 100 mL/h. His electrolytes and kidney function were reviewed with improving renal function and good urine output. Potassium is 3.2. His procalcitonin and C-reactive protein are elevated. History of present illness: This is a 77 y.o. male with a significant past medical history of Systemic hypertension, chronic obstructive pulmonary disease, Bladder cancer, seizure disorder, osteoarthritis and s/p Cerebrovascular accident [has G-tube in place], who presented to the hospital on 5/24/2021 with complaints of confusion and disorientation. Apparently patient lives at home alone but family lives nearby. Laboratory studies at presentation were remarkable for serum sodium 129 mmol/L, serum bicarbonate 19 mmol/L and elevated BUN/creatinine 102/4.36 mg/dL. I had seen patient during the prior presentation in May 2021 for acute kidney injury superimposed on chronic kidney disease stage IV and at that time serum creatinine was greater than 4 mg/dL. CODE STATUS is DNR CCA. Blood pressure at presentation this time was 114/45 work patient subsequently developed hypotension and is currently on Levophed drip at 6 mcg/min. He has a chronic indwelling Issa catheter and is nonoliguric. He is a poor historian and history was obtained primarily by chart review.     Scheduled Meds:   midodrine  10 mg Oral TID    cefTRIAXone (ROCEPHIN) IV  2,000 mg IntraVENous 10/25/2021    WBCUA 5 TO 10 10/25/2021    RBCUA 5 TO 10 10/25/2021    MUCUS NOT REPORTED 10/25/2021    TRICHOMONAS NOT REPORTED 10/25/2021    YEAST MODERATE 10/25/2021    BACTERIA FEW 10/25/2021    CLARITYU cloudy 09/07/2021    SPECGRAV 1.010 10/25/2021    LEUKOCYTESUR TRACE 10/25/2021    UROBILINOGEN Normal 10/25/2021    BILIRUBINUR NEGATIVE 10/25/2021    BLOODU ++ 09/07/2021    GLUCOSEU NEGATIVE 10/25/2021    KETUA NEGATIVE 10/25/2021    AMORPHOUS NOT REPORTED 10/25/2021     Urine Sodium:     Lab Results   Component Value Date    HELENA 78 10/25/2021     Urine Potassium:  No results found for: KUR  Urine Chloride:    Lab Results   Component Value Date    CLUR 87 10/25/2021     Urine Creatinine:     Lab Results   Component Value Date    LABCREA 32.6 10/25/2021     IMPRESSION/RECOMMENDATIONS:      1. Acute kidney injury superimposed on chronic kidney disease stage 3 [baseline serum creatinine 1.28 mg/dL on 8/11/2021] - Top differential is prerenal azotemia from poor oral intake as well as ischemic acute tubular necrosis. He has chronic indwelling Issa catheter and CT scan showed atrophic right kidney with bilateral nephrolithiasis but no hydronephrosis. Renal function is slowly improving with hydration  Plan: Continue lactated Ringer's infusion at 100 mL/h. Basic metabolic profile daily. 2.  Proteus Mirabella's and Pseudomonas aeruginosa urinary tract infection - Continue IV Rocephin    3. Klebsiella pneumonia bacteremia - Continue IV antibiotics    4. Hypotension-off Levophed pressors     5. Normocytic anemia - check iron saturation and ferritin. 6.  Hypokalemia - secondary to poor intake. We will replace with ICU potassium sliding scale. Check magnesium. Prognosis is guarded.     MD ELLA Gray  Attending Nephrologist  10/25/2021 1:23 PM

## 2021-10-25 NOTE — CONSULTS
3.0 and 2.9 cm, respectively.  Vascular surgery consultation recommended   especially for the right common iliac artery aneurysm. Past Medical History:   Diagnosis Date    Arthritis     Cancer [facility])     bladder 1980s    Cerebral artery occlusion with cerebral infarction [facility])     TIA 2010    Chronic kidney disease     [facility]chronic obstructive [facility] disease) [facility] [address]     Hypertension     Pneumonia     Psychiatric problem     depression, social anxiety    Seizures ([facility] [address]       Past Surgical History:   Procedure Laterality Date    ABDOMEN SURGERY      BACK SURGERY      spinal surgery 2005     CAROTID ENDARTERECTOMY Left 09/20/2016    COLECTOMY N/A 5/28/2021    Exploratory Laparotomy. Release of adhesive band with release of small bowel obstruction. Small bowel resection with primary anastomosis performed by Kenneth Tinoco MD at [address] [facility] COLONOSCOPY N/A 8/31/2018    COLONOSCOPY POLYPECTOMY COLD BIOPSY performed by Masha Klein MD at [address] [place] [address]      left face    GASTROSTOMY TUBE PLACEMENT  04/15/2020    HERNIA REPAIR      HIATAL HERNIA REPAIR      INGUINAL HERNIA REPAIR Bilateral     INSERT MIDLINE CATHETER  8/4/2021         OTHER SURGICAL HISTORY      mesh infected in inguinal canal, had to remove. Removed testiscle at this time.  TONSILLECTOMY      UPPER GASTROINTESTINAL ENDOSCOPY  04/15/2020       EGD CONTROL HEMORRHAGE    UPPER GASTROINTESTINAL ENDOSCOPY  4/15/2020    EGD CONTROL HEMORRHAGE performed by Perri Schumacher MD at [address] [facility] ENDOSCOPY  4/15/2020    EGD ESOPHAGOGASTRODUODENOSCOPY PEG TUBE INSERTION performed by Perri Schumacher MD at [facility]      Past Endoscopic History as above    Admission Meds  No current facility-administered medications on file prior to encounter.      Current Outpatient Medications on File Prior to Encounter   Medication Sig Dispense Refill    ferrous sulfate (IRON 325) 325 (65 Fe) MG Systems  Constitutional: negative  Eyes: negative  Ears, nose, mouth, throat, and face: negative  Respiratory: negative  Cardiovascular: negative  Gastrointestinal: negative  Genitourinary:negative  Integument/breast: negative  Hematologic/lymphatic: negative  Musculoskeletal:negative  Endocrine: negative           Physical Exam  Blood pressure 132/65, pulse 80, temperature 99 °F (37.2 °C), temperature source Axillary, resp. rate 18, height 6' 4\" (1.93 m), weight 181 lb 10.5 oz (82.4 kg), SpO2 93 %. General Appearance: alert and oriented to person, place and time, well-developed and well-nourished, in no acute distress  Skin: warm and dry, no rash or erythema  Head: normocephalic and atraumatic  Eyes: pupils equal, round, and reactive to light, extraocular eye movements intact, conjunctivae normal  ENT: hearing grossly normal bilaterally  Neck: neck supple and non tender without mass, no thyromegaly or thyroid nodules, no cervical lymphadenopathy   Pulmonary/Chest: clear to auscultation bilaterally- no wheezes, rales or rhonchi, normal air movement, no respiratory distress  Cardiovascular: normal rate, regular rhythm, normal S1 and S2, no murmurs, rubs, clicks or gallops, distal pulses intact, no carotid bruits  Abdomen: soft, non-tender, non-distended, normal bowel sounds, no masses or organomegaly  Extremities: no cyanosis, clubbing or edema  Musculoskeletal: normal range of motion, no joint swelling, deformity or tenderness  Neurologic: no cranial nerve deficit and muscle strength normal    Data Review:    Recent Labs     10/23/21  0502 10/23/21  2329 10/24/21  0518 10/24/21  0518 10/24/21  1039 10/24/21  2232 10/25/21  0509   WBC 13.7*  --  7.1  --   --   --  6.3   HGB 8.5*   < > 7.8*   < > 7.4* 7.2* 7.3*   HCT 25.4*   < > 23.3*   < > 22.6* 21.1* 21.9*   MCV 86.4  --  82.9  --   --   --  84.2     --  167  --   --   --  167    < > = values in this interval not displayed.      Recent Labs 10/23/21  0502 10/23/21  0502 10/23/21  1014 10/24/21  0518 10/25/21  0509   *  --   --  133* 139   K 3.5*   < > 3.9 3.2* 3.2*   CL 99  --   --  101 107   CO2 17*  --   --  19* 21   BUN 98*  --   --  89* 71*   CREATININE 4.28*  --   --  3.34* 2.58*    < > = values in this interval not displayed. No results for input(s): AST, ALT, ALB, BILIDIR, BILITOT, ALKPHOS in the last 72 hours. No results for input(s): LIPASE, AMYLASE in the last 72 hours. Recent Labs     10/23/21  1014   PROTIME 22.6*   INR 2.0     No results for input(s): PTT in the last 72 hours. No results for input(s): OCCULTBLD in the last 72 hours. CEA:  No results found for: CEA  Ca 125:  No results found for:   Ca 19-9:  No results found for:   Ca 15-3:  No results found for:   AFP:  No components found for: AFAFP  Beta HCG:  No components found for: BHCG  Neuron Specific Enolase:  No results found for: NSE  Imaging Studies:                           All appropriate imaging studies and reports reviewed: Yes                 Assessment:     Principal Problem:    Septic shock (HCC)  Active Problems:    Multifocal pneumonia    Tobacco abuse    Acute cystitis without hematuria    Mass of right lung    COPD (chronic obstructive pulmonary disease) (HCC)    PAF (paroxysmal atrial fibrillation) (HCC)    Chronic combined systolic and diastolic congestive heart failure (HCC)    Dilated cardiomyopathy ([facility] [address]    Bacteremia due to Klebsiella pneumoniae  Resolved Problems:    * No resolved hospital problems.  *    Very sick patient with multiple medical acute and chronic issues including sepsis and sepsis  Hypotension need for pressors acute renal failure with acute kidney injury, positive troponin, severe electrolyte imbalance etc.    GI gunter was consulted because questionable melena  And hemoglobin trending down  He does have a history of duodenal ulcer last year      Recommendations:   *We will double the dose of the PPI to 40 IV twice daily versus PPI drip if he continues to drop    We will try to avoid endoscopy unless he continued to drop his hemoglobin or show sign of bleeding    Transfuse to make hemoglobin close to 8 because of his status with the other issues    We will monitor the AST if continues to elevate will initiate work-up most likely the elevation is related to low blood pressure as on presentation was normal                      Thank you for allowing me to participate in the care of your patient. Please feel free to contact me with any questions or concerns.      Ольга Rose MD

## 2021-10-25 NOTE — CARE COORDINATION
Mehreen Elias ONGOING DISCHARGE PLANNING NOTE:    Writer reviewed notes, and discharge plan is to discharge to home with [facility]   Patient still having black tarry stools  On iv atb   Limited code status  On tube feeds  cxr worse today     Electronically signed by Ciera Cosby RN on 10/25/2021 at 12:09 PM

## 2021-10-25 NOTE — PROGRESS NOTES
Comprehensive Nutrition Assessment    Type and Reason for Visit:  Reassess    Nutrition Recommendations/Plan:   Please continue to advance Jevity 1.5 tube feed to goal of 50 ml per hour unless residual is greater 250 ml   With 2 protein modulars daily this tube feed regimen will provide 2008 kcal, 129 g protein    Nutrition Assessment:  Tube feed was held due to 60 ml residual. Discussed with nursing. Tube feed is now restarted at 20 ml per hour. Malnutrition Assessment:  Malnutrition Status: At risk for malnutrition (Comment)    Context:  Chronic Illness     Findings of the 6 clinical characteristics of malnutrition:  Energy Intake:  Unable to assess  Weight Loss:  No significant weight loss     Body Fat Loss:  Unable to assess     Muscle Mass Loss:  Unable to assess    Fluid Accumulation:  1 - Mild Extremities   Strength:  Not Performed    Estimated Daily Nutrient Needs:  Energy (kcal):   [place] x 1.2= 2000 kcal; Weight Used for Energy Requirements:  Admission     Protein (g):  1.5g/kg= 135-140 g; Weight Used for Protein Requirements:  Ideal          Nutrition Related Findings:  mild edema BLE, Labs/Meds: Reviewed, BM 1025 (watery/black)      Wounds:  Pressure Injury, Stage II, Surgical Incision       Current Nutrition Therapies:    Current Tube Feeding (TF) Orders:  · Feeding Route: PEG  · Formula: Standard with Fiber  · Schedule: Continuous  · Additives/Modulars: Protein  · Goal TF & Flush Orders Provides: Jevity 1.5 at 50 ml per hour with 2 protein moduluars provides 2008 kcal, 129 g protein    Anthropometric Measures:  · Height: 6' 4\" (193 cm)  · Current Body Weight: 181 lb (82.1 kg)   · Admission Body Weight: 181 lb (82.1 kg)    · Usual Body Weight: 183 lb (83 kg) (5/21)     · Ideal Body Weight: 202 lbs; BMI: 22  · BMI Categories: Normal Weight (BMI 22.0 to 24.9) age over 72       Nutrition Diagnosis:   · Inadequate oral intake related to  (current medical condition) as evidenced by NPO or clear liquid status due to medical condition, nutrition support - enteral nutrition    Nutrition Interventions:   Food and/or Nutrient Delivery:  Continue Current Tube Feeding  Nutrition Education/Counseling:  No recommendation at this time   Coordination of Nutrition Care:  Continue to monitor while inpatient    Goals:  provide more than 75% of nutrition needs       Nutrition Monitoring and Evaluation:   Food/Nutrient Intake Outcomes:  Enteral Nutrition Intake/Tolerance  Physical Signs/Symptoms Outcomes:  Biochemical Data, GI Status, Skin, Weight, Fluid Status or Edema     Discharge Planning:    Enteral Nutrition     Electronically signed by Diane Herman RD, HEVER on 10/25/21 at 11:14 AM EDT    Contact: [phone]

## 2021-10-25 NOTE — PROGRESS NOTES
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 In ED received IV fluid bolus, central line was placed  and norepinephrine was started for hypotension. Urine culture - several types of bacteria identified, likely contamination  COVID19 - not detected  CXR - Mildly increased patchy opacities at the lung bases, possibly pneumonia or atelectasis. UA - positive for nitrites, large leukocyte esterase, large hemoglobin and moderate bacteria  Blood cultures - 2/2 positive for klebsiella pneumonia    Interval changes  10/25/2021   No reported fever, or bleeding at IV sites and reported black tarry stools, remains on 3 MCG per minute of Levophed, open eyes, nod head to answer questions, tolerating tube feed, no acute event  Procalcitonin 6.05  Chest x-ray showed increase right lung base opacity    Summary of relevant labs:      Micro:  10/22 Blood Cx - 2/2 Klebsiella pneumoniae - sensitive to cefepime  10/22 Urine Cx - multiple types of bacteria identified, likely contamination  10/22 COVID19 - not detected    Imaging:  10/22 CXR  1.  Mildly increased patchy opacities at the lung bases, possibly pneumonia or atelectasis.    2.  Increased masslike opacity in the medial right base.  This was previously evaluated with CT on 05/26/2021.  Consider re-evaluation with contrast-enhanced CT to structures grossly intact.  Telemetry leads overlie the chest.     Patient Vitals for the past 8 hrs:   BP Temp Temp src Pulse Resp SpO2   10/25/21 0730 128/73 98.7 °F (37.1 °C) Axillary 93 18 98 %   10/25/21 0700    109     10/25/21 0604    101 20 98 %   10/25/21 0500 125/77   89 21 100 %   10/25/21 0445 93/70 97.2 °F (36.2 °C) Oral 96 19 99 %         I have personally reviewed the past medical history, past surgical history, medications, social history, and family history, and I haveupdated the database accordingly. Allergies:   Bactrim [sulfamethoxazole-trimethoprim] and Ciprofloxacin     Review of Systems:     Review of Systems  As per history present is, other than above 12 system review was negative  Physical Examination :       Physical Exam  Appearance: Normal appearance. He is not diaphoretic. HENT:      Head: Normocephalic and atraumatic. Right Ear: External ear normal.      Left Ear: External ear normal.      Nose: Nose normal.      Mouth/Throat:      Mouth: Mucous membranes are moist.      Pharynx: Oropharynx is clear. Eyes:      Conjunctiva/sclera: Conjunctivae normal.   Cardiovascular:      Rate and Rhythm: Normal rate and regular rhythm. Pulses: Normal pulses. Heart sounds: Normal heart sounds. Pulmonary:      Effort: Pulmonary effort is normal.      Breath sounds: Coarse breath sounds bilaterally  Abdominal:      General: Bowel sounds are normal. There is no distension. Palpations: Abdomen is soft. Musculoskeletal:         Cervical back:  No rigidity. Right lower leg: No edema. Left lower leg: No edema. Skin:     General: Skin is warm and dry. Coloration: Skin is not jaundiced. Neurological:      Mental Status: Mental status is at baseline.    Past Medical History:     Past Medical History:   Diagnosis Date    Arthritis     Cancer [facility])     bladder 1980s    Cerebral artery occlusion with cerebral infarction [facility])     TIA 2010    Chronic kidney disease     COPD (chronic obstructive pulmonary disease) (HCC)     Hypertension     Pneumonia     Psychiatric problem     depression, social anxiety    Seizures ([address]        Past Surgical  History:     Past Surgical History:   Procedure Laterality Date    ABDOMEN SURGERY      BACK SURGERY      spinal surgery 2005     CAROTID ENDARTERECTOMY Left 09/20/2016    COLECTOMY N/A 5/28/2021    Exploratory Laparotomy. Release of adhesive band with release of small bowel obstruction. Small bowel resection with primary anastomosis performed by Melecio Lewis MD at [facility] COLONOSCOPY N/A 8/31/2018    COLONOSCOPY POLYPECTOMY COLD BIOPSY performed by Ham Albarado MD at [address] [place] [address]      left face    GASTROSTOMY TUBE PLACEMENT  04/15/2020    HERNIA REPAIR      HIATAL HERNIA REPAIR      INGUINAL HERNIA REPAIR Bilateral     INSERT MIDLINE CATHETER  8/4/2021         OTHER SURGICAL HISTORY      mesh infected in inguinal canal, had to remove. Removed testiscle at this time.      TONSILLECTOMY      UPPER GASTROINTESTINAL ENDOSCOPY  04/15/2020       EGD CONTROL HEMORRHAGE    UPPER GASTROINTESTINAL ENDOSCOPY  4/15/2020    EGD CONTROL HEMORRHAGE performed by Marilu Ireland MD at [facility]  4/15/2020    EGD ESOPHAGOGASTRODUODENOSCOPY PEG TUBE INSERTION performed by Marilu Ireland MD at [facility]       Medications:      midodrine  10 mg Oral TID    cefTRIAXone (ROCEPHIN) IV  2,000 mg IntraVENous Q24H    pantoprazole  40 mg IntraVENous Daily    And    sodium chloride (PF)  10 mL IntraVENous Daily    [Held by provider] heparin (porcine)  5,000 Units SubCUTAneous BID       Social History:     Social History     Socioeconomic History    Marital status:      Spouse name: Not on file    Number of children: Not on file    Years of education: Not on file    Highest education level: Not on file   Occupational History    Not on

## 2021-10-26 LAB
ABSOLUTE EOS #: 0.07 K/UL (ref 0–0.4)
ABSOLUTE IMMATURE GRANULOCYTE: ABNORMAL K/UL (ref 0–0.3)
ABSOLUTE LYMPH #: 0.78 K/UL (ref 1–4.8)
ABSOLUTE MONO #: 0.72 K/UL (ref 0.1–1.3)
ANION GAP SERPL CALCULATED.3IONS-SCNC: 8 MMOL/L (ref 9–17)
BASOPHILS # BLD: 0 % (ref 0–2)
BASOPHILS ABSOLUTE: 0 K/UL (ref 0–0.2)
BLD PROD TYP BPU: NORMAL
BUN BLDV-MCNC: 56 MG/DL (ref 8–23)
BUN/CREAT BLD: ABNORMAL (ref 9–20)
CALCIUM SERPL-MCNC: 8.5 MG/DL (ref 8.6–10.4)
CHLORIDE BLD-SCNC: 109 MMOL/L (ref 98–107)
CO2: 27 MMOL/L (ref 20–31)
CREAT SERPL-MCNC: 1.76 MG/DL (ref 0.7–1.2)
DIFFERENTIAL TYPE: ABNORMAL
DISPENSE STATUS BLOOD BANK: NORMAL
EOSINOPHILS RELATIVE PERCENT: 1 % (ref 0–4)
GFR AFRICAN AMERICAN: 46 ML/MIN
GFR NON-AFRICAN AMERICAN: 38 ML/MIN
GFR SERPL CREATININE-BSD FRML MDRD: ABNORMAL ML/MIN/{1.73_M2}
GFR SERPL CREATININE-BSD FRML MDRD: ABNORMAL ML/MIN/{1.73_M2}
GLUCOSE BLD-MCNC: 155 MG/DL (ref 70–99)
HCT VFR BLD CALC: 20.8 % (ref 41–53)
HCT VFR BLD CALC: 27.7 % (ref 41–53)
HCT VFR BLD CALC: 28 % (ref 41–53)
HEMOGLOBIN: 6.8 G/DL (ref 13.5–17.5)
HEMOGLOBIN: 9.4 G/DL (ref 13.5–17.5)
HEMOGLOBIN: 9.4 G/DL (ref 13.5–17.5)
IMMATURE GRANULOCYTES: ABNORMAL %
LYMPHOCYTES # BLD: 12 % (ref 24–44)
MCH RBC QN AUTO: 27.7 PG (ref 26–34)
MCHC RBC AUTO-ENTMCNC: 32.9 G/DL (ref 31–37)
MCV RBC AUTO: 84.1 FL (ref 80–100)
MONOCYTES # BLD: 11 % (ref 1–7)
MORPHOLOGY: ABNORMAL
MORPHOLOGY: ABNORMAL
NRBC AUTOMATED: ABNORMAL PER 100 WBC
PDW BLD-RTO: 16.6 % (ref 11.5–14.9)
PLATELET # BLD: 172 K/UL (ref 150–450)
PLATELET ESTIMATE: ABNORMAL
PMV BLD AUTO: 6.8 FL (ref 6–12)
POTASSIUM SERPL-SCNC: 3.7 MMOL/L (ref 3.7–5.3)
RBC # BLD: 2.47 M/UL (ref 4.5–5.9)
RBC # BLD: ABNORMAL 10*6/UL
SEG NEUTROPHILS: 76 % (ref 36–66)
SEGMENTED NEUTROPHILS ABSOLUTE COUNT: 4.93 K/UL (ref 1.3–9.1)
SODIUM BLD-SCNC: 144 MMOL/L (ref 135–144)
TRANSFUSION STATUS: NORMAL
UNIT DIVISION: 0
UNIT NUMBER: NORMAL
WBC # BLD: 6.5 K/UL (ref 3.5–11)
WBC # BLD: ABNORMAL 10*3/UL

## 2021-10-26 PROCEDURE — P9016 RBC LEUKOCYTES REDUCED: HCPCS

## 2021-10-26 PROCEDURE — 6370000000 HC RX 637 (ALT 250 FOR IP): Performed by: INTERNAL MEDICINE

## 2021-10-26 PROCEDURE — 6370000000 HC RX 637 (ALT 250 FOR IP): Performed by: FAMILY MEDICINE

## 2021-10-26 PROCEDURE — APPSS30 APP SPLIT SHARED TIME 16-30 MINUTES: Performed by: NURSE PRACTITIONER

## 2021-10-26 PROCEDURE — 6360000002 HC RX W HCPCS: Performed by: NURSE PRACTITIONER

## 2021-10-26 PROCEDURE — 86900 BLOOD TYPING SEROLOGIC ABO: CPT

## 2021-10-26 PROCEDURE — 99232 SBSQ HOSP IP/OBS MODERATE 35: CPT | Performed by: INTERNAL MEDICINE

## 2021-10-26 PROCEDURE — 85018 HEMOGLOBIN: CPT

## 2021-10-26 PROCEDURE — 2580000003 HC RX 258: Performed by: INTERNAL MEDICINE

## 2021-10-26 PROCEDURE — 2580000003 HC RX 258: Performed by: NURSE PRACTITIONER

## 2021-10-26 PROCEDURE — 80048 BASIC METABOLIC PNL TOTAL CA: CPT

## 2021-10-26 PROCEDURE — 36415 COLL VENOUS BLD VENIPUNCTURE: CPT

## 2021-10-26 PROCEDURE — 6360000002 HC RX W HCPCS: Performed by: INTERNAL MEDICINE

## 2021-10-26 PROCEDURE — 85014 HEMATOCRIT: CPT

## 2021-10-26 PROCEDURE — C9113 INJ PANTOPRAZOLE SODIUM, VIA: HCPCS | Performed by: INTERNAL MEDICINE

## 2021-10-26 PROCEDURE — 2060000000 HC ICU INTERMEDIATE R&B

## 2021-10-26 PROCEDURE — 85025 COMPLETE CBC W/AUTO DIFF WBC: CPT

## 2021-10-26 RX ORDER — FUROSEMIDE 10 MG/ML
40 INJECTION INTRAMUSCULAR; INTRAVENOUS ONCE
Status: COMPLETED | OUTPATIENT
Start: 2021-10-26 | End: 2021-10-26

## 2021-10-26 RX ORDER — SODIUM CHLORIDE 9 MG/ML
INJECTION, SOLUTION INTRAVENOUS PRN
Status: DISCONTINUED | OUTPATIENT
Start: 2021-10-26 | End: 2021-11-02 | Stop reason: HOSPADM

## 2021-10-26 RX ORDER — ACETAMINOPHEN 325 MG/1
650 TABLET ORAL EVERY 4 HOURS PRN
Status: DISCONTINUED | OUTPATIENT
Start: 2021-10-26 | End: 2021-11-02 | Stop reason: HOSPADM

## 2021-10-26 RX ORDER — MIDODRINE HYDROCHLORIDE 10 MG/1
10 TABLET ORAL 3 TIMES DAILY PRN
Status: DISCONTINUED | OUTPATIENT
Start: 2021-10-26 | End: 2021-11-02 | Stop reason: HOSPADM

## 2021-10-26 RX ADMIN — SODIUM CHLORIDE, PRESERVATIVE FREE 10 ML: 5 INJECTION INTRAVENOUS at 22:37

## 2021-10-26 RX ADMIN — CEFTRIAXONE SODIUM 2000 MG: 2 INJECTION, POWDER, FOR SOLUTION INTRAMUSCULAR; INTRAVENOUS at 18:15

## 2021-10-26 RX ADMIN — PANTOPRAZOLE SODIUM 40 MG: 40 INJECTION, POWDER, FOR SOLUTION INTRAVENOUS at 22:37

## 2021-10-26 RX ADMIN — DOXYCYCLINE 100 MG: 25 FOR SUSPENSION ORAL at 23:43

## 2021-10-26 RX ADMIN — SODIUM CHLORIDE, POTASSIUM CHLORIDE, SODIUM LACTATE AND CALCIUM CHLORIDE: 600; 310; 30; 20 INJECTION, SOLUTION INTRAVENOUS at 06:41

## 2021-10-26 RX ADMIN — TRAMADOL HYDROCHLORIDE 50 MG: 50 TABLET ORAL at 10:23

## 2021-10-26 RX ADMIN — METOPROLOL TARTRATE 12.5 MG: 25 TABLET, FILM COATED ORAL at 22:37

## 2021-10-26 RX ADMIN — PANTOPRAZOLE SODIUM 40 MG: 40 INJECTION, POWDER, FOR SOLUTION INTRAVENOUS at 10:17

## 2021-10-26 RX ADMIN — SODIUM CHLORIDE, PRESERVATIVE FREE 10 ML: 5 INJECTION INTRAVENOUS at 10:16

## 2021-10-26 RX ADMIN — FUROSEMIDE 40 MG: 10 INJECTION, SOLUTION INTRAVENOUS at 15:43

## 2021-10-26 RX ADMIN — DOXYCYCLINE 100 MG: 25 FOR SUSPENSION ORAL at 10:18

## 2021-10-26 RX ADMIN — METOPROLOL TARTRATE 12.5 MG: 25 TABLET, FILM COATED ORAL at 14:23

## 2021-10-26 RX ADMIN — TRAMADOL HYDROCHLORIDE 50 MG: 50 TABLET ORAL at 22:37

## 2021-10-26 ASSESSMENT — PAIN SCALES - GENERAL
PAINLEVEL_OUTOF10: 0
PAINLEVEL_OUTOF10: 7
PAINLEVEL_OUTOF10: 0

## 2021-10-26 ASSESSMENT — PAIN SCALES - WONG BAKER

## 2021-10-26 ASSESSMENT — ENCOUNTER SYMPTOMS
BACK PAIN: 0
RECTAL PAIN: 0
CHOKING: 0
VOICE CHANGE: 0
TROUBLE SWALLOWING: 0
STRIDOR: 0
COLOR CHANGE: 0
ANAL BLEEDING: 0
PHOTOPHOBIA: 0
ABDOMINAL DISTENTION: 0
BLOOD IN STOOL: 0

## 2021-10-26 NOTE — CARE COORDINATION
ONGOING DISCHARGE PLAN:    Unable to speak to pt. At this time. Currently Asleep, No family at the bedside. Writer reviewed notes, Pt. Is from Home, where they provide 24/7 care. If VNS, is needed, Sister GOYO, selected [facility]. Pt. Remains on TF. Remains on IV Rocephin. Vibramycin, per G Tube. HGB today 6.8, getting transfused. Awaiting plans per GI. Cr. 1.76, Nephro on board. Will continue to follow for additional discharge needs.     Electronically signed by Beuford Gaucher, RN on 10/26/2021 at 4:40 PM

## 2021-10-26 NOTE — PROGRESS NOTES
Dedicated radiographs of the right   humerus are recommended. 10/22 CT head w/o contrast  BRAIN/VENTRICLES: There is no acute intracranial hemorrhage, mass effect or midline shift.  No abnormal extra-axial fluid collection. The gray-white differentiation is maintained without evidence of an acute infarct. There is no evidence of hydrocephalus. Mild generalized cortical atrophy, stable.  Extensive streak artifact is   again noted on the left, related to stable postsurgical change, with metallic plate in the left frontotemporal skull region.  This obscures surrounding structures. ORBITS: The visualized portion of the orbits demonstrate no acute abnormality. SINUSES: The visualized paranasal sinuses and mastoid air cells demonstrate no acute abnormality. SOFT TISSUES/SKULL:  No acute abnormality of the visualized skull or soft tissues. 10/22 CT abdomen pelvis w/o contrast  1. Partially visualized MASSLIKE LESION in the right parahilar and infrahilar region CONCERNING FOR MALIGNANCY.  Dedicated contrast enhanced CT of the chest recommended for further evaluation. 2. No evidence of metastatic disease in the abdomen or the pelvis. 3. Bilateral nephrolithiasis.  No hydronephrosis.  Atrophic right kidney. 4. Liquid stool in the colon indicates a diarrheal illness.  No bowel wall thickening or obstruction noted. 5. 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  Vascular surgery consultation recommended especially for the right common iliac artery aneurysm. 10/22 CXR  Stable bibasilar atelectasis or infiltrates with unchanged mass density medial right lung base compared to earlier exam same day.  Right IJ central venous catheter tip at the mid SVC.  No pneumothorax post procedure.  Cardiac size stable.  Osseous structures grossly intact.  Telemetry leads overlie the chest.     Patient Vitals for the past 8 hrs:   BP Temp Temp src Pulse Resp SpO2   10/26/21 1115 (!) 157/101 98.7 °F (37.1 °C) Axillary 96 20 91 %   10/26/21 1044 (!) 141/97 99.1 °F (37.3 °C) Axillary 94 20 91 %   10/26/21 0700 (!) 151/109 98.1 °F (36.7 °C) Axillary 88 22 92 %         I have personally reviewed the past medical history, past surgical history, medications, social history, and family history, and I haveupdated the database accordingly. Allergies:   Bactrim [sulfamethoxazole-trimethoprim] and Ciprofloxacin     Review of Systems:     Review of Systems  Nonverbal, unable to provide  Physical Examination :       Physical Exam  Appearance: Normal appearance. He is not diaphoretic. HENT:      Head: Normocephalic and atraumatic. Right Ear: External ear normal.      Left Ear: External ear normal.      Nose: Nose normal.      Mouth/Throat:      Mouth: Mucous membranes are moist.      Pharynx: Oropharynx is clear. Eyes:      Conjunctiva/sclera: Conjunctivae normal.   Cardiovascular:      Rate and Rhythm: Normal rate and regular rhythm. Pulses: Normal pulses. Heart sounds: Normal heart sounds. Pulmonary:      Effort: Pulmonary effort is normal.      Breath sounds: Coarse breath sounds bilaterally  Abdominal:      General: Bowel sounds are normal. There is no distension. Palpations: Abdomen is soft. Musculoskeletal:         Cervical back:  No rigidity. Right lower leg: No edema. Left lower leg: No edema. Skin:     General: Skin is warm and dry. Coloration: Skin is not jaundiced. Neurological:      Mental Status: Mental status is at baseline.    Past Medical History:     Past Medical History:   Diagnosis Date    Arthritis     Cancer [facility])     bladder 1980s    Cerebral artery occlusion with cerebral infarction [facility])     TIA 2010    Chronic kidney disease     COPD [facility]ronic obstructive [facility] disease) [facility] [address]     Hypertension     Pneumonia     Psychiatric problem     depression, social anxiety    Seizures ([facility] [address]        Past Surgical  History:     Past Surgical History:   Procedure Laterality Date    ABDOMEN SURGERY      BACK SURGERY      spinal surgery 2005     CAROTID ENDARTERECTOMY Left 2016    COLECTOMY N/A 2021    Exploratory Laparotomy. Release of adhesive band with release of small bowel obstruction. Small bowel resection with primary anastomosis performed by Alex Thao MD at [address] COLONOSCOPY N/A 2018    COLONOSCOPY POLYPECTOMY COLD BIOPSY performed by Lian Ortiz MD at [place]      left face    GASTROSTOMY TUBE PLACEMENT  04/15/2020    HERNIA REPAIR      HIATAL HERNIA REPAIR      INGUINAL HERNIA REPAIR Bilateral     INSERT MIDLINE CATHETER  2021         OTHER SURGICAL HISTORY      mesh infected in inguinal canal, had to remove. Removed testiscle at this time.      TONSILLECTOMY      UPPER GASTROINTESTINAL ENDOSCOPY  04/15/2020       EGD CONTROL HEMORRHAGE    UPPER GASTROINTESTINAL ENDOSCOPY  4/15/2020    EGD CONTROL HEMORRHAGE performed by Ashish Sheppard MD at [address] [place] [address]  4/15/2020    EGD ESOPHAGOGASTRODUODENOSCOPY PEG TUBE INSERTION performed by Ashish Sheppard MD at [facility]       Medications:      pantoprazole  40 mg IntraVENous BID    And    sodium chloride (PF)  10 mL IntraVENous BID    doxycycline Monohydrate  100 mg Per G Tube BID    midodrine  10 mg Oral TID    cefTRIAXone (ROCEPHIN) IV  2,000 mg IntraVENous Q24H       Social History:     Social History     Socioeconomic History    Marital status:      Spouse name: Not on file    Number of children: Not on file    Years of education: Not on file    Highest education level: Not on file   Occupational History    Not on file   Tobacco Use    Smoking status: Former Smoker     Packs/day: 1.00     Years: 60.00     Pack years: 60.00     Types: Cigarettes     Start date:      Quit date: 2020     Years since quittin.5    Smokeless tobacco: Never Used   Vaping Use    Vaping Use: Never used   Substance and Sexual Activity    Alcohol use: No    Drug use: No    Sexual activity: Yes     Partners: Female   Other Topics Concern    Not on file   Social History Narrative    Not on file     Social Determinants of Health     Financial Resource Strain: Low Risk     Difficulty of Paying Living Expenses: Not hard at all   Food Insecurity: No Food Insecurity    Worried About Running Out of Food in the Last Year: Never true    Blas of Food in the Last Year: Never true   Transportation Needs: No Transportation Needs    Lack of Transportation (Medical): No    Lack of Transportation (Non-Medical): No   Physical Activity: Inactive    Days of Exercise per Week: 0 days    Minutes of Exercise per Session: 0 min   Stress: Stress Concern Present    Feeling of Stress : Very much   Social Connections: Moderately Isolated    Frequency of Communication with Friends and Family: More than three times a week    Frequency of Social Gatherings with Friends and Family: More than three times a week    Attends Je[employer] witness Services: More than 4 times per year    Active Member of Clubs or Organizations: No    Attends Club or Organization Meetings: Never    Marital Status:     Intimate Partner Violence: Not At Risk    Fear of Current or Ex-Partner: No    Emotionally Abused: No    Physically Abused: No    Sexually Abused: No       Family History:     Family History   Problem Relation Age of Onset    Arthritis Mother     Atrial Fibrillation Mother     Hearing Loss Mother     Heart Disease Mother     Stroke Mother     Vision Loss Mother     Arthritis Father     Cancer Father     Heart Disease Father     High Blood Pressure Father     Stroke Father     Vision Loss Father       Medical Decision Making:   I have independently reviewed/ordered the following labs:    CBC with Differential:   Recent Labs     10/25/21  8483 10/25/21  5511

## 2021-10-26 NOTE — PLAN OF CARE
Problem: Falls - Risk of:  Goal: Will remain free from falls  Description: Will remain free from falls  10/26/2021 0238 by Gio Balderas RN  Outcome: Ongoing     Problem: Skin Integrity:  Goal: Will show no infection signs and symptoms  Description: Will show no infection signs and symptoms  10/26/2021 0238 by Gio Balderas RN  Outcome: Ongoing     Problem: SAFETY  Goal: Free from accidental physical injury  10/26/2021 0238 by Gio Balderas RN  Outcome: Ongoing     Problem: DAILY CARE  Goal: Daily care needs are met  10/26/2021 0238 by Gio Balderas RN  Outcome: Ongoing     Problem: PAIN  Goal: Patient's pain/discomfort is manageable  10/26/2021 0238 by Gio Balderas RN  Outcome: Ongoing     Problem: SKIN INTEGRITY  Goal: Skin integrity is maintained or improved  10/26/2021 0238 by Gio Balderas RN  Outcome: Ongoing     Problem: DISCHARGE BARRIERS  Goal: Patient's continuum of care needs are met  10/26/2021 0238 by Gio Balderas RN  Outcome: Ongoing     Problem: Discharge Planning:  Goal: Discharged to appropriate level of care  Description: Discharged to appropriate level of care  10/26/2021 0238 by Gio Balderas RN  Outcome: Ongoing

## 2021-10-26 NOTE — PROGRESS NOTES
[facility]    Gastroenterology Daily Progress Note      Patient:   Roland Toth   :    [id number]   Facility:   [facility]  Date:     10/26/2021  Consultant:   Burke Burris, RAGHAV - CNP, CNP      SUBJECTIVE  77 y.o. male admitted 10/22/2021 with Acute cystitis with hematuria [N30.01]  Septic shock ([address] [A41.9, R65.21]  Altered mental status, unspecified altered mental status type [R41.82] and seen for anemia and melena. The pt was seen and examined. He is alert moaning in bed. Rn reports no bm overnight.  Tolerating tube feeding. hgb 6.8         OBJECTIVE  Scheduled Meds:   pantoprazole  40 mg IntraVENous BID    And    sodium chloride (PF)  10 mL IntraVENous BID    doxycycline Monohydrate  100 mg Per G Tube BID    midodrine  10 mg Oral TID    cefTRIAXone (ROCEPHIN) IV  2,000 mg IntraVENous Q24H       Vital Signs:  BP (!) 151/109   Pulse 88   Temp 98.1 °F (36.7 °C) (Axillary)   Resp 22   Ht 6' 4\" (1.93 m)   Wt 181 lb 10.5 oz (82.4 kg)   SpO2 92%   BMI 22.11 kg/m²      Physical Exam:     General Appearance:  Ill appearing moaning alert , well-developed and well-nourished, in no acute distress  Skin: warm and dry, no rash or erythema has tattoos  Head: normocephalic and atraumatic  Eyes: pupils equal, round, and reactive to light, extraocular eye movements intact, conjunctivae normal  ENT: hearing grossly normal bilaterally  Neck: neck supple and non tender without mass, no thyromegaly or thyroid nodules, no cervical lymphadenopathy   Pulmonary/Chest: clear to auscultation bilaterally- no wheezes, rales or rhonchi, normal air movement, no respiratory distress  Cardiovascular: hypertensive normal rate, regular rhythm, normal S1 and S2, no murmurs, rubs, clicks or gallops, distal pulses intact, no carotid bruits  Abdomen: soft, non-tender, non-distended, normal bowel sounds, no masses or organomegaly peg tube site with no bleeding or sign of infection  Extremities: no cyanosis, clubbing or edema  Musculoskeletal: normal range of motion, no joint swelling, deformity or tenderness  Neurologic: alert and moaning, will not follow commands or answer questions muscle strength normal    Lab and Imaging Review     CBC  Recent Labs     10/24/21  0518 10/24/21  1039 10/25/21  0509 10/25/21  0509 10/25/21  1755 10/25/21  2305 10/26/21  0607   WBC 7.1  --  6.3  --   --   --  6.5   HGB 7.8*   < > 7.3*   < > 7.2* 7.1* 6.8*   HCT 23.3*   < > 21.9*   < > 22.0* 22.0* 20.8*   MCV 82.9  --  84.2  --   --   --  84.1     --  167  --   --   --  172    < > = values in this interval not displayed. BMP  Recent Labs     10/24/21  0518 10/25/21  0509 10/26/21  0607   * 139 144   K 3.2* 3.2* 3.7    107 109*   CO2 19* 21 27   BUN 89* 71* 56*   CREATININE 3.34* 2.58* 1.76*   GLUCOSE 120* 97 155*   CALCIUM 8.6 8.9 8.5*         PT/INR  Recent Labs     10/23/21  1014   PROTIME 22.6*   INR 2.0         ANEMIA STUDIES  Recent Labs     10/24/21  1341   LABIRON 16*   TIBC 140*   FERRITIN 977*     FINDINGS:ct abd pelvis 10/22/21   Lower Chest: A partially visualized right parahilar and infrahilar masslike   lesion is concerning for malignancy.  It measures approximately 5.7 x 5.3 cm   in the maximal axial plane.  Pulmonary opacities in the dependent aspects of   the lower lobes may represent atelectasis or pneumonia.  The heart is normal   in size.  No pleural or pericardial effusion.  Small hiatal hernia.       Organs:       Liver: Unremarkable.       Gallbladder: Subtle layering hyperdensity in the gallbladder may represent   sludge or small calculi (series 2, image 75).     Pancreas: Mild to moderately atrophied.  Otherwise, unremarkable.       Spleen:  Unremarkable.       Adrenals: Unremarkable.       Kidneys:  The right kidney is moderately atrophied.  Multiple intrarenal   calculi are seen ranging in size from 2-6 mm.  No ureterolithiasis or   hydronephrosis.       GI/Bowel: Gastrostomy tube in situ.  Liquid stool in the colon indicates a   diarrheal illness.  No bowel wall thickening or obstruction noted.       Pelvis: Issa catheter is seen in the urinary bladder which is otherwise   grossly unremarkable.  The prostate gland is mildly enlarged.  Small fat   containing left inguinal hernia.  No evidence of fat strangulation. Protrusion of the anterior wall of the colon into the proximal-most hernia.       Peritoneum/Retroperitoneum: No lymphadenopathy.  Prominent calcified   atherosclerosis is seen in the abdominal aorta.  There is borderline   aneurysmal dilation of the infrarenal aorta to 3 cm.  Aneurysmal dilation of   the right common iliac artery to 2.9 cm.       Bones/Soft Tissues: No evidence of acute fracture or joint dislocation. Likely chronic compression fracture deformity through the superior endplate   of L2.  Grade 1 anterolisthesis of L4 over L5.           Impression   1. Partially visualized MASSLIKE LESION in the right parahilar and infrahilar   region CONCERNING FOR MALIGNANCY.  Dedicated contrast enhanced CT of the   chest recommended for further evaluation. 2. No evidence of metastatic disease in the abdomen or the pelvis. 3. Bilateral nephrolithiasis.  No hydronephrosis.  Atrophic right kidney. 4.  Liquid stool in the colon indicates a diarrheal illness.  No bowel wall   thickening or obstruction noted. 5. 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  Vascular surgery consultation recommended   especially for the right common iliac artery aneurysm.             ASSESSMENT/plan  1. Anemia with reported melena, no bm overnight  -hgb 6.8 to have transfusion today  -trend hh and keep hgb >7  -d/w md will hold on egd right now unless overt bleeding occurs      2.kelbsiella pneumoniae bacteremia, pneumonia septic shock  ID mgt    3. TOD    4.right hilar mass          This plan was formulated in collaboration with  kimmy . Electronically signed by: RAGHAV Kebede CNP on 10/26/2021 at 8:06 AM       Attending Physician Statement  I have discussed the care of [facility] and   I have examined the patient myselft independently, and taken ros and hpi , including pertinent history and exam findings,  with the author of this note . I have reviewed the key elements of all parts of the encounter with the nurse practitioner/resident.     I agree with the assessment, plan and orders as documented by the above health care provider       No sign of brisk bleeding although the hemoglobin is trending down and the patient does have some melena   Will avoid EGD because he will need to be intubated with his respiratory status  We will watch if he continues to drop then will proceed with that  H&H monitoring transfuse as needed  PPI      electronically signed by Patricia Donaldson MD

## 2021-10-26 NOTE — PROGRESS NOTES
Pulmonary Progress Note  [facility]      Patient - Kim Borrego,  Age - 77 y.o.    - 1945      Room Number - 2125/2125-01   N -  [id number]   [facility] # - [de-identified]  Date of Admission -  10/22/2021  9:04 Levi Alpers, MD  Primary Care Physician - Carlyn Roca MD     SUBJECTIVE     Patient seen and examined at bedside. His overall clinical status remains the same. Chest tube in place. He does not appear dyspneic. Mild wheezing present on ausculation in bilateral lung fields. Mucus membranes remain dry. Extremities are warm and not cyanotic. OBJECTIVE   VITALS    height is 6' 4\" (1.93 m) and weight is 181 lb 10.5 oz (82.4 kg). His axillary temperature is 98.1 °F (36.7 °C). His blood pressure is 151/109 (abnormal) and his pulse is 88. His respiration is 22 and oxygen saturation is 92%. Body mass index is 22.11 kg/m². Temperature Range: Temp: 98.1 °F (36.7 °C) Temp  Av.8 °F (37.1 °C)  Min: 98.1 °F (36.7 °C)  Max: 99.1 °F (37.3 °C)  BP Range:  Systolic (15JXK), SHL:660 , Min:109 , NOW:399     Diastolic (56OEK), BTL:08, Min:64, Max:109    Pulse Range: Pulse  Av.5  Min: 80  Max: 105  Respiration Range: Resp  Av.7  Min: 17  Max: 22  Current Pulse Ox[de-identified]  SpO2: 92 %  24HR Pulse Ox Range:  SpO2  Av.5 %  Min: 92 %  Max: 100 %  Oxygen Amount and Delivery: O2 Flow Rate (L/min): 2 L/min    Wt Readings from Last 3 Encounters:   10/22/21 181 lb 10.5 oz (82.4 kg)   21 178 lb (80.7 kg)   21 178 lb 5.6 oz (80.9 kg)       I/O (24 Hours)    Intake/Output Summary (Last 24 hours) at 10/26/2021 0979  Last data filed at 10/26/2021 0350  Gross per 24 hour   Intake 1741 ml   Output 2380 ml   Net -639 ml       EXAM     General Appearance  Awake, alert, oriented, in no acute distress  HEENT - normocephalic, atraumatic.  []  Mallampati  [] Crowded airway   [] Macroglossia  []  Retrognathia  [] Micrognathia  []  Normal tongue size []  Normal Bite  [] Cherie sign positive    Neck - Supple,  trachea midline   Lungs - mild wheezing present on ausculation bilaterally  Cardiovascular - Heart sounds are normal.  Regular rate and rhythm   Abdomen - Soft, nontender, nondistended, no masses or organomegaly  Neurologic - There are no focal motor or sensory deficits  Skin - No bruising or bleeding  Extremities - No clubbing, cyanosis, edema    MEDS      pantoprazole  40 mg IntraVENous BID    And    sodium chloride (PF)  10 mL IntraVENous BID    doxycycline Monohydrate  100 mg Per G Tube BID    midodrine  10 mg Oral TID    cefTRIAXone (ROCEPHIN) IV  2,000 mg IntraVENous Q24H      sodium chloride      norepinephrine Stopped (10/24/21 1938)    lactated ringers 100 mL/hr at 10/26/21 0641     sodium chloride, traMADol, potassium chloride, microfibrillar collagen, ipratropium-albuterol    LABS   CBC   Recent Labs     10/26/21  0607   WBC 6.5   HGB 6.8*   HCT 20.8*   MCV 84.1        BMP:   Lab Results   Component Value Date     10/26/2021    K 3.7 10/26/2021     10/26/2021    CO2 27 10/26/2021    BUN 56 10/26/2021    LABALBU 3.4 10/22/2021    CREATININE 1.76 10/26/2021    CALCIUM 8.5 10/26/2021    GFRAA 46 10/26/2021    LABGLOM 38 10/26/2021     ABGs:  Lab Results   Component Value Date    PHART 7.506 06/03/2021    PO2ART 123.0 06/03/2021    KQI2CIK 37.1 06/03/2021      Lab Results   Component Value Date    MODE PRVC 06/03/2021     Ionized Calcium:  No results found for: IONCA  Magnesium:    Lab Results   Component Value Date    MG 2.8 10/22/2021     Phosphorus:    Lab Results   Component Value Date    PHOS 2.8 06/04/2021        LIVER PROFILE No results for input(s): AST, ALT, LIPASE, BILIDIR, BILITOT, ALKPHOS in the last 72 hours. Invalid input(s):   AMYLASE,  ALB  INR   Recent Labs     10/23/21  1014   INR 2.0     PTT   Lab Results   Component Value Date APTT 47.9 (H) 10/23/2021         RADIOLOGY     (See actual reports for details)    ASSESSMENT/PLAN   Principal Problem:    Septic shock ([address]  Active Problems:    Multifocal pneumonia    Tobacco abuse    Acute cystitis without hematuria    Mass of right lung    COPD (chronic obstructive pulmonary disease) (HCC)    PAF (paroxysmal atrial fibrillation) (HCC)    Chronic combined systolic and diastolic congestive heart failure (HCC)    Dilated cardiomyopathy ([address]    Bacteremia due to Klebsiella pneumoniae    Melena  Resolved Problems:    * No resolved hospital problems. *    Patient's renal function continues to improve however,  he is getting more anemic with RBC, hemoglobin and hematocrit continuing to decline. Hemoglobin is now below 7. His respiratory status remains unchanged. Awaiting hemoccult results   Order transfusion  Pro-calcitonin high, will need to continue antibiotics   Follow-up CXR    Electronically signed by Omayra Holt on 10/26/2021 at 9:27 AM  Patient seen and examined independently by me. Above discussed and I agree with medical student note except where indicated in the EMR revision history. Also see my additional comments and changes indicated by discrete font, text color, italics, and/or initials. Labs, cultures, and radiographs where available were reviewed. Went to see him this afternoon, and he looked extremely tachypneic and dyspneic. He had just received 1 unit packed red blood cells and was still getting lactated Ringer's at 100 mL/h. I ordered 40 of Lasix IV and told nursing to hold his lactated Ringer's.   I communicated this through perfect serve to nephrology  Follow-up procalcitonin level  Continue ceftriaxone and doxycycline  Electronically signed by Neil Gomez MD on 10/26/2021 at 3:39 PM

## 2021-10-26 NOTE — PROGRESS NOTES
Department of Internal Medicine  Nephrology Alexandrea Amaya MD  Progress Note    Reason for consultation: Management of acute kidney injury superimposed on chronic kidney disease stage 4..    Consulting physician: Awilda Hartman MD    Attending physician: Portia Michele MD.    Interval history:   Patient was seen and examined today and he is encephalopathic. Patient transferred out of ICU last night. BP stable. Patient is confused and in discomfort, non verbal  Non oliguric, good uop.   he has indwelling Issa catheter which was exchanged on admission and he is nonoliguric. He is also on lactated Ringer's infusion at 100 mL/h. His electrolytes and kidney function were reviewed with improving renal function and good urine output. Scr is down to 1.7 mg/dl  Hb low, 1 UNIT prbc    History of present illness: This is a 77 y.o. male with a significant past medical history of Systemic hypertension, chronic obstructive pulmonary disease, Bladder cancer, seizure disorder, osteoarthritis and s/p Cerebrovascular accident [has G-tube in place], who presented to the hospital on 5/24/2021 with complaints of confusion and disorientation. Apparently patient lives at home alone but family lives nearby. Laboratory studies at presentation were remarkable for serum sodium 129 mmol/L, serum bicarbonate 19 mmol/L and elevated BUN/creatinine 102/4.36 mg/dL. I had seen patient during the prior presentation in May 2021 for acute kidney injury superimposed on chronic kidney disease stage IV and at that time serum creatinine was greater than 4 mg/dL. CODE STATUS is DNR CCA. Blood pressure at presentation this time was 114/45 work patient subsequently developed hypotension and is currently on Levophed drip at 6 mcg/min. He has a chronic indwelling Issa catheter and is nonoliguric. He is a poor historian and history was obtained primarily by chart review.     Scheduled Meds:   metoprolol tartrate  12.5 mg Oral BID    pantoprazole  40 mg IntraVENous BID    And    sodium chloride (PF)  10 mL IntraVENous BID    doxycycline Monohydrate  100 mg Per G Tube BID    cefTRIAXone (ROCEPHIN) IV  2,000 mg IntraVENous Q24H     Continuous Infusions:   sodium chloride      norepinephrine Stopped (10/24/21 1938)    lactated ringers 100 mL/hr at 10/26/21 0641     Physical Exam:    VITALS:  /88   Pulse 73   Temp 98.1 °F (36.7 °C) (Axillary)   Resp 19   Ht 6' 4\" (1.93 m)   Wt 181 lb 10.5 oz (82.4 kg)   SpO2 94%   BMI 22.11 kg/m²   24HR INTAKE/OUTPUT:      Intake/Output Summary (Last 24 hours) at 10/26/2021 1506  Last data filed at 10/26/2021 1402  Gross per 24 hour   Intake 2158.92 ml   Output 2350 ml   Net -191.08 ml     Constitutional: fatigued, flushed and slowed mentation    Skin: Skin color, texture, turgor normal. No rashes or lesions    Head: Normocephalic, without obvious abnormality, atraumatic     Cardiovascular/Edema: regular rate and rhythm, S1, S2 normal, no murmur, click, rub or gallop    Respiratory: Lungs: clear to auscultation bilaterally    Abdomen: soft, non-tender; bowel sounds normal; no masses,  no organomegaly    Back: symmetric, no curvature. ROM normal. No CVA tenderness. Extremities: extremities normal, atraumatic, no cyanosis or edema    Neuro:  Grossly normal    CBC:   Recent Labs     10/24/21  0518 10/24/21  1039 10/25/21  0509 10/25/21  0509 10/25/21  1755 10/25/21  2305 10/26/21  0607   WBC 7.1  --  6.3  --   --   --  6.5   HGB 7.8*   < > 7.3*   < > 7.2* 7.1* 6.8*     --  167  --   --   --  172    < > = values in this interval not displayed.      BMP:    Recent Labs     10/24/21  0518 10/25/21  0509 10/26/21  0607   * 139 144   K 3.2* 3.2* 3.7    107 109*   CO2 19* 21 27   BUN 89* 71* 56*   CREATININE 3.34* 2.58* 1.76*   GLUCOSE 120* 97 155*     Lab Results   Component Value Date    NITRU NEGATIVE 10/25/2021    COLORU Yellow 10/25/2021    PHUR 5.0 10/25/2021    WBCUA 5 TO 10 10/25/2021    RBCUA 5 TO 10 10/25/2021    MUCUS NOT REPORTED 10/25/2021    TRICHOMONAS NOT REPORTED 10/25/2021    YEAST MODERATE 10/25/2021    BACTERIA FEW 10/25/2021    CLARITYU cloudy 09/07/2021    SPECGRAV 1.010 10/25/2021    LEUKOCYTESUR TRACE 10/25/2021    UROBILINOGEN Normal 10/25/2021    BILIRUBINUR NEGATIVE 10/25/2021    BLOODU ++ 09/07/2021    GLUCOSEU NEGATIVE 10/25/2021    KETUA NEGATIVE 10/25/2021    AMORPHOUS NOT REPORTED 10/25/2021     Urine Sodium:     Lab Results   Component Value Date    HELENA 78 10/25/2021     Urine Potassium:  No results found for: KUR  Urine Chloride:    Lab Results   Component Value Date    CLUR 87 10/25/2021     Urine Creatinine:     Lab Results   Component Value Date    LABCREA 32.6 10/25/2021     IMPRESSION/RECOMMENDATIONS:      1. Acute kidney injury superimposed on chronic kidney disease stage 3 [baseline serum creatinine 1.28 mg/dL on 8/11/2021] - Top differential is prerenal azotemia from poor oral intake as well as ischemic acute tubular necrosis. He has chronic indwelling Issa catheter and CT scan showed atrophic right kidney with bilateral nephrolithiasis but no hydronephrosis. Renal function is slowly improving with hydration  Scr is down to 1.7 mg/dl from 4.2    2. Proteus Mirabella's and Pseudomonas aeruginosa urinary tract infection - Continue IV Rocephin    3. Klebsiella pneumonia bacteremia - Continue IV antibiotics    4. Hypotension-off Levophed pressors     5. Normocytic anemia - iron deficiency anemia  1 Unit PRBCs. IV venofer or oral iron    6. Hypokalemia - IMPROVING, continue sliding scale    Plan  Continue IVF as ordered. Ringer lactate. Prognosis is guarded.     Davi Galicia MD   Attending Nephrologist  10/26/2021 3:06 PM

## 2021-10-26 NOTE — PROGRESS NOTES
Progress Note    10/26/2021   2:02 PM    Name:  Wing Matos  MRN:    [id number]     Acct:     [de-identified]   Room:  2125/2125-01   Day: 4     Admit Date: 10/22/2021  9:04 AM  PCP: Osbaldo Miller MD    Subjective:     C/C:   Chief Complaint   Patient presents with    Altered Mental Status       Interval History: Status: not changed. Patient is resting comfortably in bed. There have been no reports of vomiting or melena. Patient is receiving 1 unit of packed RBC for hemoglobin of 6.8. Recent vitals reviewed elevated blood pressure readings noted. Creatinine 1.76    ROS:   all 10 systems reviewed and are negative except as noted    Review of Systems   Constitutional: Negative for appetite change, chills and diaphoresis. HENT: Negative for drooling, ear pain, trouble swallowing and voice change. Eyes: Negative for photophobia and visual disturbance. Respiratory: Negative for choking and stridor. Cardiovascular: Negative for chest pain and palpitations. Gastrointestinal: Negative for abdominal distention, anal bleeding, blood in stool and rectal pain. Endocrine: Negative for polyphagia and polyuria. Genitourinary: Negative for dysuria, flank pain, hematuria and urgency. Musculoskeletal: Negative for back pain, myalgias and neck stiffness. Skin: Negative for color change, pallor and rash. Allergic/Immunologic: Negative for environmental allergies and food allergies. Neurological: Negative for tremors, seizures, facial asymmetry and numbness. Hematological: Negative for adenopathy. Does not bruise/bleed easily. Psychiatric/Behavioral: Negative for agitation, behavioral problems, hallucinations, self-injury and suicidal ideas. Medications: Allergies:    Allergies   Allergen Reactions    Bactrim [Sulfamethoxazole-Trimethoprim] Hives and Swelling    Ciprofloxacin Swelling     FACE       Current Meds: 0.9 % sodium chloride infusion, PRN  metoprolol tartrate (LOPRESSOR) tablet 12.5 mg, BID  midodrine (PROAMATINE) tablet 10 mg, TID PRN  traMADol (ULTRAM) tablet 50 mg, Q8H PRN  pantoprazole (PROTONIX) injection 40 mg, BID   And  sodium chloride (PF) 0.9 % injection 10 mL, BID  doxycycline Monohydrate (VIBRAMYCIN) suspension 100 mg, BID  potassium chloride 10 mEq/100 mL IVPB (Peripheral Line), PRN  microfibrillar collagen powder 1 g, PRN  cefTRIAXone (ROCEPHIN) 2000 mg IVPB in D5W 50ml minibag, Q24H  norepinephrine (LEVOPHED) 16 mg in sodium chloride 0.9 % 250 mL infusion, Continuous  lactated ringers infusion, Continuous  ipratropium-albuterol (DUONEB) nebulizer solution 1 ampule, Q4H PRN        Data:     Code Status:  DNR-CCA    Family History   Problem Relation Age of Onset    Arthritis Mother     Atrial Fibrillation Mother     Hearing Loss Mother     Heart Disease Mother     Stroke Mother     Vision Loss Mother     Arthritis Father     Cancer Father     Heart Disease Father     High Blood Pressure Father     Stroke Father     Vision Loss Father        Social History     Socioeconomic History    Marital status:      Spouse name: Not on file    Number of children: Not on file    Years of education: Not on file    Highest education level: Not on file   Occupational History    Not on file   Tobacco Use    Smoking status: Former Smoker     Packs/day: 1.00     Years: 60.00     Pack years: 60.00     Types: Cigarettes     Start date:      Quit date: 2020     Years since quittin.5    Smokeless tobacco: Never Used   Vaping Use    Vaping Use: Never used   Substance and Sexual Activity    Alcohol use: No    Drug use: No    Sexual activity: Yes     Partners: Female   Other Topics Concern    Not on file   Social History Narrative    Not on file     Social Determinants of Health     Financial Resource Strain: Low Risk     Difficulty of Paying Living Expenses: Not hard at all   Food Insecurity: No Food Insecurity    Worried About Running Out of Food in the abnormality. XR CHEST PORTABLE    Result Date: 10/25/2021  Increased right basilar atelectasis or pneumonia. Small right-sided pleural effusion, increased since the prior. XR CHEST PORTABLE    Result Date: 10/22/2021  1. Mildly increased patchy opacities at the lung bases, possibly pneumonia or atelectasis. 2.  Increased masslike opacity in the medial right base. This was previously evaluated with CT on 05/26/2021. Consider re-evaluation with contrast-enhanced CT to assess progression. 3.  Heterogeneous bony mineralization in the visualized right humeral diaphysis, which is indeterminate. Dedicated radiographs of the right humerus are recommended. XR CHEST PORTABLE    Result Date: 10/22/2021  Interval placed right IJ central venous catheter with no pneumothorax. Otherwise stable exam       Labs:  Recent Results (from the past 24 hour(s))   Occ Bld, Fecal Scrn    Collection Time: 10/25/21  3:09 PM   Result Value Ref Range    Occult Blood, Stool #1 NEGATIVE NEGATIVE    Date, Stool #1 [phone]     Time, Stool #1 . URINE     Occult Blood, Stool #2 NOT REPORTED NEGATIVE    Date, Stool #2 NOT REPORTED     Time, Stool #2 NOT REPORTED     Occult Blood, Stool #3 NOT REPORTED NEGATIVE    Date, Stool #3 NOT REPORTED     Time, Stool #3 NOT REPORTED    Hgb/Hct    Collection Time: 10/25/21  5:55 PM   Result Value Ref Range    Hemoglobin 7.2 (L) 13.5 - 17.5 g/dL    Hematocrit 22.0 (L) 41 - 53 %   Hgb/Hct    Collection Time: 10/25/21 11:05 PM   Result Value Ref Range    Hemoglobin 7.1 (L) 13.5 - 17.5 g/dL    Hematocrit 22.0 (L) 41 - 53 %   Basic Metabolic Panel    Collection Time: 10/26/21  6:07 AM   Result Value Ref Range    Glucose 155 (H) 70 - 99 mg/dL    BUN 56 (H) 8 - 23 mg/dL    CREATININE 1.76 (H) 0.70 - 1.20 mg/dL    Bun/Cre Ratio NOT REPORTED 9 - 20    Calcium 8.5 (L) 8.6 - 10.4 mg/dL    Sodium 144 135 - 144 mmol/L    Potassium 3.7 3.7 - 5.3 mmol/L    Chloride 109 (H) 98 - 107 mmol/L    CO2 27 20 - 31 mmol/L    Anion Gap 8 (L) 9 - 17 mmol/L    GFR Non-African American 38 (L) >60 mL/min    GFR  46 (L) >60 mL/min    GFR Comment          GFR Staging NOT REPORTED    CBC Auto Differential    Collection Time: 10/26/21  6:07 AM   Result Value Ref Range    WBC 6.5 3.5 - 11.0 k/uL    RBC 2.47 (L) 4.5 - 5.9 m/uL    Hemoglobin 6.8 (LL) 13.5 - 17.5 g/dL    Hematocrit 20.8 (L) 41 - 53 %    MCV 84.1 80 - 100 fL    MCH 27.7 26 - 34 pg    MCHC 32.9 31 - 37 g/dL    RDW 16.6 (H) 11.5 - 14.9 %    Platelets 924 417 - 951 k/uL    MPV 6.8 6.0 - 12.0 fL    NRBC Automated NOT REPORTED per 100 WBC    Differential Type NOT REPORTED     Immature Granulocytes NOT REPORTED 0 %    Absolute Immature Granulocyte NOT REPORTED 0.00 - 0.30 k/uL    WBC Morphology NOT REPORTED     RBC Morphology NOT REPORTED     Platelet Estimate NOT REPORTED     Seg Neutrophils 76 (H) 36 - 66 %    Lymphocytes 12 (L) 24 - 44 %    Monocytes 11 (H) 1 - 7 %    Eosinophils % 1 0 - 4 %    Basophils 0 0 - 2 %    Segs Absolute 4.93 1.3 - 9.1 k/uL    Absolute Lymph # 0.78 (L) 1.0 - 4.8 k/uL    Absolute Mono # 0.72 0.1 - 1.3 k/uL    Absolute Eos # 0.07 0.0 - 0.4 k/uL    Basophils Absolute 0.00 0.0 - 0.2 k/uL    Morphology ANISOCYTOSIS PRESENT     Morphology FEW ELLIPTOCYTES        Physical Examination:        Vitals:  BP (!) 166/98   Pulse 104   Temp 98.1 °F (36.7 °C) (Axillary)   Resp 22   Ht 6' 4\" (1.93 m)   Wt 181 lb 10.5 oz (82.4 kg)   SpO2 98%   BMI 22.11 kg/m²   Temp (24hrs), Av.7 °F (37.1 °C), Min:98.1 °F (36.7 °C), Max:99.1 °F (37.3 °C)    No results for input(s): POCGLU in the last 72 hours. Physical Exam  Vitals reviewed. Constitutional:       Appearance: Normal appearance. He is not diaphoretic. HENT:      Head: Normocephalic and atraumatic. Right Ear: External ear normal.      Left Ear: External ear normal.      Nose: Nose normal.      Mouth/Throat:      Mouth: Mucous membranes are moist.      Pharynx: Oropharynx is clear. Eyes:      Conjunctiva/sclera: Conjunctivae normal.   Cardiovascular:      Rate and Rhythm: Normal rate and regular rhythm. Pulses: Normal pulses. Heart sounds: Normal heart sounds. Pulmonary:      Effort: Pulmonary effort is normal.      Breath sounds: Normal breath sounds. No rales. Abdominal:      General: Bowel sounds are normal. There is no distension. Palpations: Abdomen is soft. Tenderness: There is no abdominal tenderness. Musculoskeletal:         General: No tenderness or deformity. Normal range of motion. Cervical back: Normal range of motion and neck supple. No rigidity. Right lower leg: No edema. Left lower leg: No edema. Skin:     General: Skin is warm and dry. Capillary Refill: Capillary refill takes less than 2 seconds. Coloration: Skin is not jaundiced. Neurological:      Mental Status: Mental status is at baseline. Psychiatric:         Mood and Affect: Mood normal.         Behavior: Behavior normal.         Assessment:        Primary Problem  Septic shock (HCC)     Principal Problem:    Septic shock (HCC)  Active Problems:    Multifocal pneumonia    Tobacco abuse    Acute cystitis without hematuria    Mass of right lung    COPD (chronic obstructive pulmonary disease) (HCC)    PAF (paroxysmal atrial fibrillation) (HCC)    Chronic combined systolic and diastolic congestive heart failure (HCC)    Dilated cardiomyopathy (HCC)    Bacteremia due to Klebsiella pneumoniae    Melena  Resolved Problems:    * No resolved hospital problems. *      Past Medical History:   Diagnosis Date    Arthritis     Cancer [facility])     bladder 1980s    Cerebral artery occlusion with cerebral infarction [facility])     TIA 2010    Chronic kidney disease     COPD (chronic obstructive pulmonary disease) ([facility] [address]     Hypertension     Pneumonia     Psychiatric problem     depression, social anxiety    [facility] ([facility]         Plan:        1.  IV Rocephin 2 g daily, doxycycline per PEG tube per ID  1. Continue tube feed  2. Metoprolol 12.5 mg per PEG tube twice daily with holding parameters  3. Midodrine 10 mg p.o. 3 times a day as needed for systolic blood pressure less than 110  4. IV fluid at 100 mL/h  5. Urine culture sensitivities pending  6. Collagenase right IJ catheter site   7. Cardiology input noted  8. Discussed with sister, son and nurse at bedside  9. FOBT  10. Consult GI for anemia  11. H&H every 8 hours  12. CBC, CMP  13. DVT Prophylaxis Heparin on hold due to low hemoglobin  14. EPCs  15. PT/OT to evaluate and treat  16. Pain control  17. Replace electrolytes as per sliding scale  18.  Home medications reviewed and appropriate medications continued    Electronically signed by Jason Herrera MD

## 2021-10-26 NOTE — PLAN OF CARE
Problem: Falls - Risk of:  Goal: Will remain free from falls  Outcome: Ongoing     Problem: Skin Integrity:  Goal: Will show no infection signs and symptoms  Outcome: Ongoing     Problem: SAFETY  Goal: Free from accidental physical injury  Outcome: Ongoing     Problem: DAILY CARE  Goal: Daily care needs are met  Outcome: Ongoing     Problem: PAIN  Goal: Patient's pain/discomfort is manageable  Outcome: Ongoing     Problem: SKIN INTEGRITY  Goal: Skin integrity is maintained or improved  Outcome: Ongoing     Problem: KNOWLEDGE DEFICIT  Goal: Patient/S.O. demonstrates understanding of disease process, treatment plan, medications, and discharge instructions.   Outcome: Ongoing     Problem: DISCHARGE BARRIERS  Goal: Patient's continuum of care needs are met  Outcome: Ongoing     Problem: Discharge Planning:  Goal: Discharged to appropriate level of care  Outcome: Ongoing     Problem: Gas Exchange - Impaired:  Goal: Levels of oxygenation will improve  Outcome: Ongoing     Problem: Infection, Septic Shock:  Goal: Will show no infection signs and symptoms  Outcome: Ongoing     Problem: Infection - Ventilator-Associated Pneumonia:  Goal: Absence of pulmonary infection  Outcome: Ongoing     Problem: Serum Glucose Level - Abnormal:  Goal: Ability to maintain appropriate glucose levels will improve  Outcome: Ongoing     Problem: Tissue Perfusion, Altered:  Goal: Circulatory function within specified parameters  Outcome: Ongoing     Problem: Venous Thromboembolism:  Goal: Will show no signs or symptoms of venous thromboembolism  Outcome: Ongoing     Problem: Nutrition  Goal: Optimal nutrition therapy  Outcome: Ongoing

## 2021-10-26 NOTE — PROGRESS NOTES
Progress Note    Patient Name:  Abhishek Kenney    :  [birth year]  10/26/2021 11:58 AM      SUBJECTIVE       Mr. Shama Martinez   Is resting in bed. Patient is nonverbal and unable to answer questions appropriately. No family at bedside. Appears to be resting comfortably in bed. Upon review telemetry patient noted be in sinus rhythm. No significant arrhythmias noted overnight. OBJECTIVE     Vital signs:    BP (!) 160/114   Pulse 99   Temp 98.2 °F (36.8 °C) (Axillary)   Resp 20   Ht 6' 4\" (1.93 m)   Wt 181 lb 10.5 oz (82.4 kg)   SpO2 93%   BMI 22.11 kg/m²  2 L/min      Admit Weight:  178 lb (80.7 kg)    Last 3 weights: Wt Readings from Last 3 Encounters:   10/22/21 181 lb 10.5 oz (82.4 kg)   21 178 lb (80.7 kg)   21 178 lb 5.6 oz (80.9 kg)       BMI: Body mass index is 22.11 kg/m². Input/Output:       Intake/Output Summary (Last 24 hours) at 10/26/2021 1158  Last data filed at 10/26/2021 0800  Gross per 24 hour   Intake 1846 ml   Output 2350 ml   Net -504 ml         Exam:     General appearance: awake and alert moves all ext   Lungs: no rhonchi, no wheezes, no rales  Heart: S1 and S2 no murmur  Abdomen: positive bowel sounds, no bruits, no masses  Extremities: warm and dry, no cyanosis, no clubbing        Laboratory Studies:     CBC:   Recent Labs     10/24/21  0518 10/24/21  1039 10/25/21  0509 10/25/21  0509 10/25/21  1755 10/25/21  2305 10/26/21  0607   WBC 7.1  --  6.3  --   --   --  6.5   HGB 7.8*   < > 7.3*   < > 7.2* 7.1* 6.8*   HCT 23.3*   < > 21.9*   < > 22.0* 22.0* 20.8*   MCV 82.9  --  84.2  --   --   --  84.1     --  167  --   --   --  172    < > = values in this interval not displayed. BMP:   Recent Labs     10/24/21  0518 10/25/21  0509 10/26/21  0607   * 139 144   K 3.2* 3.2* 3.7    107 109*   CO2 19* 21 27   BUN 89* 71* 56*   CREATININE 3.34* 2.58* 1.76*     PT/INR: No results for input(s): PROTIME, INR in the last 72 hours.   APTT: No results for input(s): APTT in the last 72 hours. MAG: No results for input(s): MG in the last 72 hours. D Dimer: No results for input(s): DDIMER in the last 72 hours. Troponin  No results for input(s): TROPONINI in the last 72 hours. Recent Labs     10/23/21  1520   TROPONINT NOT REPORTED      BNP No results for input(s): BNP in the last 72 hours. No results for input(s): PROBNP in the last 72 hours. Pulse Ox: SpO2  Av.6 %  Min: 91 %  Max: 100 %  Supplemental O2: O2 Flow Rate (L/min): 2 L/min     Current Meds:    pantoprazole  40 mg IntraVENous BID    And    sodium chloride (PF)  10 mL IntraVENous BID    doxycycline Monohydrate  100 mg Per G Tube BID    midodrine  10 mg Oral TID    cefTRIAXone (ROCEPHIN) IV  2,000 mg IntraVENous Q24H     Continuous Infusions:    sodium chloride      norepinephrine Stopped (10/24/21 1938)    lactated ringers 100 mL/hr at 10/26/21 0641            ASSESSMENT     Principal Problem:    Septic shock ([facility])  Active Problems:    Multifocal pneumonia    Tobacco abuse    Acute cystitis without hematuria    Mass of right lung    COPD (chronic obstructive pulmonary disease) ([facility])    PAF (paroxysmal atrial fibrillation) ([facility])    Chronic combined systolic and diastolic congestive heart failure ([facility])    D[facility] ([facility]    Bacteremia due to Klebsiella pneumoniae    Melena  Resolved Problems:    * No resolved hospital problems. *     1.  NSTEMI, type 2 demand ischemia  - mildly elevated in the setting of sepsis, UTI, elevated creatinine  - EKG without evidence of ischemic changes  -Patient and family declining any further ischemic workup     2. Sepsis secondary to UTI  - infectious disease following  - blood pressure stable at this time  - WBC 6.5  -lactate awaiting most recent results     3.  LV systolic function with  Severely reduced EF 15-20%  -appears euvolemic     4. Acute kidney injury   - creatinine 1.76     5.   Paroxysmal atrial fibrillation / flutter - currently well rate controlled  - Maintained on Eliquis 5 mg b.i.d. on hold due to anemia     6.  Remote history of CVA  - has been bedridden and nonverbal over the last year     7. Anemia  - morning hemoglobin 6.8   -Holding oral anticoagulation and antiplatelets    PLAN     Conservative approach for mildly elevated troponin. Continue holding parameters for beta-blockers due to intermittent bradycardia as well as some borderline hypotension. Continue to hold oral anticoagulation and antiplatelets in the setting of anemia. Ideally keeping a hemoglobin greater than 7. Monitor telemetry for any significant arrhythmias. Monitor electrolytes replace as appropriate.

## 2021-10-27 ENCOUNTER — APPOINTMENT (OUTPATIENT)
Dept: GENERAL RADIOLOGY | Age: 76
DRG: 698 | End: 2021-10-27
Payer: COMMERCIAL

## 2021-10-27 LAB
ABSOLUTE EOS #: 0.1 K/UL (ref 0–0.4)
ABSOLUTE IMMATURE GRANULOCYTE: ABNORMAL K/UL (ref 0–0.3)
ABSOLUTE LYMPH #: 0.8 K/UL (ref 1–4.8)
ABSOLUTE MONO #: 0.9 K/UL (ref 0.1–1.3)
ANION GAP SERPL CALCULATED.3IONS-SCNC: 11 MMOL/L (ref 9–17)
BASOPHILS # BLD: 0 % (ref 0–2)
BASOPHILS ABSOLUTE: 0 K/UL (ref 0–0.2)
BUN BLDV-MCNC: 43 MG/DL (ref 8–23)
BUN/CREAT BLD: ABNORMAL (ref 9–20)
CALCIUM SERPL-MCNC: 8.9 MG/DL (ref 8.6–10.4)
CHLORIDE BLD-SCNC: 106 MMOL/L (ref 98–107)
CO2: 28 MMOL/L (ref 20–31)
CREAT SERPL-MCNC: 1.28 MG/DL (ref 0.7–1.2)
DIFFERENTIAL TYPE: ABNORMAL
EOSINOPHILS RELATIVE PERCENT: 1 % (ref 0–4)
GFR AFRICAN AMERICAN: >60 ML/MIN
GFR NON-AFRICAN AMERICAN: 55 ML/MIN
GFR SERPL CREATININE-BSD FRML MDRD: ABNORMAL ML/MIN/{1.73_M2}
GFR SERPL CREATININE-BSD FRML MDRD: ABNORMAL ML/MIN/{1.73_M2}
GLUCOSE BLD-MCNC: 135 MG/DL (ref 70–99)
HCT VFR BLD CALC: 28.2 % (ref 41–53)
HEMOGLOBIN: 9.3 G/DL (ref 13.5–17.5)
IMMATURE GRANULOCYTES: ABNORMAL %
LYMPHOCYTES # BLD: 9 % (ref 24–44)
MCH RBC QN AUTO: 28.4 PG (ref 26–34)
MCHC RBC AUTO-ENTMCNC: 33 G/DL (ref 31–37)
MCV RBC AUTO: 86 FL (ref 80–100)
MONOCYTES # BLD: 11 % (ref 1–7)
NRBC AUTOMATED: ABNORMAL PER 100 WBC
PDW BLD-RTO: 16.9 % (ref 11.5–14.9)
PLATELET # BLD: 216 K/UL (ref 150–450)
PLATELET ESTIMATE: ABNORMAL
PMV BLD AUTO: 6.6 FL (ref 6–12)
POTASSIUM SERPL-SCNC: 3.5 MMOL/L (ref 3.7–5.3)
PROCALCITONIN: 1.27 NG/ML
RBC # BLD: 3.28 M/UL (ref 4.5–5.9)
RBC # BLD: ABNORMAL 10*6/UL
SEG NEUTROPHILS: 79 % (ref 36–66)
SEGMENTED NEUTROPHILS ABSOLUTE COUNT: 6.7 K/UL (ref 1.3–9.1)
SODIUM BLD-SCNC: 145 MMOL/L (ref 135–144)
WBC # BLD: 8.5 K/UL (ref 3.5–11)
WBC # BLD: ABNORMAL 10*3/UL

## 2021-10-27 PROCEDURE — C9113 INJ PANTOPRAZOLE SODIUM, VIA: HCPCS | Performed by: INTERNAL MEDICINE

## 2021-10-27 PROCEDURE — 6360000002 HC RX W HCPCS: Performed by: FAMILY MEDICINE

## 2021-10-27 PROCEDURE — 80048 BASIC METABOLIC PNL TOTAL CA: CPT

## 2021-10-27 PROCEDURE — 85025 COMPLETE CBC W/AUTO DIFF WBC: CPT

## 2021-10-27 PROCEDURE — 6370000000 HC RX 637 (ALT 250 FOR IP): Performed by: NURSE PRACTITIONER

## 2021-10-27 PROCEDURE — 2060000000 HC ICU INTERMEDIATE R&B

## 2021-10-27 PROCEDURE — 99232 SBSQ HOSP IP/OBS MODERATE 35: CPT | Performed by: INTERNAL MEDICINE

## 2021-10-27 PROCEDURE — 2580000003 HC RX 258: Performed by: INTERNAL MEDICINE

## 2021-10-27 PROCEDURE — APPSS30 APP SPLIT SHARED TIME 16-30 MINUTES: Performed by: NURSE PRACTITIONER

## 2021-10-27 PROCEDURE — 6370000000 HC RX 637 (ALT 250 FOR IP): Performed by: INTERNAL MEDICINE

## 2021-10-27 PROCEDURE — 36415 COLL VENOUS BLD VENIPUNCTURE: CPT

## 2021-10-27 PROCEDURE — 2580000003 HC RX 258: Performed by: NURSE PRACTITIONER

## 2021-10-27 PROCEDURE — 6360000002 HC RX W HCPCS: Performed by: INTERNAL MEDICINE

## 2021-10-27 PROCEDURE — 6370000000 HC RX 637 (ALT 250 FOR IP): Performed by: FAMILY MEDICINE

## 2021-10-27 PROCEDURE — 84145 PROCALCITONIN (PCT): CPT

## 2021-10-27 PROCEDURE — 6360000002 HC RX W HCPCS: Performed by: NURSE PRACTITIONER

## 2021-10-27 PROCEDURE — 71045 X-RAY EXAM CHEST 1 VIEW: CPT

## 2021-10-27 RX ORDER — FUROSEMIDE 10 MG/ML
20 INJECTION INTRAMUSCULAR; INTRAVENOUS ONCE
Status: COMPLETED | OUTPATIENT
Start: 2021-10-27 | End: 2021-10-27

## 2021-10-27 RX ORDER — LANOLIN ALCOHOL/MO/W.PET/CERES
6 CREAM (GRAM) TOPICAL NIGHTLY PRN
Status: DISCONTINUED | OUTPATIENT
Start: 2021-10-27 | End: 2021-11-02 | Stop reason: HOSPADM

## 2021-10-27 RX ADMIN — DOXYCYCLINE 100 MG: 25 FOR SUSPENSION ORAL at 21:34

## 2021-10-27 RX ADMIN — DOXYCYCLINE 100 MG: 25 FOR SUSPENSION ORAL at 13:07

## 2021-10-27 RX ADMIN — TRAMADOL HYDROCHLORIDE 50 MG: 50 TABLET ORAL at 20:35

## 2021-10-27 RX ADMIN — TRAMADOL HYDROCHLORIDE 50 MG: 50 TABLET ORAL at 08:20

## 2021-10-27 RX ADMIN — SODIUM CHLORIDE, PRESERVATIVE FREE 10 ML: 5 INJECTION INTRAVENOUS at 20:35

## 2021-10-27 RX ADMIN — PANTOPRAZOLE SODIUM 40 MG: 40 INJECTION, POWDER, FOR SOLUTION INTRAVENOUS at 08:08

## 2021-10-27 RX ADMIN — NYSTATIN 500000 UNITS: 100000 SUSPENSION ORAL at 17:38

## 2021-10-27 RX ADMIN — NYSTATIN 500000 UNITS: 100000 SUSPENSION ORAL at 13:07

## 2021-10-27 RX ADMIN — SODIUM CHLORIDE, PRESERVATIVE FREE 10 ML: 5 INJECTION INTRAVENOUS at 08:07

## 2021-10-27 RX ADMIN — Medication 6 MG: at 20:35

## 2021-10-27 RX ADMIN — METOPROLOL TARTRATE 25 MG: 25 TABLET, FILM COATED ORAL at 20:35

## 2021-10-27 RX ADMIN — NYSTATIN 500000 UNITS: 100000 SUSPENSION ORAL at 20:35

## 2021-10-27 RX ADMIN — ACETAMINOPHEN 650 MG: 325 TABLET ORAL at 00:08

## 2021-10-27 RX ADMIN — PIPERACILLIN AND TAZOBACTAM 4500 MG: 4; .5 INJECTION, POWDER, LYOPHILIZED, FOR SOLUTION INTRAVENOUS; PARENTERAL at 14:32

## 2021-10-27 RX ADMIN — METOPROLOL TARTRATE 12.5 MG: 25 TABLET, FILM COATED ORAL at 08:08

## 2021-10-27 RX ADMIN — IRON SUCROSE 100 MG: 20 INJECTION, SOLUTION INTRAVENOUS at 16:15

## 2021-10-27 RX ADMIN — PANTOPRAZOLE SODIUM 40 MG: 40 INJECTION, POWDER, FOR SOLUTION INTRAVENOUS at 20:35

## 2021-10-27 RX ADMIN — FUROSEMIDE 20 MG: 20 INJECTION, SOLUTION INTRAMUSCULAR; INTRAVENOUS at 13:07

## 2021-10-27 RX ADMIN — PIPERACILLIN SODIUM AND TAZOBACTAM SODIUM 3375 MG: 3; .375 INJECTION, POWDER, LYOPHILIZED, FOR SOLUTION INTRAVENOUS at 20:34

## 2021-10-27 ASSESSMENT — PAIN SCALES - WONG BAKER
WONGBAKER_NUMERICALRESPONSE: 6
WONGBAKER_NUMERICALRESPONSE: 0

## 2021-10-27 ASSESSMENT — ENCOUNTER SYMPTOMS
COLOR CHANGE: 0
CHOKING: 0
RECTAL PAIN: 0
VOICE CHANGE: 0
ABDOMINAL DISTENTION: 0
STRIDOR: 0
PHOTOPHOBIA: 0
ANAL BLEEDING: 0
BLOOD IN STOOL: 0
BACK PAIN: 0
TROUBLE SWALLOWING: 0

## 2021-10-27 ASSESSMENT — PAIN SCALES - GENERAL
PAINLEVEL_OUTOF10: 0

## 2021-10-27 NOTE — PROGRESS NOTES
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 In ED received IV fluid bolus, central line was placed  and norepinephrine was started for hypotension. Urine culture - several types of bacteria identified, likely contamination  COVID19 - not detected  CXR - Mildly increased patchy opacities at the lung bases, possibly pneumonia or atelectasis. UA - positive for nitrites, large leukocyte esterase, large hemoglobin and moderate bacteria  Blood cultures - 2/2 positive for klebsiella pneumonia    Interval changes  10/27/2021   He is afebrile, nonverbal, temperature max 99, no acute events  WBC 8.5  Procalcitonin improving 1.27 today  Chest x-ray showed worsening right lung opacities    Summary of relevant labs:      Micro:  10/22 Blood Cx - 2/2 Klebsiella pneumoniae - sensitive to cefepime  10/22 Urine Cx - multiple types of bacteria identified, likely contamination  10/22 COVID19 - not detected    Imaging:  10/22 CXR  1.  Mildly increased patchy opacities at the lung bases, possibly pneumonia or atelectasis. 2.  Increased masslike opacity in the medial right base.  This was previously evaluated with CT on 05/26/2021.  Consider re-evaluation with contrast-enhanced CT to assess progression.    3.  Heterogeneous bony mineralization in the visualized right humeral   diaphysis, which is indeterminate.  Dedicated radiographs of the right   humerus are recommended. 10/22 CT head w/o contrast  BRAIN/VENTRICLES: There is no acute intracranial hemorrhage, mass effect or midline shift.  No abnormal extra-axial fluid collection. The gray-white differentiation is maintained without evidence of an acute infarct. There is no evidence of hydrocephalus. Mild generalized cortical atrophy, stable.  Extensive streak artifact is   again noted on the left, related to stable postsurgical change, with metallic plate in the left frontotemporal skull region.  This obscures surrounding structures. ORBITS: The visualized portion of the orbits demonstrate no acute abnormality. SINUSES: The visualized paranasal sinuses and mastoid air cells demonstrate no acute abnormality. SOFT TISSUES/SKULL:  No acute abnormality of the visualized skull or soft tissues. 10/22 CT abdomen pelvis w/o contrast  1. Partially visualized MASSLIKE LESION in the right parahilar and infrahilar region CONCERNING FOR MALIGNANCY.  Dedicated contrast enhanced CT of the chest recommended for further evaluation. 2. No evidence of metastatic disease in the abdomen or the pelvis. 3. Bilateral nephrolithiasis.  No hydronephrosis.  Atrophic right kidney. 4. Liquid stool in the colon indicates a diarrheal illness.  No bowel wall thickening or obstruction noted. 5. 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  Vascular surgery consultation recommended especially for the right common iliac artery aneurysm. 10/22 CXR  Stable bibasilar atelectasis or infiltrates with unchanged mass density medial right lung base compared to earlier exam same day.  Right IJ central venous catheter tip at the mid SVC.  No pneumothorax post procedure.  Cardiac size stable.  Osseous structures grossly intact.  Telemetry leads overlie the chest.     Patient Vitals for the past 8 hrs:   BP Temp Temp src Pulse Resp SpO2   10/27/21 1245 (!) 135/90 98.9 °F (37.2 °C) Axillary 111 20 91 %   10/27/21 1130      91 %         I have personally reviewed the past medical history, past surgical history, medications, social history, and family history, and I haveupdated the database accordingly. Allergies:   Bactrim [sulfamethoxazole-trimethoprim] and Ciprofloxacin     Review of Systems:     Review of Systems  Nonverbal, unable to provide  Physical Examination :       Physical Exam  Appearance: Normal appearance. He is not diaphoretic. HENT:      Head: Normocephalic and atraumatic. Right Ear: External ear normal.      Left Ear: External ear normal.      Nose: Nose normal.      Mouth/Throat:      Mouth: Mucous membranes are moist.      Pharynx: Oropharynx is clear. Eyes:      Conjunctiva/sclera: Conjunctivae normal.   Cardiovascular:      Rate and Rhythm: Normal rate and regular rhythm. Pulses: Normal pulses. Heart sounds: Normal heart sounds. Pulmonary:      Effort: Pulmonary effort is normal.      Breath sounds: Coarse breath sounds bilaterally  Abdominal:      General: Bowel sounds are normal. There is no distension. Palpations: Abdomen is soft. Musculoskeletal:         Cervical back:  No rigidity. Right lower leg: No edema. Left lower leg: No edema. Skin:     General: Skin is warm and dry. Coloration: Skin is not jaundiced. Neurological:      Mental Status: Mental status is at baseline.    Past Medical History:     Past Medical History:   Diagnosis Date    Arthritis     Cancer [facility])     bladder 1980s    Cerebral artery occlusion with cerebral infarction [facility])     TIA 2010    Chronic kidney disease     [facility]     Hypertension     Pneumonia     Psychiatric problem     depression, social anxiety    Seizures ([facility]        Past Surgical  History:     Past Surgical History:   Procedure Laterality Date    ABDOMEN SURGERY      BACK SURGERY      spinal surgery 2005     CAROTID ENDARTERECTOMY Left 09/20/2016    COLECTOMY N/A 5/28/2021    Exploratory Laparotomy. Release of adhesive band with release of small bowel obstruction. Small bowel resection with primary anastomosis performed by Pee Osborne MD at [facility] COLONOSCOPY N/A 8/31/2018    COLONOSCOPY POLYPECTOMY COLD BIOPSY performed by Navarro Pike MD at [facility]      left face    GASTROSTOMY TUBE PLACEMENT  04/15/2020    HERNIA REPAIR      HIATAL HERNIA REPAIR      INGUINAL HERNIA REPAIR Bilateral     INSERT MIDLINE CATHETER  8/4/2021         OTHER SURGICAL HISTORY      mesh infected in inguinal canal, had to remove. Removed testiscle at this time.      TONSILLECTOMY      UPPER GASTROINTESTINAL ENDOSCOPY  04/15/2020       EGD CONTROL HEMORRHAGE    UPPER GASTROINTESTINAL ENDOSCOPY  4/15/2020    EGD CONTROL HEMORRHAGE performed by García Carter MD at [facility]  4/15/2020    EGD ESOPHAGOGASTRODUODENOSCOPY PEG TUBE INSERTION performed by García Carter MD at [facility]       Medications:      metoprolol tartrate  25 mg Oral BID    nystatin  5 mL Oral 4x Daily    piperacillin-tazobactam  3,375 mg IntraVENous Q8H    iron sucrose  100 mg IntraVENous Once    pantoprazole  40 mg IntraVENous BID    And    sodium chloride (PF)  10 mL IntraVENous BID    doxycycline Monohydrate  100 mg Per G Tube BID       Social History:     Social History     Socioeconomic History    Marital status:      Spouse name: Not on file    Number of children: Not on file    Years of education: Not on file    Highest education level: Not on file   Occupational History    Not on file   Tobacco Use    Smoking status: Former Smoker     Packs/day: 1.00     Years: 60.00     Pack years: 60.00     Types: Cigarettes     Start date: 1956     Quit date: 4/1/2020     Years since quittin.5    Smokeless tobacco: Never Used   Vaping Use    Vaping Use: Never used   Substance and Sexual Activity    Alcohol use: No    Drug use: No    Sexual activity: Yes     Partners: Female   Other Topics Concern    Not on file   Social History Narrative    Not on file     Social Determinants of Health     Financial Resource Strain: Low Risk     Difficulty of Paying Living Expenses: Not hard at all   Food Insecurity: No Food Insecurity    Worried About Running Out of Food in the Last Year: Never true    [address] in the Last Year: Never true   Transportation Needs: No Transportation Needs    Lack of Transportation (Medical): No    Lack of Transportation (Non-Medical): No   Physical Activity: Inactive    Days of Exercise per Week: 0 days    Minutes of Exercise per Session: 0 min   Stress: Stress Concern Present    Feeling of Stress : Very much   Social Connections: Moderately Isolated    Frequency of Communication with Friends and Family: More than three times a week    Frequency of Social Gatherings with Friends and Family: More than three times a week    Attends Denominational Services: More than 4 times per year    Active Member of Clubs or Organizations: No    Attends Club or Organization Meetings: Never    Marital Status:     Intimate Partner Violence: Not At Risk    Fear of Current or Ex-Partner: No    Emotionally Abused: No    Physically Abused: No    Sexually Abused: No       Family History:     Family History   Problem Relation Age of Onset    Arthritis Mother     Atrial Fibrillation Mother     Hearing Loss Mother     Heart Disease Mother     Stroke Mother     Vision Loss Mother     Arthritis Father     Cancer Father     Heart Disease Father     High Blood Pressure Father     Stroke Father     Vision Loss Father       Medical Decision Making:   I have independently reviewed/ordered the following labs:    CBC with Differential:   Recent Labs 10/26/21  0607 10/26/21  1613 10/26/21  2324 10/27/21  0815   WBC 6.5  --   --  8.5   HGB 6.8*   < > 9.4* 9.3*   HCT 20.8*   < > 28.0* 28.2*     --   --  216   LYMPHOPCT 12*  --   --  9*   MONOPCT 11*  --   --  11*    < > = values in this interval not displayed. BMP:  Recent Labs     10/26/21  0607 10/27/21  0815    145*   K 3.7 3.5*   * 106   CO2 27 28   BUN 56* 43*   CREATININE 1.76* 1.28*     Hepatic Function Panel:   No results for input(s): PROT, LABALBU, BILIDIR, IBILI, BILITOT, ALKPHOS, ALT, AST in the last 72 hours. No results for input(s): RPR in the last 72 hours. No results for input(s): HIV in the last 72 hours. No results for input(s): BC in the last 72 hours. Lab Results   Component Value Date    CREATININE 1.28 10/27/2021    GLUCOSE 135 10/27/2021       Detailed results: Thank you for allowing us to participate in the care of this patient. Please call with questions. This note is created with the assistance of a speech recognition program.  While intending to generate adocument that actually reflects the content of the visit, the document can still have some errors including those of syntax and sound a like substitutions which may escape proof reading. It such instances, actual meaningcan be extrapolated by contextual diversion.     Bartolo Ahumada, MD  Office: [phone]  Perfect serve / office [phone]

## 2021-10-27 NOTE — PROGRESS NOTES
Pulmonary Progress Note  [facility]      Patient - Tri Khalil,  Age - 77 y.o.    - 1945      Room Number - 2125/2125-01   N -  [id number]   [facility]  Date of Admission -  10/22/2021  9:04 Delonte Moctezuma MD  Primary Care Physician - Nilay Villarreal MD     SUBJECTIVE     Patient seen and examined at bedside. No acute events overnight. He seemed severely agitated on exam.  Family was present and says he is never like this. He also has bleeding present on the palate and they stated they took a lot of \"black stuff\" out of the back of his throat. Mucus membranes are dry. He still appeared SOB and had mild bilateral wheezing present on exam.    OBJECTIVE   VITALS    height is 6' 4\" (1.93 m) and weight is 187 lb 2.7 oz (84.9 kg). His axillary temperature is 98.6 °F (37 °C). His blood pressure is 136/99 (abnormal) and his pulse is 91. His respiration is 20 and oxygen saturation is 95%. Body mass index is 22.78 kg/m².   Temperature Range: Temp: 98.6 °F (37 °C) Temp  Av.6 °F (37 °C)  Min: 98.1 °F (36.7 °C)  Max: 100 °F (37.8 °C)  BP Range:  Systolic (41VXC), EGE:830 , Min:119 , UTH:776     Diastolic (70JMT), BRAYDEN:60, Min:88, Max:114    Pulse Range: Pulse  Av.8  Min: 73  Max: 104  Respiration Range: Resp  Av.4  Min: 19  Max: 22  Current Pulse Ox[de-identified]  SpO2: 95 %  24HR Pulse Ox Range:  SpO2  Av.7 %  Min: 91 %  Max: 98 %  Oxygen Amount and Delivery: O2 Flow Rate (L/min): 2 L/min    Wt Readings from Last 3 Encounters:   10/27/21 187 lb 2.7 oz (84.9 kg)   21 178 lb (80.7 kg)   21 178 lb 5.6 oz (80.9 kg)       I/O (24 Hours)    Intake/Output Summary (Last 24 hours) at 10/27/2021 1106  Last data filed at 10/27/2021 1034  Gross per 24 hour   Intake 2763.92 ml   Output 4525 ml   Net -1761.08 ml       EXAM     General Appearance  Awake, alert, oriented, in no acute distress  HEENT - normocephalic, atraumatic. []  Mallampati  [] Crowded airway   [] Macroglossia  []  Retrognathia  [] Micrognathia  []  Normal tongue size []  Normal Bite  [] [place] sign positive    Neck - Supple,  trachea midline   Lungs - mild bilateral wheezing present on ausculation   Cardiovascular - Heart sounds are normal.  Regular rate and rhythm   Abdomen - Soft, nontender, nondistended, no masses or organomegaly  Neurologic - There are no focal motor or sensory deficits  Skin - No bruising or bleeding  Extremities - No clubbing, cyanosis, edema    MEDS      metoprolol tartrate  25 mg Oral BID    pantoprazole  40 mg IntraVENous BID    And    sodium chloride (PF)  10 mL IntraVENous BID    doxycycline Monohydrate  100 mg Per G Tube BID    cefTRIAXone (ROCEPHIN) IV  2,000 mg IntraVENous Q24H      sodium chloride      [Held by provider] lactated ringers Stopped (10/26/21 1533)     sodium chloride, midodrine, acetaminophen, traMADol, potassium chloride, microfibrillar collagen, ipratropium-albuterol    LABS   CBC   Recent Labs     10/27/21  0815   WBC 8.5   HGB 9.3*   HCT 28.2*   MCV 86.0        BMP:   Lab Results   Component Value Date     10/27/2021    K 3.5 10/27/2021     10/27/2021    CO2 28 10/27/2021    BUN 43 10/27/2021    LABALBU 3.4 10/22/2021    CREATININE 1.28 10/27/2021    CALCIUM 8.9 10/27/2021    GFRAA >60 10/27/2021    LABGLOM 55 10/27/2021     ABGs:  Lab Results   Component Value Date    PHART 7.506 06/03/2021    PO2ART 123.0 06/03/2021    JHZ8HOG 37.1 06/03/2021      Lab Results   Component Value Date    MODE PRVC 06/03/2021     Ionized Calcium:  No results found for: IONCA  Magnesium:    Lab Results   Component Value Date    MG 2.8 10/22/2021     Phosphorus:    Lab Results   Component Value Date    PHOS 2.8 06/04/2021        LIVER PROFILE No results for input(s): AST, ALT, LIPASE, BILIDIR, BILITOT, ALKPHOS in the last 72 hours.     Invalid input(s): AMYLASE,  ALB  INR No results for input(s): INR in the last 72 hours. PTT   Lab Results   Component Value Date    APTT 47.9 (H) 10/23/2021         RADIOLOGY         ASSESSMENT/PLAN   Principal Problem:    Septic shock (HCC)  Active Problems:    Multifocal pneumonia    Tobacco abuse    Acute cystitis without hematuria    Mass of right lung    COPD (chronic obstructive pulmonary disease) ([facility])    PAF (paroxysmal atrial fibrillation) ([facility])    Chronic combined systolic and diastolic congestive heart failure (H[facility])    Dilated cardiomyopathy ([address]    Bacteremia due to Klebsiella pneumoniae    Melena    Anemia  Resolved Problems:    * No resolved hospital problems. *    Clinically, patient is still not improving. He is still SOB and ausculation revealed mild bilateral wheezing. Mucus membranes are extremely dry and bleeding. He is very agitated and family states he is never like this. Pro-calcitonin declining but still elevated, continue rocephin and vibramycin  1 unit packed RBCs brought hemoglobin up to 7.4 yesterday but it is declining again. GI said he does have some melena. If it gets below 7, may need to do another transfusion. Continue DuoNeb aerosols     Electronically signed by Emeterio Thompson on 10/27/2021 at 11:06 AM    Patient seen and examined independently by me. Above discussed and I agree with medical student note except where indicated in the EMR revision history. Also see my additional comments and changes indicated by discrete font, text color, italics, and/or initials. Labs, cultures, and radiographs where available were reviewed.        He looks much worse today, struggling to breathe apparently family suctioned some mucus/clot from the back of his throat    Chest x-ray also looks significantly worse with increased interstitial markings as well as a right lower lobe infiltrate    Continue IV Lasix  Discontinue IV fluids  Switch to Zosyn  Hemoglobin has been stable, can discontinue frequent hemoglobin checks  Prognosis is guarded  Electronically signed by Sheryl Toney MD on 10/27/2021 at 1:11 PM

## 2021-10-27 NOTE — PROGRESS NOTES
Progress Note    Patient Name:  Aj Sharma    :    10/27/2021 10:02 AM      SUBJECTIVE       Mr. Temi Rodriguez is resting in bed. Nonverbal unable to communicate chest pain or shortness of breath. Patient appears comfortable. Upon review telemetry patient appears to be in sinus rhythm with intermittent tachycardia. No other significant arrhythmias noted overnight. OBJECTIVE     Vital signs:    BP (!) 136/99   Pulse 91   Temp 98.6 °F (37 °C) (Axillary)   Resp 20   Ht 6' 4\" (1.93 m)   Wt 187 lb 2.7 oz (84.9 kg)   SpO2 95%   BMI 22.78 kg/m²  2 L/min      Admit Weight:  178 lb (80.7 kg)    Last 3 weights: Wt Readings from Last 3 Encounters:   10/27/21 187 lb 2.7 oz (84.9 kg)   21 178 lb (80.7 kg)   21 178 lb 5.6 oz (80.9 kg)       BMI: Body mass index is 22.78 kg/m². Input/Output:       Intake/Output Summary (Last 24 hours) at 10/27/2021 1002  Last data filed at 10/27/2021 0650  Gross per 24 hour   Intake 2658.92 ml   Output 4525 ml   Net -1866.08 ml         Exam:     General appearance: awake and alert moves all ext   Lungs: no rhonchi, no wheezes, no rales  Heart: S1 and S2 no murmur  Abdomen: positive bowel sounds, no bruits, no masses  Extremities: warm and dry, no cyanosis, no clubbing        Laboratory Studies:     CBC:   Recent Labs     10/25/21  0509 10/25/21  1755 10/26/21  0607 10/26/21  0607 10/26/21  1613 10/26/21  2324 10/27/21  0815   WBC 6.3  --  6.5  --   --   --  8.5   HGB 7.3*   < > 6.8*   < > 9.4* 9.4* 9.3*   HCT 21.9*   < > 20.8*   < > 27.7* 28.0* 28.2*   MCV 84.2  --  84.1  --   --   --  86.0     --  172  --   --   --  216    < > = values in this interval not displayed. BMP:   Recent Labs     10/25/21  0509 10/26/21  0607 10/27/21  0815    144 145*   K 3.2* 3.7 3.5*    109* 106   CO2 21  28   BUN 71* 56* 43*   CREATININE 2.58* 1.76* 1.28*     PT/INR: No results for input(s): PROTIME, INR in the last 72 hours.   APTT: No results for input(s): APTT in the last 72 hours. MAG: No results for input(s): MG in the last 72 hours. D Dimer: No results for input(s): DDIMER in the last 72 hours. Troponin  No results for input(s): TROPONINI in the last 72 hours. No results for input(s): TROPONINT in the last 72 hours. BNP No results for input(s): BNP in the last 72 hours. No results for input(s): PROBNP in the last 72 hours. Pulse Ox: SpO2  Av.4 %  Min: 91 %  Max: 98 %  Supplemental O2: O2 Flow Rate (L/min): 2 L/min     Current Meds:    metoprolol tartrate  12.5 mg Oral BID    pantoprazole  40 mg IntraVENous BID    And    sodium chloride (PF)  10 mL IntraVENous BID    doxycycline Monohydrate  100 mg Per G Tube BID    cefTRIAXone (ROCEPHIN) IV  2,000 mg IntraVENous Q24H     Continuous Infusions:    sodium chloride      [Held by provider] lactated ringers Stopped (10/26/21 1533)            ASSESSMENT     Principal Problem:    Septic shock ([facility])  Active Problems:    Multifocal pneumonia    Tobacco abuse    Acute cystitis without hematuria    Mass of right lung    COPD (chronic obstructive pulmonary disease) ([facility])    PAF (paroxysmal atrial fibrillation) ([facility])    Chronic combined systolic and diastolic congestive heart failure ([facility])    Dilated cardiomyopathy ([facility] [address]    Bacteremia due to Klebsiella pneumoniae    Melena    Anemia  Resolved Problems:    * No resolved hospital problems.  *     1.  NSTEMI, type 2 demand ischemia  - mildly elevated in the setting of sepsis, UTI, elevated creatinine  - EKG without evidence of ischemic changes  -Patient and family declining any further ischemic workup     2.  Sepsis secondary to UTI  - infectious disease following  - blood pressure stable at this time  - WBC 8.5     3.  LV systolic function with  Severely reduced EF 15-20%  -appears euvolemic     4.  Acute kidney injury   - creatinine 1.28     5.  Paroxysmal atrial fibrillation / flutter   - currently moderately rate controlled  - Maintained on Eliquis 5 mg b.i.d. on hold due to anemia     6.  Remote history of CVA  - has been bedridden and nonverbal over the last year      7. Anemia  - morning hemoglobin 9.3   -Holding oral anticoagulation and antiplatelets    PLAN     Increase metoprolol tartrate to 25 mg twice daily in the setting of intermittent tachycardia as well as moderate hypertension. Conservative management going forward. No plans for any further ischemic work-up. Resuming anticoagulants and antiplatelets is okay with all services and hemoglobin stable.     Continue with discharge planning

## 2021-10-27 NOTE — PROGRESS NOTES
Department of Internal Medicine  Nephrology Alexandrea Amaya MD  Progress Note    Reason for consultation: Management of acute kidney injury superimposed on chronic kidney disease stage 4..    Consulting physician: Awilda Hartman MD    Attending physician: Portia Michele MD.    Interval history:   Patient was seen and examined today and he is Agitated. Patient received lasix yesterday for SOB, IVF stopped  Patient is on RA  On tube feeding. BP stable. Patient is  non verbal    Non oliguric, good uop. Over all in negative fluid balance 3.7 L    he has indwelling Issa catheter which was exchanged on admission and he is nonoliguric. Scr is down to 1.2mg/dl  Hb Improved after blood transfusion    History of present illness: This is a 77 y.o. male with a significant past medical history of Systemic hypertension, chronic obstructive pulmonary disease, Bladder cancer, seizure disorder, osteoarthritis and s/p Cerebrovascular accident [has G-tube in place], who presented to the hospital on 5/24/2021 with complaints of confusion and disorientation. Apparently patient lives at home alone but family lives nearby. Laboratory studies at presentation were remarkable for serum sodium 129 mmol/L, serum bicarbonate 19 mmol/L and elevated BUN/creatinine 102/4.36 mg/dL. I had seen patient during the prior presentation in May 2021 for acute kidney injury superimposed on chronic kidney disease stage IV and at that time serum creatinine was greater than 4 mg/dL. CODE STATUS is DNR CCA. Blood pressure at presentation this time was 114/45 work patient subsequently developed hypotension and is currently on Levophed drip at 6 mcg/min. He has a chronic indwelling Issa catheter and is nonoliguric. He is a poor historian and history was obtained primarily by chart review.     Scheduled Meds:   metoprolol tartrate  25 mg Oral BID    nystatin  5 mL Oral 4x Daily    piperacillin-tazobactam  4,500 mg IntraVENous Once Followed by    piperacillin-tazobactam  3,375 mg IntraVENous Q8H    iron sucrose  100 mg IntraVENous Once    pantoprazole  40 mg IntraVENous BID    And    sodium chloride (PF)  10 mL IntraVENous BID    doxycycline Monohydrate  100 mg Per G Tube BID     Continuous Infusions:   sodium chloride       Physical Exam:    VITALS:  BP (!) 135/90   Pulse 111   Temp 98.9 °F (37.2 °C) (Axillary)   Resp 20   Ht 6' 4\" (1.93 m)   Wt 187 lb 2.7 oz (84.9 kg)   SpO2 91%   BMI 22.78 kg/m²   24HR INTAKE/OUTPUT:      Intake/Output Summary (Last 24 hours) at 10/27/2021 1446  Last data filed at 10/27/2021 1441  Gross per 24 hour   Intake 2391 ml   Output 5425 ml   Net -3034 ml     Constitutional: fatigued, flushed and slowed mentation    Skin: Skin color, texture, turgor normal. No rashes or lesions    Head: Normocephalic, without obvious abnormality, atraumatic     Cardiovascular/Edema: regular rate and rhythm, S1, S2 normal, no murmur, click, rub or gallop    Respiratory: Lungs: clear to auscultation bilaterally    Abdomen: soft, non-tender; bowel sounds normal; no masses,  no organomegaly    Back: symmetric, no curvature. ROM normal. No CVA tenderness. Extremities: extremities normal, atraumatic, no cyanosis or edema    Neuro:  Grossly normal    CBC:   Recent Labs     10/25/21  0509 10/25/21  1755 10/26/21  0607 10/26/21  0607 10/26/21  1613 10/26/21  2324 10/27/21  0815   WBC 6.3  --  6.5  --   --   --  8.5   HGB 7.3*   < > 6.8*   < > 9.4* 9.4* 9.3*     --  172  --   --   --  216    < > = values in this interval not displayed.      BMP:    Recent Labs     10/25/21  0509 10/26/21  0607 10/27/21  0815    144 145*   K 3.2* 3.7 3.5*    109* 106   CO2 21 27 28   BUN 71* 56* 43*   CREATININE 2.58* 1.76* 1.28*   GLUCOSE 97 155* 135*     Lab Results   Component Value Date    NITRU NEGATIVE 10/25/2021    COLORU Yellow 10/25/2021    PHUR 5.0 10/25/2021    WBCUA 5 TO 10 10/25/2021    RBCUA 5 TO 10 10/25/2021 MUCUS NOT REPORTED 10/25/2021    TRICHOMONAS NOT REPORTED 10/25/2021    YEAST MODERATE 10/25/2021    BACTERIA FEW 10/25/2021    CLARITYU cloudy 09/07/2021    SPECGRAV 1.010 10/25/2021    LEUKOCYTESUR TRACE 10/25/2021    UROBILINOGEN Normal 10/25/2021    BILIRUBINUR NEGATIVE 10/25/2021    BLOODU ++ 09/07/2021    GLUCOSEU NEGATIVE 10/25/2021    KETUA NEGATIVE 10/25/2021    AMORPHOUS NOT REPORTED 10/25/2021     Urine Sodium:     Lab Results   Component Value Date    HELENA 78 10/25/2021     Urine Potassium:  No results found for: KUR  Urine Chloride:    Lab Results   Component Value Date    CLUR 87 10/25/2021     Urine Creatinine:     Lab Results   Component Value Date    LABCREA 32.6 10/25/2021     IMPRESSION/RECOMMENDATIONS:      1. Acute kidney injury superimposed on chronic kidney disease stage 3 [baseline serum creatinine 1.28 mg/dL on 8/11/2021] - Top differential is prerenal azotemia from poor oral intake as well as ischemic acute tubular necrosis. He has chronic indwelling Issa catheter and CT scan showed atrophic right kidney with bilateral nephrolithiasis but no hydronephrosis. Renal function is slowly improving with hydration  Scr is down to 1.7 mg/dl from 4.2    2. Proteus Mirabella's and Pseudomonas aeruginosa urinary tract infection - Continue IV Rocephin    3. Klebsiella pneumonia bacteremia - Continue IV antibiotics    4. Hypotension-off Levophed pressors     5. Normocytic anemia - iron deficiency anemia  1 Unit PRBCs. IV venofer or oral iron    6. Hypokalemia - IMPROVING, continue sliding scale    Plan  KVO  On Lasix      Prognosis is guarded.     Arya Hall MD   Attending Nephrologist  10/27/2021 2:46 PM

## 2021-10-27 NOTE — PROGRESS NOTES
Comprehensive Nutrition Assessment    Type and Reason for Visit:  Reassess    Nutrition Recommendations/Plan: Continue tube feeding regimen as ordered. Increased water flushes to 100 ml every 4 hours. Nutrition Assessment:  Writer spoke with son via phone asking there tube feeding routine at home. Son states they were turning it off from approximately 4 pm to 8 pm because he would eat some in the evening. Usually got around 40 oz water per day to keep him well hydrated. Son said to do what ever regimen gives him the most nutrition, informed him that water flushes increased since sodium level elevated at 145. Malnutrition Assessment:  Malnutrition Status: At risk for malnutrition (Comment)    Context:  Chronic Illness     Findings of the 6 clinical characteristics of malnutrition:  Energy Intake:  Unable to assess  Weight Loss:  No significant weight loss     Body Fat Loss:  Unable to assess     Muscle Mass Loss:  Unable to assess    Fluid Accumulation:  1 - Mild Extremities   Strength:  Not Performed    Estimated Daily Nutrient Needs:  Energy (kcal): [place] x 1.2= 2000 kcal; Weight Used for Energy Requirements:  Admission     Protein (g):  1.5g/kg= 135-140 g; Weight Used for Protein Requirements:  Ideal          Nutrition Related Findings:  mild edema BLE, Labs/Meds: Reviewed, BM 1025 (watery/black)      Wounds:  Pressure Injury, Stage II, Surgical Incision       Current Nutrition Therapies:    ADULT TUBE FEEDING; PEG; Standard with Fiber; Continuous; 20; Yes; 20; Q 4 hours; 50; 120; Q 4 hours; Protein; give protein modular twice daily at 8 am and 2pm with 30 ml free water before and after.   Current Tube Feeding (TF) Orders:  · Feeding Route: PEG  · Formula: Standard with Fiber  · Schedule: Continuous  · Additives/Modulars: Protein  · Water Flushes: 100 ml every 4 hours  · Goal TF & Flush Orders Provides: Jevity 1.5 at 50 ml per hour with 2 protein moduluars provides 2008 kcal, 129 g protein      Anthropometric Measures:  · Height: 6' 4\" (193 cm)  · Current Body Weight: 187 lb (84.8 kg)   · Admission Body Weight: 181 lb (82.1 kg)    · Usual Body Weight: 183 lb (83 kg) (5/21)     · Ideal Body Weight: 202 lbs; % Ideal Body Weight     · BMI: 22.8  · BMI Categories: Normal Weight (BMI 22.0 to 24.9) age over 72       Nutrition Diagnosis:   · Inadequate oral intake related to  (current medical condition) as evidenced by NPO or clear liquid status due to medical condition, nutrition support - enteral nutrition      Nutrition Interventions:   Food and/or Nutrient Delivery:  Continue Current Tube Feeding  Nutrition Education/Counseling:  No recommendation at this time   Coordination of Nutrition Care:  Continue to monitor while inpatient    Goals:  provide more than 75% of nutrition needs       Nutrition Monitoring and Evaluation:   Food/Nutrient Intake Outcomes:  Enteral Nutrition Intake/Tolerance  Physical Signs/Symptoms Outcomes:  Biochemical Data, GI Status, Skin, Weight, Fluid Status or Edema     Discharge Planning:    Enteral Nutrition     Some areas of assessment may be incomplete due to COVID-19 precautions. Nadeen Euceda R.D., L.D.   Clinical Dietitian  Office: [phone]

## 2021-10-27 NOTE — PROGRESS NOTES
evaluating and/or treating any of the   following: The medical record reflects the following:  Risk Factors: recurrent UTI's, chronic indwelling francis, Urinary retention,   recently found to have a 1cm stone, dysuria  Clinical Indicators: noted in EPIC 9/14/21:  \"Patient has a home care nurse   that comes out once a month to change the catheter, last change was   9/7/21. patient has been complaining of leaking with the francis since the change   . ED RN documents: \"Patient had indwelling Francis upon arrival to ER and Francis   draining dark, brown-red urine and urine specimen sent to lab\". Lab culture   resulted with numerous bacteria identified and recommended recollection on 10/   23. ED DR noted \"hematuria present in francis cath\". ID states:\" KLEBSIELLA   PNEUMONIAE bacteremia likely urinary source\". Treatment:  Zosyn, doxycycline, Follow CBC and renal function, Supportive care    Thanks  Justin Angela RN, CDS  [phone]  Options provided:  -- UTI due to chronic indwelling urinary catheter  -- UTI not due to indwelling urinary catheter  -- Other - I will add my own diagnosis  -- Disagree - Not applicable / Not valid  -- Disagree - Clinically unable to determine / Unknown  -- Refer to Clinical Documentation Reviewer    PROVIDER RESPONSE TEXT:    UTI is due to the chronic indwelling urinary catheter. Query created by: Myra Rehman on 10/27/2021 3:12 PM      QUERY TEXT:    Pt admitted with Septic Shock. Pt noted to have melena, acute cystitis with   hematuria and anemia requiring a blood transfusion. If possible, please   document in the progress notes and discharge summary if you are evaluating   and/or treating any of the following: The medical record reflects the following:  Risk Factors: infection, acute and chronic illness, MODS from sepsis. borderline thrombocytopenic  Clinical Indicators: H/H on 10/22 10.8/32.8 reported in the ED. 10/23 hgb   dropped to 8.5/25.4, followed by 7.6, 7.4, 7.2, 6.8. where patient received   PRBC's and post transfusion recheck was 9. 4. H/h has remained stable at 9.4   range since transfusion. 10/24 Iron studies indicate Iron Saturation 16%,  Iron 22 ug/dL, TIBC 140 ug/dL. Treatment: protonix bid/  IV venofer / oral iron / consult GI to identify   source of Melena. trend H/H and hemodynamic monitoring. Thanks,  Catherine Wyatt RN, CDS  [phone]  Options provided:  -- Acute blood loss anemia  -- Acute on chronic blood loss anemia  -- Iron deficiency anemia  -- Dilutional anemia  -- Other - I will add my own diagnosis  -- Disagree - Not applicable / Not valid  -- Disagree - Clinically unable to determine / Unknown  -- Refer to Clinical Documentation Reviewer    PROVIDER RESPONSE TEXT:    This patient has iron deficiency anemia.     Query created by: Jeffrey Yeung on 10/27/2021 3:24 PM      Electronically signed by:  Bakari Clark MD 10/27/2021 4:44 PM

## 2021-10-27 NOTE — PROGRESS NOTES
[facility]    Gastroenterology Daily Progress Note      Patient:   Peterson Murphy   :       [facility]  Date:     10/27/2021  Consultant:   Chiquita Thompson, RAGHAV - CNP, CNP      SUBJECTIVE  77 y.o. male admitted 10/22/2021 with Acute cystitis with hematuria [N30.01]  Septic shock ([facility] [address] [A41.9, R65.21]  Altered mental status, unspecified altered mental status type [R41.82] and seen for anemia and melena. The pt was seen and examined. Pt is alert, moaning at times. Per RN no bm overnight hgb 9.4. anemia profile shows iron deficiency.         OBJECTIVE  Scheduled Meds:   metoprolol tartrate  12.5 mg Oral BID    pantoprazole  40 mg IntraVENous BID    And    sodium chloride (PF)  10 mL IntraVENous BID    doxycycline Monohydrate  100 mg Per G Tube BID    cefTRIAXone (ROCEPHIN) IV  2,000 mg IntraVENous Q24H       Vital Signs:  BP (!) 136/99   Pulse 91   Temp 98.6 °F (37 °C) (Axillary)   Resp 20   Ht 6' 4\" (1.93 m)   Wt 187 lb 2.7 oz (84.9 kg)   SpO2 95%   BMI 22.78 kg/m²      Physical Exam:     General Appearance:  Ill appearing moaning alert , well-developed and well-nourished, in no acute distress  Skin: warm and dry, no rash or erythema has tattoos  Head: normocephalic and atraumatic  Eyes: pupils equal, round, and reactive to light, extraocular eye movements intact, conjunctivae normal  ENT: hearing grossly normal bilaterally, dried blood around mouth  Neck: neck supple and non tender without mass, no thyromegaly or thyroid nodules, no cervical lymphadenopathy   Pulmonary/Chest: clear to auscultation bilaterally- no wheezes, rales or rhonchi, normal air movement, no respiratory distress  Cardiovascular: hypertensive normal rate, regular rhythm, normal S1 and S2, no murmurs, rubs, clicks or gallops, distal pulses intact, no carotid bruits  Abdomen: soft, non-tender, non-distended, normal bowel sounds, no masses or organomegaly peg tube site with no bleeding or sign of infection  Extremities: no cyanosis, clubbing or edema  Musculoskeletal: normal range of motion, no joint swelling, deformity or tenderness  Neurologic: alert and moaning, will not follow commands or answer questions muscle strength normal    Lab and Imaging Review     CBC  Recent Labs     10/25/21  0509 10/25/21  1755 10/26/21  0607 10/26/21  1613 10/26/21  2324   WBC 6.3  --  6.5  --   --    HGB 7.3*   < > 6.8* 9.4* 9.4*   HCT 21.9*   < > 20.8* 27.7* 28.0*   MCV 84.2  --  84.1  --   --      --  172  --   --     < > = values in this interval not displayed. BMP  Recent Labs     10/25/21  0509 10/26/21  0607    144   K 3.2* 3.7    109*   CO2 21 27   BUN 71* 56*   CREATININE 2.58* 1.76*   GLUCOSE 97 155*   CALCIUM 8.9 8.5*       ANEMIA STUDIES  Recent Labs     10/24/21  1341   LABIRON 16*   TIBC 140*   FERRITIN 977*     FINDINGS:ct abd pelvis 10/22/21   Lower Chest: A partially visualized right parahilar and infrahilar masslike   lesion is concerning for malignancy.  It measures approximately 5.7 x 5.3 cm   in the maximal axial plane.  Pulmonary opacities in the dependent aspects of   the lower lobes may represent atelectasis or pneumonia.  The heart is normal   in size.  No pleural or pericardial effusion.  Small hiatal hernia.       Organs:       Liver: Unremarkable.       Gallbladder: Subtle layering hyperdensity in the gallbladder may represent   sludge or small calculi (series 2, image 75).     Pancreas: Mild to moderately atrophied.  Otherwise, unremarkable.       Spleen:  Unremarkable.       Adrenals: Unremarkable.       Kidneys:  The right kidney is moderately atrophied.  Multiple intrarenal   calculi are seen ranging in size from 2-6 mm.  No ureterolithiasis or   hydronephrosis.       GI/Bowel: Gastrostomy tube in situ.  Liquid stool in the colon indicates a   diarrheal illness.  No bowel wall thickening or obstruction noted.       Pelvis: Issa catheter is seen in the urinary bladder which is otherwise   grossly unremarkable.  The prostate gland is mildly enlarged.  Small fat   containing left inguinal hernia.  No evidence of fat strangulation. Protrusion of the anterior wall of the colon into the proximal-most hernia.       Peritoneum/Retroperitoneum: No lymphadenopathy.  Prominent calcified   atherosclerosis is seen in the abdominal aorta.  There is borderline   aneurysmal dilation of the infrarenal aorta to 3 cm.  Aneurysmal dilation of   the right common iliac artery to 2.9 cm.       Bones/Soft Tissues: No evidence of acute fracture or joint dislocation. Likely chronic compression fracture deformity through the superior endplate   of L2.  Grade 1 anterolisthesis of L4 over L5.           Impression   1. Partially visualized MASSLIKE LESION in the right parahilar and infrahilar   region CONCERNING FOR MALIGNANCY.  Dedicated contrast enhanced CT of the   chest recommended for further evaluation. 2. No evidence of metastatic disease in the abdomen or the pelvis. 3. Bilateral nephrolithiasis.  No hydronephrosis.  Atrophic right kidney. 4.  Liquid stool in the colon indicates a diarrheal illness.  No bowel wall   thickening or obstruction noted. 5. 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  Vascular surgery consultation recommended   especially for the right common iliac artery aneurysm.               ASSESSMENT/plan  1. Anemia with reported melena, no bm overnight  -ok to restart AC from a gi standpoint will plan for egd and colonoscopy as outpt  -trend hh and keep hgb >7  -tube feeding as tolerated    2.kelbsiella pneumoniae bacteremia, pneumonia septic shock  ID mgt     3. TOD     4.right hilar mass     D/w md gi signing off      This plan was formulated in collaboration with Dr. Razia Bergman .     Electronically signed by: RAGHAV Cruz CNP on 10/27/2021 at 7:57 AM     Attending Physician Statement  I have discussed the care of [facility] and   I have examined the patient myselft independently, and taken ros and hpi , including pertinent history and exam findings,  with the author of this note . I have reviewed the key elements of all parts of the encounter with the nurse practitioner/resident.     I agree with the assessment, plan and orders as documented by the above health care provider       IV iron  EGD colonoscopy later may be as an outpatient  Hemoglobin is stable at 9.4  Okay for Eliquis for now  Electronically signed by Army Domingo MD

## 2021-10-27 NOTE — CARE COORDINATION
Nohemy Imre [address]. 12. Encounter Date/Time: 10/22/2021 4408    Hospital Account: [de-identified]    MRN: [id number]    Patient: Ankur Long    Contact Serial #: [id number]      ENCOUNTER          Patient Class: I Private Enc? No Unit RM BD: [facility] PRO    Hospital Service: Intermediate   Encounter DX: Acute cystitis with hema*   ADM Provider: Morgan Lincoln MD   Procedure:     ATT Provider: Morgan Lincoln MD   REF Provider:        Admission DX: Acute cystitis with hematuria, Septic shock ([facility] [address] Altered mental status, unspecified altered mental status type and DX codes: N30.01, A41.9, R65.21, R41.82      PATIENT                 Name: Ankur Long : 1945 (77 yrs)   Address: [address] [place] [address] Sex: Male   City: [place] [address]         Marital Status:    Employer: DISABLED         Confucianism: Gnosticist   Primary Care Provider: Darryle Pancake, MD         Primary Phone: [phone]   EMERGENCY CONTACT   Contact Name Legal Guardian? Relationship to Patient Home Phone Work Phone   1. Valerie Schneider  2. Sonia Kind    Brother/Sister  Child [phone]         GUARANTOR            Guarantor: Ankur Long     : 1945   Address: [address] [place] [address] Sex: Male   [place] [id number]     Relation to Patient: Self       Home Phone: [phone]   Guarantor ID: [id number]       Work Phone:     Guarantor Employer: DISABLED         Status: DISABLED      COVERAGE        PRIMARY INSURANCE   Payor: Racquel Junior [place] Plan: [facility] DUAL   Payor Address: [address]       Group Number:   Insurance Type: INDEMNITY   Subscriber Name: Eloise Desouza : 1945   Subscriber ID: [id number] Pat. Rel. to Sub: Self   SECONDARY INSURANCE   Payor:   Plan:     Payor Address:  ,           Group Number:   Insurance Type:     Subscriber Name:   Subscriber :     Subscriber ID:   Pat.  Rel. to Sub:             CSN: [id number]

## 2021-10-27 NOTE — CARE COORDINATION
ONGOING DISCHARGE PLAN:    Patient is Nonverbal, Moaning, staring at the Ceiling. Spoke with patient's Sister, [place], regarding discharge plan and she confirms that plan is still to take pt. Home. GOYO does state \"that pt. Does not act like this at home  Here all he does is Moan & stare at the [address] [place] [address]. He doesn't talk, but he spells w/ his Finger\". Pt. Remains on Vibramycin Per G Tube, IV Zosyn, was started today, ID on board. Remains on TF. CXR worse, Pulmonary following. HGB today 9.4. Face Sheet refaxed to [facility], for start of services, Per Gabby Mcgowan, will follow & let writer know if they can accept. Will continue to follow for additional discharge needs.     Electronically signed by Miguelangel Diaz RN on 10/27/2021 at 4:23 PM

## 2021-10-27 NOTE — PLAN OF CARE
Problem: Falls - Risk of:  Goal: Will remain free from falls  Description: Will remain free from falls  10/27/2021 0231 by Augustus Kohli RN  Outcome: Ongoing     Problem: Skin Integrity:  Goal: Will show no infection signs and symptoms  Description: Will show no infection signs and symptoms  10/27/2021 0231 by Augustus Kohli RN  Outcome: Ongoing     Problem: Skin Integrity:  Goal: Absence of new skin breakdown  Description: Absence of new skin breakdown  10/27/2021 0231 by Augustus Kohli RN  Outcome: Ongoing     Problem: SAFETY  Goal: Free from accidental physical injury  10/27/2021 0231 by Augustus Kohli RN  Outcome: Ongoing     Problem: DAILY CARE  Goal: Daily care needs are met  10/27/2021 0231 by Augustus Kohli RN  Outcome: Ongoing     Problem: PAIN  Goal: Patient's pain/discomfort is manageable  10/27/2021 0231 by Augustus Kohli RN  Outcome: Ongoing     Problem: SKIN INTEGRITY  Goal: Skin integrity is maintained or improved  10/27/2021 0231 by Augustus Kohli RN  Outcome: Ongoing     Problem: DISCHARGE BARRIERS  Goal: Patient's continuum of care needs are met  10/27/2021 0231 by Augustus Kohli RN  Outcome: Ongoing     Problem: Gas Exchange - Impaired:  Goal: Levels of oxygenation will improve  Description: Levels of oxygenation will improve  10/27/2021 0231 by Augustus Kohli RN  Outcome: Ongoing     Problem: Infection, Septic Shock:  Goal: Will show no infection signs and symptoms  Description: Will show no infection signs and symptoms  10/27/2021 0231 by Augustus Kohli RN  Outcome: Ongoing     Problem: Nutrition  Goal: Optimal nutrition therapy  10/27/2021 0231 by Augustus Kohli RN  Outcome: Ongoing     Problem: Venous Thromboembolism:  Goal: Will show no signs or symptoms of venous thromboembolism  Description: Will show no signs or symptoms of venous thromboembolism  10/27/2021 0231 by Augustus Kohli RN  Outcome: Ongoing

## 2021-10-27 NOTE — PLAN OF CARE
Problem: Falls - Risk of:  Goal: Will remain free from falls  10/27/2021 1435 by Khloe Van RN  Outcome: Ongoing     Problem: Skin Integrity:  Goal: Will show no infection signs and symptoms  10/27/2021 1435 by Khloe Van RN  Outcome: Ongoing     Problem: SAFETY  Goal: Free from accidental physical injury  10/27/2021 1435 by Khloe Van RN  Outcome: Ongoing     Problem: DAILY CARE  Goal: Daily care needs are met  10/27/2021 1435 by Khloe Van RN  Outcome: Ongoing     Problem: PAIN  Goal: Patient's pain/discomfort is manageable  10/27/2021 1435 by Khloe Van RN  Outcome: Ongoing     Problem: SKIN INTEGRITY  Goal: Skin integrity is maintained or improved  10/27/2021 1435 by Khloe Van RN  Outcome: Ongoing     Problem: KNOWLEDGE DEFICIT  Goal: Patient/S.O. demonstrates understanding of disease process, treatment plan, medications, and discharge instructions.   10/27/2021 1435 by Khloe Van RN  Outcome: Ongoing     Problem: DISCHARGE BARRIERS  Goal: Patient's continuum of care needs are met  10/27/2021 0231 by Jamila Oneill RN  Outcome: Ongoing     Problem: Discharge Planning:  Goal: Discharged to appropriate level of care  10/27/2021 1435 by Khloe Van RN  Outcome: Ongoing     Problem: Gas Exchange - Impaired:  Goal: Levels of oxygenation will improve  10/27/2021 1435 by Khloe Van RN  Outcome: Ongoing     Problem: Infection, Septic Shock:  Goal: Will show no infection signs and symptoms  10/27/2021 1435 by Khloe Van RN  Outcome: Ongoing

## 2021-10-28 LAB
ABSOLUTE EOS #: 0 K/UL (ref 0–0.4)
ABSOLUTE IMMATURE GRANULOCYTE: ABNORMAL K/UL (ref 0–0.3)
ABSOLUTE LYMPH #: 0.8 K/UL (ref 1–4.8)
ABSOLUTE MONO #: 0.8 K/UL (ref 0.1–1.3)
ANION GAP SERPL CALCULATED.3IONS-SCNC: 12 MMOL/L (ref 9–17)
BASOPHILS # BLD: 0 % (ref 0–2)
BASOPHILS ABSOLUTE: 0 K/UL (ref 0–0.2)
BUN BLDV-MCNC: 49 MG/DL (ref 8–23)
BUN/CREAT BLD: ABNORMAL (ref 9–20)
CALCIUM SERPL-MCNC: 8.8 MG/DL (ref 8.6–10.4)
CHLORIDE BLD-SCNC: 108 MMOL/L (ref 98–107)
CO2: 29 MMOL/L (ref 20–31)
CREAT SERPL-MCNC: 1.36 MG/DL (ref 0.7–1.2)
DIFFERENTIAL TYPE: ABNORMAL
EOSINOPHILS RELATIVE PERCENT: 0 % (ref 0–4)
GFR AFRICAN AMERICAN: >60 ML/MIN
GFR NON-AFRICAN AMERICAN: 51 ML/MIN
GFR SERPL CREATININE-BSD FRML MDRD: ABNORMAL ML/MIN/{1.73_M2}
GFR SERPL CREATININE-BSD FRML MDRD: ABNORMAL ML/MIN/{1.73_M2}
GLUCOSE BLD-MCNC: 179 MG/DL (ref 70–99)
HCT VFR BLD CALC: 27 % (ref 41–53)
HEMOGLOBIN: 8.9 G/DL (ref 13.5–17.5)
IMMATURE GRANULOCYTES: ABNORMAL %
LYMPHOCYTES # BLD: 8 % (ref 24–44)
MCH RBC QN AUTO: 28.5 PG (ref 26–34)
MCHC RBC AUTO-ENTMCNC: 33 G/DL (ref 31–37)
MCV RBC AUTO: 86.2 FL (ref 80–100)
MONOCYTES # BLD: 7 % (ref 1–7)
NRBC AUTOMATED: ABNORMAL PER 100 WBC
PDW BLD-RTO: 17.4 % (ref 11.5–14.9)
PLATELET # BLD: 238 K/UL (ref 150–450)
PLATELET ESTIMATE: ABNORMAL
PMV BLD AUTO: 7 FL (ref 6–12)
POTASSIUM SERPL-SCNC: 3 MMOL/L (ref 3.7–5.3)
POTASSIUM SERPL-SCNC: 3.5 MMOL/L (ref 3.7–5.3)
RBC # BLD: 3.13 M/UL (ref 4.5–5.9)
RBC # BLD: ABNORMAL 10*6/UL
SEG NEUTROPHILS: 85 % (ref 36–66)
SEGMENTED NEUTROPHILS ABSOLUTE COUNT: 9.4 K/UL (ref 1.3–9.1)
SODIUM BLD-SCNC: 149 MMOL/L (ref 135–144)
WBC # BLD: 11.1 K/UL (ref 3.5–11)
WBC # BLD: ABNORMAL 10*3/UL

## 2021-10-28 PROCEDURE — 99232 SBSQ HOSP IP/OBS MODERATE 35: CPT | Performed by: INTERNAL MEDICINE

## 2021-10-28 PROCEDURE — 85025 COMPLETE CBC W/AUTO DIFF WBC: CPT

## 2021-10-28 PROCEDURE — 6370000000 HC RX 637 (ALT 250 FOR IP): Performed by: INTERNAL MEDICINE

## 2021-10-28 PROCEDURE — 2060000000 HC ICU INTERMEDIATE R&B

## 2021-10-28 PROCEDURE — C9113 INJ PANTOPRAZOLE SODIUM, VIA: HCPCS | Performed by: INTERNAL MEDICINE

## 2021-10-28 PROCEDURE — 6360000002 HC RX W HCPCS: Performed by: INTERNAL MEDICINE

## 2021-10-28 PROCEDURE — 36415 COLL VENOUS BLD VENIPUNCTURE: CPT

## 2021-10-28 PROCEDURE — 6370000000 HC RX 637 (ALT 250 FOR IP): Performed by: NURSE PRACTITIONER

## 2021-10-28 PROCEDURE — 2580000003 HC RX 258: Performed by: INTERNAL MEDICINE

## 2021-10-28 PROCEDURE — 6370000000 HC RX 637 (ALT 250 FOR IP): Performed by: FAMILY MEDICINE

## 2021-10-28 PROCEDURE — 80048 BASIC METABOLIC PNL TOTAL CA: CPT

## 2021-10-28 PROCEDURE — 84132 ASSAY OF SERUM POTASSIUM: CPT

## 2021-10-28 RX ORDER — METOPROLOL TARTRATE 50 MG/1
50 TABLET, FILM COATED ORAL 2 TIMES DAILY
Status: DISCONTINUED | OUTPATIENT
Start: 2021-10-28 | End: 2021-11-02 | Stop reason: HOSPADM

## 2021-10-28 RX ORDER — FUROSEMIDE 10 MG/ML
20 INJECTION INTRAMUSCULAR; INTRAVENOUS ONCE
Status: COMPLETED | OUTPATIENT
Start: 2021-10-28 | End: 2021-10-28

## 2021-10-28 RX ORDER — METOPROLOL TARTRATE 50 MG/1
50 TABLET, FILM COATED ORAL ONCE
Status: COMPLETED | OUTPATIENT
Start: 2021-10-28 | End: 2021-10-28

## 2021-10-28 RX ORDER — QUETIAPINE FUMARATE 50 MG/1
25 TABLET, FILM COATED ORAL NIGHTLY
Status: DISCONTINUED | OUTPATIENT
Start: 2021-10-28 | End: 2021-11-02 | Stop reason: HOSPADM

## 2021-10-28 RX ADMIN — PANTOPRAZOLE SODIUM 40 MG: 40 INJECTION, POWDER, FOR SOLUTION INTRAVENOUS at 21:28

## 2021-10-28 RX ADMIN — DOXYCYCLINE 100 MG: 25 FOR SUSPENSION ORAL at 11:13

## 2021-10-28 RX ADMIN — METOPROLOL TARTRATE 50 MG: 50 TABLET, FILM COATED ORAL at 11:22

## 2021-10-28 RX ADMIN — DOXYCYCLINE 100 MG: 25 FOR SUSPENSION ORAL at 23:39

## 2021-10-28 RX ADMIN — POTASSIUM CHLORIDE: 2 INJECTION, SOLUTION, CONCENTRATE INTRAVENOUS at 08:46

## 2021-10-28 RX ADMIN — NYSTATIN 500000 UNITS: 100000 SUSPENSION ORAL at 21:29

## 2021-10-28 RX ADMIN — NYSTATIN 500000 UNITS: 100000 SUSPENSION ORAL at 17:39

## 2021-10-28 RX ADMIN — PIPERACILLIN SODIUM AND TAZOBACTAM SODIUM 3375 MG: 3; .375 INJECTION, POWDER, LYOPHILIZED, FOR SOLUTION INTRAVENOUS at 03:56

## 2021-10-28 RX ADMIN — METOPROLOL TARTRATE 50 MG: 50 TABLET, FILM COATED ORAL at 21:28

## 2021-10-28 RX ADMIN — PIPERACILLIN SODIUM AND TAZOBACTAM SODIUM 3375 MG: 3; .375 INJECTION, POWDER, LYOPHILIZED, FOR SOLUTION INTRAVENOUS at 19:35

## 2021-10-28 RX ADMIN — POTASSIUM CHLORIDE 125 ML/HR: 2 INJECTION, SOLUTION, CONCENTRATE INTRAVENOUS at 21:55

## 2021-10-28 RX ADMIN — PANTOPRAZOLE SODIUM 40 MG: 40 INJECTION, POWDER, FOR SOLUTION INTRAVENOUS at 10:51

## 2021-10-28 RX ADMIN — APIXABAN 5 MG: 5 TABLET, FILM COATED ORAL at 21:28

## 2021-10-28 RX ADMIN — APIXABAN 5 MG: 5 TABLET, FILM COATED ORAL at 10:50

## 2021-10-28 RX ADMIN — SODIUM CHLORIDE, PRESERVATIVE FREE 10 ML: 5 INJECTION INTRAVENOUS at 10:51

## 2021-10-28 RX ADMIN — PIPERACILLIN SODIUM AND TAZOBACTAM SODIUM 3375 MG: 3; .375 INJECTION, POWDER, LYOPHILIZED, FOR SOLUTION INTRAVENOUS at 11:53

## 2021-10-28 RX ADMIN — FUROSEMIDE 20 MG: 10 INJECTION, SOLUTION INTRAMUSCULAR; INTRAVENOUS at 17:39

## 2021-10-28 RX ADMIN — SODIUM CHLORIDE, PRESERVATIVE FREE 10 ML: 5 INJECTION INTRAVENOUS at 21:28

## 2021-10-28 RX ADMIN — NYSTATIN 500000 UNITS: 100000 SUSPENSION ORAL at 10:50

## 2021-10-28 RX ADMIN — QUETIAPINE FUMARATE 25 MG: 50 TABLET ORAL at 21:28

## 2021-10-28 ASSESSMENT — ENCOUNTER SYMPTOMS
VOMITING: 0
DIARRHEA: 0

## 2021-10-28 ASSESSMENT — PAIN SCALES - PAIN ASSESSMENT IN ADVANCED DEMENTIA (PAINAD)
FACIALEXPRESSION: 0
NEGVOCALIZATION: 2
BREATHING: 2

## 2021-10-28 ASSESSMENT — PAIN SCALES - GENERAL: PAINLEVEL_OUTOF10: 0

## 2021-10-28 ASSESSMENT — PAIN SCALES - WONG BAKER: WONGBAKER_NUMERICALRESPONSE: 6

## 2021-10-28 NOTE — CARE COORDINATION
ONGOING DISCHARGE PLAN:    Pt is Nonverbal. No family currently at the bedside. Writer spoke to [employer] Sister, [place] yesterday & Plan was to return to home w/ Family, w/ 24/7 Care. Jai Gee was requesting [facility], Carl Yadav is still awaiting to see if they can accept for services. Pt. Remains on Vibramycin, Per G tube & IV Zosyn, WBC today 11.1. Remains on TF. CXR again in am. Pulmonary continues to follow. Bun/Cr increasing, Nephro consult. HGB 8.9. Will continue to follow for additional discharge needs.     Electronically signed by Heath Hein RN on 10/28/2021 at 2:42 PM

## 2021-10-28 NOTE — PROGRESS NOTES
Progress Note    Patient Name:  Ayaka Roldan    :  89  10/28/2021 9:08 AM      SUBJECTIVE       Mr. Gal Painting is resting in bed. Patient is nonverbal.  Unable to express chest pain or shortness of breath. On review of telemetry patient noted to be in sinus rhythm with intermittent tachycardia. No other significant arrhythmias noted overnight. OBJECTIVE     Vital signs:    BP (!) 147/92   Pulse 94   Temp 97.3 °F (36.3 °C) (Axillary)   Resp 16   Ht 6' 4\" (1.93 m)   Wt 181 lb 7 oz (82.3 kg)   SpO2 93%   BMI 22.09 kg/m²  0 L/min      Admit Weight:  178 lb (80.7 kg)    Last 3 weights: Wt Readings from Last 3 Encounters:   10/28/21 181 lb 7 oz (82.3 kg)   21 178 lb (80.7 kg)   21 178 lb 5.6 oz (80.9 kg)       BMI: Body mass index is 22.09 kg/m². Input/Output:       Intake/Output Summary (Last 24 hours) at 10/28/2021 0908  Last data filed at 10/28/2021 5537  Gross per 24 hour   Intake 788.71 ml   Output 1850 ml   Net -1061.29 ml         Exam:     General appearance: awake and alert moves all ext   Lungs: no rhonchi, no wheezes, no rales  Heart: S1 and S2 no murmur  Abdomen: positive bowel sounds, no bruits, no masses  Extremities: warm and dry, no cyanosis, no clubbing        Laboratory Studies:     CBC:   Recent Labs     10/26/21  0607 10/26/21  1613 10/26/21  2324 10/27/21  0815 10/28/21  0520   WBC 6.5  --   --  8.5 11.1*   HGB 6.8*   < > 9.4* 9.3* 8.9*   HCT 20.8*   < > 28.0* 28.2* 27.0*   MCV 84.1  --   --  86.0 86.2     --   --  216 238    < > = values in this interval not displayed. BMP:   Recent Labs     10/26/21  0607 10/27/21  0815 10/28/21  0520    145* 149*   K 3.7 3.5* 3.0*   * 106 108*   CO2 27 28 29   BUN 56* 43* 49*   CREATININE 1.76* 1.28* 1.36*     PT/INR: No results for input(s): PROTIME, INR in the last 72 hours. APTT: No results for input(s): APTT in the last 72 hours. MAG: No results for input(s): MG in the last 72 hours.   D Dimer: No results for input(s): DDIMER in the last 72 hours. Troponin  No results for input(s): TROPONINI in the last 72 hours. No results for input(s): TROPONINT in the last 72 hours. BNP No results for input(s): BNP in the last 72 hours. No results for input(s): PROBNP in the last 72 hours. Pulse Ox: SpO2  Av.2 %  Min: 91 %  Max: 94 %  Supplemental O2: O2 Flow Rate (L/min): 0 L/min     Current Meds:    apixaban  5 mg Oral BID    metoprolol tartrate  50 mg Oral BID    nystatin  5 mL Oral 4x Daily    piperacillin-tazobactam  3,375 mg IntraVENous Q8H    pantoprazole  40 mg IntraVENous BID    And    sodium chloride (PF)  10 mL IntraVENous BID    doxycycline Monohydrate  100 mg Per G Tube BID     Continuous Infusions:    sodium chloride              ASSESSMENT     Principal Problem:    Septic shock (HCC)  Active Problems:    Multifocal pneumonia    Tobacco abuse    Acute cystitis without hematuria    Mass of right lung    COPD (chronic obstructive pulmonary disease) ([facility])    PAF (paroxysmal atrial fibrillation) ([facility])    Chronic combined systolic and diastolic congestive heart failure ([facility])    Dilated cardiomyopathy ([facility])    Bacteremia due to Klebsiella pneumoniae    Melena    Anemia  Resolved Problems:    * No resolved hospital problems.  *     1.  NSTEMI, type 2 demand ischemia  - mildly elevated in the setting of sepsis, UTI, elevated creatinine  - EKG without evidence of ischemic changes  -Patient and family declining any further ischemic workup     2.  Sepsis secondary to UTI  - infectious disease following  - blood pressure stable at this time  - XBJ 13.0     3.  LV systolic function with  Severely reduced EF 15-20%  -appears euvolemic     4.  Acute kidney injury   - creatinine 1.36     5.  Paroxysmal atrial fibrillation / flutter   - currently moderately rate controlled  - Maintained on Eliquis 5 mg b.i.d. on hold due to anemia, resume if okay with other services     6.  Remote history of CVA  - has been bedridden and nonverbal over the last year      7. Anemia  - morning hemoglobin 8.9   -Holding oral anticoagulation and antiplatelets    PLAN     Increase metoprolol tartrate to 50 mg twice daily in the setting of intermittent tachycardia as well as moderate hypertension.      Conservative management going forward.  No plans for any further ischemic work-up.     Resuming anticoagulants and antiplatelets if okay with all services and hemoglobin stable.     Continue with discharge planning

## 2021-10-28 NOTE — FLOWSHEET NOTE
10/28/21 1541   Provider Notification   Reason for Communication Critical Value (comment)  (urine output of 70ml in 8 hours)   Provider Name  Real Line   Provider Notification Physician   Method of Communication Page   Response Waiting for response   Notification Time [phone]   Paged to update on low urine output.

## 2021-10-28 NOTE — PROGRESS NOTES
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 In ED received IV fluid bolus, central line was placed  and norepinephrine was started for hypotension. Urine culture - several types of bacteria identified, likely contamination  COVID19 - not detected  CXR - Mildly increased patchy opacities at the lung bases, possibly pneumonia or atelectasis. UA - positive for nitrites, large leukocyte esterase, large hemoglobin and moderate bacteria  Blood cultures - 2/2 positive for klebsiella pneumonia    Interval changes  10/28/2021   He is afebrile, agitated, tolerating tube feed, no reported diarrhea, no new events. Procalcitonin improving 1.27 yesterday  Chest x-ray yesterday showed worsening right lung opacities    Summary of relevant labs:      Micro:  10/22 Blood Cx - 2/2 Klebsiella pneumoniae - sensitive to cefepime  10/22 Urine Cx - multiple types of bacteria identified, likely contamination  10/22 COVID19 - not detected    Imaging:  10/22 CXR  1.  Mildly increased patchy opacities at the lung bases, possibly pneumonia or atelectasis. 2.  Increased masslike opacity in the medial right base.  This was previously evaluated with CT on 05/26/2021.  Consider re-evaluation with contrast-enhanced CT to assess progression.    3.  Heterogeneous bony mineralization in the visualized right humeral   diaphysis, which is indeterminate.  Dedicated radiographs of the right   humerus are recommended. 10/22 CT head w/o contrast  BRAIN/VENTRICLES: There is no acute intracranial hemorrhage, mass effect or midline shift.  No abnormal extra-axial fluid collection. The gray-white differentiation is maintained without evidence of an acute infarct. There is no evidence of hydrocephalus. Mild generalized cortical atrophy, stable.  Extensive streak artifact is   again noted on the left, related to stable postsurgical change, with metallic plate in the left frontotemporal skull region.  This obscures surrounding structures. ORBITS: The visualized portion of the orbits demonstrate no acute abnormality. SINUSES: The visualized paranasal sinuses and mastoid air cells demonstrate no acute abnormality. SOFT TISSUES/SKULL:  No acute abnormality of the visualized skull or soft tissues. 10/22 CT abdomen pelvis w/o contrast  1. Partially visualized MASSLIKE LESION in the right parahilar and infrahilar region CONCERNING FOR MALIGNANCY.  Dedicated contrast enhanced CT of the chest recommended for further evaluation. 2. No evidence of metastatic disease in the abdomen or the pelvis. 3. Bilateral nephrolithiasis.  No hydronephrosis.  Atrophic right kidney. 4. Liquid stool in the colon indicates a diarrheal illness.  No bowel wall thickening or obstruction noted. 5. 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  Vascular surgery consultation recommended especially for the right common iliac artery aneurysm. 10/22 CXR  Stable bibasilar atelectasis or infiltrates with unchanged mass density medial right lung base compared to earlier exam same day.  Right IJ central venous catheter tip at the mid SVC.  No pneumothorax post procedure.  Cardiac size stable.  Osseous structures grossly intact.  Telemetry leads overlie the chest.     Patient Vitals for the past 8 hrs:   BP Temp Temp src Pulse Resp Weight   10/28/21 0715 (!) 147/92 97.3 °F (36.3 °C) Axillary 94 16    10/28/21 0624      181 lb 7 oz (82.3 kg)         I have personally reviewed the past medical history, past surgical history, medications, social history, and family history, and I haveupdated the database accordingly. Allergies:   Bactrim [sulfamethoxazole-trimethoprim] and Ciprofloxacin     Review of Systems:     Review of Systems  Nonverbal, unable to provide  Physical Examination :       Physical Exam  Appearance: Normal appearance. He is not diaphoretic. HENT:      Head: Normocephalic and atraumatic. Right Ear: External ear normal.      Left Ear: External ear normal.      Nose: Nose normal.      Mouth/Throat:      Mouth: Mucous membranes are moist.      Pharynx: Oropharynx is clear. Eyes:      Conjunctiva/sclera: Conjunctivae normal.   Cardiovascular:      Rate and Rhythm: Normal rate and regular rhythm. Pulses: Normal pulses. Heart sounds: Normal heart sounds. Pulmonary:      Effort: Pulmonary effort is normal.      Breath sounds: Coarse breath sounds bilaterally  Abdominal:      General: Bowel sounds are normal. There is no distension. Palpations: Abdomen is soft. Musculoskeletal:         Cervical back:  No rigidity. Right lower leg: No edema. Left lower leg: No edema. Skin:     General: Skin is warm and dry. Coloration: Skin is not jaundiced. Neurological:      Mental Status: Mental status is at baseline.    Past Medical History:     Past Medical History:   Diagnosis Date    Arthritis     Cancer [facility])     bladder 1980s    Cerebral artery occlusion with cerebral infarction [facility])     TIA 2010    Chronic kidney disease     COPD [facility]ronic obstructive pulmonary disease) [facility] [address]     Hypertension     Pneumonia     Psychiatric problem     depression, social anxiety    Seizures ([facility] [address]        Past Surgical  History:     Past Surgical History:   Procedure Laterality Date    ABDOMEN SURGERY      BACK SURGERY      spinal surgery 2005     CAROTID ENDARTERECTOMY Left 09/20/2016    COLECTOMY N/A 5/28/2021    Exploratory Laparotomy. Release of adhesive band with release of small bowel obstruction. Small bowel resection with primary anastomosis performed by Jaime Castaneda MD at [facility] COLONOSCOPY N/A 8/31/2018    COLONOSCOPY POLYPECTOMY COLD BIOPSY performed by Yuli Black MD at [facility]      left face    GASTROSTOMY TUBE PLACEMENT  04/15/2020    HERNIA REPAIR      HIATAL HERNIA REPAIR      INGUINAL HERNIA REPAIR Bilateral     INSERT MIDLINE CATHETER  8/4/2021         OTHER SURGICAL HISTORY      mesh infected in inguinal canal, had to remove. Removed testiscle at this time.      TONSILLECTOMY      UPPER GASTROINTESTINAL ENDOSCOPY  04/15/2020       EGD CONTROL HEMORRHAGE    UPPER GASTROINTESTINAL ENDOSCOPY  4/15/2020    EGD CONTROL HEMORRHAGE performed by Isiah Iyer MD at [facility]  4/15/2020    EGD ESOPHAGOGASTRODUODENOSCOPY PEG TUBE INSERTION performed by Isiah Iyer MD at [facility]       Medications:      apixaban  5 mg Oral BID    metoprolol tartrate  50 mg Oral BID    nystatin  5 mL Oral 4x Daily    piperacillin-tazobactam  3,375 mg IntraVENous Q8H    pantoprazole  40 mg IntraVENous BID    And    sodium chloride (PF)  10 mL IntraVENous BID    doxycycline Monohydrate  100 mg Per G Tube BID       Social History:     Social History     Socioeconomic History    Marital status:      Spouse name: Not on file    Number of children: Not on file    Years of education: Not on file    Highest education level: Not on file   Occupational History    Not on file   Tobacco Use    Smoking status: Former Smoker     Packs/day: 1.00     Years: 60.00     Pack years: 60.00     Types: Cigarettes     Start date:      Quit date: 2020     Years since quittin.5    Smokeless tobacco: Never Used   Vaping Use    Vaping Use: Never used   Substance and Sexual Activity    Alcohol use: No    Drug use: No    Sexual activity: Yes     Partners: Female   Other Topics Concern    Not on file   Social History Narrative    Not on file     Social Determinants of Health     Financial Resource Strain: Low Risk     Difficulty of Paying Living Expenses: Not hard at all   Food Insecurity: No Food Insecurity    Worried About Running Out of Food in the Last Year: Never true    Blas of Food in the Last Year: Never true   Transportation Needs: No Transportation Needs    Lack of Transportation (Medical): No    Lack of Transportation (Non-Medical): No   Physical Activity: Inactive    Days of Exercise per Week: 0 days    Minutes of Exercise per Session: 0 min   Stress: Stress Concern Present    Feeling of Stress : Very much   Social Connections: Moderately Isolated    Frequency of Communication with Friends and Family: More than three times a week    Frequency of Social Gatherings with Friends and Family: More than three times a week    Attends [employer] Services: More than 4 times per year    Active Member of Clubs or Organizations: No    Attends Club or Organization Meetings: Never    Marital Status:     Intimate Partner Violence: Not At Risk    Fear of Current or Ex-Partner: No    Emotionally Abused: No    Physically Abused: No    Sexually Abused: No       Family History:     Family History   Problem Relation Age of Onset    Arthritis Mother     Atrial Fibrillation Mother     Hearing Loss Mother     Heart Disease Mother     Stroke Mother     Vision Loss Mother     Arthritis Father     Cancer Father     Heart Disease Father     High Blood Pressure Father     Stroke Father     Vision Loss Father       Medical Decision Making:   I have independently reviewed/ordered the following labs:    CBC with Differential:   Recent Labs     10/27/21  0815 10/28/21  0520   WBC 8.5 11.1*   HGB 9.3* 8.9*   HCT 28.2* 27.0*    238   LYMPHOPCT 9* 8*   MONOPCT 11* 7     BMP:  Recent Labs     10/27/21  0815 10/28/21  0520   * 149*   K 3.5* 3.0*    108*   CO2 28 29   BUN 43* 49*   CREATININE 1.28* 1.36*     Hepatic Function Panel:   No results for input(s): PROT, LABALBU, BILIDIR, IBILI, BILITOT, ALKPHOS, ALT, AST in the last 72 hours. No results for input(s): RPR in the last 72 hours. No results for input(s): HIV in the last 72 hours. No results for input(s): BC in the last 72 hours. Lab Results   Component Value Date    CREATININE 1.36 10/28/2021    GLUCOSE 179 10/28/2021       Detailed results: Thank you for allowing us to participate in the care of this patient. Please call with questions. This note is created with the assistance of a speech recognition program.  While intending to generate adocument that actually reflects the content of the visit, the document can still have some errors including those of syntax and sound a like substitutions which may escape proof reading. It such instances, actual meaningcan be extrapolated by contextual diversion.     Julianne Bowen MD  Office: [phone]  Perfect serve / office [phone]

## 2021-10-28 NOTE — PROGRESS NOTES
Pulmonary Progress Note  [facility]      Patient - Kim Borrego,  Age - 77 y.o.    - 1945      Room Number - 2125/2125-01   N -  [id number]   [facility] # - [de-identified]  Date of Admission -  10/22/2021  9:04 Levi Alpers, MD  Primary Care Physician - Carlyn Roca MD     SUBJECTIVE     Patient seen and examined at bedside. No acute events overnight. He was awake and alert on exam.    He is still very agitated and SOB. Bilateral coarse breath sounds are present on auscultation and he still sounds very congested. Mucus membranes are still dry and bloody. OBJECTIVE   VITALS    height is 6' 4\" (1.93 m) and weight is 181 lb 7 oz (82.3 kg). His axillary temperature is 98.2 °F (36.8 °C). His blood pressure is 134/84 and his pulse is 93. His respiration is 16 and oxygen saturation is 90%. Body mass index is 22.09 kg/m². Temperature Range: Temp: 98.2 °F (36.8 °C) Temp  Av.6 °F (37 °C)  Min: 97.3 °F (36.3 °C)  Max: 99.7 °F (37.6 °C)  BP Range:  Systolic (19QOG), WDO:124 , Min:119 , EYF:593     Diastolic (03GSU), TAF:28, Min:82, Max:92    Pulse Range: Pulse  Av.6  Min: 93  Max: 114  Respiration Range: Resp  Av.6  Min: 16  Max: 20  Current Pulse Ox[de-identified]  SpO2: 90 %  24HR Pulse Ox Range:  SpO2  Av.3 %  Min: 90 %  Max: 94 %  Oxygen Amount and Delivery: O2 Flow Rate (L/min): 0 L/min    Wt Readings from Last 3 Encounters:   10/28/21 181 lb 7 oz (82.3 kg)   21 178 lb (80.7 kg)   21 178 lb 5.6 oz (80.9 kg)       I/O (24 Hours)    Intake/Output Summary (Last 24 hours) at 10/28/2021 1402  Last data filed at 10/28/2021 4007  Gross per 24 hour   Intake 788.71 ml   Output 1850 ml   Net -1061.29 ml       EXAM     General Appearance  Awake, alert, oriented, in no acute distress  HEENT - normocephalic, atraumatic.  []  Mallampati  [] Crowded airway   [] Macroglossia  [] Retrognathia  [] Micrognathia  []  Normal tongue size []  Normal Bite  [] Armstrong sign positive    Neck - Supple,  trachea midline   Lungs - bilateral coarse breath sounds on auscultation   Cardiovascular - Heart sounds are normal.  Regular rate and rhythm   Abdomen - Soft, nontender, nondistended, no masses or organomegaly  Neurologic - There are no focal motor or sensory deficits  Skin - No bruising or bleeding  Extremities - No clubbing, cyanosis, edema    MEDS      apixaban  5 mg Oral BID    metoprolol tartrate  50 mg Oral BID    QUEtiapine  25 mg Oral Nightly    nystatin  5 mL Oral 4x Daily    piperacillin-tazobactam  3,375 mg IntraVENous Q8H    pantoprazole  40 mg IntraVENous BID    And    sodium chloride (PF)  10 mL IntraVENous BID    doxycycline Monohydrate  100 mg Per G Tube BID      sodium chloride       melatonin, sodium chloride, midodrine, acetaminophen, traMADol, potassium chloride, microfibrillar collagen, ipratropium-albuterol    LABS   CBC   Recent Labs     10/28/21  0520   WBC 11.1*   HGB 8.9*   HCT 27.0*   MCV 86.2        BMP:   Lab Results   Component Value Date     10/28/2021    K 3.0 10/28/2021     10/28/2021    CO2 29 10/28/2021    BUN 49 10/28/2021    LABALBU 3.4 10/22/2021    CREATININE 1.36 10/28/2021    CALCIUM 8.8 10/28/2021    GFRAA >60 10/28/2021    LABGLOM 51 10/28/2021     ABGs:  Lab Results   Component Value Date    PHART 7.506 06/03/2021    PO2ART 123.0 06/03/2021    KBU0PDF 37.1 06/03/2021      Lab Results   Component Value Date    MODE PRVC 06/03/2021     Ionized Calcium:  No results found for: IONCA  Magnesium:    Lab Results   Component Value Date    MG 2.8 10/22/2021     Phosphorus:    Lab Results   Component Value Date    PHOS 2.8 06/04/2021        LIVER PROFILE No results for input(s): AST, ALT, LIPASE, BILIDIR, BILITOT, ALKPHOS in the last 72 hours. Invalid input(s): AMYLASE,  ALB  INR No results for input(s): INR in the last 72 hours.   PTT

## 2021-10-28 NOTE — PROGRESS NOTES
Progress Note    10/28/2021   8:12 PM    Name:  Angel Potts  MRN:    [id number]     Acct:     [de-identified]   Room:  [facility]5/2[facility] Day: 6     Admit Date: 10/22/2021  9:04 AM  PCP: Carson Shelby MD    Subjective:     C/C:   Chief Complaint   Patient presents with    Altered Mental Status       Interval History: Status: not changed. Agitated unable to sleep at night. Unable to expectorate sputum. Vital signs reviewed blood pressure stable heart rate in 100s. Recent labs reviewed sodium 149 potassium 3 creatinine 1.36 hemoglobin 8.9    ROS:   all 10 systems reviewed and are negative except as noted    Review of Systems   Unable to perform ROS: Patient nonverbal   HENT: Negative for drooling. Gastrointestinal: Negative for diarrhea and vomiting. Genitourinary: Negative for hematuria. Medications: Allergies:    Allergies   Allergen Reactions    Bactrim [Sulfamethoxazole-Trimethoprim] Hives and Swelling    Ciprofloxacin Swelling     FACE       Current Meds: apixaban (ELIQUIS) tablet 5 mg, BID  metoprolol tartrate (LOPRESSOR) tablet 50 mg, BID  QUEtiapine (SEROQUEL) tablet 25 mg, Nightly  potassium chloride 40 mEq in sodium chloride 0.9 % 500 mL infusion, Once  nystatin (MYCOSTATIN) 168765 UNIT/ML suspension 500,000 Units, 4x Daily  melatonin tablet 6 mg, Nightly PRN  piperacillin-tazobactam (ZOSYN) 3,375 mg in dextrose 5 % 50 mL IVPB extended infusion (mini-bag), Q8H  0.9 % sodium chloride infusion, PRN  midodrine (PROAMATINE) tablet 10 mg, TID PRN  acetaminophen (TYLENOL) tablet 650 mg, Q4H PRN  traMADol (ULTRAM) tablet 50 mg, Q8H PRN  pantoprazole (PROTONIX) injection 40 mg, BID   And  sodium chloride (PF) 0.9 % injection 10 mL, BID  doxycycline Monohydrate (VIBRAMYCIN) suspension 100 mg, BID  potassium chloride 10 mEq/100 mL IVPB (Peripheral Line), PRN  microfibrillar collagen powder 1 g, PRN  ipratropium-albuterol (DUONEB) nebulizer solution 1 ampule, Q4H PRN        Data:     Code Status: DNR-CCA    Family History   Problem Relation Age of Onset    Arthritis Mother     Atrial Fibrillation Mother     Hearing Loss Mother     Heart Disease Mother     Stroke Mother     Vision Loss Mother     Arthritis Father     Cancer Father     Heart Disease Father     High Blood Pressure Father     Stroke Father     Vision Loss Father        Social History     Socioeconomic History    Marital status:      Spouse name: Not on file    Number of children: Not on file    Years of education: Not on file    Highest education level: Not on file   Occupational History    Not on file   Tobacco Use    Smoking status: Former Smoker     Packs/day: 1.00     Years: 60.00     Pack years: 60.00     Types: Cigarettes     Start date:      Quit date: 2020     Years since quittin.5    Smokeless tobacco: Never Used   Vaping Use    Vaping Use: Never used   Substance and Sexual Activity    Alcohol use: No    Drug use: No    Sexual activity: Yes     Partners: Female   Other Topics Concern    Not on file   Social History Narrative    Not on file     Social Determinants of Health     Financial Resource Strain: Low Risk     Difficulty of Paying Living Expenses: Not hard at all   Food Insecurity: No Food Insecurity    Worried About [address] in the Last Year: Never true    [address] [facility] [address] in the Last Year: Never true   Transportation Needs: No Transportation Needs    Lack of Transportation (Medical): No    Lack of Transportation (Non-Medical): No   Physical Activity: Inactive    Days of Exercise per Week: 0 days    Minutes of Exercise per Session: 0 min   Stress: Stress Concern Present    Feeling of Stress : Very much   Social Connections:  Moderately Isolated    Frequency of Communication with Friends and Family: More than three times a week    Frequency of Social Gatherings with Friends and Family: More than three times a week    Attends Jewish Services: More than 4 times per year    Active Member of Clubs or Organizations: No    Attends Club or Organization Meetings: Never    Marital Status:    Intimate Partner Violence: Not At Risk    Fear of Current or Ex-Partner: No    Emotionally Abused: No    Physically Abused: No    Sexually Abused: No       I/O (24Hr): Intake/Output Summary (Last 24 hours) at 10/28/2021 2012  Last data filed at 10/28/2021 1817  Gross per 24 hour   Intake 1113.71 ml   Output 1020 ml   Net 93.71 ml     Radiology:  CT ABDOMEN PELVIS WO CONTRAST Additional Contrast? None    Result Date: 10/22/2021  1. Partially visualized MASSLIKE LESION in the right parahilar and infrahilar region CONCERNING FOR MALIGNANCY. Dedicated contrast enhanced CT of the chest recommended for further evaluation. 2. No evidence of metastatic disease in the abdomen or the pelvis. 3. Bilateral nephrolithiasis. No hydronephrosis. Atrophic right kidney. 4.  Liquid stool in the colon indicates a diarrheal illness. No bowel wall thickening or obstruction noted. 5. 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 Vascular surgery consultation recommended especially for the right common iliac artery aneurysm. CT HEAD WO CONTRAST    Result Date: 10/25/2021  No acute intracranial abnormality. XR CHEST PORTABLE    Result Date: 10/27/2021  Findings suggest worsening pneumonia (aspiration?), especially on the left, superimposed on unchanged or slightly increased vascular congestion with slightly larger pleural effusions, latter larger on the right. Right IJ central line tip position stable. XR CHEST PORTABLE    Result Date: 10/25/2021  Increased right basilar atelectasis or pneumonia. Small right-sided pleural effusion, increased since the prior. XR CHEST PORTABLE    Result Date: 10/22/2021  1. Mildly increased patchy opacities at the lung bases, possibly pneumonia or atelectasis.  2.  Increased masslike opacity in the medial right base. This was previously evaluated with CT on 05/26/2021. Consider re-evaluation with contrast-enhanced CT to assess progression. 3.  Heterogeneous bony mineralization in the visualized right humeral diaphysis, which is indeterminate. Dedicated radiographs of the right humerus are recommended. XR CHEST PORTABLE    Result Date: 10/22/2021  Interval placed right IJ central venous catheter with no pneumothorax.  Otherwise stable exam       Labs:  Recent Results (from the past 24 hour(s))   Basic Metabolic Panel    Collection Time: 10/28/21  5:20 AM   Result Value Ref Range    Glucose 179 (H) 70 - 99 mg/dL    BUN 49 (H) 8 - 23 mg/dL    CREATININE 1.36 (H) 0.70 - 1.20 mg/dL    Bun/Cre Ratio NOT REPORTED 9 - 20    Calcium 8.8 8.6 - 10.4 mg/dL    Sodium 149 (H) 135 - 144 mmol/L    Potassium 3.0 (L) 3.7 - 5.3 mmol/L    Chloride 108 (H) 98 - 107 mmol/L    CO2 29 20 - 31 mmol/L    Anion Gap 12 9 - 17 mmol/L    GFR Non-African American 51 (L) >60 mL/min    GFR African American >60 >60 mL/min    GFR Comment          GFR Staging NOT REPORTED    CBC Auto Differential    Collection Time: 10/28/21  5:20 AM   Result Value Ref Range    WBC 11.1 (H) 3.5 - 11.0 k/uL    RBC 3.13 (L) 4.5 - 5.9 m/uL    Hemoglobin 8.9 (L) 13.5 - 17.5 g/dL    Hematocrit 27.0 (L) 41 - 53 %    MCV 86.2 80 - 100 fL    MCH 28.5 26 - 34 pg    MCHC 33.0 31 - 37 g/dL    RDW 17.4 (H) 11.5 - 14.9 %    Platelets 262 789 - 363 k/uL    MPV 7.0 6.0 - 12.0 fL    NRBC Automated NOT REPORTED per 100 WBC    Differential Type NOT REPORTED     Seg Neutrophils 85 (H) 36 - 66 %    Lymphocytes 8 (L) 24 - 44 %    Monocytes 7 1 - 7 %    Eosinophils % 0 0 - 4 %    Basophils 0 0 - 2 %    Immature Granulocytes NOT REPORTED 0 %    Segs Absolute 9.40 (H) 1.3 - 9.1 k/uL    Absolute Lymph # 0.80 (L) 1.0 - 4.8 k/uL    Absolute Mono # 0.80 0.1 - 1.3 k/uL    Absolute Eos # 0.00 0.0 - 0.4 k/uL    Basophils Absolute 0.00 0.0 - 0.2 k/uL    Absolute Immature Granulocyte NOT REPORTED 0.00 - 0.30 k/uL    WBC Morphology NOT REPORTED     RBC Morphology NOT REPORTED     Platelet Estimate NOT REPORTED    K (Potassium)    Collection Time: 10/28/21  7:19 PM   Result Value Ref Range    Potassium 3.5 (L) 3.7 - 5.3 mmol/L       Physical Examination:        Vitals:  BP (!) 125/94   Pulse 101   Temp 98.1 °F (36.7 °C) (Axillary)   Resp 20   Ht 6' 4\" (1.93 m)   Wt 181 lb 7 oz (82.3 kg)   SpO2 92%   BMI 22.09 kg/m²   Temp (24hrs), Av °F (36.7 °C), Min:97.3 °F (36.3 °C), Max:98.4 °F (36.9 °C)    No results for input(s): POCGLU in the last 72 hours. Physical Exam  Vitals reviewed. Constitutional:       Appearance: Normal appearance. He is not diaphoretic. HENT:      Head: Normocephalic and atraumatic. Right Ear: External ear normal.      Left Ear: External ear normal.      Nose: Nose normal.      Mouth/Throat:      Mouth: Mucous membranes are moist.      Pharynx: Oropharynx is clear. Eyes:      Conjunctiva/sclera: Conjunctivae normal.   Cardiovascular:      Rate and Rhythm: Normal rate and regular rhythm. Pulses: Normal pulses. Heart sounds: Normal heart sounds. Pulmonary:      Effort: Pulmonary effort is normal.      Breath sounds: Examination of the right-lower field reveals rhonchi. Examination of the left-lower field reveals rhonchi. Rhonchi present. Abdominal:      General: Bowel sounds are normal. There is no distension. Palpations: Abdomen is soft. Musculoskeletal:         General: No tenderness or deformity. Normal range of motion. Cervical back: Normal range of motion and neck supple. No rigidity. Right lower leg: No edema. Left lower leg: No edema. Skin:     General: Skin is warm and dry. Capillary Refill: Capillary refill takes less than 2 seconds. Coloration: Skin is not jaundiced.    Psychiatric:      Comments: Agitation         Assessment:        Primary Problem  Septic shock ([facility] [address]     Principal

## 2021-10-28 NOTE — PROGRESS NOTES
Abdominal binder placed on patient at this time in order to prevent patient from continuously pulling at peg tube.      Electronically signed by Sarai Stewart RN on 10/28/2021 at 5:08 AM

## 2021-10-28 NOTE — PROGRESS NOTES
Department of Internal Medicine  Nephrology Reilly Clark MD  Progress Note    Reason for consultation: Management of acute kidney injury superimposed on chronic kidney disease stage 4..    Consulting physician: Tr Farrell MD    Attending physician: Pascual Corrales MD.    Interval history:  Patient was seen and examined today. He is oliguric today. He is on tube feeding   BP stable. Patient is  non verbal. Over all in negative fluid balance 4.7  L  He has indwelling Issa catheter which was exchanged on admission and he is nonoliguric. Stable hemoglobin. History of present illness: This is a 77 y.o. male with a significant past medical history of Systemic hypertension, chronic obstructive pulmonary disease, Bladder cancer, seizure disorder, osteoarthritis and s/p Cerebrovascular accident [has G-tube in place], who presented to the hospital on 5/24/2021 with complaints of confusion and disorientation. Apparently patient lives at home alone but family lives nearby. Laboratory studies at presentation were remarkable for serum sodium 129 mmol/L, serum bicarbonate 19 mmol/L and elevated BUN/creatinine 102/4.36 mg/dL. I had seen patient during the prior presentation in May 2021 for acute kidney injury superimposed on chronic kidney disease stage IV and at that time serum creatinine was greater than 4 mg/dL. CODE STATUS is DNR CCA. Blood pressure at presentation this time was 114/45 work patient subsequently developed hypotension and is currently on Levophed drip at 6 mcg/min. He has a chronic indwelling Issa catheter and is nonoliguric. He is a poor historian and history was obtained primarily by chart review.     Scheduled Meds:   apixaban  5 mg Oral BID    metoprolol tartrate  50 mg Oral BID    QUEtiapine  25 mg Oral Nightly    nystatin  5 mL Oral 4x Daily    piperacillin-tazobactam  3,375 mg IntraVENous Q8H    pantoprazole  40 mg IntraVENous BID    And    sodium chloride (PF)  10 mL IntraVENous BID    doxycycline Monohydrate  100 mg Per G Tube BID     Continuous Infusions:   sodium chloride       Physical Exam:    VITALS:  /84   Pulse 93   Temp 98.2 °F (36.8 °C) (Axillary)   Resp 16   Ht 6' 4\" (1.93 m)   Wt 181 lb 7 oz (82.3 kg)   SpO2 90%   BMI 22.09 kg/m²   24HR INTAKE/OUTPUT:      Intake/Output Summary (Last 24 hours) at 10/28/2021 1618  Last data filed at 10/28/2021 1535  Gross per 24 hour   Intake 788.71 ml   Output 1020 ml   Net -231.29 ml     Constitutional: fatigued, flushed and slowed mentation    Skin: Skin color, texture, turgor normal. No rashes or lesions    Head: Normocephalic, without obvious abnormality, atraumatic     Cardiovascular/Edema: regular rate and rhythm, S1, S2 normal, no murmur, click, rub or gallop    Respiratory: Lungs: clear to auscultation bilaterally    Abdomen: soft, non-tender; bowel sounds normal; no masses,  no organomegaly    Back: symmetric, no curvature. ROM normal. No CVA tenderness. Extremities: extremities normal, atraumatic, no cyanosis or edema    Neuro:  Grossly normal    CBC:   Recent Labs     10/26/21  0607 10/26/21  1613 10/26/21  2324 10/27/21  0815 10/28/21  0520   WBC 6.5  --   --  8.5 11.1*   HGB 6.8*   < > 9.4* 9.3* 8.9*     --   --  216 238    < > = values in this interval not displayed.      BMP:    Recent Labs     10/26/21  0607 10/27/21  0815 10/28/21  0520    145* 149*   K 3.7 3.5* 3.0*   * 106 108*   CO2 27 28 29   BUN 56* 43* 49*   CREATININE 1.76* 1.28* 1.36*   GLUCOSE 155* 135* 179*     Lab Results   Component Value Date    NITRU NEGATIVE 10/25/2021    COLORU Yellow 10/25/2021    PHUR 5.0 10/25/2021    WBCUA 5 TO 10 10/25/2021    RBCUA 5 TO 10 10/25/2021    MUCUS NOT REPORTED 10/25/2021    TRICHOMONAS NOT REPORTED 10/25/2021    YEAST MODERATE 10/25/2021    BACTERIA FEW 10/25/2021    CLARITYU cloudy 09/07/2021    SPECGRAV 1.010 10/25/2021    LEUKOCYTESUR TRACE 10/25/2021    UROBILINOGEN Normal 10/25/2021    BILIRUBINUR NEGATIVE 10/25/2021    BLOODU ++ 09/07/2021    GLUCOSEU NEGATIVE 10/25/2021    KETUA NEGATIVE 10/25/2021    AMORPHOUS NOT REPORTED 10/25/2021     Urine Sodium:     Lab Results   Component Value Date    HELENA 78 10/25/2021     Urine Potassium:  No results found for: NATASHA  Urine Chloride:    Lab Results   Component Value Date    CLUR 87 10/25/2021     Urine Creatinine:     Lab Results   Component Value Date    LABCREA 32.6 10/25/2021     IMPRESSION/RECOMMENDATIONS:      1. Acute kidney injury superimposed on chronic kidney disease stage 3 [baseline serum creatinine 1.28 mg/dL on 8/11/2021] - Top differential is prerenal azotemia from poor oral intake as well as ischemic acute tubular necrosis. He has chronic indwelling Issa catheter and CT scan showed atrophic right kidney with bilateral nephrolithiasis but no hydronephrosis. Oliguric today-Scr is down to 1.36  Mg/dl stable       2. Proteus Mirabella's and Pseudomonas aeruginosa urinary tract infection - Continue IV Rocephin    3. Klebsiella pneumonia bacteremia - Continue IV antibiotics    4. Hypotension-resolved    5. Normocytic anemia - iron deficiency anemia  S/p 1 Unit PRBC    6. Hypokalemia - Patient  will receive IV 60 meq of KCL per sliding scale. 7.Hypernatremia-  start free water 250 mg q 6 hrly  Strict I/Os-monitor serum sodium AM.  CXR AM   IV lasix 20 mg x one  Monitor urine output. IV 60 meq of KCL per sliding scale. Prognosis is guarded.     Ligia Lamb MD   Attending Nephrologist  10/28/2021 4:18 PM

## 2021-10-28 NOTE — PLAN OF CARE
Problem: Falls - Risk of:  Goal: Will remain free from falls  Description: Will remain free from falls  10/28/2021 1706 by Darío Lorenzana RN  Outcome: Met This Shift  10/28/2021 0508 by Rima Villalba RN  Outcome: Met This Shift  Note: Will continue to monitor patient needs hourly and respond in a timely fashion in order to decrease the risk of patient falls. Patient is a high fall risk. Bed alarm initiated at all times. Will encourage patient to utilize call light when needing to ambulate out of bed. Patient compliant this shift. Goal: Absence of physical injury  Description: Absence of physical injury  10/28/2021 1706 by Darío Lorenzana RN  Outcome: Met This Shift  10/28/2021 0508 by Rima Villalba RN  Outcome: Met This Shift  Note: Patient will continue to be free from physical injury. Patient's bed alarm is on and hourly rounding is being completed to ensure all patient needs are met. Problem: SAFETY  Goal: Free from accidental physical injury  Outcome: Met This Shift  Goal: Free from intentional harm  Outcome: Met This Shift     Problem: DAILY CARE  Goal: Daily care needs are met  10/28/2021 1706 by Darío Lorenzana RN  Outcome: Met This Shift  10/28/2021 0508 by Rima Villalba RN  Outcome: Ongoing  Note: Will assist patient with any and all daily care needs to ensure they are met. Problem: DISCHARGE BARRIERS  Goal: Patient's continuum of care needs are met  Outcome: Met This Shift     Problem: Gas Exchange - Impaired:  Goal: Levels of oxygenation will improve  Description: Levels of oxygenation will improve  10/28/2021 1706 by Darío Lorenzana RN  Outcome: Met This Shift  Note: O2 sat wnl on room air   10/28/2021 0508 by Rima Villalba RN  Outcome: Ongoing  Note: Will continue to monitor patients SpO2 level to ensure proper oxygenation is occurring.   O2 device will be applied to keep SpO2>88%        Problem: Serum Glucose Level - Abnormal:  Goal: Ability to maintain appropriate glucose levels will improve  Description: Ability to maintain appropriate glucose levels will improve  Outcome: Met This Shift     Problem: Venous Thromboembolism:  Goal: Will show no signs or symptoms of venous thromboembolism  Description: Will show no signs or symptoms of venous thromboembolism  Outcome: Met This Shift     Problem: Nutrition  Goal: Optimal nutrition therapy  Outcome: Met This Shift  Note: Tube feed goals met

## 2021-10-28 NOTE — PLAN OF CARE
Problem: Falls - Risk of:  Goal: Will remain free from falls  Description: Will remain free from falls  Outcome: Met This Shift  Note: Will continue to monitor patient needs hourly and respond in a timely fashion in order to decrease the risk of patient falls. Patient is a high fall risk. Bed alarm initiated at all times. Will encourage patient to utilize call light when needing to ambulate out of bed. Patient compliant this shift. Goal: Absence of physical injury  Description: Absence of physical injury  Outcome: Met This Shift  Note: Patient will continue to be free from physical injury. Patient's bed alarm is on and hourly rounding is being completed to ensure all patient needs are met. Problem: Skin Integrity:  Goal: Will show no infection signs and symptoms  Description: Will show no infection signs and symptoms  Outcome: Ongoing  Note: Will continue to monitor and assess patient for any signs or symptoms of infection. Patient receiving IV zosyn for pneumonia at this time    Goal: Absence of new skin breakdown  Description: Absence of new skin breakdown  Outcome: Ongoing  Note: Will continue to assess and monitor patient for any signs of skin integrity issues. Patient will be rotated hourly to ensure pressure points are being rotated since patient is unable to move freely. Foam dressing applied to coccyx and waffle mattress applied to bed to help prevent further skin breakdown on coccyx     Problem: DAILY CARE  Goal: Daily care needs are met  Outcome: Ongoing  Note: Will assist patient with any and all daily care needs to ensure they are met. Problem: PAIN  Goal: Patient's pain/discomfort is manageable  Outcome: Ongoing  Note: Patient will be monitored and assessed to ensure pain is being managed properly in order to ensure the pain level has decreased or remained at a tolerable level for patient acceptance. Patient very restless and moaning all shift.       Problem: Discharge Planning:  Goal: Discharged to appropriate level of care  Description: Discharged to appropriate level of care  Outcome: Ongoing  Note: Patient continues to be involved in discharge planning. Care coordination continues to work with patient and family in regards to individual needs once discharged as inpatient. All questions are being addressed and patient is kept well informed. Problem: Gas Exchange - Impaired:  Goal: Levels of oxygenation will improve  Description: Levels of oxygenation will improve  Outcome: Ongoing  Note: Will continue to monitor patients SpO2 level to ensure proper oxygenation is occurring.   O2 device will be applied to keep SpO2>88%

## 2021-10-28 NOTE — PROGRESS NOTES
Nurse reviewed low urine output with Dr. Jordan Gonzales orders to give free water flushes 250ml q6h and 1 time dose of iv lasix 20mg.

## 2021-10-29 ENCOUNTER — APPOINTMENT (OUTPATIENT)
Dept: GENERAL RADIOLOGY | Age: 76
DRG: 698 | End: 2021-10-29
Payer: COMMERCIAL

## 2021-10-29 LAB
ABSOLUTE EOS #: 0 K/UL (ref 0–0.4)
ABSOLUTE IMMATURE GRANULOCYTE: ABNORMAL K/UL (ref 0–0.3)
ABSOLUTE LYMPH #: 1.3 K/UL (ref 1–4.8)
ABSOLUTE MONO #: 0.9 K/UL (ref 0.1–1.3)
ANION GAP SERPL CALCULATED.3IONS-SCNC: 12 MMOL/L (ref 9–17)
ANION GAP SERPL CALCULATED.3IONS-SCNC: 12 MMOL/L (ref 9–17)
BASOPHILS # BLD: 0 % (ref 0–2)
BASOPHILS ABSOLUTE: 0 K/UL (ref 0–0.2)
BUN BLDV-MCNC: 54 MG/DL (ref 8–23)
BUN BLDV-MCNC: 55 MG/DL (ref 8–23)
BUN/CREAT BLD: ABNORMAL (ref 9–20)
BUN/CREAT BLD: ABNORMAL (ref 9–20)
CALCIUM SERPL-MCNC: 8.8 MG/DL (ref 8.6–10.4)
CALCIUM SERPL-MCNC: 8.9 MG/DL (ref 8.6–10.4)
CHLORIDE BLD-SCNC: 109 MMOL/L (ref 98–107)
CHLORIDE BLD-SCNC: 113 MMOL/L (ref 98–107)
CO2: 28 MMOL/L (ref 20–31)
CO2: 28 MMOL/L (ref 20–31)
CREAT SERPL-MCNC: 1.21 MG/DL (ref 0.7–1.2)
CREAT SERPL-MCNC: 1.29 MG/DL (ref 0.7–1.2)
DIFFERENTIAL TYPE: ABNORMAL
EOSINOPHILS RELATIVE PERCENT: 0 % (ref 0–4)
GFR AFRICAN AMERICAN: >60 ML/MIN
GFR AFRICAN AMERICAN: >60 ML/MIN
GFR NON-AFRICAN AMERICAN: 54 ML/MIN
GFR NON-AFRICAN AMERICAN: 58 ML/MIN
GFR SERPL CREATININE-BSD FRML MDRD: ABNORMAL ML/MIN/{1.73_M2}
GLUCOSE BLD-MCNC: 136 MG/DL (ref 70–99)
GLUCOSE BLD-MCNC: 141 MG/DL (ref 70–99)
HCT VFR BLD CALC: 25.4 % (ref 41–53)
HEMOGLOBIN: 8.3 G/DL (ref 13.5–17.5)
IMMATURE GRANULOCYTES: ABNORMAL %
LYMPHOCYTES # BLD: 11 % (ref 24–44)
MCH RBC QN AUTO: 28.2 PG (ref 26–34)
MCHC RBC AUTO-ENTMCNC: 32.5 G/DL (ref 31–37)
MCV RBC AUTO: 86.9 FL (ref 80–100)
MONOCYTES # BLD: 7 % (ref 1–7)
NRBC AUTOMATED: ABNORMAL PER 100 WBC
PDW BLD-RTO: 18 % (ref 11.5–14.9)
PLATELET # BLD: 268 K/UL (ref 150–450)
PLATELET ESTIMATE: ABNORMAL
PMV BLD AUTO: 7.7 FL (ref 6–12)
POTASSIUM SERPL-SCNC: 3.4 MMOL/L (ref 3.7–5.3)
POTASSIUM SERPL-SCNC: 3.4 MMOL/L (ref 3.7–5.3)
POTASSIUM SERPL-SCNC: 3.7 MMOL/L (ref 3.7–5.3)
RBC # BLD: 2.92 M/UL (ref 4.5–5.9)
RBC # BLD: ABNORMAL 10*6/UL
SEG NEUTROPHILS: 82 % (ref 36–66)
SEGMENTED NEUTROPHILS ABSOLUTE COUNT: 9.8 K/UL (ref 1.3–9.1)
SODIUM BLD-SCNC: 149 MMOL/L (ref 135–144)
SODIUM BLD-SCNC: 150 MMOL/L (ref 135–144)
SODIUM BLD-SCNC: 153 MMOL/L (ref 135–144)
WBC # BLD: 12.1 K/UL (ref 3.5–11)
WBC # BLD: ABNORMAL 10*3/UL

## 2021-10-29 PROCEDURE — 6360000002 HC RX W HCPCS: Performed by: INTERNAL MEDICINE

## 2021-10-29 PROCEDURE — 2580000003 HC RX 258: Performed by: INTERNAL MEDICINE

## 2021-10-29 PROCEDURE — 85025 COMPLETE CBC W/AUTO DIFF WBC: CPT

## 2021-10-29 PROCEDURE — 6370000000 HC RX 637 (ALT 250 FOR IP): Performed by: FAMILY MEDICINE

## 2021-10-29 PROCEDURE — 84295 ASSAY OF SERUM SODIUM: CPT

## 2021-10-29 PROCEDURE — 80048 BASIC METABOLIC PNL TOTAL CA: CPT

## 2021-10-29 PROCEDURE — 36415 COLL VENOUS BLD VENIPUNCTURE: CPT

## 2021-10-29 PROCEDURE — 71045 X-RAY EXAM CHEST 1 VIEW: CPT

## 2021-10-29 PROCEDURE — 6370000000 HC RX 637 (ALT 250 FOR IP): Performed by: INTERNAL MEDICINE

## 2021-10-29 PROCEDURE — 2060000000 HC ICU INTERMEDIATE R&B

## 2021-10-29 PROCEDURE — 6370000000 HC RX 637 (ALT 250 FOR IP): Performed by: NURSE PRACTITIONER

## 2021-10-29 PROCEDURE — 84132 ASSAY OF SERUM POTASSIUM: CPT

## 2021-10-29 PROCEDURE — 2500000003 HC RX 250 WO HCPCS: Performed by: FAMILY MEDICINE

## 2021-10-29 PROCEDURE — 6360000002 HC RX W HCPCS: Performed by: FAMILY MEDICINE

## 2021-10-29 PROCEDURE — C9113 INJ PANTOPRAZOLE SODIUM, VIA: HCPCS | Performed by: INTERNAL MEDICINE

## 2021-10-29 RX ORDER — FUROSEMIDE 10 MG/ML
20 INJECTION INTRAMUSCULAR; INTRAVENOUS ONCE
Status: COMPLETED | OUTPATIENT
Start: 2021-10-29 | End: 2021-10-29

## 2021-10-29 RX ORDER — DEXTROSE MONOHYDRATE 50 MG/ML
INJECTION, SOLUTION INTRAVENOUS CONTINUOUS
Status: DISCONTINUED | OUTPATIENT
Start: 2021-10-29 | End: 2021-11-01

## 2021-10-29 RX ORDER — GLYCOPYRROLATE 1 MG/1
1 TABLET ORAL 3 TIMES DAILY
Status: COMPLETED | OUTPATIENT
Start: 2021-10-29 | End: 2021-10-30

## 2021-10-29 RX ORDER — POTASSIUM CHLORIDE 20 MEQ/1
20 TABLET, EXTENDED RELEASE ORAL DAILY
Status: DISCONTINUED | OUTPATIENT
Start: 2021-10-30 | End: 2021-10-30

## 2021-10-29 RX ADMIN — DEXTROSE MONOHYDRATE: 50 INJECTION, SOLUTION INTRAVENOUS at 09:28

## 2021-10-29 RX ADMIN — NYSTATIN 500000 UNITS: 100000 SUSPENSION ORAL at 17:49

## 2021-10-29 RX ADMIN — APIXABAN 5 MG: 5 TABLET, FILM COATED ORAL at 08:22

## 2021-10-29 RX ADMIN — NYSTATIN 500000 UNITS: 100000 SUSPENSION ORAL at 21:59

## 2021-10-29 RX ADMIN — PIPERACILLIN SODIUM AND TAZOBACTAM SODIUM 3375 MG: 3; .375 INJECTION, POWDER, LYOPHILIZED, FOR SOLUTION INTRAVENOUS at 22:01

## 2021-10-29 RX ADMIN — APIXABAN 5 MG: 5 TABLET, FILM COATED ORAL at 21:59

## 2021-10-29 RX ADMIN — PANTOPRAZOLE SODIUM 40 MG: 40 INJECTION, POWDER, FOR SOLUTION INTRAVENOUS at 08:22

## 2021-10-29 RX ADMIN — METOPROLOL TARTRATE 50 MG: 50 TABLET, FILM COATED ORAL at 21:59

## 2021-10-29 RX ADMIN — PIPERACILLIN SODIUM AND TAZOBACTAM SODIUM 3375 MG: 3; .375 INJECTION, POWDER, LYOPHILIZED, FOR SOLUTION INTRAVENOUS at 03:45

## 2021-10-29 RX ADMIN — SODIUM CHLORIDE, PRESERVATIVE FREE 10 ML: 5 INJECTION INTRAVENOUS at 08:22

## 2021-10-29 RX ADMIN — METOPROLOL TARTRATE 50 MG: 50 TABLET, FILM COATED ORAL at 08:22

## 2021-10-29 RX ADMIN — ANTI-FUNGAL POWDER MICONAZOLE NITRATE TALC FREE: 1.42 POWDER TOPICAL at 13:59

## 2021-10-29 RX ADMIN — POTASSIUM CHLORIDE: 2 INJECTION, SOLUTION, CONCENTRATE INTRAVENOUS at 04:39

## 2021-10-29 RX ADMIN — NYSTATIN 500000 UNITS: 100000 SUSPENSION ORAL at 08:22

## 2021-10-29 RX ADMIN — PIPERACILLIN SODIUM AND TAZOBACTAM SODIUM 3375 MG: 3; .375 INJECTION, POWDER, LYOPHILIZED, FOR SOLUTION INTRAVENOUS at 11:29

## 2021-10-29 RX ADMIN — DOXYCYCLINE 100 MG: 25 FOR SUSPENSION ORAL at 22:41

## 2021-10-29 RX ADMIN — GLYCOPYRROLATE 1 MG: 1 TABLET ORAL at 21:58

## 2021-10-29 RX ADMIN — FUROSEMIDE 20 MG: 10 INJECTION, SOLUTION INTRAMUSCULAR; INTRAVENOUS at 13:59

## 2021-10-29 RX ADMIN — TRAMADOL HYDROCHLORIDE 50 MG: 50 TABLET ORAL at 08:24

## 2021-10-29 RX ADMIN — DOXYCYCLINE 100 MG: 25 FOR SUSPENSION ORAL at 09:28

## 2021-10-29 RX ADMIN — PANTOPRAZOLE SODIUM 40 MG: 40 INJECTION, POWDER, FOR SOLUTION INTRAVENOUS at 21:59

## 2021-10-29 RX ADMIN — SODIUM CHLORIDE, PRESERVATIVE FREE 10 ML: 5 INJECTION INTRAVENOUS at 21:59

## 2021-10-29 RX ADMIN — DEXTROSE MONOHYDRATE: 50 INJECTION, SOLUTION INTRAVENOUS at 21:04

## 2021-10-29 RX ADMIN — ANTI-FUNGAL POWDER MICONAZOLE NITRATE TALC FREE: 1.42 POWDER TOPICAL at 22:33

## 2021-10-29 RX ADMIN — QUETIAPINE FUMARATE 25 MG: 50 TABLET ORAL at 21:58

## 2021-10-29 RX ADMIN — GLYCOPYRROLATE 1 MG: 1 TABLET ORAL at 13:59

## 2021-10-29 RX ADMIN — NYSTATIN 500000 UNITS: 100000 SUSPENSION ORAL at 13:59

## 2021-10-29 ASSESSMENT — ENCOUNTER SYMPTOMS: VOMITING: 0

## 2021-10-29 ASSESSMENT — PAIN SCALES - GENERAL
PAINLEVEL_OUTOF10: 5
PAINLEVEL_OUTOF10: 0

## 2021-10-29 NOTE — ACP (ADVANCE CARE PLANNING)
..Advance Care Planning     Advance Care Planning Activator (Inpatient)  Conversation Note      Date of ACP Conversation: 10/29/2021     Conversation Conducted with: Patient with [address]: Named in Advance Directive or Healthcare Power of  (name) Nahomi Mayes    ACP Activator: Kathrin Merrill RN      Health Care Decision Maker:  Nahomi Mayes [phone]    Current Designated Health Care Decision Maker:     Primary Decision Maker (Active): Dusty Lechuga - Child - [phone]    Secondary Decision Maker: Dorinda Sosa - Brother/Sister - [phone]       Care Preferences    Ventilation: \"If you were in your present state of health and suddenly became very ill and were unable to breathe on your own, what would your preference be about the use of a ventilator (breathing machine) if it were available to you? \"      Would the patient desire the use of ventilator (breathing machine)?: no    \"If your health worsens and it becomes clear that your chance of recovery is unlikely, what would your preference be about the use of a ventilator (breathing machine) if it were available to you? \"     Would the patient desire the use of ventilator (breathing machine)?: No      Resuscitation  \"CPR works best to restart the heart when there is a sudden event, like a heart attack, in someone who is otherwise healthy. Unfortunately, CPR does not typically restart the heart for people who have serious health conditions or who are very sick. \"    \"In the event your heart stopped as a result of an underlying serious health condition, would you want attempts to be made to restart your heart (answer \"yes\" for attempt to resuscitate) or would you prefer a natural death (answer \"no\" for do not attempt to resuscitate)? \" no       [x] Yes   [] No   Educated Patient / Barbara Oconnellp regarding differences between Advance Directives and portable DNR orders.     Length of ACP Conversation in minutes: Conversation Outcomes:  [] ACP discussion completed  [x] Existing advance directive reviewed with patient; no changes to patient's previously recorded wishes  [] New Advance Directive completed  [] Portable Do Not Rescitate prepared for Provider review and signature  [] POLST/POST/MOLST/MOST prepared for Provider review and signature      Follow-up plan:    [x] Schedule follow-up conversation to continue planning  [] Referred individual to Provider for additional questions/concerns   [] Advised patient/agent/surrogate to review completed ACP document and update if needed with changes in condition, patient preferences or care setting    [] This note routed to one or more involved healthcare providers

## 2021-10-29 NOTE — CARE COORDINATION
Writer was notified, by Klarissa Franco, Palliative Care Nurse, that there will be a NWO,Hospice Meeting, on Monday. They are getting in touch w/ the Pt's Son, Bree Dixon, to set up a time.

## 2021-10-29 NOTE — PROGRESS NOTES
Measures:  · Height: 6' 4\" (193 cm)  · Current Body Weight: 181 lb (82.1 kg)   · Admission Body Weight: 181 lb (82.1 kg)    · Usual Body Weight: 183 lb (83 kg) (5/21)     · Ideal Body Weight: 202 lbs; % Ideal Body Weight     · BMI: 22  · BMI Categories: Normal Weight (BMI 22.0 to 24.9) age over 72       Nutrition Diagnosis:   · Inadequate oral intake related to  (current medical condition) as evidenced by NPO or clear liquid status due to medical condition, nutrition support - enteral nutrition      Nutrition Interventions:   Food and/or Nutrient Delivery:  Continue Current Tube Feeding  Nutrition Education/Counseling:  No recommendation at this time   Coordination of Nutrition Care:  Continue to monitor while inpatient    Goals:  provide more than 75% of nutrition needs       Nutrition Monitoring and Evaluation:   Food/Nutrient Intake Outcomes:  Enteral Nutrition Intake/Tolerance  Physical Signs/Symptoms Outcomes:  Biochemical Data, GI Status, Skin, Weight, Fluid Status or Edema     Discharge Planning:    Enteral Nutrition     Some areas of assessment may be incomplete due to COVID-19 precautions. Marilin Barnhart R.D., L.D.   Clinical Dietitian  Office: [phone]

## 2021-10-29 NOTE — PLAN OF CARE
Problem: Falls - Risk of:  Goal: Will remain free from falls  Description: Will remain free from falls  10/29/2021 0332 by Sana Arce RN  Outcome: Ongoing  Note: No falls this shift. Call light within reach and siderails x2. Bed in lowest position. Patient safety maintained. Problem: Skin Integrity:  Goal: Will show no infection signs and symptoms  Description: Will show no infection signs and symptoms  10/29/2021 0332 by Sana Arce RN  Outcome: Ongoing  Note: Patient remains free from infection and no signs and symptoms of infection. Problem: Skin Integrity:  Goal: Absence of new skin breakdown  Description: Absence of new skin breakdown  10/29/2021 0332 by Sana Arce RN  Outcome: Ongoing  Note: Skin assessment as charted. No new areas of breakdown. Problem: PAIN  Goal: Patient's pain/discomfort is manageable  10/29/2021 0332 by Sana Arce RN  Outcome: Ongoing  Note: Denies any pain at this time. Will continue to monitor. Problem: Gas Exchange - Impaired:  Goal: Levels of oxygenation will improve  Description: Levels of oxygenation will improve  10/29/2021 0332 by Sana Arce RN  Outcome: Ongoing  Note: Respiratory assessment as charted. No signs or symptoms of distress.

## 2021-10-29 NOTE — PROGRESS NOTES
Spoke with dr Riddle How regarding pt tachypneic episodes where pt appears to be struggling/anxious every minute on and off continuously. Reviewed pt needs suctioned every few minutes d/t thick sputum. New order for robinul to help dry up secretions and lasix Iv to help with SOB. Reviewed concern for penis looking pale. Dr not concerned but ordered micotin powder for the area around it. Reviewed with dr Augustus Storey pt had 220 output so far today and lasix will be given per new order. Reviewed pt appears to be struggling with comfort a lot today.

## 2021-10-29 NOTE — PROGRESS NOTES
Progress Note    10/29/2021   1:48 PM    Name:  Mariah Moreno  MRN:    [id number]     Acct:     [de-identified]   Room:  [facility]/[address] [place] [address] Day: 7     Admit Date: 10/22/2021  9:04 AM  PCP: Prabhakar Santizo MD    Subjective:     C/C:   Chief Complaint   Patient presents with    Altered Mental Status       Interval History: Status: not changed. Patient has increased generalized weakness. Has respiratory secretions requiring frequent suction. Vital signs reviewed patient is on 2 L of oxygen via nasal cannula blood pressure and heart rate stable. Labs reviewed sodium 153 creatinine 1.21 potassium 3.7 hemoglobin 8.3 WBC 12.1    ROS:   all 10 systems reviewed and are negative except as noted    Review of Systems   Unable to perform ROS: Patient nonverbal   Constitutional: Positive for fatigue. Respiratory:        Respiratory secretions   Gastrointestinal: Negative for vomiting. Medications: Allergies:    Allergies   Allergen Reactions    Bactrim [Sulfamethoxazole-Trimethoprim] Hives and Swelling    Ciprofloxacin Swelling     FACE       Current Meds: dextrose 5 % solution, Continuous  furosemide (LASIX) injection 20 mg, Once  glycopyrrolate (ROBINUL) tablet 1 mg, TID  miconazole (MICOTIN) 2 % powder, BID  apixaban (ELIQUIS) tablet 5 mg, BID  metoprolol tartrate (LOPRESSOR) tablet 50 mg, BID  QUEtiapine (SEROQUEL) tablet 25 mg, Nightly  nystatin (MYCOSTATIN) 884393 UNIT/ML suspension 500,000 Units, 4x Daily  melatonin tablet 6 mg, Nightly PRN  piperacillin-tazobactam (ZOSYN) 3,375 mg in dextrose 5 % 50 mL IVPB extended infusion (mini-bag), Q8H  0.9 % sodium chloride infusion, PRN  midodrine (PROAMATINE) tablet 10 mg, TID PRN  acetaminophen (TYLENOL) tablet 650 mg, Q4H PRN  traMADol (ULTRAM) tablet 50 mg, Q8H PRN  pantoprazole (PROTONIX) injection 40 mg, BID   And  sodium chloride (PF) 0.9 % injection 10 mL, BID  doxycycline Monohydrate (VIBRAMYCIN) suspension 100 mg, BID  potassium chloride 10 mEq/100 mL IVPB (Peripheral Line), PRN  microfibrillar collagen powder 1 g, PRN  ipratropium-albuterol (DUONEB) nebulizer solution 1 ampule, Q4H PRN        Data:     Code Status:  DNR-CCA    Family History   Problem Relation Age of Onset    Arthritis Mother     Atrial Fibrillation Mother     Hearing Loss Mother     Heart Disease Mother     Stroke Mother     Vision Loss Mother     Arthritis Father     Cancer Father     Heart Disease Father     High Blood Pressure Father     Stroke Father     Vision Loss Father        Social History     Socioeconomic History    Marital status:      Spouse name: Not on file    Number of children: Not on file    Years of education: Not on file    Highest education level: Not on file   Occupational History    Not on file   Tobacco Use    Smoking status: Former Smoker     Packs/day: 1.00     Years: 60.00     Pack years: 60.00     Types: Cigarettes     Start date:      Quit date: 2020     Years since quittin.5    Smokeless tobacco: Never Used   Vaping Use    Vaping Use: Never used   Substance and Sexual Activity    Alcohol use: No    Drug use: No    Sexual activity: Yes     Partners: Female   Other Topics Concern    Not on file   Social History Narrative    Not on file     Social Determinants of Health     Financial Resource Strain: Low Risk     Difficulty of Paying Living Expenses: Not hard at all   Food Insecurity: No Food Insecurity    Worried About [facility] in the Last Year: Never true    [facility] in the Last Year: Never true   Transportation Needs: No Transportation Needs    Lack of Transportation (Medical): No    Lack of Transportation (Non-Medical): No   Physical Activity: Inactive    Days of Exercise per Week: 0 days    Minutes of Exercise per Session: 0 min   Stress: Stress Concern Present    Feeling of Stress : Very much   Social Connections:  Moderately Isolated    Frequency of Communication with Friends and Family: More than three times a week    Frequency of Social Gatherings with Friends and Family: More than three times a week    Attends Confucianism Services: More than 4 times per year    Active Member of Clubs or Organizations: No    Attends Club or Organization Meetings: Never    Marital Status:    Intimate Partner Violence: Not At Risk    Fear of Current or Ex-Partner: No    Emotionally Abused: No    Physically Abused: No    Sexually Abused: No       I/O (24Hr): Intake/Output Summary (Last 24 hours) at 10/29/2021 1348  Last data filed at 10/29/2021 1018  Gross per 24 hour   Intake 1230 ml   Output 1420 ml   Net -190 ml     Radiology:  CT ABDOMEN PELVIS WO CONTRAST Additional Contrast? None    Result Date: 10/22/2021  1. Partially visualized MASSLIKE LESION in the right parahilar and infrahilar region CONCERNING FOR MALIGNANCY. Dedicated contrast enhanced CT of the chest recommended for further evaluation. 2. No evidence of metastatic disease in the abdomen or the pelvis. 3. Bilateral nephrolithiasis. No hydronephrosis. Atrophic right kidney. 4.  Liquid stool in the colon indicates a diarrheal illness. No bowel wall thickening or obstruction noted. 5. 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 Vascular surgery consultation recommended especially for the right common iliac artery aneurysm. CT HEAD WO CONTRAST    Result Date: 10/25/2021  No acute intracranial abnormality. XR CHEST PORTABLE    Result Date: 10/29/2021  Bilateral effusions and bibasilar infiltrates most pronounced on the right. Stable exam compared to prior. XR CHEST PORTABLE    Result Date: 10/27/2021  Findings suggest worsening pneumonia (aspiration?), especially on the left, superimposed on unchanged or slightly increased vascular congestion with slightly larger pleural effusions, latter larger on the right. Right IJ central line tip position stable.      XR CHEST PORTABLE    Result Date: 10/25/2021  Increased right basilar atelectasis or pneumonia. Small right-sided pleural effusion, increased since the prior. XR CHEST PORTABLE    Result Date: 10/22/2021  Interval placed right IJ central venous catheter with no pneumothorax.  Otherwise stable exam       Labs:  Recent Results (from the past 24 hour(s))   K (Potassium)    Collection Time: 10/28/21  7:19 PM   Result Value Ref Range    Potassium 3.5 (L) 3.7 - 5.3 mmol/L   BASIC METABOLIC PANEL    Collection Time: 10/29/21  3:08 AM   Result Value Ref Range    Glucose 136 (H) 70 - 99 mg/dL    BUN 55 (H) 8 - 23 mg/dL    CREATININE 1.29 (H) 0.70 - 1.20 mg/dL    Bun/Cre Ratio NOT REPORTED 9 - 20    Calcium 8.9 8.6 - 10.4 mg/dL    Sodium 149 (H) 135 - 144 mmol/L    Potassium 3.4 (L) 3.7 - 5.3 mmol/L    Chloride 109 (H) 98 - 107 mmol/L    CO2 28 20 - 31 mmol/L    Anion Gap 12 9 - 17 mmol/L    GFR Non-African American 54 (L) >60 mL/min    GFR African American >60 >60 mL/min    GFR Comment          GFR Staging NOT REPORTED    Basic Metabolic Panel    Collection Time: 10/29/21  5:19 AM   Result Value Ref Range    Glucose 141 (H) 70 - 99 mg/dL    BUN 54 (H) 8 - 23 mg/dL    CREATININE 1.21 (H) 0.70 - 1.20 mg/dL    Bun/Cre Ratio NOT REPORTED 9 - 20    Calcium 8.8 8.6 - 10.4 mg/dL    Sodium 153 (H) 135 - 144 mmol/L    Potassium 3.4 (L) 3.7 - 5.3 mmol/L    Chloride 113 (H) 98 - 107 mmol/L    CO2 28 20 - 31 mmol/L    Anion Gap 12 9 - 17 mmol/L    GFR Non-African American 58 (L) >60 mL/min    GFR African American >60 >60 mL/min    GFR Comment          GFR Staging NOT REPORTED    CBC Auto Differential    Collection Time: 10/29/21  5:19 AM   Result Value Ref Range    WBC 12.1 (H) 3.5 - 11.0 k/uL    RBC 2.92 (L) 4.5 - 5.9 m/uL    Hemoglobin 8.3 (L) 13.5 - 17.5 g/dL    Hematocrit 25.4 (L) 41 - 53 %    MCV 86.9 80 - 100 fL    MCH 28.2 26 - 34 pg    MCHC 32.5 31 - 37 g/dL    RDW 18.0 (H) 11.5 - 14.9 %    Platelets 785 552 - 781 k/uL    MPV 7.7 6.0 - 12.0 fL    NRBC Automated NOT REPORTED per 100 WBC    Differential Type NOT REPORTED     Seg Neutrophils 82 (H) 36 - 66 %    Lymphocytes 11 (L) 24 - 44 %    Monocytes 7 1 - 7 %    Eosinophils % 0 0 - 4 %    Basophils 0 0 - 2 %    Immature Granulocytes NOT REPORTED 0 %    Segs Absolute 9.80 (H) 1.3 - 9.1 k/uL    Absolute Lymph # 1.30 1.0 - 4.8 k/uL    Absolute Mono # 0.90 0.1 - 1.3 k/uL    Absolute Eos # 0.00 0.0 - 0.4 k/uL    Basophils Absolute 0.00 0.0 - 0.2 k/uL    Absolute Immature Granulocyte NOT REPORTED 0.00 - 0.30 k/uL    WBC Morphology NOT REPORTED     RBC Morphology NOT REPORTED     Platelet Estimate NOT REPORTED    K (Potassium)    Collection Time: 10/29/21 12:05 PM   Result Value Ref Range    Potassium 3.7 3.7 - 5.3 mmol/L       Physical Examination:        Vitals:  BP (!) 143/95   Pulse 86   Temp 98.1 °F (36.7 °C) (Axillary)   Resp (!) 38   Ht 6' 4\" (1.93 m)   Wt 181 lb 7 oz (82.3 kg)   SpO2 96%   BMI 22.09 kg/m²   Temp (24hrs), Av.3 °F (36.8 °C), Min:98.1 °F (36.7 °C), Max:98.7 °F (37.1 °C)    No results for input(s): POCGLU in the last 72 hours. Physical Exam  Vitals reviewed. Constitutional:       Appearance: He is not diaphoretic. Comments: Drowsy   HENT:      Head: Normocephalic and atraumatic. Right Ear: External ear normal.      Left Ear: External ear normal.      Nose: Nose normal.      Mouth/Throat:      Mouth: Mucous membranes are moist.      Pharynx: Oropharynx is clear. Eyes:      Conjunctiva/sclera: Conjunctivae normal.   Cardiovascular:      Rate and Rhythm: Normal rate and regular rhythm. Pulses: Normal pulses. Heart sounds: Normal heart sounds. Pulmonary:      Effort: Pulmonary effort is normal.      Breath sounds: Examination of the right-lower field reveals decreased breath sounds and rales. Examination of the left-lower field reveals decreased breath sounds and rales. Decreased breath sounds and rales present.    Abdominal:      General: Bowel sounds are normal. There is no distension. Palpations: Abdomen is soft. Musculoskeletal:         General: No tenderness or deformity. Normal range of motion. Cervical back: Normal range of motion and neck supple. No rigidity. Skin:     General: Skin is warm and dry. Capillary Refill: Capillary refill takes less than 2 seconds. Coloration: Skin is not jaundiced. Neurological:      Comments: Nonverbal         Assessment:        Primary Problem  Septic shock (HCC)     Principal Problem:    Septic shock ([facility])  Active Problems:    Multifocal pneumonia    Tobacco abuse    Acute cystitis without hematuria    Mass of right lung    COPD (chronic obstructive pulmonary disease) ([facility])    PAF (paroxysmal atrial fibrillation) ([facility])    Chronic combined systolic and diastolic congestive heart failure (HCC)    Dilated cardiomyopathy ([facility])    Bacteremia due to Klebsiella pneumoniae    Melena    Anemia  Resolved Problems:    * No resolved hospital problems. *      Past Medical History:   Diagnosis Date    Arthritis     Cancer [facility])     bladder 1980s    Cerebral artery occlusion with cerebral infarction [facility])     TIA 2010    Chronic kidney disease     [facility] [address]     Hypertension     Pneumonia     Psychiatric problem     depression, social anxiety    Seizures ([facility] [address]         Plan:        1. IV Zosyn,  2.  doxycycline per PEG tube  3. Chest x-ray report from today reviewed  4. Robinul 1 mg per PEG tube 3 times a day  5. Lasix 20 mg IV now  6. D5 IV at 100 mL/h  7. Palliative care consult  1. Continue tube feed  2.  free water flushes 250 mg every 6 hours  3. Chest x-ray  4. Nystatin p.o. every 6 hours  5. Discussed with nurse and sister at bedside  6. increase Metoprolol 50 mg per PEG tube twice daily with holding parameters  7. Midodrine 10 mg p.o. 3 times a day as needed for systolic blood pressure less than 110  8. DC IV fluid  9.  Urine culture sensitivities pending  10. Seroquel 25 mg per PEG tube nightly for insomnia  11. Cardiology input noted  12. Melatonin 6 mg p.o. as needed nightly  13. CBC, CMP  14. DVT Prophylaxis Eliquis 5 mg p.o. twice daily  15. EPCs  16. PT/OT to evaluate and treat  17. Pain control  18. Replace electrolytes as per sliding scale  19.  Home medications reviewed and appropriate medications continued    Electronically signed by Horacio Patiño MD

## 2021-10-29 NOTE — CONSULTS
..    Palliative Care Inpatient Consult    NAME:  [address] [place] [address] RECORD NUMBER:  [id number]  AGE: 77 y.o. GENDER: male  : 1945  TODAY'S DATE:  10/29/2021    Reasons for Consultation:    Provision of information regarding PC and/or hospice philosophies  Complex, time-intensive communication and interdisciplinary psychosocial support  Clarification of goals of care and/or assistance with difficult decision-making  Guidance in regards to resources and transition(s)    Code Status: [facility]    Members of PC team contributing to this consultation are :  Nghia Ng RN    History of Present Illness     The patient is a 77 y.o. Non- / non  male who presents with Altered Mental Status    Referred to Palliative Care by   [x] Physician   [] Nursing  [] Family Request   [] Other:       He was admitted to the primary service for Acute cystitis with hematuria [N30.01]  Septic shock ([facility] [address] [A41.9, R65.21]  Altered mental status, unspecified altered mental status type [R41.82]. His hospital course has been associated with Septic shock ([address]  The patient has a complicated medical history and has been hospitalized since 10/22/2021  9:04 AM.    Active Hospital Problems    Diagnosis Date Noted    Melena [K92.1]     Anemia [D64.9]     Bacteremia due to Klebsiella pneumoniae [R78.81, B96.1] 10/24/2021    Septic shock ([address] [A41.9, R65.21] 10/22/2021    Chronic combined systolic and diastolic congestive heart failure ([address] [I50.42] 10/22/2021    Dilated cardiomyopathy ([facility] [address] [I42.0] 10/22/2021    PAF (paroxysmal atrial fibrillation) ([facility] [address] [I48.0] 2021    COPD (chronic obstructive pulmonary disease) ([facility] [address] [J44.9] 2021    Acute cystitis without hematuria [N30.00] 2021    Mass of right lung [R91.8] 2021    Tobacco abuse [Z72.0] 2020    Multifocal pneumonia [J18.9] 2020       Data        Code Status: DNR-CCA     ADVANCED CARE PLANNING:  Patient has capacity for verbally communicate. Pt's sister Martin Green ([employer] #2) at bedside and I introduce myself. Pt randomly moans out when awake then falls back to sleep. Martin Green states patient was on hospice care at home for a short time before he decided he wanted to do therapy to try to get stronger and so they discontinued hospice services and enrolled in home care. Since that time, patient has become fully bedridden and Martin Green states her and patient's son Arya Street (DPOA#1) have been discussing patient's poor quality of life. Currently patient continues on antibiotics for sepsis and multifocal pneumonia. He is not tolerating his tube feedings. He has become oliguric and nephrology was consulted. Pt is less responsive and not using his normal route of communication with his family. He does nod his head \"no\" when I asked if he was in pain but does not answer any other questions at this time. Pt is currently a DNRCCA. I reached out to carol Street on the phone and introduced myself. He states he feels the patient's quality of life has gone down and they are again contemplating hospice services. We discussed this option and family is wanting to speak with hospice again. Family is requesting a Monday meeting as they have a family  to attend this weekend. I called [facility] [address] [place] [address] and made a referral. They will call carol Street to arrange a time on Monday and let me know.  updated. Emotional support offered to son and sister. Will follow up on Monday.      Education/support to family  Education/support to patient  Discharge planning/helping to coordinate care  Communications with primary service  Providing support for coping/adaptation/distress of patient  Discussing meaning/purpose   Continue with current plan of care  Clarification of medical condition to patient and family  Code status clarified: [facility]  Provided information about hospice  Validating patient/family distress  Continued communication updates  Recognizing, reflecting, and empathizing with family members' anticipatory grief  Long discussion held with family and they are wanting to re-evaluate patient for hospice services again. They will be meeting with [facility] on Monday. We will follow up on Monday. Principle Problem/Diagnosis:  Acute cystitis with hematuria [N30.01]  Septic shock ([facility] [A41.9, R65.21]  Altered mental status, unspecified altered mental status type [R41.82]    Goals of care evaluation:  The patient goals of care are improve or maintain function/quality of life and provide comfort care/support/palliation/relieve suffering   Goals of care discussed with:    [] Patient independently    [] Patient and Family    [x] Family or Healthcare DPOA independently    [] Unable to discuss with patient, family/DPOA not present    Code Status  DNR-CCA    Other recommendations:  Please call with any palliative questions or concerns. Palliative Care Team is available via perfect serve or via phone - [phone]. Palliative Care will continue to follow Mr. Jerry Verde care as needed. Thank you for allowing Palliative Care to participate in the care of Mr. Paige Moffett .     Electronically signed by   Rojelio Robles RN  Palliative Care Team  on 10/29/2021 at 12:29 PM

## 2021-10-29 NOTE — PROGRESS NOTES
Pulmonary Progress Note  [facility]      Patient - Aaron Pruitt,  Age - 77 y.o.    - 1945      Room Number - 2125/2125-01   N -  [id number]   [facility] # - [de-identified]  Date of Admission -  10/22/2021  9:04 Odalis Sanchez MD  Primary Care Physician - Shady Parsons MD     SUBJECTIVE   Patient's vital signs remained stable. He is on 2 L nasal cannula however he is constantly moaning. He is at baseline nonverbal. He is contracted in upper extremities. He was started on free water as well as dextrose infusion due to hypernatremia    OBJECTIVE   VITALS    height is 6' 4\" (1.93 m) and weight is 181 lb 7 oz (82.3 kg). His axillary temperature is 98.7 °F (37.1 °C). His blood pressure is 137/85 and his pulse is 82. His respiration is 28 and oxygen saturation is 93%. Body mass index is 22.09 kg/m².   Temperature Range: Temp: 98.7 °F (37.1 °C) Temp  Av.3 °F (36.8 °C)  Min: 98.1 °F (36.7 °C)  Max: 98.7 °F (37.1 °C)  BP Range:  Systolic (77SEJ), ZXV:312 , Min:125 , DSP:953     Diastolic (10PTU), TJL:16, Min:80, Max:94    Pulse Range: Pulse  Av  Min: 68  Max: 101  Respiration Range: Resp  Av  Min: 16  Max: 28  Current Pulse Ox[de-identified]  SpO2: 93 % (bounces to 85% for short period and back up to 95%)  24HR Pulse Ox Range:  SpO2  Av.6 %  Min: 90 %  Max: 97 %  Oxygen Amount and Delivery: O2 Flow Rate (L/min): 2 L/min    Wt Readings from Last 3 Encounters:   10/28/21 181 lb 7 oz (82.3 kg)   21 178 lb (80.7 kg)   21 178 lb 5.6 oz (80.9 kg)       I/O (24 Hours)    Intake/Output Summary (Last 24 hours) at 10/29/2021 1138  Last data filed at 10/29/2021 1018  Gross per 24 hour   Intake 1230 ml   Output 1420 ml   Net -190 ml       EXAM     General Appearance moaning  HEENT - normocephalic, atraumatic   Neck - Supple,  trachea midline   Lungs -sounds are coarse throughout  Cardiovascular - Heart sounds are normal.  Regular rate and rhythm   Abdomen - Soft, nontender, nondistended, no masses or organomegaly  Neurologic -she is awake, nonverbal, moaning no other following of commands  Skin - No bruising or bleeding      MEDS      apixaban  5 mg Oral BID    metoprolol tartrate  50 mg Oral BID    QUEtiapine  25 mg Oral Nightly    nystatin  5 mL Oral 4x Daily    piperacillin-tazobactam  3,375 mg IntraVENous Q8H    pantoprazole  40 mg IntraVENous BID    And    sodium chloride (PF)  10 mL IntraVENous BID    doxycycline Monohydrate  100 mg Per G Tube BID      dextrose 100 mL/hr at 10/29/21 0928    sodium chloride       melatonin, sodium chloride, midodrine, acetaminophen, traMADol, potassium chloride, microfibrillar collagen, ipratropium-albuterol    LABS   CBC   Recent Labs     10/29/21  0519   WBC 12.1*   HGB 8.3*   HCT 25.4*   MCV 86.9        BMP:   Lab Results   Component Value Date     10/29/2021    K 3.4 10/29/2021     10/29/2021    CO2 28 10/29/2021    BUN 54 10/29/2021    LABALBU 3.4 10/22/2021    CREATININE 1.21 10/29/2021    CALCIUM 8.8 10/29/2021    GFRAA >60 10/29/2021    LABGLOM 58 10/29/2021     ABGs:  Lab Results   Component Value Date    PHART 7.506 06/03/2021    PO2ART 123.0 06/03/2021    BNV3QTD 37.1 06/03/2021      Lab Results   Component Value Date    MODE PRVC 06/03/2021     Ionized Calcium:  No results found for: IONCA  Magnesium:    Lab Results   Component Value Date    MG 2.8 10/22/2021     Phosphorus:    Lab Results   Component Value Date    PHOS 2.8 06/04/2021        LIVER PROFILE No results for input(s): AST, ALT, LIPASE, BILIDIR, BILITOT, ALKPHOS in the last 72 hours. Invalid input(s): AMYLASE,  ALB  INR No results for input(s): INR in the last 72 hours.   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 Down to 1.27 from 6.05  Chest x-ray still reveals bilateral effusions and bibasilar infiltrates  Remains on doxycycline as well as Zosyn  Recommend repeat dose of Lasix-~primary service did add this  Spoke with palliative care nurse.  Agree with palliative care  Case discussed with Dr. Vonda Rivera  Very  poor prognosis    Electronically signed by RAGHAV Tubbs CNP on 10/29/2021 at 11:38 AM

## 2021-10-29 NOTE — CARE COORDINATION
ONGOING DISCHARGE PLAN:    Patient is Nonverbal.    Spoke with patient's Sister, Etienne Gomez, regarding discharge plan and she confirms that plan is still to return to home w/ Family, w/ 24/7 Care. GOYO Would like [facility], [facility], Per Margaretmary Osler, they can accept. Pt. Has Chronic Issa, which is [facility] monthly by NP, [facility], they do not do any other Nursing Care. Pt. Remains on Vibramycin, Per G tube & IV Zosyn, WBC today 12.1, from 11.1 yesterday. HGB 8.3. Remains on TF. Nephro continues to Follow. Bun 54, Cr 1.21. GOYO has asked writer for a [facility], for she feels his condition is declining. Order placed, Writer spoke to [facility], she will see today. CXR today. Pulmonary following.        Will continue to follow for additional discharge needs.     Electronically signed by Bulmaro Lechuga RN on 10/29/2021 at 11:19 AM

## 2021-10-29 NOTE — PROGRESS NOTES
sodium chloride (PF)  10 mL IntraVENous BID    doxycycline Monohydrate  100 mg Per G Tube BID     Continuous Infusions:   dextrose 100 mL/hr at 10/29/21 0928    sodium chloride       Physical Exam:    VITALS:  /80   Pulse 80   Temp 98.4 °F (36.9 °C) (Axillary)   Resp 26   Ht 6' 4\" (1.93 m)   Wt 181 lb 7 oz (82.3 kg)   SpO2 94%   BMI 22.09 kg/m²   24HR INTAKE/OUTPUT:      Intake/Output Summary (Last 24 hours) at 10/29/2021 1454  Last data filed at 10/29/2021 1018  Gross per 24 hour   Intake 1230 ml   Output 1420 ml   Net -190 ml     Constitutional: fatigued, flushed and slowed mentation    Skin: Skin color, texture, turgor normal. No rashes or lesions    Head: Normocephalic, without obvious abnormality, atraumatic     Cardiovascular/Edema: regular rate and rhythm, S1, S2 normal, no murmur, click, rub or gallop    Respiratory: Lungs: clear to auscultation bilaterally    Abdomen: soft, non-tender; bowel sounds normal; no masses,  no organomegaly    Back: symmetric, no curvature. ROM normal. No CVA tenderness. Extremities: extremities normal, atraumatic, no cyanosis or edema    Neuro:  Grossly normal    CBC:   Recent Labs     10/27/21  0815 10/28/21  0520 10/29/21  0519   WBC 8.5 11.1* 12.1*   HGB 9.3* 8.9* 8.3*    238 268     BMP:    Recent Labs     10/28/21  0520 10/28/21  1919 10/29/21  0308 10/29/21  0519 10/29/21  1205   *  --  149* 153*  --    K 3.0*   < > 3.4* 3.4* 3.7   *  --  109* 113*  --    CO2 29 --  28 28  --    BUN 49*  --  55* 54*  --    CREATININE 1.36*  --  1.29* 1.21*  --    GLUCOSE 179*  --  136* 141*  --     < > = values in this interval not displayed.      Lab Results   Component Value Date    NITRU NEGATIVE 10/25/2021    COLORU Yellow 10/25/2021    PHUR 5.0 10/25/2021    WBCUA 5 TO 10 10/25/2021    RBCUA 5 TO 10 10/25/2021    MUCUS NOT REPORTED 10/25/2021    TRICHOMONAS NOT REPORTED 10/25/2021    YEAST MODERATE 10/25/2021    BACTERIA FEW 10/25/2021    CLARITYU

## 2021-10-29 NOTE — PROGRESS NOTES
Notified dr Migue Love of pt frequent liquid black sticky stool and excoriated bottom.  New order for rectal tube to be placed

## 2021-10-29 NOTE — PROGRESS NOTES
[facility] CARDIOLOGY Progress Note    10/29/2021 7:11 AM      Subjective:  Mr. Barry Barakat Is awake but nonverbal which is is baseline status. Not moaning this a.m. at the time of my evaluation. Unable to obtain any history from the patient. .    Review of systems:   Unobtainable. Nurse updated overnight events.              LABS:     Recent Results (from the past 24 hour(s))   K (Potassium)    Collection Time: 10/28/21  7:19 PM   Result Value Ref Range    Potassium 3.5 (L) 3.7 - 5.3 mmol/L   BASIC METABOLIC PANEL    Collection Time: 10/29/21  3:08 AM   Result Value Ref Range    Glucose 136 (H) 70 - 99 mg/dL    BUN 55 (H) 8 - 23 mg/dL    CREATININE 1.29 (H) 0.70 - 1.20 mg/dL    Bun/Cre Ratio NOT REPORTED 9 - 20    Calcium 8.9 8.6 - 10.4 mg/dL    Sodium 149 (H) 135 - 144 mmol/L    Potassium 3.4 (L) 3.7 - 5.3 mmol/L    Chloride 109 (H) 98 - 107 mmol/L    CO2 28 20 - 31 mmol/L    Anion Gap 12 9 - 17 mmol/L    GFR Non-African American 54 (L) >60 mL/min    GFR African American >60 >60 mL/min    GFR Comment          GFR Staging NOT REPORTED    Basic Metabolic Panel    Collection Time: 10/29/21  5:19 AM   Result Value Ref Range    Glucose 141 (H) 70 - 99 mg/dL    BUN 54 (H) 8 - 23 mg/dL    CREATININE 1.21 (H) 0.70 - 1.20 mg/dL    Bun/Cre Ratio NOT REPORTED 9 - 20    Calcium 8.8 8.6 - 10.4 mg/dL    Sodium 153 (H) 135 - 144 mmol/L    Potassium 3.4 (L) 3.7 - 5.3 mmol/L    Chloride 113 (H) 98 - 107 mmol/L    CO2 28 20 - 31 mmol/L    Anion Gap 12 9 - 17 mmol/L    GFR Non-African American 58 (L) >60 mL/min    GFR African American >60 >60 mL/min    GFR Comment          GFR Staging NOT REPORTED    CBC Auto Differential    Collection Time: 10/29/21  5:19 AM   Result Value Ref Range    WBC 12.1 (H) 3.5 - 11.0 k/uL    RBC 2.92 (L) 4.5 - 5.9 m/uL    Hemoglobin 8.3 (L) 13.5 - 17.5 g/dL    Hematocrit 25.4 (L) 41 - 53 %    MCV 86.9 80 - 100 fL    MCH 28.2 26 - 34 pg    MCHC 32.5 31 - 37 g/dL    RDW 18.0 (H) 11.5 - 14.9 % wheezing. Cardiac: Irregular rate and rhythm. No S3 gallop or rubs. Abdomen: soft, non-tender. PEG tube in place. Extremities: No leg edema. Skin:  warm and dry. 2D echocardiogram June 2021:     Summary  Contrast was utilized on this technically difficult study. Left ventricle is normal in size and wall thickness. Severe global hypokinesis  Severely reduced LV systolic function, EF 64-59%  Left atrium is mildly dilated. Normal right ventricular size and function. No significant valvular regurgitation or stenosis seen. No significant pericardial effusion is seen. IVC not well visualized        Impression:     Elevated high sensitivity troponin -nonspecific finding due to demand ischemia given presentation and atrial fibrillation with rapid ventricular rate.  Unable to assess if patient has any ongoing chest pain or not.  Suspect underlying atherosclerotic coronary artery disease.       Paroxysmal Atrial fibrillation with rapid ventricular rate.       Idiopathic cardiomyopathy. Severe left ventricular systolic dysfunction with LVEF 15-20% on 2D echocardiogram June 2021.     Acute on Chronic systolic heart failure with reduced LVEF 15-20%.     History of intermittent hypotension requiring IV Levophed earlier this admission.  Currently off of IV vasopressors.  On ProAmatine.       Abnormal EKG.       History of Hypokalemia. Maintain potassium level around 4.0 range.     Acute on chronic kidney disease-management per other physicians.    History of carotid artery disease.  Status post left carotid endarterectomy September 2016.       History of severe anemia.  Management per other physicians.     Status post PEG tube placement.       History of cerebrovascular accident.       Nonverbal-moaning with mouth open - most of the time during current hospitalization.       Ex tobacco abuse.     DNRCC Arrest code status.       Other problems as charted.         REC/PLAN:    As ordered. Started on Eliquis. Continue on all other current cardiac medications. Discussed with the nurse. Will follow. Electronically signed by Clifford Lou MD, [facility]        PLEASE NOTE:  This progress note was completed using a voice transcription system. Every effort was made to ensure accuracy. However, inadvertent computerized transcription errors may be present.

## 2021-10-30 LAB
ABO/RH: NORMAL
ABSOLUTE EOS #: 0.2 K/UL (ref 0–0.4)
ABSOLUTE IMMATURE GRANULOCYTE: ABNORMAL K/UL (ref 0–0.3)
ABSOLUTE LYMPH #: 0.9 K/UL (ref 1–4.8)
ABSOLUTE MONO #: 0.8 K/UL (ref 0.1–1.3)
ANION GAP SERPL CALCULATED.3IONS-SCNC: 13 MMOL/L (ref 9–17)
ANTIBODY SCREEN: NEGATIVE
ARM BAND NUMBER: NORMAL
BASOPHILS # BLD: 0 % (ref 0–2)
BASOPHILS ABSOLUTE: 0 K/UL (ref 0–0.2)
BLD PROD TYP BPU: NORMAL
BUN BLDV-MCNC: 46 MG/DL (ref 8–23)
BUN/CREAT BLD: ABNORMAL (ref 9–20)
CALCIUM SERPL-MCNC: 8.7 MG/DL (ref 8.6–10.4)
CHLORIDE BLD-SCNC: 112 MMOL/L (ref 98–107)
CO2: 29 MMOL/L (ref 20–31)
CREAT SERPL-MCNC: 1.17 MG/DL (ref 0.7–1.2)
CROSSMATCH RESULT: NORMAL
DIFFERENTIAL TYPE: ABNORMAL
DISPENSE STATUS BLOOD BANK: NORMAL
EOSINOPHILS RELATIVE PERCENT: 2 % (ref 0–4)
EXPIRATION DATE: NORMAL
GFR AFRICAN AMERICAN: >60 ML/MIN
GFR NON-AFRICAN AMERICAN: >60 ML/MIN
GFR SERPL CREATININE-BSD FRML MDRD: ABNORMAL ML/MIN/{1.73_M2}
GFR SERPL CREATININE-BSD FRML MDRD: ABNORMAL ML/MIN/{1.73_M2}
GLUCOSE BLD-MCNC: 145 MG/DL (ref 70–99)
HCT VFR BLD CALC: 25.8 % (ref 41–53)
HEMOGLOBIN: 8.3 G/DL (ref 13.5–17.5)
IMMATURE GRANULOCYTES: ABNORMAL %
LYMPHOCYTES # BLD: 6 % (ref 24–44)
MCH RBC QN AUTO: 28.3 PG (ref 26–34)
MCHC RBC AUTO-ENTMCNC: 32.4 G/DL (ref 31–37)
MCV RBC AUTO: 87.3 FL (ref 80–100)
MONOCYTES # BLD: 6 % (ref 1–7)
NRBC AUTOMATED: ABNORMAL PER 100 WBC
PDW BLD-RTO: 17.7 % (ref 11.5–14.9)
PLATELET # BLD: 301 K/UL (ref 150–450)
PLATELET ESTIMATE: ABNORMAL
PMV BLD AUTO: 7.6 FL (ref 6–12)
POTASSIUM SERPL-SCNC: 3.2 MMOL/L (ref 3.7–5.3)
POTASSIUM SERPL-SCNC: 3.7 MMOL/L (ref 3.7–5.3)
RBC # BLD: 2.95 M/UL (ref 4.5–5.9)
RBC # BLD: ABNORMAL 10*6/UL
SEG NEUTROPHILS: 86 % (ref 36–66)
SEGMENTED NEUTROPHILS ABSOLUTE COUNT: 11.8 K/UL (ref 1.3–9.1)
SODIUM BLD-SCNC: 154 MMOL/L (ref 135–144)
TRANSFUSION STATUS: NORMAL
UNIT DIVISION: 0
UNIT NUMBER: NORMAL
WBC # BLD: 13.7 K/UL (ref 3.5–11)
WBC # BLD: ABNORMAL 10*3/UL

## 2021-10-30 PROCEDURE — 6370000000 HC RX 637 (ALT 250 FOR IP): Performed by: INTERNAL MEDICINE

## 2021-10-30 PROCEDURE — 6360000002 HC RX W HCPCS: Performed by: FAMILY MEDICINE

## 2021-10-30 PROCEDURE — 85025 COMPLETE CBC W/AUTO DIFF WBC: CPT

## 2021-10-30 PROCEDURE — 6360000002 HC RX W HCPCS: Performed by: INTERNAL MEDICINE

## 2021-10-30 PROCEDURE — 99232 SBSQ HOSP IP/OBS MODERATE 35: CPT | Performed by: INTERNAL MEDICINE

## 2021-10-30 PROCEDURE — 2060000000 HC ICU INTERMEDIATE R&B

## 2021-10-30 PROCEDURE — C9113 INJ PANTOPRAZOLE SODIUM, VIA: HCPCS | Performed by: INTERNAL MEDICINE

## 2021-10-30 PROCEDURE — 36415 COLL VENOUS BLD VENIPUNCTURE: CPT

## 2021-10-30 PROCEDURE — 6370000000 HC RX 637 (ALT 250 FOR IP): Performed by: FAMILY MEDICINE

## 2021-10-30 PROCEDURE — 2580000003 HC RX 258: Performed by: INTERNAL MEDICINE

## 2021-10-30 PROCEDURE — 2580000003 HC RX 258: Performed by: FAMILY MEDICINE

## 2021-10-30 PROCEDURE — 6370000000 HC RX 637 (ALT 250 FOR IP): Performed by: NURSE PRACTITIONER

## 2021-10-30 PROCEDURE — 84132 ASSAY OF SERUM POTASSIUM: CPT

## 2021-10-30 PROCEDURE — 80048 BASIC METABOLIC PNL TOTAL CA: CPT

## 2021-10-30 RX ORDER — POTASSIUM CHLORIDE 1.5 G/1.77G
20 POWDER, FOR SOLUTION ORAL DAILY
Status: DISCONTINUED | OUTPATIENT
Start: 2021-10-30 | End: 2021-11-02 | Stop reason: HOSPADM

## 2021-10-30 RX ORDER — POTASSIUM CHLORIDE 1.5 G/1.77G
20 POWDER, FOR SOLUTION ORAL DAILY
Status: DISCONTINUED | OUTPATIENT
Start: 2021-10-30 | End: 2021-10-30

## 2021-10-30 RX ADMIN — PIPERACILLIN SODIUM AND TAZOBACTAM SODIUM 3375 MG: 3; .375 INJECTION, POWDER, LYOPHILIZED, FOR SOLUTION INTRAVENOUS at 04:51

## 2021-10-30 RX ADMIN — NYSTATIN 500000 UNITS: 100000 SUSPENSION ORAL at 09:12

## 2021-10-30 RX ADMIN — POTASSIUM CHLORIDE 20 MEQ: 1.5 FOR SOLUTION ORAL at 16:32

## 2021-10-30 RX ADMIN — SODIUM CHLORIDE, PRESERVATIVE FREE 10 ML: 5 INJECTION INTRAVENOUS at 19:59

## 2021-10-30 RX ADMIN — NYSTATIN 500000 UNITS: 100000 SUSPENSION ORAL at 16:32

## 2021-10-30 RX ADMIN — ANTI-FUNGAL POWDER MICONAZOLE NITRATE TALC FREE: 1.42 POWDER TOPICAL at 09:12

## 2021-10-30 RX ADMIN — QUETIAPINE FUMARATE 25 MG: 50 TABLET ORAL at 19:59

## 2021-10-30 RX ADMIN — DEXTROSE MONOHYDRATE: 50 INJECTION, SOLUTION INTRAVENOUS at 22:21

## 2021-10-30 RX ADMIN — POTASSIUM CHLORIDE: 149 INJECTION, SOLUTION, CONCENTRATE INTRAVENOUS at 09:12

## 2021-10-30 RX ADMIN — PIPERACILLIN SODIUM AND TAZOBACTAM SODIUM 3375 MG: 3; .375 INJECTION, POWDER, LYOPHILIZED, FOR SOLUTION INTRAVENOUS at 19:59

## 2021-10-30 RX ADMIN — APIXABAN 5 MG: 5 TABLET, FILM COATED ORAL at 20:00

## 2021-10-30 RX ADMIN — NYSTATIN 500000 UNITS: 100000 SUSPENSION ORAL at 20:00

## 2021-10-30 RX ADMIN — APIXABAN 5 MG: 5 TABLET, FILM COATED ORAL at 09:12

## 2021-10-30 RX ADMIN — GLYCOPYRROLATE 1 MG: 1 TABLET ORAL at 09:17

## 2021-10-30 RX ADMIN — PIPERACILLIN SODIUM AND TAZOBACTAM SODIUM 3375 MG: 3; .375 INJECTION, POWDER, LYOPHILIZED, FOR SOLUTION INTRAVENOUS at 11:51

## 2021-10-30 RX ADMIN — METOPROLOL TARTRATE 50 MG: 50 TABLET, FILM COATED ORAL at 19:59

## 2021-10-30 RX ADMIN — PANTOPRAZOLE SODIUM 40 MG: 40 INJECTION, POWDER, FOR SOLUTION INTRAVENOUS at 09:12

## 2021-10-30 RX ADMIN — TRAMADOL HYDROCHLORIDE 50 MG: 50 TABLET ORAL at 09:12

## 2021-10-30 RX ADMIN — ANTI-FUNGAL POWDER MICONAZOLE NITRATE TALC FREE: 1.42 POWDER TOPICAL at 20:02

## 2021-10-30 RX ADMIN — PANTOPRAZOLE SODIUM 40 MG: 40 INJECTION, POWDER, FOR SOLUTION INTRAVENOUS at 19:59

## 2021-10-30 RX ADMIN — SODIUM CHLORIDE, PRESERVATIVE FREE 10 ML: 5 INJECTION INTRAVENOUS at 09:11

## 2021-10-30 RX ADMIN — METOPROLOL TARTRATE 50 MG: 50 TABLET, FILM COATED ORAL at 09:12

## 2021-10-30 RX ADMIN — DOXYCYCLINE 100 MG: 25 FOR SUSPENSION ORAL at 10:15

## 2021-10-30 RX ADMIN — DOXYCYCLINE 100 MG: 25 FOR SUSPENSION ORAL at 21:01

## 2021-10-30 RX ADMIN — NYSTATIN 500000 UNITS: 100000 SUSPENSION ORAL at 13:48

## 2021-10-30 ASSESSMENT — PAIN SCALES - GENERAL: PAINLEVEL_OUTOF10: 4

## 2021-10-30 NOTE — PROGRESS NOTES
[address] [place]  [phone]    PROGRESS NOTE     Roland Toth  Was seen examined at bedside. When questioned if he had any difficulty breathing or chest pain patient said no. Telemetry reviewed and showed that the patient was in atrial fibrillation, heart rate in the 80s. SUBJECTIVE     Allergies:     Allergies   Allergen Reactions    Bactrim [Sulfamethoxazole-Trimethoprim] Hives and Swelling    Ciprofloxacin Swelling     FACE        CURRENT MEDICATIONS   IV infusion builder   IntraVENous Once    miconazole   Topical BID    potassium chloride  20 mEq Oral Daily    apixaban  5 mg Oral BID    metoprolol tartrate  50 mg Oral BID    QUEtiapine  25 mg Oral Nightly    nystatin  5 mL Oral 4x Daily    piperacillin-tazobactam  3,375 mg IntraVENous Q8H    pantoprazole  40 mg IntraVENous BID    And    sodium chloride (PF)  10 mL IntraVENous BID    doxycycline Monohydrate  100 mg Per G Tube BID     CONTINUOUS INFUSIONS   dextrose 75 mL/hr at 10/29/21 2104    sodium chloride             OBJECTIVE     CBC: @LABRCNTIP(WBC:3,HGB:3,HCT:3,MCV:3,PLT:3)@      [email or web address]  PT/INR: @LABRCNTIP(PROTIME:3,INR:3)@  APTT: @LABRCNTIP(aPTT:3)@  MAG: @LABRCNTIP(MG:3)@  D Dimer: @LABRCNTIP(DDIMER:3)@  Troponin I @LABRCNTIP(TROPONINI:3)@  ProBNP @LABRCNTIP(BNP:3)@  Lipid Panel:    Lab Results   Component Value Date    CHOL 132 08/15/2019    TRIG 49 06/02/2021    HDL 70 08/15/2019     Liver Panel: No results found for: ALB  HgA1C:  No components found for: HGBA1C    ABG: No components found for: ABGSAMPLETYP, ABGBODYTEMP, ABGPHCORRFOR, [id number], ABGPHCORRFOOR, ABGPH, ABGPCO2, ABGPO2, ABGBASEEXCES, ABGBASEDEFIC, ABGHCO3, QTMQ2WQL, ABGENDTIDALC, ABGALLENSTES, ABGSPO2, ABGSAMEPLESIT, [id number], ABGOXYGENSOUE      LAST ECHO (Within 2 Years) [unfilled]      RADIOLOGY:  [unfilled]    PHYSICAL EXAM  Admission Weight: Weight: 178 lb (80.7 kg)  I/O last 3 completed shifts: In: 6032.3 [I.V.:3209.3; NG/GT:2179; IV Piggyback:644]  Out: 1875 [KSYTH:3200]  Weight change: Wt Readings from Last 3 Encounters:   10/30/21 185 lb 6.5 oz (84.1 kg)   09/07/21 178 lb (80.7 kg)   08/03/21 178 lb 5.6 oz (80.9 kg)       Vitals:  Vitals:    10/29/21 2030 10/30/21 0045 10/30/21 0100 10/30/21 0715   BP: (!) 128/91 (!) 136/94  (!) 131/96   Pulse: 86 86  96   Resp: 20 20 26   Temp: 97.5 °F (36.4 °C) 97.3 °F (36.3 °C)  98.2 °F (36.8 °C)   TempSrc: Oral Oral  Axillary   SpO2: 95% 95%  96%   Weight:   185 lb 6.5 oz (84.1 kg)    Height:           Admit Weight  Weight: 178 lb (80.7 kg)  Last 3 Weights  [unfilled]  Body mass index is 22.57 kg/m². INTAKE/OUTPUT  I/O last 3 completed shifts: In: 6032.3 [I.V.:3209.3; NG/GT:2179; IV VLUNWIYJO:904]  Out: 1875 [Urine:1875]    Intake/Output Summary (Last 24 hours) at 10/30/2021 1133  Last data filed at 10/30/2021 0920  Gross per 24 hour   Intake 5687.33 ml   Output 1875 ml   Net 3812.33 ml       General appearance: Alert oriented and cooperative, In no acute distress  Lungs:   Bilateral  crackles, no use of accessory muscles  Heart[de-identified]   Irregularly irregular rhythm, normal rate,with normal S1 and S2, no murmurs and no gallops. No JVD. Abdomen: Soft, non-tender, bowel sounds normal.  Extremities: No edema    ASSESSMENT      1.   NSTEMI -  Likely demand ischemia   2. 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 Acute on chronic systolic heart failure   4. Intermittent hypotension requiring pressors   5. Paroxysmal atrial fibrillation on Eliquis   6. History of CVA with neurological deficits   7. Status post PEG   8. Acute kidney injury/CKD   9. Klebsiella pneumonia   10. DNR CCA code status    PLAN     -  Stable from a cardiac standpoint. Euvolemic on exam today.   -   Eliquis for prevention of thromboembolic events. -     Given his episodes of intermittent hypertension, would avoid using Coreg due to help a blocking effect. Avoid checking not use crushed Toprol XL dose will continue with current dose of Lopressor 50 b.i.d. .  Will try to optimize goal-directed medical therapy for HFrEF down the line. Can be done as outpatient. -  Plan for follow-up as outpatient within 7-10 days from discharge. -   Will sign off and follow as needed. Johnson Smith MD      This note was completed using a voice transcription system. Every effort was made to ensure accuracy. However, inadvertent computerized transcription errors may be present.

## 2021-10-30 NOTE — PROGRESS NOTES
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 In ED received IV fluid bolus, central line was placed  and norepinephrine was started for hypotension. Urine culture - several types of bacteria identified, likely contamination  COVID19 - not detected  CXR - Mildly increased patchy opacities at the lung bases, possibly pneumonia or atelectasis. UA - positive for nitrites, large leukocyte esterase, large hemoglobin and moderate bacteria  Blood cultures - 2/2 positive for klebsiella pneumonia    Interval changes  10/30/2021   He remains afebrile, nonverbal, tolerating tube feed, no new events    Summary of relevant labs:      Micro:  10/22 Blood Cx - 2/2 Klebsiella pneumoniae - sensitive to cefepime  10/22 Urine Cx - multiple types of bacteria identified, likely contamination  10/22 COVID19 - not detected    Imaging:  10/22 CXR  1.  Mildly increased patchy opacities at the lung bases, possibly pneumonia or atelectasis. 2.  Increased masslike opacity in the medial right base.  This was previously evaluated with CT on 05/26/2021.  Consider re-evaluation with contrast-enhanced CT to assess progression. 3.  Heterogeneous bony mineralization in the visualized right humeral   diaphysis, which is indeterminate.  Dedicated radiographs of the right   humerus are recommended. 10/22 CT head w/o contrast  BRAIN/VENTRICLES: There is no acute intracranial hemorrhage, mass effect or midline shift.  No abnormal extra-axial fluid collection. The gray-white differentiation is maintained without evidence of an acute infarct. There is no evidence of hydrocephalus. Mild generalized cortical atrophy, stable.  Extensive streak artifact is   again noted on the left, related to stable postsurgical change, with metallic plate in the left frontotemporal skull region.  This obscures surrounding structures. ORBITS: The visualized portion of the orbits demonstrate no acute abnormality. SINUSES: The visualized paranasal sinuses and mastoid air cells demonstrate no acute abnormality. SOFT TISSUES/SKULL:  No acute abnormality of the visualized skull or soft tissues. 10/22 CT abdomen pelvis w/o contrast  1. Partially visualized MASSLIKE LESION in the right parahilar and infrahilar region CONCERNING FOR MALIGNANCY.  Dedicated contrast enhanced CT of the chest recommended for further evaluation. 2. No evidence of metastatic disease in the abdomen or the pelvis. 3. Bilateral nephrolithiasis.  No hydronephrosis.  Atrophic right kidney. 4. Liquid stool in the colon indicates a diarrheal illness.  No bowel wall thickening or obstruction noted. 5. 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  Vascular surgery consultation recommended especially for the right common iliac artery aneurysm. 10/22 CXR  Stable bibasilar atelectasis or infiltrates with unchanged mass density medial right lung base compared to earlier exam same day.  Right IJ central venous catheter tip at the mid SVC.  No pneumothorax post procedure.  Cardiac size stable.  Osseous structures grossly intact.  Telemetry leads overlie the chest.     Patient Vitals for the past 8 hrs:   BP Temp Temp src Pulse Resp SpO2   10/30/21 1200 133/89 97.6 °F (36.4 °C) Axillary 78 26 96 %         I have personally reviewed the past medical history, past surgical history, medications, social history, and family history, and I haveupdated the database accordingly. Allergies:   Bactrim [sulfamethoxazole-trimethoprim] and Ciprofloxacin     Review of Systems:     Review of Systems  Nonverbal, unable to provide  Physical Examination :       Physical Exam  Appearance: Normal appearance. He is not diaphoretic. HENT:      Head: Normocephalic and atraumatic. Right Ear: External ear normal.      Left Ear: External ear normal.      Nose: Nose normal.      Mouth/Throat:      Mouth: Mucous membranes are moist.      Pharynx: Oropharynx is clear. Eyes:      Conjunctiva/sclera: Conjunctivae normal.   Cardiovascular:      Rate and Rhythm: Normal rate and regular rhythm. Pulses: Normal pulses. Heart sounds: Normal heart sounds. Pulmonary:      Effort: Pulmonary effort is normal.      Breath sounds: Coarse breath sounds bilaterally  Abdominal:      General: Bowel sounds are normal. There is no distension. Palpations: Abdomen is soft. Musculoskeletal:         Cervical back:  No rigidity. Right lower leg: No edema. Left lower leg: No edema. Skin:     General: Skin is warm and dry. Coloration: Skin is not jaundiced. Neurological:      Mental Status: Mental status is at baseline.    Past Medical History:     Past Medical History:   Diagnosis Date    Arthritis     Cancer [facility])     bladder 1980s    Cerebral artery occlusion with cerebral infarction [facility])     TIA 2010    Chronic kidney disease     [facility]) [facility] [address]     Hypertension     Pneumonia     Psychiatric problem     depression, social anxiety    Seizures ([facility] [address]        Past Surgical  History:     Past Surgical History:   Procedure Laterality Date    ABDOMEN SURGERY      BACK SURGERY      spinal surgery 2005     CAROTID ENDARTERECTOMY Left 09/20/2016    COLECTOMY N/A 2021    Exploratory Laparotomy. Release of adhesive band with release of small bowel obstruction. Small bowel resection with primary anastomosis performed by Paco Mcgowan MD at [address] COLONOSCOPY N/A 2018    COLONOSCOPY POLYPECTOMY COLD BIOPSY performed by Nancy Acuna MD at [address] [facility]      left face    GASTROSTOMY TUBE PLACEMENT  04/15/2020    HERNIA REPAIR      HIATAL HERNIA REPAIR      INGUINAL HERNIA REPAIR Bilateral     INSERT MIDLINE CATHETER  2021         OTHER SURGICAL HISTORY      mesh infected in inguinal canal, had to remove. Removed testiscle at this time.      TONSILLECTOMY      UPPER GASTROINTESTINAL ENDOSCOPY  04/15/2020       EGD CONTROL HEMORRHAGE    UPPER GASTROINTESTINAL ENDOSCOPY  4/15/2020    EGD CONTROL HEMORRHAGE performed by Vanesa Collado MD at [facility]  4/15/2020    EGD ESOPHAGOGASTRODUODENOSCOPY PEG TUBE INSERTION performed by Vanesa Collado MD at [facility]       Medications:      miconazole   Topical BID    potassium chloride  20 mEq Oral Daily    apixaban  5 mg Oral BID    metoprolol tartrate  50 mg Oral BID    QUEtiapine  25 mg Oral Nightly    nystatin  5 mL Oral 4x Daily    piperacillin-tazobactam  3,375 mg IntraVENous Q8H    pantoprazole  40 mg IntraVENous BID    And    sodium chloride (PF)  10 mL IntraVENous BID    doxycycline Monohydrate  100 mg Per G Tube BID       Social History:     Social History     Socioeconomic History    Marital status:      Spouse name: Not on file    Number of children: Not on file    Years of education: Not on file    Highest education level: Not on file   Occupational History    Not on file   Tobacco Use    Smoking status: Former Smoker     Packs/day: 1.00     Years: 60.00     Pack years: 60.00     Types: Cigarettes     Start date:      Quit date: 2020     Years since quittin.5    Smokeless tobacco: Never Used Vaping Use    Vaping Use: Never used   Substance and Sexual Activity    Alcohol use: No    Drug use: No    Sexual activity: Yes     Partners: Female   Other Topics Concern    Not on file   Social History Narrative    Not on file     Social Determinants of Health     Financial Resource Strain: Low Risk     Difficulty of Paying Living Expenses: Not hard at all   Food Insecurity: No Food Insecurity    Worried About Running Out of Food in the Last Year: Never true    Blas of Food in the Last Year: Never true   Transportation Needs: No Transportation Needs    Lack of Transportation (Medical): No    Lack of Transportation (Non-Medical): No   Physical Activity: Inactive    Days of Exercise per Week: 0 days    Minutes of Exercise per Session: 0 min   Stress: Stress Concern Present    Feeling of Stress : Very much   Social Connections: Moderately Isolated    Frequency of Communication with Friends and Family: More than three times a week    Frequency of Social Gatherings with Friends and Family: More than three times a week    Attends Taoism Services: More than 4 times per year    Active Member of Clubs or Organizations: No    Attends Club or Organization Meetings: Never    Marital Status:     Intimate Partner Violence: Not At Risk    Fear of Current or Ex-Partner: No    Emotionally Abused: No    Physically Abused: No    Sexually Abused: No       Family History:     Family History   Problem Relation Age of Onset    Arthritis Mother     Atrial Fibrillation Mother     Hearing Loss Mother     Heart Disease Mother     Stroke Mother     Vision Loss Mother     Arthritis Father     Cancer Father     Heart Disease Father     High Blood Pressure Father     Stroke Father     Vision Loss Father       Medical Decision Making:   I have independently reviewed/ordered the following labs:    CBC with Differential:   Recent Labs     10/29/21  0519 10/30/21  0605   WBC 12.1* 13.7*   HGB 8.3* 8.3*   HCT 25.4* 25.8*    301   LYMPHOPCT 11* 6*   MONOPCT 7 6     BMP:  Recent Labs     10/29/21  0519 10/29/21  0519 10/29/21  1205 10/29/21  1652 10/30/21  0605   *   < >  --  150* 154*   K 3.4*   < > 3.7  --  3.2*   *  --   --   --  112*   CO2 28  --   --   --  29   BUN 54*  --   --   --  46*   CREATININE 1.21*  --   --   --  1.17    < > = values in this interval not displayed. Hepatic Function Panel:   No results for input(s): PROT, LABALBU, BILIDIR, IBILI, BILITOT, ALKPHOS, ALT, AST in the last 72 hours. No results for input(s): RPR in the last 72 hours. No results for input(s): HIV in the last 72 hours. No results for input(s): BC in the last 72 hours. Lab Results   Component Value Date    CREATININE 1.17 10/30/2021    GLUCOSE 145 10/30/2021       Detailed results: Thank you for allowing us to participate in the care of this patient. Please call with questions. This note is created with the assistance of a speech recognition program.  While intending to generate adocument that actually reflects the content of the visit, the document can still have some errors including those of syntax and sound a like substitutions which may escape proof reading. It such instances, actual meaningcan be extrapolated by contextual diversion.     Rai Morton MD  Office: [phone]  Perfect serve / office [phone]

## 2021-10-30 NOTE — PROGRESS NOTES
Department of Internal Medicine  Nephrology [facility], MD  Progress Note    Reason for consultation: Management of acute kidney injury superimposed on chronic kidney disease stage 4..    Consulting physician: Jonn Isaacs MD    Attending physician: Tish Lindesy MD.    Interval history:  Patient was seen and examined today. He is on tube feeding   BP stable. Patient is  non verbal. Serum sodium at 154 today  He has indwelling Issa catheter which was exchanged on admission and he is nonoliguric. Stable hemoglobin. K 3.2-->3.7    History of present illness: This is a 77 y.o. male with a significant past medical history of Systemic hypertension, chronic obstructive pulmonary disease, Bladder cancer, seizure disorder, osteoarthritis and s/p Cerebrovascular accident [has G-tube in place], who presented to the hospital on 5/24/2021 with complaints of confusion and disorientation. Apparently patient lives at home alone but family lives nearby. Laboratory studies at presentation were remarkable for serum sodium 129 mmol/L, serum bicarbonate 19 mmol/L and elevated BUN/creatinine 102/4.36 mg/dL. I had seen patient during the prior presentation in May 2021 for acute kidney injury superimposed on chronic kidney disease stage IV and at that time serum creatinine was greater than 4 mg/dL. CODE STATUS is DNR CCA. Blood pressure at presentation this time was 114/45 work patient subsequently developed hypotension and is currently on Levophed drip at 6 mcg/min. He has a chronic indwelling Issa catheter and is nonoliguric. He is a poor historian and history was obtained primarily by chart review.     Scheduled Meds:   potassium chloride  20 mEq Per G Tube Daily    miconazole   Topical BID    apixaban  5 mg Oral BID    metoprolol tartrate  50 mg Oral BID    QUEtiapine  25 mg Oral Nightly    nystatin  5 mL Oral 4x Daily    piperacillin-tazobactam  3,375 mg IntraVENous Q8H    pantoprazole  40 mg IntraVENous BID    And    sodium chloride (PF)  10 mL IntraVENous BID    doxycycline Monohydrate  100 mg Per G Tube BID     Continuous Infusions:   dextrose 75 mL/hr at 10/29/21 2104    sodium chloride       Physical Exam:    VITALS:  /89   Pulse 78   Temp 97.6 °F (36.4 °C) (Axillary)   Resp 26   Ht 6' 4\" (1.93 m)   Wt 185 lb 6.5 oz (84.1 kg)   SpO2 96%   BMI 22.57 kg/m²   24HR INTAKE/OUTPUT:      Intake/Output Summary (Last 24 hours) at 10/30/2021 1720  Last data filed at 10/30/2021 1529  Gross per 24 hour   Intake 7037.33 ml   Output 1425 ml   Net 5612.33 ml     Constitutional: fatigued, flushed and slowed mentation    Skin: Skin color, texture, turgor normal. No rashes or lesions    Head: Normocephalic, without obvious abnormality, atraumatic     Cardiovascular/Edema: regular rate and rhythm, S1, S2 normal, no murmur, click, rub or gallop    Respiratory: Lungs: clear to auscultation bilaterally    Abdomen: soft, non-tender; bowel sounds normal; no masses,  no organomegaly    Back: symmetric, no curvature. ROM normal. No CVA tenderness. Extremities: extremities normal, atraumatic, no cyanosis or edema    Neuro:  Grossly normal    CBC:   Recent Labs     10/28/21  0520 10/29/21  0519 10/30/21  0605   WBC 11.1* 12.1* 13.7*   HGB 8.9* 8.3* 8.3*    268 301     BMP:    Recent Labs     10/29/21  0308 10/29/21  0308 10/29/21  0519 10/29/21  0519 10/29/21  1205 10/29/21  1652 10/30/21  0605 10/30/21  1503   *   < > 153*  --   --  150* 154*  --    K 3.4*   < > 3.4*   < > 3.7  --  3.2* 3.7   *  --  113*  --   --   --  112*  --    CO2 28  --  28  --   --   --  29  --    BUN 55*  --  54*  --   --   --  46*  --    CREATININE 1.29*  --  1.21*  --   --   --  1.17  --    GLUCOSE 136*  --  141*  --   --   --  145*  --     < > = values in this interval not displayed.      Lab Results   Component Value Date    NITRU NEGATIVE 10/25/2021    COLORU Yellow 10/25/2021    PHUR 5.0 10/25/2021    WBCUA 5 TO 10 10/25/2021    RBCUA 5 TO 10 10/25/2021    MUCUS NOT REPORTED 10/25/2021    TRICHOMONAS NOT REPORTED 10/25/2021    YEAST MODERATE 10/25/2021    BACTERIA FEW 10/25/2021    CLARITYU cloudy 09/07/2021    SPECGRAV 1.010 10/25/2021    LEUKOCYTESUR TRACE 10/25/2021    UROBILINOGEN Normal 10/25/2021    BILIRUBINUR NEGATIVE 10/25/2021    BLOODU ++ 09/07/2021    GLUCOSEU NEGATIVE 10/25/2021    KETUA NEGATIVE 10/25/2021    AMORPHOUS NOT REPORTED 10/25/2021     Urine Sodium:     Lab Results   Component Value Date    HELENA 78 10/25/2021     Urine Potassium:  No results found for: KUR  Urine Chloride:    Lab Results   Component Value Date    CLUR 87 10/25/2021     Urine Creatinine:     Lab Results   Component Value Date    LABCREA 32.6 10/25/2021     IMPRESSION/RECOMMENDATIONS:      1. Acute kidney injury superimposed on chronic kidney disease stage 3 [baseline serum creatinine 1.28 mg/dL on 8/11/2021] - Top differential is prerenal azotemia from poor oral intake as well as ischemic acute tubular necrosis. He has chronic indwelling Issa catheter and CT scan showed atrophic right kidney with bilateral nephrolithiasis but no hydronephrosis. Non Oliguric today-Scr is down to 1.1 mg/dl stable       2. Proteus Mirabella's and Pseudomonas aeruginosa urinary tract infection - Continue IV Rocephin    3. Klebsiella pneumonia bacteremia - Continue IV antibiotics    4. Hypotension-resolved    5. Normocytic anemia - iron deficiency anemia  S/p 1 Unit PRBC    6. Hypokalemia - KCL per sliding scale. 7.Hypernatremia-      Plan  free water 250 mg q 4 hrly  D5W IVF 75 ml/hr  Strict I/Os-monitor serum sodium at 4 p.m  Monitor urine output. KCL per sliding scale. Tube feeding    Prognosis is guarded.     Dian Murray MD   Attending Nephrologist  10/30/2021 5:20 PM

## 2021-10-30 NOTE — PROGRESS NOTES
Hospitalist Progress Note  10/30/2021 5:09 PM  Subjective:   Admit Date: 10/22/2021  PCP: Osbaldo Miller MD     DNR-CCA      C/c:  Chief Complaint   Patient presents with    Altered Mental Status         Interval History: resting quietly, has nasal oxygen    Diet: ADULT TUBE FEEDING; PEG; Standard with Fiber; Continuous; 20; Yes; 20; Q 4 hours; 50; 250; Q 4 hours; Protein; give protein modular twice daily at 8 am and 2pm with 30 ml free water before and after. ip days:8  Medications:   Scheduled Meds:   potassium chloride  20 mEq Per G Tube Daily    miconazole   Topical BID    apixaban  5 mg Oral BID    metoprolol tartrate  50 mg Oral BID    QUEtiapine  25 mg Oral Nightly    nystatin  5 mL Oral 4x Daily    piperacillin-tazobactam  3,375 mg IntraVENous Q8H    pantoprazole  40 mg IntraVENous BID    And    sodium chloride (PF)  10 mL IntraVENous BID    doxycycline Monohydrate  100 mg Per G Tube BID     Continuous Infusions:   dextrose 75 mL/hr at 10/29/21 2104    sodium chloride       PRN Meds:.melatonin, sodium chloride, midodrine, acetaminophen, traMADol, potassium chloride, microfibrillar collagen, ipratropium-albuterol     CBC:   Recent Labs     10/28/21  0520 10/29/21  0519 10/30/21  0605   WBC 11.1* 12.1* 13.7*   HGB 8.9* 8.3* 8.3*    268 301     BMP:    Recent Labs     10/29/21  0308 10/29/21  0308 10/29/21  0519 10/29/21  0519 10/29/21  1205 10/29/21  1652 10/30/21  0605 10/30/21  1503   *   < > 153*  --   --  150* 154*  --    K 3.4*   < > 3.4*   < > 3.7  --  3.2* 3.7   *  --  113*  --   --   --  112*  --    CO2 28  --  28  --   --   --  29  --    BUN 55*  --  54*  --   --   --  46*  --    CREATININE 1.29*  --  1.21*  --   --   --  1.17  --    GLUCOSE 136*  --  141*  --   --   --  145*  --     < > = values in this interval not displayed. Hepatic: No results for input(s): AST, ALT, ALB, BILITOT, ALKPHOS in the last 72 hours.   Troponin: No results for input(s): TROPONINI in the last 72 hours. BNP: No results for input(s): BNP in the last 72 hours. Lipids: No results for input(s): CHOL, HDL in the last 72 hours. Invalid input(s): LDLCALCU  INR: No results for input(s): INR in the last 72 hours. Objective:   Vitals: /89   Pulse 78   Temp 97.6 °F (36.4 °C) (Axillary)   Resp 26   Ht 6' 4\" (1.93 m)   Wt 185 lb 6.5 oz (84.1 kg)   SpO2 96%   BMI 22.57 kg/m²   General appearance: alert, appears stated age and cooperative  Skin: Skin color, texture, turgor normal. No rashes or lesions  Lungs: clear to auscultation bilaterally  Heart: regular rate and rhythm, S1, S2 normal, no murmur, click, rub or gallop  Abdomen: soft, non-tender; bowel sounds normal; no masses,  no organomegaly  Extremities: extremities normal, atraumatic, no cyanosis or edema  Neurologic: Mental status: Alert, oriented, thought content appropriate    Prophylaxis:   DVT with  [] lovenox        [] heparin        [] Scd        [x] eliquis    Radiology:  CT ABDOMEN PELVIS WO CONTRAST Additional Contrast? None    Result Date: 10/22/2021  EXAMINATION: CT OF THE ABDOMEN AND PELVIS WITHOUT CONTRAST 10/22/2021 12:16 pm TECHNIQUE: CT of the abdomen and pelvis was performed without the administration of intravenous contrast. Multiplanar reformatted images are provided for review. Dose modulation, iterative reconstruction, and/or weight based adjustment of the mA/kV was utilized to reduce the radiation dose to as low as reasonably achievable. COMPARISON: None. HISTORY: ORDERING SYSTEM PROVIDED HISTORY: TOD, UTI TECHNOLOGIST PROVIDED HISTORY: TOD, UTI Decision Support Exception - unselect if not a suspected or confirmed emergency medical condition->Emergency Medical Condition (MA) Reason for Exam: TOD, UTI Acuity: Acute Type of Exam: Initial FINDINGS: Lower Chest: A partially visualized right parahilar and infrahilar masslike lesion is concerning for malignancy.   It measures approximately 5.7 x 5.3 cm in the maximal axial plane. Pulmonary opacities in the dependent aspects of the lower lobes may represent atelectasis or pneumonia. The heart is normal in size. No pleural or pericardial effusion. Small hiatal hernia. Organs: Liver: Unremarkable. Gallbladder: Subtle layering hyperdensity in the gallbladder may represent sludge or small calculi (series 2, image 75). Pancreas: Mild to moderately atrophied. Otherwise, unremarkable. Spleen:  Unremarkable. Adrenals: Unremarkable. Kidneys: The right kidney is moderately atrophied. Multiple intrarenal calculi are seen ranging in size from 2-6 mm. No ureterolithiasis or hydronephrosis. GI/Bowel: Gastrostomy tube in situ. Liquid stool in the colon indicates a diarrheal illness. No bowel wall thickening or obstruction noted. Pelvis: Issa catheter is seen in the urinary bladder which is otherwise grossly unremarkable. The prostate gland is mildly enlarged. Small fat containing left inguinal hernia. No evidence of fat strangulation. Protrusion of the anterior wall of the colon into the proximal-most hernia. Peritoneum/Retroperitoneum: No lymphadenopathy. Prominent calcified atherosclerosis is seen in the abdominal aorta. There is borderline aneurysmal dilation of the infrarenal aorta to 3 cm. 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 Bones/Soft Tissues: No evidence of acute fracture or joint dislocation. Likely chronic compression fracture deformity through the superior endplate of L2. Grade 1 anterolisthesis of L4 over L5.     1. Partially visualized MASSLIKE LESION in the right parahilar and infrahilar region CONCERNING FOR MALIGNANCY. Dedicated contrast enhanced CT of the chest recommended for further evaluation. 2. No evidence of metastatic disease in the abdomen or the pelvis. 3. Bilateral nephrolithiasis. No hydronephrosis. Atrophic right kidney.  4.  Liquid stool in the colon indicates a diarrheal illness. No bowel wall thickening or obstruction noted. 5. 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 Vascular surgery consultation recommended especially for the right common iliac artery aneurysm. CT HEAD WO CONTRAST    Result Date: 10/25/2021  EXAMINATION: CT OF THE HEAD WITHOUT CONTRAST  10/22/2021 12:16 pm TECHNIQUE: CT of the head was performed without the administration of intravenous contrast. Dose modulation, iterative reconstruction, and/or weight based adjustment of the mA/kV was utilized to reduce the radiation dose to as low as reasonably achievable. COMPARISON: None. HISTORY: ORDERING SYSTEM PROVIDED HISTORY: ams TECHNOLOGIST PROVIDED HISTORY: ams Decision Support Exception - unselect if not a suspected or confirmed emergency medical condition->Emergency Medical Condition (MA) Reason for Exam: AMS Acuity: Acute Type of Exam: Initial FINDINGS: BRAIN/VENTRICLES: There is no acute intracranial hemorrhage, mass effect or midline shift. No abnormal extra-axial fluid collection. The gray-white differentiation is maintained without evidence of an acute infarct. There is no evidence of hydrocephalus. Mild generalized cortical atrophy, stable. Extensive streak artifact is again noted on the left, related to stable postsurgical change, with metallic plate in the left frontotemporal skull region. This obscures surrounding structures. ORBITS: The visualized portion of the orbits demonstrate no acute abnormality. SINUSES: The visualized paranasal sinuses and mastoid air cells demonstrate no acute abnormality. SOFT TISSUES/SKULL:  No acute abnormality of the visualized skull or soft tissues. No acute intracranial abnormality.      XR CHEST PORTABLE    Result Date: 10/22/2021  EXAMINATION: ONE XRAY VIEW OF THE CHEST 10/22/2021 9:24 am COMPARISON: 08/03/2021 HISTORY: ORDERING SYSTEM PROVIDED HISTORY: sepsis, fever TECHNOLOGIST PROVIDED HISTORY: sepsis, fever Reason for Exam: sepsis, fever Acuity: Unknown Type of Exam: Unknown FINDINGS: Heart size is stable. There are increased patchy opacities in the lung bases. Masslike opacity in the medial right base appears increased as well. No evidence of pneumothorax or sizable pleural effusion. No free air seen beneath the diaphragm. No evidence of acute osseous abnormality. There is heterogeneous bony mineralization in the visualized right humeral diaphysis. 1.  Mildly increased patchy opacities at the lung bases, possibly pneumonia or atelectasis. 2.  Increased masslike opacity in the medial right base. This was previously evaluated with CT on 05/26/2021. Consider re-evaluation with contrast-enhanced CT to assess progression. 3.  Heterogeneous bony mineralization in the visualized right humeral diaphysis, which is indeterminate. Dedicated radiographs of the right humerus are recommended. XR CHEST PORTABLE    Result Date: 10/22/2021  EXAMINATION: ONE XRAY VIEW OF THE CHEST 10/22/2021 11:15 am COMPARISON: Exam performed earlier the same day. HISTORY: ORDERING SYSTEM PROVIDED HISTORY: right IJ central line TECHNOLOGIST PROVIDED HISTORY: right IJ central line Reason for Exam: right IJ central line Acuity: Acute Type of Exam: Initial FINDINGS: Stable bibasilar atelectasis or infiltrates with unchanged mass density medial right lung base compared to earlier exam same day. Right IJ central venous catheter tip at the mid SVC. No pneumothorax post procedure. Cardiac size stable. Osseous structures grossly intact. Telemetry leads overlie the chest.     Interval placed right IJ central venous catheter with no pneumothorax. Otherwise stable exam       Assessment :   1. Dehydration/multifocal pneumonia/bacterermia  2. copd     Plan:   1. Pt is dnrcc a  2.   See order    Patient Active Problem List:     Constipation     Change in bowel habits     Rectal bleeding     Aortic dissection ([address] Bradycardia     Other dysphagia     Enteritis     Multifocal pneumonia     Tobacco abuse     Aspiration pneumonitis (HCC)     Status post insertion of percutaneous endoscopic gastrostomy (PEG) tube (HCC)     Small bowel obstruction (HCC)     Acute cystitis without hematuria     Mass of right lung     COPD (chronic obstructive pulmonary disease) (HCC)     PAF (paroxysmal atrial fibrillation) (HCC)     Urinary tract infection associated with indwelling urethral catheter (HCC)     Heart failure with reduced ejection fraction (HCC)     Iron deficiency anemia     Septic shock (HCC)     Chronic combined systolic and diastolic congestive heart failure ([address]     Dilated cardiomyopathy ([address]     Bacteremia due to Klebsiella pneumoniae     Melena     Anemia      Anticipated Disposition upon discharge: [] Home                                                                         [] Home with Home Health                                                                         [] [facility]                                                                         [] [address] ,[address]      Patient is admitted as inpatient status because of co-morbidities listed above, severity of signs and symptoms as outlined, requirement for current medical therapies and most importantly because of direct risk to patient if care not provided in a hospital setting.           Riaz Gonsalves MD, MD  [facility] Hospitalist

## 2021-10-30 NOTE — PROGRESS NOTES
Pulmonary Progress Note  [facility]      Patient - Timmy Mccain,  Age - 77 y.o.    - 1945      Room Number - 2125/2125-01   N -  [id number]   [facility]  Date of Admission -  10/22/2021  9:04 Lissette Almaguer MD  Primary Care Physician - Erica Mock MD     SUBJECTIVE   He is not in distress, nasal O2 on his lip. Denies pain or SOB. He seems unable to talk for me but indicates yes/no appropriately. OBJECTIVE   VITALS    height is 6' 4\" (1.93 m) and weight is 185 lb 6.5 oz (84.1 kg). His axillary temperature is 97.6 °F (36.4 °C). His blood pressure is 133/89 and his pulse is 78. His respiration is 26 and oxygen saturation is 96%. Body mass index is 22.57 kg/m². Temperature Range: Temp: 97.6 °F (36.4 °C) Temp  Av.7 °F (36.5 °C)  Min: 97.3 °F (36.3 °C)  Max: 98.2 °F (36.8 °C)  BP Range:  Systolic (47GZK), NGA:864 , Min:128 , XWE:232     Diastolic (26PJM), DHA:95, Min:79, Max:96    Pulse Range: Pulse  Av  Min: 78  Max: 96  Respiration Range: Resp  Av.6  Min: 20  Max: 26  Current Pulse Ox[de-identified]  SpO2: 96 %  24HR Pulse Ox Range:  SpO2  Av %  Min: 93 %  Max: 96 %  Oxygen Amount and Delivery: O2 Flow Rate (L/min): 2 L/min    Wt Readings from Last 3 Encounters:   10/30/21 185 lb 6.5 oz (84.1 kg)   21 178 lb (80.7 kg)   21 178 lb 5.6 oz (80.9 kg)       I/O (24 Hours)    Intake/Output Summary (Last 24 hours) at 10/30/2021 1622  Last data filed at 10/30/2021 1529  Gross per 24 hour   Intake 7037.33 ml   Output 1425 ml   Net 5612.33 ml       EXAM     General Appearance  Awake, alert, oriented, in no acute distress  HEENT - normocephalic, atraumatic.  []  Mallampati  [] Crowded airway   [] Macroglossia  []  Retrognathia  [] Micrognathia  []  Normal tongue size []  Normal Bite  [] Bollinger sign positive    Neck - Supple,  trachea midline   Lungs - Soft Bronchial RT Lower and lateral. Clear left. Some rattling post. Pharynx. Cardiovascular - Heart sounds are normal.  Regular rate and rhythm   Abdomen - Soft, nontender, nondistended, no masses or organomegaly  Neurologic - There are no focal motor or sensory deficits  Skin - No bruising or bleeding  Extremities - No clubbing, cyanosis, edema. EPc in place. MEDS      potassium chloride  20 mEq Per G Tube Daily    miconazole   Topical BID    apixaban  5 mg Oral BID    metoprolol tartrate  50 mg Oral BID    QUEtiapine  25 mg Oral Nightly    nystatin  5 mL Oral 4x Daily    piperacillin-tazobactam  3,375 mg IntraVENous Q8H    pantoprazole  40 mg IntraVENous BID    And    sodium chloride (PF)  10 mL IntraVENous BID    doxycycline Monohydrate  100 mg Per G Tube BID      dextrose 75 mL/hr at 10/29/21 2104    sodium chloride       melatonin, sodium chloride, midodrine, acetaminophen, traMADol, potassium chloride, microfibrillar collagen, ipratropium-albuterol    LABS   CBC   Recent Labs     10/30/21  0605   WBC 13.7*   HGB 8.3*   HCT 25.8*   MCV 87.3        BMP:   Lab Results   Component Value Date     10/30/2021    K 3.7 10/30/2021     10/30/2021    CO2 29 10/30/2021    BUN 46 10/30/2021    LABALBU 3.4 10/22/2021    CREATININE 1.17 10/30/2021    CALCIUM 8.7 10/30/2021    GFRAA >60 10/30/2021    LABGLOM >60 10/30/2021     ABGs:  Lab Results   Component Value Date    PHART 7.506 06/03/2021    PO2ART 123.0 06/03/2021    PQL9ICA 37.1 06/03/2021      Lab Results   Component Value Date    MODE PRVC 06/03/2021     Ionized Calcium:  No results found for: IONCA  Magnesium:    Lab Results   Component Value Date    MG 2.8 10/22/2021     Phosphorus:    Lab Results   Component Value Date    PHOS 2.8 06/04/2021        LIVER PROFILE No results for input(s): AST, ALT, LIPASE, BILIDIR, BILITOT, ALKPHOS in the last 72 hours. Invalid input(s):   AMYLASE,  ALB  INR No results for input(s): INR in the last 72 hours. PTT   Lab Results   Component Value Date    APTT 47.9 (H) 10/23/2021         RADIOLOGY     CXR yesterday reports no change from prior with bibasilar effusions and infiltrates, RT worse than LT. Unable to open on PACS. (See actual reports for details)    ASSESSMENT/PLAN   Principal Problem:    Septic shock ([facility]  Active Problems:    Multifocal pneumonia    Tobacco abuse    Acute cystitis without hematuria    Mass of right lung    COPD (chronic obstructive pulmonary disease) ([facility])    PAF (paroxysmal atrial fibrillation) ([facility])    Chronic combined systolic and diastolic congestive heart failure ([facility])    Dilated cardiomyopathy ([facility]    Bacteremia due to Klebsiella pneumoniae    Melena    Anemia  Resolved Problems:    * No resolved hospital problems. *        Bacteremia  Multifocal Klebsiella pneumonia  Encephalopathy  Acute on chronic kidney disease  Hypernatremia -nephrology now following. Acute cystitis-Proteus Mirabella's and Pseudomonas aeruginosa urinary tract infection chronic indwelling Issa  Right hilar mass  Anemia-hemoglobin 8.3  COPD  Paroxysmal atrial fibrillation  Combined systolic and diastolic heart failure  Dilated cardiomyopathy    REC:  1. Continue best supporting care  2. DNR [facility]   3. On antibiotics with Zosyn and doxycycline. 4. Lytes per nephrology  5. Hospice meeting Monday per Care Coordinator note.      Electronically signed by Brissa Beauchamp MD on 10/30/2021 at 4:22 PM

## 2021-10-30 NOTE — CARE COORDINATION
ONGOING DISCHARGE PLAN:    Hospice Meeting with family and [address] to take place Monday as family has a  to attend this weekend. Will continue to follow for additional discharge needs.     Electronically signed by Marion Garcia RN on 10/30/2021 at 2:45 PM

## 2021-10-30 NOTE — DISCHARGE INSTR - COC
Continuity of Care Form    Patient Name: Abhishek Kenney   :  [id number]  MRN:  [id number]    Admit date:  10/22/2021  Discharge date:  ***    Code Status Order: DNR-CCA   Advance Directives:     Admitting Physician:  Rain Montejo MD  PCP: Fozuia Roberts MD    Discharging Nurse: [facility] Unit/Room#: 2125/2125-01  Discharging Unit Phone Number: ***    Emergency Contact:   Extended Emergency Contact Information  Primary Emergency Contact: Mamadou Bernard , [address] Phone: [phone]  Mobile Phone: [phone]  Relation: Brother/Sister  Secondary Emergency Contact: Zelalem Peters  Home Phone: [phone]  Work Phone: [phone]  Mobile Phone: [phone]  Relation: Child    Past Surgical History:  Past Surgical History:   Procedure Laterality Date    ABDOMEN SURGERY      BACK SURGERY      spinal surgery      CAROTID ENDARTERECTOMY Left 2016    COLECTOMY N/A 2021    Exploratory Laparotomy. Release of adhesive band with release of small bowel obstruction. Small bowel resection with primary anastomosis performed by Bryson Estrada MD at [address] [place] [address] COLONOSCOPY N/A 2018    COLONOSCOPY POLYPECTOMY COLD BIOPSY performed by Ruth Mehta MD at [address] [place] [address]      left face    GASTROSTOMY TUBE PLACEMENT  04/15/2020    HERNIA REPAIR      HIATAL HERNIA REPAIR      INGUINAL HERNIA REPAIR Bilateral     INSERT MIDLINE CATHETER  2021         OTHER SURGICAL HISTORY      mesh infected in inguinal canal, had to remove. Removed testiscle at this time.  TONSILLECTOMY      UPPER GASTROINTESTINAL ENDOSCOPY  04/15/2020       EGD CONTROL HEMORRHAGE    UPPER GASTROINTESTINAL ENDOSCOPY  4/15/2020    EGD CONTROL HEMORRHAGE performed by Jovan King MD at [facility] ENDOSCOPY  4/15/2020    EGD ESOPHAGOGASTRODUODENOSCOPY PEG TUBE INSERTION performed by Jovan King MD at [facility]       Immunization History:      There is no immunization history on file for this patient.     Active Problems:  Patient Active Problem List   Diagnosis Code    Constipation K59.00    Change in bowel habits R19.4    Rectal bleeding K62.5    Aortic dissection ([facility]) I71.00    Bradycardia R00.1    Other dysphagia R13.19    Enteritis K52.9    Multifocal pneumonia J18.9    Tobacco abuse Z72.0    Aspiration pneumonitis ([address] J69.0    Status post insertion of percutaneous endoscopic gastrostomy (PEG) tube ([address] Z93.1    Small bowel obstruction ([address] K56.609    Acute cystitis without hematuria N30.00    Mass of right lung R91.8    COPD (chronic obstructive pulmonary disease) ([facility]) J44.9    PAF (paroxysmal atrial fibrillation) ([facility]) I48.0    Urinary tract infection associated with indwelling urethral catheter ([facility]) T83.511A, N39.0    Heart failure with reduced ejection fraction ([facility]) I50.20    Iron deficiency anemia D50.9    Septic shock ([facility]) A41.9, R65.21    Chronic combined systolic and diastolic congestive heart failure ([facility]) I50.42    Dilated cardiomyopathy ([facility]) I42.0    Bacteremia due to Klebsiella pneumoniae R78.81, B96.1    Melena K92.1    Anemia D64.9       Isolation/Infection:   Isolation          No Isolation        Patient Infection Status     Infection Onset Added Last Indicated Last Indicated By Review Planned Expiration Resolved Resolved By    None active    Resolved    COVID-19 Rule Out 10/22/21 10/22/21 10/22/21 COVID-19, Rapid (Ordered)   10/22/21 Rule-Out Test Resulted    COVID-19 Rule Out 05/24/21 05/24/21 05/24/21 COVID-19, Rapid (Ordered)   05/24/21 Rule-Out Test Resulted    C-diff Rule Out 01/21/21 01/21/21 01/21/21 C. difficile toxin Molecular (Ordered)   01/21/21 Rule-Out Test Resulted    C-diff Rule Out 12/10/20 12/10/20 12/10/20 C. difficile toxin Molecular (Ordered)   12/11/20 Rule-Out Test Resulted    C-diff Rule Out 10/19/20 10/19/20 10/19/20 C. difficile toxin Molecular (Ordered)   10/20/20 Rule-Out Test Resulted C-diff Rule Out 09/22/20 09/22/20 09/22/20 C. difficile toxin Molecular (Ordered)   09/23/20 Rule-Out Test Resulted    C-diff Rule Out 08/10/20 08/10/20 08/10/20 Gastrointestinal Panel, Molecular (Ordered)   08/10/20 Rule-Out Test Resulted    COVID-19 Rule Out 04/07/20 04/07/20 04/07/20 COVID-19 (Ordered)   04/08/20 Rule-Out Test Resulted          Nurse Assessment:  Last Vital Signs: /89   Pulse 78   Temp 97.6 °F (36.4 °C) (Axillary)   Resp 26   Ht 6' 4\" (1.93 m)   Wt 185 lb 6.5 oz (84.1 kg)   SpO2 96%   BMI 22.57 kg/m²     Last documented pain score (0-10 scale): Pain Level: 4  Last Weight:   Wt Readings from Last 1 Encounters:   10/30/21 185 lb 6.5 oz (84.1 kg)     Mental Status:  oriented and alert    IV Access:  - None    Nursing Mobility/ADLs:  Walking   Dependent  Transfer  Dependent  Bathing  Dependent  Dressing  Dependent  Toileting  Dependent  Feeding  Dependent  Med Admin  Dependent  Med Delivery   crushed and PEG    Wound Care Documentation and Therapy:  Wound 10/22/21 Coccyx Lower (Active)   Wound Etiology Pressure Stage  2 10/30/21 0920   Dressing Status Clean;Dry; Intact 10/30/21 0920   Wound Cleansed Soap and water 10/30/21 0920   Dressing/Treatment Foam;Zinc paste 10/30/21 0920   Offloading for Diabetic Foot Ulcers No 10/23/21 0400   Dressing Change Due 10/31/21 10/28/21 1501   Wound Assessment Pink/red;Eschar moist 10/30/21 0920   Drainage Amount Small 10/30/21 0920   Drainage Description Thick 10/30/21 0920   Odor None 10/30/21 0920   Johana-wound Assessment Fragile 10/30/21 0920   Margins Defined edges 10/25/21 1530   Wound Thickness Description not for Pressure Injury Full thickness 10/30/21 0920   Number of days: 7        Elimination:  Continence:   · Bowel: No  · Bladder: No  Urinary Catheter: Indication for Use of Catheter: Hospice/comfort/palliateive care   Colostomy/Ileostomy/Ileal Conduit: No  Rectal Tube With balloon-Stool Appearance: Loose, Watery  Rectal Tube With balloon-Stool Color: Black (pt on iron, home med)    Date of Last BM: 11/2/21 rectal tube removed 11/2/21    Intake/Output Summary (Last 24 hours) at 10/30/2021 1449  Last data filed at 10/30/2021 1301  Gross per 24 hour   Intake 5747.33 ml   Output 2225 ml   Net 3522.33 ml     I/O last 3 completed shifts: In: 6032.3 [I.V.:3209.3; NG/GT:2179; IV Piggyback:644]  Out: 1875 [Urine:1875]    Safety Concerns:     None    Impairments/Disabilities:      Speech, Contractures - bedridden and ***    Nutrition Therapy:  Current Nutrition Therapy:   - Tube Feedings:  resume home tube  feedins rpior to admission    Routes of Feeding: Gastrostomy Tube  Liquids: 200 ml water bolus every 6 hours  Daily Fluid Restriction: no  Last Modified Barium Swallow with Video (Video Swallowing Test): not done    Treatments at the Time of Hospital Discharge:   Respiratory Treatments: see MAR  Oxygen Therapy:  is on oxygen at 2 L/min per nasal cannula.   Ventilator:    - No ventilator support    Rehab Therapies: Physical Therapy and Occupational Therapy  Weight Bearing Status/Restrictions: NA  Other Medical Equipment (for information only, NOT a DME order):  {EQUIPMENT:366543475}  Other Treatments: ***    Patient's personal belongings (please select all that are sent with patient):  {[facility] DME Belongings:791321887}    RN SIGNATURE:  {Esignature:213192653}    CASE MANAGEMENT/SOCIAL WORK SECTION    Inpatient Status Date: ***    Readmission Risk Assessment Score:  Readmission Risk              Risk of Unplanned Readmission:  30           Discharging to Facility/ Agency       / signature: {Esignature:249708406}    PHYSICIAN SECTION    Prognosis: {Prognosis:7809642509}    Condition at Discharge: [facility] Patient Condition:[id number]}    Rehab Potential (if transferring to Rehab): {Prognosis:0848983549}    Recommended Labs or Other Treatments After Discharge: ***    Physician Certification: I certify the above information and transfer

## 2021-10-30 NOTE — PROGRESS NOTES
Spoke to dr Leonel Looney regarding pt potassium oral being tablet and pt is npo, pill cannot be crushed. Order to change to potassium packet through peg tube. Order to change to 250 ml water flushes Q4H d/t .

## 2021-10-30 NOTE — PLAN OF CARE
Problem: Falls - Risk of:  Goal: Will remain free from falls  Description: Will remain free from falls  10/30/2021 1531 by Ej Carrasquillo RN  Outcome: Ongoing   Precautions in place    Problem: Skin Integrity:  Goal: Will show no infection signs and symptoms  Description: Will show no infection signs and symptoms  10/30/2021 1531 by Ej Carrasquillo RN  Outcome: Ongoing   Afebrile this shift    Problem: SAFETY  Goal: Free from accidental physical injury  Outcome: Ongoing   High fall risk. Pt doesn't attempt to get out of bed. Bed rest/Q2turn     Problem: DAILY CARE  Goal: Daily care needs are met  10/30/2021 1531 by Ej Carrasquillo RN  Outcome: Ongoing   CTA and RN rounding    Problem: PAIN  Goal: Patient's pain/discomfort is manageable  Outcome: Ongoing   Non pharm and meds utilized. Problem: SKIN INTEGRITY  Goal: Skin integrity is maintained or improved  Outcome: Ongoing   Wound care in place for coccyx wound. Rectal tube in place for stool to keep coccyx wound clean and dry. Problem: KNOWLEDGE DEFICIT  Goal: Patient/S.O. demonstrates understanding of disease process, treatment plan, medications, and discharge instructions. Outcome: Ongoing   Pt nonverbal. Speak to pt as he seems to understand what we are saying.  Nods occasionally and makes eye contact

## 2021-10-30 NOTE — FLOWSHEET NOTE
10/30/21 1134   Encounter Summary   Services provided to: Patient   Referral/Consult From: Palliative Care   Complexity of Encounter Low   Length of Encounter 15 minutes   Spiritual/Sikh   Type Spiritual support   Assessment Unable to respond   Intervention Prayer

## 2021-10-31 LAB
ABSOLUTE EOS #: 0.3 K/UL (ref 0–0.4)
ABSOLUTE IMMATURE GRANULOCYTE: ABNORMAL K/UL (ref 0–0.3)
ABSOLUTE LYMPH #: 1.1 K/UL (ref 1–4.8)
ABSOLUTE MONO #: 1 K/UL (ref 0.1–1.3)
ANION GAP SERPL CALCULATED.3IONS-SCNC: 10 MMOL/L (ref 9–17)
BASOPHILS # BLD: 0 % (ref 0–2)
BASOPHILS ABSOLUTE: 0 K/UL (ref 0–0.2)
BUN BLDV-MCNC: 46 MG/DL (ref 8–23)
BUN/CREAT BLD: ABNORMAL (ref 9–20)
CALCIUM SERPL-MCNC: 8.5 MG/DL (ref 8.6–10.4)
CHLORIDE BLD-SCNC: 105 MMOL/L (ref 98–107)
CO2: 28 MMOL/L (ref 20–31)
CREAT SERPL-MCNC: 0.95 MG/DL (ref 0.7–1.2)
DIFFERENTIAL TYPE: ABNORMAL
EOSINOPHILS RELATIVE PERCENT: 3 % (ref 0–4)
GFR AFRICAN AMERICAN: >60 ML/MIN
GFR NON-AFRICAN AMERICAN: >60 ML/MIN
GFR SERPL CREATININE-BSD FRML MDRD: ABNORMAL ML/MIN/{1.73_M2}
GFR SERPL CREATININE-BSD FRML MDRD: ABNORMAL ML/MIN/{1.73_M2}
GLUCOSE BLD-MCNC: 118 MG/DL (ref 70–99)
HCT VFR BLD CALC: 24.7 % (ref 41–53)
HEMOGLOBIN: 8 G/DL (ref 13.5–17.5)
IMMATURE GRANULOCYTES: ABNORMAL %
LYMPHOCYTES # BLD: 9 % (ref 24–44)
MCH RBC QN AUTO: 28.9 PG (ref 26–34)
MCHC RBC AUTO-ENTMCNC: 32.4 G/DL (ref 31–37)
MCV RBC AUTO: 89.1 FL (ref 80–100)
MONOCYTES # BLD: 8 % (ref 1–7)
NRBC AUTOMATED: ABNORMAL PER 100 WBC
PDW BLD-RTO: 17.8 % (ref 11.5–14.9)
PLATELET # BLD: 289 K/UL (ref 150–450)
PLATELET ESTIMATE: ABNORMAL
PMV BLD AUTO: 8.3 FL (ref 6–12)
POTASSIUM SERPL-SCNC: 3.5 MMOL/L (ref 3.7–5.3)
RBC # BLD: 2.77 M/UL (ref 4.5–5.9)
RBC # BLD: ABNORMAL 10*6/UL
SEG NEUTROPHILS: 80 % (ref 36–66)
SEGMENTED NEUTROPHILS ABSOLUTE COUNT: 9.7 K/UL (ref 1.3–9.1)
SODIUM BLD-SCNC: 143 MMOL/L (ref 135–144)
WBC # BLD: 12.1 K/UL (ref 3.5–11)
WBC # BLD: ABNORMAL 10*3/UL

## 2021-10-31 PROCEDURE — 6370000000 HC RX 637 (ALT 250 FOR IP): Performed by: INTERNAL MEDICINE

## 2021-10-31 PROCEDURE — 80048 BASIC METABOLIC PNL TOTAL CA: CPT

## 2021-10-31 PROCEDURE — 2580000003 HC RX 258: Performed by: INTERNAL MEDICINE

## 2021-10-31 PROCEDURE — 6370000000 HC RX 637 (ALT 250 FOR IP): Performed by: FAMILY MEDICINE

## 2021-10-31 PROCEDURE — 6360000002 HC RX W HCPCS: Performed by: FAMILY MEDICINE

## 2021-10-31 PROCEDURE — 85025 COMPLETE CBC W/AUTO DIFF WBC: CPT

## 2021-10-31 PROCEDURE — 6360000002 HC RX W HCPCS: Performed by: INTERNAL MEDICINE

## 2021-10-31 PROCEDURE — 99232 SBSQ HOSP IP/OBS MODERATE 35: CPT | Performed by: INTERNAL MEDICINE

## 2021-10-31 PROCEDURE — 2060000000 HC ICU INTERMEDIATE R&B

## 2021-10-31 PROCEDURE — 6370000000 HC RX 637 (ALT 250 FOR IP): Performed by: NURSE PRACTITIONER

## 2021-10-31 PROCEDURE — C9113 INJ PANTOPRAZOLE SODIUM, VIA: HCPCS | Performed by: INTERNAL MEDICINE

## 2021-10-31 PROCEDURE — 36415 COLL VENOUS BLD VENIPUNCTURE: CPT

## 2021-10-31 RX ADMIN — NYSTATIN 500000 UNITS: 100000 SUSPENSION ORAL at 16:21

## 2021-10-31 RX ADMIN — POTASSIUM CHLORIDE 10 MEQ: 10 INJECTION, SOLUTION INTRAVENOUS at 17:59

## 2021-10-31 RX ADMIN — ANTI-FUNGAL POWDER MICONAZOLE NITRATE TALC FREE: 1.42 POWDER TOPICAL at 20:04

## 2021-10-31 RX ADMIN — PIPERACILLIN SODIUM AND TAZOBACTAM SODIUM 3375 MG: 3; .375 INJECTION, POWDER, LYOPHILIZED, FOR SOLUTION INTRAVENOUS at 12:13

## 2021-10-31 RX ADMIN — POTASSIUM CHLORIDE 10 MEQ: 10 INJECTION, SOLUTION INTRAVENOUS at 15:42

## 2021-10-31 RX ADMIN — APIXABAN 5 MG: 5 TABLET, FILM COATED ORAL at 20:03

## 2021-10-31 RX ADMIN — PANTOPRAZOLE SODIUM 40 MG: 40 INJECTION, POWDER, FOR SOLUTION INTRAVENOUS at 09:46

## 2021-10-31 RX ADMIN — POTASSIUM CHLORIDE 20 MEQ: 1.5 FOR SOLUTION ORAL at 09:48

## 2021-10-31 RX ADMIN — NYSTATIN 500000 UNITS: 100000 SUSPENSION ORAL at 20:03

## 2021-10-31 RX ADMIN — METOPROLOL TARTRATE 50 MG: 50 TABLET, FILM COATED ORAL at 22:28

## 2021-10-31 RX ADMIN — TRAMADOL HYDROCHLORIDE 50 MG: 50 TABLET ORAL at 17:53

## 2021-10-31 RX ADMIN — DEXTROSE MONOHYDRATE: 50 INJECTION, SOLUTION INTRAVENOUS at 04:23

## 2021-10-31 RX ADMIN — DOXYCYCLINE 100 MG: 25 FOR SUSPENSION ORAL at 12:13

## 2021-10-31 RX ADMIN — APIXABAN 5 MG: 5 TABLET, FILM COATED ORAL at 09:50

## 2021-10-31 RX ADMIN — SODIUM CHLORIDE, PRESERVATIVE FREE 10 ML: 5 INJECTION INTRAVENOUS at 20:03

## 2021-10-31 RX ADMIN — SODIUM CHLORIDE, PRESERVATIVE FREE 10 ML: 5 INJECTION INTRAVENOUS at 09:47

## 2021-10-31 RX ADMIN — DOXYCYCLINE 100 MG: 25 FOR SUSPENSION ORAL at 20:22

## 2021-10-31 RX ADMIN — PIPERACILLIN SODIUM AND TAZOBACTAM SODIUM 3375 MG: 3; .375 INJECTION, POWDER, LYOPHILIZED, FOR SOLUTION INTRAVENOUS at 20:02

## 2021-10-31 RX ADMIN — POTASSIUM CHLORIDE 10 MEQ: 10 INJECTION, SOLUTION INTRAVENOUS at 14:52

## 2021-10-31 RX ADMIN — ANTI-FUNGAL POWDER MICONAZOLE NITRATE TALC FREE: 1.42 POWDER TOPICAL at 09:46

## 2021-10-31 RX ADMIN — METOPROLOL TARTRATE 50 MG: 50 TABLET, FILM COATED ORAL at 09:46

## 2021-10-31 RX ADMIN — CHLOROTHIAZIDE SODIUM 250 MG: 500 INJECTION, POWDER, LYOPHILIZED, FOR SOLUTION INTRAVENOUS at 18:26

## 2021-10-31 RX ADMIN — NYSTATIN 500000 UNITS: 100000 SUSPENSION ORAL at 12:36

## 2021-10-31 RX ADMIN — QUETIAPINE FUMARATE 25 MG: 50 TABLET ORAL at 20:03

## 2021-10-31 RX ADMIN — NYSTATIN 500000 UNITS: 100000 SUSPENSION ORAL at 09:46

## 2021-10-31 RX ADMIN — PIPERACILLIN SODIUM AND TAZOBACTAM SODIUM 3375 MG: 3; .375 INJECTION, POWDER, LYOPHILIZED, FOR SOLUTION INTRAVENOUS at 04:01

## 2021-10-31 RX ADMIN — POTASSIUM CHLORIDE 10 MEQ: 10 INJECTION, SOLUTION INTRAVENOUS at 20:21

## 2021-10-31 RX ADMIN — PANTOPRAZOLE SODIUM 40 MG: 40 INJECTION, POWDER, FOR SOLUTION INTRAVENOUS at 20:03

## 2021-10-31 RX ADMIN — ACETAMINOPHEN 650 MG: 325 TABLET ORAL at 09:46

## 2021-10-31 ASSESSMENT — PAIN DESCRIPTION - ORIENTATION
ORIENTATION: OTHER (COMMENT)
ORIENTATION: OTHER (COMMENT)

## 2021-10-31 ASSESSMENT — PAIN SCALES - GENERAL
PAINLEVEL_OUTOF10: 2
PAINLEVEL_OUTOF10: 2
PAINLEVEL_OUTOF10: 5

## 2021-10-31 ASSESSMENT — PAIN SCALES - WONG BAKER
WONGBAKER_NUMERICALRESPONSE: 2
WONGBAKER_NUMERICALRESPONSE: 4

## 2021-10-31 NOTE — PROGRESS NOTES
Department of Internal Medicine  Nephrology Loyd Nick MD  Progress Note    Reason for consultation: Management of acute kidney injury superimposed on chronic kidney disease stage 4..    Consulting physician: Katherin Veronica MD    Attending physician: Lupis Cano MD.    Interval history:  Patient was seen and examined today. He is on tube feeding   BP stable. Patient is  non verbal.   On free water and D5 water IV fluid  He has indwelling Issa catheter which was exchanged on admission and he is nonoliguric. Stable hemoglobin. K 3.5  Sodium improved to 143 from 154  Edema increased    History of present illness: This is a 77 y.o. male with a significant past medical history of Systemic hypertension, chronic obstructive pulmonary disease, Bladder cancer, seizure disorder, osteoarthritis and s/p Cerebrovascular accident [has G-tube in place], who presented to the hospital on 5/24/2021 with complaints of confusion and disorientation. Apparently patient lives at home alone but family lives nearby. Laboratory studies at presentation were remarkable for serum sodium 129 mmol/L, serum bicarbonate 19 mmol/L and elevated BUN/creatinine 102/4.36 mg/dL. I had seen patient during the prior presentation in May 2021 for acute kidney injury superimposed on chronic kidney disease stage IV and at that time serum creatinine was greater than 4 mg/dL. CODE STATUS is DNR CCA. Blood pressure at presentation this time was 114/45 work patient subsequently developed hypotension and is currently on Levophed drip at 6 mcg/min. He has a chronic indwelling Issa catheter and is nonoliguric. He is a poor historian and history was obtained primarily by chart review.     Scheduled Meds:   potassium chloride  20 mEq Per G Tube Daily    miconazole   Topical BID    apixaban  5 mg Oral BID    metoprolol tartrate  50 mg Oral BID    QUEtiapine  25 mg Oral Nightly    nystatin  5 mL Oral 4x Daily    piperacillin-tazobactam  3,375 mg IntraVENous Q8H    pantoprazole  40 mg IntraVENous BID    And    sodium chloride (PF)  10 mL IntraVENous BID    doxycycline Monohydrate  100 mg Per G Tube BID     Continuous Infusions:   dextrose 75 mL/hr at 10/31/21 0423    sodium chloride       Physical Exam:    VITALS:  /87   Pulse 68   Temp 98.3 °F (36.8 °C) (Axillary)   Resp 26   Ht 6' 4\" (1.93 m)   Wt 192 lb 14.4 oz (87.5 kg)   SpO2 95%   BMI 23.48 kg/m²   24HR INTAKE/OUTPUT:      Intake/Output Summary (Last 24 hours) at 10/31/2021 1658  Last data filed at 10/31/2021 1300  Gross per 24 hour   Intake 4076 ml   Output 1900 ml   Net 2176 ml     Constitutional: fatigued, flushed and slowed mentation    Skin: Skin color, texture, turgor normal. No rashes or lesions    Head: Normocephalic, without obvious abnormality, atraumatic     Cardiovascular/Edema: regular rate and rhythm, S1, S2 normal, no murmur, click, rub or gallop    Respiratory: Lungs: clear to auscultation bilaterally    Abdomen: soft, non-tender; bowel sounds normal; no masses,  no organomegaly    Back: symmetric, no curvature. ROM normal. No CVA tenderness. Extremities: extremities normal, atraumatic, no cyanosis + edema    Neuro:  Grossly normal    CBC:   Recent Labs     10/29/21  0519 10/30/21  0605 10/31/21  0545   WBC 12.1* 13.7* 12.1*   HGB 8.3* 8.3* 8.0*    301 289     BMP:    Recent Labs     10/29/21  0519 10/29/21  1205 10/29/21  1652 10/30/21  0605 10/30/21  1503 10/31/21  0545   *  --  150* 154*  --  143   K 3.4*   < >  --  3.2* 3.7 3.5*   *  --   --  112*  --  105   CO2 28  --   --  29  --  28   BUN 54*  --   --  46*  --  46*   CREATININE 1.21*  --   --  1.17  --  0.95   GLUCOSE 141*  --   --  145*  --  118*    < > = values in this interval not displayed.      Lab Results   Component Value Date    NITRU NEGATIVE 10/25/2021    COLORU Yellow 10/25/2021    PHUR 5.0 10/25/2021    WBCUA 5 TO 10 10/25/2021    RBCUA 5 TO 10 10/25/2021    MUCUS NOT REPORTED 10/25/2021    TRICHOMONAS NOT REPORTED 10/25/2021    YEAST MODERATE 10/25/2021    BACTERIA FEW 10/25/2021    CLARITYU cloudy 09/07/2021    SPECGRAV 1.010 10/25/2021    LEUKOCYTESUR TRACE 10/25/2021    UROBILINOGEN Normal 10/25/2021    BILIRUBINUR NEGATIVE 10/25/2021    BLOODU ++ 09/07/2021    GLUCOSEU NEGATIVE 10/25/2021    KETUA NEGATIVE 10/25/2021    AMORPHOUS NOT REPORTED 10/25/2021     Urine Sodium:     Lab Results   Component Value Date    HELENA 78 10/25/2021     Urine Potassium:  No results found for: NATASHA  Urine Chloride:    Lab Results   Component Value Date    CLUR 87 10/25/2021     Urine Creatinine:     Lab Results   Component Value Date    LABCREA 32.6 10/25/2021     IMPRESSION/RECOMMENDATIONS:      1. Acute kidney injury superimposed on chronic kidney disease stage 3 [baseline serum creatinine 1.28 mg/dL on 8/11/2021] - Top differential is prerenal azotemia from poor oral intake as well as ischemic acute tubular necrosis. He has chronic indwelling Issa catheter and CT scan showed atrophic right kidney with bilateral nephrolithiasis but no hydronephrosis. Non Oliguric today-Scr is down to 1.1 mg/dl stable       2. Proteus Mirabella's and Pseudomonas aeruginosa urinary tract infection - Continue IV Rocephin    3. Klebsiella pneumonia bacteremia - Continue IV antibiotics    4. Hypotension-resolved    5. Normocytic anemia - iron deficiency anemia  S/p 1 Unit PRBC    6. Hypokalemia - KCL per sliding scale. 7.Hypernatremia-      Plan  free water 250 mg q 4 hrly  Taper IVF  Strict I/Os-monitor serum sodium at 4 p.m  Monitor urine output. KCL per sliding scale. Tube feeding  Resume diuretics, will will use IV Diuril to avoid hypernatremia    Prognosis is guarded.     Dequan Cummins MD   Attending Nephrologist  10/31/2021 4:58 PM

## 2021-10-31 NOTE — PROGRESS NOTES
Pulmonary Progress Note  [facility]      Patient - Roland Toth,  Age - 77 y.o.    - 1945      Room Number - 2125/2125-01   N -  [id number]   [facility]  Date of Admission -  10/22/2021  9:04 Ham Cason MD  Primary Care Physician - Nola Wyatt MD     SUBJECTIVE   Patient seen in the progressive unit. Currently is on oxygen at 2 L nasal cannula. His O2 saturations are 95 to 98% currently. OBJECTIVE   VITALS    height is 6' 4\" (1.93 m) and weight is 192 lb 14.4 oz (87.5 kg). His axillary temperature is 98.3 °F (36.8 °C). His blood pressure is 106/87 and his pulse is 68. His respiration is 26 and oxygen saturation is 95%. Body mass index is 23.48 kg/m². Temperature Range: Temp: 98.3 °F (36.8 °C) Temp  Av.2 °F (36.8 °C)  Min: 97.6 °F (36.4 °C)  Max: 98.4 °F (36.9 °C)  BP Range:  Systolic (30ABO), HG , Min:106 , QKZ:560     Diastolic (69OMS), SAÚL:30, Min:80, Max:87    Pulse Range: Pulse  Av.5  Min: 68  Max: 92  Respiration Range: Resp  Av.5  Min: 20  Max: 28  Current Pulse Ox[de-identified]  SpO2: 95 %  24HR Pulse Ox Range:  SpO2  Av.8 %  Min: 95 %  Max: 98 %  Oxygen Amount and Delivery: O2 Flow Rate (L/min): 2 L/min    Wt Readings from Last 3 Encounters:   10/30/21 192 lb 14.4 oz (87.5 kg)   21 178 lb (80.7 kg)   21 178 lb 5.6 oz (80.9 kg)       I/O (24 Hours)    Intake/Output Summary (Last 24 hours) at 10/31/2021 191  Last data filed at 10/31/2021 1829  Gross per 24 hour   Intake 3751 ml   Output 2300 ml   Net 1451 ml       EXAM     General Appearance  Awake, alert, oriented, in no acute distress  HEENT - normocephalic, atraumatic. Neck - Supple,  trachea midline   Lungs -coarse breath sounds  Heart Exam:PMI normal. No lifts, heaves, or thrills. RRR. No murmurs, clicks, gallops, or rubs  Abdomen Exam: Abdomen soft, non-tender.  BS normal.   Extremity Exam: As of cyanosis    MEDS      chlorothiazide (DIURIL) IVPB  250 mg IntraVENous Q12H    potassium chloride  20 mEq Per G Tube Daily    miconazole   Topical BID    apixaban  5 mg Oral BID    metoprolol tartrate  50 mg Oral BID    QUEtiapine  25 mg Oral Nightly    nystatin  5 mL Oral 4x Daily    piperacillin-tazobactam  3,375 mg IntraVENous Q8H    pantoprazole  40 mg IntraVENous BID    And    sodium chloride (PF)  10 mL IntraVENous BID    doxycycline Monohydrate  100 mg Per G Tube BID      dextrose 50 mL/hr at 10/31/21 1751    sodium chloride       melatonin, sodium chloride, midodrine, acetaminophen, traMADol, potassium chloride, microfibrillar collagen, ipratropium-albuterol    LABS   CBC   Recent Labs     10/31/21  0545   WBC 12.1*   HGB 8.0*   HCT 24.7*   MCV 89.1        BMP:   Lab Results   Component Value Date     10/31/2021    K 3.5 10/31/2021     10/31/2021    CO2 28 10/31/2021    BUN 46 10/31/2021    LABALBU 3.4 10/22/2021    CREATININE 0.95 10/31/2021    CALCIUM 8.5 10/31/2021    GFRAA >60 10/31/2021    LABGLOM >60 10/31/2021     ABGs:  Lab Results   Component Value Date    PHART 7.506 06/03/2021    PO2ART 123.0 06/03/2021    CNM0BSX 37.1 06/03/2021      Lab Results   Component Value Date    MODE PRVC 06/03/2021     Ionized Calcium:  No results found for: IONCA  Magnesium:    Lab Results   Component Value Date    MG 2.8 10/22/2021     Phosphorus:    Lab Results   Component Value Date    PHOS 2.8 06/04/2021        LIVER PROFILE No results for input(s): AST, ALT, LIPASE, BILIDIR, BILITOT, ALKPHOS in the last 72 hours. Invalid input(s): AMYLASE,  ALB  INR No results for input(s): INR in the last 72 hours.   PTT   Lab Results   Component Value Date    APTT 47.9 (H) 10/23/2021         RADIOLOGY     (See actual reports for details)    ASSESSMENT/PLAN     Patient Active Problem List   Diagnosis    Constipation    Change in bowel habits    Rectal bleeding    [facility]    Bradycardia    Other dysphagia    Enteritis    Multifocal pneumonia    Tobacco abuse    Aspiration pneumonitis (HCC)    Status post insertion of percutaneous endoscopic gastrostomy (PEG) tube (HCC)    Small bowel obstruction (HCC)    Acute cystitis without hematuria    Mass of right lung    COPD (chronic obstructive pulmonary disease) (HCC)    PAF (paroxysmal atrial fibrillation) (HCC)    Urinary tract infection associated with indwelling urethral catheter (HCC)    Heart failure with reduced ejection fraction (HCC)    Iron deficiency anemia    Septic shock (HCC)    Chronic combined systolic and diastolic congestive heart failure (HCC)    Dilated cardiomyopathy (HCC)    Bacteremia due to Klebsiella pneumoniae    Melena    Anemia       Multifocal Klebsiella pneumonia with bacteremia. Positive blood cultures October 22  Encephalopathy  Acute on chronic kidney disease creatinine actually has improved to 0.95. It was 1.7 6 October 2025  Hypernatremia -nephrology now following. Sodium now down to 143  Acute cystitis-Proteus Mirabella's and Pseudomonas aeruginosa urinary tract infection chronic indwelling Issa  Right hilar mass  Anemia-hemoglobin 8.3  COPD  Paroxysmal atrial fibrillation  LV systolic dysfunction. EF 15 to 20% on echo done June 2, 2021    Antibiotics per ID.   Currently on doxycycline and Zosyn  On Eliquis  Was told that hospice will be following with the patient early this week    DNR CCA noted      Electronically signed by Sanaz Nunez MD on 10/31/2021 at 7:18 PM

## 2021-10-31 NOTE — PLAN OF CARE
Problem: Falls - Risk of:  Goal: Will remain free from falls  Description: Will remain free from falls  10/31/2021 0257 by King Hamlin RN  Outcome: Ongoing     Problem: Falls - Risk of:  Goal: Absence of physical injury  Description: Absence of physical injury  10/31/2021 0257 by King Hamlin RN  Outcome: Ongoing     Problem: Skin Integrity:  Goal: Will show no infection signs and symptoms  Description: Will show no infection signs and symptoms  10/31/2021 0257 by King Hamlin RN  Outcome: Ongoing     Problem: Skin Integrity:  Goal: Absence of new skin breakdown  Description: Absence of new skin breakdown  10/31/2021 0257 by King Hamlin RN  Outcome: Ongoing     Problem: SAFETY  Goal: Free from accidental physical injury  10/31/2021 0257 by King Hamlin RN  Outcome: Ongoing     Problem: SAFETY  Goal: Free from intentional harm  10/31/2021 0257 by King Hamlin RN  Outcome: Ongoing     Problem: DAILY CARE  Goal: Daily care needs are met  10/31/2021 0257 by King Hamlin RN  Outcome: Ongoing     Problem: PAIN  Goal: Patient's pain/discomfort is manageable  10/31/2021 0257 by King Hamlin RN  Outcome: Ongoing     Problem: SKIN INTEGRITY  Goal: Skin integrity is maintained or improved  10/31/2021 0257 by King Hamlin RN  Outcome: Ongoing     Problem: KNOWLEDGE DEFICIT  Goal: Patient/S.O. demonstrates understanding of disease process, treatment plan, medications, and discharge instructions.   10/31/2021 0257 by King Hamlin RN  Outcome: Ongoing     Problem: DISCHARGE BARRIERS  Goal: Patient's continuum of care needs are met  10/31/2021 0257 by King Hamlin RN  Outcome: Ongoing     Problem: Discharge Planning:  Goal: Discharged to appropriate level of care  Description: Discharged to appropriate level of care  10/31/2021 0257 by King Hamlin RN  Outcome: Ongoing     Problem: Discharge Planning:  Goal: Ability to perform activities of daily living will improve  Description: Ability to perform activities of daily living will improve  Outcome: Ongoing     Problem: Discharge Planning:  Goal: Participates in care planning  Description: Participates in care planning  Outcome: Ongoing     Problem: Gas Exchange - Impaired:  Goal: Levels of oxygenation will improve  Description: Levels of oxygenation will improve  10/31/2021 0257 by King Hamlin RN  Outcome: Ongoing     Problem: Infection, Septic Shock:  Goal: Will show no infection signs and symptoms  Description: Will show no infection signs and symptoms  10/31/2021 0257 by King Hamlin RN  Outcome: Ongoing     Problem: Infection - Ventilator-Associated Pneumonia:  Goal: Absence of pulmonary infection  Description: Absence of pulmonary infection  10/31/2021 0257 by King Hamlin RN  Outcome: Ongoing     Problem: Serum Glucose Level - Abnormal:  Goal: Ability to maintain appropriate glucose levels will improve  Description: Ability to maintain appropriate glucose levels will improve  10/31/2021 0257 by King Hamlin RN  Outcome: Ongoing     Problem: Tissue Perfusion, Altered:  Goal: Circulatory function within specified parameters  Description: Circulatory function within specified parameters  10/31/2021 0257 by King Hamlin RN  Outcome: Ongoing     Problem: Venous Thromboembolism:  Goal: Will show no signs or symptoms of venous thromboembolism  Description: Will show no signs or symptoms of venous thromboembolism  10/31/2021 0257 by King Hamlin RN  Outcome: Ongoing     Problem: Venous Thromboembolism:  Goal: Absence of signs or symptoms of impaired coagulation  Description: Absence of signs or symptoms of impaired coagulation  10/31/2021 0257 by King Hamlin RN  Outcome: Ongoing     Problem: Nutrition  Goal: Optimal nutrition therapy  10/31/2021 0257 by King Hamlin RN  Outcome: Ongoing     Problem: Pain:  Goal: Pain level will decrease  Description: Pain level will decrease  10/31/2021 0257 by King Hamlin RN  Outcome: Ongoing     Problem: Pain:  Goal: Control of acute pain  Description: Control of acute pain  10/31/2021 0257 by Corinna Ballard RN  Outcome: Ongoing     Problem: Pain:  Goal: Control of chronic pain  Description: Control of chronic pain  10/31/2021 0257 by Corinna Ballard RN  Outcome: Ongoing     Problem: Confusion - Acute:  Goal: Absence of continued neurological deterioration signs and symptoms  Description: Absence of continued neurological deterioration signs and symptoms  Outcome: Ongoing     Problem: Confusion - Acute:  Goal: Mental status will be restored to baseline  Description: Mental status will be restored to baseline  Outcome: Ongoing     Problem: Injury - Risk of, Physical Injury:  Goal: Will remain free from falls  Description: Will remain free from falls  10/31/2021 0257 by Corinna Ballard RN  Outcome: Ongoing     Problem: Injury - Risk of, Physical Injury:  Goal: Absence of physical injury  Description: Absence of physical injury  10/31/2021 0257 by Corinna Ballard RN  Outcome: Ongoing     Problem: Mood - Altered:  Goal: Mood stable  Description: Mood stable  Outcome: Ongoing     Problem: Mood - Altered:  Goal: Absence of abusive behavior  Description: Absence of abusive behavior  Outcome: Ongoing     Problem: Mood - Altered:  Goal: Verbalizations of feeling emotionally comfortable while being cared for will increase  Description: Verbalizations of feeling emotionally comfortable while being cared for will increase  Outcome: Ongoing     Problem: Psychomotor Activity - Altered:  Goal: Absence of psychomotor disturbance signs and symptoms  Description: Absence of psychomotor disturbance signs and symptoms  Outcome: Ongoing     Problem: Sensory Perception - Impaired:  Goal: Demonstrations of improved sensory functioning will increase  Description: Demonstrations of improved sensory functioning will increase  Outcome: Ongoing     Problem: Sensory Perception - Impaired:  Goal: Decrease in sensory misperception

## 2021-10-31 NOTE — CARE COORDINATION
ONGOING DISCHARGE PLAN:    Hospice Meeting with family and [address] to take place Monday as family has a  to attend this weekend.      Electronically signed by Nico Deras RN on 10/31/2021 at 10:45 AM

## 2021-10-31 NOTE — PROGRESS NOTES
Hospitalist Progress Note  10/31/2021 10:48 AM  Subjective:   Admit Date: 10/22/2021  PCP: Carlyn Roca MD     DNR-CCA      C/c:  Chief Complaint   Patient presents with    Altered Mental Status         Interval History: remains same, no complaints spoke with sister    Diet: ADULT TUBE FEEDING; PEG; Standard with Fiber; Continuous; 20; Yes; 20; Q 4 hours; 50; 250; Q 4 hours; Protein; give protein modular twice daily at 8 am and 2pm with 30 ml free water before and after. ip days:9  Medications:   Scheduled Meds:   potassium chloride  20 mEq Per G Tube Daily    miconazole   Topical BID    apixaban  5 mg Oral BID    metoprolol tartrate  50 mg Oral BID    QUEtiapine  25 mg Oral Nightly    nystatin  5 mL Oral 4x Daily    piperacillin-tazobactam  3,375 mg IntraVENous Q8H    pantoprazole  40 mg IntraVENous BID    And    sodium chloride (PF)  10 mL IntraVENous BID    doxycycline Monohydrate  100 mg Per G Tube BID     Continuous Infusions:   dextrose 75 mL/hr at 10/31/21 0423    sodium chloride       PRN Meds:.melatonin, sodium chloride, midodrine, acetaminophen, traMADol, potassium chloride, microfibrillar collagen, ipratropium-albuterol     CBC:   Recent Labs     10/29/21  0519 10/30/21  0605 10/31/21  0545   WBC 12.1* 13.7* 12.1*   HGB 8.3* 8.3* 8.0*    301 289     BMP:    Recent Labs     10/29/21  0519 10/29/21  1205 10/29/21  1652 10/30/21  0605 10/30/21  1503 10/31/21  0545   *  --  150* 154*  --  143   K 3.4*   < >  --  3.2* 3.7 3.5*   *  --   --  112*  --  105   CO2 28  --   --  29  --  28   BUN 54*  --   --  46*  --  46*   CREATININE 1.21*  --   --  1.17  --  0.95   GLUCOSE 141*  --   --  145*  --  118*    < > = values in this interval not displayed. Hepatic: No results for input(s): AST, ALT, ALB, BILITOT, ALKPHOS in the last 72 hours. Troponin: No results for input(s): TROPONINI in the last 72 hours.   BNP: No results for input(s): BNP in the last 72 hours. Lipids: No results for input(s): CHOL, HDL in the last 72 hours. Invalid input(s): LDLCALCU  INR: No results for input(s): INR in the last 72 hours. Objective:   Vitals: /87   Pulse 92   Temp 98.4 °F (36.9 °C) (Axillary)   Resp 28   Ht 6' 4\" (1.93 m)   Wt 192 lb 14.4 oz (87.5 kg)   SpO2 96%   BMI 23.48 kg/m²   General appearance: alert, appears stated age and cooperative  Skin: Skin color, texture, turgor normal. No rashes or lesions  Lungs: clear to auscultation bilaterally  Heart: regular rate and rhythm, S1, S2 normal, no murmur, click, rub or gallop  Abdomen: soft, non-tender; bowel sounds normal; no masses,  no organomegaly  Extremities: extremities normal, atraumatic, no cyanosis or edema  Neurologic: Mental status: Alert, oriented, thought content appropriate    Prophylaxis:   DVT with  [] lovenox        [] heparin        [] Scd        [x] eliquis     Radiology:  CT ABDOMEN PELVIS WO CONTRAST Additional Contrast? None    Result Date: 10/22/2021  EXAMINATION: CT OF THE ABDOMEN AND PELVIS WITHOUT CONTRAST 10/22/2021 12:16 pm TECHNIQUE: CT of the abdomen and pelvis was performed without the administration of intravenous contrast. Multiplanar reformatted images are provided for review. Dose modulation, iterative reconstruction, and/or weight based adjustment of the mA/kV was utilized to reduce the radiation dose to as low as reasonably achievable. COMPARISON: None. HISTORY: ORDERING SYSTEM PROVIDED HISTORY: TOD, UTI TECHNOLOGIST PROVIDED HISTORY: TOD, UTI Decision Support Exception - unselect if not a suspected or confirmed emergency medical condition->Emergency Medical Condition (MA) Reason for Exam: TOD, UTI Acuity: Acute Type of Exam: Initial FINDINGS: Lower Chest: A partially visualized right parahilar and infrahilar masslike lesion is concerning for malignancy. It measures approximately 5.7 x 5.3 cm in the maximal axial plane.   Pulmonary opacities in the dependent aspects of the lower lobes may represent atelectasis or pneumonia. The heart is normal in size. No pleural or pericardial effusion. Small hiatal hernia. Organs: Liver: Unremarkable. Gallbladder: Subtle layering hyperdensity in the gallbladder may represent sludge or small calculi (series 2, image 75). Pancreas: Mild to moderately atrophied. Otherwise, unremarkable. Spleen:  Unremarkable. Adrenals: Unremarkable. Kidneys: The right kidney is moderately atrophied. Multiple intrarenal calculi are seen ranging in size from 2-6 mm. No ureterolithiasis or hydronephrosis. GI/Bowel: Gastrostomy tube in situ. Liquid stool in the colon indicates a diarrheal illness. No bowel wall thickening or obstruction noted. Pelvis: Issa catheter is seen in the urinary bladder which is otherwise grossly unremarkable. The prostate gland is mildly enlarged. Small fat containing left inguinal hernia. No evidence of fat strangulation. Protrusion of the anterior wall of the colon into the proximal-most hernia. Peritoneum/Retroperitoneum: No lymphadenopathy. Prominent calcified atherosclerosis is seen in the abdominal aorta. There is borderline aneurysmal dilation of the infrarenal aorta to 3 cm. 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 Bones/Soft Tissues: No evidence of acute fracture or joint dislocation. Likely chronic compression fracture deformity through the superior endplate of L2. Grade 1 anterolisthesis of L4 over L5.     1. Partially visualized MASSLIKE LESION in the right parahilar and infrahilar region CONCERNING FOR MALIGNANCY. Dedicated contrast enhanced CT of the chest recommended for further evaluation. 2. No evidence of metastatic disease in the abdomen or the pelvis. 3. Bilateral nephrolithiasis. No hydronephrosis. Atrophic right kidney. 4.  Liquid stool in the colon indicates a diarrheal illness. No bowel wall thickening or obstruction noted.  5. 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 Vascular surgery consultation recommended especially for the right common iliac artery aneurysm. CT HEAD WO CONTRAST    Result Date: 10/25/2021  EXAMINATION: CT OF THE HEAD WITHOUT CONTRAST  10/22/2021 12:16 pm TECHNIQUE: CT of the head was performed without the administration of intravenous contrast. Dose modulation, iterative reconstruction, and/or weight based adjustment of the mA/kV was utilized to reduce the radiation dose to as low as reasonably achievable. COMPARISON: None. HISTORY: ORDERING SYSTEM PROVIDED HISTORY: ams TECHNOLOGIST PROVIDED HISTORY: ams Decision Support Exception - unselect if not a suspected or confirmed emergency medical condition->Emergency Medical Condition (MA) Reason for Exam: AMS Acuity: Acute Type of Exam: Initial FINDINGS: BRAIN/VENTRICLES: There is no acute intracranial hemorrhage, mass effect or midline shift. No abnormal extra-axial fluid collection. The gray-white differentiation is maintained without evidence of an acute infarct. There is no evidence of hydrocephalus. Mild generalized cortical atrophy, stable. Extensive streak artifact is again noted on the left, related to stable postsurgical change, with metallic plate in the left frontotemporal skull region. This obscures surrounding structures. ORBITS: The visualized portion of the orbits demonstrate no acute abnormality. SINUSES: The visualized paranasal sinuses and mastoid air cells demonstrate no acute abnormality. SOFT TISSUES/SKULL:  No acute abnormality of the visualized skull or soft tissues. No acute intracranial abnormality.      XR CHEST PORTABLE    Result Date: 10/22/2021  EXAMINATION: ONE XRAY VIEW OF THE CHEST 10/22/2021 9:24 am COMPARISON: 08/03/2021 HISTORY: ORDERING SYSTEM PROVIDED HISTORY: sepsis, fever TECHNOLOGIST PROVIDED HISTORY: sepsis, fever Reason for Exam: sepsis, fever Acuity: Unknown Type of Exam: Unknown FINDINGS: Heart size is stable. There are increased patchy opacities in the lung bases. Masslike opacity in the medial right base appears increased as well. No evidence of pneumothorax or sizable pleural effusion. No free air seen beneath the diaphragm. No evidence of acute osseous abnormality. There is heterogeneous bony mineralization in the visualized right humeral diaphysis. 1.  Mildly increased patchy opacities at the lung bases, possibly pneumonia or atelectasis. 2.  Increased masslike opacity in the medial right base. This was previously evaluated with CT on 05/26/2021. Consider re-evaluation with contrast-enhanced CT to assess progression. 3.  Heterogeneous bony mineralization in the visualized right humeral diaphysis, which is indeterminate. Dedicated radiographs of the right humerus are recommended. XR CHEST PORTABLE    Result Date: 10/22/2021  EXAMINATION: ONE XRAY VIEW OF THE CHEST 10/22/2021 11:15 am COMPARISON: Exam performed earlier the same day. HISTORY: ORDERING SYSTEM PROVIDED HISTORY: right IJ central line TECHNOLOGIST PROVIDED HISTORY: right IJ central line Reason for Exam: right IJ central line Acuity: Acute Type of Exam: Initial FINDINGS: Stable bibasilar atelectasis or infiltrates with unchanged mass density medial right lung base compared to earlier exam same day. Right IJ central venous catheter tip at the mid SVC. No pneumothorax post procedure. Cardiac size stable. Osseous structures grossly intact. Telemetry leads overlie the chest.     Interval placed right IJ central venous catheter with no pneumothorax. Otherwise stable exam       Assessment :   1. Dehydration. Fluids/check bmp tomorrow  2. ?lung mass     Plan:   1.  palative care and ?hospice for tomorrow  2.   See order    Patient Active Problem List:     Constipation     Change in bowel habits     Rectal bleeding     Aortic dissection (HCC)     Bradycardia     Other dysphagia     Enteritis     Multifocal pneumonia Tobacco abuse     Aspiration pneumonitis (HCC)     Status post insertion of percutaneous endoscopic gastrostomy (PEG) tube (HCC)     Small bowel obstruction (HCC)     Acute cystitis without hematuria     Mass of right lung     COPD (chronic obstructive pulmonary disease) (HCC)     PAF (paroxysmal atrial fibrillation) (HCC)     Urinary tract infection associated with indwelling urethral catheter (HCC)     Heart failure with reduced ejection fraction (HCC)     Iron deficiency anemia     Septic shock (HCC)     Chronic combined systolic and diastolic congestive heart failure ([address]     Dilated cardiomyopathy ([facility] [address] [facility]     Bacteremia due to Klebsiella pneumoniae     Melena     Anemia      Anticipated Disposition upon discharge: [] Home                                                                         [] Home with Home Health                                                                         [] [facility]                                                                         [] [address] ,[address]      Patient is admitted as inpatient status because of co-morbidities listed above, severity of signs and symptoms as outlined, requirement for current medical therapies and most importantly because of direct risk to patient if care not provided in a hospital setting.           Joesph Cedeño MD, MD  Rounding Hospitalist

## 2021-10-31 NOTE — PROGRESS NOTES
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 In ED received IV fluid bolus, central line was placed  and norepinephrine was started for hypotension. Urine culture - several types of bacteria identified, likely contamination  COVID19 - not detected  CXR - Mildly increased patchy opacities at the lung bases, possibly pneumonia or atelectasis. UA - positive for nitrites, large leukocyte esterase, large hemoglobin and moderate bacteria  Blood cultures - 2/2 positive for klebsiella pneumonia    Interval changes  10/31/2021   He remains afebrile, on tube feed, no reported fever, no new events. Daughter at the bedside  Summary of relevant labs:      Micro:  10/22 Blood Cx - 2/2 Klebsiella pneumoniae - sensitive to cefepime  10/22 Urine Cx - multiple types of bacteria identified, likely contamination  10/22 COVID19 - not detected    Imaging:  10/22 CXR  1.  Mildly increased patchy opacities at the lung bases, possibly pneumonia or atelectasis. 2.  Increased masslike opacity in the medial right base.  This was previously evaluated with CT on 05/26/2021.  Consider re-evaluation with contrast-enhanced CT to assess progression.    3.  Heterogeneous bony mineralization in the visualized right humeral   diaphysis, which is indeterminate.  Dedicated radiographs of the right   humerus °C) Axillary 92 28 96 %         I have personally reviewed the past medical history, past surgical history, medications, social history, and family history, and I haveupdated the database accordingly. Allergies:   Bactrim [sulfamethoxazole-trimethoprim] and Ciprofloxacin     Review of Systems:     Review of Systems  Nonverbal, unable to provide  Physical Examination :       Physical Exam  Appearance: Normal appearance. He is not diaphoretic. HENT:      Head: Normocephalic and atraumatic. Right Ear: External ear normal.      Left Ear: External ear normal.      Nose: Nose normal.      Mouth/Throat:      Mouth: Mucous membranes are moist.      Pharynx: Oropharynx is clear. Eyes:      Conjunctiva/sclera: Conjunctivae normal.   Cardiovascular:      Rate and Rhythm: Normal rate and regular rhythm. Pulses: Normal pulses. Heart sounds: Normal heart sounds. Pulmonary:      Effort: Pulmonary effort is normal.      Breath sounds: Coarse breath sounds bilaterally  Abdominal:      General: Bowel sounds are normal. There is no distension. Palpations: Abdomen is soft. Musculoskeletal:         Cervical back:  No rigidity. Right lower leg: No edema. Left lower leg: No edema. Skin:     General: Skin is warm and dry. Coloration: Skin is not jaundiced. Neurological:      Mental Status: Mental status is at baseline.    Past Medical History:     Past Medical History:   Diagnosis Date    Arthritis     Cancer [facility])     bladder 1980s    Cerebral artery occlusion with cerebral infarction [facility])     TIA 2010    Chronic kidney disease     COPD [facility] obstructive [facility] disease) [facility]     Hypertension     Pneumonia     Psychiatric problem     depression, social anxiety    Seizures ([facility]        Past Surgical  History:     Past Surgical History:   Procedure Laterality Date    ABDOMEN SURGERY      BACK SURGERY      spinal surgery 2005     CAROTID ENDARTERECTOMY Left 09/20/2016    COLECTOMY N/A 2021    Exploratory Laparotomy. Release of adhesive band with release of small bowel obstruction. Small bowel resection with primary anastomosis performed by Brenda Álvarez MD at [address] COLONOSCOPY N/A 2018    COLONOSCOPY POLYPECTOMY COLD BIOPSY performed by Mitch Quintana MD at [facility]      left face    GASTROSTOMY TUBE PLACEMENT  04/15/2020    HERNIA REPAIR      HIATAL HERNIA REPAIR      INGUINAL HERNIA REPAIR Bilateral     INSERT MIDLINE CATHETER  2021         OTHER SURGICAL HISTORY      mesh infected in inguinal canal, had to remove. Removed testiscle at this time.      TONSILLECTOMY      UPPER GASTROINTESTINAL ENDOSCOPY  04/15/2020       EGD CONTROL HEMORRHAGE    UPPER GASTROINTESTINAL ENDOSCOPY  4/15/2020    EGD CONTROL HEMORRHAGE performed by Sean Mercer MD at [facility]  4/15/2020    EGD ESOPHAGOGASTRODUODENOSCOPY PEG TUBE INSERTION performed by Sean Mercer MD at [facility]       Medications:      potassium chloride  20 mEq Per G Tube Daily    miconazole   Topical BID    apixaban  5 mg Oral BID    metoprolol tartrate  50 mg Oral BID    QUEtiapine  25 mg Oral Nightly    nystatin  5 mL Oral 4x Daily    piperacillin-tazobactam  3,375 mg IntraVENous Q8H    pantoprazole  40 mg IntraVENous BID    And    sodium chloride (PF)  10 mL IntraVENous BID    doxycycline Monohydrate  100 mg Per G Tube BID       Social History:     Social History     Socioeconomic History    Marital status:      Spouse name: Not on file    Number of children: Not on file    Years of education: Not on file    Highest education level: Not on file   Occupational History    Not on file   Tobacco Use    Smoking status: Former Smoker     Packs/day: 1.00     Years: 60.00     Pack years: 60.00     Types: Cigarettes     Start date:      Quit date: 2020     Years since quittin.5    Smokeless tobacco: Never Used   Vaping Use    Vaping Use: Never used   Substance and Sexual Activity    Alcohol use: No    Drug use: No    Sexual activity: Yes     Partners: Female   Other Topics Concern    Not on file   Social History Narrative    Not on file     Social Determinants of Health     Financial Resource Strain: Low Risk     Difficulty of Paying Living Expenses: Not hard at all   Food Insecurity: No Food Insecurity    Worried About Running Out of Food in the Last Year: Never true    Blas of Food in the Last Year: Never true   Transportation Needs: No Transportation Needs    Lack of Transportation (Medical): No    Lack of Transportation (Non-Medical): No   Physical Activity: Inactive    Days of Exercise per Week: 0 days    Minutes of Exercise per Session: 0 min   Stress: Stress Concern Present    Feeling of Stress : Very much   Social Connections: Moderately Isolated    Frequency of Communication with Friends and Family: More than three times a week    Frequency of Social Gatherings with Friends and Family: More than three times a week    Attends Buddhism Services: More than 4 times per year    Active Member of Clubs or Organizations: No    Attends Club or Organization Meetings: Never    Marital Status:     Intimate Partner Violence: Not At Risk    Fear of Current or Ex-Partner: No    Emotionally Abused: No    Physically Abused: No    Sexually Abused: No       Family History:     Family History   Problem Relation Age of Onset    Arthritis Mother     Atrial Fibrillation Mother     Hearing Loss Mother     Heart Disease Mother     Stroke Mother     Vision Loss Mother     Arthritis Father     Cancer Father     Heart Disease Father     High Blood Pressure Father     Stroke Father     Vision Loss Father       Medical Decision Making:   I have independently reviewed/ordered the following labs:    CBC with Differential:   Recent Labs     10/30/21  0605 10/31/21  0545   WBC 13.7* 12.1*   HGB 8.3* 8.0*   HCT 25.8* 24.7*    289   LYMPHOPCT 6* 9*   MONOPCT 6 8*     BMP:  Recent Labs     10/30/21  0605 10/30/21  0605 10/30/21  1503 10/31/21  0545   *  --   --  143   K 3.2*   < > 3.7 3.5*   *  --   --  105   CO2 29  --   --  28   BUN 46*  --   --  46*   CREATININE 1.17  --   --  0.95    < > = values in this interval not displayed. Hepatic Function Panel:   No results for input(s): PROT, LABALBU, BILIDIR, IBILI, BILITOT, ALKPHOS, ALT, AST in the last 72 hours. No results for input(s): RPR in the last 72 hours. No results for input(s): HIV in the last 72 hours. No results for input(s): BC in the last 72 hours. Lab Results   Component Value Date    CREATININE 0.95 10/31/2021    GLUCOSE 118 10/31/2021       Detailed results: Thank you for allowing us to participate in the care of this patient. Please call with questions. This note is created with the assistance of a speech recognition program.  While intending to generate adocument that actually reflects the content of the visit, the document can still have some errors including those of syntax and sound a like substitutions which may escape proof reading. It such instances, actual meaningcan be extrapolated by contextual diversion.     Shu Ricketts MD  Office: [phone]  Perfect serve / office [phone]

## 2021-11-01 LAB
ABSOLUTE EOS #: 0.4 K/UL (ref 0–0.4)
ABSOLUTE IMMATURE GRANULOCYTE: ABNORMAL K/UL (ref 0–0.3)
ABSOLUTE LYMPH #: 0.9 K/UL (ref 1–4.8)
ABSOLUTE MONO #: 0.6 K/UL (ref 0.1–1.3)
ANION GAP SERPL CALCULATED.3IONS-SCNC: 10 MMOL/L (ref 9–17)
BASOPHILS # BLD: 0 % (ref 0–2)
BASOPHILS ABSOLUTE: 0 K/UL (ref 0–0.2)
BUN BLDV-MCNC: 34 MG/DL (ref 8–23)
BUN/CREAT BLD: ABNORMAL (ref 9–20)
CALCIUM SERPL-MCNC: 8.4 MG/DL (ref 8.6–10.4)
CHLORIDE BLD-SCNC: 100 MMOL/L (ref 98–107)
CO2: 26 MMOL/L (ref 20–31)
CREAT SERPL-MCNC: 0.9 MG/DL (ref 0.7–1.2)
DIFFERENTIAL TYPE: ABNORMAL
EOSINOPHILS RELATIVE PERCENT: 4 % (ref 0–4)
GFR AFRICAN AMERICAN: >60 ML/MIN
GFR NON-AFRICAN AMERICAN: >60 ML/MIN
GFR SERPL CREATININE-BSD FRML MDRD: ABNORMAL ML/MIN/{1.73_M2}
GFR SERPL CREATININE-BSD FRML MDRD: ABNORMAL ML/MIN/{1.73_M2}
GLUCOSE BLD-MCNC: 132 MG/DL (ref 70–99)
HCT VFR BLD CALC: 24.1 % (ref 41–53)
HEMOGLOBIN: 8 G/DL (ref 13.5–17.5)
IMMATURE GRANULOCYTES: ABNORMAL %
LYMPHOCYTES # BLD: 9 % (ref 24–44)
MCH RBC QN AUTO: 29.3 PG (ref 26–34)
MCHC RBC AUTO-ENTMCNC: 33.2 G/DL (ref 31–37)
MCV RBC AUTO: 88.3 FL (ref 80–100)
MONOCYTES # BLD: 6 % (ref 1–7)
NRBC AUTOMATED: ABNORMAL PER 100 WBC
PDW BLD-RTO: 17.3 % (ref 11.5–14.9)
PLATELET # BLD: 297 K/UL (ref 150–450)
PLATELET ESTIMATE: ABNORMAL
PMV BLD AUTO: 8.4 FL (ref 6–12)
POTASSIUM SERPL-SCNC: 4 MMOL/L (ref 3.7–5.3)
RBC # BLD: 2.73 M/UL (ref 4.5–5.9)
RBC # BLD: ABNORMAL 10*6/UL
SEG NEUTROPHILS: 81 % (ref 36–66)
SEGMENTED NEUTROPHILS ABSOLUTE COUNT: 8.3 K/UL (ref 1.3–9.1)
SODIUM BLD-SCNC: 136 MMOL/L (ref 135–144)
WBC # BLD: 10.2 K/UL (ref 3.5–11)
WBC # BLD: ABNORMAL 10*3/UL

## 2021-11-01 PROCEDURE — 6370000000 HC RX 637 (ALT 250 FOR IP): Performed by: NURSE PRACTITIONER

## 2021-11-01 PROCEDURE — 99232 SBSQ HOSP IP/OBS MODERATE 35: CPT | Performed by: INTERNAL MEDICINE

## 2021-11-01 PROCEDURE — 2580000003 HC RX 258: Performed by: INTERNAL MEDICINE

## 2021-11-01 PROCEDURE — 80048 BASIC METABOLIC PNL TOTAL CA: CPT

## 2021-11-01 PROCEDURE — C9113 INJ PANTOPRAZOLE SODIUM, VIA: HCPCS | Performed by: INTERNAL MEDICINE

## 2021-11-01 PROCEDURE — 85025 COMPLETE CBC W/AUTO DIFF WBC: CPT

## 2021-11-01 PROCEDURE — 6370000000 HC RX 637 (ALT 250 FOR IP): Performed by: FAMILY MEDICINE

## 2021-11-01 PROCEDURE — 6360000002 HC RX W HCPCS: Performed by: INTERNAL MEDICINE

## 2021-11-01 PROCEDURE — 6370000000 HC RX 637 (ALT 250 FOR IP): Performed by: INTERNAL MEDICINE

## 2021-11-01 PROCEDURE — 36415 COLL VENOUS BLD VENIPUNCTURE: CPT

## 2021-11-01 PROCEDURE — 2060000000 HC ICU INTERMEDIATE R&B

## 2021-11-01 RX ORDER — DOXYCYCLINE 100 MG/1
100 CAPSULE ORAL EVERY 12 HOURS SCHEDULED
Status: DISCONTINUED | OUTPATIENT
Start: 2021-11-01 | End: 2021-11-02

## 2021-11-01 RX ADMIN — PANTOPRAZOLE SODIUM 40 MG: 40 INJECTION, POWDER, FOR SOLUTION INTRAVENOUS at 21:38

## 2021-11-01 RX ADMIN — SODIUM CHLORIDE, PRESERVATIVE FREE 10 ML: 5 INJECTION INTRAVENOUS at 10:49

## 2021-11-01 RX ADMIN — PANTOPRAZOLE SODIUM 40 MG: 40 INJECTION, POWDER, FOR SOLUTION INTRAVENOUS at 10:49

## 2021-11-01 RX ADMIN — PIPERACILLIN SODIUM AND TAZOBACTAM SODIUM 3375 MG: 3; .375 INJECTION, POWDER, LYOPHILIZED, FOR SOLUTION INTRAVENOUS at 04:45

## 2021-11-01 RX ADMIN — METOPROLOL TARTRATE 50 MG: 50 TABLET, FILM COATED ORAL at 22:36

## 2021-11-01 RX ADMIN — APIXABAN 5 MG: 5 TABLET, FILM COATED ORAL at 10:50

## 2021-11-01 RX ADMIN — DOXYCYCLINE 100 MG: 100 CAPSULE ORAL at 22:33

## 2021-11-01 RX ADMIN — TRAMADOL HYDROCHLORIDE 50 MG: 50 TABLET ORAL at 02:25

## 2021-11-01 RX ADMIN — SODIUM CHLORIDE, PRESERVATIVE FREE 10 ML: 5 INJECTION INTRAVENOUS at 21:38

## 2021-11-01 RX ADMIN — PIPERACILLIN SODIUM AND TAZOBACTAM SODIUM 3375 MG: 3; .375 INJECTION, POWDER, LYOPHILIZED, FOR SOLUTION INTRAVENOUS at 21:38

## 2021-11-01 RX ADMIN — NYSTATIN 500000 UNITS: 100000 SUSPENSION ORAL at 13:56

## 2021-11-01 RX ADMIN — APIXABAN 5 MG: 5 TABLET, FILM COATED ORAL at 22:33

## 2021-11-01 RX ADMIN — METOPROLOL TARTRATE 50 MG: 50 TABLET, FILM COATED ORAL at 10:50

## 2021-11-01 RX ADMIN — CHLOROTHIAZIDE SODIUM 250 MG: 500 INJECTION, POWDER, LYOPHILIZED, FOR SOLUTION INTRAVENOUS at 18:29

## 2021-11-01 RX ADMIN — CHLOROTHIAZIDE SODIUM 250 MG: 500 INJECTION, POWDER, LYOPHILIZED, FOR SOLUTION INTRAVENOUS at 06:14

## 2021-11-01 RX ADMIN — NYSTATIN 500000 UNITS: 100000 SUSPENSION ORAL at 18:29

## 2021-11-01 RX ADMIN — NYSTATIN 500000 UNITS: 100000 SUSPENSION ORAL at 10:50

## 2021-11-01 RX ADMIN — POTASSIUM CHLORIDE 20 MEQ: 1.5 FOR SOLUTION ORAL at 10:50

## 2021-11-01 RX ADMIN — QUETIAPINE FUMARATE 25 MG: 50 TABLET ORAL at 22:33

## 2021-11-01 RX ADMIN — ANTI-FUNGAL POWDER MICONAZOLE NITRATE TALC FREE: 1.42 POWDER TOPICAL at 10:51

## 2021-11-01 RX ADMIN — PIPERACILLIN SODIUM AND TAZOBACTAM SODIUM 3375 MG: 3; .375 INJECTION, POWDER, LYOPHILIZED, FOR SOLUTION INTRAVENOUS at 13:56

## 2021-11-01 RX ADMIN — DOXYCYCLINE 100 MG: 25 FOR SUSPENSION ORAL at 13:39

## 2021-11-01 RX ADMIN — ANTI-FUNGAL POWDER MICONAZOLE NITRATE TALC FREE: 1.42 POWDER TOPICAL at 22:36

## 2021-11-01 RX ADMIN — NYSTATIN 500000 UNITS: 100000 SUSPENSION ORAL at 22:33

## 2021-11-01 ASSESSMENT — ENCOUNTER SYMPTOMS
BACK PAIN: 0
COUGH: 0
ANAL BLEEDING: 0
ABDOMINAL DISTENTION: 0
COLOR CHANGE: 0
BLOOD IN STOOL: 0
SHORTNESS OF BREATH: 0
STRIDOR: 0
CHOKING: 0
ABDOMINAL PAIN: 0
VOICE CHANGE: 0

## 2021-11-01 ASSESSMENT — PAIN SCALES - GENERAL: PAINLEVEL_OUTOF10: 5

## 2021-11-01 NOTE — PROGRESS NOTES
distress  HEENT - normocephalic, atraumatic.  []  Mallampati  [] Crowded airway   [] Macroglossia  []  Retrognathia  [] Micrognathia  []  Normal tongue size []  Normal Bite  [] [place] sign positive    Neck - Supple,  trachea midline   Lungs - bilateral coarse breath sounds on auscultation  Cardiovascular - Heart sounds are normal.  Regular rate and rhythm   Abdomen - Soft, nontender, nondistended, no masses or organomegaly  Neurologic - There are no focal motor or sensory deficits  Skin - No bruising or bleeding  Extremities - No clubbing, cyanosis, edema    MEDS      chlorothiazide (DIURIL) IVPB  250 mg IntraVENous Q12H    potassium chloride  20 mEq Per G Tube Daily    miconazole   Topical BID    apixaban  5 mg Oral BID    metoprolol tartrate  50 mg Oral BID    QUEtiapine  25 mg Oral Nightly    nystatin  5 mL Oral 4x Daily    piperacillin-tazobactam  3,375 mg IntraVENous Q8H    pantoprazole  40 mg IntraVENous BID    And    sodium chloride (PF)  10 mL IntraVENous BID    doxycycline Monohydrate  100 mg Per G Tube BID      dextrose 50 mL/hr at 10/31/21 1751    sodium chloride       melatonin, sodium chloride, midodrine, acetaminophen, traMADol, potassium chloride, microfibrillar collagen, ipratropium-albuterol    LABS   CBC   Recent Labs     11/01/21  0517   WBC 10.2   HGB 8.0*   HCT 24.1*   MCV 88.3        BMP:   Lab Results   Component Value Date     11/01/2021    K 4.0 11/01/2021     11/01/2021    CO2 26 11/01/2021    BUN 34 11/01/2021    LABALBU 3.4 10/22/2021    CREATININE 0.90 11/01/2021    CALCIUM 8.4 11/01/2021    GFRAA >60 11/01/2021    LABGLOM >60 11/01/2021     ABGs:  Lab Results   Component Value Date    PHART 7.506 06/03/2021    PO2ART 123.0 06/03/2021    QCN0SXQ 37.1 06/03/2021      Lab Results   Component Value Date    MODE PRVC 06/03/2021     Ionized Calcium:  No results found for: IONCA  Magnesium:    Lab Results   Component Value Date    MG 2.8 10/22/2021 Phosphorus:    Lab Results   Component Value Date    PHOS 2.8 06/04/2021        LIVER PROFILE No results for input(s): AST, ALT, LIPASE, BILIDIR, BILITOT, ALKPHOS in the last 72 hours. Invalid input(s): AMYLASE,  ALB  INR No results for input(s): INR in the last 72 hours. PTT   Lab Results   Component Value Date    APTT 47.9 (H) 10/23/2021         RADIOLOGY     (See actual reports for details)    ASSESSMENT/PLAN   Principal Problem:    Septic shock ([facility]  Active Problems:    Multifocal pneumonia    Tobacco abuse    Acute cystitis without hematuria    Mass of right lung    COPD (chronic obstructive pulmonary disease) ([facility])    PAF (paroxysmal atrial fibrillation) ([facility])    Chronic combined systolic and diastolic congestive heart failure (HCC)    Dilated cardiomyopathy ([facility]    Bacteremia due to Klebsiella pneumoniae    Melena    Anemia  Resolved Problems:    * No resolved hospital problems. *    His clinical condition seems much improved. He is no longer dyspneic and does not sound congested however, he is now on 3L of oxygen. He also does not seem agitated like before. Continue supplemental oxygen  Continue zosyn and doxycycline   Continue eliquis   Follow-up CXR  Hypernatremia has resolved, sodium down to 136  BUN down to 34 and Creatinine down to 0.90  Hospice will be following patient this week     Electronically signed by Gordo Rowe, MS3 on 11/1/2021 at 9:43 AM    Patient seen and examined independently by me. Above discussed and I agree with resident note except where indicated in the EMR revision history. Also see my additional comments and changes indicated by discrete font, text color, italics, and/or initials. Labs, cultures, and radiographs where available were reviewed. He looks better overall, but still has difficulty with his cough and secretions. At this point, family is going to take him home with hospice after antibiotics are done.   Would stop Zosyn tomorrow that will be 7 days and then discharged home. We will sign off.   Electronically signed by Tad Cherry MD on 11/1/2021 at 2:02 PM

## 2021-11-01 NOTE — PROGRESS NOTES
Department of Internal Medicine  Nephrology [facility], MD  Progress Note    Reason for consultation: Management of acute kidney injury superimposed on chronic kidney disease stage 4..    Consulting physician: Estephania Cobb MD    Attending physician: Rome Manning MD.    Interval history:  Patient was seen and examined today. He is on tube feeding   BP stable. Patient is  non verbal.   On free water and D5 water IV fluid  He has indwelling Issa catheter which was exchanged on admission and he is nonoliguric. Stable hemoglobin. K 4.0  Sodium improved 136  Edema increased    History of present illness: This is a 77 y.o. male with a significant past medical history of Systemic hypertension, chronic obstructive pulmonary disease, Bladder cancer, seizure disorder, osteoarthritis and s/p Cerebrovascular accident [has G-tube in place], who presented to the hospital on 5/24/2021 with complaints of confusion and disorientation. Apparently patient lives at home alone but family lives nearby. Laboratory studies at presentation were remarkable for serum sodium 129 mmol/L, serum bicarbonate 19 mmol/L and elevated BUN/creatinine 102/4.36 mg/dL. I had seen patient during the prior presentation in May 2021 for acute kidney injury superimposed on chronic kidney disease stage IV and at that time serum creatinine was greater than 4 mg/dL. CODE STATUS is DNR CCA. Blood pressure at presentation this time was 114/45 work patient subsequently developed hypotension and is currently on Levophed drip at 6 mcg/min. He has a chronic indwelling Issa catheter and is nonoliguric. He is a poor historian and history was obtained primarily by chart review.     Scheduled Meds:   chlorothiazide (DIURIL) IVPB  250 mg IntraVENous Q12H    potassium chloride  20 mEq Per G Tube Daily    miconazole   Topical BID    apixaban  5 mg Oral BID    metoprolol tartrate  50 mg Oral BID    QUEtiapine  25 mg Oral Nightly    nystatin 10/25/2021    YEAST MODERATE 10/25/2021    BACTERIA FEW 10/25/2021    CLARITYU cloudy 09/07/2021    SPECGRAV 1.010 10/25/2021    LEUKOCYTESUR TRACE 10/25/2021    UROBILINOGEN Normal 10/25/2021    BILIRUBINUR NEGATIVE 10/25/2021    BLOODU ++ 09/07/2021    GLUCOSEU NEGATIVE 10/25/2021    KETUA NEGATIVE 10/25/2021    AMORPHOUS NOT REPORTED 10/25/2021     Urine Sodium:     Lab Results   Component Value Date    HELENA 78 10/25/2021     Urine Potassium:  No results found for: NATASHA  Urine Chloride:    Lab Results   Component Value Date    CLUR 87 10/25/2021     Urine Creatinine:     Lab Results   Component Value Date    LABCREA 32.6 10/25/2021     IMPRESSION/RECOMMENDATIONS:      1. Acute kidney injury superimposed on chronic kidney disease stage 3 [baseline serum creatinine 1.28 mg/dL on 8/11/2021] - Top differential is prerenal azotemia from poor oral intake as well as ischemic acute tubular necrosis. He has chronic indwelling Issa catheter and CT scan showed atrophic right kidney with bilateral nephrolithiasis but no hydronephrosis. Non Oliguric today-Scr is down to 0.9 mg/dl stable       2. Proteus Mirabella's and Pseudomonas aeruginosa urinary tract infection - Continue IV Rocephin    3. Klebsiella pneumonia bacteremia - Continue IV antibiotics    4. Hypotension-resolved    5. Normocytic anemia - iron deficiency anemia  S/p 1 Unit PRBC    6. Hypokalemia - KCL per sliding scale. 7.Hypernatremia- improved      Plan  Dec free water 20 mg q 6 hrly  DC  IVF  Strict I/Os-monitor serum sodium at 4 p.m  Monitor urine output. KCL per sliding scale. Tube feeding  CONTINUE IV Diuril    Prognosis is guarded.     Ramy Topete MD   Attending Nephrologist  11/1/2021 11:46 AM

## 2021-11-01 NOTE — CARE COORDINATION
MICHELLE received info from [facility] [address] [place] [address] that once this patient has completed his IV antibiotics, he will go home with Hospice. MICHELLE then learned that the patient will be done with his IV antibiotics tomorrow. MICHELLE spoke with Hospice and they will be at his home at 5:30 pm tomorrow. S will set up transport for this patient after his final IV antibiotic does is given.

## 2021-11-01 NOTE — PROGRESS NOTES
Progress Note    11/1/2021   2:33 PM    Name:  Milli Durán  MRN:    [id number]     Acct:     [de-identified]   Room:  2125/2125-01   Day: 8     Admit Date: 10/22/2021  9:04 AM  PCP: Darrick Rouse MD    Subjective:     C/C:   Chief Complaint   Patient presents with    Altered Mental Status       Interval History: Status: not changed. Patient is lying comfortably in bed is alert. There have been no reports of nausea vomiting coughing episodes overnight. Vital signs reviewed blood pressure and heart rate stable patient is on 2 L of oxygen  via nasal cannula. Recent labs reviewed hemoglobin 8 sodium 136    ROS:   all 10 systems reviewed and are negative except as noted    Review of Systems   Constitutional: Negative for appetite change, chills and diaphoresis. HENT: Negative for drooling, ear pain and voice change. Respiratory: Negative for cough, choking, shortness of breath and stridor. Cardiovascular: Negative for chest pain and palpitations. Gastrointestinal: Negative for abdominal distention, abdominal pain, anal bleeding and blood in stool. Endocrine: Negative for polyphagia and polyuria. Genitourinary: Negative for dysuria, flank pain and hematuria. Musculoskeletal: Negative for back pain, myalgias and neck stiffness. Skin: Negative for color change, pallor and rash. Allergic/Immunologic: Negative for environmental allergies and food allergies. Neurological: Negative for seizures and facial asymmetry. Hematological: Negative for adenopathy. Does not bruise/bleed easily. Psychiatric/Behavioral: Negative for agitation, behavioral problems and hallucinations. Medications: Allergies:    Allergies   Allergen Reactions    Bactrim [Sulfamethoxazole-Trimethoprim] Hives and Swelling    Ciprofloxacin Swelling     FACE       Current Meds: chlorothiazide (DIURIL) 250 mg in sodium chloride 0.9 % 50 mL IVPB, Q12H  potassium chloride (KLOR-CON) packet 20 mEq, Daily  miconazole (MICOTIN) 2 % powder, BID  apixaban (ELIQUIS) tablet 5 mg, BID  metoprolol tartrate (LOPRESSOR) tablet 50 mg, BID  QUEtiapine (SEROQUEL) tablet 25 mg, Nightly  nystatin (MYCOSTATIN) 537816 UNIT/ML suspension 500,000 Units, 4x Daily  melatonin tablet 6 mg, Nightly PRN  piperacillin-tazobactam (ZOSYN) 3,375 mg in dextrose 5 % 50 mL IVPB extended infusion (mini-bag), Q8H  0.9 % sodium chloride infusion, PRN  midodrine (PROAMATINE) tablet 10 mg, TID PRN  acetaminophen (TYLENOL) tablet 650 mg, Q4H PRN  traMADol (ULTRAM) tablet 50 mg, Q8H PRN  pantoprazole (PROTONIX) injection 40 mg, BID   And  sodium chloride (PF) 0.9 % injection 10 mL, BID  doxycycline Monohydrate (VIBRAMYCIN) suspension 100 mg, BID  potassium chloride 10 mEq/100 mL IVPB (Peripheral Line), PRN  microfibrillar collagen powder 1 g, PRN  ipratropium-albuterol (DUONEB) nebulizer solution 1 ampule, Q4H PRN        Data:     Code Status:  DNR-CCA    Family History   Problem Relation Age of Onset    Arthritis Mother     Atrial Fibrillation Mother     Hearing Loss Mother     Heart Disease Mother     Stroke Mother     Vision Loss Mother     Arthritis Father     Cancer Father     Heart Disease Father     High Blood Pressure Father     Stroke Father     Vision Loss Father        Social History     Socioeconomic History    Marital status:      Spouse name: Not on file    Number of children: Not on file    Years of education: Not on file    Highest education level: Not on file   Occupational History    Not on file   Tobacco Use    Smoking status: Former Smoker     Packs/day: 1.00     Years: 60.00     Pack years: 60.00     Types: Cigarettes     Start date:      Quit date: 2020     Years since quittin.5    Smokeless tobacco: Never Used   Vaping Use    Vaping Use: Never used   Substance and Sexual Activity    Alcohol use: No    Drug use: No    Sexual activity: Yes     Partners: Female   Other Topics Concern    Not on file Social History Narrative    Not on file     Social Determinants of Health     Financial Resource Strain: Low Risk     Difficulty of Paying Living Expenses: Not hard at all   Food Insecurity: No Food Insecurity    Worried About Running Out of Food in the Last Year: Never true    [address] in the Last Year: Never true   Transportation Needs: No Transportation Needs    Lack of Transportation (Medical): No    Lack of Transportation (Non-Medical): No   Physical Activity: Inactive    Days of Exercise per Week: 0 days    Minutes of Exercise per Session: 0 min   Stress: Stress Concern Present    Feeling of Stress : Very much   Social Connections: Moderately Isolated    Frequency of Communication with Friends and Family: More than three times a week    Frequency of Social Gatherings with Friends and Family: More than three times a week    Attends [employer] Services: More than 4 times per year    Active Member of Clubs or Organizations: No    Attends Club or Organization Meetings: Never    Marital Status:    Intimate Partner Violence: Not At Risk    Fear of Current or Ex-Partner: No    Emotionally Abused: No    Physically Abused: No    Sexually Abused: No       I/O (24Hr): Intake/Output Summary (Last 24 hours) at 11/1/2021 1433  Last data filed at 11/1/2021 1200  Gross per 24 hour   Intake 1650 ml   Output 3125 ml   Net -1475 ml     Radiology:  XR CHEST PORTABLE    Result Date: 10/29/2021  Bilateral effusions and bibasilar infiltrates most pronounced on the right. Stable exam compared to prior. XR CHEST PORTABLE    Result Date: 10/27/2021  Findings suggest worsening pneumonia (aspiration?), especially on the left, superimposed on unchanged or slightly increased vascular congestion with slightly larger pleural effusions, latter larger on the right. Right IJ central line tip position stable.        Labs:  Recent Results (from the past 24 hour(s))   Basic Metabolic Panel    Collection Time: 21  5:17 AM   Result Value Ref Range    Glucose 132 (H) 70 - 99 mg/dL    BUN 34 (H) 8 - 23 mg/dL    CREATININE 0.90 0.70 - 1.20 mg/dL    Bun/Cre Ratio NOT REPORTED 9 - 20    Calcium 8.4 (L) 8.6 - 10.4 mg/dL    Sodium 136 135 - 144 mmol/L    Potassium 4.0 3.7 - 5.3 mmol/L    Chloride 100 98 - 107 mmol/L    CO2 26 20 - 31 mmol/L    Anion Gap 10 9 - 17 mmol/L    GFR Non-African American >60 >60 mL/min    GFR African American >60 >60 mL/min    GFR Comment          GFR Staging NOT REPORTED    CBC Auto Differential    Collection Time: 21  5:17 AM   Result Value Ref Range    WBC 10.2 3.5 - 11.0 k/uL    RBC 2.73 (L) 4.5 - 5.9 m/uL    Hemoglobin 8.0 (L) 13.5 - 17.5 g/dL    Hematocrit 24.1 (L) 41 - 53 %    MCV 88.3 80 - 100 fL    MCH 29.3 26 - 34 pg    MCHC 33.2 31 - 37 g/dL    RDW 17.3 (H) 11.5 - 14.9 %    Platelets 797 353 - 824 k/uL    MPV 8.4 6.0 - 12.0 fL    NRBC Automated NOT REPORTED per 100 WBC    Differential Type NOT REPORTED     Seg Neutrophils 81 (H) 36 - 66 %    Lymphocytes 9 (L) 24 - 44 %    Monocytes 6 1 - 7 %    Eosinophils % 4 0 - 4 %    Basophils 0 0 - 2 %    Immature Granulocytes NOT REPORTED 0 %    Segs Absolute 8.30 1.3 - 9.1 k/uL    Absolute Lymph # 0.90 (L) 1.0 - 4.8 k/uL    Absolute Mono # 0.60 0.1 - 1.3 k/uL    Absolute Eos # 0.40 0.0 - 0.4 k/uL    Basophils Absolute 0.00 0.0 - 0.2 k/uL    Absolute Immature Granulocyte NOT REPORTED 0.00 - 0.30 k/uL    WBC Morphology NOT REPORTED     RBC Morphology NOT REPORTED     Platelet Estimate NOT REPORTED        Physical Examination:        Vitals:  /71   Pulse 57   Temp 98.4 °F (36.9 °C) (Axillary)   Resp 22   Ht 6' 4\" (1.93 m)   Wt 198 lb 13.7 oz (90.2 kg)   SpO2 99%   BMI 24.21 kg/m²   Temp (24hrs), Av.4 °F (36.9 °C), Min:98.2 °F (36.8 °C), Max:98.4 °F (36.9 °C)    No results for input(s): POCGLU in the last 72 hours. Physical Exam  Vitals reviewed. Constitutional:       Appearance: Normal appearance.  He is not diaphoretic. HENT:      Head: Normocephalic and atraumatic. Right Ear: External ear normal.      Left Ear: External ear normal.      Nose: Nose normal.      Mouth/Throat:      Mouth: Mucous membranes are moist.      Pharynx: Oropharynx is clear. Eyes:      Conjunctiva/sclera: Conjunctivae normal.   Cardiovascular:      Rate and Rhythm: Normal rate and regular rhythm. Pulses: Normal pulses. Heart sounds: Normal heart sounds. Pulmonary:      Effort: Pulmonary effort is normal.      Breath sounds: Examination of the right-lower field reveals rhonchi. Examination of the left-lower field reveals rhonchi. Rhonchi present. Abdominal:      General: Bowel sounds are normal. There is no distension. Palpations: Abdomen is soft. Musculoskeletal:         General: No tenderness or deformity. Normal range of motion. Cervical back: Normal range of motion and neck supple. No rigidity. Right lower leg: No edema. Left lower leg: No edema. Skin:     General: Skin is warm and dry. Capillary Refill: Capillary refill takes less than 2 seconds. Coloration: Skin is not jaundiced. Neurological:      General: No focal deficit present. Mental Status: Mental status is at baseline. Psychiatric:         Mood and Affect: Mood normal.         Behavior: Behavior normal.         Assessment:        Primary Problem  Septic shock (HCC)     Principal Problem:    Septic shock (HCC)  Active Problems:    Multifocal pneumonia    Tobacco abuse    Acute cystitis without hematuria    Mass of right lung    COPD (chronic obstructive pulmonary disease) (HCC)    PAF (paroxysmal atrial fibrillation) (HCC)    Chronic combined systolic and diastolic congestive heart failure (HCC)    Dilated cardiomyopathy (HCC)    Bacteremia due to Klebsiella pneumoniae    Melena    Anemia  Resolved Problems:    * No resolved hospital problems.  *      Past Medical History:   Diagnosis Date    Arthritis     Cancer [facility])     bladder 1980s    Cerebral artery occlusion with cerebral infarction [facility])     TIA 2010    Chronic kidney disease     COPD (chronic obstructive pulmonary disease) (HCC)     Hypertension     Pneumonia     Psychiatric problem     depression, social anxiety    Seizures ([facility] [address]         Plan:        1. IV Zosyn  2. Doxycycline per PEG tube  3. IV diuril per nephrology  1. Continue tube feed  2. CBC, CMP  3. Hospice meeting today  4. Monitor I's and O's  5. DVT Prophylaxis Eliquis 5 mg p.o. twice daily  6. EPCs  7. PT/OT to evaluate and treat  8. Pain control  9. Replace electrolytes as per sliding scale  10. Home medications reviewed and appropriate medications continued  11.  Reviewed labs and imaging studies from last 24 hours and results explained to patient      Electronically signed by Susen Dandy, MD

## 2021-11-01 NOTE — PROGRESS NOTES
radiographs of the right   humerus are recommended. 10/22 CT head w/o contrast  BRAIN/VENTRICLES: There is no acute intracranial hemorrhage, mass effect or midline shift.  No abnormal extra-axial fluid collection. The gray-white differentiation is maintained without evidence of an acute infarct. There is no evidence of hydrocephalus. Mild generalized cortical atrophy, stable.  Extensive streak artifact is   again noted on the left, related to stable postsurgical change, with metallic plate in the left frontotemporal skull region.  This obscures surrounding structures. ORBITS: The visualized portion of the orbits demonstrate no acute abnormality. SINUSES: The visualized paranasal sinuses and mastoid air cells demonstrate no acute abnormality. SOFT TISSUES/SKULL:  No acute abnormality of the visualized skull or soft tissues. 10/22 CT abdomen pelvis w/o contrast  1. Partially visualized MASSLIKE LESION in the right parahilar and infrahilar region CONCERNING FOR MALIGNANCY.  Dedicated contrast enhanced CT of the chest recommended for further evaluation. 2. No evidence of metastatic disease in the abdomen or the pelvis. 3. Bilateral nephrolithiasis.  No hydronephrosis.  Atrophic right kidney. 4. Liquid stool in the colon indicates a diarrheal illness.  No bowel wall thickening or obstruction noted. 5. 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  Vascular surgery consultation recommended especially for the right common iliac artery aneurysm. 10/22 CXR  Stable bibasilar atelectasis or infiltrates with unchanged mass density medial right lung base compared to earlier exam same day.  Right IJ central venous catheter tip at the mid SVC.  No pneumothorax post procedure.  Cardiac size stable.  Osseous structures grossly intact.  Telemetry leads overlie the chest.     Patient Vitals for the past 8 hrs:   BP Temp Temp src Pulse Resp SpO2 11/01/21 0645 131/78 98.4 °F (36.9 °C) Axillary 64 22 98 %         I have personally reviewed the past medical history, past surgical history, medications, social history, and family history, and I haveupdated the database accordingly. Allergies:   Bactrim [sulfamethoxazole-trimethoprim] and Ciprofloxacin     Review of Systems:     Review of Systems  Nonverbal, unable to provide  Physical Examination :       Physical Exam  Appearance: Normal appearance. He is not diaphoretic. HENT:      Head: Normocephalic and atraumatic. Right Ear: External ear normal.      Left Ear: External ear normal.      Nose: Nose normal.      Mouth/Throat:      Mouth: Mucous membranes are moist.      Pharynx: Oropharynx is clear. Eyes:      Conjunctiva/sclera: Conjunctivae normal.   Cardiovascular:      Rate and Rhythm: Normal rate and regular rhythm. Pulses: Normal pulses. Heart sounds: Normal heart sounds. Pulmonary:      Effort: Pulmonary effort is normal.      Breath sounds: Coarse breath sounds bilaterally  Abdominal:      General: Bowel sounds are normal. There is no distension. Palpations: Abdomen is soft. Musculoskeletal:         Cervical back:  No rigidity. Right lower leg: No edema. Left lower leg: No edema. Skin:     General: Skin is warm and dry. Coloration: Skin is not jaundiced. Neurological:      Mental Status: Mental status is at baseline.    Past Medical History:     Past Medical History:   Diagnosis Date    Arthritis     Cancer [facility])     bladder 1980s    Cerebral artery occlusion with cerebral infarction [facility])     TIA 2010    Chronic kidney disease     COPD [facility]chronic obstructive [facility] disease) ([facility] [address]     Hypertension     Pneumonia     Psychiatric problem     depression, social anxiety    Seizures ([facility] [address]        Past Surgical  History:     Past Surgical History:   Procedure Laterality Date    ABDOMEN SURGERY      BACK SURGERY      spinal surgery 2005     CAROTID ENDARTERECTOMY Left 09/20/2016    COLECTOMY N/A 5/28/2021    Exploratory Laparotomy. Release of adhesive band with release of small bowel obstruction. Small bowel resection with primary anastomosis performed by Cheryl Desai MD at [facility] COLONOSCOPY N/A 8/31/2018    COLONOSCOPY POLYPECTOMY COLD BIOPSY performed by Stepan Jaffe MD at [address]      left face    GASTROSTOMY TUBE PLACEMENT  04/15/2020    HERNIA REPAIR      HIATAL HERNIA REPAIR      INGUINAL HERNIA REPAIR Bilateral     INSERT MIDLINE CATHETER  8/4/2021         OTHER SURGICAL HISTORY      mesh infected in inguinal canal, had to remove. Removed testiscle at this time.      TONSILLECTOMY      UPPER GASTROINTESTINAL ENDOSCOPY  04/15/2020       EGD CONTROL HEMORRHAGE    UPPER GASTROINTESTINAL ENDOSCOPY  4/15/2020    EGD CONTROL HEMORRHAGE performed by Marney Hammans, MD at [facility]  4/15/2020    EGD ESOPHAGOGASTRODUODENOSCOPY PEG TUBE INSERTION performed by Marney Hammans, MD at [facility] End[facility]       Medications:      chlorothiazide (DIURIL) IVPB  250 mg IntraVENous Q12H    potassium chloride  20 mEq Per G Tube Daily    miconazole   Topical BID    apixaban  5 mg Oral BID    metoprolol tartrate  50 mg Oral BID    QUEtiapine  25 mg Oral Nightly    nystatin  5 mL Oral 4x Daily    piperacillin-tazobactam  3,375 mg IntraVENous Q8H    pantoprazole  40 mg IntraVENous BID    And    sodium chloride (PF)  10 mL IntraVENous BID    doxycycline Monohydrate  100 mg Per G Tube BID       Social History:     Social History     Socioeconomic History    Marital status:      Spouse name: Not on file    Number of children: Not on file    Years of education: Not on file    Highest education level: Not on file   Occupational History    Not on file   Tobacco Use    Smoking status: Former Smoker     Packs/day: 1.00     Years: 60.00     Pack years: 60.00     Types: Cigarettes     Start date:      Quit date: 2020     Years since quittin.5    Smokeless tobacco: Never Used   Vaping Use    Vaping Use: Never used   Substance and Sexual Activity    Alcohol use: No    Drug use: No    Sexual activity: Yes     Partners: Female   Other Topics Concern    Not on file   Social History Narrative    Not on file     Social Determinants of Health     Financial Resource Strain: Low Risk     Difficulty of Paying Living Expenses: Not hard at all   Food Insecurity: No Food Insecurity    Worried About Running Out of Food in the Last Year: Never true    Blas of Food in the Last Year: Never true   Transportation Needs: No Transportation Needs    Lack of Transportation (Medical): No    Lack of Transportation (Non-Medical): No   Physical Activity: Inactive    Days of Exercise per Week: 0 days    Minutes of Exercise per Session: 0 min   Stress: Stress Concern Present    Feeling of Stress : Very much   Social Connections: Moderately Isolated    Frequency of Communication with Friends and Family: More than three times a week    Frequency of Social Gatherings with Friends and Family: More than three times a week    Attends Alev[employer] Services: More than 4 times per year    Active Member of Clubs or Organizations: No    Attends Club or Organization Meetings: Never    Marital Status:     Intimate Partner Violence: Not At Risk    Fear of Current or Ex-Partner: No    Emotionally Abused: No    Physically Abused: No    Sexually Abused: No       Family History:     Family History   Problem Relation Age of Onset    Arthritis Mother     Atrial Fibrillation Mother     Hearing Loss Mother     Heart Disease Mother     Stroke Mother     Vision Loss Mother     Arthritis Father     Cancer Father     Heart Disease Father     High Blood Pressure Father     Stroke Father     Vision Loss Father       Medical Decision Making:   I have independently reviewed/ordered the following labs:    CBC with Differential:   Recent Labs     10/31/21  0545 11/01/21  0517   WBC 12.1* 10.2   HGB 8.0* 8.0*   HCT 24.7* 24.1*    297   LYMPHOPCT 9* 9*   MONOPCT 8* 6     BMP:  Recent Labs     10/31/21  0545 11/01/21  0517    136   K 3.5* 4.0    100   CO2 28 26   BUN 46* 34*   CREATININE 0.95 0.90     Hepatic Function Panel:   No results for input(s): PROT, LABALBU, BILIDIR, IBILI, BILITOT, ALKPHOS, ALT, AST in the last 72 hours. No results for input(s): RPR in the last 72 hours. No results for input(s): HIV in the last 72 hours. No results for input(s): BC in the last 72 hours. Lab Results   Component Value Date    CREATININE 0.90 11/01/2021    GLUCOSE 132 11/01/2021       Detailed results: Thank you for allowing us to participate in the care of this patient. Please call with questions. This note is created with the assistance of a speech recognition program.  While intending to generate adocument that actually reflects the content of the visit, the document can still have some errors including those of syntax and sound a like substitutions which may escape proof reading. It such instances, actual meaningcan be extrapolated by contextual diversion.     Sierra Montilla MD  Office: [phone]  Perfect serve / office [phone]

## 2021-11-01 NOTE — CARE COORDINATION
DISCHARGE PLANNING NOTE:    Unable to speak to pt. Nonverbal. No family at the bedside. [address] was held w/ family today. Per Nursing, Plan is for Pt. To DC to home w/ [facility], after IV antibiotics. Pt. Will never return to the Hospital.     ID is following. Pt. Remains on IV Zosyn. Per Pulm Notes, Would stop Zosyn, dania & then DC to home. Pt. Does have an afternoon dose due around 2pm, Anticipate DC to home after that. Will follow along w/ LSW for needs.

## 2021-11-01 NOTE — PLAN OF CARE
Problem: Falls - Risk of:  Goal: Will remain free from falls  Description: Will remain free from falls  11/1/2021 1624 by Lina Blankenship RN  Outcome: Met This Shift  11/1/2021 0345 by Misty Hannah RN  Outcome: Ongoing  Goal: Absence of physical injury  Description: Absence of physical injury  11/1/2021 1624 by Lina Blankenship RN  Outcome: Met This Shift  Note: COMPLETE BEDREST MAINTAINED  11/1/2021 0345 by Misty Hannah RN  Outcome: Ongoing     Problem: Skin Integrity:  Goal: Will show no infection signs and symptoms  Description: Will show no infection signs and symptoms  11/1/2021 1624 by Lina Blankenship RN  Outcome: Ongoing  11/1/2021 0345 by Misty Hannah RN  Outcome: Ongoing  Goal: Absence of new skin breakdown  Description: Absence of new skin breakdown  11/1/2021 1624 by Lina Blankenship RN  Outcome: Ongoing  Note: BREAKDOWN OF COCCYX NOTED ON LDA  11/1/2021 0345 by Misty Hannah RN  Outcome: Ongoing     Problem: SAFETY  Goal: Free from accidental physical injury  11/1/2021 1624 by Lina Blankenship RN  Outcome: Met This Shift  11/1/2021 0345 by Misty Hannah RN  Outcome: Ongoing  Goal: Free from intentional harm  11/1/2021 1624 by Lina Blankenship RN  Outcome: Met This Shift  11/1/2021 0345 by Misty Hannah RN  Outcome: Ongoing     Problem: DAILY CARE  Goal: Daily care needs are met  11/1/2021 1624 by Lina Blankenship RN  Outcome: Met This Shift  11/1/2021 0345 by Misty Hannah RN  Outcome: Ongoing     Problem: PAIN  Goal: Patient's pain/discomfort is manageable  11/1/2021 1624 by Lina Blankenship RN  Outcome: Met This Shift  11/1/2021 0345 by Misty Hannah RN  Outcome: Ongoing     Problem: SKIN INTEGRITY  Goal: Skin integrity is maintained or improved  11/1/2021 1624 by Lina Blankenship RN  Outcome: Ongoing  11/1/2021 0345 by Misty Hannah RN  Outcome: Ongoing     Problem: KNOWLEDGE DEFICIT  Goal: Patient/S.O. demonstrates understanding of disease process, treatment plan, medications, and discharge instructions.   11/1/2021 1624 by Raj Rogel RN  Outcome: Ongoing  11/1/2021 0345 by Tae Campo RN  Outcome: Ongoing     Problem: DISCHARGE BARRIERS  Goal: Patient's continuum of care needs are met  11/1/2021 1624 by Raj Rogel RN  Outcome: Not Met This Shift  Note: DISCHARGE 11/2/21 WITH HOSPICE  11/1/2021 0345 by Tae Campo RN  Outcome: Ongoing     Problem: Discharge Planning:  Goal: Discharged to appropriate level of care  Description: Discharged to appropriate level of care  11/1/2021 1624 by Raj Rogel RN  Outcome: Not Met This Shift  11/1/2021 0345 by Tae Campo RN  Outcome: Ongoing  Goal: Ability to perform activities of daily living will improve  Description: Ability to perform activities of daily living will improve  11/1/2021 1624 by Raj Rogel RN  Outcome: Not Met This Shift  11/1/2021 0345 by Tae Campo RN  Outcome: Ongoing  Goal: Participates in care planning  Description: Participates in care planning  11/1/2021 1624 by Raj Rogel RN  Outcome: Not Met This Shift  11/1/2021 0345 by Tae Campo RN  Outcome: Ongoing     Problem: Gas Exchange - Impaired:  Goal: Levels of oxygenation will improve  Description: Levels of oxygenation will improve  11/1/2021 1624 by Raj Rogel RN  Outcome: Ongoing  11/1/2021 0345 by Tae Campo RN  Outcome: Ongoing     Problem: Infection, Septic Shock:  Goal: Will show no infection signs and symptoms  Description: Will show no infection signs and symptoms  11/1/2021 1624 by Raj Rogel RN  Outcome: Ongoing  Note: CONTINUE WITH IV ANTIBX  11/1/2021 0345 by Tae Campo RN  Outcome: Ongoing     Problem: Infection - Ventilator-Associated Pneumonia:  Goal: Absence of pulmonary infection  Description: Absence of pulmonary infection  11/1/2021 1624 by Raj Rogel RN  Outcome: Ongoing  11/1/2021 0345 by Tae Campo RN  Outcome: Ongoing     Problem: Serum Glucose Level - Abnormal:  Goal: Ability to maintain appropriate glucose levels will improve  Description: Ability to maintain appropriate glucose levels will improve  11/1/2021 1624 by Reza Schneider RN  Outcome: Ongoing  11/1/2021 0345 by Claude Ege, RN  Outcome: Ongoing     Problem: Tissue Perfusion, Altered:  Goal: Circulatory function within specified parameters  Description: Circulatory function within specified parameters  11/1/2021 1624 by Reza Schneider RN  Outcome: Ongoing  11/1/2021 0345 by Claude Ege, RN  Outcome: Ongoing     Problem: Venous Thromboembolism:  Goal: Will show no signs or symptoms of venous thromboembolism  Description: Will show no signs or symptoms of venous thromboembolism  11/1/2021 1624 by Reza Schneider RN  Outcome: Completed  11/1/2021 0345 by Claude Ege, RN  Outcome: Ongoing  Goal: Absence of signs or symptoms of impaired coagulation  Description: Absence of signs or symptoms of impaired coagulation  11/1/2021 1624 by Reza Schneider RN  Outcome: Completed  11/1/2021 0345 by Claude Ege, RN  Outcome: Ongoing     Problem: Nutrition  Goal: Optimal nutrition therapy  11/1/2021 1624 by Reza Schneider RN  Outcome: Ongoing  Note: Steve Major   11/1/2021 0345 by Claude Ege, RN  Outcome: Ongoing     Problem: Pain:  Description: Pain management should include both nonpharmacologic and pharmacologic interventions.   Goal: Pain level will decrease  Description: Pain level will decrease  11/1/2021 1624 by Reza Schneider RN  Outcome: Met This Shift  11/1/2021 0345 by Claude Ege, RN  Outcome: Ongoing  Goal: Control of acute pain  Description: Control of acute pain  11/1/2021 1624 by Reza Schneider RN  Outcome: Met This Shift  11/1/2021 0345 by Claude Ege, RN  Outcome: Ongoing  Goal: Control of chronic pain  Description: Control of chronic pain  11/1/2021 1624 by Reza Schneider RN  Outcome: Met This Shift  11/1/2021 0345 by Claude Ege, RN  Outcome: Ongoing Not Met This Shift  11/1/2021 0345 by Tejinder Salazar RN  Outcome: Ongoing     Problem: Sensory Perception - Impaired:  Goal: Demonstrations of improved sensory functioning will increase  Description: Demonstrations of improved sensory functioning will increase  11/1/2021 0345 by Tejinder Salazar RN  Outcome: Ongoing  Goal: Decrease in sensory misperception frequency  Description: Decrease in sensory misperception frequency  11/1/2021 0345 by Tejinder Salazar RN  Outcome: Ongoing  Goal: Able to refrain from responding to false sensory perceptions  Description: Able to refrain from responding to false sensory perceptions  11/1/2021 0345 by Tejinder Salazar RN  Outcome: Ongoing  Goal: Demonstrates accurate environmental perceptions  Description: Demonstrates accurate environmental perceptions  11/1/2021 0345 by Tejinder Salazar RN  Outcome: Ongoing  Goal: Able to distinguish between reality-based and nonreality-based thinking  Description: Able to distinguish between reality-based and nonreality-based thinking  11/1/2021 0345 by Tejinder Salazar RN  Outcome: Ongoing  Goal: Able to interrupt nonreality-based thinking  Description: Able to interrupt nonreality-based thinking  11/1/2021 0345 by Tejinder Salazar RN  Outcome: Ongoing     Problem: Sleep Pattern Disturbance:  Goal: Appears well-rested  Description: Appears well-rested  11/1/2021 0345 by Tejinder Salazar RN  Outcome: Ongoing

## 2021-11-01 NOTE — FLOWSHEET NOTE
No family present  /// pt sleeping         11/01/21 1054   Encounter Summary   Services provided to: Patient   Referral/Consult From: Palliative Care   Complexity of Encounter Low   Length of Encounter 15 minutes   Spiritual/Muslim   Type Spiritual support   Assessment Sleeping   Intervention Prayer

## 2021-11-01 NOTE — PLAN OF CARE
Problem: Falls - Risk of:  Goal: Will remain free from falls  Description: Will remain free from falls  11/1/2021 0345 by Mitra Uriosetgui RN  Outcome: Ongoing     Problem: Falls - Risk of:  Goal: Absence of physical injury  Description: Absence of physical injury  11/1/2021 0345 by Mitra Uriostegui RN  Outcome: Ongoing     Problem: Skin Integrity:  Goal: Will show no infection signs and symptoms  Description: Will show no infection signs and symptoms  11/1/2021 0345 by Mitar Uriostegui RN  Outcome: Ongoing     Problem: Skin Integrity:  Goal: Absence of new skin breakdown  Description: Absence of new skin breakdown  11/1/2021 0345 by Mitra Uriostegui RN  Outcome: Ongoing     Problem: SAFETY  Goal: Free from accidental physical injury  11/1/2021 0345 by Mitra Uriostegui RN  Outcome: Ongoing     Problem: SAFETY  Goal: Free from intentional harm  11/1/2021 0345 by Mitra Uriostegui RN  Outcome: Ongoing     Problem: DAILY CARE  Goal: Daily care needs are met  11/1/2021 0345 by Mitra Uriostegui RN  Outcome: Ongoing     Problem: PAIN  Goal: Patient's pain/discomfort is manageable  11/1/2021 0345 by Mitra Uriostegui RN  Outcome: Ongoing     Problem: SKIN INTEGRITY  Goal: Skin integrity is maintained or improved  11/1/2021 0345 by Mitra Uriostegui RN  Outcome: Ongoing     Problem: KNOWLEDGE DEFICIT  Goal: Patient/S.O. demonstrates understanding of disease process, treatment plan, medications, and discharge instructions.   11/1/2021 0345 by Mitra Uriostegui RN  Outcome: Ongoing     Problem: DISCHARGE BARRIERS  Goal: Patient's continuum of care needs are met  11/1/2021 0345 by Mitra Uriostegui RN  Outcome: Ongoing     Problem: Discharge Planning:  Goal: Discharged to appropriate level of care  Description: Discharged to appropriate level of care  11/1/2021 0345 by Mitra Uriostegui RN  Outcome: Ongoing     Problem: Discharge Planning:  Goal: Ability to perform activities of daily living will improve  Description: Ability to perform activities of daily living will improve  11/1/2021 0345 by Mitra Uriostegui RN  Outcome: Ongoing     Problem: Discharge Planning:  Goal: Participates in care planning  Description: Participates in care planning  11/1/2021 0345 by Mitra Uriostegui RN  Outcome: Ongoing     Problem: Gas Exchange - Impaired:  Goal: Levels of oxygenation will improve  Description: Levels of oxygenation will improve  11/1/2021 0345 by Mitra Uriostegui RN  Outcome: Ongoing     Problem: Infection, Septic Shock:  Goal: Will show no infection signs and symptoms  Description: Will show no infection signs and symptoms  11/1/2021 0345 by Mitra Uriostegui RN  Outcome: Ongoing     Problem: Infection - Ventilator-Associated Pneumonia:  Goal: Absence of pulmonary infection  Description: Absence of pulmonary infection  11/1/2021 0345 by Mitra Uriostegui RN  Outcome: Ongoing     Problem: Serum Glucose Level - Abnormal:  Goal: Ability to maintain appropriate glucose levels will improve  Description: Ability to maintain appropriate glucose levels will improve  11/1/2021 0345 by Mitra Uriostegui RN  Outcome: Ongoing     Problem: Tissue Perfusion, Altered:  Goal: Circulatory function within specified parameters  Description: Circulatory function within specified parameters  11/1/2021 0345 by Mitra Uriostegui RN  Outcome: Ongoing     Problem: Venous Thromboembolism:  Goal: Will show no signs or symptoms of venous thromboembolism  Description: Will show no signs or symptoms of venous thromboembolism  11/1/2021 0345 by Mitra Uriostegui RN  Outcome: Ongoing     Problem: Venous Thromboembolism:  Goal: Absence of signs or symptoms of impaired coagulation  Description: Absence of signs or symptoms of impaired coagulation  11/1/2021 0345 by Mitra Uriostegui RN  Outcome: Ongoing     Problem: Nutrition  Goal: Optimal nutrition therapy  11/1/2021 0345 by Mitra Uriostegui RN  Outcome: Ongoing     Problem: Pain:  Goal: Pain level will decrease  Description: Pain level will decrease  11/1/2021 0345 by Claude Ege, RN  Outcome: Ongoing     Problem: Pain:  Goal: Control of acute pain  Description: Control of acute pain  11/1/2021 0345 by Claude Ege, RN  Outcome: Ongoing     Problem: Pain:  Goal: Control of chronic pain  Description: Control of chronic pain  11/1/2021 0345 by Claude Ege, RN  Outcome: Ongoing     Problem: Confusion - Acute:  Goal: Absence of continued neurological deterioration signs and symptoms  Description: Absence of continued neurological deterioration signs and symptoms  11/1/2021 0345 by Claude Ege, RN  Outcome: Ongoing     Problem: Confusion - Acute:  Goal: Mental status will be restored to baseline  Description: Mental status will be restored to baseline  11/1/2021 0345 by Claude Ege, RN  Outcome: Ongoing     Problem: Injury - Risk of, Physical Injury:  Goal: Will remain free from falls  Description: Will remain free from falls  11/1/2021 0345 by Claude Ege, RN  Outcome: Ongoing     Problem: Injury - Risk of, Physical Injury:  Goal: Absence of physical injury  Description: Absence of physical injury  11/1/2021 0345 by Claude Ege, RN  Outcome: Ongoing     Problem: Mood - Altered:  Goal: Mood stable  Description: Mood stable  11/1/2021 0345 by Claude Ege, RN  Outcome: Ongoing     Problem: Mood - Altered:  Goal: Absence of abusive behavior  Description: Absence of abusive behavior  11/1/2021 0345 by Claude Ege, RN  Outcome: Ongoing     Problem: Mood - Altered:  Goal: Verbalizations of feeling emotionally comfortable while being cared for will increase  Description: Verbalizations of feeling emotionally comfortable while being cared for will increase  11/1/2021 0345 by Claude Ege, RN  Outcome: Ongoing     Problem: Psychomotor Activity - Altered:  Goal: Absence of psychomotor disturbance signs and symptoms  Description: Absence of psychomotor disturbance signs and symptoms  11/1/2021 0345 by Claude Ege, RN  Outcome: Ongoing     Problem: Sensory Perception - Impaired:  Goal: Demonstrations of improved sensory functioning will increase  Description: Demonstrations of improved sensory functioning will increase  11/1/2021 0345 by Lee Bray RN  Outcome: Ongoing     Problem: Sensory Perception - Impaired:  Goal: Decrease in sensory misperception frequency  Description: Decrease in sensory misperception frequency  11/1/2021 0345 by Lee Bray RN  Outcome: Ongoing     Problem: Sensory Perception - Impaired:  Goal: Able to refrain from responding to false sensory perceptions  Description: Able to refrain from responding to false sensory perceptions  11/1/2021 0345 by Lee Bray RN  Outcome: Ongoing     Problem: Sensory Perception - Impaired:  Goal: Demonstrates accurate environmental perceptions  Description: Demonstrates accurate environmental perceptions  11/1/2021 0345 by Lee Bray RN  Outcome: Ongoing     Problem: Sensory Perception - Impaired:  Goal: Able to distinguish between reality-based and nonreality-based thinking  Description: Able to distinguish between reality-based and nonreality-based thinking  11/1/2021 0345 by Lee Bray RN  Outcome: Ongoing     Problem: Sensory Perception - Impaired:  Goal: Able to interrupt nonreality-based thinking  Description: Able to interrupt nonreality-based thinking  11/1/2021 0345 by Lee Bray RN  Outcome: Ongoing     Problem: Sleep Pattern Disturbance:  Goal: Appears well-rested  Description: Appears well-rested  11/1/2021 0345 by Lee Bray RN  Outcome: Ongoing

## 2021-11-02 VITALS
WEIGHT: 192.02 LBS | SYSTOLIC BLOOD PRESSURE: 131 MMHG | HEART RATE: 68 BPM | HEIGHT: 76 IN | OXYGEN SATURATION: 98 % | BODY MASS INDEX: 23.38 KG/M2 | RESPIRATION RATE: 20 BRPM | TEMPERATURE: 98 F | DIASTOLIC BLOOD PRESSURE: 73 MMHG

## 2021-11-02 LAB
ABSOLUTE EOS #: 0.3 K/UL (ref 0–0.4)
ABSOLUTE IMMATURE GRANULOCYTE: ABNORMAL K/UL (ref 0–0.3)
ABSOLUTE LYMPH #: 1 K/UL (ref 1–4.8)
ABSOLUTE MONO #: 0.8 K/UL (ref 0.1–1.3)
ANION GAP SERPL CALCULATED.3IONS-SCNC: 11 MMOL/L (ref 9–17)
BASOPHILS # BLD: 0 % (ref 0–2)
BASOPHILS ABSOLUTE: 0 K/UL (ref 0–0.2)
BUN BLDV-MCNC: 34 MG/DL (ref 8–23)
BUN/CREAT BLD: ABNORMAL (ref 9–20)
CALCIUM SERPL-MCNC: 9.2 MG/DL (ref 8.6–10.4)
CHLORIDE BLD-SCNC: 106 MMOL/L (ref 98–107)
CO2: 23 MMOL/L (ref 20–31)
CREAT SERPL-MCNC: 0.92 MG/DL (ref 0.7–1.2)
DIFFERENTIAL TYPE: ABNORMAL
EOSINOPHILS RELATIVE PERCENT: 4 % (ref 0–4)
GFR AFRICAN AMERICAN: >60 ML/MIN
GFR NON-AFRICAN AMERICAN: >60 ML/MIN
GFR SERPL CREATININE-BSD FRML MDRD: ABNORMAL ML/MIN/{1.73_M2}
GFR SERPL CREATININE-BSD FRML MDRD: ABNORMAL ML/MIN/{1.73_M2}
GLUCOSE BLD-MCNC: 104 MG/DL (ref 70–99)
HCT VFR BLD CALC: 29.4 % (ref 41–53)
HEMOGLOBIN: 9.6 G/DL (ref 13.5–17.5)
IMMATURE GRANULOCYTES: ABNORMAL %
LYMPHOCYTES # BLD: 10 % (ref 24–44)
MAGNESIUM: 1.7 MG/DL (ref 1.6–2.6)
MCH RBC QN AUTO: 29.7 PG (ref 26–34)
MCHC RBC AUTO-ENTMCNC: 32.8 G/DL (ref 31–37)
MCV RBC AUTO: 90.6 FL (ref 80–100)
MONOCYTES # BLD: 9 % (ref 1–7)
NRBC AUTOMATED: ABNORMAL PER 100 WBC
PDW BLD-RTO: 18.4 % (ref 11.5–14.9)
PLATELET # BLD: 326 K/UL (ref 150–450)
PLATELET ESTIMATE: ABNORMAL
PMV BLD AUTO: 8.3 FL (ref 6–12)
POTASSIUM SERPL-SCNC: 4.4 MMOL/L (ref 3.7–5.3)
RBC # BLD: 3.25 M/UL (ref 4.5–5.9)
RBC # BLD: ABNORMAL 10*6/UL
SEG NEUTROPHILS: 77 % (ref 36–66)
SEGMENTED NEUTROPHILS ABSOLUTE COUNT: 7.1 K/UL (ref 1.3–9.1)
SODIUM BLD-SCNC: 140 MMOL/L (ref 135–144)
WBC # BLD: 9.3 K/UL (ref 3.5–11)
WBC # BLD: ABNORMAL 10*3/UL

## 2021-11-02 PROCEDURE — 6370000000 HC RX 637 (ALT 250 FOR IP): Performed by: INTERNAL MEDICINE

## 2021-11-02 PROCEDURE — 6360000002 HC RX W HCPCS: Performed by: INTERNAL MEDICINE

## 2021-11-02 PROCEDURE — 6370000000 HC RX 637 (ALT 250 FOR IP): Performed by: FAMILY MEDICINE

## 2021-11-02 PROCEDURE — 2580000003 HC RX 258: Performed by: INTERNAL MEDICINE

## 2021-11-02 PROCEDURE — 80048 BASIC METABOLIC PNL TOTAL CA: CPT

## 2021-11-02 PROCEDURE — 85025 COMPLETE CBC W/AUTO DIFF WBC: CPT

## 2021-11-02 PROCEDURE — 99232 SBSQ HOSP IP/OBS MODERATE 35: CPT | Performed by: INTERNAL MEDICINE

## 2021-11-02 PROCEDURE — 36415 COLL VENOUS BLD VENIPUNCTURE: CPT

## 2021-11-02 PROCEDURE — 83735 ASSAY OF MAGNESIUM: CPT

## 2021-11-02 PROCEDURE — C9113 INJ PANTOPRAZOLE SODIUM, VIA: HCPCS | Performed by: INTERNAL MEDICINE

## 2021-11-02 PROCEDURE — 6370000000 HC RX 637 (ALT 250 FOR IP): Performed by: NURSE PRACTITIONER

## 2021-11-02 RX ADMIN — PIPERACILLIN SODIUM AND TAZOBACTAM SODIUM 3375 MG: 3; .375 INJECTION, POWDER, LYOPHILIZED, FOR SOLUTION INTRAVENOUS at 11:50

## 2021-11-02 RX ADMIN — SODIUM CHLORIDE, PRESERVATIVE FREE 10 ML: 5 INJECTION INTRAVENOUS at 09:40

## 2021-11-02 RX ADMIN — ANTI-FUNGAL POWDER MICONAZOLE NITRATE TALC FREE: 1.42 POWDER TOPICAL at 10:27

## 2021-11-02 RX ADMIN — PANTOPRAZOLE SODIUM 40 MG: 40 INJECTION, POWDER, FOR SOLUTION INTRAVENOUS at 09:39

## 2021-11-02 RX ADMIN — POTASSIUM CHLORIDE 20 MEQ: 1.5 FOR SOLUTION ORAL at 09:40

## 2021-11-02 RX ADMIN — DOXYCYCLINE 100 MG: 100 CAPSULE ORAL at 09:39

## 2021-11-02 RX ADMIN — APIXABAN 5 MG: 5 TABLET, FILM COATED ORAL at 09:39

## 2021-11-02 RX ADMIN — PIPERACILLIN SODIUM AND TAZOBACTAM SODIUM 3375 MG: 3; .375 INJECTION, POWDER, LYOPHILIZED, FOR SOLUTION INTRAVENOUS at 04:26

## 2021-11-02 RX ADMIN — Medication 400 MG: at 12:49

## 2021-11-02 RX ADMIN — CHLOROTHIAZIDE SODIUM 250 MG: 500 INJECTION, POWDER, LYOPHILIZED, FOR SOLUTION INTRAVENOUS at 06:00

## 2021-11-02 RX ADMIN — NYSTATIN 500000 UNITS: 100000 SUSPENSION ORAL at 09:39

## 2021-11-02 RX ADMIN — METOPROLOL TARTRATE 50 MG: 50 TABLET, FILM COATED ORAL at 09:39

## 2021-11-02 RX ADMIN — NYSTATIN 500000 UNITS: 100000 SUSPENSION ORAL at 12:49

## 2021-11-02 ASSESSMENT — ENCOUNTER SYMPTOMS
ANAL BLEEDING: 0
TROUBLE SWALLOWING: 0
ABDOMINAL DISTENTION: 0
BACK PAIN: 0
STRIDOR: 0
COLOR CHANGE: 0
VOICE CHANGE: 0
BLOOD IN STOOL: 0
RECTAL PAIN: 0
CHOKING: 0
PHOTOPHOBIA: 0

## 2021-11-02 NOTE — DISCHARGE INSTR - DIET

## 2021-11-02 NOTE — PLAN OF CARE
Problem: Falls - Risk of:  Goal: Will remain free from falls  Description: Will remain free from falls  11/2/2021 0216 by Clayton Scott RN  Outcome: Ongoing  Note: Pt free of falls. Call light and bedside table within reach. Bed locked and in lowest position, nonskid socks on feet. Problem: Skin Integrity:  Goal: Absence of new skin breakdown  Description: Absence of new skin breakdown  11/2/2021 0216 by Clayton Scott RN  Outcome: Ongoing  Note: Pt absent from any new skin breakdown       Problem: SAFETY  Goal: Free from intentional harm  11/2/2021 0216 by Clayton Scott RN  Outcome: Ongoing  Note: Pt free from intentional harm     Problem: Discharge Planning:  Goal: Discharged to appropriate level of care  Description: Discharged to appropriate level of care  11/2/2021 0216 by Clayton Scott RN  Outcome: Ongoing  Note: Pt discharging tomorrow home with hospice care.

## 2021-11-02 NOTE — DISCHARGE SUMMARY
Discharge Summary      Patient ID: Tri Khalil    MRN: [id number]     Acct:  [de-identified]       Patient's PCP: Nilay Villarreal MD    Admit Date: 10/22/2021     Discharge Date:   11/2/2021    Admitting Physician: Valentina Dickey MD    Discharge Physician: Stevie Richards MD     Discharge Diagnoses:    Primary Problem  Septic shock [facility])    Principal Problem:    Septic shock [facility])  Active Problems:    Multifocal pneumonia    Tobacco abuse    Acute cystitis without hematuria    Mass of right lung    COPD (chronic obstructive pulmonary disease) ([facility] [address]    PAF (paroxysmal atrial fibrillation) ([facility] [address]    Chronic combined systolic and diastolic congestive heart failure ([facility] [address]    Dilated cardiomyopathy ([facility] [address]    Bacteremia due to Klebsiella pneumoniae    Melena    Anemia  Resolved Problems:    * No resolved hospital problems. *    Past Medical History:   Diagnosis Date    Arthritis     Cancer [facility])     bladder 1980s    Cerebral artery occlusion with cerebral infarction [facility])     TIA 2010    Chronic kidney disease     COPD (chronic obstructive pulmonary disease) ([facility] [address]     Hypertension     Pneumonia     Psychiatric problem     depression, social anxiety    Seizures ([facility] [address]      The patient was seen and examined on day of discharge and this discharge summary is in conjunction with daily progress note from day of discharge. Code Status:  [facility] Course:   H&P Reviewed. patient with a past medical history of PAF,  Dilated cardiomyopathy, previous  CVA on G-tube feedings at home, was admitted with septic shock due to Klebsiella pneumonia bacteremia, multifocal pneumonia and Klebsiella pneumoniae cystitis. Patient was started on IV fluids, broad-spectrum IV antibiotics and IV vasopressors. During hospital stay patient was weaned off of IV vasopressors. Patient continue to have a prolonged hospital course with a very slow recovery.  After family meeting with Hospice patient's code status was changed to DNR cc. Patient is being discharged in stable condition home with hospice. Discharge day progress note: Today   Patient was seen and examined at bedside today. Hemodynamically stable. No chest pain, shortness of breath, fever, chills, nausea, vomiting, palpitations, or abdominal pain reported. No events reported overnight. Review of Systems   Constitutional: Negative for appetite change, chills and diaphoresis. HENT: Negative for drooling, ear pain, trouble swallowing and voice change. Eyes: Negative for photophobia and visual disturbance. Respiratory: Negative for choking and stridor. Cardiovascular: Negative for chest pain and palpitations. Gastrointestinal: Negative for abdominal distention, anal bleeding, blood in stool and rectal pain. Endocrine: Negative for polyphagia and polyuria. Genitourinary: Negative for dysuria, flank pain, hematuria and urgency. Musculoskeletal: Negative for back pain, myalgias and neck stiffness. Skin: Negative for color change, pallor and rash. Allergic/Immunologic: Negative for environmental allergies and food allergies. Neurological: Negative for tremors, seizures, facial asymmetry and numbness. Hematological: Negative for adenopathy. Does not bruise/bleed easily. Psychiatric/Behavioral: Negative for agitation, behavioral problems, hallucinations, self-injury and suicidal ideas. Physical Exam  Vitals reviewed. Constitutional:       Appearance: Normal appearance. He is not diaphoretic. HENT:      Head: Normocephalic and atraumatic. Right Ear: External ear normal.      Left Ear: External ear normal.      Nose: Nose normal.      Mouth/Throat:      Mouth: Mucous membranes are moist.      Pharynx: Oropharynx is clear. Eyes:      Conjunctiva/sclera: Conjunctivae normal.   Cardiovascular:      Rate and Rhythm: Normal rate and regular rhythm. Pulses: Normal pulses. Heart sounds: Normal heart sounds.    Pulmonary: Effort: Pulmonary effort is normal.      Breath sounds: Normal breath sounds. Abdominal:      General: Bowel sounds are normal. There is no distension. Palpations: Abdomen is soft. Comments: G-tube in place   Musculoskeletal:         General: No tenderness or deformity. Normal range of motion. Cervical back: Normal range of motion and neck supple. No rigidity. Right lower leg: No edema. Left lower leg: No edema. Skin:     General: Skin is warm and dry. Capillary Refill: Capillary refill takes less than 2 seconds. Coloration: Skin is not jaundiced. Neurological:      General: No focal deficit present. Mental Status: Mental status is at baseline.    Psychiatric:         Mood and Affect: Mood normal.         Behavior: Behavior normal.         Consults:  IP CONSULT TO INTERNAL MEDICINE  IP CONSULT TO CRITICAL CARE  IP CONSULT TO NEPHROLOGY  IP CONSULT TO DIETITIAN  IP CONSULT TO CARDIOLOGY  IP CONSULT TO INFECTIOUS DISEASES  IP CONSULT TO GI  IP CONSULT TO GI  PALLIATIVE CARE EVAL    Significant Diagnostic Studies: as above, and as follows:    Treatments: as above    Disposition: home with hospice    Discharged Condition: Stable    Follow Up:  Ashley Tierney MD in one week    Discharge Medications:    Jina Ojeda   [facility] Medication Instructions [id number]:[id number]    Printed on:11/02/21 0648   Medication Information                      apixaban (ELIQUIS) 5 MG TABS tablet  Take 1 tablet by mouth 2 times daily             Bacillus Coagulans-Inulin (PROBIOTIC) 1-250 BILLION-MG CAPS  Take by mouth 2 times daily             famotidine (PEPCID) 20 MG tablet  Take 20 mg by mouth 2 times daily             ferrous sulfate (IRON 325) 325 (65 Fe) MG tablet  Take 325 mg by mouth daily (with breakfast)             furosemide (LASIX) 20 MG tablet  Take 2 tablets by mouth daily             metoprolol tartrate (LOPRESSOR) 25 MG tablet  Take 12.5 mg by mouth 2 times daily Time Spent on discharge is more than  35 min in the examination, evaluation, counseling and review of medications and discharge plan.       Electronically signed by Shon Mendoza MD     Copy sent to Dr. Sita Sanchez MD

## 2021-11-02 NOTE — PROGRESS NOTES
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 In ED received IV fluid bolus, central line was placed  and norepinephrine was started for hypotension. Urine culture - several types of bacteria identified, likely contamination  COVID19 - not detected  CXR - Mildly increased patchy opacities at the lung bases, possibly pneumonia or atelectasis. UA - positive for nitrites, large leukocyte esterase, large hemoglobin and moderate bacteria  Blood cultures - 2/2 positive for klebsiella pneumonia    Interval changes  11/2/2021   He is afebrile, comfortable on oxygen per nasal cannula, nonverbal, fecal management system in place with loose stool, no acute events. Chronic Issa catheter in place    Micro:  10/22 Blood Cx - 2/2 Klebsiella pneumoniae - sensitive to cefepime  10/22 Urine Cx - multiple types of bacteria identified, likely contamination  10/22 COVID19 - not detected    Imaging:  10/22 CXR  1.  Mildly increased patchy opacities at the lung bases, possibly pneumonia or atelectasis. 2.  Increased masslike opacity in the medial right base.  This was previously evaluated with CT on 05/26/2021.  Consider re-evaluation with contrast-enhanced CT to assess progression.    3.  Heterogeneous bony mineralization in the visualized right humeral   diaphysis, which is indeterminate.  Dedicated radiographs of the right   humerus are recommended. 10/22 CT head w/o contrast  BRAIN/VENTRICLES: There is no acute intracranial hemorrhage, mass effect or midline shift.  No abnormal extra-axial fluid collection. The gray-white differentiation is maintained without evidence of an acute infarct. There is no evidence of hydrocephalus. Mild generalized cortical atrophy, stable.  Extensive streak artifact is   again noted on the left, related to stable postsurgical change, with metallic plate in the left frontotemporal skull region.  This obscures surrounding structures. ORBITS: The visualized portion of the orbits demonstrate no acute abnormality. SINUSES: The visualized paranasal sinuses and mastoid air cells demonstrate no acute abnormality. SOFT TISSUES/SKULL:  No acute abnormality of the visualized skull or soft tissues. 10/22 CT abdomen pelvis w/o contrast  1. Partially visualized MASSLIKE LESION in the right parahilar and infrahilar region CONCERNING FOR MALIGNANCY.  Dedicated contrast enhanced CT of the chest recommended for further evaluation. 2. No evidence of metastatic disease in the abdomen or the pelvis. 3. Bilateral nephrolithiasis.  No hydronephrosis.  Atrophic right kidney. 4. Liquid stool in the colon indicates a diarrheal illness.  No bowel wall thickening or obstruction noted. 5. 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  Vascular surgery consultation recommended especially for the right common iliac artery aneurysm. 10/22 CXR  Stable bibasilar atelectasis or infiltrates with unchanged mass density medial right lung base compared to earlier exam same day.  Right IJ central venous catheter tip at the mid SVC.  No pneumothorax post procedure.  Cardiac size stable.  Osseous structures grossly intact.  Telemetry leads overlie the chest.     Patient Vitals for the past 8 hrs:   BP Temp Temp src Pulse Resp SpO2   11/02/21 1138 131/73 98 °F (36.7 °C) Axillary 68 20 98 %   11/02/21 0938 118/74   83     11/02/21 0725    68     11/02/21 0700 120/74 97.8 °F (36.6 °C) Axillary 60 18 99 %         I have personally reviewed the past medical history, past surgical history, medications, social history, and family history, and I haveupdated the database accordingly. Allergies:   Bactrim [sulfamethoxazole-trimethoprim] and Ciprofloxacin     Review of Systems:     Review of Systems  Nonverbal, unable to provide  Physical Examination :       Physical Exam  Appearance: Normal appearance. He is not diaphoretic. HENT:      Head: Normocephalic and atraumatic. Right Ear: External ear normal.      Left Ear: External ear normal.      Nose: Nose normal.      Mouth/Throat:      Mouth: Mucous membranes are moist.      Pharynx: Oropharynx is clear. Eyes:      Conjunctiva/sclera: Conjunctivae normal.   Cardiovascular:      Rate and Rhythm: Normal rate and regular rhythm. Pulses: Normal pulses. Heart sounds: Normal heart sounds. Pulmonary:      Effort: Pulmonary effort is normal.      Breath sounds: Coarse breath sounds bilaterally  Abdominal:      General: Bowel sounds are normal. There is no distension. Palpations: Abdomen is soft. Musculoskeletal:         Cervical back:  No rigidity. Right lower leg: No edema. Left lower leg: No edema. Skin:     General: Skin is warm and dry. Coloration: Skin is not jaundiced. Neurological:      Mental Status: Mental status is at baseline.    Past Medical History:     Past Medical History:   Diagnosis Date    Arthritis     Cancer [facility])     bladder 1980s    Cerebral artery occlusion with cerebral infarction [facility])     TIA 2010    Chronic kidney disease     COPD (chronic obstructive pulmonary disease) ([facility] [place] [address]     Hypertension     Pneumonia     Psychiatric problem     depression, social anxiety    Seizures ([facility] [address]        Past Surgical  History:     Past Surgical History:   Procedure Laterality Date    ABDOMEN SURGERY      BACK SURGERY      spinal surgery 2005     CAROTID ENDARTERECTOMY Left 09/20/2016    COLECTOMY N/A 5/28/2021    Exploratory Laparotomy. Release of adhesive band with release of small bowel obstruction. Small bowel resection with primary anastomosis performed by Cheryl Desai MD at [facility] COLONOSCOPY N/A 8/31/2018    COLONOSCOPY POLYPECTOMY COLD BIOPSY performed by Stepan Jaffe MD at [address]      left face    GASTROSTOMY TUBE PLACEMENT  04/15/2020    HERNIA REPAIR      HIATAL HERNIA REPAIR      INGUINAL HERNIA REPAIR Bilateral     INSERT MIDLINE CATHETER  8/4/2021         OTHER SURGICAL HISTORY      mesh infected in inguinal canal, had to remove. Removed testiscle at this time.      TONSILLECTOMY      UPPER GASTROINTESTINAL ENDOSCOPY  04/15/2020       EGD CONTROL HEMORRHAGE    UPPER GASTROINTESTINAL ENDOSCOPY  4/15/2020    EGD CONTROL HEMORRHAGE performed by Marney Hammans, MD at [address] [facility]  4/15/2020    EGD ESOPHAGOGASTRODUODENOSCOPY PEG TUBE INSERTION performed by Marney Hammans, MD at [facility]       Medications:      magnesium oxide  400 mg Oral Daily    doxycycline monohydrate  100 mg Oral 2 times per day    chlorothiazide (DIURIL) IVPB  250 mg IntraVENous Q12H    potassium chloride  20 mEq Per G Tube Daily    miconazole   Topical BID    apixaban  5 mg Oral BID    metoprolol tartrate  50 mg Oral BID    QUEtiapine  25 mg Oral Nightly    nystatin  5 mL Oral 4x Daily    piperacillin-tazobactam  3,375 mg IntraVENous Q8H    pantoprazole  40 mg IntraVENous BID    And    sodium chloride (PF)  10 mL IntraVENous BID       Social History:     Social History     Socioeconomic History    Marital status:      Spouse name: Not on file    Number of children: Not on file    Years of education: Not on file    Highest education level: Not on file   Occupational History    Not on file   Tobacco Use    Smoking status: Former Smoker     Packs/day: 1.00     Years: 60.00     Pack years: 60.00     Types: Cigarettes     Start date:      Quit date: 2020     Years since quittin.5    Smokeless tobacco: Never Used   Vaping Use    Vaping Use: Never used   Substance and Sexual Activity    Alcohol use: No    Drug use: No    Sexual activity: Yes     Partners: Female   Other Topics Concern    Not on file   Social History Narrative    Not on file     Social Determinants of Health     Financial Resource Strain: Low Risk     Difficulty of Paying Living Expenses: Not hard at all   Food Insecurity: No Food Insecurity    Worried About [employer] in the Last Year: Never true    Blas of Food in the Last Year: Never true   Transportation Needs: No Transportation Needs    Lack of Transportation (Medical): No    Lack of Transportation (Non-Medical): No   Physical Activity: Inactive    Days of Exercise per Week: 0 days    Minutes of Exercise per Session: 0 min   Stress: Stress Concern Present    Feeling of Stress : Very much   Social Connections: Moderately Isolated    Frequency of Communication with Friends and Family: More than three times a week    Frequency of Social Gatherings with Friends and Family: More than three times a week    Attends Holiness Services: More than 4 times per year    Active Member of Clubs or Organizations: No    Attends Club or Organization Meetings: Never    Marital Status:     Intimate Partner Violence: Not At Risk    Fear of Current or Ex-Partner: No    Emotionally Abused: No    Physically Abused: No    Sexually Abused: No       Family History:     Family History   Problem Relation Age of Onset    Arthritis Mother     Atrial Fibrillation Mother     Hearing Loss Mother     Heart Disease Mother  Stroke Mother     Vision Loss Mother     Arthritis Father     Cancer Father     Heart Disease Father     High Blood Pressure Father     Stroke Father     Vision Loss Father       Medical Decision Making:   I have independently reviewed/ordered the following labs:    CBC with Differential:   Recent Labs     11/01/21  0517 11/02/21 0513   WBC 10.2 9.3   HGB 8.0* 9.6*   HCT 24.1* 29.4*    326   LYMPHOPCT 9* 10*   MONOPCT 6 9*     BMP:  Recent Labs     11/01/21  0517 11/02/21  0513    140   K 4.0 4.4    106   CO2 26 23   BUN 34* 34*   CREATININE 0.90 0.92   MG  --  1.7     Hepatic Function Panel:   No results for input(s): PROT, LABALBU, BILIDIR, IBILI, BILITOT, ALKPHOS, ALT, AST in the last 72 hours. No results for input(s): RPR in the last 72 hours. No results for input(s): HIV in the last 72 hours. No results for input(s): BC in the last 72 hours. Lab Results   Component Value Date    CREATININE 0.92 11/02/2021    GLUCOSE 104 11/02/2021       Detailed results: Thank you for allowing us to participate in the care of this patient. Please call with questions. This note is created with the assistance of a speech recognition program.  While intending to generate adocument that actually reflects the content of the visit, the document can still have some errors including those of syntax and sound a like substitutions which may escape proof reading. It such instances, actual meaningcan be extrapolated by contextual diversion.     Mine Sutherland MD  Office: [phone]  Perfect serve / office [phone]

## 2021-11-02 NOTE — CARE COORDINATION
Transport is set for this patient to go home with Hospice on this date. The patient is scheduled to be picked up at 4:00 pm via stretcher with O2 by [facility]. Hospice will then meet at the house at 5:30 pm.  MICHELLE informed Gloria Mora, patient's sister of  time and also that Hospice will be there at 5:30 to open the case. MICHELLE also informed [facility] of the DC/transport time. ADD: MICHELLE Pacheco received a call from Cait at [facility] that they were behind and had to hand over transport to [place]. Patients new pickup time is at 3:30pm. MICHELLE frias informed the [facility] of the change, patients nurse Pascual Harmon RN, and called to inform the patients sister Gloria Mora of the change. MICHELLE Frias also informed [facility] of the change in [place] time.      MICHELLE Pacheco

## 2021-11-02 NOTE — PROGRESS NOTES
Comprehensive Nutrition Assessment    Type and Reason for Visit:  Reassess    Nutrition Recommendations/Plan: Continue tube feeding regimen as ordered. Nutrition Assessment:  Pt being discharged today with Hospice. Malnutrition Assessment:  Malnutrition Status: At risk for malnutrition (Comment)    Context:  Chronic Illness     Findings of the 6 clinical characteristics of malnutrition:  Energy Intake:  Unable to assess  Weight Loss:  No significant weight loss     Body Fat Loss:  Unable to assess     Muscle Mass Loss:  Unable to assess    Fluid Accumulation:  1 - Mild Extremities   Strength:  Not Performed    Estimated Daily Nutrient Needs:  Energy (kcal): [place] x 1.2= 2000 kcal; Weight Used for Energy Requirements:  Admission     Protein (g):  1.5g/kg= 135-140 g; Weight Used for Protein Requirements:  Ideal          Nutrition Related Findings:  mild edema BLE, Labs/Meds: Reviewed, BM 1025 (watery/black)      Wounds:  Pressure Injury, Stage II, Surgical Incision       Current Nutrition Therapies:    ADULT TUBE FEEDING; PEG; Standard with Fiber; Continuous; 20; Yes; 20; Q 4 hours; 50; 250; Q 4 hours; Protein; give protein modular twice daily at 8 am and 2pm with 30 ml free water before and after.   Current Tube Feeding (TF) Orders:  · Feeding Route: PEG  · Formula: Standard with Fiber  · Schedule: Continuous  · Additives/Modulars: Protein  · Water Flushes: 60 ml every 4 hours (also on IV fluids)  · Goal TF & Flush Orders Provides: Jevity 1.5 at 50 ml per hour with 2 protein moduluars provides 2008 kcal, 129 g protein      Anthropometric Measures:  · Height: 6' 4\" (193 cm)  · Current Body Weight: 181 lb (82.1 kg)   · Admission Body Weight: 181 lb (82.1 kg)    · Usual Body Weight: 183 lb (83 kg) (5/21)     · Ideal Body Weight: 202 lbs; % Ideal Body Weight     · BMI: 22  · BMI Categories: Normal Weight (BMI 22.0 to 24.9) age over 72       Nutrition Diagnosis:   · Inadequate oral intake related to (current medical condition) as evidenced by NPO or clear liquid status due to medical condition, nutrition support - enteral nutrition      Nutrition Interventions:   Food and/or Nutrient Delivery:  Continue Current Tube Feeding  Nutrition Education/Counseling:  No recommendation at this time   Coordination of Nutrition Care:  Continue to monitor while inpatient    Goals:  provide more than 75% of nutrition needs       Nutrition Monitoring and Evaluation:   Food/Nutrient Intake Outcomes:  Enteral Nutrition Intake/Tolerance  Physical Signs/Symptoms Outcomes:  Biochemical Data, GI Status, Skin, Weight, Fluid Status or Edema     Discharge Planning:    Enteral Nutrition     Some areas of assessment may be incomplete due to COVID-19 precautions. LUCÍA Saldivar R.D..   Clinical Dietitian  Office: [phone]

## 2021-11-02 NOTE — PROGRESS NOTES
Department of Internal Medicine  Nephrology Lobo Calvin MD  Progress Note    Reason for consultation: Management of acute kidney injury superimposed on chronic kidney disease stage 4..    Consulting physician: Korey Skinner MD    Attending physician: John Dutton MD.    Interval history:  Patient was seen and examined today. He is on tube feeding   BP stable. Patient is  non verbal.   On free water  He has indwelling Issa catheter which was exchanged on admission and he is nonoliguric. Stable hemoglobin. Sodium improved 140  Edema improved  On IV Diuril good urine output, 5.9 L urine output yesterday in 24 hours    History of present illness: This is a 77 y.o. male with a significant past medical history of Systemic hypertension, chronic obstructive pulmonary disease, Bladder cancer, seizure disorder, osteoarthritis and s/p Cerebrovascular accident [has G-tube in place], who presented to the hospital on 5/24/2021 with complaints of confusion and disorientation. Apparently patient lives at home alone but family lives nearby. Laboratory studies at presentation were remarkable for serum sodium 129 mmol/L, serum bicarbonate 19 mmol/L and elevated BUN/creatinine 102/4.36 mg/dL. I had seen patient during the prior presentation in May 2021 for acute kidney injury superimposed on chronic kidney disease stage IV and at that time serum creatinine was greater than 4 mg/dL. CODE STATUS is DNR CCA. Blood pressure at presentation this time was 114/45 work patient subsequently developed hypotension and is currently on Levophed drip at 6 mcg/min. He has a chronic indwelling Issa catheter and is nonoliguric. He is a poor historian and history was obtained primarily by chart review.     Scheduled Meds:   magnesium oxide  400 mg Oral Daily    chlorothiazide (DIURIL) IVPB  250 mg IntraVENous Q12H    potassium chloride  20 mEq Per G Tube Daily    miconazole   Topical BID    apixaban  5 mg Oral BID    metoprolol tartrate  50 mg Oral BID    QUEtiapine  25 mg Oral Nightly    nystatin  5 mL Oral 4x Daily    pantoprazole  40 mg IntraVENous BID    And    sodium chloride (PF)  10 mL IntraVENous BID     Continuous Infusions:   sodium chloride       Physical Exam:    VITALS:  /73   Pulse 68   Temp 98 °F (36.7 °C) (Axillary)   Resp 20   Ht 6' 4\" (1.93 m)   Wt 192 lb 0.3 oz (87.1 kg)   SpO2 98%   BMI 23.37 kg/m²   24HR INTAKE/OUTPUT:      Intake/Output Summary (Last 24 hours) at 11/2/2021 1558  Last data filed at 11/2/2021 1505  Gross per 24 hour   Intake 600 ml   Output 6150 ml   Net -5550 ml     Constitutional: fatigued, flushed and slowed mentation    Skin: Skin color, texture, turgor normal. No rashes or lesions    Head: Normocephalic, without obvious abnormality, atraumatic     Cardiovascular/Edema: regular rate and rhythm, S1, S2 normal, no murmur, click, rub or gallop    Respiratory: Lungs: clear to auscultation bilaterally    Abdomen: soft, non-tender; bowel sounds normal; no masses,  no organomegaly    Back: symmetric, no curvature. ROM normal. No CVA tenderness.     Extremities: extremities normal, atraumatic, no cyanosis + edema    Neuro:  Grossly normal    CBC:   Recent Labs     10/31/21  0545 11/01/21  0517 11/02/21  0513   WBC 12.1* 10.2 9.3   HGB 8.0* 8.0* 9.6*    297 326     BMP:    Recent Labs     10/31/21  0545 11/01/21  0517 11/02/21  0513    136 140   K 3.5* 4.0 4.4    100 106   CO2 28 26 23   BUN 46* 34* 34*   CREATININE 0.95 0.90 0.92   GLUCOSE 118* 132* 104*     Lab Results   Component Value Date    NITRU NEGATIVE 10/25/2021    COLORU Yellow 10/25/2021    PHUR 5.0 10/25/2021    WBCUA 5 TO 10 10/25/2021    RBCUA 5 TO 10 10/25/2021    MUCUS NOT REPORTED 10/25/2021    TRICHOMONAS NOT REPORTED 10/25/2021    YEAST MODERATE 10/25/2021    BACTERIA FEW 10/25/2021    CLARITYU cloudy 09/07/2021    SPECGRAV 1.010 10/25/2021    LEUKOCYTESUR TRACE 10/25/2021    UROBILINOGEN Normal 10/25/2021    BILIRUBINUR NEGATIVE 10/25/2021    BLOODU ++ 09/07/2021    GLUCOSEU NEGATIVE 10/25/2021    KETUA NEGATIVE 10/25/2021    AMORPHOUS NOT REPORTED 10/25/2021     Urine Sodium:     Lab Results   Component Value Date    HELENA 78 10/25/2021     Urine Potassium:  No results found for: NATASHA  Urine Chloride:    Lab Results   Component Value Date    CLUR 87 10/25/2021     Urine Creatinine:     Lab Results   Component Value Date    LABCREA 32.6 10/25/2021     IMPRESSION/RECOMMENDATIONS:      1. Acute kidney injury superimposed on chronic kidney disease stage 3 [baseline serum creatinine 1.28 mg/dL on 8/11/2021] - Top differential is prerenal azotemia from poor oral intake as well as ischemic acute tubular necrosis. He has chronic indwelling Issa catheter and CT scan showed atrophic right kidney with bilateral nephrolithiasis but no hydronephrosis. Non Oliguric today-Scr is down to 0.9 mg/dl stable       2. Proteus Mirabella's and Pseudomonas aeruginosa urinary tract infection - Continue IV Rocephin    3. Klebsiella pneumonia bacteremia - Continue IV antibiotics    4. Hypotension-resolved    5. Normocytic anemia - iron deficiency anemia  S/p 1 Unit PRBC    6. Hypokalemia - KCL per sliding scale. 7.Hypernatremia- improved      Plan   free water 20 mg q 6 hrly  DC  IVF  Strict I/Os-monitor serum sodium at 4 p.m  Monitor urine output. KCL per sliding scale. Tube feeding  CONTINUE IV Diuril    Prognosis is guarded.     Toma Parish MD   Attending Nephrologist  11/2/2021 3:58 PM

## 2021-11-03 ENCOUNTER — CARE COORDINATION (OUTPATIENT)
Dept: CASE MANAGEMENT | Age: 76
End: 2021-11-03

## 2021-11-03 NOTE — CARE COORDINATION
[facility] Transitions Initial Follow Up Call    Call within 2 business days of discharge: Yes    Patient: Jo Mayen Patient : 1945   MRN: [id number]  Reason for Admission: septic shock  Discharge Date: 21 RARS: Readmission Risk Score: 17.5      Last Discharge [facility]       Complaint Diagnosis Description Type Department Provider    10/22/21 Altered Mental Status Septic shock ([facility] [address] . .. ED to Hosp-Admission (Discharged) (ADMITTED) Governor Bradley MD; Yady Hager. .. Spoke with: Son Gisella Ovalles who states patient is doing well he states they have hospice of [facility] at present time. CTN contacted [facility] admission date for patient services verified on 2021. Facility: [facility]    Non-face-to-face services provided:  Obtained and reviewed discharge summary and/or continuity of care documents  Establishment or re-establishment of referrals-[facility] verified    Care Transitions 24 Hour Call    Do you have all of your prescriptions and are they filled?: Yes  Care Transitions Interventions         Follow Up  No future appointments.     Sera Contreras LPN

## 2021-11-12 ENCOUNTER — HOSPITAL ENCOUNTER (OUTPATIENT)
Age: 76
Setting detail: SPECIMEN
Discharge: HOME | End: 2021-11-12
Payer: MEDICARE

## 2021-11-12 LAB
ABSOLUTE EOS #: 0.23 K/UL (ref 0–0.44)
ABSOLUTE IMMATURE GRANULOCYTE: 0.05 K/UL (ref 0–0.3)
ABSOLUTE LYMPH #: 0.98 K/UL (ref 1.1–3.7)
ABSOLUTE MONO #: 0.82 K/UL (ref 0.1–1.2)
BASOPHILS # BLD: 0 % (ref 0–2)
BASOPHILS ABSOLUTE: 0.04 K/UL (ref 0–0.2)
DIFFERENTIAL TYPE: ABNORMAL
EOSINOPHILS RELATIVE PERCENT: 2 % (ref 1–4)
HCT VFR BLD CALC: 30 % (ref 40.7–50.3)
HEMOGLOBIN: 9.2 G/DL (ref 13–17)
IMMATURE GRANULOCYTES: 1 %
LYMPHOCYTES # BLD: 10 % (ref 24–43)
MCH RBC QN AUTO: 28.2 PG (ref 25.2–33.5)
MCHC RBC AUTO-ENTMCNC: 30.7 G/DL (ref 28.4–34.8)
MCV RBC AUTO: 92 FL (ref 82.6–102.9)
MONOCYTES # BLD: 8 % (ref 3–12)
NRBC AUTOMATED: 0 PER 100 WBC
PDW BLD-RTO: 15.6 % (ref 11.8–14.4)
PLATELET # BLD: 361 K/UL (ref 138–453)
PLATELET ESTIMATE: ABNORMAL
PMV BLD AUTO: 10 FL (ref 8.1–13.5)
RBC # BLD: 3.26 M/UL (ref 4.21–5.77)
RBC # BLD: ABNORMAL 10*6/UL
SEG NEUTROPHILS: 79 % (ref 36–65)
SEGMENTED NEUTROPHILS ABSOLUTE COUNT: 7.79 K/UL (ref 1.5–8.1)
WBC # BLD: 9.9 K/UL (ref 3.5–11.3)
WBC # BLD: ABNORMAL 10*3/UL

## 2024-12-23 NOTE — DISCHARGE INSTR - ACTIVITY
4 Eyes Skin Assessment     NAME:  Annamarie Velásquez  YOB: 1945  MEDICAL RECORD NUMBER:  [id number]    The patient is being assessed for  Shift Handoff    I agree that at least one RN has performed a thorough Head to Toe Skin Assessment on the patient. ALL assessment sites listed below have been assessed.      Areas assessed by both nurses:    Head, Face, Ears, Shoulders, Back, Chest, Arms, Elbows, Hands, Sacrum. Buttock, Coccyx, Ischium, Legs. Feet and Heels, and Under Medical Devices         Does the Patient have a Wound? No noted wound(s)       Jamaal Prevention initiated by RN: Yes  Wound Care Orders initiated by RN: No    Pressure Injury (Stage 3,4, Unstageable, DTI, NWPT, and Complex wounds) if present, place Wound referral order by RN under : No    New Ostomies, if present place, Ostomy referral order under : No     Nurse 1 eSignature: Electronically signed by Karina Perez RN on 12/23/24 at 7:56 AM EST    **SHARE this note so that the co-signing nurse can place an eSignature**    Nurse 2 eSignature: Electronically signed by Janine Blankenship RN on 12/23/24 at 7:55 PM EST     As per hospice.

## (undated) DEVICE — BLANKET WRM W29.9XL79.1IN UP BODY FORC AIR MISTRAL-AIR

## (undated) DEVICE — RELOAD STPL L55MM OPN H3.8MM CLS H1.5MM WIRE DIA0.2MM REG

## (undated) DEVICE — CLIP INT L235CM WRK CHN DIA2.8MM OPN 11MM BRAID CATH ROT BX/10

## (undated) DEVICE — CANNULA NSL AD TBNG L7FT PVC STR NONFLARED PRNG O2 DEL W STD

## (undated) DEVICE — SUTURE VCRL + SZ 2-0 L54IN ABSRB VLT TIE POLYGLACTIN 910 VCP286G

## (undated) DEVICE — YANKAUER,POOLE TIP,STERILE,50/CS: Brand: MEDLINE

## (undated) DEVICE — GOWN,POLY REINFORCED,LG: Brand: MEDLINE

## (undated) DEVICE — SINGLE PORT MANIFOLD: Brand: NEPTUNE 2

## (undated) DEVICE — ST CHARLES MAJOR ABDOMINAL PK: Brand: MEDLINE INDUSTRIES, INC.

## (undated) DEVICE — Z DISCONTINUED USE 2424143 ADAPTER O2 SWVL CHRISTMAS TREE GRN

## (undated) DEVICE — STAPLER INT L60MM REG TISS BLU B FRM 8 FIRING 2 ROW AUTO

## (undated) DEVICE — PAD,NON-ADHERENT,3X8,STERILE,LF,1/PK: Brand: MEDLINE

## (undated) DEVICE — BINDER ABD UNISX 9IN 62IN L AND XL UNIV

## (undated) DEVICE — KIT DRN FLAT W/ 100CC EVAC 10MM FULL PERF

## (undated) DEVICE — DRAPE,MEDI-SLUSH,STERILE: Brand: MEDLINE

## (undated) DEVICE — GOWN,AURORA,NONREINFORCED,LARGE: Brand: MEDLINE

## (undated) DEVICE — MERCY HEALTH ST CHARLES: Brand: MEDLINE INDUSTRIES, INC.

## (undated) DEVICE — SOLUTION IV IRRIG POUR BRL 0.9% SODIUM CHL 2F7124

## (undated) DEVICE — SUTURE PDS II SZ 0 L60IN ABSRB VLT L48MM CTX 1/2 CIR Z990G

## (undated) DEVICE — SUTURE PERMAHAND SZ 3-0 L18IN NONABSORBABLE BLK L26MM SH C013D

## (undated) DEVICE — SPONGE DRN W4XL4IN RAYON/POLYESTER 6 PLY NONWOVEN PRECUT

## (undated) DEVICE — STAPLER INT L55MM CUT LN L53MM STPL LN L57MM BLU B FRM 8

## (undated) DEVICE — GLOVE ORTHO 7 1/2   MSG9475

## (undated) DEVICE — BIPOLAR ELECTROHEMOSTASIS CATHETER: Brand: GOLD PROBE

## (undated) DEVICE — JELLY,LUBE,STERILE,FLIP TOP,TUBE,2-OZ: Brand: MEDLINE

## (undated) DEVICE — FORCEPS BX L240CM JAW DIA2.4MM ORNG L CAP W/ NDL DISP RAD

## (undated) DEVICE — SUTURE PERMAHAND SZ 0 L30IN NONABSORBABLE BLK FSL L30MM 3/8 680H

## (undated) DEVICE — GLOVE ORANGE PI 7   MSG9070

## (undated) DEVICE — Z DUPLICATE USE 2527422 TUBING O2 STD 7 FT EXTN NO CRUSH VISUAL CNTRST LF

## (undated) DEVICE — DEFENDO AIR WATER SUCTION AND BIOPSY VALVE KIT FOR  OLYMPUS: Brand: DEFENDO AIR/WATER/SUCTION AND BIOPSY VALVE

## (undated) DEVICE — SNARE ENDOSCP L240CM LOOP W13MM DIA2.4MM SHT THROW SM OVL

## (undated) DEVICE — SOLUTION IV IRRIG WATER 1000ML POUR BRL 2F7114

## (undated) DEVICE — Z INACTIVE USE 2525529 CONNECTOR TBNG FOR O2

## (undated) DEVICE — DRESSING TRNSPAR W4XL10IN FLM MIC POR SURESITE 123

## (undated) DEVICE — MIC* SAFETY PERCUTANEOUS ENDOSCOPIC GASTROSTOMY PEG KIT - 20 FR - PULL: Brand: MIC PEG TUBE

## (undated) DEVICE — Z DUP USE 2275256 TRANSPARENT DRESSING  4X4.5IN  BULK

## (undated) DEVICE — Device

## (undated) DEVICE — SUTURE PDS + SZ 4 0 L27IN ABSRB VLT L26MM SH 1 2 CIR PDP315H